# Patient Record
Sex: FEMALE | Race: BLACK OR AFRICAN AMERICAN | Employment: OTHER | URBAN - METROPOLITAN AREA
[De-identification: names, ages, dates, MRNs, and addresses within clinical notes are randomized per-mention and may not be internally consistent; named-entity substitution may affect disease eponyms.]

---

## 2017-01-03 ENCOUNTER — TELEPHONE (OUTPATIENT)
Dept: CARDIOLOGY CLINIC | Age: 62
End: 2017-01-03

## 2017-01-06 ENCOUNTER — PROCEDURE VISIT (OUTPATIENT)
Dept: CARDIOLOGY CLINIC | Age: 62
End: 2017-01-06

## 2017-01-06 DIAGNOSIS — Z95.810 CARDIAC DEFIBRILLATOR IN PLACE: ICD-10-CM

## 2017-01-19 ENCOUNTER — OFFICE VISIT (OUTPATIENT)
Dept: CARDIOLOGY CLINIC | Age: 62
End: 2017-01-19

## 2017-01-19 VITALS
BODY MASS INDEX: 42.69 KG/M2 | OXYGEN SATURATION: 97 % | SYSTOLIC BLOOD PRESSURE: 120 MMHG | HEIGHT: 67 IN | HEART RATE: 109 BPM | DIASTOLIC BLOOD PRESSURE: 62 MMHG | WEIGHT: 272 LBS

## 2017-01-19 DIAGNOSIS — I48.0 PAROXYSMAL ATRIAL FIBRILLATION (HCC): ICD-10-CM

## 2017-01-19 DIAGNOSIS — R06.02 SHORTNESS OF BREATH: Primary | ICD-10-CM

## 2017-01-19 PROCEDURE — 99213 OFFICE O/P EST LOW 20 MIN: CPT | Performed by: NURSE PRACTITIONER

## 2017-01-19 PROCEDURE — 93000 ELECTROCARDIOGRAM COMPLETE: CPT | Performed by: NURSE PRACTITIONER

## 2017-02-06 ENCOUNTER — TELEPHONE (OUTPATIENT)
Dept: CARDIOLOGY CLINIC | Age: 62
End: 2017-02-06

## 2017-04-20 ENCOUNTER — OFFICE VISIT (OUTPATIENT)
Dept: CARDIOLOGY CLINIC | Age: 62
End: 2017-04-20

## 2017-04-20 VITALS
OXYGEN SATURATION: 97 % | SYSTOLIC BLOOD PRESSURE: 140 MMHG | DIASTOLIC BLOOD PRESSURE: 80 MMHG | HEART RATE: 90 BPM | WEIGHT: 274 LBS | BODY MASS INDEX: 43.56 KG/M2

## 2017-04-20 DIAGNOSIS — Z95.810 AUTOMATIC IMPLANTABLE CARDIOVERTER-DEFIBRILLATOR IN SITU: ICD-10-CM

## 2017-04-20 DIAGNOSIS — I48.0 PAROXYSMAL ATRIAL FIBRILLATION (HCC): ICD-10-CM

## 2017-04-20 DIAGNOSIS — R06.02 SHORTNESS OF BREATH: Primary | ICD-10-CM

## 2017-04-20 DIAGNOSIS — I50.22 CHRONIC SYSTOLIC (CONGESTIVE) HEART FAILURE (HCC): ICD-10-CM

## 2017-04-20 DIAGNOSIS — I10 ESSENTIAL HYPERTENSION: ICD-10-CM

## 2017-04-20 PROCEDURE — 93290 INTERROG DEV EVAL ICPMS IP: CPT | Performed by: NURSE PRACTITIONER

## 2017-04-20 PROCEDURE — 99214 OFFICE O/P EST MOD 30 MIN: CPT | Performed by: NURSE PRACTITIONER

## 2017-04-20 RX ORDER — METOPROLOL TARTRATE 100 MG/1
150 TABLET ORAL 2 TIMES DAILY
Qty: 270 TABLET | Refills: 3 | Status: SHIPPED | OUTPATIENT
Start: 2017-04-20 | End: 2018-05-30 | Stop reason: SDUPTHER

## 2017-04-20 RX ORDER — POTASSIUM CHLORIDE 750 MG/1
10 TABLET, EXTENDED RELEASE ORAL DAILY
Qty: 90 TABLET | Refills: 3 | Status: SHIPPED | OUTPATIENT
Start: 2017-04-20 | End: 2018-06-28

## 2017-04-20 RX ORDER — ATORVASTATIN CALCIUM 40 MG/1
40 TABLET, FILM COATED ORAL DAILY
Qty: 90 TABLET | Refills: 3 | Status: SHIPPED | OUTPATIENT
Start: 2017-04-20 | End: 2018-06-28

## 2017-04-20 RX ORDER — LISINOPRIL 5 MG/1
TABLET ORAL
Qty: 90 TABLET | Refills: 3 | Status: SHIPPED | OUTPATIENT
Start: 2017-04-20 | End: 2018-06-28

## 2017-04-20 RX ORDER — FUROSEMIDE 20 MG/1
20 TABLET ORAL DAILY
Qty: 90 TABLET | Refills: 3 | Status: SHIPPED | OUTPATIENT
Start: 2017-04-20 | End: 2018-05-30 | Stop reason: SDUPTHER

## 2017-06-02 ENCOUNTER — TELEPHONE (OUTPATIENT)
Dept: SLEEP MEDICINE | Age: 62
End: 2017-06-02

## 2018-05-30 ENCOUNTER — PROCEDURE VISIT (OUTPATIENT)
Dept: CARDIOLOGY CLINIC | Age: 63
End: 2018-05-30

## 2018-05-30 ENCOUNTER — HOSPITAL ENCOUNTER (OUTPATIENT)
Dept: OTHER | Age: 63
Discharge: HOME | End: 2018-05-30
Attending: NURSE PRACTITIONER | Admitting: NURSE PRACTITIONER

## 2018-05-30 ENCOUNTER — OFFICE VISIT (OUTPATIENT)
Dept: CARDIOLOGY CLINIC | Age: 63
End: 2018-05-30

## 2018-05-30 VITALS
WEIGHT: 275 LBS | BODY MASS INDEX: 43.16 KG/M2 | DIASTOLIC BLOOD PRESSURE: 64 MMHG | OXYGEN SATURATION: 94 % | HEART RATE: 101 BPM | SYSTOLIC BLOOD PRESSURE: 122 MMHG | HEIGHT: 67 IN

## 2018-05-30 DIAGNOSIS — I48.0 PAROXYSMAL ATRIAL FIBRILLATION (HCC): ICD-10-CM

## 2018-05-30 DIAGNOSIS — I47.29 VENTRICULAR TACHYCARDIA, NONSUSTAINED: ICD-10-CM

## 2018-05-30 DIAGNOSIS — R06.02 SHORTNESS OF BREATH: Primary | ICD-10-CM

## 2018-05-30 DIAGNOSIS — Z95.810 CARDIAC DEFIBRILLATOR IN PLACE: ICD-10-CM

## 2018-05-30 DIAGNOSIS — I10 ESSENTIAL HYPERTENSION: ICD-10-CM

## 2018-05-30 LAB
A/G RATIO: 1.1 (ref 1.1–2.2)
ALBUMIN SERPL-MCNC: 4 G/DL (ref 3.4–5)
ALP BLD-CCNC: 92 U/L (ref 40–129)
ALT SERPL-CCNC: 51 U/L (ref 10–40)
ANION GAP SERPL CALCULATED.3IONS-SCNC: 15 MMOL/L (ref 3–16)
AST SERPL-CCNC: 29 U/L (ref 15–37)
BILIRUB SERPL-MCNC: 0.4 MG/DL (ref 0–1)
BUN BLDV-MCNC: 8 MG/DL (ref 7–20)
CALCIUM SERPL-MCNC: 9.3 MG/DL (ref 8.3–10.6)
CHLORIDE BLD-SCNC: 100 MMOL/L (ref 99–110)
CO2: 28 MMOL/L (ref 21–32)
CREAT SERPL-MCNC: 0.5 MG/DL (ref 0.6–1.2)
GFR AFRICAN AMERICAN: >60
GFR NON-AFRICAN AMERICAN: >60
GLOBULIN: 3.7 G/DL
GLUCOSE BLD-MCNC: 92 MG/DL (ref 70–99)
MAGNESIUM: 2 MG/DL (ref 1.8–2.4)
POTASSIUM SERPL-SCNC: 3.5 MMOL/L (ref 3.5–5.1)
SODIUM BLD-SCNC: 143 MMOL/L (ref 136–145)
TOTAL PROTEIN: 7.7 G/DL (ref 6.4–8.2)
TSH SERPL DL<=0.05 MIU/L-ACNC: 5.64 UIU/ML (ref 0.27–4.2)

## 2018-05-30 PROCEDURE — 99214 OFFICE O/P EST MOD 30 MIN: CPT | Performed by: NURSE PRACTITIONER

## 2018-05-30 PROCEDURE — 93283 PRGRMG EVAL IMPLANTABLE DFB: CPT | Performed by: INTERNAL MEDICINE

## 2018-05-30 RX ORDER — ATORVASTATIN CALCIUM 40 MG/1
40 TABLET, FILM COATED ORAL DAILY
Qty: 90 TABLET | Refills: 3 | Status: CANCELLED | OUTPATIENT
Start: 2018-05-30

## 2018-05-30 RX ORDER — METOPROLOL TARTRATE 100 MG/1
150 TABLET ORAL 2 TIMES DAILY
Qty: 270 TABLET | Refills: 3 | Status: CANCELLED | OUTPATIENT
Start: 2018-05-30

## 2018-05-30 RX ORDER — FUROSEMIDE 20 MG/1
20 TABLET ORAL DAILY
Qty: 30 TABLET | Refills: 3 | Status: SHIPPED | OUTPATIENT
Start: 2018-05-30 | End: 2018-06-28 | Stop reason: SDUPTHER

## 2018-05-30 RX ORDER — METOPROLOL TARTRATE 50 MG/1
50 TABLET, FILM COATED ORAL 2 TIMES DAILY
Qty: 60 TABLET | Refills: 3 | Status: SHIPPED | OUTPATIENT
Start: 2018-05-30 | End: 2018-06-28 | Stop reason: SDUPTHER

## 2018-05-30 RX ORDER — FUROSEMIDE 20 MG/1
20 TABLET ORAL DAILY
Qty: 90 TABLET | Refills: 3 | Status: CANCELLED | OUTPATIENT
Start: 2018-05-30

## 2018-05-30 RX ORDER — POTASSIUM CHLORIDE 750 MG/1
10 TABLET, EXTENDED RELEASE ORAL DAILY
Qty: 90 TABLET | Refills: 3 | Status: CANCELLED | OUTPATIENT
Start: 2018-05-30

## 2018-05-30 RX ORDER — LISINOPRIL 5 MG/1
TABLET ORAL
Qty: 90 TABLET | Refills: 3 | Status: CANCELLED | OUTPATIENT
Start: 2018-05-30

## 2018-06-01 ENCOUNTER — TELEPHONE (OUTPATIENT)
Dept: CARDIOLOGY CLINIC | Age: 63
End: 2018-06-01

## 2018-06-01 DIAGNOSIS — E78.5 HYPERLIPIDEMIA, UNSPECIFIED HYPERLIPIDEMIA TYPE: Primary | ICD-10-CM

## 2018-06-07 ENCOUNTER — TELEPHONE (OUTPATIENT)
Dept: CARDIOLOGY CLINIC | Age: 63
End: 2018-06-07

## 2018-06-28 ENCOUNTER — OFFICE VISIT (OUTPATIENT)
Dept: CARDIOLOGY CLINIC | Age: 63
End: 2018-06-28

## 2018-06-28 VITALS
OXYGEN SATURATION: 97 % | HEIGHT: 66 IN | BODY MASS INDEX: 44.37 KG/M2 | SYSTOLIC BLOOD PRESSURE: 122 MMHG | WEIGHT: 276.1 LBS | DIASTOLIC BLOOD PRESSURE: 72 MMHG | HEART RATE: 98 BPM

## 2018-06-28 DIAGNOSIS — I48.0 PAROXYSMAL ATRIAL FIBRILLATION (HCC): ICD-10-CM

## 2018-06-28 DIAGNOSIS — I10 ESSENTIAL HYPERTENSION: ICD-10-CM

## 2018-06-28 DIAGNOSIS — R06.02 SHORTNESS OF BREATH: Primary | ICD-10-CM

## 2018-06-28 DIAGNOSIS — R79.89 ELEVATED TSH: ICD-10-CM

## 2018-06-28 PROCEDURE — 99214 OFFICE O/P EST MOD 30 MIN: CPT | Performed by: NURSE PRACTITIONER

## 2018-06-28 RX ORDER — FUROSEMIDE 20 MG/1
20 TABLET ORAL DAILY
Qty: 90 TABLET | Refills: 3 | Status: SHIPPED | OUTPATIENT
Start: 2018-06-28 | End: 2019-04-02 | Stop reason: DRUGHIGH

## 2018-06-28 RX ORDER — LISINOPRIL 2.5 MG/1
2.5 TABLET ORAL DAILY
Qty: 90 TABLET | Refills: 1 | Status: SHIPPED | OUTPATIENT
Start: 2018-06-28 | End: 2019-04-02 | Stop reason: SDUPTHER

## 2018-06-28 RX ORDER — IBUPROFEN 200 MG
800 TABLET ORAL EVERY 8 HOURS PRN
Status: ON HOLD | COMMUNITY
End: 2020-02-29 | Stop reason: HOSPADM

## 2018-06-28 RX ORDER — ATORVASTATIN CALCIUM 40 MG/1
40 TABLET, FILM COATED ORAL DAILY
Qty: 90 TABLET | Refills: 3 | Status: SHIPPED | OUTPATIENT
Start: 2018-06-28 | End: 2019-04-02 | Stop reason: SDUPTHER

## 2018-06-28 RX ORDER — METOPROLOL TARTRATE 50 MG/1
50 TABLET, FILM COATED ORAL 2 TIMES DAILY
Qty: 180 TABLET | Refills: 3 | Status: SHIPPED | OUTPATIENT
Start: 2018-06-28 | End: 2019-04-02 | Stop reason: SDUPTHER

## 2018-09-21 ENCOUNTER — TELEPHONE (OUTPATIENT)
Dept: CARDIOLOGY CLINIC | Age: 63
End: 2018-09-21

## 2018-09-21 NOTE — TELEPHONE ENCOUNTER
Medication Refill    When was your last appointment with cardiology?  (if 1year or longer, please schedule an appointment)    Medication needing refilled:eliquis     Doseage of the medication:    How are you taking this medication (QD, BID, TID, QID, PRN):    Patient want a 30 or 90 day supply called in:    Which Pharmacy are we sending the medication to:[facility] outpatient pharm     Pharmacy Phone number:    Pharmacy Fax number:  Efarin is paying for her rx today because the patient cant afford it . Efrain wants to know how to get this patient on the patient assistance program so she can get the eliquis cheaper .  pls call Reuben Zavala

## 2018-10-03 ENCOUNTER — HOSPITAL ENCOUNTER (OUTPATIENT)
Age: 63
Discharge: HOME | End: 2018-10-03

## 2018-10-03 ENCOUNTER — OFFICE VISIT (OUTPATIENT)
Dept: CARDIOLOGY CLINIC | Age: 63
End: 2018-10-03
Payer: MEDICARE

## 2018-10-03 VITALS
HEIGHT: 63 IN | DIASTOLIC BLOOD PRESSURE: 78 MMHG | SYSTOLIC BLOOD PRESSURE: 136 MMHG | BODY MASS INDEX: 48.64 KG/M2 | HEART RATE: 80 BPM | WEIGHT: 274.5 LBS

## 2018-10-03 DIAGNOSIS — I10 ESSENTIAL HYPERTENSION: ICD-10-CM

## 2018-10-03 DIAGNOSIS — I47.29 VENTRICULAR TACHYCARDIA, NONSUSTAINED: ICD-10-CM

## 2018-10-03 DIAGNOSIS — Z00.00 WELLNESS EXAMINATION: ICD-10-CM

## 2018-10-03 DIAGNOSIS — R79.89 ELEVATED TSH: ICD-10-CM

## 2018-10-03 DIAGNOSIS — I48.0 PAROXYSMAL ATRIAL FIBRILLATION (HCC): Primary | ICD-10-CM

## 2018-10-03 LAB
A/G RATIO: 1.1 (ref 1.1–2.2)
ALBUMIN SERPL-MCNC: 4 G/DL (ref 3.4–5)
ALP BLD-CCNC: 99 U/L (ref 40–129)
ALT SERPL-CCNC: 14 U/L (ref 10–40)
ANION GAP SERPL CALCULATED.3IONS-SCNC: 14 MMOL/L (ref 3–16)
AST SERPL-CCNC: 18 U/L (ref 15–37)
BILIRUB SERPL-MCNC: 0.4 MG/DL (ref 0–1)
BUN BLDV-MCNC: 9 MG/DL (ref 7–20)
CALCIUM SERPL-MCNC: 9.4 MG/DL (ref 8.3–10.6)
CHLORIDE BLD-SCNC: 102 MMOL/L (ref 99–110)
CO2: 27 MMOL/L (ref 21–32)
CREAT SERPL-MCNC: 0.6 MG/DL (ref 0.6–1.2)
GFR AFRICAN AMERICAN: >60
GFR NON-AFRICAN AMERICAN: >60
GLOBULIN: 3.8 G/DL
GLUCOSE BLD-MCNC: 91 MG/DL (ref 70–99)
POTASSIUM SERPL-SCNC: 3.5 MMOL/L (ref 3.5–5.1)
SODIUM BLD-SCNC: 143 MMOL/L (ref 136–145)
T4 FREE: 1.2 NG/DL (ref 0.9–1.8)
TOTAL PROTEIN: 7.8 G/DL (ref 6.4–8.2)
TSH SERPL DL<=0.05 MIU/L-ACNC: 4.14 UIU/ML (ref 0.27–4.2)

## 2018-10-03 PROCEDURE — 36415 COLL VENOUS BLD VENIPUNCTURE: CPT

## 2018-10-03 PROCEDURE — 80053 COMPREHEN METABOLIC PANEL: CPT

## 2018-10-03 PROCEDURE — 84439 ASSAY OF FREE THYROXINE: CPT

## 2018-10-03 PROCEDURE — 99214 OFFICE O/P EST MOD 30 MIN: CPT | Performed by: NURSE PRACTITIONER

## 2018-10-03 PROCEDURE — 84443 ASSAY THYROID STIM HORMONE: CPT

## 2018-10-03 NOTE — PROGRESS NOTES
edema, no calf tenderness. Pulses are present bilaterally. DATA:      [id number] Score for Atrial Fibrillation Stroke Risk   Risk   Factors  Component Value   C CHF Yes 1   H HTN Yes 1   A2 Age >= 76 No,  (61 y.o.) 0   D DM No 0   S2 Prior Stroke/TIA No 0   V Vascular Disease No 0   A Age 74-69 No,  (61 y.o.) 0   Sc Sex female 1    [id number]  Score  3   Score last updated 10/3/18 8:13 AM      Lab Results   Component Value Date     05/30/2018    K 3.5 05/30/2018     05/30/2018    CO2 28 05/30/2018    BUN 8 05/30/2018    CREATININE 0.5 05/30/2018    GLUCOSE 92 05/30/2018    CALCIUM 9.3 05/30/2018      Lab Results   Component Value Date    TSH 5.64 (H) 05/30/2018     Lab Results   Component Value Date    MG 2.00 05/30/2018       Radiology Review:  Pertinent images / reports were reviewed as a part of this visit and reveals the following:      Last Echo: 2/29/16:  Summary   -LV function analysis is limited due to difficult endocardial visualization.   -Mild concentric left ventricular hypertrophy is present.   -Global ejection fraction is borderline and estimated around 50 %.  -There is reversal of E/A inflow velocities across the mitral valve   suggesting impaired left ventricular relaxation.   -E/e'= 12.2 .   -There is mild mitral and tricuspid regurgitation with RVSP estimated at 35   mmHg.   -The left atrium is at the upper limits of normal in size.   -Pacer / ICD wire is visualized in the right heart. Last Stress Test: 12/12/16:  Summary    -There is a small anterior apical defect that is partially reversible.    -This is likely due to artifact due to shadowing from bowel and liver.    -However, a very small area of ischemia cannot be excluded. -There is mild LV dysfunction with EF= 52%.    -This is a low risk scan. Stress Protocols      Resting ECG  Normal sinus rhythm.       Resting HR:82 bpm    Resting BP:113/71 mmHg     Stress Protocol:Pharmacologic - Lexiscan's      Peak HR:113 bleeding was discussed. Daily weight, low sodium diet were discussed. Patient instructed to call the office with a weight gain: > 3 # over night or 5# in one week; swelling, SOB/orthopnea/PND    The patient verbalizes understanding not to stop medications without discussing with us. Discussed exercise: 30-60 minutes 7 days/week  Discussed Low saturated fat/WILBER diet. Drinks > 2 L / day. Drinks a lot of water or sweet tea    Thank you for allowing to us to participate in the care of [employer].     Audi Tipton CNP    Documentation of today's visit sent to PCP >> no PCP

## 2019-03-18 ENCOUNTER — TELEPHONE (OUTPATIENT)
Dept: CARDIOLOGY CLINIC | Age: 64
End: 2019-03-18

## 2019-04-02 ENCOUNTER — OFFICE VISIT (OUTPATIENT)
Dept: CARDIOLOGY CLINIC | Age: 64
End: 2019-04-02
Payer: MEDICARE

## 2019-04-02 ENCOUNTER — HOSPITAL ENCOUNTER (OUTPATIENT)
Age: 64
Discharge: HOME | End: 2019-04-02
Payer: MEDICARE

## 2019-04-02 ENCOUNTER — PROCEDURE VISIT (OUTPATIENT)
Dept: CARDIOLOGY CLINIC | Age: 64
End: 2019-04-02
Payer: MEDICARE

## 2019-04-02 ENCOUNTER — HOSPITAL ENCOUNTER (OUTPATIENT)
Dept: NON INVASIVE DIAGNOSTICS | Age: 64
Discharge: HOME | End: 2019-04-02
Payer: MEDICARE

## 2019-04-02 VITALS
DIASTOLIC BLOOD PRESSURE: 62 MMHG | OXYGEN SATURATION: 93 % | WEIGHT: 280 LBS | BODY MASS INDEX: 46.65 KG/M2 | HEIGHT: 65 IN | HEART RATE: 102 BPM | SYSTOLIC BLOOD PRESSURE: 118 MMHG

## 2019-04-02 DIAGNOSIS — Z95.810 CARDIAC DEFIBRILLATOR IN PLACE: ICD-10-CM

## 2019-04-02 DIAGNOSIS — I47.29 VENTRICULAR TACHYCARDIA, NONSUSTAINED: ICD-10-CM

## 2019-04-02 DIAGNOSIS — I10 ESSENTIAL HYPERTENSION: ICD-10-CM

## 2019-04-02 DIAGNOSIS — E87.79 OTHER HYPERVOLEMIA: ICD-10-CM

## 2019-04-02 DIAGNOSIS — Z79.899 LONG-TERM USE OF HIGH-RISK MEDICATION: ICD-10-CM

## 2019-04-02 DIAGNOSIS — I48.0 PAROXYSMAL ATRIAL FIBRILLATION (HCC): Primary | ICD-10-CM

## 2019-04-02 LAB
A/G RATIO: 1 (ref 1.1–2.2)
ALBUMIN SERPL-MCNC: 4 G/DL (ref 3.4–5)
ALP BLD-CCNC: 92 U/L (ref 40–129)
ALT SERPL-CCNC: 11 U/L (ref 10–40)
ANION GAP SERPL CALCULATED.3IONS-SCNC: 12 MMOL/L (ref 3–16)
AST SERPL-CCNC: 12 U/L (ref 15–37)
BILIRUB SERPL-MCNC: <0.2 MG/DL (ref 0–1)
BUN BLDV-MCNC: 9 MG/DL (ref 7–20)
CALCIUM SERPL-MCNC: 9.4 MG/DL (ref 8.3–10.6)
CHLORIDE BLD-SCNC: 104 MMOL/L (ref 99–110)
CHOLESTEROL, TOTAL: 165 MG/DL (ref 0–199)
CO2: 27 MMOL/L (ref 21–32)
CREAT SERPL-MCNC: 0.6 MG/DL (ref 0.6–1.2)
GFR AFRICAN AMERICAN: >60
GFR NON-AFRICAN AMERICAN: >60
GLOBULIN: 4.2 G/DL
GLUCOSE BLD-MCNC: 101 MG/DL (ref 70–99)
HCT VFR BLD CALC: 41.7 % (ref 36–48)
HDLC SERPL-MCNC: 52 MG/DL (ref 40–60)
HEMOGLOBIN: 13 G/DL (ref 12–16)
LDL CHOLESTEROL CALCULATED: 97 MG/DL
LV EF: 33 %
LVEF MODALITY: NORMAL
MAGNESIUM: 2.2 MG/DL (ref 1.8–2.4)
MCH RBC QN AUTO: 26.8 PG (ref 26–34)
MCHC RBC AUTO-ENTMCNC: 31.2 G/DL (ref 31–36)
MCV RBC AUTO: 85.7 FL (ref 80–100)
PDW BLD-RTO: 14.5 % (ref 12.4–15.4)
PLATELET # BLD: 332 K/UL (ref 135–450)
PMV BLD AUTO: 9.1 FL (ref 5–10.5)
POTASSIUM SERPL-SCNC: 3.7 MMOL/L (ref 3.5–5.1)
RBC # BLD: 4.86 M/UL (ref 4–5.2)
SODIUM BLD-SCNC: 143 MMOL/L (ref 136–145)
TOTAL PROTEIN: 8.2 G/DL (ref 6.4–8.2)
TRIGL SERPL-MCNC: 80 MG/DL (ref 0–150)
VLDLC SERPL CALC-MCNC: 16 MG/DL
WBC # BLD: 17.7 K/UL (ref 4–11)

## 2019-04-02 PROCEDURE — C8929 TTE W OR WO FOL WCON,DOPPLER: HCPCS

## 2019-04-02 PROCEDURE — 99214 OFFICE O/P EST MOD 30 MIN: CPT | Performed by: NURSE PRACTITIONER

## 2019-04-02 PROCEDURE — G8400 PT W/DXA NO RESULTS DOC: HCPCS | Performed by: NURSE PRACTITIONER

## 2019-04-02 PROCEDURE — 36415 COLL VENOUS BLD VENIPUNCTURE: CPT

## 2019-04-02 PROCEDURE — 1090F PRES/ABSN URINE INCON ASSESS: CPT | Performed by: NURSE PRACTITIONER

## 2019-04-02 PROCEDURE — 85027 COMPLETE CBC AUTOMATED: CPT

## 2019-04-02 PROCEDURE — G8598 ASA/ANTIPLAT THER USED: HCPCS | Performed by: NURSE PRACTITIONER

## 2019-04-02 PROCEDURE — 4040F PNEUMOC VAC/ADMIN/RCVD: CPT | Performed by: NURSE PRACTITIONER

## 2019-04-02 PROCEDURE — 6360000004 HC RX CONTRAST MEDICATION: Performed by: INTERNAL MEDICINE

## 2019-04-02 PROCEDURE — 3017F COLORECTAL CA SCREEN DOC REV: CPT | Performed by: NURSE PRACTITIONER

## 2019-04-02 PROCEDURE — 83735 ASSAY OF MAGNESIUM: CPT

## 2019-04-02 PROCEDURE — 1036F TOBACCO NON-USER: CPT | Performed by: NURSE PRACTITIONER

## 2019-04-02 PROCEDURE — 93283 PRGRMG EVAL IMPLANTABLE DFB: CPT | Performed by: INTERNAL MEDICINE

## 2019-04-02 PROCEDURE — 80053 COMPREHEN METABOLIC PANEL: CPT

## 2019-04-02 PROCEDURE — 80061 LIPID PANEL: CPT

## 2019-04-02 PROCEDURE — G8427 DOCREV CUR MEDS BY ELIG CLIN: HCPCS | Performed by: NURSE PRACTITIONER

## 2019-04-02 PROCEDURE — G8417 CALC BMI ABV UP PARAM F/U: HCPCS | Performed by: NURSE PRACTITIONER

## 2019-04-02 PROCEDURE — 1123F ACP DISCUSS/DSCN MKR DOCD: CPT | Performed by: NURSE PRACTITIONER

## 2019-04-02 RX ORDER — FUROSEMIDE 20 MG/1
20 TABLET ORAL DAILY
Qty: 90 TABLET | Refills: 3 | Status: CANCELLED | OUTPATIENT
Start: 2019-04-02

## 2019-04-02 RX ORDER — METOPROLOL TARTRATE 50 MG/1
75 TABLET, FILM COATED ORAL 2 TIMES DAILY
Qty: 270 TABLET | Refills: 3 | Status: ON HOLD
Start: 2019-04-02 | End: 2020-08-13 | Stop reason: HOSPADM

## 2019-04-02 RX ORDER — ATORVASTATIN CALCIUM 40 MG/1
40 TABLET, FILM COATED ORAL DAILY
Qty: 90 TABLET | Refills: 3 | Status: SHIPPED | OUTPATIENT
Start: 2019-04-02 | End: 2020-08-24

## 2019-04-02 RX ORDER — FUROSEMIDE 20 MG/1
20 TABLET ORAL EVERY OTHER DAY
Qty: 45 TABLET | Refills: 3 | Status: ON HOLD
Start: 2019-04-02 | End: 2020-08-18 | Stop reason: HOSPADM

## 2019-04-02 RX ORDER — LISINOPRIL 2.5 MG/1
2.5 TABLET ORAL DAILY
Qty: 90 TABLET | Refills: 1 | Status: SHIPPED | OUTPATIENT
Start: 2019-04-02 | End: 2019-11-06 | Stop reason: SDUPTHER

## 2019-04-02 RX ADMIN — PERFLUTREN 1.65 MG: 6.52 INJECTION, SUSPENSION INTRAVENOUS at 12:09

## 2019-04-02 NOTE — PROGRESS NOTES
TABS tablet Take 1 tablet by mouth 2 times daily 60 tablet 5    ibuprofen (ADVIL;MOTRIN) 200 MG tablet Take 400 mg by mouth daily       lisinopril (PRINIVIL;ZESTRIL) 2.5 MG tablet Take 1 tablet by mouth daily 90 tablet 1    atorvastatin (LIPITOR) 40 MG tablet Take 1 tablet by mouth daily 90 tablet 3    metoprolol tartrate (LOPRESSOR) 50 MG tablet Take 1 tablet by mouth 2 times daily 180 tablet 3    furosemide (LASIX) 20 MG tablet Take 1 tablet by mouth daily 90 tablet 3     No current facility-administered medications for this visit. REVIEW OF SYSTEMS:    CONSTITUTIONAL: No major weight gain or loss, fatigue, weakness, night sweats or fever. HEENT: No new vision difficulties or ringing in the ears. RESPIRATORY: No new SOB, PND, orthopnea or cough. CARDIOVASCULAR: See HPI  GI: No nausea, vomiting, diarrhea, constipation, abdominal pain or changes in bowel habits. : No urinary frequency, urgency, incontinence hematuria or dysuria. SKIN: No cyanosis or skin lesions. MUSCULOSKELETAL: Lt shoulder pain . NEUROLOGICAL: No syncope or TIA-like symptoms. PSYCHIATRIC: No anxiety, pain, insomnia or depression    Objective:   PHYSICAL EXAM:        Vitals:    04/02/19 1027 04/02/19 1047   BP: 130/70 118/62   Pulse: 102    SpO2: 93%    Weight: 280 lb (127 kg)    Height: 5' 5\" (1.651 m)       BMI 43.72 kg/m2     CONSTITUTIONAL: Cooperative, no apparent distress, and appears well nourished / obese  NEUROLOGIC:  Awake and orientated to person, place and time. PSYCH: Calm affect  SKIN: Warm and dry. HEENT: Sclera non-icteric, normocephalic, neck supple, no elevation of JVP, normal carotid pulses with no bruits and thyroid normal size. LUNGS:  No increased work of breathing and clear to auscultation, no crackles or wheezing  CARDIOVASCULAR:  Regular rate 104 and rhythm with no murmurs, gallops, rubs, or abnormal heart sounds, normal PMI. The apical impulses not displaced  JVP less than 8 cm H2O  Heart tones are crisp and normal  Cervical veins are not engorged  The carotid upstroke is normal in amplitude and contour without delay or bruit  JVP is not elevated  ABDOMEN:  Normal bowel sounds, non-distended and non-tender to palpation  EXT: no  edema, no calf tenderness. Pulses are present bilaterally. DATA:      [id number] Score for Atrial Fibrillation Stroke Risk   Risk   Factors  Component Value   C CHF Yes 1   H HTN Yes 1   A2 Age >= 76 No,  (65 y.o.) 0   D DM No 0   S2 Prior Stroke/TIA No 0   V Vascular Disease No 0   A Age 74-69 Yes,  (65 y.o.) 1   Sc Sex female 1    [id number]  Score  4   Score last updated 4/4/19 7:51 AM      Lab Results   Component Value Date     10/03/2018    K 3.5 10/03/2018     10/03/2018    CO2 27 10/03/2018    BUN 9 10/03/2018    CREATININE 0.6 10/03/2018    GLUCOSE 91 10/03/2018    CALCIUM 9.4 10/03/2018      Lab Results   Component Value Date    TSH 4.14 10/03/2018     Lab Results   Component Value Date    MG 2.00 05/30/2018       Radiology Review:  Pertinent images / reports were reviewed as a part of this visit and reveals the following:      Last Echo: 2/29/16:  Summary   -LV function analysis is limited due to difficult endocardial visualization.   -Mild concentric left ventricular hypertrophy is present.   -Global ejection fraction is borderline and estimated around 50 %.  -There is reversal of E/A inflow velocities across the mitral valve   suggesting impaired left ventricular relaxation.   -E/e'= 12.2 .   -There is mild mitral and tricuspid regurgitation with RVSP estimated at 35   mmHg.   -The left atrium is at the upper limits of normal in size.   -Pacer / ICD wire is visualized in the right heart. Last Stress Test: 12/12/16:  Summary    -There is a small anterior apical defect that is partially reversible.    -This is likely due to artifact due to shadowing from bowel and liver.    -However, a very small area of ischemia cannot be excluded.     -There is mild LV dysfunction with EF= 52%.    -This is a low risk scan. Stress Protocols      Resting ECG  Normal sinus rhythm. Resting HR:82 bpm    Resting BP:113/71 mmHg     Stress Protocol:Pharmacologic - Lexiscan's      Peak HR:113 bpm                             HR/BP product:[id number]    Peak BP:121/63 mmHg    Predicted HR: 158 bpm  % of predicted HR: 72    Test duration: 4 min    Reason for termination:Completed      ECG Findings  Normal response to lexiscan . Gated   Imaging Results     Stress ejection   Ejection fraction:52 %    Device interrogation : 4/2/19:   ~Arrhythmia in Dec : confirmed as AT and ATP terminated  ~AS- 0.09%  ~AP-VS 0.33%  ~AP- 0.02 %    Assessment:     1. Paroxysmal atrial fibrillation (HCC)   ~regular to auscultation   ~no AF noted on device interrogation today. Last episode of AT was in Dec '18 with ATP termination  ~ASA 81 mg daily & Eliquis   ~CHADs 4    2. Essential HTN   ~controlled in office today ; suboptimal when presenting to ER 3/31 at 158/75 (presented with c/o hip/back pain > tx with prednisone, percocet and flexeril)   ~lisinopril / metoprolol   3. VT   ~non noted on device interrogation today  ~denies device firing   ~ Optival interrogation today: decompensation late Aug through mid-September. Does not recall having any associated swelling and \"always\" SOB. Unable to tolerate higher dose of lasix d/t excessive urination / inct on low dose  ~EF 52% by LexiScan '16  ~offers no c/o chest discomfort    4. Volume overload    ~episode of decompensation end-Dec first of Jan '19 noted on Optival impedence   ~episode of AT with ATP around the time of being volume up   ~pt has stopped taking lasix d/t freq urination ; also takes ibuprofen often      I had the opportunity to review the clinical symptoms and presentation of Jose Alberto Underwood. Plan:     1. CBC/CMP/Mg+/lipid profile  2. Agreeable to resume lasix at qod 20 mg  3. Increase metoprolol to 75 mg bid: HR  4.  F/u in 3 months with device check   ~echo as planned for today    Overall the patient is stable from CV standpoint    I have addresed the patient's cardiac risk factors and adjusted pharmacologic treatment as needed. In addition, I have reinforced the need for patient directed risk factor modification. Further evaluation will be based upon the patient's clinical course and testing results. All questions and concerns were addressed to the patient/niece, RN . Alternatives to my treatment were discussed. The patient is not currently smoking. The risks related to smoking were reviewed with the patient. Recommend maintaining a smoke-free lifestyle. .    Patient is on a beta-blocker   Patient is on an ace-I     Patient is on a statin     Antiplatelet therapy / anti-coagulation has been recommended / prescribed for this patient. Education conducted on adverse reactions including bleeding was discussed. Daily weight, low sodium diet were discussed. Patient instructed to call the office with a weight gain: > 3 # over night or 5# in one week; swelling, SOB/orthopnea/PND    The patient verbalizes understanding not to stop medications without discussing with us. Discussed exercise: 30-60 minutes 7 days/week  Discussed Low saturated fat/WILBER diet. Drinks > 2 L / day. Drinks a lot of water or sweet tea    Thank you for allowing to us to participate in the care of [employer].     Cisco Santana, APRN-CNP    Documentation of today's visit sent to PCP >> no PCP

## 2019-04-02 NOTE — PATIENT INSTRUCTIONS
Labs today after you have your echo    Increase metoprolol to 75 mg or 1 1/2 tablets twice a day    Resume lasix at 20 mg every other day    appt in three months with Yojana Mathis and myself

## 2019-04-02 NOTE — PROGRESS NOTES
Patient comes in for programming evaluation for her defibrillator. All sensing and pacing parameters are within normal range. Pt had recordings of AT back in December that was ATP terminated. She remains on Eliquis. No changes need to be made at this time. Please see interrogation for more detail. Patient will follow up in 3 months in office or remotely. Optivol is within normal range.

## 2019-04-05 ENCOUNTER — HOSPITAL ENCOUNTER (EMERGENCY)
Age: 64
Discharge: HOME | End: 2019-04-05
Attending: EMERGENCY MEDICINE
Payer: MEDICARE

## 2019-04-05 ENCOUNTER — APPOINTMENT (OUTPATIENT)
Dept: GENERAL RADIOLOGY | Age: 64
End: 2019-04-05
Payer: MEDICARE

## 2019-04-05 ENCOUNTER — TELEPHONE (OUTPATIENT)
Dept: CARDIOLOGY CLINIC | Age: 64
End: 2019-04-05

## 2019-04-05 VITALS
HEART RATE: 86 BPM | TEMPERATURE: 97.7 F | SYSTOLIC BLOOD PRESSURE: 114 MMHG | BODY MASS INDEX: 46.65 KG/M2 | RESPIRATION RATE: 15 BRPM | OXYGEN SATURATION: 97 % | WEIGHT: 280 LBS | DIASTOLIC BLOOD PRESSURE: 50 MMHG | HEIGHT: 65 IN

## 2019-04-05 DIAGNOSIS — I48.0 PAROXYSMAL ATRIAL FIBRILLATION (HCC): ICD-10-CM

## 2019-04-05 DIAGNOSIS — M25.561 ACUTE PAIN OF RIGHT KNEE: ICD-10-CM

## 2019-04-05 DIAGNOSIS — I50.42 CHRONIC COMBINED SYSTOLIC AND DIASTOLIC HEART FAILURE (HCC): ICD-10-CM

## 2019-04-05 DIAGNOSIS — R42 DIZZINESS: ICD-10-CM

## 2019-04-05 DIAGNOSIS — I42.8 OTHER CARDIOMYOPATHY (HCC): Primary | ICD-10-CM

## 2019-04-05 DIAGNOSIS — R11.0 NAUSEA: ICD-10-CM

## 2019-04-05 DIAGNOSIS — N89.8 VAGINAL ITCHING: ICD-10-CM

## 2019-04-05 DIAGNOSIS — R51.9 ACUTE NONINTRACTABLE HEADACHE, UNSPECIFIED HEADACHE TYPE: Primary | ICD-10-CM

## 2019-04-05 DIAGNOSIS — R68.83 CHILLS: ICD-10-CM

## 2019-04-05 DIAGNOSIS — R53.83 FATIGUE, UNSPECIFIED TYPE: ICD-10-CM

## 2019-04-05 DIAGNOSIS — D72.829 LEUKOCYTOSIS, UNSPECIFIED TYPE: ICD-10-CM

## 2019-04-05 LAB
A/G RATIO: 1 (ref 1.1–2.2)
ALBUMIN SERPL-MCNC: 3.8 G/DL (ref 3.4–5)
ALP BLD-CCNC: 92 U/L (ref 40–129)
ALT SERPL-CCNC: 15 U/L (ref 10–40)
ANION GAP SERPL CALCULATED.3IONS-SCNC: 10 MMOL/L (ref 3–16)
APTT: 27.4 SEC (ref 26–36)
AST SERPL-CCNC: 15 U/L (ref 15–37)
BACTERIA WET PREP: NORMAL
BASOPHILS ABSOLUTE: 0.2 K/UL (ref 0–0.2)
BASOPHILS RELATIVE PERCENT: 0.8 %
BILIRUB SERPL-MCNC: 0.3 MG/DL (ref 0–1)
BILIRUBIN URINE: NEGATIVE
BLOOD, URINE: NEGATIVE
BUN BLDV-MCNC: 11 MG/DL (ref 7–20)
CALCIUM SERPL-MCNC: 9.3 MG/DL (ref 8.3–10.6)
CHLORIDE BLD-SCNC: 102 MMOL/L (ref 99–110)
CLARITY: CLEAR
CLUE CELLS: NORMAL
CO2: 29 MMOL/L (ref 21–32)
COLOR: YELLOW
CREAT SERPL-MCNC: 0.7 MG/DL (ref 0.6–1.2)
EKG ATRIAL RATE: 92 BPM
EKG DIAGNOSIS: NORMAL
EKG P AXIS: 77 DEGREES
EKG P-R INTERVAL: 160 MS
EKG Q-T INTERVAL: 368 MS
EKG QRS DURATION: 96 MS
EKG QTC CALCULATION (BAZETT): 455 MS
EKG R AXIS: -7 DEGREES
EKG T AXIS: 59 DEGREES
EKG VENTRICULAR RATE: 92 BPM
EOSINOPHILS ABSOLUTE: 0.1 K/UL (ref 0–0.6)
EOSINOPHILS RELATIVE PERCENT: 0.6 %
EPITHELIAL CELLS WET PREP: NORMAL
GFR AFRICAN AMERICAN: >60
GFR NON-AFRICAN AMERICAN: >60
GLOBULIN: 4 G/DL
GLUCOSE BLD-MCNC: 92 MG/DL (ref 70–99)
GLUCOSE URINE: NEGATIVE MG/DL
HCT VFR BLD CALC: 44.3 % (ref 36–48)
HEMOGLOBIN: 13.9 G/DL (ref 12–16)
INR BLD: 1.05 (ref 0.86–1.14)
KETONES, URINE: NEGATIVE MG/DL
LACTIC ACID: 1 MMOL/L (ref 0.4–2)
LACTIC ACID: 2.2 MMOL/L (ref 0.4–2)
LEUKOCYTE ESTERASE, URINE: NEGATIVE
LYMPHOCYTES ABSOLUTE: 4.3 K/UL (ref 1–5.1)
LYMPHOCYTES RELATIVE PERCENT: 18.5 %
MCH RBC QN AUTO: 27 PG (ref 26–34)
MCHC RBC AUTO-ENTMCNC: 31.5 G/DL (ref 31–36)
MCV RBC AUTO: 85.5 FL (ref 80–100)
MICROSCOPIC EXAMINATION: NORMAL
MONOCYTES ABSOLUTE: 1.5 K/UL (ref 0–1.3)
MONOCYTES RELATIVE PERCENT: 6.3 %
NEUTROPHILS ABSOLUTE: 17 K/UL (ref 1.7–7.7)
NEUTROPHILS RELATIVE PERCENT: 73.8 %
NITRITE, URINE: NEGATIVE
PDW BLD-RTO: 14.5 % (ref 12.4–15.4)
PH UA: 7 (ref 5–8)
PLATELET # BLD: 337 K/UL (ref 135–450)
PMV BLD AUTO: 8.6 FL (ref 5–10.5)
POTASSIUM SERPL-SCNC: 4 MMOL/L (ref 3.5–5.1)
PRO-BNP: 80 PG/ML (ref 0–124)
PROTEIN UA: NEGATIVE MG/DL
PROTHROMBIN TIME: 12 SEC (ref 9.8–13)
RAPID INFLUENZA  B AGN: NEGATIVE
RAPID INFLUENZA A AGN: NEGATIVE
RBC # BLD: 5.17 M/UL (ref 4–5.2)
RBC WET PREP: NORMAL
SODIUM BLD-SCNC: 141 MMOL/L (ref 136–145)
SOURCE WET PREP: NORMAL
SPECIFIC GRAVITY UA: 1.01 (ref 1–1.03)
TOTAL PROTEIN: 7.8 G/DL (ref 6.4–8.2)
TRICHOMONAS PREP: NORMAL
TROPONIN: <0.01 NG/ML
URINE REFLEX TO CULTURE: NORMAL
URINE TYPE: NORMAL
UROBILINOGEN, URINE: 0.2 E.U./DL
WBC # BLD: 23.1 K/UL (ref 4–11)
WBC WET PREP: NORMAL
YEAST WET PREP: NORMAL

## 2019-04-05 PROCEDURE — 81003 URINALYSIS AUTO W/O SCOPE: CPT

## 2019-04-05 PROCEDURE — 87804 INFLUENZA ASSAY W/OPTIC: CPT

## 2019-04-05 PROCEDURE — 87210 SMEAR WET MOUNT SALINE/INK: CPT

## 2019-04-05 PROCEDURE — 96375 TX/PRO/DX INJ NEW DRUG ADDON: CPT

## 2019-04-05 PROCEDURE — 93971 EXTREMITY STUDY: CPT

## 2019-04-05 PROCEDURE — 2580000003 HC RX 258: Performed by: EMERGENCY MEDICINE

## 2019-04-05 PROCEDURE — 96374 THER/PROPH/DIAG INJ IV PUSH: CPT

## 2019-04-05 PROCEDURE — 80053 COMPREHEN METABOLIC PANEL: CPT

## 2019-04-05 PROCEDURE — 83605 ASSAY OF LACTIC ACID: CPT

## 2019-04-05 PROCEDURE — 85730 THROMBOPLASTIN TIME PARTIAL: CPT

## 2019-04-05 PROCEDURE — 96361 HYDRATE IV INFUSION ADD-ON: CPT

## 2019-04-05 PROCEDURE — 84484 ASSAY OF TROPONIN QUANT: CPT

## 2019-04-05 PROCEDURE — 93010 ELECTROCARDIOGRAM REPORT: CPT | Performed by: INTERNAL MEDICINE

## 2019-04-05 PROCEDURE — 85025 COMPLETE CBC W/AUTO DIFF WBC: CPT

## 2019-04-05 PROCEDURE — 6360000002 HC RX W HCPCS: Performed by: PHYSICIAN ASSISTANT

## 2019-04-05 PROCEDURE — 73560 X-RAY EXAM OF KNEE 1 OR 2: CPT

## 2019-04-05 PROCEDURE — 83880 ASSAY OF NATRIURETIC PEPTIDE: CPT

## 2019-04-05 PROCEDURE — 87040 BLOOD CULTURE FOR BACTERIA: CPT

## 2019-04-05 PROCEDURE — 85610 PROTHROMBIN TIME: CPT

## 2019-04-05 PROCEDURE — 99285 EMERGENCY DEPT VISIT HI MDM: CPT

## 2019-04-05 PROCEDURE — 93005 ELECTROCARDIOGRAM TRACING: CPT | Performed by: PHYSICIAN ASSISTANT

## 2019-04-05 PROCEDURE — 71046 X-RAY EXAM CHEST 2 VIEWS: CPT

## 2019-04-05 RX ORDER — ONDANSETRON 2 MG/ML
4 INJECTION INTRAMUSCULAR; INTRAVENOUS ONCE
Status: COMPLETED | OUTPATIENT
Start: 2019-04-05 | End: 2019-04-05

## 2019-04-05 RX ORDER — ONDANSETRON 4 MG/1
4 TABLET, FILM COATED ORAL EVERY 8 HOURS PRN
Qty: 20 TABLET | Refills: 0 | Status: SHIPPED | OUTPATIENT
Start: 2019-04-05 | End: 2019-08-12

## 2019-04-05 RX ORDER — 0.9 % SODIUM CHLORIDE 0.9 %
1000 INTRAVENOUS SOLUTION INTRAVENOUS ONCE
Status: COMPLETED | OUTPATIENT
Start: 2019-04-05 | End: 2019-04-05

## 2019-04-05 RX ORDER — MORPHINE SULFATE 4 MG/ML
4 INJECTION, SOLUTION INTRAMUSCULAR; INTRAVENOUS ONCE
Status: COMPLETED | OUTPATIENT
Start: 2019-04-05 | End: 2019-04-05

## 2019-04-05 RX ORDER — OXYCODONE HYDROCHLORIDE AND ACETAMINOPHEN 5; 325 MG/1; MG/1
1-2 TABLET ORAL EVERY 6 HOURS PRN
Qty: 6 TABLET | Refills: 0 | Status: SHIPPED | OUTPATIENT
Start: 2019-04-05 | End: 2019-04-08

## 2019-04-05 RX ADMIN — ONDANSETRON 4 MG: 2 INJECTION INTRAMUSCULAR; INTRAVENOUS at 15:23

## 2019-04-05 RX ADMIN — MORPHINE SULFATE 4 MG: 4 INJECTION INTRAVENOUS at 15:23

## 2019-04-05 RX ADMIN — SODIUM CHLORIDE 1000 ML: 9 INJECTION, SOLUTION INTRAVENOUS at 17:24

## 2019-04-05 ASSESSMENT — ENCOUNTER SYMPTOMS
SHORTNESS OF BREATH: 1
ANAL BLEEDING: 0
RECTAL PAIN: 0
COLOR CHANGE: 0
DIARRHEA: 0
BLOOD IN STOOL: 0
CONSTIPATION: 0
COUGH: 0
EYES NEGATIVE: 1
BACK PAIN: 0
WHEEZING: 0
ABDOMINAL DISTENTION: 0
ALLERGIC/IMMUNOLOGIC NEGATIVE: 1
STRIDOR: 0
ABDOMINAL PAIN: 0
NAUSEA: 1
VOMITING: 0

## 2019-04-05 ASSESSMENT — PAIN SCALES - GENERAL: PAINLEVEL_OUTOF10: 9

## 2019-04-05 NOTE — ED PROVIDER NOTES
[address]  eMERGENCY dEPARTMENT eNCOUnter        Pt Name: Yumiko Byers  MRN: [id number]  Cirilogfluis daniel 1955  Date of evaluation: 4/5/2019  Provider: Nayan Mandel PA-C  PCP: No primary care provider on file. This patient was seen and evaluated by the attending physician Dr. Stephani Favre   Patient presents with    Illness     pt states started yesterday with nausea, dizziness, sob, headache, low grade fever       HISTORY OF PRESENT ILLNESS   (Location/Symptom, Timing/Onset, Context/Setting, Quality, Duration, Modifying Factors, Severity)  Note limiting factors. Yumiko Byers is a 71 y.o. female who presents to the emergency department with a multitude of symptoms. She states that she has had issues with her right knee in the past, and states that it does occasionally cause discomfort however starting 2 days ago, her pain has gotten worse and she reports radiation to her right lower leg. She is unable to bear weight and straighten the right knee because of the pain. Also since yesterday, patient reports nausea, shortness of breath, headache, dizziness, low-grade fevers. She does not report any significant cough, palpitations or hemoptysis but states that last night she did have a cramping in her chest that is resolved now. She denies any redness, red streaking, numbness, tingling or weakness of her extremities. She also reports some vaginal irritation and itching and thinks she might have a yeast infection. She would like for us to check this. Nursing Notes were all reviewed and agreed with or any disagreements were addressed  in the HPI. REVIEW OF SYSTEMS    (2-9 systems for level 4, 10 or more for level 5)     Review of Systems   Constitutional: Positive for chills, fatigue and fever. HENT: Negative. Eyes: Negative. Respiratory: Positive for shortness of breath.  Negative for cough, wheezing and stridor. Cardiovascular: Positive for chest pain. Negative for palpitations and leg swelling. Gastrointestinal: Positive for nausea. Negative for abdominal distention, abdominal pain, anal bleeding, blood in stool, constipation, diarrhea, rectal pain and vomiting. Endocrine: Negative. Genitourinary: Positive for vaginal discharge and vaginal pain. Negative for decreased urine volume, difficulty urinating, dysuria, flank pain, frequency, hematuria, menstrual problem, pelvic pain, urgency and vaginal bleeding. Musculoskeletal: Positive for arthralgias and myalgias. Negative for back pain, gait problem, joint swelling, neck pain and neck stiffness. Skin: Negative for color change, pallor, rash and wound. Allergic/Immunologic: Negative. Neurological: Positive for dizziness and headaches. Negative for tremors, seizures, syncope, facial asymmetry, speech difficulty, weakness and numbness. Psychiatric/Behavioral: Negative for confusion. All other systems reviewed and are negative. Positives and Pertinent negatives as per HPI. Except as noted abovein the ROS, all other systems were reviewed and negative.        PAST MEDICAL HISTORY     Past Medical History:   Diagnosis Date    AICD (automatic cardioverter/defibrillator) present     Arthritis     CAD (coronary artery disease)     [facility]     Gout     Hyperlipidemia     Hypertension     MI (myocardial infarction) ([facility] [address]          SURGICAL HISTORY     Past Surgical History:   Procedure Laterality Date    CARDIAC DEFIBRILLATOR PLACEMENT      DILATION AND CURETTAGE OF UTERUS           CURRENTMEDICATIONS       Discharge Medication List as of 4/5/2019  7:04 PM      CONTINUE these medications which have NOT CHANGED    Details   metoprolol tartrate (LOPRESSOR) 50 MG tablet Take 1.5 tablets by mouth 2 times daily, Disp-270 tablet, R-3Normal      atorvastatin (LIPITOR) 40 MG tablet Take 1 tablet by mouth daily, Disp-90 tablet, R-3Normal      lisinopril (PRINIVIL;ZESTRIL) 2.5 MG tablet Take 1 tablet by mouth daily, Disp-90 tablet, R-1Normal      furosemide (LASIX) 20 MG tablet Take 1 tablet by mouth every other day, Disp-45 tablet, R-3Normal      ibuprofen (ADVIL;MOTRIN) 200 MG tablet Take 400 mg by mouth daily Historical Med               ALLERGIES     Patient has no known allergies.     FAMILYHISTORY       Family History   Problem Relation Age of Onset    Heart Disease Mother     High Blood Pressure Mother     High Cholesterol Mother     Diabetes Mother     Cancer Father           SOCIAL HISTORY       Social History     Socioeconomic History    Marital status:      Spouse name: None    Number of children: None    Years of education: None    Highest education level: None   Occupational History    None   Social Needs    Financial resource strain: None    Food insecurity:     Worry: None     Inability: None    Transportation needs:     Medical: None     Non-medical: None   Tobacco Use    Smoking status: Never Smoker    Smokeless tobacco: Never Used    Tobacco comment: QUIT AS A TEEN   Substance and Sexual Activity    Alcohol use: No     Comment: RARE    Drug use: No    Sexual activity: None   Lifestyle    Physical activity:     Days per week: None     Minutes per session: None    Stress: None   Relationships    Social connections:     Talks on phone: None     Gets together: None     Attends Zoroastrian service: None     Active member of club or organization: None     Attends meetings of clubs or organizations: None     Relationship status: None    Intimate partner violence:     Fear of current or ex partner: None     Emotionally abused: None     Physically abused: None     Forced sexual activity: None   Other Topics Concern    None   Social History Narrative    None       SCREENINGS             PHYSICAL EXAM    (up to 7 for level 4, 8 or more for level 5)     ED Triage Vitals [04/05/19 1356]   BP Temp 95% 96% 99% 97%   Weight: 280 lb (127 kg)      Height: 5' 5\" (1.651 m)          Patient was given thefollowing medications:  Medications   morphine injection 4 mg (4 mg Intravenous Given 4/5/19 1523)   ondansetron (ZOFRAN) injection 4 mg (4 mg Intravenous Given 4/5/19 1523)   0.9 % sodium chloride bolus (0 mLs Intravenous Stopped 4/5/19 1859)     This patient presents with a multitude of symptoms. She reports some vaginal irritation and itching with discharge. Wet prep was unremarkable. She has not been sexually active for many years and therefore decreased suspicion for STD. Given her postmenopausal state, this could represent mild atrophic vaginitis. Therefore, advised to follow up with gynecologist.  Although she reports some nausea, abdomen is soft and nontender in all 4 quadrants without pulsatile mass or CVA tenderness. She is doing much better after receiving IV fluids, pain medicine and antibiotic. Lactate is minimally elevated, when repeated it is 1.0. Patient does have chronic leukocytosis, and was on a steroid prescription recently for the right knee pain, therefore this could also cause some worsening acute leukocytosis. She denies any chest pain throughout stay here. EKG is stable. Chest x-ray shows no acute intrathoracic abnormality. My suspicion is low for acute surgical abdomen, obstruction, perforation, abscess, mesenteric ischemia, AAA, dissection, cholecystitis, cholangitis, pancreatitis, appendicitis, C. diff colitis, diverticulitis, volvulus, incarcerated hernia, necrotizing fasciitis, TOA, ovarian torsion, PID, ectopic pregnancy, christos dahiana Tristian syndrome,pyelonephritis, perinephric abscess, kidney stone, urosepsis, fistula, std, syphilis,  carotid dissection, sinus abscess, acute fracture, acute CVA, ICH, SAH, TIA, meningitis, encephalitis, pseudotumor cerebri, temporal arteritis, sentinel bleed from ruptured aneurysm, hypertensive urgency or emergency, subdural hematoma, epidural hematoma, cerebellar compromise, posterior stroke,ACS, PE, myocarditis, pericarditis, endocarditis, acute pulmonary edema, pleural effusion, pericardial effusion, cardiac tamponade, cardiomyopathy, CHF exacerbation, thoracic aortic dissection, esophageal rupture, other life-threatening arrhythmia, hypertensive urgency or emergency, hemothorax, pulmonary contusion, subcutaneous emphysema, flail chest, pneumo mediastinum, rib fracture, pneumonia, otitis infection,  Sepsis, dka, hypoglycemia, or other concerning pathology. Xray of right knee unremarkable. Venous doppler negative for acute DVT. My suspicion is low for foreign body, tendon rupture, compartment syndrome, acute fracture, dislocation, DVT, arterial compromise or occlusion, limb ischemia, gout, septic joint, abscess, cellulitis, osteomyelitis, or other concerning pathology. Patient will be sent home with gynecology and orthopedic referrals. Advised to follow-up with Dr. Evert Alexis, her PCP, for recheck and may return to ED per discharge instructions. Will be sent home with Zofran and short course of Percocet as needed for breakthrough pain. We have addressed concerns and expectations. FINAL IMPRESSION      1. Acute nonintractable headache, unspecified headache type    2. Dizziness    3. Nausea    4. Acute pain of right knee    5. Fatigue, unspecified type    6. Chills    7. Leukocytosis, unspecified type-chronic    8.  Vaginal itching          DISPOSITION/PLAN   DISPOSITION Decision To Discharge 04/05/2019 03:30:31 PM      PATIENT REFERREDTO:  Arleen Cuevas MD  [address]  [phone]      for follow up in 1-3 days    [facility]  [address] [place] [address]  [address] [place] [id number] [phone]    for gynecology follow up    Dominik Hopkins MD  [address] [place] [address]  [address] [place] [address]  [phone]      for orthopedic follow pu      DISCHARGE MEDICATIONS:  Discharge Medication List as of 4/5/2019 7:04 PM      START taking these medications    Details   oxyCODONE-acetaminophen (PERCOCET) 5-325 MG per tablet Take 1-2 tablets by mouth every 6 hours as needed for Pain for up to 3 days. WARNING:  May cause drowsiness. May impair ability to operate vehicles or machinery.   Do not use in combination with alcohol., Disp-6 tablet, R-0Print      ondansetron (ZOFRAN) 4 MG tablet Take 1 tablet by mouth every 8 hours as needed for Nausea, Disp-20 tablet, R-0Print             DISCONTINUED MEDICATIONS:  Discharge Medication List as of 4/5/2019  7:04 PM                 (Please note that portions ofthis note were completed with a voice recognition program.  Efforts were made to edit the dictations but occasionally words are mis-transcribed.)    Loyd Medeiros PA-C (electronically signed)           Loyd Medeiros PA-C  04/06/19 1053

## 2019-04-05 NOTE — TELEPHONE ENCOUNTER
----- Message from YEIMY Mohamud CNP sent at 4/4/2019  5:17 PM EDT -----  Her EF is lower than previous study; she'll need a LexiScan to reassess

## 2019-04-05 NOTE — TELEPHONE ENCOUNTER
----- Message from YEIMY Soto CNP sent at 4/4/2019  5:18 PM EDT -----  Her WBC is up.  She needs to f/u with her PCP

## 2019-04-05 NOTE — TELEPHONE ENCOUNTER
Called and spoke with patient this am. She states that she does not have a PCP at this time. I encouraged her to call her insurance company and look for a PCP.

## 2019-04-06 NOTE — ED PROVIDER NOTES
[address] Emergency Department      Pt Name: Juliet Murray  MRN: [id number]  Cirilogfluis daniel 1955  Date of evaluation: 4/5/2019  Provider: Kelsie Zhang MD  I independently performed a history and physical on Juliet Murray. All diagnostic, treatment, and disposition decisions were made by myself in conjunction with the advanced practice provider. HPI: Juliet Murray presented with   Chief Complaint   Patient presents with    Illness     pt states started yesterday with nausea, dizziness, sob, headache, low grade fever     Juliet Murray has a past medical history of AICD (automatic cardioverter/defibrillator) present, Arthritis, CAD (coronary artery disease), CHF (congestive heart failure) ([facility] [address] Gout, Hyperlipidemia, Hypertension, and MI (myocardial infarction) ([facility] [address] She has a past surgical history that includes Dilation and curettage of uterus and Cardiac defibrillator placement. No current facility-administered medications on file prior to encounter.       Current Outpatient Medications on File Prior to Encounter   Medication Sig Dispense Refill    metoprolol tartrate (LOPRESSOR) 50 MG tablet Take 1.5 tablets by mouth 2 times daily 270 tablet 3    atorvastatin (LIPITOR) 40 MG tablet Take 1 tablet by mouth daily 90 tablet 3    lisinopril (PRINIVIL;ZESTRIL) 2.5 MG tablet Take 1 tablet by mouth daily 90 tablet 1    furosemide (LASIX) 20 MG tablet Take 1 tablet by mouth every other day 45 tablet 3    ibuprofen (ADVIL;MOTRIN) 200 MG tablet Take 400 mg by mouth daily        PHYSICAL EXAM  Vitals: BP (!) 114/50   Pulse 86   Temp 97.7 °F (36.5 °C) (Infrared)   Resp 15   Ht 5' 5\" (1.651 m)   Wt 280 lb (127 kg)   SpO2 97%   BMI 46.59 kg/m²   Constitutional:  71 y.o. female alert  HENT:  Atraumatic, oral mucosa moist  Neck:  No visible JVD, supple  Chest/Lungs:  Respiratory effort normal, clear, regular  Abdomen:  Non-distended, soft, NT  Back:  No gross deformity  Extremities:  Normal tone and perfusion, no edema, no redness, no visible joint effusion, pain with flexion beyond 45 degrees at the knee, leg lifts symmetric, light touch sensation intact    Medical Decision Making and Plan: Briefly, this is an 71 y. o.female who presented with leg pain, nausea, feeling feverish, light headed, multiple complaints. Seen at [facility] on Sunday and diagnosed with sciatica, started on prednisone which she took a couple doses of at home. Diagnostics show leukocytosis though she does have what appears to be a chronic leukocytosis, level today slightly higher than prior levels. We suspect this is a response to her recent steroid use. There is no obvious source of infection on clinical exam or on diagnostic testing. We do not believe the patient is experiencing symptoms from sepsis, septic joint, DVT, meningitis, endocarditis, myocarditis, soft tissue abscess or necrotizing infection, cholangitis, acute abdomen, epidural abscess or discitis, or other emergent condition requiring hospital admission. Her lactate level improved with hydration. She does report needing to establish herself with primary care so that she does not need to depend on coming to the ED for medical treatment. Tiffanie Kiran was given appropriate discharge instructions. Referral to follow up provider. For further details of [address] Emergency Department encounter, please see documentation by advanced practice provider REHAN Harrington.     Labs Reviewed   CBC WITH AUTO DIFFERENTIAL - Abnormal; Notable for the following components:       Result Value    WBC 23.1 (*)     Neutrophils # 17.0 (*)     Monocytes # 1.5 (*)     All other components within normal limits    Narrative:     Performed at:  [facility] [address], [place], [address]   Phone [phone]   COMPREHENSIVE METABOLIC PANEL - Abnormal; Notable for the following components:    Albumin/Globulin Ratio 1.0 (*)     All other components within normal limits    Narrative:     Performed at:  [facility] [address], [address]   Phone [phone]   LACTIC ACID, PLASMA - Abnormal; Notable for the following components:    Lactic Acid 2.2 (*)     All other components within normal limits    Narrative:     Performed at:  [facility] [address],  [place], [address]   Phone [phone]   RAPID INFLUENZA A/B ANTIGENS    Narrative:     Performed at:  [facility] [address], [address]   Phone [phone]   WET PREP, GENITAL    Narrative:     Performed at:  [facility] [address], [address]   Phone [phone]   CULTURE BLOOD #1   CULTURE BLOOD #2   TROPONIN    Narrative:     Performed at:  [facility] [email or web address], [address]   Phone [phone]   BRAIN NATRIURETIC PEPTIDE    Narrative:     Performed at:  [facility] [email or web address], [address]   Phone [phone]   PROTIME-INR    Narrative:     Performed at:  [facility] [address], [address]   Phone [phone]   APTT    Narrative:     Performed at:  [facility] [email or web address], [address]   Phone [phone]   LACTIC ACID, PLASMA    Narrative:     Performed at:  [facility] [email or web address], [address]   Phone [phone]   URINE RT REFLEX TO CULTURE    Narrative:     Performed at:  [facility] [address], [address]   Phone [phone]     Previous WBC:    WBC   Date/Time Value Ref Range Status   04/05/2019 02:05 PM 23.1 (H) 4.0 - 11.0 K/uL Final   04/02/2019 12:37 PM 17.7 (H) 4.0 - 11.0 K/uL Final   04/07/2015 02:24 PM 15.5 (H) 4.0 - 11.0 K/uL Final     RADIOLOGY:   Plain x-rays were viewed by me: Xr Chest Standard (2 Vw)    Result Date: 4/5/2019  EXAMINATION: TWO VIEWS OF THE CHEST 4/5/2019 2:44 pm COMPARISON: 04/07/2015 HISTORY: ORDERING SYSTEM PROVIDED HISTORY: sob TECHNOLOGIST PROVIDED HISTORY: Reason for exam:->sob Ordering Physician Provided Reason for Exam: Illness (pt states started yesterday with nausea, dizziness, sob, headache, low grade fever) Acuity: Unknown Type of Exam: Unknown FINDINGS: Limited by patient body habitus. Cardiac defibrillator in the left chest with lead projected over the right ventricle. Mild cardiomegaly. Normal pulmonary vasculature. No focal consolidations, pleural effusions, or pneumothorax. No evidence of acute process in the chest.     Xr Knee Right (1-2 Views)    Result Date: 4/5/2019  EXAMINATION: 2 XRAY VIEWS OF THE RIGHT KNEE 4/5/2019 2:44 pm COMPARISON: None. HISTORY: ORDERING SYSTEM PROVIDED HISTORY: pain TECHNOLOGIST PROVIDED HISTORY: Reason for exam:->pain Ordering Physician Provided Reason for Exam: Illness (pt states started yesterday with nausea, dizziness, sob, headache, low grade fever) Acuity: Unknown Type of Exam: Unknown FINDINGS: No acute fracture or dislocation. Moderate to severe degenerative changes in the medial compartment of the knee as well as the patellofemoral joint. No joint effusion. No acute radiographic abnormality. Moderate to severe degenerative changes in the medial compartment and patellofemoral joint.      Vl Extremity Venous Right    Result Date: 4/5/2019  Lower Extremities DVT Study  Demographics   Patient Name       Thiago Cervantes   Date of Study      04/05/2019         Gender              Female   Patient Number     [id number]         Date of Birth       1955   Visit Number       [id number]          Age                 71 year(s)   Accession Number   [id number]          Room Number         8193   Corporate ID       [id number]            [id number] Christine Roberts,                                                            [address] [place] [address]   Ordering Physician Saqib Mccullough    Interpreting        [facility]                     Reyes Guillen PA-C       Physician           Lucie Calabrese MD,                                                            [facility]  Procedure Type of Study:   Veins:Lower Extremities DVT Study, VL EXTREMITY VENOUS DUPLEX RIGHT. Vascular Sonographer Report  Additional Indications:Pain and swelling Impressions Right Impression No evidence of deep vein or superficial vein thrombosis involving the right lower extremity and the left common femoral vein. The right peroneal veins were not visualized. Conclusions   Summary   -No evidence of deep vein or superficial vein thrombosis involving the right  lower extremity and the left common femoral vein. -The right peroneal veins were not visualized. Signature   ------------------------------------------------------------------  Electronically signed by Lucie Calabrese MD, [facility] (Interpreting  physician) on 04/05/2019 at 05:58 PM  ------------------------------------------------------------------  Patient Status:ER. [address] [place] [address] - Vascular Lab. Technical Quality:Limited visualization due to swelling. Velocities are measured in cm/s ; Diameters are measured in mm Right Lower Extremities DVT Study Measurements Right 2D Measurements +------------------------+----------+---------------+----------+ ! Location                ! Visualized! Compressibility! Thrombosis! +------------------------+----------+---------------+----------+ ! Sapheno Femoral Junction! Yes       ! Yes            ! None      ! +------------------------+----------+---------------+----------+ ! GSV Thigh               ! Yes       ! Yes            ! None      ! +------------------------+----------+---------------+----------+ ! Common Femoral          !Yes       ! Yes            ! None      ! +------------------------+------+------+------------+ ! Popliteal               !Phasic!      !            ! +------------------------+------+------+------------+ Left Lower Extremities DVT Study Measurements Left 2D Measurements +------------------------+----------+---------------+----------+ ! Location                ! Visualized! Compressibility! Thrombosis! +------------------------+----------+---------------+----------+ ! Sapheno Femoral Junction! Yes       ! Yes            ! None      ! +------------------------+----------+---------------+----------+ ! Common Femoral          !Yes       ! Yes            ! None      ! +------------------------+----------+---------------+----------+ Left Doppler Measurements +--------------+------+------+------------+ ! Location      ! Signal!Reflux! Reflux (sec)! +--------------+------+------+------------+ ! Common Femoral!Phasic!      !            ! +--------------+------+------+------------+    EKG:  Read by me in the absence of a cardiologist shows:  Sinus rhythm, normal rate, normal conduction intervals, normal axis, no acute injury pattern, no prior EKG available for comparison     Medications administered:  Medications   morphine injection 4 mg (4 mg Intravenous Given 4/5/19 1523)   ondansetron (ZOFRAN) injection 4 mg (4 mg Intravenous Given 4/5/19 1523)   0.9 % sodium chloride bolus (0 mLs Intravenous Stopped 4/5/19 0661)     Discharge Medication List as of 4/5/2019  7:04 PM      START taking these medications    Details   oxyCODONE-acetaminophen (PERCOCET) 5-325 MG per tablet Take 1-2 tablets by mouth every 6 hours as needed for Pain for up to 3 days. WARNING:  May cause drowsiness. May impair ability to operate vehicles or machinery.   Do not use in combination with alcohol., Disp-6 tablet, R-0Print      ondansetron (ZOFRAN) 4 MG tablet Take 1 tablet by mouth every 8 hours as needed for Nausea, Disp-20 tablet, R-0Print           FOLLOW UP:    Zaina Coronel MD  [address] [facility] [address]  [phone]      for follow up in 1-3 days    [facility] AND  [facility]  [address] [facility]  [address]  [phone]    for gynecology follow up    Sharonda Jimenez MD  [address], [address] [place] [address]  [address] [facility]  [phone]      for orthopedic follow pu    FINAL IMPRESSION:    1. Acute nonintractable headache, unspecified headache type    2. Dizziness    3. Nausea    4. Acute pain of right knee    5. Fatigue, unspecified type    6. Chills    7. Leukocytosis, unspecified type-chronic    8.  Vaginal itching          Missy Aguilera MD  04/05/19 8936

## 2019-04-08 NOTE — TELEPHONE ENCOUNTER
Faxed the referral to Dr. Sunita Cottrell. Asked their office to call us back, if not accepting new patients.

## 2019-04-10 LAB
BLOOD CULTURE, ROUTINE: NORMAL
CULTURE, BLOOD 2: NORMAL

## 2019-04-16 ENCOUNTER — OFFICE VISIT (OUTPATIENT)
Dept: ORTHOPEDIC SURGERY | Age: 64
End: 2019-04-16
Payer: MEDICARE

## 2019-04-16 ENCOUNTER — TELEPHONE (OUTPATIENT)
Dept: ORTHOPEDIC SURGERY | Age: 64
End: 2019-04-16

## 2019-04-16 VITALS — RESPIRATION RATE: 16 BRPM | WEIGHT: 274 LBS | HEIGHT: 65 IN | BODY MASS INDEX: 45.65 KG/M2

## 2019-04-16 DIAGNOSIS — E66.01 MORBID OBESITY WITH BMI OF 45.0-49.9, ADULT (HCC): ICD-10-CM

## 2019-04-16 DIAGNOSIS — M85.80 OSTEOPENIA DETERMINED BY X-RAY: ICD-10-CM

## 2019-04-16 DIAGNOSIS — M17.11 PRIMARY OSTEOARTHRITIS OF RIGHT KNEE: Primary | ICD-10-CM

## 2019-04-16 PROCEDURE — G8598 ASA/ANTIPLAT THER USED: HCPCS | Performed by: ORTHOPAEDIC SURGERY

## 2019-04-16 PROCEDURE — G8400 PT W/DXA NO RESULTS DOC: HCPCS | Performed by: ORTHOPAEDIC SURGERY

## 2019-04-16 PROCEDURE — 99204 OFFICE O/P NEW MOD 45 MIN: CPT | Performed by: ORTHOPAEDIC SURGERY

## 2019-04-16 PROCEDURE — G8428 CUR MEDS NOT DOCUMENT: HCPCS | Performed by: ORTHOPAEDIC SURGERY

## 2019-04-16 PROCEDURE — 20610 DRAIN/INJ JOINT/BURSA W/O US: CPT | Performed by: ORTHOPAEDIC SURGERY

## 2019-04-16 PROCEDURE — 4040F PNEUMOC VAC/ADMIN/RCVD: CPT | Performed by: ORTHOPAEDIC SURGERY

## 2019-04-16 PROCEDURE — 1090F PRES/ABSN URINE INCON ASSESS: CPT | Performed by: ORTHOPAEDIC SURGERY

## 2019-04-16 PROCEDURE — 1123F ACP DISCUSS/DSCN MKR DOCD: CPT | Performed by: ORTHOPAEDIC SURGERY

## 2019-04-16 PROCEDURE — 3017F COLORECTAL CA SCREEN DOC REV: CPT | Performed by: ORTHOPAEDIC SURGERY

## 2019-04-16 PROCEDURE — 1036F TOBACCO NON-USER: CPT | Performed by: ORTHOPAEDIC SURGERY

## 2019-04-16 PROCEDURE — G8417 CALC BMI ABV UP PARAM F/U: HCPCS | Performed by: ORTHOPAEDIC SURGERY

## 2019-04-16 NOTE — PROGRESS NOTES
NEW PATIENT ORTHOPAEDIC NOTE    Chief Complaint   Patient presents with    Knee Pain     rt knee pain        HPI  71 y.o. female seen for evaluation of right knee pain    Onset acute on chronic, worse past few weeks  History of symptoms yes as above  Injury/trauma none  Pain is located diffuse but worse medially  Worse with WB, standing  Better with nothing, ibuprofen doesn't help  Pain is 8/10  Associated with grinding      I have reviewed and discussed thebelow pain assessment findings with the patient. Pain Assessment  Location of Pain: Knee  Location Modifiers: Right  Severity of Pain: 8  Quality of Pain: Sharp  Duration of Pain: Persistent  Frequency of Pain: Constant  Date Pain First Started: (on going )  Aggravating Factors: Stairs, Walking, Standing  Limiting Behavior: Yes  Relieving Factors:  Other (Comment)(nothing )  Result of Injury: No  Work-Related Injury: No  Are there other pain locations you wish to document?: No    Review of Systems  Constitutional - denies fevers, weight loss  Cardiovascular - denies chest pain, palpitations, peripheral edema, blood clots; hx of CAG, + HTN  Respiratory - denies SOB, cough  Gastrointestinal - denies abdominal pain, nausea, vomiting  Genitourinary - denies dysuria, discharge  Musculoskeletal - per HPI  Integumentary - denies rash, sores  Neurologic - denies numbness,tingling, paresthesias  Hematologic - denies abnormal bleeding, blood clots  Allergic/Immunologic - denies metal allergies, recurrent infections    No Known Allergies     Current Outpatient Medications   Medication Sig Dispense Refill    diclofenac (VOLTAREN) 50 MG EC tablet Take 1 tablet by mouth 2 times daily (with meals) 60 tablet 1    apixaban (ELIQUIS) 5 MG TABS tablet Take 1 tablet by mouth 2 times daily 60 tablet 0    ondansetron (ZOFRAN) 4 MG tablet Take 1 tablet by mouth every 8 hours as needed for Nausea 20 tablet 0    metoprolol tartrate (LOPRESSOR) 50 MG tablet Take 1.5 tablets by mouth 2 times daily 270 tablet 3    atorvastatin (LIPITOR) 40 MG tablet Take 1 tablet by mouth daily 90 tablet 3    lisinopril (PRINIVIL;ZESTRIL) 2.5 MG tablet Take 1 tablet by mouth daily 90 tablet 1    furosemide (LASIX) 20 MG tablet Take 1 tablet by mouth every other day 45 tablet 3    ibuprofen (ADVIL;MOTRIN) 200 MG tablet Take 400 mg by mouth daily        No current facility-administered medications for this visit.         Past Medical History:   Diagnosis Date    AICD (automatic cardioverter/defibrillator) present     Arthritis     CAD (coronary artery disease)     [facility]est[facility]     Gout     Hyperlipidemia     Hypertension     MI (myocardial infarction) ([facility] [address]         Past Surgical History:   Procedure Laterality Date    CARDIAC DEFIBRILLATOR PLACEMENT      DILATION AND CURETTAGE OF UTERUS         Family History   Problem Relation Age of Onset    Heart Disease Mother     High Blood Pressure Mother     High Cholesterol Mother     Diabetes Mother     Cancer Father        Social History     Socioeconomic History    Marital status:      Spouse name: Not on file    Number of children: Not on file    Years of education: Not on file    Highest education level: Not on file   Occupational History    Not on file   Social Needs    Financial resource strain: Not on file    Food insecurity:     Worry: Not on file     Inability: Not on file    Transportation needs:     Medical: Not on file     Non-medical: Not on file   Tobacco Use    Smoking status: Never Smoker    Smokeless tobacco: Never Used    Tobacco comment: QUIT AS A TEEN   Substance and Sexual Activity    Alcohol use: No     Comment: RARE    Drug use: No    Sexual activity: Not on file   Lifestyle    Physical activity:     Days per week: Not on file     Minutes per session: Not on file    Stress: Not on file   Relationships    Social connections:     Talks on phone: Not on file     Gets together: Not on

## 2019-07-02 ENCOUNTER — OFFICE VISIT (OUTPATIENT)
Dept: CARDIOLOGY CLINIC | Age: 64
End: 2019-07-02
Payer: MEDICARE

## 2019-07-02 ENCOUNTER — PROCEDURE VISIT (OUTPATIENT)
Dept: CARDIOLOGY CLINIC | Age: 64
End: 2019-07-02
Payer: MEDICARE

## 2019-07-02 VITALS
HEART RATE: 108 BPM | SYSTOLIC BLOOD PRESSURE: 100 MMHG | BODY MASS INDEX: 41.48 KG/M2 | WEIGHT: 249 LBS | DIASTOLIC BLOOD PRESSURE: 62 MMHG | HEIGHT: 65 IN

## 2019-07-02 DIAGNOSIS — I42.8 OTHER CARDIOMYOPATHY (HCC): Primary | ICD-10-CM

## 2019-07-02 DIAGNOSIS — I47.29 VENTRICULAR TACHYCARDIA, NONSUSTAINED: ICD-10-CM

## 2019-07-02 DIAGNOSIS — Z95.810 CARDIAC DEFIBRILLATOR IN PLACE: ICD-10-CM

## 2019-07-02 DIAGNOSIS — I10 ESSENTIAL HYPERTENSION: ICD-10-CM

## 2019-07-02 DIAGNOSIS — I48.0 PAROXYSMAL ATRIAL FIBRILLATION (HCC): ICD-10-CM

## 2019-07-02 PROCEDURE — G8400 PT W/DXA NO RESULTS DOC: HCPCS | Performed by: NURSE PRACTITIONER

## 2019-07-02 PROCEDURE — G8427 DOCREV CUR MEDS BY ELIG CLIN: HCPCS | Performed by: NURSE PRACTITIONER

## 2019-07-02 PROCEDURE — 3017F COLORECTAL CA SCREEN DOC REV: CPT | Performed by: NURSE PRACTITIONER

## 2019-07-02 PROCEDURE — 1123F ACP DISCUSS/DSCN MKR DOCD: CPT | Performed by: NURSE PRACTITIONER

## 2019-07-02 PROCEDURE — 99214 OFFICE O/P EST MOD 30 MIN: CPT | Performed by: NURSE PRACTITIONER

## 2019-07-02 PROCEDURE — 1036F TOBACCO NON-USER: CPT | Performed by: NURSE PRACTITIONER

## 2019-07-02 PROCEDURE — 1090F PRES/ABSN URINE INCON ASSESS: CPT | Performed by: NURSE PRACTITIONER

## 2019-07-02 PROCEDURE — G8417 CALC BMI ABV UP PARAM F/U: HCPCS | Performed by: NURSE PRACTITIONER

## 2019-07-02 PROCEDURE — 93283 PRGRMG EVAL IMPLANTABLE DFB: CPT | Performed by: INTERNAL MEDICINE

## 2019-07-02 PROCEDURE — 4040F PNEUMOC VAC/ADMIN/RCVD: CPT | Performed by: NURSE PRACTITIONER

## 2019-07-02 PROCEDURE — G8598 ASA/ANTIPLAT THER USED: HCPCS | Performed by: NURSE PRACTITIONER

## 2019-07-02 NOTE — PROGRESS NOTES
not engorged  The carotid upstroke is normal in amplitude and contour without delay or bruit  JVP is not elevated  ABDOMEN:  Normal bowel sounds, non-distended and non-tender to palpation  EXT: thanh ankle edema, no calf tenderness. Pulses are present bilaterally.     DATA:        [id number] Score for Atrial Fibrillation Stroke Risk   Risk   Factors  Component Value   C CHF No 1   H HTN Yes 1   A2 Age >= 75 No,  (65 y.o.) 0   D DM No 0   S2 Prior Stroke/TIA No 0   V Vascular Disease No 0   A Age 74-69 Yes,  (65 y.o.) 1   Sc Sex female 1    [id number]  Score  4   Score last updated 7/2/19 7:05 PM        Lab Results   Component Value Date     04/05/2019    K 4.0 04/05/2019     04/05/2019    CO2 29 04/05/2019    BUN 11 04/05/2019    CREATININE 0.7 04/05/2019    GLUCOSE 92 04/05/2019    CALCIUM 9.3 04/05/2019      Lab Results   Component Value Date    TSH 4.14 10/03/2018     Lab Results   Component Value Date    MG 2.20 04/02/2019     Lab Results   Component Value Date    WBC 23.1 (H) 04/05/2019    HGB 13.9 04/05/2019    HCT 44.3 04/05/2019    MCV 85.5 04/05/2019     04/05/2019     LABS: 5/9/19:    W 18.4 H/H 12.6 / 38.4 plts 336   Na+ 143 L+ 3.7 chl 103 CO2 23 BUN 8 creatinine 0.74 glu 73    trig 94 HDL 55    CRP 59.8   TSH 5.66   A1c 6.7%    Radiology Review:  Pertinent images / reports were reviewed as a part of this visit and reveals the following:    CXR: 4/5/19:  FINDINGS:   Limited by patient body habitus.       Cardiac defibrillator in the left chest with lead projected over the right   ventricle.  Mild cardiomegaly.  Normal pulmonary vasculature.  No focal   consolidations, pleural effusions, or pneumothorax.           Impression   No evidence of acute process in the chest.       Last Echo: 2/29/16:  Summary   -LV function analysis is limited due to difficult endocardial visualization.   -Mild concentric left ventricular hypertrophy is present.   -Global ejection fraction is including bleeding was discussed. Daily weight, low sodium diet were discussed. Patient instructed to call the office with a weight gain: > 3 # over night or 5# in one week; swelling, SOB/orthopnea/PND    The patient verbalizes understanding not to stop medications without discussing with us. Discussed exercise: 30-60 minutes 7 days/week . Limited d/t knee discomfort   Discussed Low saturated fat/WILBER diet. Drinks > 2 L / day    Thank you for allowing to us to participate in the care of Hojo.pl.     YEIMY Ledesma-HANS    Documentation of today's visit sent to PCP

## 2019-07-02 NOTE — LETTER
[address] [place] [address] Cardiology [place] [address] [address] [address]  Phone: [phone]  Fax: [phone]      July 3, 2019     Louis Patel MD  Justo Gasca    Patient: Al Keller  MR Number: [id number]  YOB: 1955  Date of Visit: 7/2/2019    Dear Dr. Louis Patel:    Fabby Avalos is 71 y.o. female who presents today with a history of VF s/p ICD '07, device upgrade '15, AT/PAF and non-compliance . CHIEF COMPLAINT / HPI:  Follow Up secondary cardiomyopathy encouraged to take lasix qod. Subjective:   She cannot tolerate taking lasix d/t inct, she manages to take it every 3 days. She denies chest discomfort. Every now and then she'll feel a funny heart beat. She comes today without needing a w/c. She's lost a lot of weight through dietary changes (31# / 3 months by office scales). Eating more fruits/vegetables and snacks on popcorn. She eats nothing after 6 pm. Her breathing is much better. She sleeps on her left side with two pillows. She denies PND. She snores and has a sleep study planned. She has occ swelling in her ankles  She now has a PCP and being under w/u for an elevated WBC  These symptoms have improved since the last OV. With regard to medication therapy the patient has been compliant with prescribed regimen since last seen. They have tolerated therapy to date.        Current Outpatient Medications   Medication Sig Dispense Refill    Naproxen Sodium (ALEVE PO) Take by mouth as needed      diclofenac (VOLTAREN) 50 MG EC tablet Take 1 tablet by mouth 2 times daily (with meals) 60 tablet 1    apixaban (ELIQUIS) 5 MG TABS tablet Take 1 tablet by mouth 2 times daily 60 tablet 0    ondansetron (ZOFRAN) 4 MG tablet Take 1 tablet by mouth every 8 hours as needed for Nausea 20 tablet 0    metoprolol tartrate (LOPRESSOR) 50 MG tablet Take 1.5 tablets by mouth 2

## 2019-07-03 NOTE — COMMUNICATION BODY
function. No therapy delieved  Optival : thoracic impendence : no evidence of volume overload. Last episode of decompensation Dec '18 - Jan '19  Time in AF/AT <0.1 hr / day  ~AS-VS 99.&%  ~AS- < 0.1%  ~AP-VS 0.2%  ~AP- < 0.1%      Assessment:   1. Cardiomyopathy   ~new : decreased LVEF by echo to 30-35%   ~myoview from '16 : small anterior apical defect that is partially reversible.     ~offers no c/o CP    2. Paroxysmal atrial fibrillation (HCC)   ~regular to auscultation   ~< 0.1 hr / day of AF noted on device interrogation today. Last episode of AT was in Dec '18 with ATP termination  ~ASA 81 mg daily & Eliquis   ~CHADs 4 ; if diagnosed with DM will increase score to 5    2. Essential HTN   ~controlled    ~lisinopril / metoprolol   3. VT   ~non noted on device interrogation today  ~denies device firing   ~no therapies delivered per device interrogation   ~EF 52% by Thelma Angeles '16. EF 30-35% with latest echo  ~offers no c/o chest discomfort    4. Volume overload    ~euvolemic : has had a wt loss and doing better with diet. Now following with a PCP    ~does not tolerate lasix more than every 3rd day d/t freq urination      I had the opportunity to review the clinical symptoms and presentation of Raj Chase. Plan:     1. LexiScan as planned with decreased LVEF by echo  2. F/u in 3 months for device check  3. F/U in five months / test dependend     Overall the patient is stable from CV standpoint    Further evaluation will be based upon the patient's clinical course and testing results. Thank you for allowing to us to participate in the care of Raj Chase.     Cisco Mckeon, APRN-CNP

## 2019-08-12 ENCOUNTER — NURSE ONLY (OUTPATIENT)
Dept: CARDIOLOGY CLINIC | Age: 64
End: 2019-08-12
Payer: MEDICARE

## 2019-08-12 ENCOUNTER — OFFICE VISIT (OUTPATIENT)
Dept: CARDIOLOGY CLINIC | Age: 64
End: 2019-08-12
Payer: MEDICARE

## 2019-08-12 VITALS
HEIGHT: 64 IN | SYSTOLIC BLOOD PRESSURE: 132 MMHG | WEIGHT: 269 LBS | DIASTOLIC BLOOD PRESSURE: 72 MMHG | HEART RATE: 92 BPM | BODY MASS INDEX: 45.93 KG/M2

## 2019-08-12 DIAGNOSIS — Z79.899 LONG-TERM USE OF HIGH-RISK MEDICATION: ICD-10-CM

## 2019-08-12 DIAGNOSIS — R07.89 OTHER CHEST PAIN: Primary | ICD-10-CM

## 2019-08-12 DIAGNOSIS — I47.29 VENTRICULAR TACHYCARDIA, NONSUSTAINED: ICD-10-CM

## 2019-08-12 DIAGNOSIS — I48.0 PAROXYSMAL ATRIAL FIBRILLATION (HCC): ICD-10-CM

## 2019-08-12 DIAGNOSIS — I42.8 NONISCHEMIC CARDIOMYOPATHY (HCC): ICD-10-CM

## 2019-08-12 DIAGNOSIS — I50.42 CHRONIC COMBINED SYSTOLIC AND DIASTOLIC HEART FAILURE (HCC): ICD-10-CM

## 2019-08-12 DIAGNOSIS — Z95.810 CARDIAC DEFIBRILLATOR IN PLACE: ICD-10-CM

## 2019-08-12 DIAGNOSIS — I10 ESSENTIAL HYPERTENSION: ICD-10-CM

## 2019-08-12 PROCEDURE — 3017F COLORECTAL CA SCREEN DOC REV: CPT | Performed by: NURSE PRACTITIONER

## 2019-08-12 PROCEDURE — 4040F PNEUMOC VAC/ADMIN/RCVD: CPT | Performed by: NURSE PRACTITIONER

## 2019-08-12 PROCEDURE — 93283 PRGRMG EVAL IMPLANTABLE DFB: CPT | Performed by: INTERNAL MEDICINE

## 2019-08-12 PROCEDURE — G8400 PT W/DXA NO RESULTS DOC: HCPCS | Performed by: NURSE PRACTITIONER

## 2019-08-12 PROCEDURE — G8417 CALC BMI ABV UP PARAM F/U: HCPCS | Performed by: NURSE PRACTITIONER

## 2019-08-12 PROCEDURE — 1036F TOBACCO NON-USER: CPT | Performed by: NURSE PRACTITIONER

## 2019-08-12 PROCEDURE — 99214 OFFICE O/P EST MOD 30 MIN: CPT | Performed by: NURSE PRACTITIONER

## 2019-08-12 PROCEDURE — G8598 ASA/ANTIPLAT THER USED: HCPCS | Performed by: NURSE PRACTITIONER

## 2019-08-12 PROCEDURE — 1123F ACP DISCUSS/DSCN MKR DOCD: CPT | Performed by: NURSE PRACTITIONER

## 2019-08-12 PROCEDURE — G8427 DOCREV CUR MEDS BY ELIG CLIN: HCPCS | Performed by: NURSE PRACTITIONER

## 2019-08-12 PROCEDURE — 1090F PRES/ABSN URINE INCON ASSESS: CPT | Performed by: NURSE PRACTITIONER

## 2019-08-12 RX ORDER — SPIRONOLACTONE 25 MG/1
25 TABLET ORAL DAILY
Qty: 90 TABLET | Refills: 1 | Status: SHIPPED | OUTPATIENT
Start: 2019-08-12 | End: 2019-11-06 | Stop reason: SDUPTHER

## 2019-08-12 NOTE — COMMUNICATION BODY
[address]     Outpatient Follow Up Note    Samuel Reece is 71 y.o. female who presents today with a history of hypokalemia (K+ 2.5) VF s/p ICD '07, device upgrade '15, AT/PAF, non-ischemic CM and HFrEF.      CHIEF COMPLAINT / HPI:  Follow Up secondary being referred from Dr. Geeta Valdivia office with reported chest pain (seeing hem/onc for newly diagnosis of chronic leukemia). She's next to see ID  (per PCP's note: for elevated WBC and CRP). She had not scheduled her Myoview yet as she recently had gotten her transportation squared away. Subjective:     Her chest started hurting around her breast bone and face. Her heart was skipping beats. It started this morning after getting to Dr. Geeta Valdivia office. While waiting to be seen, it started to get worse : heaviness. Her jaws were of concern. She is SOB after walking short-moderate distances. She is now sleep with 3 pillows from 2, two weeks ago. She denies PND but doesn't sleep well (c/o insomnia). Her feet and ankles swelled over the weekend. She went to [place] and had not taken her diuretic. She continues to follow her diet but doesn't have an appetite. She feels nauseated at times just at the smell of foods. Her wt is up ~ 20# / 5 weeks by office scales. She cannot tolerate taking lasix d/t inct, she manages to take it 3 days / week. She is no longer taking ibuprofen routinely. These symptoms are new since the last OV. With regard to medication therapy the patient has been compliant with prescribed regimen since last seen. They have tolerated therapy to date.      Current Outpatient Medications   Medication Sig Dispense Refill    apixaban (ELIQUIS) 5 MG TABS tablet Take 1 tablet by mouth 2 times daily 60 tablet 0    metoprolol tartrate (LOPRESSOR) 50 MG tablet Take 1.5 tablets by mouth 2 times daily 270 tablet 3    atorvastatin (LIPITOR) 40 MG tablet Take 1 tablet by mouth daily 90 tablet 3    lisinopril (PRINIVIL;ZESTRIL) 2.5 MG tablet Take 1

## 2019-08-12 NOTE — PROGRESS NOTES
[address]     Outpatient Follow Up Note    Eva Postal is 71 y.o. female who presents today with a history of hypokalemia (K+ 2.5) VF s/p ICD '07, device upgrade '15, AT/PAF, non-ischemic CM and HFrEF.      CHIEF COMPLAINT / HPI:  Follow Up secondary being referred from Dr. Trice Pedro office with reported chest pain (seeing hem/onc for newly diagnosis of chronic leukemia). She's next to see ID  (per PCP's note: for elevated WBC and CRP). She had not scheduled her Myoview yet as she recently had gotten her transportation squared away. Subjective:     Her chest started hurting around her breast bone and face. Her heart was skipping beats. It started this morning after getting to Dr. Trice Pedro office. While waiting to be seen, it started to get worse : heaviness. Her jaws were of concern. She is SOB after walking short-moderate distances. She is now sleep with 3 pillows from 2, two weeks ago. She denies PND but doesn't sleep well (c/o insomnia). Her feet and ankles swelled over the weekend. She went to [place] and had not taken her diuretic. She continues to follow her diet but doesn't have an appetite. She feels nauseated at times just at the smell of foods. Her wt is up ~ 20# / 5 weeks by office scales. She cannot tolerate taking lasix d/t inct, she manages to take it 3 days / week. She is no longer taking ibuprofen routinely. These symptoms are new since the last OV. With regard to medication therapy the patient has been compliant with prescribed regimen since last seen. They have tolerated therapy to date.      Past Medical History:   Diagnosis Date    AICD (automatic cardioverter/defibrillator) present     Arthritis     CAD (coronary artery disease)     CHF (congestive heart failure) ([facility])     Gout     Hyperlipidemia     Hypertension     MI (myocardial infarction) ([facility] [address]      Social History:    Social History     Tobacco Use   Smoking Status Never Smoker   Smokeless Tobacco Never Used increase score to 5 (PCP planning on w/u once oncology & ID w/u concluded)     4. Essential HTN   ~suboptimal on arrival ; improved by recheck   ~lisinopril / metoprolol   5. VT   ~none noted on device interrogation today  ~denies device firing   ~no therapies delivered per device interrogation   ~EF 52% by Thelma Angeles '16. EF 30-35% with latest echo  ~K+ 3.7   6. Chronic combined systolic and diastolic heart failure   ~thoracic impedence showing signs of decompensation for approx 3 weeks    ~states to take lasix M-W-F only    ~wt up 20# / 5 weeks by office scale    I had the opportunity to review the clinical symptoms and presentation of Raj Chase. Plan:     1. EKG: sinus rhythm 100   2. Tonja Sol as planned  3. Begin aldactone 25 mg daily    ~BMP in one week  4. F/U in 3-4 weeks / test dependent      Overall the patient is stable from CV standpoint    I have addresed the patient's cardiac risk factors and adjusted pharmacologic treatment as needed. In addition, I have reinforced the need for patient directed risk factor modification. Further evaluation will be based upon the patient's clinical course and testing results. All questions and concerns were addressed to the patient . Alternatives to my treatment were discussed. The patient is not currently smoking. The risks related to smoking were reviewed with the patient. Recommend maintaining a smoke-free lifestyle. .    Patient is on a beta-blocker   Patient is on an ace-I     Patient is on a statin     Antiplatelet therapy / anti-coagulation has been recommended / prescribed for this patient. Education conducted on adverse reactions including bleeding was discussed. Daily weight, low sodium diet were discussed.  Patient instructed to call the office with a weight gain: > 3 # over night or 5# in one week; swelling, SOB/orthopnea/PND    The patient verbalizes understanding not to stop medications without discussing with

## 2019-08-14 ENCOUNTER — HOSPITAL ENCOUNTER (OUTPATIENT)
Dept: NON INVASIVE DIAGNOSTICS | Age: 64
Discharge: HOME | End: 2019-08-14
Payer: MEDICARE

## 2019-08-14 LAB
LV EF: 62 %
LVEF MODALITY: NORMAL

## 2019-08-14 PROCEDURE — 6360000002 HC RX W HCPCS: Performed by: INTERNAL MEDICINE

## 2019-08-14 PROCEDURE — 6360000002 HC RX W HCPCS: Performed by: NURSE PRACTITIONER

## 2019-08-14 PROCEDURE — 93017 CV STRESS TEST TRACING ONLY: CPT | Performed by: INTERNAL MEDICINE

## 2019-08-14 PROCEDURE — 3430000000 HC RX DIAGNOSTIC RADIOPHARMACEUTICAL: Performed by: NURSE PRACTITIONER

## 2019-08-14 PROCEDURE — 78452 HT MUSCLE IMAGE SPECT MULT: CPT

## 2019-08-14 PROCEDURE — A9502 TC99M TETROFOSMIN: HCPCS | Performed by: NURSE PRACTITIONER

## 2019-08-14 RX ORDER — AMINOPHYLLINE DIHYDRATE 25 MG/ML
100 INJECTION, SOLUTION INTRAVENOUS ONCE
Status: COMPLETED | OUTPATIENT
Start: 2019-08-14 | End: 2019-08-14

## 2019-08-14 RX ADMIN — TETROFOSMIN 30 MILLICURIE: 1.38 INJECTION, POWDER, LYOPHILIZED, FOR SOLUTION INTRAVENOUS at 09:20

## 2019-08-14 RX ADMIN — TETROFOSMIN 10 MILLICURIE: 1.38 INJECTION, POWDER, LYOPHILIZED, FOR SOLUTION INTRAVENOUS at 07:59

## 2019-08-14 RX ADMIN — REGADENOSON 0.4 MG: 0.08 INJECTION, SOLUTION INTRAVENOUS at 09:17

## 2019-08-14 RX ADMIN — AMINOPHYLLINE 100 MG: 25 INJECTION, SOLUTION INTRAVENOUS at 09:25

## 2019-08-19 ENCOUNTER — TELEPHONE (OUTPATIENT)
Dept: CARDIOLOGY CLINIC | Age: 64
End: 2019-08-19

## 2019-11-06 ENCOUNTER — OFFICE VISIT (OUTPATIENT)
Dept: CARDIOLOGY CLINIC | Age: 64
End: 2019-11-06
Payer: MEDICARE

## 2019-11-06 VITALS
DIASTOLIC BLOOD PRESSURE: 80 MMHG | HEIGHT: 64 IN | BODY MASS INDEX: 44.34 KG/M2 | SYSTOLIC BLOOD PRESSURE: 138 MMHG | HEART RATE: 74 BPM | WEIGHT: 259.7 LBS | OXYGEN SATURATION: 98 %

## 2019-11-06 DIAGNOSIS — I48.0 PAROXYSMAL ATRIAL FIBRILLATION (HCC): ICD-10-CM

## 2019-11-06 DIAGNOSIS — I50.42 CHRONIC COMBINED SYSTOLIC AND DIASTOLIC HEART FAILURE (HCC): ICD-10-CM

## 2019-11-06 DIAGNOSIS — I10 ESSENTIAL HYPERTENSION: ICD-10-CM

## 2019-11-06 DIAGNOSIS — I42.8 NONISCHEMIC CARDIOMYOPATHY (HCC): Primary | ICD-10-CM

## 2019-11-06 PROCEDURE — 1123F ACP DISCUSS/DSCN MKR DOCD: CPT | Performed by: NURSE PRACTITIONER

## 2019-11-06 PROCEDURE — G8427 DOCREV CUR MEDS BY ELIG CLIN: HCPCS | Performed by: NURSE PRACTITIONER

## 2019-11-06 PROCEDURE — 1090F PRES/ABSN URINE INCON ASSESS: CPT | Performed by: NURSE PRACTITIONER

## 2019-11-06 PROCEDURE — G8417 CALC BMI ABV UP PARAM F/U: HCPCS | Performed by: NURSE PRACTITIONER

## 2019-11-06 PROCEDURE — 99214 OFFICE O/P EST MOD 30 MIN: CPT | Performed by: NURSE PRACTITIONER

## 2019-11-06 PROCEDURE — G8484 FLU IMMUNIZE NO ADMIN: HCPCS | Performed by: NURSE PRACTITIONER

## 2019-11-06 PROCEDURE — 3017F COLORECTAL CA SCREEN DOC REV: CPT | Performed by: NURSE PRACTITIONER

## 2019-11-06 PROCEDURE — G8400 PT W/DXA NO RESULTS DOC: HCPCS | Performed by: NURSE PRACTITIONER

## 2019-11-06 PROCEDURE — 4040F PNEUMOC VAC/ADMIN/RCVD: CPT | Performed by: NURSE PRACTITIONER

## 2019-11-06 PROCEDURE — 1036F TOBACCO NON-USER: CPT | Performed by: NURSE PRACTITIONER

## 2019-11-06 RX ORDER — SPIRONOLACTONE 25 MG/1
25 TABLET ORAL DAILY
Qty: 90 TABLET | Refills: 1 | Status: ON HOLD
Start: 2019-11-06 | End: 2020-08-18 | Stop reason: HOSPADM

## 2019-11-06 RX ORDER — LISINOPRIL 2.5 MG/1
2.5 TABLET ORAL DAILY
Qty: 90 TABLET | Refills: 1 | Status: ON HOLD
Start: 2019-11-06 | End: 2020-08-18 | Stop reason: HOSPADM

## 2019-11-13 ENCOUNTER — TELEPHONE (OUTPATIENT)
Dept: CARDIOLOGY CLINIC | Age: 64
End: 2019-11-13

## 2020-02-28 ENCOUNTER — PROCEDURE VISIT (OUTPATIENT)
Dept: CARDIOLOGY CLINIC | Age: 65
End: 2020-02-28

## 2020-02-28 ENCOUNTER — APPOINTMENT (OUTPATIENT)
Dept: GENERAL RADIOLOGY | Age: 65
End: 2020-02-28
Payer: MEDICARE

## 2020-02-28 ENCOUNTER — HOSPITAL ENCOUNTER (OUTPATIENT)
Age: 65
Setting detail: OBSERVATION
Discharge: HOME | End: 2020-02-29
Attending: EMERGENCY MEDICINE | Admitting: INTERNAL MEDICINE
Payer: MEDICARE

## 2020-02-28 PROBLEM — R07.9 CHEST PAIN: Status: ACTIVE | Noted: 2020-02-28

## 2020-02-28 LAB
ANION GAP SERPL CALCULATED.3IONS-SCNC: 14 MMOL/L (ref 3–16)
BASOPHILS ABSOLUTE: 0.1 K/UL (ref 0–0.2)
BASOPHILS RELATIVE PERCENT: 0.8 %
BUN BLDV-MCNC: 11 MG/DL (ref 7–20)
CALCIUM SERPL-MCNC: 9.6 MG/DL (ref 8.3–10.6)
CHLORIDE BLD-SCNC: 102 MMOL/L (ref 99–110)
CO2: 25 MMOL/L (ref 21–32)
CREAT SERPL-MCNC: 0.6 MG/DL (ref 0.6–1.2)
EKG ATRIAL RATE: 84 BPM
EKG DIAGNOSIS: NORMAL
EKG P AXIS: 71 DEGREES
EKG P-R INTERVAL: 180 MS
EKG Q-T INTERVAL: 392 MS
EKG QRS DURATION: 90 MS
EKG QTC CALCULATION (BAZETT): 463 MS
EKG R AXIS: -13 DEGREES
EKG T AXIS: 65 DEGREES
EKG VENTRICULAR RATE: 84 BPM
EOSINOPHILS ABSOLUTE: 0.2 K/UL (ref 0–0.6)
EOSINOPHILS RELATIVE PERCENT: 1.4 %
GFR AFRICAN AMERICAN: >60
GFR NON-AFRICAN AMERICAN: >60
GLUCOSE BLD-MCNC: 101 MG/DL (ref 70–99)
HCT VFR BLD CALC: 43.9 % (ref 36–48)
HEMOGLOBIN: 13.9 G/DL (ref 12–16)
LYMPHOCYTES ABSOLUTE: 2.6 K/UL (ref 1–5.1)
LYMPHOCYTES RELATIVE PERCENT: 17.2 %
MCH RBC QN AUTO: 27.1 PG (ref 26–34)
MCHC RBC AUTO-ENTMCNC: 31.6 G/DL (ref 31–36)
MCV RBC AUTO: 86 FL (ref 80–100)
MONOCYTES ABSOLUTE: 0.7 K/UL (ref 0–1.3)
MONOCYTES RELATIVE PERCENT: 4.9 %
NEUTROPHILS ABSOLUTE: 11.3 K/UL (ref 1.7–7.7)
NEUTROPHILS RELATIVE PERCENT: 75.7 %
PDW BLD-RTO: 16.1 % (ref 12.4–15.4)
PLATELET # BLD: 340 K/UL (ref 135–450)
PMV BLD AUTO: 8.2 FL (ref 5–10.5)
POTASSIUM SERPL-SCNC: 3.8 MMOL/L (ref 3.5–5.1)
PRO-BNP: 59 PG/ML (ref 0–124)
RBC # BLD: 5.11 M/UL (ref 4–5.2)
SODIUM BLD-SCNC: 141 MMOL/L (ref 136–145)
TROPONIN: <0.01 NG/ML
WBC # BLD: 15 K/UL (ref 4–11)

## 2020-02-28 PROCEDURE — 6370000000 HC RX 637 (ALT 250 FOR IP): Performed by: INTERNAL MEDICINE

## 2020-02-28 PROCEDURE — 84484 ASSAY OF TROPONIN QUANT: CPT

## 2020-02-28 PROCEDURE — 36415 COLL VENOUS BLD VENIPUNCTURE: CPT

## 2020-02-28 PROCEDURE — G0378 HOSPITAL OBSERVATION PER HR: HCPCS

## 2020-02-28 PROCEDURE — 93010 ELECTROCARDIOGRAM REPORT: CPT | Performed by: INTERNAL MEDICINE

## 2020-02-28 PROCEDURE — 99285 EMERGENCY DEPT VISIT HI MDM: CPT

## 2020-02-28 PROCEDURE — 96374 THER/PROPH/DIAG INJ IV PUSH: CPT

## 2020-02-28 PROCEDURE — 71046 X-RAY EXAM CHEST 2 VIEWS: CPT

## 2020-02-28 PROCEDURE — 85025 COMPLETE CBC W/AUTO DIFF WBC: CPT

## 2020-02-28 PROCEDURE — 83880 ASSAY OF NATRIURETIC PEPTIDE: CPT

## 2020-02-28 PROCEDURE — 6360000002 HC RX W HCPCS: Performed by: PHYSICIAN ASSISTANT

## 2020-02-28 PROCEDURE — 93005 ELECTROCARDIOGRAM TRACING: CPT | Performed by: EMERGENCY MEDICINE

## 2020-02-28 PROCEDURE — 6370000000 HC RX 637 (ALT 250 FOR IP): Performed by: PHYSICIAN ASSISTANT

## 2020-02-28 PROCEDURE — 80048 BASIC METABOLIC PNL TOTAL CA: CPT

## 2020-02-28 RX ORDER — ASPIRIN 81 MG/1
81 TABLET ORAL DAILY
COMMUNITY

## 2020-02-28 RX ORDER — FUROSEMIDE 20 MG/1
20 TABLET ORAL EVERY OTHER DAY
Status: DISCONTINUED | OUTPATIENT
Start: 2020-02-29 | End: 2020-02-29 | Stop reason: HOSPADM

## 2020-02-28 RX ORDER — PROMETHAZINE HYDROCHLORIDE 25 MG/1
12.5 TABLET ORAL EVERY 6 HOURS PRN
Status: DISCONTINUED | OUTPATIENT
Start: 2020-02-28 | End: 2020-02-29 | Stop reason: HOSPADM

## 2020-02-28 RX ORDER — DOCUSATE SODIUM 100 MG/1
100 CAPSULE, LIQUID FILLED ORAL 2 TIMES DAILY
Status: DISCONTINUED | OUTPATIENT
Start: 2020-02-28 | End: 2020-02-29 | Stop reason: HOSPADM

## 2020-02-28 RX ORDER — POLYETHYLENE GLYCOL 3350 17 G/17G
17 POWDER, FOR SOLUTION ORAL DAILY PRN
Status: DISCONTINUED | OUTPATIENT
Start: 2020-02-28 | End: 2020-02-29 | Stop reason: HOSPADM

## 2020-02-28 RX ORDER — ASPIRIN 81 MG/1
81 TABLET, CHEWABLE ORAL DAILY
Status: DISCONTINUED | OUTPATIENT
Start: 2020-02-29 | End: 2020-02-29 | Stop reason: HOSPADM

## 2020-02-28 RX ORDER — HYDROCODONE BITARTRATE AND ACETAMINOPHEN 5; 325 MG/1; MG/1
1 TABLET ORAL EVERY 6 HOURS PRN
Status: DISCONTINUED | OUTPATIENT
Start: 2020-02-28 | End: 2020-02-29 | Stop reason: HOSPADM

## 2020-02-28 RX ORDER — SODIUM CHLORIDE 0.9 % (FLUSH) 0.9 %
10 SYRINGE (ML) INJECTION PRN
Status: DISCONTINUED | OUTPATIENT
Start: 2020-02-28 | End: 2020-02-29 | Stop reason: HOSPADM

## 2020-02-28 RX ORDER — ONDANSETRON 2 MG/ML
4 INJECTION INTRAMUSCULAR; INTRAVENOUS EVERY 6 HOURS PRN
Status: DISCONTINUED | OUTPATIENT
Start: 2020-02-28 | End: 2020-02-29 | Stop reason: HOSPADM

## 2020-02-28 RX ORDER — ATORVASTATIN CALCIUM 40 MG/1
40 TABLET, FILM COATED ORAL DAILY
Status: DISCONTINUED | OUTPATIENT
Start: 2020-02-28 | End: 2020-02-29 | Stop reason: HOSPADM

## 2020-02-28 RX ORDER — ASPIRIN 81 MG/1
324 TABLET, CHEWABLE ORAL ONCE
Status: COMPLETED | OUTPATIENT
Start: 2020-02-28 | End: 2020-02-28

## 2020-02-28 RX ORDER — SODIUM CHLORIDE 0.9 % (FLUSH) 0.9 %
10 SYRINGE (ML) INJECTION EVERY 12 HOURS SCHEDULED
Status: DISCONTINUED | OUTPATIENT
Start: 2020-02-28 | End: 2020-02-29 | Stop reason: HOSPADM

## 2020-02-28 RX ORDER — LISINOPRIL 20 MG/1
20 TABLET ORAL DAILY
Status: DISCONTINUED | OUTPATIENT
Start: 2020-02-29 | End: 2020-02-29 | Stop reason: HOSPADM

## 2020-02-28 RX ORDER — ACETAMINOPHEN 325 MG/1
650 TABLET ORAL EVERY 6 HOURS PRN
Status: DISCONTINUED | OUTPATIENT
Start: 2020-02-28 | End: 2020-02-29 | Stop reason: HOSPADM

## 2020-02-28 RX ORDER — MORPHINE SULFATE 2 MG/ML
2 INJECTION, SOLUTION INTRAMUSCULAR; INTRAVENOUS ONCE
Status: COMPLETED | OUTPATIENT
Start: 2020-02-28 | End: 2020-02-28

## 2020-02-28 RX ORDER — ACETAMINOPHEN 650 MG/1
650 SUPPOSITORY RECTAL EVERY 6 HOURS PRN
Status: DISCONTINUED | OUTPATIENT
Start: 2020-02-28 | End: 2020-02-29 | Stop reason: HOSPADM

## 2020-02-28 RX ORDER — ISOSORBIDE MONONITRATE 30 MG/1
30 TABLET, EXTENDED RELEASE ORAL DAILY
Status: DISCONTINUED | OUTPATIENT
Start: 2020-02-28 | End: 2020-02-29

## 2020-02-28 RX ADMIN — METOPROLOL TARTRATE 75 MG: 50 TABLET, FILM COATED ORAL at 20:08

## 2020-02-28 RX ADMIN — ASPIRIN 81 MG 324 MG: 81 TABLET ORAL at 14:41

## 2020-02-28 RX ADMIN — DESMOPRESSIN ACETATE 40 MG: 0.2 TABLET ORAL at 20:08

## 2020-02-28 RX ADMIN — APIXABAN 5 MG: 5 TABLET, FILM COATED ORAL at 20:08

## 2020-02-28 RX ADMIN — MORPHINE SULFATE 2 MG: 2 INJECTION, SOLUTION INTRAMUSCULAR; INTRAVENOUS at 14:41

## 2020-02-28 RX ADMIN — HYDROCODONE BITARTRATE AND ACETAMINOPHEN 1 TABLET: 5; 325 TABLET ORAL at 19:18

## 2020-02-28 RX ADMIN — ISOSORBIDE MONONITRATE 30 MG: 30 TABLET, EXTENDED RELEASE ORAL at 20:08

## 2020-02-28 RX ADMIN — NITROGLYCERIN 1 INCH: 20 OINTMENT TOPICAL at 14:41

## 2020-02-28 ASSESSMENT — ENCOUNTER SYMPTOMS
ABDOMINAL PAIN: 0
DIARRHEA: 0
BACK PAIN: 0
VOMITING: 0
CHEST TIGHTNESS: 0
SHORTNESS OF BREATH: 0
NAUSEA: 1

## 2020-02-28 ASSESSMENT — PAIN SCALES - GENERAL
PAINLEVEL_OUTOF10: 0
PAINLEVEL_OUTOF10: 6

## 2020-02-28 ASSESSMENT — PAIN DESCRIPTION - PAIN TYPE
TYPE: ACUTE PAIN
TYPE: ACUTE PAIN

## 2020-02-28 ASSESSMENT — PAIN DESCRIPTION - DESCRIPTORS
DESCRIPTORS: HEAVINESS;ACHING
DESCRIPTORS: ACHING

## 2020-02-28 ASSESSMENT — PAIN DESCRIPTION - FREQUENCY
FREQUENCY: CONTINUOUS
FREQUENCY: CONTINUOUS

## 2020-02-28 ASSESSMENT — HEART SCORE: ECG: 1

## 2020-02-28 ASSESSMENT — PAIN DESCRIPTION - LOCATION
LOCATION: CHEST
LOCATION: CHEST

## 2020-02-28 NOTE — ED NOTES
Report called to Enid Denson, charge RN on 3A. States she does not have anybody available to leave the floor, so ok to send patient up on tele box with a tech. Denies further questions at this time. Patient to floor via wheelchair.       Arun Price RN  02/28/20 1703

## 2020-02-28 NOTE — ED PROVIDER NOTES
mouth every 8 hours as needed     LISINOPRIL (PRINIVIL;ZESTRIL) 2.5 MG TABLET    Take 1 tablet by mouth daily    METOPROLOL TARTRATE (LOPRESSOR) 50 MG TABLET    Take 1.5 tablets by mouth 2 times daily    NAPROXEN SODIUM (ALEVE PO)    Take by mouth as needed    SPIRONOLACTONE (ALDACTONE) 25 MG TABLET    Take 1 tablet by mouth daily         ALLERGIES     Patient has no known allergies. FAMILYHISTORY       Family History   Problem Relation Age of Onset    Heart Disease Mother     High Blood Pressure Mother     High Cholesterol Mother     Diabetes Mother     Cancer Father           SOCIAL HISTORY       Social History     Tobacco Use    Smoking status: Never Smoker    Smokeless tobacco: Never Used    Tobacco comment: QUIT AS A TEEN   Substance Use Topics    Alcohol use: No     Comment: RARE    Drug use: No       SCREENINGS      Heart Score for chest pain patients  History: Highly Suspicious  ECG: Non-Specifc repolarization disturbance/LBTB/PM  Patient Age: > 65 years  *Risk factors for Atherosclerotic disease: Hypercholesterolemia, Hypertension, Obesity, Coronary Artery Disease  Risk Factors: > 3 Risk factors or history of atherosclerotic disease*  Troponin: < 1X normal limit  Heart Score Total: 7      PHYSICAL EXAM    (up to 7 for level 4, 8 or more for level 5)     ED Triage Vitals [02/28/20 0932]   BP Temp Temp Source Pulse Resp SpO2 Height Weight   (!) 203/94 98 °F (36.7 °C) Oral 82 22 97 % 5' 5\" (1.651 m) 264 lb (119.7 kg)       Physical Exam  Vitals signs and nursing note reviewed. Constitutional:       General: She is awake. She is not in acute distress. Appearance: She is well-developed. She is morbidly obese. She is not diaphoretic. HENT:      Head: Normocephalic and atraumatic. Right Ear: Hearing and external ear normal.      Left Ear: Hearing and external ear normal.   Eyes:      General: No scleral icterus. Right eye: No discharge. Left eye: No discharge. Conjunctiva/sclera: Conjunctivae normal.   Neck:      Musculoskeletal: Normal range of motion. Vascular: No JVD. Cardiovascular:      Rate and Rhythm: Normal rate and regular rhythm. Heart sounds: No murmur. No friction rub. No gallop. Pulmonary:      Effort: Pulmonary effort is normal. No accessory muscle usage or respiratory distress. Breath sounds: Normal breath sounds. No wheezing, rhonchi or rales. Skin:     General: Skin is warm and dry. Neurological:      Mental Status: She is alert and oriented to person, place, and time. GCS: GCS eye subscore is 4. GCS verbal subscore is 5. GCS motor subscore is 6. Cranial Nerves: No cranial nerve deficit. Sensory: No sensory deficit. Coordination: Coordination normal.   Psychiatric:         Behavior: Behavior normal. Behavior is cooperative. DIAGNOSTIC RESULTS   LABS:    Labs Reviewed   BASIC METABOLIC PANEL - Abnormal; Notable for the following components:       Result Value    Glucose 101 (*)     All other components within normal limits    Narrative:     Performed at:  [facility] [address], [facility], [address]   Phone [phone]   CBC WITH AUTO DIFFERENTIAL - Abnormal; Notable for the following components:    WBC 15.0 (*)     RDW 16.1 (*)     Neutrophils Absolute 11.3 (*)     All other components within normal limits    Narrative:     Performed at:  [facility] [address], [facility], [address]   Phone [phone]   TROPONIN    Narrative:     Performed at:  [facility] [address], [facility], [address]   Phone [phone]    Narrative:     Performed at:  [facility] [address], [facility], [address]   Phone [phone]       All other labs were within normal range or not returned as of this dictation. EKG:  All EKG's are physician will feel the phone call for the patient's ongoing care management. FINAL IMPRESSION      1. Chest pain, unspecified type    2. History of coronary artery disease    3. Leukocytosis chronic, unspecified type    4. History of cardiomyopathy    5. History of paroxysmal atrial fibrillation ([facility] [address]    6. History of implantable cardiac defibrillator (ICD)    7. Anticoagulation adequate          DISPOSITION/PLAN   DISPOSITION: Admit medical stable condition.          (Please note that portions of this note were completed with a voice recognition program.  Efforts were made to edit the dictations but occasionally words are mis-transcribed.)    Kirstie Patrick PA-C (electronically signed)            Bernard Reed PA-C  02/28/20 6079

## 2020-02-29 VITALS
SYSTOLIC BLOOD PRESSURE: 101 MMHG | WEIGHT: 264 LBS | BODY MASS INDEX: 43.99 KG/M2 | TEMPERATURE: 97.8 F | RESPIRATION RATE: 16 BRPM | DIASTOLIC BLOOD PRESSURE: 55 MMHG | OXYGEN SATURATION: 96 % | HEART RATE: 74 BPM | HEIGHT: 65 IN

## 2020-02-29 LAB
EKG ATRIAL RATE: 72 BPM
EKG DIAGNOSIS: NORMAL
EKG P AXIS: 70 DEGREES
EKG P-R INTERVAL: 174 MS
EKG Q-T INTERVAL: 446 MS
EKG QRS DURATION: 92 MS
EKG QTC CALCULATION (BAZETT): 488 MS
EKG R AXIS: 0 DEGREES
EKG T AXIS: 58 DEGREES
EKG VENTRICULAR RATE: 72 BPM
HCT VFR BLD CALC: 40.3 % (ref 36–48)
HEMOGLOBIN: 12.9 G/DL (ref 12–16)
LV EF: 55 %
LVEF MODALITY: NORMAL
MCH RBC QN AUTO: 27.7 PG (ref 26–34)
MCHC RBC AUTO-ENTMCNC: 31.9 G/DL (ref 31–36)
MCV RBC AUTO: 86.8 FL (ref 80–100)
PDW BLD-RTO: 16.2 % (ref 12.4–15.4)
PLATELET # BLD: 333 K/UL (ref 135–450)
PMV BLD AUTO: 8.4 FL (ref 5–10.5)
RBC # BLD: 4.65 M/UL (ref 4–5.2)
WBC # BLD: 14.4 K/UL (ref 4–11)

## 2020-02-29 PROCEDURE — 93017 CV STRESS TEST TRACING ONLY: CPT | Performed by: INTERNAL MEDICINE

## 2020-02-29 PROCEDURE — 99205 OFFICE O/P NEW HI 60 MIN: CPT | Performed by: INTERNAL MEDICINE

## 2020-02-29 PROCEDURE — 93010 ELECTROCARDIOGRAM REPORT: CPT | Performed by: INTERNAL MEDICINE

## 2020-02-29 PROCEDURE — 6370000000 HC RX 637 (ALT 250 FOR IP): Performed by: INTERNAL MEDICINE

## 2020-02-29 PROCEDURE — G0378 HOSPITAL OBSERVATION PER HR: HCPCS

## 2020-02-29 PROCEDURE — 78452 HT MUSCLE IMAGE SPECT MULT: CPT | Performed by: INTERNAL MEDICINE

## 2020-02-29 PROCEDURE — 3430000000 HC RX DIAGNOSTIC RADIOPHARMACEUTICAL: Performed by: INTERNAL MEDICINE

## 2020-02-29 PROCEDURE — A9502 TC99M TETROFOSMIN: HCPCS | Performed by: INTERNAL MEDICINE

## 2020-02-29 PROCEDURE — 85027 COMPLETE CBC AUTOMATED: CPT

## 2020-02-29 PROCEDURE — 93005 ELECTROCARDIOGRAM TRACING: CPT | Performed by: INTERNAL MEDICINE

## 2020-02-29 PROCEDURE — 6360000002 HC RX W HCPCS: Performed by: INTERNAL MEDICINE

## 2020-02-29 RX ORDER — AMINOPHYLLINE DIHYDRATE 25 MG/ML
100 INJECTION, SOLUTION INTRAVENOUS ONCE
Status: COMPLETED | OUTPATIENT
Start: 2020-02-29 | End: 2020-02-29

## 2020-02-29 RX ORDER — METOPROLOL TARTRATE 50 MG/1
100 TABLET, FILM COATED ORAL 2 TIMES DAILY
Status: DISCONTINUED | OUTPATIENT
Start: 2020-02-29 | End: 2020-02-29 | Stop reason: HOSPADM

## 2020-02-29 RX ORDER — SPIRONOLACTONE 25 MG/1
25 TABLET ORAL DAILY
Status: DISCONTINUED | OUTPATIENT
Start: 2020-02-29 | End: 2020-02-29 | Stop reason: HOSPADM

## 2020-02-29 RX ADMIN — FUROSEMIDE 20 MG: 20 TABLET ORAL at 08:33

## 2020-02-29 RX ADMIN — ASPIRIN 81 MG 81 MG: 81 TABLET ORAL at 08:34

## 2020-02-29 RX ADMIN — DOCUSATE SODIUM 100 MG: 100 CAPSULE ORAL at 08:34

## 2020-02-29 RX ADMIN — ACETAMINOPHEN 650 MG: 325 TABLET, FILM COATED ORAL at 05:12

## 2020-02-29 RX ADMIN — TETROFOSMIN 30 MILLICURIE: 1.38 INJECTION, POWDER, LYOPHILIZED, FOR SOLUTION INTRAVENOUS at 12:10

## 2020-02-29 RX ADMIN — ISOSORBIDE MONONITRATE 30 MG: 30 TABLET, EXTENDED RELEASE ORAL at 08:33

## 2020-02-29 RX ADMIN — APIXABAN 5 MG: 5 TABLET, FILM COATED ORAL at 08:33

## 2020-02-29 RX ADMIN — SPIRONOLACTONE 25 MG: 25 TABLET ORAL at 14:11

## 2020-02-29 RX ADMIN — REGADENOSON 0.4 MG: 0.08 INJECTION, SOLUTION INTRAVENOUS at 12:10

## 2020-02-29 RX ADMIN — TETROFOSMIN 10 MILLICURIE: 1.38 INJECTION, POWDER, LYOPHILIZED, FOR SOLUTION INTRAVENOUS at 10:35

## 2020-02-29 RX ADMIN — AMINOPHYLLINE DIHYDRATE 100 MG: 25 INJECTION, SOLUTION INTRAVENOUS at 12:21

## 2020-02-29 RX ADMIN — ACETAMINOPHEN 650 MG: 325 TABLET, FILM COATED ORAL at 14:11

## 2020-02-29 RX ADMIN — LISINOPRIL 20 MG: 20 TABLET ORAL at 08:33

## 2020-02-29 ASSESSMENT — PAIN SCALES - GENERAL
PAINLEVEL_OUTOF10: 2
PAINLEVEL_OUTOF10: 7
PAINLEVEL_OUTOF10: 6
PAINLEVEL_OUTOF10: 4

## 2020-02-29 ASSESSMENT — PAIN DESCRIPTION - DESCRIPTORS
DESCRIPTORS: HEADACHE
DESCRIPTORS: ACHING
DESCRIPTORS: STABBING

## 2020-02-29 ASSESSMENT — PAIN DESCRIPTION - LOCATION
LOCATION: HEAD
LOCATION: CHEST

## 2020-02-29 ASSESSMENT — PAIN DESCRIPTION - PAIN TYPE
TYPE: ACUTE PAIN
TYPE: ACUTE PAIN

## 2020-02-29 ASSESSMENT — PAIN DESCRIPTION - ORIENTATION: ORIENTATION: MID;LEFT

## 2020-02-29 NOTE — PROGRESS NOTES
PM assessment completed. Pt NSR on the monitor, initial troponin WNL. Pt had c/o her chest aching 6/10 see MAR for administration of scheduled medication, and PRN. Pt updated on POC, notified to not eat or drink after midnight, and to avoid caffeine and decaffeinated products. Pt states she understands. Pt educated to notify RN if and when she has chest pain. Pt educated on call light. Call light within reach, will continue to monitor.

## 2020-02-29 NOTE — ED PROVIDER NOTES
I independently performed a history and physical on Miroi. All diagnostic, treatment, and disposition decisions were made by myself in conjunction with the advanced practice provider. Briefly, this is a 76 y.o. female here for central heavy chest pain which began at 0300 this morning. Patient states pain woke her from sleep. .    On exam, Patient afebrile and nontoxic. No distress. Heart RRR. Lungs CTAB. Abdomen soft, nondistended, nontender to palpation in all quadrants. EKG  EKG was reviewed by emergency department physician in the absence of a cardiologist    Narrow complex sinus rhythm, rate 84, normal axis, normal MO and QRS intervals, normal Qtc, no ST elevations or depressions, normal t-wave morphology, impression NSR with poor R wave progression, no STEMI      Screenings   [place] Coma Scale  Eye Opening: Spontaneous  Best Verbal Response: Oriented  Best Motor Response: Obeys commands  Andriy Coma Scale Score: 15 Heart Score for chest pain patients  History: Highly Suspicious  ECG: Non-Specifc repolarization disturbance/LBTB/PM  Patient Age: > 65 years  *Risk factors for Atherosclerotic disease: Hypercholesterolemia, Hypertension, Obesity, Coronary Artery Disease  Risk Factors: > 3 Risk factors or history of atherosclerotic disease*  Troponin: < 1X normal limit  Heart Score Total: 7      MDM    Patient with active chest pain and history of CAD. EKG is no STEMI, no troponin normal, ACS or malignant dysrhythmias not evident. Work-up without evidence of infection. Nothing clinically to suggest dissection. No risk factors for pulmonary embolism this is felt much less likely diagnosis, patient is already on appropriate anticoagulation. Pacemaker was interrogated, I discussed results with rep and there have been no dysrhythmias immediately preceding or during patient's symptoms today.   Patient is high risk by HEART score with active chest pain, will admit to internal medicine service for

## 2020-02-29 NOTE — PROGRESS NOTES
Instructed on Lexiscan Stress Test Procedure including possible side effects/ adverse reactions. Patient verbalizes  understanding and denies having any questions . See [address] [place] [address] Rd Cardiology. Aminophylline given per lexiscan protocol in stress lab for c/o persistant nausea.

## 2020-02-29 NOTE — PLAN OF CARE
Problem: Pain:  Goal: Control of acute pain  Description  Control of acute pain  2/29/2020 1312 by Darrell Mott, RN  Outcome: Ongoing  Note:   C/o chest aching this am, much better then yesterday, having stress test now

## 2020-02-29 NOTE — CONSULTS
[address]  Advanced CHF/Pulmonary Hypertension   Cardiac Evaluation      Lesli Cotton  YOB: 1955    Requesting PHysician:  Dr. Ole Titus      Chief Complaint   Patient presents with    Chest Pain     c/o chest pain started 3am acute dizziness onset 0730, denies dizziness currently. cp, sob, denies being sweaty or nausea         History of Present Illness:  Lesli Cotton is a 77 yo female who presents to the ED with chest pain that woke her up from sleep yesterday am.  The pain continued after that. The pain was a pressure type pain in the middle of her chest without radiation. It was a dull achy pressure type pain. There was some associated symptoms of dizziness. The pain was associated with mild nausea but no diaphoresis. She does not take aspirin. She has a history of chronic systolic HF and VT, has an ICD. She reports that she has had MIs in the past, but I see no indication of that in her chart. She follows in our office. The pain has no alleviating or worsening factors. She is on Eliquis as a blood thinner. Denies vomiting or diarrhea. No increasing swelling in her lower extremities. She is currently not having any chest pain. troponins are negative. EKG is unremarkable.         No Known Allergies  Current Facility-Administered Medications   Medication Dose Route Frequency Provider Last Rate Last Dose    apixaban (ELIQUIS) tablet 5 mg  5 mg Oral BID Teri Morin MD   5 mg at 02/29/20 0833    atorvastatin (LIPITOR) tablet 40 mg  40 mg Oral Daily Lopez Titus MD   40 mg at 02/28/20 2008    furosemide (LASIX) tablet 20 mg  20 mg Oral Every Other Day Teri Morin MD   20 mg at 02/29/20 0833    lisinopril (PRINIVIL;ZESTRIL) tablet 20 mg  20 mg Oral Daily Lopez Titus MD   20 mg at 02/29/20 0833    metoprolol tartrate (LOPRESSOR) tablet 75 mg  75 mg Oral BID Teri Morin MD   Stopped at 02/29/20 0833    sodium chloride flush 0.9 % injection 10 mL 10 mL Intravenous 2 times per day Ar Carnes MD        sodium chloride flush 0.9 % injection 10 mL  10 mL Intravenous PRN Ar Carnes MD        acetaminophen (TYLENOL) tablet 650 mg  650 mg Oral Q6H PRN Ar Carnes MD   650 mg at 02/29/20 2238    Or    acetaminophen (TYLENOL) suppository 650 mg  650 mg Rectal Q6H PRN Lopez Valadez MD        polyethylene glycol (GLYCOLAX) packet 17 g  17 g Oral Daily PRN Lopez Velasco MD        promethazine (PHENERGAN) tablet 12.5 mg  12.5 mg Oral Q6H PRN Lopez Valadez MD        Or    ondansetron (ZOFRAN) injection 4 mg  4 mg Intravenous Q6H PRN Lopez Velasco MD        aspirin chewable tablet 81 mg  81 mg Oral Daily Lopez Queen MD   81 mg at 02/29/20 0834    isosorbide mononitrate (IMDUR) extended release tablet 30 mg  30 mg Oral Daily Lopez Queen MD   30 mg at 02/29/20 0833    HYDROcodone-acetaminophen (NORCO) 5-325 MG per tablet 1 tablet  1 tablet Oral Q6H PRN Ar Carnes MD   1 tablet at 02/28/20 1918    docusate sodium (COLACE) capsule 100 mg  100 mg Oral BID Ar Carnes MD   100 mg at 02/29/20 8121       Past Medical History:   Diagnosis Date    AICD (automatic cardioverter/defibrillator) present     Arthritis     [facility] [address]     Gout     Hyperlipidemia     Hypertension     MI (myocardial infarction) ([facility] [address]      Past Surgical History:   Procedure Laterality Date    CARDIAC DEFIBRILLATOR PLACEMENT      DILATION AND CURETTAGE OF UTERUS       Family History   Problem Relation Age of Onset    Heart Disease Mother     High Blood Pressure Mother     High Cholesterol Mother     Diabetes Mother     Cancer Father      Social History     Socioeconomic History    Marital status:      Spouse name: Not on file    Number of children: Not on file    Years of education: Not on file    Highest education level: Not on file   Occupational History    Not on file   Social Needs    Financial resource strain: Not on file    Food insecurity:     Worry: Not on file     Inability: Not on file    Transportation needs:     Medical: Not on file     Non-medical: Not on file   Tobacco Use    Smoking status: Never Smoker    Smokeless tobacco: Never Used    Tobacco comment: QUIT AS A TEEN   Substance and Sexual Activity    Alcohol use: No     Comment: RARE    Drug use: No    Sexual activity: Not on file   Lifestyle    Physical activity:     Days per week: Not on file     Minutes per session: Not on file    Stress: Not on file   Relationships    Social connections:     Talks on phone: Not on file     Gets together: Not on file     Attends [employer] service: Not on file     Active member of club or organization: Not on file     Attends meetings of clubs or organizations: Not on file     Relationship status: Not on file    Intimate partner violence:     Fear of current or ex partner: Not on file     Emotionally abused: Not on file     Physically abused: Not on file     Forced sexual activity: Not on file   Other Topics Concern    Not on file   Social History Narrative    Not on file       Review of Systems:   · Constitutional: there has been no unanticipated weight loss. There's been no change in energy level, sleep pattern, or activity level. · Eyes: No visual changes or diplopia. No scleral icterus. · ENT: No Headaches, hearing loss or vertigo. No mouth sores or sore throat. · Cardiovascular: Reviewed in HPI  · Respiratory: No cough or wheezing, no sputum production. No hematemesis. · Gastrointestinal: No abdominal pain, appetite loss, blood in stools. No change in bowel or bladder habits. · Genitourinary: No dysuria, trouble voiding, or hematuria. · Musculoskeletal:  No gait disturbance, weakness or joint complaints. · Integumentary: No rash or pruritis. · Neurological: No headache, diplopia, change in muscle strength, numbness or tingling.  No change in gait, balance, mentation, or behavior are noted    Labs were reviewed including labs from other hospital systems through [facility]. Cardiac testing was reviewed including echos, nuclear scans, cardiac catheterization, including from other hospital systems through [facility]. Assessment:    1. Chest pain, unspecified type    2. History of coronary artery disease    3. Leukocytosis chronic, unspecified type    4. History of cardiomyopathy    5. History of paroxysmal atrial fibrillation ([address]    6. History of implantable cardiac defibrillator (ICD)    7. Anticoagulation adequate         Plan:  1. Her troponins are negative and chest pain is atypical  2. Would like to proceed with Paddy Even to rule out ischemia. 3.  Continue lisinopril, lasix  4. Continue aspirin, metoprolol,spironolactone  5. If stress test is negative, okay for discharge later today  6. If positive, she will need to stay for cardiac cath on Monday. ADdendum:  Stress test negative for ischemia  Interrogation of her device shows some VT and atrial tachycardia. Discussed with Dr. Aide Gomes. Recommend increasing metoprolol to 100 mg po bid  Okay for discharge home today from our perspective. I appreciate the opportunity of cooperating in the care of this patient.     Flori Grant M.D., [address] [facility]

## 2020-02-29 NOTE — PROGRESS NOTES
Discharge instructions given including to f/u with pcp in 1-2 weeks, peripheral iv removed, no homecare needs, patient discharged at 1730

## 2020-02-29 NOTE — DISCHARGE SUMMARY
CARDIOLOGY    Physical examination on discharge day. BP (!) 101/55   Pulse 74   Temp 97.8 °F (36.6 °C) (Oral)   Resp 16   Ht 5' 5\" (1.651 m)   Wt 264 lb (119.7 kg)   SpO2 96%   BMI 43.93 kg/m²   General appearance. Alert. Looks comfortable. HEENT. Sclera clear. Moist mucus membranes. Cardiovascular. Regular rate and rhythm, normal S1, S2. No murmur. Respiratory. Not using accessory muscles. Clear to auscultation bilaterally, no wheeze. Gastrointestinal. Abdomen soft, non-tender, not distended, normal bowel sounds  Neurology. Facial symmetry. No speech deficits. Moving all extremities equally. Extremities. No edema in lower extremities. Skin. Warm, dry, normal turgor        Condition at time of discharge stable    Medication instructions provided to patient at discharge. Medication List      CONTINUE taking these medications    apixaban 5 MG Tabs tablet  Commonly known as:  ELIQUIS  Take 1 tablet by mouth 2 times daily  Notes to patient:  Blood thinner used to treat and prevent blood clots  Side effects:  Bruising/bleeding     aspirin 81 MG EC tablet  Notes to patient:  Use: prevents heart attacks and strokes. Side effects: bleeding or bruising more easily, stomach upset. atorvastatin 40 MG tablet  Commonly known as:  LIPITOR  Take 1 tablet by mouth daily  Notes to patient:  Atorvastatin (Lipitor)  Use: lower cholesterol; prevent heart attack/stroke  Side effects: headache, muscle pain/weakness     furosemide 20 MG tablet  Commonly known as:  LASIX  Take 1 tablet by mouth every other day  Notes to patient:  Use: treat heart failure, fluid retention, lower blood pressure.        Side effects: frequent urination, weakness, muscle cramps, increased sensitivity to light, nausea, and dizziness     lisinopril 2.5 MG tablet  Commonly known as:  PRINIVIL;ZESTRIL  Take 1 tablet by mouth daily  Notes to patient:  Use:  Lowers blood pressure and takes workload off heart  Side Effects: Dry cough, dizziness, drowsiness, sensitivity to the sun     metoprolol tartrate 50 MG tablet  Commonly known as:  LOPRESSOR  Take 1.5 tablets by mouth 2 times daily  Notes to patient:  Use: treat heart failure, prevent future heart attacks, lower blood pressure and heart rate, treat chest pain  Side effects: dizziness, fatigue, and diarrhea     spironolactone 25 MG tablet  Commonly known as:  ALDACTONE  Take 1 tablet by mouth daily  Notes to patient:  Use: treat heart failure, fluid retention, lower blood pressure. Side effects: frequent urination, weakness, muscle cramps, increased sensitivity to light, nausea, and dizziness        STOP taking these medications    ALEVE PO     ibuprofen 200 MG tablet  Commonly known as:  ADVIL;MOTRIN            Discharge recommendations given to patient. Follow Up. Pcp, cardiology in 1 week   Disposition. home  Activity. activity as tolerated  Diet: DIET CARDIAC;      Spent 25  minutes in discharge process.     Patience Pablo MD     2/29/2020 3:54 PM

## 2020-08-11 ENCOUNTER — HOSPITAL ENCOUNTER (INPATIENT)
Age: 65
LOS: 7 days | Discharge: HOME | DRG: 286 | End: 2020-08-18
Attending: INTERNAL MEDICINE | Admitting: INTERNAL MEDICINE
Payer: MEDICARE

## 2020-08-11 PROBLEM — I49.9 CARDIAC ARRHYTHMIA: Status: ACTIVE | Noted: 2020-08-11

## 2020-08-11 LAB — TROPONIN: 0.2 NG/ML

## 2020-08-11 PROCEDURE — 94760 N-INVAS EAR/PLS OXIMETRY 1: CPT

## 2020-08-11 PROCEDURE — 2060000000 HC ICU INTERMEDIATE R&B

## 2020-08-11 PROCEDURE — 84484 ASSAY OF TROPONIN QUANT: CPT

## 2020-08-11 PROCEDURE — 6360000002 HC RX W HCPCS: Performed by: INTERNAL MEDICINE

## 2020-08-11 PROCEDURE — 6370000000 HC RX 637 (ALT 250 FOR IP): Performed by: INTERNAL MEDICINE

## 2020-08-11 PROCEDURE — 2580000003 HC RX 258: Performed by: INTERNAL MEDICINE

## 2020-08-11 RX ORDER — LISINOPRIL 5 MG/1
2.5 TABLET ORAL DAILY
Status: DISCONTINUED | OUTPATIENT
Start: 2020-08-12 | End: 2020-08-13

## 2020-08-11 RX ORDER — ATORVASTATIN CALCIUM 40 MG/1
40 TABLET, FILM COATED ORAL NIGHTLY
Status: DISCONTINUED | OUTPATIENT
Start: 2020-08-11 | End: 2020-08-18 | Stop reason: HOSPADM

## 2020-08-11 RX ORDER — POTASSIUM CHLORIDE 20 MEQ/1
40 TABLET, EXTENDED RELEASE ORAL PRN
Status: DISCONTINUED | OUTPATIENT
Start: 2020-08-11 | End: 2020-08-15

## 2020-08-11 RX ORDER — PROMETHAZINE HYDROCHLORIDE 25 MG/1
12.5 TABLET ORAL EVERY 6 HOURS PRN
Status: DISCONTINUED | OUTPATIENT
Start: 2020-08-11 | End: 2020-08-18 | Stop reason: HOSPADM

## 2020-08-11 RX ORDER — SODIUM CHLORIDE 0.9 % (FLUSH) 0.9 %
10 SYRINGE (ML) INJECTION EVERY 12 HOURS SCHEDULED
Status: DISCONTINUED | OUTPATIENT
Start: 2020-08-11 | End: 2020-08-18 | Stop reason: HOSPADM

## 2020-08-11 RX ORDER — SODIUM CHLORIDE 0.9 % (FLUSH) 0.9 %
10 SYRINGE (ML) INJECTION PRN
Status: DISCONTINUED | OUTPATIENT
Start: 2020-08-11 | End: 2020-08-18 | Stop reason: HOSPADM

## 2020-08-11 RX ORDER — ACETAMINOPHEN 325 MG/1
650 TABLET ORAL EVERY 6 HOURS PRN
Status: DISCONTINUED | OUTPATIENT
Start: 2020-08-11 | End: 2020-08-13 | Stop reason: SDUPTHER

## 2020-08-11 RX ORDER — ZOLPIDEM TARTRATE 5 MG/1
5 TABLET ORAL NIGHTLY PRN
Status: DISCONTINUED | OUTPATIENT
Start: 2020-08-11 | End: 2020-08-18 | Stop reason: HOSPADM

## 2020-08-11 RX ORDER — MORPHINE SULFATE 2 MG/ML
2 INJECTION, SOLUTION INTRAMUSCULAR; INTRAVENOUS EVERY 4 HOURS PRN
Status: DISCONTINUED | OUTPATIENT
Start: 2020-08-11 | End: 2020-08-18 | Stop reason: HOSPADM

## 2020-08-11 RX ORDER — POLYETHYLENE GLYCOL 3350 17 G/17G
17 POWDER, FOR SOLUTION ORAL DAILY PRN
Status: DISCONTINUED | OUTPATIENT
Start: 2020-08-11 | End: 2020-08-18 | Stop reason: HOSPADM

## 2020-08-11 RX ORDER — SPIRONOLACTONE 25 MG/1
25 TABLET ORAL DAILY
Status: DISCONTINUED | OUTPATIENT
Start: 2020-08-12 | End: 2020-08-18

## 2020-08-11 RX ORDER — LORAZEPAM 2 MG/ML
1 INJECTION INTRAMUSCULAR EVERY 6 HOURS PRN
Status: DISCONTINUED | OUTPATIENT
Start: 2020-08-11 | End: 2020-08-18 | Stop reason: HOSPADM

## 2020-08-11 RX ORDER — ONDANSETRON 2 MG/ML
4 INJECTION INTRAMUSCULAR; INTRAVENOUS EVERY 6 HOURS PRN
Status: DISCONTINUED | OUTPATIENT
Start: 2020-08-11 | End: 2020-08-13 | Stop reason: SDUPTHER

## 2020-08-11 RX ORDER — ASPIRIN 81 MG/1
81 TABLET ORAL DAILY
Status: DISCONTINUED | OUTPATIENT
Start: 2020-08-12 | End: 2020-08-18 | Stop reason: HOSPADM

## 2020-08-11 RX ORDER — MAGNESIUM SULFATE IN WATER 40 MG/ML
2 INJECTION, SOLUTION INTRAVENOUS PRN
Status: DISCONTINUED | OUTPATIENT
Start: 2020-08-11 | End: 2020-08-18 | Stop reason: HOSPADM

## 2020-08-11 RX ORDER — ACETAMINOPHEN 650 MG/1
650 SUPPOSITORY RECTAL EVERY 6 HOURS PRN
Status: DISCONTINUED | OUTPATIENT
Start: 2020-08-11 | End: 2020-08-18 | Stop reason: HOSPADM

## 2020-08-11 RX ORDER — POTASSIUM CHLORIDE 7.45 MG/ML
10 INJECTION INTRAVENOUS PRN
Status: DISCONTINUED | OUTPATIENT
Start: 2020-08-11 | End: 2020-08-15

## 2020-08-11 RX ORDER — FUROSEMIDE 20 MG/1
20 TABLET ORAL EVERY OTHER DAY
Status: DISCONTINUED | OUTPATIENT
Start: 2020-08-12 | End: 2020-08-12

## 2020-08-11 RX ADMIN — METOPROLOL TARTRATE 75 MG: 50 TABLET, FILM COATED ORAL at 23:09

## 2020-08-11 RX ADMIN — MORPHINE SULFATE 2 MG: 2 INJECTION, SOLUTION INTRAMUSCULAR; INTRAVENOUS at 23:09

## 2020-08-11 RX ADMIN — Medication 10 ML: at 23:13

## 2020-08-11 RX ADMIN — DESMOPRESSIN ACETATE 40 MG: 0.2 TABLET ORAL at 23:09

## 2020-08-11 RX ADMIN — ZOLPIDEM TARTRATE 5 MG: 5 TABLET ORAL at 23:09

## 2020-08-11 RX ADMIN — APIXABAN 5 MG: 5 TABLET, FILM COATED ORAL at 23:12

## 2020-08-11 ASSESSMENT — PAIN DESCRIPTION - ORIENTATION: ORIENTATION: MID

## 2020-08-11 ASSESSMENT — PAIN DESCRIPTION - PAIN TYPE: TYPE: ACUTE PAIN

## 2020-08-11 ASSESSMENT — PAIN DESCRIPTION - LOCATION: LOCATION: CHEST

## 2020-08-11 ASSESSMENT — PAIN SCALES - GENERAL: PAINLEVEL_OUTOF10: 7

## 2020-08-12 ENCOUNTER — APPOINTMENT (OUTPATIENT)
Dept: GENERAL RADIOLOGY | Age: 65
DRG: 286 | End: 2020-08-12
Attending: INTERNAL MEDICINE
Payer: MEDICARE

## 2020-08-12 ENCOUNTER — NURSE ONLY (OUTPATIENT)
Dept: CARDIOLOGY CLINIC | Age: 65
End: 2020-08-12
Payer: MEDICARE

## 2020-08-12 PROBLEM — Z86.79 HISTORY OF VENTRICULAR TACHYCARDIA: Status: ACTIVE | Noted: 2020-08-12

## 2020-08-12 LAB
ANION GAP SERPL CALCULATED.3IONS-SCNC: 7 MMOL/L (ref 3–16)
ANION GAP SERPL CALCULATED.3IONS-SCNC: 8 MMOL/L (ref 3–16)
BUN BLDV-MCNC: 10 MG/DL (ref 7–20)
BUN BLDV-MCNC: 8 MG/DL (ref 7–20)
CALCIUM SERPL-MCNC: 8.6 MG/DL (ref 8.3–10.6)
CALCIUM SERPL-MCNC: 8.8 MG/DL (ref 8.3–10.6)
CHLORIDE BLD-SCNC: 104 MMOL/L (ref 99–110)
CHLORIDE BLD-SCNC: 105 MMOL/L (ref 99–110)
CO2: 27 MMOL/L (ref 21–32)
CO2: 27 MMOL/L (ref 21–32)
CREAT SERPL-MCNC: 0.6 MG/DL (ref 0.6–1.2)
CREAT SERPL-MCNC: 0.6 MG/DL (ref 0.6–1.2)
GFR AFRICAN AMERICAN: >60
GFR AFRICAN AMERICAN: >60
GFR NON-AFRICAN AMERICAN: >60
GFR NON-AFRICAN AMERICAN: >60
GLUCOSE BLD-MCNC: 142 MG/DL (ref 70–99)
GLUCOSE BLD-MCNC: 150 MG/DL (ref 70–99)
HCT VFR BLD CALC: 37.1 % (ref 36–48)
HEMOGLOBIN: 11.9 G/DL (ref 12–16)
LV EF: 40 %
LVEF MODALITY: NORMAL
MAGNESIUM: 1.9 MG/DL (ref 1.8–2.4)
MAGNESIUM: 2.1 MG/DL (ref 1.8–2.4)
MCH RBC QN AUTO: 29.4 PG (ref 26–34)
MCHC RBC AUTO-ENTMCNC: 32 G/DL (ref 31–36)
MCV RBC AUTO: 91.7 FL (ref 80–100)
PDW BLD-RTO: 20.2 % (ref 12.4–15.4)
PLATELET # BLD: 327 K/UL (ref 135–450)
PMV BLD AUTO: 7.4 FL (ref 5–10.5)
POTASSIUM REFLEX MAGNESIUM: 3.4 MMOL/L (ref 3.5–5.1)
POTASSIUM SERPL-SCNC: 3.9 MMOL/L (ref 3.5–5.1)
PRO-BNP: 95 PG/ML (ref 0–124)
RBC # BLD: 4.04 M/UL (ref 4–5.2)
SODIUM BLD-SCNC: 138 MMOL/L (ref 136–145)
SODIUM BLD-SCNC: 140 MMOL/L (ref 136–145)
TROPONIN: 0.16 NG/ML
WBC # BLD: 10.6 K/UL (ref 4–11)

## 2020-08-12 PROCEDURE — 83735 ASSAY OF MAGNESIUM: CPT

## 2020-08-12 PROCEDURE — 2580000003 HC RX 258: Performed by: INTERNAL MEDICINE

## 2020-08-12 PROCEDURE — 6370000000 HC RX 637 (ALT 250 FOR IP): Performed by: INTERNAL MEDICINE

## 2020-08-12 PROCEDURE — 84484 ASSAY OF TROPONIN QUANT: CPT

## 2020-08-12 PROCEDURE — 36415 COLL VENOUS BLD VENIPUNCTURE: CPT

## 2020-08-12 PROCEDURE — 71045 X-RAY EXAM CHEST 1 VIEW: CPT

## 2020-08-12 PROCEDURE — 93306 TTE W/DOPPLER COMPLETE: CPT

## 2020-08-12 PROCEDURE — 93289 INTERROG DEVICE EVAL HEART: CPT | Performed by: INTERNAL MEDICINE

## 2020-08-12 PROCEDURE — 85027 COMPLETE CBC AUTOMATED: CPT

## 2020-08-12 PROCEDURE — 80048 BASIC METABOLIC PNL TOTAL CA: CPT

## 2020-08-12 PROCEDURE — 83880 ASSAY OF NATRIURETIC PEPTIDE: CPT

## 2020-08-12 PROCEDURE — 99223 1ST HOSP IP/OBS HIGH 75: CPT | Performed by: INTERNAL MEDICINE

## 2020-08-12 PROCEDURE — 2060000000 HC ICU INTERMEDIATE R&B

## 2020-08-12 PROCEDURE — 6360000002 HC RX W HCPCS: Performed by: INTERNAL MEDICINE

## 2020-08-12 PROCEDURE — 6360000002 HC RX W HCPCS: Performed by: NURSE PRACTITIONER

## 2020-08-12 RX ORDER — AMIODARONE HYDROCHLORIDE 200 MG/1
400 TABLET ORAL 2 TIMES DAILY
Status: DISCONTINUED | OUTPATIENT
Start: 2020-08-12 | End: 2020-08-18 | Stop reason: HOSPADM

## 2020-08-12 RX ORDER — METOPROLOL SUCCINATE 50 MG/1
100 TABLET, EXTENDED RELEASE ORAL DAILY
Status: DISCONTINUED | OUTPATIENT
Start: 2020-08-12 | End: 2020-08-18 | Stop reason: HOSPADM

## 2020-08-12 RX ORDER — FUROSEMIDE 20 MG/1
20 TABLET ORAL DAILY
Status: DISCONTINUED | OUTPATIENT
Start: 2020-08-13 | End: 2020-08-13

## 2020-08-12 RX ADMIN — LORAZEPAM 1 MG: 2 INJECTION INTRAMUSCULAR; INTRAVENOUS at 06:20

## 2020-08-12 RX ADMIN — SPIRONOLACTONE 25 MG: 25 TABLET ORAL at 08:56

## 2020-08-12 RX ADMIN — MORPHINE SULFATE 2 MG: 2 INJECTION, SOLUTION INTRAMUSCULAR; INTRAVENOUS at 08:54

## 2020-08-12 RX ADMIN — AMIODARONE HYDROCHLORIDE 400 MG: 200 TABLET ORAL at 21:26

## 2020-08-12 RX ADMIN — Medication 10 ML: at 21:31

## 2020-08-12 RX ADMIN — LISINOPRIL 2.5 MG: 5 TABLET ORAL at 08:56

## 2020-08-12 RX ADMIN — DESMOPRESSIN ACETATE 40 MG: 0.2 TABLET ORAL at 21:26

## 2020-08-12 RX ADMIN — METOPROLOL TARTRATE 75 MG: 50 TABLET, FILM COATED ORAL at 08:56

## 2020-08-12 RX ADMIN — MORPHINE SULFATE 2 MG: 2 INJECTION, SOLUTION INTRAMUSCULAR; INTRAVENOUS at 14:50

## 2020-08-12 RX ADMIN — ZOLPIDEM TARTRATE 5 MG: 5 TABLET ORAL at 21:26

## 2020-08-12 RX ADMIN — APIXABAN 5 MG: 5 TABLET, FILM COATED ORAL at 08:56

## 2020-08-12 RX ADMIN — ENOXAPARIN SODIUM 40 MG: 40 INJECTION SUBCUTANEOUS at 21:26

## 2020-08-12 RX ADMIN — LORAZEPAM 1 MG: 2 INJECTION INTRAMUSCULAR; INTRAVENOUS at 21:26

## 2020-08-12 RX ADMIN — METOPROLOL SUCCINATE 100 MG: 50 TABLET, EXTENDED RELEASE ORAL at 21:32

## 2020-08-12 RX ADMIN — FUROSEMIDE 20 MG: 20 TABLET ORAL at 08:55

## 2020-08-12 RX ADMIN — AMIODARONE HYDROCHLORIDE 400 MG: 200 TABLET ORAL at 14:49

## 2020-08-12 RX ADMIN — ASPIRIN 81 MG: 81 TABLET, COATED ORAL at 08:54

## 2020-08-12 RX ADMIN — Medication 10 ML: at 08:57

## 2020-08-12 RX ADMIN — Medication 10 ML: at 14:50

## 2020-08-12 ASSESSMENT — PAIN SCALES - GENERAL
PAINLEVEL_OUTOF10: 6
PAINLEVEL_OUTOF10: 6
PAINLEVEL_OUTOF10: 7
PAINLEVEL_OUTOF10: 8
PAINLEVEL_OUTOF10: 8
PAINLEVEL_OUTOF10: 5
PAINLEVEL_OUTOF10: 8

## 2020-08-12 ASSESSMENT — PAIN DESCRIPTION - DESCRIPTORS: DESCRIPTORS: SORE

## 2020-08-12 ASSESSMENT — PAIN DESCRIPTION - LOCATION
LOCATION: CHEST
LOCATION: CHEST

## 2020-08-12 ASSESSMENT — PAIN DESCRIPTION - PAIN TYPE: TYPE: ACUTE PAIN

## 2020-08-12 ASSESSMENT — PAIN DESCRIPTION - FREQUENCY: FREQUENCY: CONTINUOUS

## 2020-08-12 ASSESSMENT — PAIN DESCRIPTION - ORIENTATION
ORIENTATION: MID
ORIENTATION: MID

## 2020-08-12 NOTE — PROGRESS NOTES
[facility] check @ [facility] 8/11/20. Pt transferred to [facility] and currently admitted. DC-ICD. Episodes Since: 28-Feb-2020  7 Treated VT/VF w/ ATP attempts x1-2 and 7 shocks delivered. 1 Monitored VT  2 Non-sustained VT  6 SVT  3 Monitored AT/AF  Dual tachycardia noted w/ 1:1 AVC @ 230-250 bpm.   Possible OptiVol fluid accumulation: 25-Jul-2020 -- ongoing. See PACEART report under Cardiology tab.

## 2020-08-12 NOTE — PROGRESS NOTES
[facility] PROGRESS NOTE    8/12/2020 2:18 PM        Name: Piter Valadez . Admitted: 8/11/2020  Primary Care Provider: Zahra Wilson MD (Tel: [phone])      Subjective:  Patient is a 75 yo female with hx non-obstructed CAD,  VT arrest s/p AICD, PAF, HTN, HLD. She presented to outside hospital with c/o multiple AICD discharges. Device interrogation showed AICD discharge x 7. Noted to have low potassium 2.5. Presently resting in bed. No further significant arrhythmia on monitor since admission. States chest feels sore, notes stable exertional shortness of breath. Has been seen by EP and started on amiodarone. She offers c/o vaginal discomfort, discharge, itching and foul odor. Says this has been ongoing intermittently for months. Has been washing with apple cider vinegar without improvement.  Says it has been years since she has seen gynecologist.    Reviewed interval ancillary notes    Current Medications  aspirin EC tablet 81 mg, Daily  apixaban (ELIQUIS) tablet 5 mg, BID  atorvastatin (LIPITOR) tablet 40 mg, Nightly  lisinopril (PRINIVIL;ZESTRIL) tablet 2.5 mg, Daily  metoprolol tartrate (LOPRESSOR) tablet 75 mg, BID  furosemide (LASIX) tablet 20 mg, Every Other Day  spironolactone (ALDACTONE) tablet 25 mg, Daily  sodium chloride flush 0.9 % injection 10 mL, 2 times per day  sodium chloride flush 0.9 % injection 10 mL, PRN  acetaminophen (TYLENOL) tablet 650 mg, Q6H PRN    Or  acetaminophen (TYLENOL) suppository 650 mg, Q6H PRN  polyethylene glycol (GLYCOLAX) packet 17 g, Daily PRN  promethazine (PHENERGAN) tablet 12.5 mg, Q6H PRN    Or  ondansetron (ZOFRAN) injection 4 mg, Q6H PRN  potassium chloride (KLOR-CON M) extended release tablet 40 mEq, PRN    Or  potassium bicarb-citric acid (EFFER-K) effervescent tablet 40 mEq, PRN    Or  potassium chloride 10 mEq/100 mL IVPB (Peripheral Line), PRN  magnesium sulfate 2 g in 50 mL IVPB premix, PRN  LORazepam (ATIVAN) injection 1 mg, Q6H PRN  morphine (PF) injection 2 mg, Q4H PRN  zolpidem (AMBIEN) tablet 5 mg, Nightly PRN        Objective:  /76   Pulse 82   Temp 98.3 °F (36.8 °C) (Oral)   Resp 16   Ht 5' 6\" (1.676 m)   Wt 294 lb (133.4 kg)   SpO2 95%   BMI 47.45 kg/m²   No intake or output data in the 24 hours ending 08/12/20 0716   Wt Readings from Last 3 Encounters:   08/11/20 294 lb (133.4 kg)   02/29/20 264 lb (119.7 kg)   11/06/19 259 lb 11.2 oz (117.8 kg)       General appearance:  Appears comfortable  Eyes: Sclera clear. Pupils equal.  ENT: Moist oral mucosa. Trachea midline, no adenopathy. Cardiovascular: Regular rhythm, normal S1, S2. No murmur. No edema in lower extremities  Respiratory: Not using accessory muscles. Good inspiratory effort. Clear to auscultation bilaterally, no wheeze or crackles. GI: Abdomen soft, no tenderness, not distended, normal bowel sounds  Musculoskeletal: No cyanosis in digits, neck supple  Neurology: CN 2-12 grossly intact. No speech or motor deficits  Psych: Normal affect. Alert and oriented in time, place and person  Skin: Warm, dry, normal turgor    Labs and Tests:  CBC:   Recent Labs     08/12/20  0433   WBC 10.6   HGB 11.9*        BMP:    Recent Labs     08/12/20  0433      K 3.9      CO2 27   BUN 8   CREATININE 0.6   GLUCOSE 142*     Hepatic: No results for input(s): AST, ALT, ALB, BILITOT, ALKPHOS in the last 72 hours. CXR 8/12/2020:  FINDINGS:    Left-sided cardiac pacer/defibrillator is unchanged in configuration.  The    heart is enlarged with mild central pulmonary venous congestion.  There are    patchy bilateral perihilar and lower lobe interstitial/alveolar opacities.     Note is made of a small left effusion.  Mediastinal contours are stable.              Impression    CHF with pulmonary edema.             MPI 2/29/2020:  Summary     There is normal isotope uptake at stress and rest. There is no evidence of     myocardial ischemia or scar.          Normal LV function.          Overall findings represent a low risk scan. Echo 4/2/2019:  Summary   -Limited study.   -Definity was used to optimize left ventricle. - Normal left ventricle size. There is concentric left ventricular   hypertrophy. Ejection fraction is visually estimated to be 30-35 %. Diastolic filling parameters suggests impaired left ventricular relaxation. [facility] 5/2007:   FINDING:         PRESSURES:  LV pressure is 134 with an EDP of 29.  Aortic pressure is 132/81.         LEFT VENTRICULOGRAM:  Left ventricular function is severely decreased. Ejection fraction     is estimated at 25-30% with large anteroapical severe hypokinesis. This possibly could be     in the realm of apical ballooning syndrome.          LEFT MAIN CORONARY ARTERY:  The LMCA appears to be angiographically normal.          LEFT ANTERIOR DESCENDING:  The LAD is tortuous but has very mild luminal disease.         LEFT CIRCUMFLEX:  The left circumflex is non-dominant and has very mild luminal disease.           RIGHT CORONARY ARTERY:  The RCA is dominant and is angiographically normal. It gives rise to a moderate sized PDA and a smaller PLV branch.           IMPRESSION:        Severe LV dysfunction with an ejection fraction of around 25-30%.       Angiographically normal coronaries.              PLAN:   EP consult for sudden death and QT prolongation. Problem List  Principal Problem:    Cardiac arrhythmia  Active Problems:    Hyperlipidemia    Morbid obesity (HCC)    Automatic implantable cardioverter-defibrillator in situ    Paroxysmal atrial fibrillation (HCC)    Chest pain    History of ventricular tachycardia  Resolved Problems:    * No resolved hospital problems. *       Assessment & Plan:   1. Cardiac arrhythmia / Multiple AICD shocks. Device check with episode VT/VF, atrial fib RVR. Has been started on amiodarone per EP. 2. PAF.  Presently in sinus

## 2020-08-12 NOTE — CARE COORDINATION
Discharge Planning Assessment  Discharge Planning Assessment  RN/SW discharge planner met with patient/ (and family member) to discuss reason for admission, current living situation, and potential needs at the time of discharge    Demographics/Insurance verified Yes/yes    Current type of dwelling: private home    Patient from ECF/SW confirmed with: n/a    Living arrangements: basement apartment 9 stairs to enter. Lives alone    Level of function/Support: independent     PCP:Kristi Jackson MD    Last Visit to PCP: 2019    DME: None    Active with any community resources/agencies/skilled home care: None    Medication compliance issues: No    Financial issues that could impact healthcare: No      Tentative discharge plan:  Discussed and provided facilities of choice if transition to a skilled nursing facility is required at the time of discharge      Discussed with patient and/or family that on the day of discharge home tentative time of discharge will be between 10 AM and noon. Transportation at the time of discharge: yes    Pt denies discharge needs.     Marko Reyes MSW, [address]

## 2020-08-12 NOTE — PLAN OF CARE
Problem: Pain:  Goal: Control of acute pain  Description: Control of acute pain  8/12/2020 1140 by Jazz Chilel RN  Outcome: Ongoing  Note: Pt has chest soreness of 8/0-10 r/t seven paced defibrillations within the past 24 hours; gave 2 mg of morphine IV; reassessed pt 45 minutes later and pt was satisfied; will continue to assess pt throughout shift  5/60/6512 [id number] by Paola Estrada RN  Outcome: Ongoing  Note: Pt states chest pain is now just \" sore\" but a 6/10 . Problem: Falls - Risk of:  Goal: Will remain free from falls  Description: Will remain free from falls  8/12/2020 1140 by Jazz Chilel RN  Outcome: Ongoing  Note: Pt is aware of her limitations and uses call light appropriately; will continue to monitor pt throughout shift  7/59/9217 2041 by Paola Estrada RN  Outcome: Ongoing  Note: Pt has remained free from falls this shift. Socks, id bracelet, and sign posted in room.

## 2020-08-12 NOTE — PROGRESS NOTES
Referral received because patient is grieving. Patient has lost 4 family members recently - all from different causes. Most recent was her brother who was found dead in his home (dead for several days). She presents as calm, subdued, sad and tearful. Spiritual support provided through listening, prayer and provided a prayer shawl. Patient seemed appreciative of visit. Update to nurse.

## 2020-08-12 NOTE — H&P
(ALDACTONE) 25 MG tablet Take 1 tablet by mouth daily 11/6/19  Yes YEIMY Moraes CNP   metoprolol tartrate (LOPRESSOR) 50 MG tablet Take 1.5 tablets by mouth 2 times daily 4/2/19  Yes YEIMY Moraes CNP   atorvastatin (LIPITOR) 40 MG tablet Take 1 tablet by mouth daily 4/2/19  Yes YEIMY Moraes CNP   furosemide (LASIX) 20 MG tablet Take 1 tablet by mouth every other day 4/2/19  Yes YEIMY Moraes CNP       Allergies:  Patient has no known allergies. Social History:      The patient currently lives at home    TOBACCO:   reports that she has never smoked. She has never used smokeless tobacco.  ETOH:   reports no history of alcohol use. E-Cigarettes Vaping or Juuling     Questions Responses    Vaping Use Never User    Start Date     Does device contain nicotine? Never    Quit Date     Vaping Type             Family History:      Reviewed in detail and negative for DM, CAD, Cancer, CVA. Positive as follows:        Problem Relation Age of Onset    Heart Disease Mother     High Blood Pressure Mother     High Cholesterol Mother     Diabetes Mother     Cancer Father        REVIEW OF SYSTEMS:   Pertinent positives as noted in the HPI. All other systems reviewed and negative. PHYSICAL EXAM PERFORMED:    BP (!) 173/93   Pulse 96   Temp 98.4 °F (36.9 °C) (Oral)   Resp 16   Ht 5' 6\" (1.676 m)   Wt 294 lb (133.4 kg)   SpO2 96%   BMI 47.45 kg/m²     General appearance:  No apparent distress, appears stated age and cooperative. HEENT:  Normal cephalic, atraumatic without obvious deformity. Pupils equal, round, and reactive to light. Extra ocular muscles intact. Conjunctivae/corneas clear. Neck: Supple, with full range of motion. No jugular venous distention. Trachea midline. Respiratory:  Normal respiratory effort. Clear to auscultation, bilaterally without Rales/Wheezes/Rhonchi.   Cardiovascular:  Regular rate and rhythm with normal S1/S2 without murmurs, rubs or opportunity to be involved in this patient's care. If you have any questions or concerns please feel free to contact me at [phone].

## 2020-08-12 NOTE — CONSULTS
[facility]   Electrophysiology Consultation   Date: 8/12/2020  Reason for Consultation: AICD shock   Consult Requesting Physician: Naomi Bowling MD     CC: Chest pain   HPI: Fredo Becerra is a 76 y.o. female who has presented to the hospital with chest pain. She states that she lost her brother and ws distressed. She was not feeling well. Suddenly felt severe chest pain and had several shocks. No syncope. She states that she was not taking her BB therapy due to Covid issues. She has been transferred to [address] [place] [address] for further evaluation. At [facility] she noted to have very high troponin of 3.5 and also low potassium of 2.5. She has multiple comorbidities including history of non-obstructive CAD, history of ?MI and s/p AICD implantation in 2007 at [facility]. Hasn't had any cardiac cath since then. She reportedly had cardiac arrest.   She is morbidly obese, and has not tested for TYLER. She also has history of PAF and is on anticoagulation. Past Medical History:   Diagnosis Date    AICD (automatic cardioverter/defibrillator) present     Arthritis     [facility]congestive [facility] failure) [facility])     Gout     Hyperlipidemia     Hypertension     MI (myocardial infarction) ([facility] [address]         Past Surgical History:   Procedure Laterality Date    CARDIAC DEFIBRILLATOR PLACEMENT      DILATION AND CURETTAGE OF UTERUS         No Known Allergies    Social History:  Reviewed. reports that she has never smoked. She has never used smokeless tobacco. She reports that she does not drink alcohol or use drugs. Family History:  Reviewed. family history includes Cancer in her father; Diabetes in her mother; Heart Disease in her mother; High Blood Pressure in her mother; High Cholesterol in her mother. Review of System:  All other systems reviewed except for that noted above.  Pertinent negatives and positives are:       · General: negative for fever, chills   · Ophthalmic ROS: negative for - eye pain or loss of vision  · ENT ROS: negative for - headaches, sore throat   · Respiratory: negative for - cough, sputum  · Cardiovascular: Reviewed in HPI  · Gastrointestinal: negative for - abdominal pain, diarrhea, N/V  · Hematology: negative for - bleeding, blood clots, bruising or jaundice  · Genito-Urinary:  negative for - Dysuria or incontinence  · Musculoskeletal: negative for - Joint swelling, muscle pain  · Neurological: negative for - confusion, dizziness, headaches   · Psychiatric: No anxiety, no depression. · Dermatological: negative for - rash    Physical Examination:  Vitals:    20 0745   BP: 128/80   Pulse: 83   Resp: 18   Temp: 97.6 °F (36.4 °C)   SpO2: 95%      No intake/output data recorded. Wt Readings from Last 3 Encounters:   20 294 lb (133.4 kg)   20 264 lb (119.7 kg)   19 259 lb 11.2 oz (117.8 kg)     Temp  Av.9 °F (36.6 °C)  Min: 97.6 °F (36.4 °C)  Max: 98.4 °F (36.9 °C)  Pulse  Av  Min: 83  Max: 96  BP  Min: 128/80  Max: 173/93  SpO2  Av.5 %  Min: 95 %  Max: 96 %  No intake or output data in the 24 hours ending 20 1224    · Telemetry: Sinus rhythm   · Constitutional: Oriented. No distress. · Head: Normocephalic and atraumatic. · Mouth/Throat: Oropharynx is clear and moist.   · Eyes: Conjunctivae normal. EOM are normal.   · Neck: Neck supple. No rigidity. No JVD present. · Cardiovascular: Normal rate, regular rhythm, S1&S2. · Pulmonary/Chest: Bilateral respiratory sounds. No wheezes, No rhonchi. · Abdominal: Soft. Bowel sounds present. No distension, No tenderness. + obese   · Musculoskeletal: No tenderness. No edema    · Lymphadenopathy: Has no cervical adenopathy. · Neurological: Alert and oriented. Cranial nerve appears intact, No Gross deficit   · Skin: Skin is warm and dry. No rash noted. · Psychiatric: Has a normal behavior     Labs, diagnostic and imaging results reviewed. Reviewed.    Recent Labs     20  0433      K 3.9    CO2 27   BUN 8   CREATININE 0.6     Recent Labs     08/12/20  0433   WBC 10.6   HGB 11.9*   HCT 37.1   MCV 91.7        Lab Results   Component Value Date    TROPONINI 0.16 08/12/2020     No results found for: BNP  Lab Results   Component Value Date    PROTIME 12.0 04/05/2019    PROTIME 11.9 06/22/2013    INR 1.05 04/05/2019    INR 1.05 06/22/2013     Lab Results   Component Value Date    CHOL 165 04/02/2019    HDL 52 04/02/2019    TRIG 80 04/02/2019       ECG: Sinus rhythm   Echo: -Definity was used to optimize left ventricle. - Normal left ventricle size. There is concentric left ventricular   hypertrophy. Ejection fraction is visually estimated to be 30-35 %. Diastolic filling parameters suggests impaired left ventricular relaxation.       Scheduled Meds:   aspirin  81 mg Oral Daily    apixaban  5 mg Oral BID    atorvastatin  40 mg Oral Nightly    lisinopril  2.5 mg Oral Daily    metoprolol tartrate  75 mg Oral BID    furosemide  20 mg Oral Every Other Day    spironolactone  25 mg Oral Daily    sodium chloride flush  10 mL Intravenous 2 times per day     Continuous Infusions:  PRN Meds:.sodium chloride flush, acetaminophen **OR** acetaminophen, polyethylene glycol, promethazine **OR** ondansetron, potassium chloride **OR** potassium alternative oral replacement **OR** potassium chloride, magnesium sulfate, LORazepam, morphine, zolpidem     Patient Active Problem List    Diagnosis Date Noted    History of ventricular tachycardia 08/12/2020    Cardiac arrhythmia 08/11/2020    Chest pain 02/28/2020    Shortness of breath 11/29/2016    Paroxysmal atrial fibrillation ([facility] [address] 11/29/2016    Automatic implantable cardioverter-defibrillator in situ 01/13/2015    Hyperlipidemia 12/18/2014    Morbid obesity ([facility] [address] 12/18/2014      Active Hospital Problems    Diagnosis Date Noted    History of ventricular tachycardia [Z86.79] 08/12/2020    Cardiac arrhythmia [I49.9] 08/11/2020    Chest pain [R07.9] 02/28/2020    Paroxysmal atrial fibrillation ([facility] [address] [I48.0] 11/29/2016    Automatic implantable cardioverter-defibrillator in situ [Z95.810] 01/13/2015    Hyperlipidemia [E78.5] 12/18/2014    Morbid obesity ([facility] [address] [E66.01] 12/18/2014       Assessment:     - AICD shock:    Device interrogation reviewed and it showed 7 AICD shock. Device interrogation revealed 7 AICD shock. She has had Afib/flutter with RVR. Start Amiodarone PO loading. Resume BB therapy. Treatment options including ablation were discussed with patient. Risks, benefits and alternative of each treatment options were explained. All questions answered. Will use amiodarone for now. - History of cardiac arrest in the past, ? MI and CAD   Last cath in 2007. High troponin. IC consult for further evaluation and possible cath. ASA   Statin   Lisinopril   Change metoprolol to Toprol XL    - Morbid obesity: Body mass index is 47.45 kg/m². - ? TYLER     - Chronic systolic heart failure: On medical therapy. Follow up with heart failure team.      Thank you for allowing me to participate in the care of [facility]     All questions and concerns were addressed to the patient/family. Alternatives to my treatment were discussed. I have discussed the above stated plan and the patient verbalized understanding and agreed with the plan. NOTE: This report was transcribed using voice recognition software. Every effort was made to ensure accuracy, however, inadvertent computerized transcription errors may be present.      Ratna Vicente MD, MPH  [facility]   Office: [phone]

## 2020-08-12 NOTE — PLAN OF CARE
Problem: Pain:  Goal: Pain level will decrease  Description: Pain level will decrease  Outcome: Ongoing  Goal: Control of acute pain  Description: Control of acute pain  Outcome: Ongoing  Note: Pt states chest pain is now just \" sore\" but a 6/10 . Goal: Control of chronic pain  Description: Control of chronic pain  Outcome: Ongoing     Problem: Falls - Risk of:  Goal: Will remain free from falls  Description: Will remain free from falls  Outcome: Ongoing  Note: Pt has remained free from falls this shift. Socks, id bracelet, and sign posted in room.    Goal: Absence of physical injury  Description: Absence of physical injury  Outcome: Ongoing

## 2020-08-13 LAB
ANION GAP SERPL CALCULATED.3IONS-SCNC: 10 MMOL/L (ref 3–16)
BUN BLDV-MCNC: 8 MG/DL (ref 7–20)
CALCIUM SERPL-MCNC: 8.6 MG/DL (ref 8.3–10.6)
CHLORIDE BLD-SCNC: 105 MMOL/L (ref 99–110)
CO2: 27 MMOL/L (ref 21–32)
CREAT SERPL-MCNC: 0.6 MG/DL (ref 0.6–1.2)
GFR AFRICAN AMERICAN: >60
GFR NON-AFRICAN AMERICAN: >60
GLUCOSE BLD-MCNC: 128 MG/DL (ref 70–99)
HCT VFR BLD CALC: 37.4 % (ref 36–48)
HEMOGLOBIN: 12 G/DL (ref 12–16)
LEFT VENTRICULAR EJECTION FRACTION HIGH VALUE: 35 %
LEFT VENTRICULAR EJECTION FRACTION MODE: NORMAL
MCH RBC QN AUTO: 29.3 PG (ref 26–34)
MCHC RBC AUTO-ENTMCNC: 32 G/DL (ref 31–36)
MCV RBC AUTO: 91.6 FL (ref 80–100)
PDW BLD-RTO: 19.9 % (ref 12.4–15.4)
PLATELET # BLD: 311 K/UL (ref 135–450)
PMV BLD AUTO: 7.8 FL (ref 5–10.5)
POTASSIUM SERPL-SCNC: 3.8 MMOL/L (ref 3.5–5.1)
RBC # BLD: 4.08 M/UL (ref 4–5.2)
SODIUM BLD-SCNC: 142 MMOL/L (ref 136–145)
T4 FREE: 1 NG/DL (ref 0.9–1.8)
TSH REFLEX: 4.24 UIU/ML (ref 0.27–4.2)
WBC # BLD: 10.9 K/UL (ref 4–11)

## 2020-08-13 PROCEDURE — 99152 MOD SED SAME PHYS/QHP 5/>YRS: CPT

## 2020-08-13 PROCEDURE — 93458 L HRT ARTERY/VENTRICLE ANGIO: CPT

## 2020-08-13 PROCEDURE — 6370000000 HC RX 637 (ALT 250 FOR IP): Performed by: NURSE PRACTITIONER

## 2020-08-13 PROCEDURE — 6360000002 HC RX W HCPCS: Performed by: INTERNAL MEDICINE

## 2020-08-13 PROCEDURE — 6370000000 HC RX 637 (ALT 250 FOR IP): Performed by: INTERNAL MEDICINE

## 2020-08-13 PROCEDURE — 2709999900 HC NON-CHARGEABLE SUPPLY

## 2020-08-13 PROCEDURE — 2580000003 HC RX 258

## 2020-08-13 PROCEDURE — 99233 SBSQ HOSP IP/OBS HIGH 50: CPT | Performed by: INTERNAL MEDICINE

## 2020-08-13 PROCEDURE — 2580000003 HC RX 258: Performed by: INTERNAL MEDICINE

## 2020-08-13 PROCEDURE — B2111ZZ FLUOROSCOPY OF MULTIPLE CORONARY ARTERIES USING LOW OSMOLAR CONTRAST: ICD-10-PCS | Performed by: INTERNAL MEDICINE

## 2020-08-13 PROCEDURE — 2500000003 HC RX 250 WO HCPCS

## 2020-08-13 PROCEDURE — C1769 GUIDE WIRE: HCPCS

## 2020-08-13 PROCEDURE — 6360000002 HC RX W HCPCS

## 2020-08-13 PROCEDURE — 1200000000 HC SEMI PRIVATE

## 2020-08-13 PROCEDURE — 99233 SBSQ HOSP IP/OBS HIGH 50: CPT | Performed by: NURSE PRACTITIONER

## 2020-08-13 PROCEDURE — 85027 COMPLETE CBC AUTOMATED: CPT

## 2020-08-13 PROCEDURE — 84443 ASSAY THYROID STIM HORMONE: CPT

## 2020-08-13 PROCEDURE — 80048 BASIC METABOLIC PNL TOTAL CA: CPT

## 2020-08-13 PROCEDURE — B2151ZZ FLUOROSCOPY OF LEFT HEART USING LOW OSMOLAR CONTRAST: ICD-10-PCS | Performed by: INTERNAL MEDICINE

## 2020-08-13 PROCEDURE — 36415 COLL VENOUS BLD VENIPUNCTURE: CPT

## 2020-08-13 PROCEDURE — 84439 ASSAY OF FREE THYROXINE: CPT

## 2020-08-13 PROCEDURE — 6360000004 HC RX CONTRAST MEDICATION: Performed by: INTERNAL MEDICINE

## 2020-08-13 PROCEDURE — 4A023N7 MEASUREMENT OF CARDIAC SAMPLING AND PRESSURE, LEFT HEART, PERCUTANEOUS APPROACH: ICD-10-PCS | Performed by: INTERNAL MEDICINE

## 2020-08-13 PROCEDURE — C1894 INTRO/SHEATH, NON-LASER: HCPCS

## 2020-08-13 PROCEDURE — 99153 MOD SED SAME PHYS/QHP EA: CPT

## 2020-08-13 RX ORDER — SODIUM CHLORIDE 0.9 % (FLUSH) 0.9 %
10 SYRINGE (ML) INJECTION EVERY 12 HOURS SCHEDULED
Status: DISCONTINUED | OUTPATIENT
Start: 2020-08-13 | End: 2020-08-18 | Stop reason: HOSPADM

## 2020-08-13 RX ORDER — ACETAMINOPHEN 325 MG/1
650 TABLET ORAL EVERY 4 HOURS PRN
Status: DISCONTINUED | OUTPATIENT
Start: 2020-08-13 | End: 2020-08-18 | Stop reason: HOSPADM

## 2020-08-13 RX ORDER — OXYCODONE HYDROCHLORIDE AND ACETAMINOPHEN 5; 325 MG/1; MG/1
1 TABLET ORAL EVERY 4 HOURS PRN
Status: DISCONTINUED | OUTPATIENT
Start: 2020-08-13 | End: 2020-08-18 | Stop reason: HOSPADM

## 2020-08-13 RX ORDER — METOPROLOL SUCCINATE 100 MG/1
100 TABLET, EXTENDED RELEASE ORAL DAILY
Qty: 90 TABLET | Refills: 3 | Status: SHIPPED | OUTPATIENT
Start: 2020-08-14 | End: 2021-08-30 | Stop reason: SDUPTHER

## 2020-08-13 RX ORDER — CARVEDILOL 6.25 MG/1
12.5 TABLET ORAL 2 TIMES DAILY WITH MEALS
Status: DISCONTINUED | OUTPATIENT
Start: 2020-08-13 | End: 2020-08-13

## 2020-08-13 RX ORDER — FUROSEMIDE 10 MG/ML
40 INJECTION INTRAMUSCULAR; INTRAVENOUS 2 TIMES DAILY
Status: DISCONTINUED | OUTPATIENT
Start: 2020-08-13 | End: 2020-08-18

## 2020-08-13 RX ORDER — ONDANSETRON 2 MG/ML
4 INJECTION INTRAMUSCULAR; INTRAVENOUS EVERY 6 HOURS PRN
Status: DISCONTINUED | OUTPATIENT
Start: 2020-08-13 | End: 2020-08-18 | Stop reason: HOSPADM

## 2020-08-13 RX ORDER — SODIUM CHLORIDE 0.9 % (FLUSH) 0.9 %
10 SYRINGE (ML) INJECTION PRN
Status: DISCONTINUED | OUTPATIENT
Start: 2020-08-13 | End: 2020-08-18 | Stop reason: HOSPADM

## 2020-08-13 RX ORDER — AMIODARONE HYDROCHLORIDE 400 MG/1
TABLET ORAL
Qty: 59 TABLET | Refills: 0 | Status: ON HOLD | OUTPATIENT
Start: 2020-08-13 | End: 2020-10-09 | Stop reason: SDUPTHER

## 2020-08-13 RX ORDER — OXYCODONE HYDROCHLORIDE AND ACETAMINOPHEN 5; 325 MG/1; MG/1
2 TABLET ORAL EVERY 4 HOURS PRN
Status: DISCONTINUED | OUTPATIENT
Start: 2020-08-13 | End: 2020-08-18 | Stop reason: HOSPADM

## 2020-08-13 RX ORDER — VALSARTAN 160 MG/1
160 TABLET ORAL DAILY
Status: DISCONTINUED | OUTPATIENT
Start: 2020-08-13 | End: 2020-08-18 | Stop reason: HOSPADM

## 2020-08-13 RX ORDER — VALSARTAN 160 MG/1
160 TABLET ORAL DAILY
Qty: 90 TABLET | Refills: 3 | Status: SHIPPED | OUTPATIENT
Start: 2020-08-14 | End: 2020-09-21

## 2020-08-13 RX ADMIN — MORPHINE SULFATE 2 MG: 2 INJECTION, SOLUTION INTRAMUSCULAR; INTRAVENOUS at 09:36

## 2020-08-13 RX ADMIN — AMIODARONE HYDROCHLORIDE 400 MG: 200 TABLET ORAL at 08:15

## 2020-08-13 RX ADMIN — ZOLPIDEM TARTRATE 5 MG: 5 TABLET ORAL at 21:15

## 2020-08-13 RX ADMIN — LISINOPRIL 2.5 MG: 5 TABLET ORAL at 08:18

## 2020-08-13 RX ADMIN — APIXABAN 5 MG: 5 TABLET, FILM COATED ORAL at 21:15

## 2020-08-13 RX ADMIN — OXYCODONE HYDROCHLORIDE AND ACETAMINOPHEN 2 TABLET: 5; 325 TABLET ORAL at 12:41

## 2020-08-13 RX ADMIN — LORAZEPAM 1 MG: 2 INJECTION INTRAMUSCULAR; INTRAVENOUS at 21:23

## 2020-08-13 RX ADMIN — VALSARTAN 160 MG: 160 TABLET, FILM COATED ORAL at 12:41

## 2020-08-13 RX ADMIN — ASPIRIN 81 MG: 81 TABLET, COATED ORAL at 08:15

## 2020-08-13 RX ADMIN — Medication 10 ML: at 21:15

## 2020-08-13 RX ADMIN — AMIODARONE HYDROCHLORIDE 400 MG: 200 TABLET ORAL at 21:15

## 2020-08-13 RX ADMIN — FUROSEMIDE 20 MG: 20 TABLET ORAL at 08:15

## 2020-08-13 RX ADMIN — SPIRONOLACTONE 25 MG: 25 TABLET ORAL at 08:20

## 2020-08-13 RX ADMIN — IOPAMIDOL 84 ML: 755 INJECTION, SOLUTION INTRAVENOUS at 11:19

## 2020-08-13 RX ADMIN — METOPROLOL SUCCINATE 100 MG: 50 TABLET, EXTENDED RELEASE ORAL at 08:14

## 2020-08-13 RX ADMIN — FUROSEMIDE 40 MG: 10 INJECTION, SOLUTION INTRAMUSCULAR; INTRAVENOUS at 17:03

## 2020-08-13 RX ADMIN — MORPHINE SULFATE 2 MG: 2 INJECTION, SOLUTION INTRAMUSCULAR; INTRAVENOUS at 15:05

## 2020-08-13 RX ADMIN — DESMOPRESSIN ACETATE 40 MG: 0.2 TABLET ORAL at 21:15

## 2020-08-13 RX ADMIN — Medication 10 ML: at 08:20

## 2020-08-13 ASSESSMENT — PAIN SCALES - GENERAL
PAINLEVEL_OUTOF10: 0
PAINLEVEL_OUTOF10: 5
PAINLEVEL_OUTOF10: 7
PAINLEVEL_OUTOF10: 0
PAINLEVEL_OUTOF10: 7
PAINLEVEL_OUTOF10: 0

## 2020-08-13 ASSESSMENT — PAIN DESCRIPTION - PAIN TYPE
TYPE: ACUTE PAIN

## 2020-08-13 ASSESSMENT — PAIN DESCRIPTION - ORIENTATION
ORIENTATION: MID

## 2020-08-13 ASSESSMENT — PAIN SCALES - WONG BAKER
WONGBAKER_NUMERICALRESPONSE: 0

## 2020-08-13 ASSESSMENT — PAIN DESCRIPTION - LOCATION
LOCATION: CHEST

## 2020-08-13 ASSESSMENT — PAIN DESCRIPTION - DESCRIPTORS
DESCRIPTORS: ACHING
DESCRIPTORS: PRESSURE

## 2020-08-13 ASSESSMENT — PAIN DESCRIPTION - FREQUENCY
FREQUENCY: CONTINUOUS
FREQUENCY: CONTINUOUS

## 2020-08-13 NOTE — CONSULTS
Department of Gynecology  Consult Note      Reason for Consult:  Vaginal discharge  Requesting Physician:  Dwight    CHIEF COMPLAINT:   Vaginal odor, discomfort and discharge for a month    History obtained from patient    HISTORY OF PRESENT ILLNESS:     The patient is a 76 y.o. female who presents with chest pain and had an incidental complaint of vaginal discharge    Past Medical History:        Diagnosis Date    AICD (automatic cardioverter/defibrillator) present     Arthritis     [facility] [address]     Gout     Hyperlipidemia     Hypertension     MI (myocardial infarction) ([facility] [address]      Past Surgical History:        Procedure Laterality Date    CARDIAC DEFIBRILLATOR PLACEMENT      DILATION AND CURETTAGE OF UTERUS     removal ectopic pregnancy    Past Gynecological History:    1. Last menstrual period:  Late 40's  2. Menses: one episode PMB with w/u at [facility] about 4 years ago  3. Contraception: Abstinence  4. Sexually transmitted disease history: none        A.  Number of sexual partners in the last 6 months: 0    meds:  Current Facility-Administered Medications:     sodium chloride flush 0.9 % injection 10 mL, 10 mL, Intravenous, 2 times per day, Robert Yen MD    sodium chloride flush 0.9 % injection 10 mL, 10 mL, Intravenous, PRN, Robert Yen MD    acetaminophen (TYLENOL) tablet 650 mg, 650 mg, Oral, Q4H PRN, Robert Yen MD    oxyCODONE-acetaminophen (PERCOCET) 5-325 MG per tablet 1 tablet, 1 tablet, Oral, Q4H PRN **OR** oxyCODONE-acetaminophen (PERCOCET) 5-325 MG per tablet 2 tablet, 2 tablet, Oral, Q4H PRN, Robert Yen MD, 2 tablet at 08/13/20 1241    ondansetron (ZOFRAN) injection 4 mg, 4 mg, Intravenous, Q6H PRN, Robert Yen MD    valsartan (DIOVAN) tablet 160 mg, 160 mg, Oral, Daily, Robert Yen MD, 160 mg at 08/13/20 1241    furosemide (LASIX) injection 40 mg, 40 mg, Intravenous, BID, Robert Yen MD    [facility]) tablet 5 mg, 5 mg, Oral, BID, Patient has no known allergies. Social History:  TOBACCO:   reports that she has never smoked.  She has never used smokeless tobacco.    Family History:       Problem Relation Age of Onset    Heart Disease Mother     High Blood Pressure Mother     High Cholesterol Mother     Diabetes Mother     Cancer Father         PHYSICAL EXAM:    Vitals:  BP (!) 158/96   Pulse 66   Temp 98.7 °F (37.1 °C) (Temporal)   Resp 18   Ht 5' 6\" (1.676 m)   Wt 289 lb 12.8 oz (131.5 kg)   SpO2 94%   BMI 46.77 kg/m²     CONSTITUTIONAL:  awake, alert, cooperative, no apparent distress, and appears stated age  External Genitalia: General appearance; normal, Hair distribution; normal, Lesions absent  Blind swab of vagina with thin yellow tinged malodorous D/C    IMPRESSION/RECOMMENDATIONS:      Incidental vaginal discharge x 1 mo, no better with OTC Vagasil products  Affirm collected  Needs f/u pap as outpatient, h/o abnormal pap about 4 years ago and no f/u

## 2020-08-13 NOTE — OP NOTE
Patient:  Gerardo Means   :   1955    Procedural Summary  ~Consent:   Obtained written and verbal consent      Risks/benefits explained in detail  ~Procedure:    Left Heart Catheterization  ~Medications:    Procedural sedation with minimal conscious sedation  ~Complications:   None  ~Blood Loss:    <10cc  ~Specimens:    None obtained  ~Pre-sedation re-evaluation: Performed immediately prior to procedure. Medication and Procedural Reconciliation:  An independent trained observer pushed medications at my direction. We monitored the patient's level of consciousness and vital signs/physiologic status throughout the procedure duration (see start and stop times below). Sedation: 1 mg Versed, 100 mcg Fentanyl  Sedation start: 1039  Sedation stop: 1105    Cardiac Cath LVG:  Anatomy:   LM-Normal   LAD-mid 50%  Cx-normal  OM- normal  RCA-dominant normal  RPDA- normal  LVEF- 35  LVG- Global  LVEDP- 25    Contrast: 84  Flouro Time: 4.8  Access: R radial    Impression  ~Coronary Angiography w/ nonobstructive CAD  ~LVG with LVEF of 35 and global regional wall motion abnormalities  ~high LVEDP        Recommendation  ~Aggressive medical treatment and risk factor modification  ~1. Medications reviewed, Needs lasix, ace/arb/bb  2. Stop heparin gtt. Post cath IVF. Bedrest.  3. No indication for statin or anti platelet therapy  4. Patient has been advised on the importance of regular exercise of at least 20-30 minutes daily alternating with aerobic and isometric activities. 5. Patient counseled about and offered assistance for smoking cessation   6. No indication for cardiac rehab  7.  Monitor for 1-2 days with diuresis            Franklyn Erickson MD 2020 11:11 AM

## 2020-08-13 NOTE — PROGRESS NOTES
Called cath lab in regards to procedure this morning. Plan is around 930 am. Give scheduled meds, no blood thinners.      Sleeping and resting comfortably in bed at the start of shift

## 2020-08-13 NOTE — PROGRESS NOTES
Luis taken out , offered bedpan able to void. Left floor for cath lab at 1010am with X2 RNs via hospital bed, she does have chest pressure mid chest 5/10.    Belongings left in room, as soon as they know what room she is going into , they will call so transport can bring them to room

## 2020-08-13 NOTE — PROGRESS NOTES
[facility] PROGRESS NOTE    8/13/2020 3:50 PM        Name: Iza Flores . Admitted: 8/11/2020  Primary Care Provider: Eve Oppenheim, MD (Tel: [phone])      Subjective:  Patient is a 75 yo female with hx non-obstructed CAD,  VT arrest s/p AICD, PAF, HTN, HLD. She presented to outside hospital with c/o multiple AICD discharges. Device interrogation showed AICD discharge x 7. Noted to have low potassium 2.5. Presently resting in bed, sister visiting. C today showed non-obstructive CAD, LVEF 35%, LVEDP 25 consistent with volume overload. Now on IV Lasix. No recurrent arrhythmias. Reports shortness of breath which \"I have all the time. \" Denies chest pain.      Reviewed interval ancillary notes    Current Medications  sodium chloride flush 0.9 % injection 10 mL, 2 times per day  sodium chloride flush 0.9 % injection 10 mL, PRN  acetaminophen (TYLENOL) tablet 650 mg, Q4H PRN  oxyCODONE-acetaminophen (PERCOCET) 5-325 MG per tablet 1 tablet, Q4H PRN    Or  oxyCODONE-acetaminophen (PERCOCET) 5-325 MG per tablet 2 tablet, Q4H PRN  ondansetron (ZOFRAN) injection 4 mg, Q6H PRN  valsartan (DIOVAN) tablet 160 mg, Daily  furosemide (LASIX) injection 40 mg, BID  amiodarone (CORDARONE) tablet 400 mg, BID  metoprolol succinate (TOPROL XL) extended release tablet 100 mg, Daily  aspirin EC tablet 81 mg, Daily  atorvastatin (LIPITOR) tablet 40 mg, Nightly  spironolactone (ALDACTONE) tablet 25 mg, Daily  sodium chloride flush 0.9 % injection 10 mL, 2 times per day  sodium chloride flush 0.9 % injection 10 mL, PRN  acetaminophen (TYLENOL) suppository 650 mg, Q6H PRN  polyethylene glycol (GLYCOLAX) packet 17 g, Daily PRN  promethazine (PHENERGAN) tablet 12.5 mg, Q6H PRN  potassium chloride (KLOR-CON M) extended release tablet 40 mEq, PRN    Or  potassium bicarb-citric acid (EFFER-K) effervescent tablet 40 mEq, PRN Or  potassium chloride 10 mEq/100 mL IVPB (Peripheral Line), PRN  magnesium sulfate 2 g in 50 mL IVPB premix, PRN  LORazepam (ATIVAN) injection 1 mg, Q6H PRN  morphine (PF) injection 2 mg, Q4H PRN  zolpidem (AMBIEN) tablet 5 mg, Nightly PRN        Objective:  /84   Pulse 62   Temp 98.7 °F (37.1 °C) (Temporal)   Resp 18   Ht 5' 6\" (1.676 m)   Wt 289 lb 12.8 oz (131.5 kg)   SpO2 97%   BMI 46.77 kg/m²   No intake or output data in the 24 hours ending 08/13/20 1352   Wt Readings from Last 3 Encounters:   08/13/20 289 lb 12.8 oz (131.5 kg)   02/29/20 264 lb (119.7 kg)   11/06/19 259 lb 11.2 oz (117.8 kg)     General:  Awake, alert, oriented in NAD  Skin:  Warm and dry. No unusual bruising or rash  Neck:  Supple. No JVD appreciated but thick neck  Chest:  Normal effort. Clear to auscultation, no wheezes/rhonchi/rales  Cardiovascular:  RRR, normal S1/S2, no murmur/gallop/rub  Abdomen:  Soft, nontender, +bowel sounds  Extremities:  No edema  Neurological: No focal deficits  Psychological: Normal mood and affect      Labs and Tests:  CBC:   Recent Labs     08/12/20  0433 08/13/20  0725   WBC 10.6 10.9   HGB 11.9* 12.0    311     BMP:    Recent Labs     08/12/20  0433 08/12/20 2027 08/13/20  0725    138 142   K 3.9 3.4* 3.8    104 105   CO2 27 27 27   BUN 8 10 8   CREATININE 0.6 0.6 0.6   GLUCOSE 142* 150* 128*     Hepatic: No results for input(s): AST, ALT, ALB, BILITOT, ALKPHOS in the last 72 hours. CXR 8/12/2020:  FINDINGS:    Left-sided cardiac pacer/defibrillator is unchanged in configuration.  The    heart is enlarged with mild central pulmonary venous congestion.  There are    patchy bilateral perihilar and lower lobe interstitial/alveolar opacities.     Note is made of a small left effusion.  Mediastinal contours are stable.              Impression    CHF with pulmonary edema.             MPI 2/29/2020:  Summary     There is normal isotope uptake at stress and rest. There is no evidence of     myocardial ischemia or scar.          Normal LV function.          Overall findings represent a low risk scan. Echo 8/12/2020:  Summary   Left ventricular systolic function is mild to moderately reduced with   ejection fraction estimated at 40%. There is mild concentric left ventricular hypertrophy. Grade I diastolic dysfunction. Normal right ventricular size and function. Pacer / ICD wire is visualized in the right ventricle. Mild mitral regurgitation. Aortic valve appears sclerotic but opens adequately. Mild tricuspid regurgitation. Systolic pulmonary artery pressure (SPAP) is   normal and estimated at 25-30 mmHg. [facility] 8/13/2020: Anatomy:   LM-Normal   LAD-mid 50%  Cx-normal  OM- normal  RCA-dominant normal  RPDA- normal  LVEF- 35  LVG- Global  LVEDP- 25     Impression  ~Coronary Angiography w/ nonobstructive CAD  ~LVG with LVEF of 35 and global regional wall motion abnormalities  ~high LVEDP         Problem List  Principal Problem:    Cardiac arrhythmia  Active Problems:    Hyperlipidemia    Morbid obesity (HCC)    Automatic implantable cardioverter-defibrillator in situ    Paroxysmal atrial fibrillation (HCC)    Chest pain    History of ventricular tachycardia  Resolved Problems:    * No resolved hospital problems. *       Assessment & Plan:   1. Cardiac arrhythmia / Multiple AICD shocks. Potassium 2.85 on presentation. Device check with episodes VT/VF, atrial fib RVR. Has been started on amiodarone per EP. No recurrent arrhythmia. Considering ablation as out-patient. 2. PAF. Remains in sinus rhythm. Started on amiodarone. Eliquis has been resumed post cath. Continue beta blocker for rate control. 3. Hypokalemia. Resolved. Potassium 3.8 today. Continue aldactone. BMP daily. 4. CAD / elevated troponin. [facility] today with non-obstructive CAD. Per cardiology op note, no indication for asa or statin. 5. Cardiomyopathy. EF 40% by echo.  LVEDP on [facility] today 25 mmHg consistent with volume overload. Agree with IV Lasix. Continue ACE, AA, and evidence based beta blocker. O2 sats mid 90s on room air. Will ask HF coordinator to see. 6. HTN. Mildly elevated today, should improve with diuresis. Continue lisinopril. Follow BMP closely. 7. Vaginal discharge/pain. GYN consult pending.        Diet: DIET CARDIAC;  Code:Full Code  DVT PPX: On YEIMY Dan - CNP   8/13/2020 3:50 PM

## 2020-08-13 NOTE — PROGRESS NOTES
noted bleeding from cath site after 2ml if air is released, pushed 2ml back again. No bleeding noted now, will continue to monitor.

## 2020-08-13 NOTE — PROGRESS NOTES
[facility]   Electrophysiology Progress Note     Date: 8/13/2020  Admit Date: 8/11/2020     Reason for consultation: AICD    Chief Complaint: No chief complaint on file. History of Present Illness: History obtained from patient and medical record. Larry Carpenter is a 76 y.o. female with a past medical history of HTN, HLD, CHF, obesity, and AICD (2007 at [facility]). Remote hx of MI and cardiac arrest ?    Pt presented to outside hospital with chest pain. She recently lost her brother and has not been feeling well. Pt admitted to non-compliance with medications secondary to COVID. Her troponin was noted to be 3.5 and potassium 2.5 in the ER at [facility]. She was transferred to [facility] as her primary cardiologists are here. Interval Hx: Today, she is being seen for follow up. She had a cardiac angiogram today, which showed normal coronaries and EF of 35%. She remains in sinus rhythm on telemetry, no recurrent arrhythmias. Patient seen and examined. Clinical notes reviewed. Telemetry reviewed. No new complaints today. No major events overnight. Denies having chest pain, palpitations, shortness of breath, orthopnea/PND, cough, or dizziness at the time of this visit. Allergies:  No Known Allergies    Home Meds:  Prior to Visit Medications    Medication Sig Taking?  Authorizing Provider   aspirin 81 MG EC tablet Take 81 mg by mouth daily Yes Historical Provider, MD   lisinopril (PRINIVIL;ZESTRIL) 2.5 MG tablet Take 1 tablet by mouth daily Yes YEIMY Avendano CNP   apixaban (ELIQUIS) 5 MG TABS tablet Take 1 tablet by mouth 2 times daily Yes YEIMY Avendano CNP   spironolactone (ALDACTONE) 25 MG tablet Take 1 tablet by mouth daily Yes YEIMY Avendano CNP   metoprolol tartrate (LOPRESSOR) 50 MG tablet Take 1.5 tablets by mouth 2 times daily Yes YEIMY Avendano CNP   atorvastatin (LIPITOR) 40 MG tablet Take 1 tablet by mouth daily Yes YEIMY Avendano CNP furosemide (LASIX) 20 MG tablet Take 1 tablet by mouth every other day Yes Daniel Gamboa, APRN - CNP      Scheduled Meds:   sodium chloride flush  10 mL Intravenous 2 times per day    valsartan  160 mg Oral Daily    furosemide  40 mg Intravenous BID    amiodarone  400 mg Oral BID    metoprolol succinate  100 mg Oral Daily    aspirin  81 mg Oral Daily    atorvastatin  40 mg Oral Nightly    spironolactone  25 mg Oral Daily    sodium chloride flush  10 mL Intravenous 2 times per day     Continuous Infusions:  PRN Meds:sodium chloride flush, acetaminophen, oxyCODONE-acetaminophen **OR** oxyCODONE-acetaminophen, ondansetron, sodium chloride flush, [DISCONTINUED] acetaminophen **OR** acetaminophen, polyethylene glycol, promethazine **OR** [DISCONTINUED] ondansetron, potassium chloride **OR** potassium alternative oral replacement **OR** potassium chloride, magnesium sulfate, LORazepam, morphine, zolpidem     Past Medical History:  Past Medical History:   Diagnosis Date    AICD (automatic cardioverter/defibrillator) present     Arthritis     [facility]     Gout     Hyperlipidemia     Hypertension     MI (myocardial infarction) ([facility] [address]       Past Surgical History:    has a past surgical history that includes Dilation and curettage of uterus and Cardiac defibrillator placement. Social History:  Reviewed. reports that she has never smoked. She has never used smokeless tobacco. She reports that she does not drink alcohol or use drugs. Family History:  Reviewed. family history includes Cancer in her father; Diabetes in her mother; Heart Disease in her mother; High Blood Pressure in her mother; High Cholesterol in her mother.     Review of Systems:  · Constitutional: Negative for fever, night sweats, chills, weight changes, or weakness  · Skin: Negative for rash, dry skin, pruritus, bruising, bleeding, blood clots, or changes in skin pigment  · HEENT: Negative for vision changes, ringing in the ears, sore throat, dysphagia, or swollen lymph nodes  · Respiratory: Reviewed in HPI  · Cardiovascular: Reviewed in HPI  · Gastrointestinal: Negative for abdominal pain, N/V/D, constipation, or black/tarry stools  · Genito-Urinary: Negative for dysuria, incontinence, urgency, or hematuria  · Musculoskeletal: Negative for joint swelling, muscle pain, or injuries  · Neurological/Psych: Negative for confusion, seizures, headaches, balance issues or TIA-like symptoms. No anxiety, depression, or insomnia    Physical Examination:  Vitals:    08/13/20 1416   BP: (!) 158/96   Pulse: 66   Resp: 18   Temp:    SpO2: 94%      In: -   Out: 800    Wt Readings from Last 3 Encounters:   08/13/20 289 lb 12.8 oz (131.5 kg)   02/29/20 264 lb (119.7 kg)   11/06/19 259 lb 11.2 oz (117.8 kg)       Intake/Output Summary (Last 24 hours) at 8/13/2020 1512  Last data filed at 8/13/2020 1502  Gross per 24 hour   Intake --   Output 800 ml   Net -800 ml     Telemetry: Personally Reviewed  - Sinus rhythm   · Constitutional: Cooperative and in no apparent distress, and appears well nourished  · Skin: Warm and pink; no pallor, cyanosis, bruising, or clubbing  · HEENT: Symmetric and normocephalic. PERRL, EOM intact. Conjunctiva pink with clear sclera. Mucus membranes pink and moist. Teeth intact. Thyroid smooth without nodules or goiter. · Cardiovascular: Regular rate and rhythm. S1/S2 present without murmurs, rubs, or gallops. Peripheral pulses 2+, capillary refill < 3 seconds. No elevation of JVP. No peripheral edema  · Respiratory: Respirations symmetric and unlabored. Lungs clear to auscultation bilaterally, no wheezing, crackles, or rhonchi  · Gastrointestinal: Abdomen soft and round. Bowel sounds normoactive in all quadrants without tenderness or masses. · Musculoskeletal: Bilateral upper and lower extremity strength 5/5 with full ROM  · Neurologic/Psych: Awake and orientated to person, place and time.  Calm affect, appropriate mood    Pertinent labs, diagnostic, device, and imaging results reviewed as a part of this visit    Labs:    BMP:   Recent Labs     20  0433 08/12/20  2027 08/13/20  0725    138 142   K 3.9 3.4* 3.8    104 105   CO2 27 27 27   BUN 8 10 8   CREATININE 0.6 0.6 0.6   MG 2.10 1.90  --      Estimated Creatinine Clearance: 128 mL/min (based on SCr of 0.6 mg/dL). CBC:   Recent Labs     20  0725   WBC 10.6 10.9   HGB 11.9* 12.0   HCT 37.1 37.4   MCV 91.7 91.6    311     Thyroid:   Lab Results   Component Value Date    TSH 4.14 10/03/2018     Lipids:   Lab Results   Component Value Date    CHOL 165 2019    HDL 52 2019    TRIG 80 2019     LFTS:   Lab Results   Component Value Date    ALT 15 2019    AST 15 2019    ALKPHOS 92 2019    PROT 7.8 2019    AGRATIO 1.0 2019    BILITOT 0.3 2019     Cardiac Enzymes:   Lab Results   Component Value Date    TROPONINI 0.16 2020    TROPONINI 0.20 2020    TROPONINI <0.01 2020     Coags:   Lab Results   Component Value Date    PROTIME 12.0 2019    INR 1.05 2019       EC20  NSR, rate 72, QTc 488    ECHO: 20   Left ventricular systolic function is mild to moderately reduced with   ejection fraction estimated at 40%. There is mild concentric left ventricular hypertrophy. Grade I diastolic dysfunction. Normal right ventricular size and function. Pacer / ICD wire is visualized in the right ventricle. Mild mitral regurgitation. Aortic valve appears sclerotic but opens adequately. Mild tricuspid regurgitation. Systolic pulmonary artery pressure (SPAP) is normal and estimated at 25-30 mmHg.      Cath: 20  Anatomy:   LM-Normal   LAD-mid 50%  Cx-normal  OM- normal  RCA-dominant normal  RPDA- normal  LVEF- 35  LVG- Global  LVEDP- 25     Contrast: 84  Flouro Time: 4.8  Access: R radial     Impression  ~Coronary Angiography w/ failure (NYHA Class III)  - Appears decompensated   ~ EF 40% per echo; 35% per LHC (8/20)   ~ Elevated LVEDP on cath today  - Continue with Toprol  mg QD, valsartan 160 mg QD, spironolactone 25 mg QD  - Continue IV diuresis with lasix  - Monitor I&Os, daily weights    5. At risk for TYLER   - Discussed long term risks of untreated TYLER   - Consider referral to sleep medicine at discharge    Multiple medical conditions with risk of decompensation. All pertinent information and plan of care discussed with the EP physician. All questions and concerns were addressed to the patient. Alternatives to my treatment were discussed. I have discussed the above stated plan with patient and the nurse. The patient verbalized understanding and agreed with the plan. Thank you for allowing to us to participate in the care of Eugene Dunbar.     YEIMY Mejia-HANS  [address]   Office: [phone]

## 2020-08-13 NOTE — PROGRESS NOTES
Pt upto 3A from cathlab, transferred form 3T. Left heart cath done,through rt radial, no complications noted at site. Call light with in reach, pt alert and awake. Pt on RA. Will continue to monitor.

## 2020-08-13 NOTE — PRE SEDATION
Brief Pre-Op Note/Sedation Assessment      Gerardo Means  1955  [id number]      [id number]  7:55 AM    Planned Procedure: Cardiac Catheterization Procedure    Post Procedure Plan: Return to same level of care    Consent: I have discussed with the patient and/or the patient representative the indication, alternatives, and the possible risks and/or complications of the planned procedure and the anesthesia methods. The patient and/or patient representative appear to understand and agree to proceed. Chief Complaint: Dyspnea on Exertion  Dyspnea      Indications for Cath Procedure:  Suspected CAD and Cardiac Arrythmia  Anginal Classification within 2 weeks:  No symptoms  NYHA Heart Failure Class within 2 weeks: Class III - Symptoms of HF on less-than-ordinary exertion, Newly Diagnosed?  Yes, Heart Failure Type: Diastolic  Is Cath Lab Visit Valve-related?: No  Surgical Risk: Intermediate  Functional Type: >= 4 METS with symptoms    Anti- Anginal Meds within 2 weeks:   Yes: Aspirin    Stress or Imaging Studies Performed:  None     Vital Signs:  BP (!) 159/84   Pulse 76   Temp 98.1 °F (36.7 °C) (Oral)   Resp 16   Ht 5' 6\" (1.676 m)   Wt 289 lb 12.8 oz (131.5 kg)   SpO2 98%   BMI 46.77 kg/m²     Allergies:  No Known Allergies    Past Medical History:  Past Medical History:   Diagnosis Date    AICD (automatic cardioverter/defibrillator) present     Arthritis     [facility]     Gout     Hyperlipidemia     Hypertension     [facility] ([facility] infarction) [facility] [address]          Surgical History:  Past Surgical History:   Procedure Laterality Date    CARDIAC DEFIBRILLATOR PLACEMENT      DILATION AND CURETTAGE OF UTERUS           Medications:  Current Facility-Administered Medications   Medication Dose Route Frequency Provider Last Rate Last Dose    amiodarone (CORDARONE) tablet 400 mg  400 mg Oral BID Mari Carrillo MD   400 mg at 08/12/20 2126    metoprolol succinate (TOPROL XL) extended release tablet 100 mg  100 mg Oral Daily Rickey Sharma MD   100 mg at 08/12/20 2132    furosemide (LASIX) tablet 20 mg  20 mg Oral Daily YEIMY Whittington - CNP        aspirin EC tablet 81 mg  81 mg Oral Daily Bishop Whitman MD   81 mg at 08/12/20 0854    atorvastatin (LIPITOR) tablet 40 mg  40 mg Oral Nightly Bishop Whitman MD   40 mg at 08/12/20 2126    lisinopril (PRINIVIL;ZESTRIL) tablet 2.5 mg  2.5 mg Oral Daily Bishop Whitman MD   2.5 mg at 08/12/20 0856    spironolactone (ALDACTONE) tablet 25 mg  25 mg Oral Daily Bishop Whitman MD   25 mg at 08/12/20 0856    sodium chloride flush 0.9 % injection 10 mL  10 mL Intravenous 2 times per day Bishop Whitman MD   10 mL at 08/12/20 2131    sodium chloride flush 0.9 % injection 10 mL  10 mL Intravenous PRN Bishop Whitman MD   10 mL at 08/12/20 1450    acetaminophen (TYLENOL) tablet 650 mg  650 mg Oral Q6H PRN Bishop Whitman MD        Or   Sandrine Piles acetaminophen (TYLENOL) suppository 650 mg  650 mg Rectal Q6H PRN Bishop Whitman MD        polyethylene glycol (GLYCOLAX) packet 17 g  17 g Oral Daily PRN Bishop Whitman MD        promethazine (PHENERGAN) tablet 12.5 mg  12.5 mg Oral Q6H PRN Bishop Whitman MD        Or    ondansetron (ZOFRAN) injection 4 mg  4 mg Intravenous Q6H PRN Bishop Whitman MD        potassium chloride (KLOR-CON M) extended release tablet 40 mEq  40 mEq Oral PRN Bishop Whitman MD        Or    potassium bicarb-citric acid (EFFER-K) effervescent tablet 40 mEq  40 mEq Oral PRN Bishop Whitman MD        Or    potassium chloride 10 mEq/100 mL IVPB (Peripheral Line)  10 mEq Intravenous PRN Bishop Whitman MD        magnesium sulfate 2 g in 50 mL IVPB premix  2 g Intravenous PRN Bishop Whitman MD        LORazepam (ATIVAN) injection 1 mg  1 mg Intravenous Q6H PRN Bishop Whitman MD   1 mg at 08/12/20 2126    morphine (PF) injection 2 mg  2 mg Intravenous Q4H PRN Bishop Whitman MD   2 mg at 08/12/20 1450    zolpidem (AMBIEN) tablet 5 mg  5 mg Oral Nightly PRN Gordon Crocker MD   5 mg at 08/12/20 2120           Pre-Sedation:    Pre-Sedation Documentation and Exam:  I have assessed the patient and agree with the H&P present on the chart. Prior History of Anesthesia Complications:   none    Modified Mallampati:  II (soft palate, uvula, fauces visible)    ASA Classification:  Class 2 - A normal healthy patient with mild systemic disease      Starla Scale: Activity:  2 - Able to move 4 extremities voluntarily on command  Respiration:  2 - Able to breathe deeply and cough freely  Circulation:  2 - BP+/- 20mmHg of normal  Consciousness:  2 - Fully awake  Oxygen Saturation (color):  2 - Able to maintain oxygen saturation >92% on room air    Sedation/Anesthesia Plan:  Guard the patient's safety and welfare. Minimize physical discomfort and pain. Minimize negative psychological responses to treatment by providing sedation and analgesia and maximize the potential amnesia. Patient to meet pre-procedure discharge plan.     Medication Planned:  midazolam intravenously and fentanyl intravenously    Patient is an appropriate candidate for plan of sedation: yes      Electronically signed by Mirta Pennington MD on 8/13/2020 at 7:55 AM

## 2020-08-13 NOTE — CONSULTS
Not appropriate for cardiac rehab since no stents placed and EF 35%. Dr. Kp Patino note from 8/13/2020 states no indication for cardiac rehab.

## 2020-08-13 NOTE — PROGRESS NOTES
Report given to 3A nurse,belongings walked to [address] and given to pt. She states all her belongings are in there.

## 2020-08-14 LAB
ANION GAP SERPL CALCULATED.3IONS-SCNC: 11 MMOL/L (ref 3–16)
BUN BLDV-MCNC: 10 MG/DL (ref 7–20)
CALCIUM SERPL-MCNC: 9 MG/DL (ref 8.3–10.6)
CHLORIDE BLD-SCNC: 100 MMOL/L (ref 99–110)
CO2: 29 MMOL/L (ref 21–32)
CREAT SERPL-MCNC: 0.8 MG/DL (ref 0.6–1.2)
GFR AFRICAN AMERICAN: >60
GFR NON-AFRICAN AMERICAN: >60
GLUCOSE BLD-MCNC: 115 MG/DL (ref 70–99)
POTASSIUM SERPL-SCNC: 3.8 MMOL/L (ref 3.5–5.1)
SODIUM BLD-SCNC: 140 MMOL/L (ref 136–145)

## 2020-08-14 PROCEDURE — 36415 COLL VENOUS BLD VENIPUNCTURE: CPT

## 2020-08-14 PROCEDURE — 6370000000 HC RX 637 (ALT 250 FOR IP): Performed by: INTERNAL MEDICINE

## 2020-08-14 PROCEDURE — 6370000000 HC RX 637 (ALT 250 FOR IP): Performed by: NURSE PRACTITIONER

## 2020-08-14 PROCEDURE — 2700000000 HC OXYGEN THERAPY PER DAY

## 2020-08-14 PROCEDURE — 2580000003 HC RX 258: Performed by: INTERNAL MEDICINE

## 2020-08-14 PROCEDURE — 94640 AIRWAY INHALATION TREATMENT: CPT

## 2020-08-14 PROCEDURE — 99232 SBSQ HOSP IP/OBS MODERATE 35: CPT | Performed by: CLINICAL NURSE SPECIALIST

## 2020-08-14 PROCEDURE — 1200000000 HC SEMI PRIVATE

## 2020-08-14 PROCEDURE — 99233 SBSQ HOSP IP/OBS HIGH 50: CPT | Performed by: INTERNAL MEDICINE

## 2020-08-14 PROCEDURE — 6360000002 HC RX W HCPCS: Performed by: INTERNAL MEDICINE

## 2020-08-14 PROCEDURE — 94761 N-INVAS EAR/PLS OXIMETRY MLT: CPT

## 2020-08-14 PROCEDURE — 80048 BASIC METABOLIC PNL TOTAL CA: CPT

## 2020-08-14 RX ADMIN — Medication 10 ML: at 20:40

## 2020-08-14 RX ADMIN — Medication 10 ML: at 10:03

## 2020-08-14 RX ADMIN — AMIODARONE HYDROCHLORIDE 400 MG: 200 TABLET ORAL at 20:40

## 2020-08-14 RX ADMIN — SPIRONOLACTONE 25 MG: 25 TABLET ORAL at 10:02

## 2020-08-14 RX ADMIN — DESMOPRESSIN ACETATE 40 MG: 0.2 TABLET ORAL at 20:40

## 2020-08-14 RX ADMIN — ASPIRIN 81 MG: 81 TABLET, COATED ORAL at 10:03

## 2020-08-14 RX ADMIN — FUROSEMIDE 40 MG: 10 INJECTION, SOLUTION INTRAMUSCULAR; INTRAVENOUS at 17:10

## 2020-08-14 RX ADMIN — AMIODARONE HYDROCHLORIDE 400 MG: 200 TABLET ORAL at 10:03

## 2020-08-14 RX ADMIN — OXYCODONE HYDROCHLORIDE AND ACETAMINOPHEN 1 TABLET: 5; 325 TABLET ORAL at 06:01

## 2020-08-14 RX ADMIN — ZOLPIDEM TARTRATE 5 MG: 5 TABLET ORAL at 20:45

## 2020-08-14 RX ADMIN — METOPROLOL SUCCINATE 100 MG: 50 TABLET, EXTENDED RELEASE ORAL at 10:03

## 2020-08-14 RX ADMIN — OXYCODONE HYDROCHLORIDE AND ACETAMINOPHEN 1 TABLET: 5; 325 TABLET ORAL at 15:50

## 2020-08-14 RX ADMIN — LORAZEPAM 1 MG: 2 INJECTION INTRAMUSCULAR; INTRAVENOUS at 20:45

## 2020-08-14 RX ADMIN — APIXABAN 5 MG: 5 TABLET, FILM COATED ORAL at 10:03

## 2020-08-14 RX ADMIN — FUROSEMIDE 40 MG: 10 INJECTION, SOLUTION INTRAMUSCULAR; INTRAVENOUS at 10:02

## 2020-08-14 RX ADMIN — OXYCODONE HYDROCHLORIDE AND ACETAMINOPHEN 2 TABLET: 5; 325 TABLET ORAL at 10:06

## 2020-08-14 RX ADMIN — VALSARTAN 160 MG: 160 TABLET, FILM COATED ORAL at 10:03

## 2020-08-14 RX ADMIN — APIXABAN 5 MG: 5 TABLET, FILM COATED ORAL at 20:40

## 2020-08-14 ASSESSMENT — PAIN SCALES - GENERAL
PAINLEVEL_OUTOF10: 4
PAINLEVEL_OUTOF10: 6
PAINLEVEL_OUTOF10: 7
PAINLEVEL_OUTOF10: 7
PAINLEVEL_OUTOF10: 0

## 2020-08-14 ASSESSMENT — PAIN DESCRIPTION - PAIN TYPE
TYPE: CHRONIC PAIN

## 2020-08-14 ASSESSMENT — PAIN DESCRIPTION - DESCRIPTORS
DESCRIPTORS: POUNDING
DESCRIPTORS: ACHING

## 2020-08-14 ASSESSMENT — PAIN DESCRIPTION - ORIENTATION
ORIENTATION: MID
ORIENTATION: MID

## 2020-08-14 ASSESSMENT — PAIN DESCRIPTION - LOCATION
LOCATION: CHEST

## 2020-08-14 NOTE — PROGRESS NOTES
[facility]   Daily Progress Note      Admit Date:  8/11/2020    HPI:    Ms. Haley Reese is a 76year old female with history of non obstructed CAD, VT arrest s/p AICD, PAF, HTN, HLD    She is admitted with multiple AICD discharges (x7). LHC done with non obstructive CAD, LVEF 35%, LVEDP 25 consistent with volume overload. She is being diuresed with IV lasix. She was not taking her BB. Troponin 3.5 and potassium 2.5    Subjective:  Patient is being seen for chronic sHF. There were no acute overnight cardiac events. Creat 0.8 potassium 3.8 weight 294->281 and -1.1 L out. She is lying in the bed on 2 L of oxygen (not on at home) with family member at bedside. She has a bag of fritos at bedside. She has never had sleep evaluation. She denies any chest pain, palpitations or lightheadedness. She feels best weight is 260 although per chart always 270-280s    Objective:   /73   Pulse 71   Temp 98.2 °F (36.8 °C) (Oral)   Resp 18   Ht 5' 6\" (1.676 m)   Wt 281 lb 1.4 oz (127.5 kg)   SpO2 97%   BMI 45.37 kg/m²       Intake/Output Summary (Last 24 hours) at 8/14/2020 1400  Last data filed at 8/14/2020 0723  Gross per 24 hour   Intake 300 ml   Output 1425 ml   Net -1125 ml          Physical Exam:  General:  Awake, alert, oriented in NAD  Skin:  Warm and dry. No unusual bruising or rash  Neck:  Supple. No JVD or carotid bruit appreciated  Chest:  Normal effort.   Clear to auscultation, no wheezes/rhonchi/rales  Cardiovascular:  RRR, S1/S2, no murmur/gallop/rub  Abdomen:  Soft, nontender, +bowel sounds, morbidly obese  Extremities:  No edema  Neurological: No focal deficits  Psychological: Normal mood and affect      Medications:    sodium chloride flush  10 mL Intravenous 2 times per day    valsartan  160 mg Oral Daily    furosemide  40 mg Intravenous BID    apixaban  5 mg Oral BID    amiodarone  400 mg Oral BID    metoprolol succinate  100 mg Oral Daily    aspirin  81 mg Oral Daily    atorvastatin  40 mg Oral Nightly    spironolactone  25 mg Oral Daily    sodium chloride flush  10 mL Intravenous 2 times per day         Lab Data:  CBC:   Recent Labs     08/12/20  0433 08/13/20  0725   WBC 10.6 10.9   HGB 11.9* 12.0    311     BMP:    Recent Labs     08/12/20 2027 08/13/20  0725 08/14/20  0614    142 140   K 3.4* 3.8 3.8   CO2 27 27 29   BUN 10 8 10   CREATININE 0.6 0.6 0.8     INR:  No results for input(s): INR in the last 72 hours. BNP:    Recent Labs     08/12/20  0433   PROBNP 95         Diagnostics:  CXR 8/12/2020:  FINDINGS:    Left-sided cardiac pacer/defibrillator is unchanged in configuration.  The    heart is enlarged with mild central pulmonary venous congestion.  There are    patchy bilateral perihilar and lower lobe interstitial/alveolar opacities. Note is made of a small left effusion.  Mediastinal contours are stable.              Impression    CHF with pulmonary edema.               MPI 2/29/2020:  Summary     There is normal isotope uptake at stress and rest. There is no evidence of     myocardial ischemia or scar.          Normal LV function.          Overall findings represent a low risk scan.       Echo 8/12/2020:  Summary   Left ventricular systolic function is mild to moderately reduced with   ejection fraction estimated at 40%.   There is mild concentric left ventricular hypertrophy.   Grade I diastolic dysfunction.   Normal right ventricular size and function.   Pacer / ICD wire is visualized in the right ventricle.   Mild mitral regurgitation.   Aortic valve appears sclerotic but opens adequately.   Mild tricuspid regurgitation. Systolic pulmonary artery pressure (SPAP) is   normal and estimated at 25-30 mmHg.     [facility] 8/13/2020:   Anatomy:   LM-Normal   LAD-mid 50%  Cx-normal  OM- normal  RCA-dominant normal  RPDA- normal  LVEF- 35  LVG- Global  LVEDP- 25     Impression  ~Coronary Angiography w/ nonobstructive CAD  ~LVG with LVEF of 35 and global regional wall motion abnormalities  ~high LVEDP     Assessment:    1. VT with multiple AICD shocks  2. Chronic PAF on eliquis and amiodarone  3. CAD, non obstructive  4. Systolic heart failure, on ace, bb   5. Hypokalemia  6. Obese  7. Possible TYLER    Plan:    1. Continue lasix 40 mg bid IV  2. Continue toprol 100 mg daily and valsartan 160 mg daily  3. Continue aldactone 25 mg daily   4.  EP following  5. Recommend sleep evaluation as an outpatient    Hopefully home in the next 1-2 days    Discussed with patient who is agreeable with plan of care. Thank you for allowing me to participate in the care of your patient.     Kishore Mcconnell, CNS

## 2020-08-14 NOTE — PROGRESS NOTES
Pt refuses bed alarm. Pt educated call in needs. Pt verbalized understanding. Will continue to monitor.

## 2020-08-14 NOTE — PROGRESS NOTES
[address] 1955    History:  Past Medical History:   Diagnosis Date    AICD (automatic cardioverter/defibrillator) present     Arthritis     [facility])     Gout     Hyperlipidemia     Hypertension     MI (myocardial infarction) ([facility] [address]        ECHO:  Summary   Left ventricular systolic function is mild to moderately reduced with   ejection fraction estimated at 40%. There is mild concentric left ventricular hypertrophy. Grade I diastolic dysfunction. Normal right ventricular size and function. Pacer / ICD wire is visualized in the right ventricle. Mild mitral regurgitation. Aortic valve appears sclerotic but opens adequately. Mild tricuspid regurgitation. Systolic pulmonary artery pressure (SPAP) is   normal and estimated at 25-30 mmHg. ACE/ARB: .valsartan 160 mg daily  BB: toprolol xl 100 mg daily  Aldosterone Antagonist: aldactone 25 mg daily    History of sleep apnea: yes Type: ros   Equipment used at home: has seen sleep in past but needs PSG still-- did not go d/t covid      Last Hospital Admission:2/28/20 with chest pain  Code Status: full code  Discharge plans: lives at home    Family Present: farideh Carr was admitted to the hospital with her AICD discharge. Patient states HF is new but appears she has history in EMR. Patient states she does not have a scale and was not weighing herself daily. Patient weight today 281 lb, was 294 lb on admission. Patient states she does not add salt to her foods but was not following a sodium or fluid restriction. A bag of Fritos are at bedside. Discussed reading labels and adhering to 3 G sodium restriction. Patient states she had not been getting her medications during COVID and she had cancelled her follow ups with cardiology. She states she is otherwise compliant. She has been getting short of breath at home with any activity so she had not been exercising. Patient provided with both written and verbal education on CHF signs/ symptoms, causes, discharge medications,  daily weights, low sodium diet, activity, and follow-up. Pt to call if gains 3 pounds in one day or 5 pounds in one week. Mutually agreed upon goals were discussed such as calling the MD as soon as they recognize symptoms and weight gain, maintaining his proper diet, taking medications as prescribed, joining rehab when able. Patient provided with CHF Zone Management tool and CHF symptoms magnet. Discussed importance of lifestyle changes: agrees to daily wieghts    PATIENT/CAREGIVER TEACHING:    Level of patient/caregiver understanding able to:   [x ] Verbalize understanding [ ] Demonstrate understanding [ ] Teach back   [ ] Needs reinforcement [ ] Other:       Time spent teachin mins    1. WEIGHT: Admit Weight: 294 lb (133.4 kg)      Today  Weight: 281 lb 1.4 oz (127.5 kg)   2. I/O     Intake/Output Summary (Last 24 hours) at 2020 1406  Last data filed at 2020 0723  Gross per 24 hour   Intake 300 ml   Output 1425 ml   Net -1125 ml       Recommendations:   1. She needs follow up in a week with cardiology. 2. Educate further on fluid restriction 48 oz- 64 oz during inpatient stay so she can understand how to measure intake at home. 3. Continue to educate on S/S.   4. Emphasize daily weights, diet, and knowing when and who to call  5. Provided patient with CHF Resource Line for questions and concerns.        NOEL SHAFER 2020 2:06 PM

## 2020-08-14 NOTE — PROGRESS NOTES
[facility] PROGRESS NOTE    8/14/2020 11:19 AM        Name: Dee Leyva . Admitted: 8/11/2020  Primary Care Provider: Keenan Griffith MD (Tel: [phone])      Subjective:  Patient is a 77 yo female with hx non-obstructed CAD,  VT arrest s/p AICD, PAF, HTN, HLD. She presented to outside hospital with c/o multiple AICD discharges. Device interrogation showed AICD discharge x 7. Noted to have low potassium 2.5. Presently resting in bed. Says she feels tired today. Reports slight improvement in breathing. O2 sats high 90s on 2 liters, she does not use this at home. On IV Lasix, net output ~ 1 liter.      Reviewed interval ancillary notes    Current Medications  sodium chloride flush 0.9 % injection 10 mL, 2 times per day  sodium chloride flush 0.9 % injection 10 mL, PRN  acetaminophen (TYLENOL) tablet 650 mg, Q4H PRN  oxyCODONE-acetaminophen (PERCOCET) 5-325 MG per tablet 1 tablet, Q4H PRN    Or  oxyCODONE-acetaminophen (PERCOCET) 5-325 MG per tablet 2 tablet, Q4H PRN  ondansetron (ZOFRAN) injection 4 mg, Q6H PRN  valsartan (DIOVAN) tablet 160 mg, Daily  furosemide (LASIX) injection 40 mg, BID  apixaban (ELIQUIS) tablet 5 mg, BID  amiodarone (CORDARONE) tablet 400 mg, BID  metoprolol succinate (TOPROL XL) extended release tablet 100 mg, Daily  aspirin EC tablet 81 mg, Daily  atorvastatin (LIPITOR) tablet 40 mg, Nightly  spironolactone (ALDACTONE) tablet 25 mg, Daily  sodium chloride flush 0.9 % injection 10 mL, 2 times per day  sodium chloride flush 0.9 % injection 10 mL, PRN  acetaminophen (TYLENOL) suppository 650 mg, Q6H PRN  polyethylene glycol (GLYCOLAX) packet 17 g, Daily PRN  promethazine (PHENERGAN) tablet 12.5 mg, Q6H PRN  potassium chloride (KLOR-CON M) extended release tablet 40 mEq, PRN    Or  potassium bicarb-citric acid (EFFER-K) effervescent tablet 40 mEq, PRN    Or  potassium chloride 10 mEq/100 mL IVPB (Peripheral Line), PRN  magnesium sulfate 2 g in 50 mL IVPB premix, PRN  LORazepam (ATIVAN) injection 1 mg, Q6H PRN  morphine (PF) injection 2 mg, Q4H PRN  zolpidem (AMBIEN) tablet 5 mg, Nightly PRN        Objective:  /76   Pulse 84   Temp 98 °F (36.7 °C) (Oral)   Resp 16   Ht 5' 6\" (1.676 m)   Wt 281 lb 1.4 oz (127.5 kg)   SpO2 97%   BMI 45.37 kg/m²     Intake/Output Summary (Last 24 hours) at 8/14/2020 1119  Last data filed at 8/14/2020 0723  Gross per 24 hour   Intake 300 ml   Output 1425 ml   Net -1125 ml      Wt Readings from Last 3 Encounters:   08/14/20 281 lb 1.4 oz (127.5 kg)   02/29/20 264 lb (119.7 kg)   11/06/19 259 lb 11.2 oz (117.8 kg)     General:  Awake, alert, oriented in NAD  Skin:  Warm and dry. No unusual bruising or rash  Neck:  Supple. No JVD appreciated but thick neck  Chest:  Normal effort. Clear to auscultation, no wheezes/rhonchi/rales  Cardiovascular:  RRR, normal S1/S2, no murmur/gallop/rub  Abdomen:  Soft, nontender, +bowel sounds  Extremities:  No edema  Neurological: No focal deficits  Psychological: Normal mood and affect    Labs and Tests:  CBC:   Recent Labs     08/12/20  0433 08/13/20  0725   WBC 10.6 10.9   HGB 11.9* 12.0    311     BMP:    Recent Labs     08/12/20 2027 08/13/20  0725 08/14/20  0614    142 140   K 3.4* 3.8 3.8    105 100   CO2 27 27 29   BUN 10 8 10   CREATININE 0.6 0.6 0.8   GLUCOSE 150* 128* 115*     Hepatic: No results for input(s): AST, ALT, ALB, BILITOT, ALKPHOS in the last 72 hours. CXR 8/12/2020:  FINDINGS:    Left-sided cardiac pacer/defibrillator is unchanged in configuration.  The    heart is enlarged with mild central pulmonary venous congestion.  There are    patchy bilateral perihilar and lower lobe interstitial/alveolar opacities.     Note is made of a small left effusion.  Mediastinal contours are stable.              Impression    CHF with pulmonary edema.             MPI 2/29/2020:  Summary    Gigi Cabrera is normal isotope uptake at stress and rest. There is no evidence of     myocardial ischemia or scar.          Normal LV function.          Overall findings represent a low risk scan. Echo 8/12/2020:  Summary   Left ventricular systolic function is mild to moderately reduced with   ejection fraction estimated at 40%. There is mild concentric left ventricular hypertrophy. Grade I diastolic dysfunction. Normal right ventricular size and function. Pacer / ICD wire is visualized in the right ventricle. Mild mitral regurgitation. Aortic valve appears sclerotic but opens adequately. Mild tricuspid regurgitation. Systolic pulmonary artery pressure (SPAP) is   normal and estimated at 25-30 mmHg. [facility] 8/13/2020: Anatomy:   LM-Normal   LAD-mid 50%  Cx-normal  OM- normal  RCA-dominant normal  RPDA- normal  LVEF- 35  LVG- Global  LVEDP- 25     Impression  ~Coronary Angiography w/ nonobstructive CAD  ~LVG with LVEF of 35 and global regional wall motion abnormalities  ~high LVEDP         Problem List  Principal Problem:    Cardiac arrhythmia  Active Problems:    Hyperlipidemia    Morbid obesity (HCC)    Automatic implantable cardioverter-defibrillator in situ    Paroxysmal atrial fibrillation (HCC)    Chest pain    History of ventricular tachycardia    Acute on chronic systolic congestive heart failure (HCC)    Non-ischemic cardiomyopathy ([facility] [address]  Resolved Problems:    * No resolved hospital problems. *       Assessment & Plan:   1. Cardiac arrhythmia / Multiple AICD shocks. Potassium 2.85 on presentation. Device check with episodes VT/VF, atrial fib RVR. Has been started on short term amiodarone per EP. No recurrent arrhythmia. Will have ablation as out-patient. 2. PAF. Remains in sinus rhythm. Started on amiodarone. Continue Eliquis and beta blocker for rate control. TSH 4.24, free T4 1.0  3. Hypokalemia. Resolved. Potassium 3.8 today. Continue aldactone. BMP daily. 4. CAD / elevated troponin.  [facility] with non-obstructive CAD. Per cardiology op note, no indication for asa or statin. 5. Cardiomyopathy. EF 40% by echo. LVEDP on LHC 25 mmHg consistent with volume overload. Receiving IV Lasix BID. Continue ACE, AA, and evidence based beta blocker. Limit fluids to 2 liters, no added salt diet, daily weights, I&O. HF coordinator to see. 6. HTN. Controlled. Continue lisinopril. 7. Vaginal discharge/pain. GYN consulted, swab results pending. DC planning. Needs continued diuresis.      Diet: DIET CARDIAC;  Code:Full Code  DVT PPX: On YEIMY Hatch - CNP   8/14/2020 11:19 AM

## 2020-08-14 NOTE — PROGRESS NOTES
CLINICAL PHARMACY NOTE: MEDS TO [address] Select Patient?: No  Total # of Prescriptions Filled: 3   The following medications were delivered to the patient:  · Amiodarone 200mg  · Valsartan 80mg  · Metoprolol ER 100mg  Total # of Interventions Completed: 0  Time Spent (min): 30    Additional Documentation:    Delivered to Nurse [id number] [place] [facility]

## 2020-08-14 NOTE — PROGRESS NOTES
[address]   Electrophysiology Progress Note     Admit Date: 2020     Reason for follow up: AICD shock     HPI and Interval History: 76 y.o. female presented with AICD shock. Multiple comorbidities including hypertension, hyperlipidemia, morbid obesity,HFrEF, status post AICD in  at [facility] and remote history of MI and cardiac arrest    Device interrogation with A. fib/flutter with rapid ventricular rate and multiple shocks. Cardiac catheterization with no significant obstructive coronary artery disease    Patient seen and examined. Clinical notes reviewed. Telemetry reviewed. No new complaint today. No major events overnight. Denies having chest pain, shortness of breath, dyspnea on exertion, Orthopnea, PND at the time of this visit. Review of System:  All other systems reviewed except for that noted above. Pertinent negatives and positives are:     · General: negative for fever, chills   · Ophthalmic ROS: negative for - eye pain or loss of vision  · ENT ROS: negative for - headaches, sore throat   · Respiratory: negative for - cough, sputum  · Cardiovascular: Reviewed in HPI  · Gastrointestinal: negative for - abdominal pain, diarrhea, N/V  · Hematology: negative for - bleeding, blood clots, bruising or jaundice  · Genito-Urinary:  negative for - Dysuria or incontinence  · Musculoskeletal: negative for - Joint swelling, muscle pain  · Neurological: negative for - confusion, dizziness, headaches   · Psychiatric: No anxiety, no depression.   · Dermatological: negative for - rash      Physical Examination:  Vitals:    20 0407   BP: (!) 108/53   Pulse: 70   Resp: 16   Temp: 98 °F (36.7 °C)   SpO2: 97%      In: 300 [P.O.:300]  Out: 1425    Wt Readings from Last 3 Encounters:   20 281 lb 1.4 oz (127.5 kg)   20 264 lb (119.7 kg)   19 259 lb 11.2 oz (117.8 kg)     Temp  Av °F (36.7 °C)  Min: 96.8 °F (36 °C)  Max: 98.7 °F (37.1 °C)  Pulse  Av.2  Min: 62  Max: 85  BP  Min: 108/53  Max: 158/96  SpO2  Av.1 %  Min: 92 %  Max: 97 %    Intake/Output Summary (Last 24 hours) at 2020 0811  Last data filed at 2020 0723  Gross per 24 hour   Intake 300 ml   Output 1425 ml   Net -1125 ml       · Telemetry: Sinus rhythm   · Constitutional: Oriented. No distress. · Head: Normocephalic and atraumatic. · Mouth/Throat: Oropharynx is clear and moist.   · Eyes: Conjunctivae normal. EOM are normal.   · Neck: Neck supple. No rigidity. No JVD present. · Cardiovascular: Normal rate, regular rhythm, S1&S2. · Pulmonary/Chest: Bilateral respiratory sounds. No wheezes, No rhonchi. · Abdominal: Soft. Bowel sounds present. No distension, No tenderness.  + Morbid obesity  · Musculoskeletal: No tenderness. No edema    · Lymphadenopathy: Has no cervical adenopathy. · Neurological: Alert and oriented. Cranial nerve appears intact, No Gross deficit   · Skin: Skin is warm and dry. No rash noted. · Psychiatric: Has a normal behavior     Labs, diagnostic and imaging results reviewed. Reviewed. Recent Labs     20  0725 20  0614    142 140   K 3.4* 3.8 3.8    105 100   CO2 27 27 29   BUN 10 8 10   CREATININE 0.6 0.6 0.8     Recent Labs     20  0433 20  0725   WBC 10.6 10.9   HGB 11.9* 12.0   HCT 37.1 37.4   MCV 91.7 91.6    311     Lab Results   Component Value Date    TROPONINI 0.16 2020     Estimated Creatinine Clearance: 95 mL/min (based on SCr of 0.8 mg/dL).    No results found for: BNP  Lab Results   Component Value Date    PROTIME 12.0 2019    PROTIME 11.9 2013    INR 1.05 2019    INR 1.05 2013     Lab Results   Component Value Date    CHOL 165 2019    HDL 52 2019    TRIG 80 2019       Scheduled Meds:   sodium chloride flush  10 mL Intravenous 2 times per day    valsartan  160 mg Oral Daily    furosemide  40 mg Intravenous BID    apixaban  5 mg Oral BID    amiodarone  400 mg Oral BID    metoprolol succinate  100 mg Oral Daily    aspirin  81 mg Oral Daily    atorvastatin  40 mg Oral Nightly    spironolactone  25 mg Oral Daily    sodium chloride flush  10 mL Intravenous 2 times per day     Continuous Infusions:  PRN Meds:sodium chloride flush, acetaminophen, oxyCODONE-acetaminophen **OR** oxyCODONE-acetaminophen, ondansetron, sodium chloride flush, [DISCONTINUED] acetaminophen **OR** acetaminophen, polyethylene glycol, promethazine **OR** [DISCONTINUED] ondansetron, potassium chloride **OR** potassium alternative oral replacement **OR** potassium chloride, magnesium sulfate, LORazepam, morphine, zolpidem     Patient Active Problem List    Diagnosis Date Noted    [facility]     Non-ischemic cardiomyopathy ([facility]     History of ventricular tachycardia 2020    Cardiac arrhythmia 2020    Chest pain 2020    Shortness of breath 2016    Paroxysmal atrial fibrillation ([facility] 2016    Automatic implantable cardioverter-defibrillator in situ 2015    Hyperlipidemia 2014    Morbid obesity ([facility] 2014      Active Hospital Problems    Diagnosis Date Noted    Acute on chronic systolic congestive heart failure (HCC) [I50.23]     Non-ischemic cardiomyopathy ([facility] [I42.8]     History of ventricular tachycardia [Z86.79] 2020    Cardiac arrhythmia [I49.9] 2020    Chest pain [R07.9] 2020    Paroxysmal atrial fibrillation ([facility] [I48.0] 2016    Automatic implantable cardioverter-defibrillator in situ [Z95.810] 2015    Hyperlipidemia [E78.5] 2014    Morbid obesity ([facility] [E66.01] 2014     EC20  NSR, rate 72, QTc 488     ECHO: 20   Left ventricular systolic function is mild to moderately reduced with   ejection fraction estimated at 40%.   There is mild concentric left ventricular hypertrophy.   Grade I diastolic dysfunction.   Normal right ventricular size and function.   Pacer / ICD wire is visualized in the right ventricle.   Mild mitral regurgitation.   Aortic valve appears sclerotic but opens adequately.   Mild tricuspid regurgitation. Systolic pulmonary artery pressure (SPAP) is normal and estimated at 25-30 mmHg.     Cath: 20  Anatomy:   LM-Normal   LAD-mid 50%  Cx-normal  OM- normal  RCA-dominant normal  RPDA- normal  LVEF- 35  LVG- Global  LVEDP- 25     Contrast: 84  Flouro Time: 4.8  Access: R radial     Impression  ~Coronary Angiography w/ nonobstructive CAD  ~LVG with LVEF of 35 and global regional wall motion abnormalities  ~high LVEDP       Assessment:     -Multiple AICD shock   Patient also had hyperkalemia and fluid overload   She is not taking her beta-blocker therapy     Noted to have repeated episodes of atrial fibrillation with rapid ventricular rate. Started on amiodarone therapy plan for short-term amiodarone    Treatment options including ablation were discussed with patient. Risks, benefits and alternative of each treatment options were explained. All questions answered. The risks, benefits and alternatives of the ablation procedure were discussed with the patient. The risks including, but not limited to, the risks of bleeding, infection, radiation exposure, injury to vascular, cardiac and surrounding structures (including pneumothorax), stroke, cardiac perforation, tamponade, need for emergent open heart surgery, need for pacemaker implantation, Injury to the phrenic nerve, injury to the esophagus, myocardial infarction and death were discussed in detail. The patient opted to proceed with the ablation. Will schedule for outpatient ablation. Resume anticoagulation with Eliquis    - Oral amiodarone loadin mg BID x1 week, then 200 mg BID x1 week, then 200 mg daily    -Nonischemic cardiomyopathy     On optimal medical therapy.    Needs follow-up with heart failure clinic    - Acute of chronic systolic heart failure:    Continue with medical therapy with Toprol- mg daily   Valsartan 160 daily   Spironolactone 25 mg daily   Continue with IV diuresis with Lasix   Strict intake output    Morbid obesity: Body mass index is 45.37 kg/m². At risk for TYLER   Needs outpatient sleep study       Multiple medical conditions with risk of decompensation. All questions and concerns were addressed to the patient/family. Alternatives to my treatment were discussed. I have discussed the above stated plan and the patient verbalized understanding and agreed with the plan. NOTE: This report was transcribed using voice recognition software. Every effort was made to ensure accuracy, however, inadvertent computerized transcription errors may be present.      Guera Cuevas MD, MPH  [place] [address]   Office: [phone]

## 2020-08-14 NOTE — PROGRESS NOTES
Assessment and med pass complete. Requested med for sleep. Meds taken whole with water. Appears to be out of breath, gave oxygen to use while sleeping. Refusing bed alarm, commode next to bed, informed to call if needing assist to prevent a fall. Got cup of ice per request. Denies other needs, call light in reach.

## 2020-08-15 LAB
ANION GAP SERPL CALCULATED.3IONS-SCNC: 9 MMOL/L (ref 3–16)
BUN BLDV-MCNC: 11 MG/DL (ref 7–20)
CALCIUM SERPL-MCNC: 9.3 MG/DL (ref 8.3–10.6)
CHLORIDE BLD-SCNC: 99 MMOL/L (ref 99–110)
CO2: 30 MMOL/L (ref 21–32)
CREAT SERPL-MCNC: 0.7 MG/DL (ref 0.6–1.2)
GFR AFRICAN AMERICAN: >60
GFR NON-AFRICAN AMERICAN: >60
GLUCOSE BLD-MCNC: 139 MG/DL (ref 70–99)
POTASSIUM SERPL-SCNC: 3.2 MMOL/L (ref 3.5–5.1)
SODIUM BLD-SCNC: 138 MMOL/L (ref 136–145)

## 2020-08-15 PROCEDURE — 36415 COLL VENOUS BLD VENIPUNCTURE: CPT

## 2020-08-15 PROCEDURE — 6370000000 HC RX 637 (ALT 250 FOR IP): Performed by: NURSE PRACTITIONER

## 2020-08-15 PROCEDURE — 80048 BASIC METABOLIC PNL TOTAL CA: CPT

## 2020-08-15 PROCEDURE — 6360000002 HC RX W HCPCS: Performed by: INTERNAL MEDICINE

## 2020-08-15 PROCEDURE — 6370000000 HC RX 637 (ALT 250 FOR IP): Performed by: INTERNAL MEDICINE

## 2020-08-15 PROCEDURE — 2580000003 HC RX 258: Performed by: INTERNAL MEDICINE

## 2020-08-15 PROCEDURE — 1200000000 HC SEMI PRIVATE

## 2020-08-15 PROCEDURE — 94760 N-INVAS EAR/PLS OXIMETRY 1: CPT

## 2020-08-15 PROCEDURE — 99233 SBSQ HOSP IP/OBS HIGH 50: CPT | Performed by: NURSE PRACTITIONER

## 2020-08-15 RX ORDER — POTASSIUM CHLORIDE 20 MEQ/1
40 TABLET, EXTENDED RELEASE ORAL ONCE
Status: COMPLETED | OUTPATIENT
Start: 2020-08-15 | End: 2020-08-15

## 2020-08-15 RX ADMIN — AMIODARONE HYDROCHLORIDE 400 MG: 200 TABLET ORAL at 20:10

## 2020-08-15 RX ADMIN — Medication 10 ML: at 08:44

## 2020-08-15 RX ADMIN — APIXABAN 5 MG: 5 TABLET, FILM COATED ORAL at 08:44

## 2020-08-15 RX ADMIN — Medication 10 ML: at 12:00

## 2020-08-15 RX ADMIN — OXYCODONE HYDROCHLORIDE AND ACETAMINOPHEN 1 TABLET: 5; 325 TABLET ORAL at 07:01

## 2020-08-15 RX ADMIN — LORAZEPAM 1 MG: 2 INJECTION INTRAMUSCULAR; INTRAVENOUS at 20:10

## 2020-08-15 RX ADMIN — AMIODARONE HYDROCHLORIDE 400 MG: 200 TABLET ORAL at 08:45

## 2020-08-15 RX ADMIN — APIXABAN 5 MG: 5 TABLET, FILM COATED ORAL at 20:09

## 2020-08-15 RX ADMIN — POTASSIUM CHLORIDE 40 MEQ: 1500 TABLET, EXTENDED RELEASE ORAL at 17:12

## 2020-08-15 RX ADMIN — ZOLPIDEM TARTRATE 5 MG: 5 TABLET ORAL at 20:09

## 2020-08-15 RX ADMIN — FUROSEMIDE 40 MG: 10 INJECTION, SOLUTION INTRAMUSCULAR; INTRAVENOUS at 08:44

## 2020-08-15 RX ADMIN — FUROSEMIDE 40 MG: 10 INJECTION, SOLUTION INTRAMUSCULAR; INTRAVENOUS at 17:12

## 2020-08-15 RX ADMIN — DESMOPRESSIN ACETATE 40 MG: 0.2 TABLET ORAL at 20:10

## 2020-08-15 RX ADMIN — OXYCODONE HYDROCHLORIDE AND ACETAMINOPHEN 1 TABLET: 5; 325 TABLET ORAL at 12:07

## 2020-08-15 RX ADMIN — SPIRONOLACTONE 25 MG: 25 TABLET ORAL at 08:45

## 2020-08-15 RX ADMIN — Medication 10 ML: at 20:10

## 2020-08-15 RX ADMIN — VALSARTAN 160 MG: 160 TABLET, FILM COATED ORAL at 08:45

## 2020-08-15 RX ADMIN — METOPROLOL SUCCINATE 100 MG: 50 TABLET, EXTENDED RELEASE ORAL at 08:45

## 2020-08-15 RX ADMIN — ASPIRIN 81 MG: 81 TABLET, COATED ORAL at 08:45

## 2020-08-15 ASSESSMENT — PAIN SCALES - WONG BAKER: WONGBAKER_NUMERICALRESPONSE: 0

## 2020-08-15 ASSESSMENT — PAIN DESCRIPTION - PAIN TYPE
TYPE: CHRONIC PAIN
TYPE: ACUTE PAIN;CHRONIC PAIN

## 2020-08-15 ASSESSMENT — PAIN DESCRIPTION - DESCRIPTORS
DESCRIPTORS: ACHING
DESCRIPTORS: SORE

## 2020-08-15 ASSESSMENT — PAIN SCALES - GENERAL
PAINLEVEL_OUTOF10: 8
PAINLEVEL_OUTOF10: 7

## 2020-08-15 ASSESSMENT — PAIN DESCRIPTION - LOCATION
LOCATION: CHEST
LOCATION: CHEST

## 2020-08-15 ASSESSMENT — PAIN DESCRIPTION - ORIENTATION
ORIENTATION: MID
ORIENTATION: MID

## 2020-08-15 NOTE — PROGRESS NOTES
Assessment and med pass complete. Meds taken whole with water. Resting in bed, refusing alarm, commode next to bed, informed to call if needing assist to prevent a fall. Requested snack. Denies other needs, call light in reach.

## 2020-08-15 NOTE — PLAN OF CARE
Problem: Pain:  Goal: Control of acute pain  Description: Control of acute pain  Outcome: Ongoing  Note: C/o acute chest pain mid sternal, ice applied, percocet given, pain reduced a little but still present after ICD fired before admission, emotional support given     Problem: Cardiac Output - Decreased:  Goal: Hemodynamic stability will improve  Description: Hemodynamic stability will improve  Outcome: Ongoing  Note: Pt sob after ambulation, on 2 L NC overnight and prn, iv lasix given, continued I&O's, fluid restriction 2 L /daily

## 2020-08-15 NOTE — PROGRESS NOTES
[address]   Cardiology Progress Note   Date: 8/15/2020  Admit Date: 8/11/2020     Reason for follow up: ICD shock/CHF    Chief Complaint: ICD fired    History of Present Illness: History obtained from patient and medical record. Kylie Gregory is a 76 y.o. female with a past medical history of HTN, HLD, CMP s/p AICD (2007, [facility] [address]), VT, and CHF who presented with CP and several ICD shocks. She was noted to have elevated troponin 3.5 and s/p C 813/2020 that showed nonobstructive CAD. Potassium was 2.5 on admission. Interval Hx: Today, she is being seen for follow up. Continues to not feel well, but cannot well define her symptoms. Continues with SOB and on O2. Admits to mild orthopnea. No arrhythmia on monitor. No new complaints today. No major events overnight. Denies having chest pain, palpitations,or dizziness. Patient seen and examined. Clinical notes reviewed. Telemetry reviewed. Allergies:  No Known Allergies    Home Meds:  Prior to Visit Medications    Medication Sig Taking? Authorizing Provider   amiodarone (PACERONE) 400 MG tablet Take 1 tablet by mouth 2 times daily for 7 days, THEN 0.5 tablets 2 times daily for 7 days, THEN 0.5 tablets daily.  Yes YEIMY Poole CNP   metoprolol succinate (TOPROL XL) 100 MG extended release tablet Take 1 tablet by mouth daily Yes YEIMY Poole CNP   valsartan (DIOVAN) 160 MG tablet Take 1 tablet by mouth daily Yes YEIMY Poole CNP   aspirin 81 MG EC tablet Take 81 mg by mouth daily Yes Historical Provider, MD   lisinopril (PRINIVIL;ZESTRIL) 2.5 MG tablet Take 1 tablet by mouth daily Yes YEIMY Napier CNP   spironolactone (ALDACTONE) 25 MG tablet Take 1 tablet by mouth daily Yes YEIMY Napier CNP   atorvastatin (LIPITOR) 40 MG tablet Take 1 tablet by mouth daily Yes YEIMY Napier CNP   furosemide (LASIX) 20 MG tablet Take 1 tablet by mouth every other day Yes YEIMY Napier CNP   apixaban (ELIQUIS) 5 MG TABS tablet Take 1 tablet by mouth 2 times daily  Johs Jazz, APRN - CNP      Scheduled Meds:   sodium chloride flush  10 mL Intravenous 2 times per day    valsartan  160 mg Oral Daily    furosemide  40 mg Intravenous BID    apixaban  5 mg Oral BID    amiodarone  400 mg Oral BID    metoprolol succinate  100 mg Oral Daily    aspirin  81 mg Oral Daily    atorvastatin  40 mg Oral Nightly    spironolactone  25 mg Oral Daily    sodium chloride flush  10 mL Intravenous 2 times per day     Continuous Infusions:  PRN Meds:sodium chloride flush, acetaminophen, oxyCODONE-acetaminophen **OR** oxyCODONE-acetaminophen, ondansetron, sodium chloride flush, [DISCONTINUED] acetaminophen **OR** acetaminophen, polyethylene glycol, promethazine **OR** [DISCONTINUED] ondansetron, potassium chloride **OR** potassium alternative oral replacement **OR** potassium chloride, magnesium sulfate, LORazepam, morphine, zolpidem     Past Medical History:  Past Medical History:   Diagnosis Date    AICD (automatic cardioverter/defibrillator) present     Arthritis     [facility]congestive [facility] failure) [facility]     Gout     Hyperlipidemia     Hypertension     MI [facility]myocardial infarction) [facility]       Past Surgical History:    has a past surgical history that includes Dilation and curettage of uterus and Cardiac defibrillator placement. Social History:  Reviewed. reports that she has never smoked. She has never used smokeless tobacco. She reports that she does not drink alcohol or use drugs. Family History:  Reviewed. family history includes Cancer in her father; Diabetes in her mother; Heart Disease in her mother; High Blood Pressure in her mother; High Cholesterol in her mother.    Denies family history of sudden cardiac death, arrhythmia, premature CAD    Review of Systems:  · Constitutional: Negative for fever, night sweats, chills, weight changes, or weakness  · Skin: Negative for rash, dry skin, pruritus, bruising, bleeding, blood clots, or changes in skin pigment  · HEENT: Negative for vision changes, ringing in the ears, sore throat, dysphagia, or swollen lymph nodes  · Respiratory: Reviewed in HPI  · Cardiovascular: Reviewed in HPI  · Gastrointestinal: Negative for abdominal pain, N/V/D, constipation, or black/tarry stools  · Genito-Urinary: Negative for dysuria, incontinence, urgency, or hematuria  · Musculoskeletal: Negative for joint swelling, muscle pain, or injuries  · Neurological/Psych: Negative for confusion, seizures, headaches, balance issues or TIA-like symptoms. No anxiety, depression, or insomnia    Physical Examination:  Vitals:    08/15/20 0830   BP: (!) 178/85   Pulse: 62   Resp: 16   Temp: 98.7 °F (37.1 °C)   SpO2: 95%      In: -   Out: 1450    Wt Readings from Last 3 Encounters:   08/15/20 277 lb 1.9 oz (125.7 kg)   02/29/20 264 lb (119.7 kg)   11/06/19 259 lb 11.2 oz (117.8 kg)       Intake/Output Summary (Last 24 hours) at 8/15/2020 1057  Last data filed at 8/15/2020 0701  Gross per 24 hour   Intake --   Output 1450 ml   Net -1450 ml     Telemetry: Personally Reviewed Normal sinus rhythm  · Constitutional: Cooperative and in no apparent distress, and appears well nourished  · Skin: Warm and pink; no pallor, cyanosis, bruising, or clubbing  · HEENT: Symmetric and normocephalic. PERRL, EOM intact. Conjunctiva pink with clear sclera. Mucus membranes pink and moist.   · Cardiovascular: regular and rhythm. S1 & S2, no murmurs, rubs, or gallops. Peripheral pulses 2+, capillary refill < 3 seconds. Negative elevation of JVP. 2+ Peripheral edema  · Respiratory: Respirations symmetric and unlabored. Lungs clear to auscultation bilaterally, no wheezing, crackles, or rhonchi  · Gastrointestinal: Abdomen soft and round. Bowel sounds normoactive in all quadrants without tenderness or masses.   · Musculoskeletal: Bilateral upper and lower extremity strength 5/5 with full ROM  · Neurologic/Psych: Awake and orientated to person, place and time. Calm affect, appropriate mood    Pertinent labs, diagnostic, device, and imaging results reviewed as a part of this visit    Labs:    BMP:   Recent Labs     08/12/20 2027 08/13/20 0725 08/14/20 0614    142 140   K 3.4* 3.8 3.8    105 100   CO2 27 27 29   BUN 10 8 10   CREATININE 0.6 0.6 0.8   MG 1.90  --   --      Estimated Creatinine Clearance: 94 mL/min (based on SCr of 0.8 mg/dL). CBC:   Recent Labs     08/13/20 0725   WBC 10.9   HGB 12.0   HCT 37.4   MCV 91.6        Thyroid:   Lab Results   Component Value Date    TSH 4.14 10/03/2018     Lipids:   Lab Results   Component Value Date    CHOL 165 04/02/2019    HDL 52 04/02/2019    TRIG 80 04/02/2019     LFTS:   Lab Results   Component Value Date    ALT 15 04/05/2019    AST 15 04/05/2019    ALKPHOS 92 04/05/2019    PROT 7.8 04/05/2019    AGRATIO 1.0 04/05/2019    BILITOT 0.3 04/05/2019     Cardiac Enzymes:   Lab Results   Component Value Date    TROPONINI 0.16 08/12/2020    TROPONINI 0.20 08/11/2020    TROPONINI <0.01 02/28/2020     Coags:   Lab Results   Component Value Date    PROTIME 12.0 04/05/2019    INR 1.05 04/05/2019     ECG: none recent    ECHO:  8/12/2020  Summary   Left ventricular systolic function is mild to moderately reduced with   ejection fraction estimated at 40%. There is mild concentric left ventricular hypertrophy. Grade I diastolic dysfunction. Normal right ventricular size and function. Pacer / ICD wire is visualized in the right ventricle. Mild mitral regurgitation. Aortic valve appears sclerotic but opens adequately. Mild tricuspid regurgitation. Systolic pulmonary artery pressure (SPAP) is   normal and estimated at 25-30 mmHg.     Cath: 8/13/2020  Cardiac Cath LVG:  Anatomy:   LM-Normal   LAD-mid 50%  Cx-normal  OM- normal  RCA-dominant normal  RPDA- normal  LVEF- 35  LVG- Global  LVEDP- 25     Contrast: 84  Flouro Time: 4.8  Access: R radial     Impression  ~Coronary Angiography w/ nonobstructive CAD  ~LVG with LVEF of 35 and global regional wall motion abnormalities  ~high LVEDP    Problem List:   Patient Active Problem List    Diagnosis Date Noted    Coronary artery disease due to lipid rich plaque     Hypokalemia     Class 3 severe obesity with serious comorbidity and body mass index (BMI) of 45.0 to 49.9 in adult ([facility]     TYLER (obstructive sleep apnea)     Systolic heart failure, chronic (HCC)     Non-ischemic cardiomyopathy ([facility]     History of ventricular tachycardia 08/12/2020    Cardiac arrhythmia 08/11/2020    Chest pain 02/28/2020    Shortness of breath 11/29/2016    Paroxysmal atrial fibrillation ([facility] 11/29/2016    Automatic implantable cardioverter-defibrillator in situ 01/13/2015    Hyperlipidemia 12/18/2014    Morbid obesity ([facility] 12/18/2014      Assessment and Plan:  AICD Shock   - S/p AICD shock x 7    - Occurred in the setting of severe hypokalemia   - Device interrogation showed AF/FL   - On oral amiodarone  Paroxysmal Atrial Fibrillation/RVR  - Non on monitor today  - Continue Toprol 100 mg daily and amiodarone 400 mg bid x 1 week, 200 mg bid x 1 week and then 200 mg dialy   - [id number] score: 4 (age, gender, CHF, HTN) ; [id number] Vasc score and anticoagulation discussed. High risk for stroke and thromboembolism. Anticoagulation is recommended.   ~ On Eliquis 5 mg bid, no s/s of bleeding  - Afib risk factors including age, HTN, obesity, inactivity and TYLER were discussed with patient. Risk factor modification recommended   ~ TSH 4.24 (8/13/2020)     - Treatment options including cardioversion, rate control strategy, antiarrhythmics, anticoagulation and possible ablation were discussed with patient. Risks, benefits and alternative of each treatment options were explained.  All questions answered    ~ Continue amiodarone loading  NICMP/Implantable device   - S/p dual chamber AICD (2007, [facility])  Acute on Chronic

## 2020-08-15 NOTE — PROGRESS NOTES
[facility] PROGRESS NOTE    8/15/2020 1:26 PM        Name: Ani Anguiano . Admitted: 8/11/2020  Primary Care Provider: Kathryn Valenzuela MD (Tel: [phone])      Subjective:  Patient is a 77 yo female with hx non-obstructed CAD,  VT arrest s/p AICD, PAF, HTN, HLD. She presented to outside hospital with c/o multiple AICD discharges. Device interrogation showed AICD discharge x 7. Noted to have low potassium 2.5. Presently resting in bed, sister visiting. Reports she is feeling better compared to admission, breathing has improved. Still with shortness of breath getting up to [facility], still using O2. Weight trending down, I&O not complete. On IV Lasix, labs not obtained today. Denies chest pain, palpitations.      Reviewed interval ancillary notes    Current Medications  sodium chloride flush 0.9 % injection 10 mL, 2 times per day  sodium chloride flush 0.9 % injection 10 mL, PRN  acetaminophen (TYLENOL) tablet 650 mg, Q4H PRN  oxyCODONE-acetaminophen (PERCOCET) 5-325 MG per tablet 1 tablet, Q4H PRN    Or  oxyCODONE-acetaminophen (PERCOCET) 5-325 MG per tablet 2 tablet, Q4H PRN  ondansetron (ZOFRAN) injection 4 mg, Q6H PRN  valsartan (DIOVAN) tablet 160 mg, Daily  furosemide (LASIX) injection 40 mg, BID  apixaban (ELIQUIS) tablet 5 mg, BID  amiodarone (CORDARONE) tablet 400 mg, BID  metoprolol succinate (TOPROL XL) extended release tablet 100 mg, Daily  aspirin EC tablet 81 mg, Daily  atorvastatin (LIPITOR) tablet 40 mg, Nightly  spironolactone (ALDACTONE) tablet 25 mg, Daily  sodium chloride flush 0.9 % injection 10 mL, 2 times per day  sodium chloride flush 0.9 % injection 10 mL, PRN  acetaminophen (TYLENOL) suppository 650 mg, Q6H PRN  polyethylene glycol (GLYCOLAX) packet 17 g, Daily PRN  promethazine (PHENERGAN) tablet 12.5 mg, Q6H PRN  potassium chloride (KLOR-CON M) extended release tablet 40 mEq, PRN      Impression    CHF with pulmonary edema.             MPI 2/29/2020:  Summary     There is normal isotope uptake at stress and rest. There is no evidence of     myocardial ischemia or scar.          Normal LV function.          Overall findings represent a low risk scan. Echo 8/12/2020:  Summary   Left ventricular systolic function is mild to moderately reduced with   ejection fraction estimated at 40%. There is mild concentric left ventricular hypertrophy. Grade I diastolic dysfunction. Normal right ventricular size and function. Pacer / ICD wire is visualized in the right ventricle. Mild mitral regurgitation. Aortic valve appears sclerotic but opens adequately. Mild tricuspid regurgitation. Systolic pulmonary artery pressure (SPAP) is   normal and estimated at 25-30 mmHg. [facility] 8/13/2020: Anatomy:   LM-Normal   LAD-mid 50%  Cx-normal  OM- normal  RCA-dominant normal  RPDA- normal  LVEF- 35  LVG- Global  LVEDP- 25     Impression  ~Coronary Angiography w/ nonobstructive CAD  ~LVG with LVEF of 35 and global regional wall motion abnormalities  ~high LVEDP         Problem List  Principal Problem:    Cardiac arrhythmia  Active Problems:    Hyperlipidemia    Morbid obesity (HCC)    Automatic implantable cardioverter-defibrillator in situ    Paroxysmal atrial fibrillation (HCC)    Chest pain    History of ventricular tachycardia    Systolic heart failure, chronic (HCC)    Non-ischemic cardiomyopathy (HCC)    Coronary artery disease due to lipid rich plaque    Hypokalemia    Class 3 severe obesity with serious comorbidity and body mass index (BMI) of 45.0 to 49.9 in adult ([facility])    TYLER (obstructive sleep apnea)  Resolved Problems:    * No resolved hospital problems. *       Assessment & Plan:   1. Cardiac arrhythmia / Multiple AICD shocks. Potassium 2.8 on presentation. Device check with episodes VT/VF, atrial fib RVR. Has been started on short term amiodarone per EP. No recurrent arrhythmia. Plan is for ablation as out-patient. 2. PAF. Remains in sinus rhythm, on amiodarone. Continue Eliquis and beta blocker for rate control. TSH 4.24, free T4 1.0  3. Hypokalemia. Check BMP. Continue aldactone. 4. CAD / elevated troponin. [facility] with non-obstructive CAD. Per cardiology op note, no indication for asa or statin. 5. Cardiomyopathy. EF 40% by echo. LVEDP on [facility] 25 mmHg consistent with volume overload. Receiving IV Lasix BID, weight trending down. Check BMP. Continue ACE, AA, and evidence based beta blocker. Limit fluids to 2 liters, no added salt diet, daily weights, I&O.   6. HTN. Controlled. Continue lisinopril. 7. Vaginal discharge/pain. GYN consulted, swab results pending. DC planning. Needs continued diuresis. Still on O2. Diet: DIET CARDIAC; No Added Salt (3-4 GM);  Daily Fluid Restriction: 2000 ml  Code:Full Code  DVT PPX: On YEIMY Cooney - CNP   8/15/2020 1:26 PM

## 2020-08-16 LAB
ANION GAP SERPL CALCULATED.3IONS-SCNC: 10 MMOL/L (ref 3–16)
BUN BLDV-MCNC: 11 MG/DL (ref 7–20)
CALCIUM SERPL-MCNC: 9.1 MG/DL (ref 8.3–10.6)
CHLORIDE BLD-SCNC: 99 MMOL/L (ref 99–110)
CO2: 31 MMOL/L (ref 21–32)
CREAT SERPL-MCNC: 0.6 MG/DL (ref 0.6–1.2)
GFR AFRICAN AMERICAN: >60
GFR NON-AFRICAN AMERICAN: >60
GLUCOSE BLD-MCNC: 111 MG/DL (ref 70–99)
HCT VFR BLD CALC: 36.9 % (ref 36–48)
HEMOGLOBIN: 11.7 G/DL (ref 12–16)
MAGNESIUM: 1.8 MG/DL (ref 1.8–2.4)
MCH RBC QN AUTO: 29.4 PG (ref 26–34)
MCHC RBC AUTO-ENTMCNC: 31.8 G/DL (ref 31–36)
MCV RBC AUTO: 92.5 FL (ref 80–100)
PDW BLD-RTO: 20 % (ref 12.4–15.4)
PLATELET # BLD: 316 K/UL (ref 135–450)
PMV BLD AUTO: 7.9 FL (ref 5–10.5)
POTASSIUM SERPL-SCNC: 3.7 MMOL/L (ref 3.5–5.1)
RBC # BLD: 3.99 M/UL (ref 4–5.2)
SODIUM BLD-SCNC: 140 MMOL/L (ref 136–145)
WBC # BLD: 12.1 K/UL (ref 4–11)

## 2020-08-16 PROCEDURE — 2580000003 HC RX 258: Performed by: INTERNAL MEDICINE

## 2020-08-16 PROCEDURE — 6370000000 HC RX 637 (ALT 250 FOR IP): Performed by: INTERNAL MEDICINE

## 2020-08-16 PROCEDURE — 80048 BASIC METABOLIC PNL TOTAL CA: CPT

## 2020-08-16 PROCEDURE — 6370000000 HC RX 637 (ALT 250 FOR IP): Performed by: NURSE PRACTITIONER

## 2020-08-16 PROCEDURE — 83735 ASSAY OF MAGNESIUM: CPT

## 2020-08-16 PROCEDURE — 1200000000 HC SEMI PRIVATE

## 2020-08-16 PROCEDURE — 36415 COLL VENOUS BLD VENIPUNCTURE: CPT

## 2020-08-16 PROCEDURE — 6360000002 HC RX W HCPCS: Performed by: INTERNAL MEDICINE

## 2020-08-16 PROCEDURE — 94760 N-INVAS EAR/PLS OXIMETRY 1: CPT

## 2020-08-16 PROCEDURE — 99233 SBSQ HOSP IP/OBS HIGH 50: CPT | Performed by: NURSE PRACTITIONER

## 2020-08-16 PROCEDURE — 85027 COMPLETE CBC AUTOMATED: CPT

## 2020-08-16 RX ORDER — POTASSIUM CHLORIDE 20 MEQ/1
40 TABLET, EXTENDED RELEASE ORAL PRN
Status: DISCONTINUED | OUTPATIENT
Start: 2020-08-16 | End: 2020-08-18 | Stop reason: HOSPADM

## 2020-08-16 RX ORDER — POTASSIUM CHLORIDE 7.45 MG/ML
10 INJECTION INTRAVENOUS PRN
Status: DISCONTINUED | OUTPATIENT
Start: 2020-08-16 | End: 2020-08-18 | Stop reason: HOSPADM

## 2020-08-16 RX ORDER — METOLAZONE 2.5 MG/1
2.5 TABLET ORAL ONCE
Status: COMPLETED | OUTPATIENT
Start: 2020-08-16 | End: 2020-08-16

## 2020-08-16 RX ORDER — POTASSIUM CHLORIDE 20 MEQ/1
20 TABLET, EXTENDED RELEASE ORAL ONCE
Status: COMPLETED | OUTPATIENT
Start: 2020-08-16 | End: 2020-08-16

## 2020-08-16 RX ADMIN — OXYCODONE HYDROCHLORIDE AND ACETAMINOPHEN 1 TABLET: 5; 325 TABLET ORAL at 12:01

## 2020-08-16 RX ADMIN — DESMOPRESSIN ACETATE 40 MG: 0.2 TABLET ORAL at 20:52

## 2020-08-16 RX ADMIN — OXYCODONE HYDROCHLORIDE AND ACETAMINOPHEN 1 TABLET: 5; 325 TABLET ORAL at 16:28

## 2020-08-16 RX ADMIN — AMIODARONE HYDROCHLORIDE 400 MG: 200 TABLET ORAL at 20:53

## 2020-08-16 RX ADMIN — APIXABAN 5 MG: 5 TABLET, FILM COATED ORAL at 20:52

## 2020-08-16 RX ADMIN — OXYCODONE HYDROCHLORIDE AND ACETAMINOPHEN 1 TABLET: 5; 325 TABLET ORAL at 20:53

## 2020-08-16 RX ADMIN — POTASSIUM CHLORIDE 20 MEQ: 1500 TABLET, EXTENDED RELEASE ORAL at 16:13

## 2020-08-16 RX ADMIN — Medication 10 ML: at 08:09

## 2020-08-16 RX ADMIN — Medication 10 ML: at 20:53

## 2020-08-16 RX ADMIN — ZOLPIDEM TARTRATE 5 MG: 5 TABLET ORAL at 20:53

## 2020-08-16 RX ADMIN — METOPROLOL SUCCINATE 100 MG: 50 TABLET, EXTENDED RELEASE ORAL at 08:08

## 2020-08-16 RX ADMIN — SPIRONOLACTONE 25 MG: 25 TABLET ORAL at 08:08

## 2020-08-16 RX ADMIN — FUROSEMIDE 40 MG: 10 INJECTION, SOLUTION INTRAMUSCULAR; INTRAVENOUS at 08:08

## 2020-08-16 RX ADMIN — VALSARTAN 160 MG: 160 TABLET, FILM COATED ORAL at 08:08

## 2020-08-16 RX ADMIN — ASPIRIN 81 MG: 81 TABLET, COATED ORAL at 08:08

## 2020-08-16 RX ADMIN — APIXABAN 5 MG: 5 TABLET, FILM COATED ORAL at 08:08

## 2020-08-16 RX ADMIN — OXYCODONE HYDROCHLORIDE AND ACETAMINOPHEN 1 TABLET: 5; 325 TABLET ORAL at 07:10

## 2020-08-16 RX ADMIN — FUROSEMIDE 40 MG: 10 INJECTION, SOLUTION INTRAMUSCULAR; INTRAVENOUS at 17:58

## 2020-08-16 RX ADMIN — METOLAZONE 2.5 MG: 2.5 TABLET ORAL at 16:13

## 2020-08-16 RX ADMIN — AMIODARONE HYDROCHLORIDE 400 MG: 200 TABLET ORAL at 08:08

## 2020-08-16 ASSESSMENT — PAIN DESCRIPTION - PAIN TYPE
TYPE: CHRONIC PAIN
TYPE: ACUTE PAIN

## 2020-08-16 ASSESSMENT — PAIN SCALES - GENERAL
PAINLEVEL_OUTOF10: 0
PAINLEVEL_OUTOF10: 7
PAINLEVEL_OUTOF10: 8
PAINLEVEL_OUTOF10: 8
PAINLEVEL_OUTOF10: 6

## 2020-08-16 ASSESSMENT — PAIN DESCRIPTION - LOCATION
LOCATION: CHEST;BACK
LOCATION: CHEST

## 2020-08-16 ASSESSMENT — PAIN DESCRIPTION - ORIENTATION: ORIENTATION: MID

## 2020-08-16 NOTE — PLAN OF CARE
Problem: Pain:  Goal: Pain level will decrease  Description: Pain level will decrease  Outcome: Ongoing  Note: C/o continued chest pain, thinks its from AICD firing, soreness, ice didn't help, percocet prn     Problem: Cardiac Output - Decreased:  Goal: Hemodynamic stability will improve  Description: Hemodynamic stability will improve  8/16/2020 0822 by Scot Lizama RN  Outcome: Ongoing  Note: Pt A&OX4, /72   Pulse 76   Temp 98.2 °F (36.8 °C) (Oral)   Resp 16   Ht 5' 6\" (1.676 m)   Wt 285 lb 9.6 oz (129.5 kg)   SpO2 93%   BMI 46.10 kg/m²   SR on tele, lungs diminished, c/o sob still with ambulation, on RA, oxygen with sleep and prn, iv lasix given, continued I&O's, 2 L fluid restriction

## 2020-08-16 NOTE — PROGRESS NOTES
[facility] PROGRESS NOTE    8/16/2020 1:35 PM        Name: Jignesh Galeano . Admitted: 8/11/2020  Primary Care Provider: Nikki Main MD (Tel: [phone])      Subjective:  Patient is a 75 yo female with hx non-obstructed CAD,  VT arrest s/p AICD, PAF, HTN, HLD. She presented to outside hospital with c/o multiple AICD discharges. Device interrogation showed AICD discharge x 7. Noted to have low potassium 2.5. Presently resting in bed, sister visiting. Reports breathing improved compared to admission but still short of breath getting up to commode chair, + orthopnea. I&O not complete, weight today 285 lbs which is trending up but different scale. Renal numbers stable. Denies chest pain.      Reviewed interval ancillary notes    Current Medications  potassium chloride (KLOR-CON M) extended release tablet 40 mEq, PRN    Or  potassium bicarb-citric acid (EFFER-K) effervescent tablet 40 mEq, PRN    Or  potassium chloride 10 mEq/100 mL IVPB (Peripheral Line), PRN  sodium chloride flush 0.9 % injection 10 mL, 2 times per day  sodium chloride flush 0.9 % injection 10 mL, PRN  acetaminophen (TYLENOL) tablet 650 mg, Q4H PRN  oxyCODONE-acetaminophen (PERCOCET) 5-325 MG per tablet 1 tablet, Q4H PRN    Or  oxyCODONE-acetaminophen (PERCOCET) 5-325 MG per tablet 2 tablet, Q4H PRN  ondansetron (ZOFRAN) injection 4 mg, Q6H PRN  valsartan (DIOVAN) tablet 160 mg, Daily  furosemide (LASIX) injection 40 mg, BID  apixaban (ELIQUIS) tablet 5 mg, BID  amiodarone (CORDARONE) tablet 400 mg, BID  metoprolol succinate (TOPROL XL) extended release tablet 100 mg, Daily  aspirin EC tablet 81 mg, Daily  atorvastatin (LIPITOR) tablet 40 mg, Nightly  spironolactone (ALDACTONE) tablet 25 mg, Daily  sodium chloride flush 0.9 % injection 10 mL, 2 times per day  sodium chloride flush 0.9 % injection 10 mL, PRN  acetaminophen (TYLENOL) suppository 650 mg, Q6H PRN  polyethylene glycol (GLYCOLAX) packet 17 g, Daily PRN  promethazine (PHENERGAN) tablet 12.5 mg, Q6H PRN  magnesium sulfate 2 g in 50 mL IVPB premix, PRN  LORazepam (ATIVAN) injection 1 mg, Q6H PRN  morphine (PF) injection 2 mg, Q4H PRN  zolpidem (AMBIEN) tablet 5 mg, Nightly PRN        Objective:  BP (!) 147/78   Pulse 70   Temp 97.9 °F (36.6 °C)   Resp 16   Ht 5' 6\" (1.676 m)   Wt 285 lb 9.6 oz (129.5 kg)   SpO2 92%   BMI 46.10 kg/m²     Intake/Output Summary (Last 24 hours) at 8/16/2020 1335  Last data filed at 8/16/2020 1330  Gross per 24 hour   Intake 720 ml   Output 1300 ml   Net -580 ml      Wt Readings from Last 3 Encounters:   08/16/20 285 lb 9.6 oz (129.5 kg)   02/29/20 264 lb (119.7 kg)   11/06/19 259 lb 11.2 oz (117.8 kg)     General:  Awake, alert, oriented in NAD  Skin:  Warm and dry. No unusual bruising or rash  Neck:  Supple. No JVD but difficult to assess  Chest:  Normal effort. Diminished, no wheezes/rhonchi/rales  Cardiovascular:  RRR, normal S1/S2, no murmur/gallop/rub  Abdomen:  Soft, nontender, +bowel sounds  Extremities:  Trace BLE edema  Neurological: No focal deficits  Psychological: Normal mood and affect      Labs and Tests:  CBC:   Recent Labs     08/16/20  0950   WBC 12.1*   HGB 11.7*        BMP:    Recent Labs     08/14/20  0614 08/15/20  1342 08/16/20  0950    138 140   K 3.8 3.2* 3.7    99 99   CO2 29 30 31   BUN 10 11 11   CREATININE 0.8 0.7 0.6   GLUCOSE 115* 139* 111*     Hepatic: No results for input(s): AST, ALT, ALB, BILITOT, ALKPHOS in the last 72 hours. CXR 8/12/2020:  FINDINGS:    Left-sided cardiac pacer/defibrillator is unchanged in configuration.  The    heart is enlarged with mild central pulmonary venous congestion.  There are    patchy bilateral perihilar and lower lobe interstitial/alveolar opacities.     Note is made of a small left effusion.  Mediastinal contours are stable.              Impression    CHF with pulmonary edema.             MPI 2/29/2020:  Summary     There is normal isotope uptake at stress and rest. There is no evidence of     myocardial ischemia or scar.          Normal LV function.          Overall findings represent a low risk scan. Echo 8/12/2020:  Summary   Left ventricular systolic function is mild to moderately reduced with   ejection fraction estimated at 40%. There is mild concentric left ventricular hypertrophy. Grade I diastolic dysfunction. Normal right ventricular size and function. Pacer / ICD wire is visualized in the right ventricle. Mild mitral regurgitation. Aortic valve appears sclerotic but opens adequately. Mild tricuspid regurgitation. Systolic pulmonary artery pressure (SPAP) is   normal and estimated at 25-30 mmHg. [facility] 8/13/2020: Anatomy:   LM-Normal   LAD-mid 50%  Cx-normal  OM- normal  RCA-dominant normal  RPDA- normal  LVEF- 35  LVG- Global  LVEDP- 25     Impression  ~Coronary Angiography w/ nonobstructive CAD  ~LVG with LVEF of 35 and global regional wall motion abnormalities  ~high LVEDP         Problem List  Principal Problem:    Cardiac arrhythmia  Active Problems:    Hyperlipidemia    Morbid obesity (HCC)    Automatic implantable cardioverter-defibrillator in situ    Paroxysmal atrial fibrillation (HCC)    Chest pain    History of ventricular tachycardia    Systolic heart failure, chronic (HCC)    Non-ischemic cardiomyopathy (HCC)    Coronary artery disease due to lipid rich plaque    Hypokalemia    Class 3 severe obesity with serious comorbidity and body mass index (BMI) of 45.0 to 49.9 in adult (HCC)    TYLER (obstructive sleep apnea)  Resolved Problems:    * No resolved hospital problems. *       Assessment & Plan:   1. Cardiac arrhythmia / Multiple AICD shocks. Potassium 2.8 on presentation. Device check with episodes VT/VF, atrial fib RVR. Has been started on short term amiodarone per EP. No recurrent arrhythmia.  Plan is for ablation as out-patient. 2. PAF. Remains in sinus rhythm, on amiodarone. Continue Eliquis and beta blocker for rate control. TSH 4.24, free T4 1.0  3. Hypokalemia. Replaced. Potassium 3.7 today. Will give additional 20 mEq. Continue aldactone. BMP in am.  4. CAD / elevated troponin. [facility] with non-obstructive CAD. Per cardiology op note, no indication for asa or statin. 5. Cardiomyopathy. EF 40% by echo. LVEDP on [facility] 25 mmHg consistent with volume overload. Receiving IV Lasix BID, weight up today. Add one dose metolazone prior to next Lasix dose. Continue ACE, AA, and evidence based beta blocker. Continue fluid and sodium restrictions, daily weights, I&O.   6. HTN. Controlled. Continue lisinopril. 7. Vaginal discharge/pain. GYN consulted, swab results pending. Patient will need f/u pap as outpatient as she had abnormal pap 4 years ago and no follow up. DC planning. Needs continued diuresis. Diet: DIET CARDIAC; No Added Salt (3-4 GM);  Daily Fluid Restriction: 2000 ml  Code:Full Code  DVT PPX: On Lakshmibrigettecharlene      Abhijeet Odell, YEIMY - CNP   8/16/2020 1:35 PM

## 2020-08-16 NOTE — PROGRESS NOTES
[address]   Cardiology Progress Note   Date: 8/16/2020  Admit Date: 8/11/2020     Reason for follow up: ICD shock/CHF    Chief Complaint: ICD fired    History of Present Illness: History obtained from patient and medical record. Kylie Gregory is a 76 y.o. female with a past medical history of HTN, HLD, CMP s/p AICD (2007, [facility] [place], and CHF who presented with CP and several ICD shocks. She was noted to have elevated troponin 3.5 and s/p [facility] 813/2020 that showed nonobstructive CAD. Potassium was 2.5 on admission. Interval Hx: Today, she is being seen for follow up. Hypokalemic yesterday, renal fx stable. No BMP this am.  She continues to feel SOB, mild BLE edema remains. O2 overnight, now RA. No new complaints today. No major events overnight. Denies having chest pain, palpitations or dizziness. Patient seen and examined. Clinical notes reviewed. Telemetry reviewed. Allergies:  No Known Allergies    Home Meds:  Prior to Visit Medications    Medication Sig Taking? Authorizing Provider   amiodarone (PACERONE) 400 MG tablet Take 1 tablet by mouth 2 times daily for 7 days, THEN 0.5 tablets 2 times daily for 7 days, THEN 0.5 tablets daily.  Yes YEIMY Poole CNP   metoprolol succinate (TOPROL XL) 100 MG extended release tablet Take 1 tablet by mouth daily Yes YEIMY Poole CNP   valsartan (DIOVAN) 160 MG tablet Take 1 tablet by mouth daily Yes YEIMY Poole CNP   aspirin 81 MG EC tablet Take 81 mg by mouth daily Yes Historical Provider, MD   lisinopril (PRINIVIL;ZESTRIL) 2.5 MG tablet Take 1 tablet by mouth daily Yes YEIMY Napier CNP   spironolactone (ALDACTONE) 25 MG tablet Take 1 tablet by mouth daily Yes YEIMY Napier CNP   atorvastatin (LIPITOR) 40 MG tablet Take 1 tablet by mouth daily Yes YEIMY Napier CNP   furosemide (LASIX) 20 MG tablet Take 1 tablet by mouth every other day Yes YEIMY Napier CNP   apixaban ringing in the ears, sore throat, dysphagia, or swollen lymph nodes  · Respiratory: Reviewed in HPI  · Cardiovascular: Reviewed in HPI  · Gastrointestinal: Negative for abdominal pain, N/V/D, constipation, or black/tarry stools  · Genito-Urinary: Negative for dysuria, incontinence, urgency, or hematuria  · Musculoskeletal: Negative for joint swelling, muscle pain, or injuries  · Neurological/Psych: Negative for confusion, seizures, headaches, balance issues or TIA-like symptoms. No anxiety, depression, or insomnia    Physical Examination:  Vitals:    08/16/20 0815   BP:    Pulse:    Resp:    Temp:    SpO2: 93%      In: -   Out: 500    Wt Readings from Last 3 Encounters:   08/16/20 285 lb 9.6 oz (129.5 kg)   02/29/20 264 lb (119.7 kg)   11/06/19 259 lb 11.2 oz (117.8 kg)       Intake/Output Summary (Last 24 hours) at 8/16/2020 0850  Last data filed at 8/16/2020 3690  Gross per 24 hour   Intake --   Output 1200 ml   Net -1200 ml     Telemetry: Personally Reviewed Normal sinus rhythm  · Constitutional: Cooperative and in no apparent distress, and appears well nourished +obesity   · Skin: Warm and pink; no pallor, cyanosis, bruising, or clubbing  · HEENT: Symmetric and normocephalic. PERRL, EOM intact. Conjunctiva pink with clear sclera. Mucus membranes pink and moist.   · Cardiovascular: regular and rhythm. S1 & S2, no murmurs, rubs, or gallops. Peripheral pulses 2+, capillary refill < 3 seconds. Negative elevation of JVP. Trace Peripheral edema  · Respiratory: Respirations symmetric and unlabored. Lungs clear to auscultation bilaterally, no wheezing, crackles, or rhonchi  · Gastrointestinal: Abdomen soft and round. Bowel sounds normoactive in all quadrants without tenderness or masses. · Musculoskeletal: Bilateral upper and lower extremity strength 5/5 with full ROM  · Neurologic/Psych: Awake and orientated to person, place and time.  Calm affect, appropriate mood    Pertinent labs, diagnostic, device, and imaging results reviewed as a part of this visit    Labs:    BMP:   Recent Labs     08/14/20  0614 08/15/20  1342    138   K 3.8 3.2*    99   CO2 29 30   BUN 10 11   CREATININE 0.8 0.7     Estimated Creatinine Clearance: 109 mL/min (based on SCr of 0.7 mg/dL). CBC:   No results for input(s): WBC, HGB, HCT, MCV, PLT in the last 72 hours. Thyroid:   Lab Results   Component Value Date    TSH 4.14 10/03/2018     Lipids:   Lab Results   Component Value Date    CHOL 165 04/02/2019    HDL 52 04/02/2019    TRIG 80 04/02/2019     LFTS:   Lab Results   Component Value Date    ALT 15 04/05/2019    AST 15 04/05/2019    ALKPHOS 92 04/05/2019    PROT 7.8 04/05/2019    AGRATIO 1.0 04/05/2019    BILITOT 0.3 04/05/2019     Cardiac Enzymes:   Lab Results   Component Value Date    TROPONINI 0.16 08/12/2020    TROPONINI 0.20 08/11/2020    TROPONINI <0.01 02/28/2020     Coags:   Lab Results   Component Value Date    PROTIME 12.0 04/05/2019    INR 1.05 04/05/2019     ECG: none recent    ECHO:  8/12/2020  Summary   Left ventricular systolic function is mild to moderately reduced with   ejection fraction estimated at 40%. There is mild concentric left ventricular hypertrophy. Grade I diastolic dysfunction. Normal right ventricular size and function. Pacer / ICD wire is visualized in the right ventricle. Mild mitral regurgitation. Aortic valve appears sclerotic but opens adequately. Mild tricuspid regurgitation. Systolic pulmonary artery pressure (SPAP) is   normal and estimated at 25-30 mmHg.     Cath: 8/13/2020  Cardiac Cath LVG:  Anatomy:   LM-Normal   LAD-mid 50%  Cx-normal  OM- normal  RCA-dominant normal  RPDA- normal  LVEF- 35  LVG- Global  LVEDP- 25     Contrast: 84  Flouro Time: 4.8  Access: R radial     Impression  ~Coronary Angiography w/ nonobstructive CAD  ~LVG with LVEF of 35 and global regional wall motion abnormalities  ~high LVEDP    Problem List:   Patient Active Problem List    Diagnosis Date Noted    Coronary artery disease due to lipid rich plaque     Hypokalemia     Class 3 severe obesity with serious comorbidity and body mass index (BMI) of 45.0 to 49.9 in adult ([facility]     TYLER (obstructive sleep apnea)     Systolic heart failure, chronic (HCC)     [facility]     History of ventricular tachycardia 08/12/2020    Cardiac arrhythmia 08/11/2020    Chest pain 02/28/2020    Shortness of breath 11/29/2016    Paroxysmal atrial fibrillation (HCC) 11/29/2016    Automatic implantable cardioverter-defibrillator in situ 01/13/2015    Hyperlipidemia 12/18/2014    Morbid obesity ([facility] 12/18/2014      Assessment and Plan:  AICD Shock   - S/p AICD shock x 7    - Occurred in the setting of severe hypokalemia   - Device interrogation showed AF/FL   - On oral amiodarone  Paroxysmal Atrial Fibrillation/RVR  - None on monitor today, remains in SR 80's  - Continue Toprol 100 mg daily and amiodarone 400 mg bid x 1 week, 200 mg bid x 1 week and then 200 mg dialy   - [id number] score: 4 (age, gender, CHF, HTN) ; [id number] Vasc score and anticoagulation discussed. High risk for stroke and thromboembolism. Anticoagulation is recommended.   ~ On Eliquis 5 mg bid, no s/s of bleeding  - Afib risk factors including age, HTN, obesity, inactivity and TYLER were discussed with patient. Risk factor modification recommended   ~ TSH 4.24 (8/13/2020)     - Treatment options including cardioversion, rate control strategy, antiarrhythmics, anticoagulation and possible ablation were discussed with patient. Risks, benefits and alternative of each treatment options were explained.  All questions answered    ~ Continue amiodarone loading  NICMP/Implantable device   - S/p dual chamber AICD (2007, [facility])  Acute on Chronic systolic and diastolic heart failure (NYHA Class III)  - Continues with SOB, trace BLE edema, O2 overnight, now on RA               ~ EF 40% per echo   - Continue BB, ARB, and aldactone   - Continue IV lasix for another day, trend renal fx/BUN    - Continue daily wt, strict I&O and fluid restriction  HTN-goal <130/80   - Controlled  - Discussed lifestyle modifications, weight loss, low sodium diet  Hypokalemia   - Monitor and replace   - S/s potassium replacement, recheck 1700  TYLER?   - Consider OP sleep evaluation  Obesity: Body mass index is 46.1 kg/m². - Excessive weight is complicating assessment treatment. It is placing patient at risk for multiple co-morbidities as well as early death contributing to the patient's presentations. - Discussed weight loss and lifestyle modifications.       - BMP and mg today, replace K+ as needed  - Continue IV lasix one more day    All pertinent information and plan of care discussed with the rounding/attending cardiologist.    Multiple medical conditions with risk of decompensation. All questions and concerns were addressed to the patient. Alternatives to my treatment were discussed. I have discussed the above stated plan with patient and the nurse. The patient verbalized understanding and agreed with the plan. Thank you for allowing to us to participate in the care of [place]. Annetta Stewart, YEIMY-CNP  [address]   Office: [phone]    I have spent 35 minutes of face to face time with the patient with more than 50% spent counseling and coordinating care for [place].

## 2020-08-16 NOTE — PROGRESS NOTES
Assessment and med pass complete. Gave sleep aid per request. Meds taken whole with fresh ice water. Refusing alarm, commode next to bed. Denies other needs, call light in reach.

## 2020-08-17 PROCEDURE — 94760 N-INVAS EAR/PLS OXIMETRY 1: CPT

## 2020-08-17 PROCEDURE — 6370000000 HC RX 637 (ALT 250 FOR IP): Performed by: INTERNAL MEDICINE

## 2020-08-17 PROCEDURE — 2580000003 HC RX 258: Performed by: INTERNAL MEDICINE

## 2020-08-17 PROCEDURE — 80048 BASIC METABOLIC PNL TOTAL CA: CPT

## 2020-08-17 PROCEDURE — 6360000002 HC RX W HCPCS: Performed by: INTERNAL MEDICINE

## 2020-08-17 PROCEDURE — 6370000000 HC RX 637 (ALT 250 FOR IP): Performed by: NURSE PRACTITIONER

## 2020-08-17 PROCEDURE — 1200000000 HC SEMI PRIVATE

## 2020-08-17 PROCEDURE — 99233 SBSQ HOSP IP/OBS HIGH 50: CPT | Performed by: NURSE PRACTITIONER

## 2020-08-17 RX ADMIN — FUROSEMIDE 40 MG: 10 INJECTION, SOLUTION INTRAMUSCULAR; INTRAVENOUS at 18:00

## 2020-08-17 RX ADMIN — Medication 10 ML: at 08:18

## 2020-08-17 RX ADMIN — FUROSEMIDE 40 MG: 10 INJECTION, SOLUTION INTRAMUSCULAR; INTRAVENOUS at 08:17

## 2020-08-17 RX ADMIN — AMIODARONE HYDROCHLORIDE 400 MG: 200 TABLET ORAL at 08:17

## 2020-08-17 RX ADMIN — OXYCODONE HYDROCHLORIDE AND ACETAMINOPHEN 2 TABLET: 5; 325 TABLET ORAL at 08:21

## 2020-08-17 RX ADMIN — VALSARTAN 160 MG: 160 TABLET, FILM COATED ORAL at 08:17

## 2020-08-17 RX ADMIN — APIXABAN 5 MG: 5 TABLET, FILM COATED ORAL at 21:02

## 2020-08-17 RX ADMIN — SPIRONOLACTONE 25 MG: 25 TABLET ORAL at 08:17

## 2020-08-17 RX ADMIN — DESMOPRESSIN ACETATE 40 MG: 0.2 TABLET ORAL at 21:01

## 2020-08-17 RX ADMIN — ASPIRIN 81 MG: 81 TABLET, COATED ORAL at 08:17

## 2020-08-17 RX ADMIN — OXYCODONE HYDROCHLORIDE AND ACETAMINOPHEN 1 TABLET: 5; 325 TABLET ORAL at 16:00

## 2020-08-17 RX ADMIN — OXYCODONE HYDROCHLORIDE AND ACETAMINOPHEN 1 TABLET: 5; 325 TABLET ORAL at 21:02

## 2020-08-17 RX ADMIN — AMIODARONE HYDROCHLORIDE 400 MG: 200 TABLET ORAL at 21:01

## 2020-08-17 RX ADMIN — Medication 10 ML: at 21:02

## 2020-08-17 RX ADMIN — METOPROLOL SUCCINATE 100 MG: 50 TABLET, EXTENDED RELEASE ORAL at 08:17

## 2020-08-17 RX ADMIN — APIXABAN 5 MG: 5 TABLET, FILM COATED ORAL at 08:17

## 2020-08-17 RX ADMIN — ZOLPIDEM TARTRATE 5 MG: 5 TABLET ORAL at 21:02

## 2020-08-17 RX ADMIN — OXYCODONE HYDROCHLORIDE AND ACETAMINOPHEN 2 TABLET: 5; 325 TABLET ORAL at 04:02

## 2020-08-17 ASSESSMENT — PAIN SCALES - GENERAL
PAINLEVEL_OUTOF10: 6
PAINLEVEL_OUTOF10: 0
PAINLEVEL_OUTOF10: 6
PAINLEVEL_OUTOF10: 0
PAINLEVEL_OUTOF10: 4
PAINLEVEL_OUTOF10: 0
PAINLEVEL_OUTOF10: 5
PAINLEVEL_OUTOF10: 6
PAINLEVEL_OUTOF10: 7
PAINLEVEL_OUTOF10: 5

## 2020-08-17 ASSESSMENT — PAIN DESCRIPTION - PAIN TYPE
TYPE: CHRONIC PAIN
TYPE: ACUTE PAIN
TYPE: CHRONIC PAIN

## 2020-08-17 ASSESSMENT — PAIN DESCRIPTION - LOCATION
LOCATION: CHEST

## 2020-08-17 ASSESSMENT — PAIN DESCRIPTION - ORIENTATION
ORIENTATION: MID
ORIENTATION: MID

## 2020-08-17 NOTE — PROGRESS NOTES
Hospitalist Progress Note      PCP: Robert Bennett MD    Date of Admission: 8/11/2020    Chief Complaint: multiple AICD discharge    Hospital Course: Patient is a 75 yo female with hx non-obstructed CAD,  VT arrest s/p AICD, PAF, HTN, HLD. She presented to outside hospital with c/o multiple AICD discharges. Device interrogation showed AICD discharge x 7. Noted to have low potassium 2.5.        Subjective: no acute events overnight. Remains with exertional dyspnea       Medications:  Reviewed    Infusion Medications   Scheduled Medications    sodium chloride flush  10 mL Intravenous 2 times per day    valsartan  160 mg Oral Daily    furosemide  40 mg Intravenous BID    apixaban  5 mg Oral BID    amiodarone  400 mg Oral BID    metoprolol succinate  100 mg Oral Daily    aspirin  81 mg Oral Daily    atorvastatin  40 mg Oral Nightly    spironolactone  25 mg Oral Daily    sodium chloride flush  10 mL Intravenous 2 times per day     PRN Meds: potassium chloride **OR** potassium alternative oral replacement **OR** potassium chloride, sodium chloride flush, acetaminophen, oxyCODONE-acetaminophen **OR** oxyCODONE-acetaminophen, ondansetron, sodium chloride flush, [DISCONTINUED] acetaminophen **OR** acetaminophen, polyethylene glycol, promethazine **OR** [DISCONTINUED] ondansetron, magnesium sulfate, LORazepam, morphine, zolpidem      Intake/Output Summary (Last 24 hours) at 8/17/2020 1109  Last data filed at 8/17/2020 0400  Gross per 24 hour   Intake 960 ml   Output 2850 ml   Net -1890 ml       Physical Exam Performed:    /76   Pulse 66   Temp 98.3 °F (36.8 °C) (Oral)   Resp 16   Ht 5' 6\" (1.676 m)   Wt 285 lb 7.9 oz (129.5 kg)   SpO2 94%   BMI 46.08 kg/m²     General appearance: No apparent distress, appears stated age and cooperative. HEENT: Pupils equal, round, and reactive to light. Conjunctivae/corneas clear. Neck: Supple, with full range of motion. No jugular venous distention. [I42.8]    History of ventricular tachycardia [Z86.79]    Cardiac arrhythmia [I49.9]    Chest pain [R07.9]    Paroxysmal atrial fibrillation (HCC) [I48.0]    Automatic implantable cardioverter-defibrillator in situ [Z95.810]    Hyperlipidemia [E78.5]    Morbid obesity ([facility] [address] [E66.01]     1. Cardiac arrhythmia / Multiple AICD shocks. Potassium 2.8 on presentation. Device check with episodes VT/VF, atrial fib RVR. Has been started on short term amiodarone per EP. No recurrent arrhythmia. Plan is for ablation as out-patient. 2. PAF. Remains in sinus rhythm, on amiodarone. Continue Eliquis and beta blocker for rate control. TSH 4.24, free T4 1.0  3. Hypokalemia. Replace as needed    4. CAD / elevated troponin. [facility] with non-obstructive CAD. Per cardiology op note, no indication for asa or statin. 5. Cardiomyopathy. EF 40% by echo. LVEDP on [facility] 25 mmHg consistent with volume overload. diuretics Continue ACE, AA, and evidence based beta blocker. Continue fluid and sodium restrictions, daily weights, I&O.   6. HTN. Controlled. Continue lisinopril. 7. Vaginal discharge/pain. GYN consulted, swab results pending. Patient will need f/u pap as outpatient as she had abnormal pap 4 years ago and no follow up. DVT Prophylaxis: NOAC  Diet: DIET CARDIAC; No Added Salt (3-4 GM);  Daily Fluid Restriction: 2000 ml  Code Status: Full Code    PT/OT Eval Status: NA    Dispo - 1-2 days    YEIMY Garcia - HANS denies pain/discomfort

## 2020-08-17 NOTE — PLAN OF CARE
Problem: Pain:  Goal: Pain level will decrease  Description: Pain level will decrease  Outcome: Ongoing  Note: Pain medication given per MAR. Pain reassessed within 1 hour. Pt satisfied. Will continue to monitor. Problem: Falls - Risk of:  Goal: Will remain free from falls  Description: Will remain free from falls  Outcome: Ongoing  Note: Fall precautions in place, bed alarm on, nonskid foot wear applied, bed in lowest position, and call light within reach. Will continue to monitor. Problem: Cardiac Output - Decreased:  Goal: Hemodynamic stability will improve  Description: Hemodynamic stability will improve  Outcome: Ongoing  Note: VSS. Pt on tele monitoring rate and rhythm. IV Lasix BID. Fluid and sodium restriction diet. Intake and output being recorded, daily weight being done. Cardiology following. Will continue to monitor.

## 2020-08-17 NOTE — PLAN OF CARE
Problem: Pain:  Goal: Control of chronic pain  Description: Control of chronic pain  Outcome: Ongoing  Note: Pt assessed for pain. Pt educated on pain rating scale. Pt c/o 6/10 pain. See MAR for pain interventions. Pt c/o 5/10 pain upon reassessment. Pt verbalized satisfaction and denies further pain intervention at this time. Pt educated to call RN if pain gets worse. Pt verbalized understanding. Stimuli reduced. Rest promoted. Call light within reach. Will continue to monitor. Problem: Falls - Risk of:  Goal: Will remain free from falls  Description: Will remain free from falls  8/17/2020 1308 by Tucker Franklin RN  Outcome: Ongoing  Note: Pt assessed for fall. Pt is a high fall risk. Fall precautions in place. Pt refuses bed alarm. Bed locked in lowest position and 2/4 rails up. Nonskid socks on. Call light and personal belongings within reach. Instructed pt to call out for needs. Pt verbalized understanding. Will continue to monitor.

## 2020-08-17 NOTE — PROGRESS NOTES
appropriate mood    Pertinent labs, diagnostic, device, and imaging results reviewed as a part of this visit    Labs:    BMP:   Recent Labs     08/15/20  1342 08/16/20  0950    140   K 3.2* 3.7   CL 99 99   CO2 30 31   BUN 11 11   CREATININE 0.7 0.6   MG  --  1.80     Estimated Creatinine Clearance: 127 mL/min (based on SCr of 0.6 mg/dL). CBC:   Recent Labs     08/16/20  0950   WBC 12.1*   HGB 11.7*   HCT 36.9   MCV 92.5        Thyroid:   Lab Results   Component Value Date    TSH 4.14 10/03/2018     Lipids:   Lab Results   Component Value Date    CHOL 165 04/02/2019    HDL 52 04/02/2019    TRIG 80 04/02/2019     LFTS:   Lab Results   Component Value Date    ALT 15 04/05/2019    AST 15 04/05/2019    ALKPHOS 92 04/05/2019    PROT 7.8 04/05/2019    AGRATIO 1.0 04/05/2019    BILITOT 0.3 04/05/2019     Cardiac Enzymes:   Lab Results   Component Value Date    TROPONINI 0.16 08/12/2020    TROPONINI 0.20 08/11/2020    TROPONINI <0.01 02/28/2020     Coags:   Lab Results   Component Value Date    PROTIME 12.0 04/05/2019    INR 1.05 04/05/2019     ECG: none recent    ECHO:  8/12/2020  Summary   Left ventricular systolic function is mild to moderately reduced with   ejection fraction estimated at 40%. There is mild concentric left ventricular hypertrophy. Grade I diastolic dysfunction. Normal right ventricular size and function. Pacer / ICD wire is visualized in the right ventricle. Mild mitral regurgitation. Aortic valve appears sclerotic but opens adequately. Mild tricuspid regurgitation. Systolic pulmonary artery pressure (SPAP) is   normal and estimated at 25-30 mmHg.     Cath: 8/13/2020  Cardiac Cath LVG:  Anatomy:   LM-Normal   LAD-mid 50%  Cx-normal  OM- normal  RCA-dominant normal  RPDA- normal  LVEF- 35  LVG- Global  LVEDP- 25     Contrast: 84  Flouro Time: 4.8  Access: R radial     Impression  ~Coronary Angiography w/ nonobstructive CAD  ~LVG with LVEF of 35 and global regional wall motion abnormalities  ~high LVEDP    Problem List:   Patient Active Problem List    Diagnosis Date Noted    Coronary artery disease due to lipid rich plaque     Hypokalemia     Class 3 severe obesity with serious comorbidity and body mass index (BMI) of 45.0 to 49.9 in adult ([facility]     TYLER (obstructive sleep apnea)     Systolic heart failure, chronic ([facility]     Non-ischemic cardiomyopathy ([facility]     History of ventricular tachycardia 08/12/2020    Cardiac arrhythmia 08/11/2020    Chest pain 02/28/2020    Shortness of breath 11/29/2016    Paroxysmal atrial fibrillation ([facility] 11/29/2016    Automatic implantable cardioverter-defibrillator in situ 01/13/2015    Hyperlipidemia 12/18/2014    Morbid obesity ([facility] 12/18/2014      Assessment and Plan:  AICD Shock   - S/p AICD shock x 7    - Occurred in the setting of severe hypokalemia   - Device interrogation showed AF/FL   - On oral amiodarone  Paroxysmal Atrial Fibrillation/RVR  - SR on telemetry today   - Continue Toprol 100 mg daily and amiodarone 400 mg bid x 1 week, 200 mg bid x 1 week and then 200 mg dialy   - [id number] score: 4 (age, gender, CHF, HTN) ; [id number] Vasc score and anticoagulation discussed. High risk for stroke and thromboembolism. Anticoagulation is recommended.   ~ On Eliquis 5 mg bid, no s/s of bleeding  - Afib risk factors including age, HTN, obesity, inactivity and TYLER were discussed with patient. Risk factor modification recommended   ~ TSH 4.24 (8/13/2020)     - Treatment options including cardioversion, rate control strategy, antiarrhythmics, anticoagulation and possible ablation were discussed with patient. Risks, benefits and alternative of each treatment options were explained.  All questions answered    ~ Continue amiodarone loading    ~ OP ablation scheduled for 10/8/2020  NICMP/Implantable device   - S/p dual chamber AICD (2007, GSH)  Acute on Chronic systolic and diastolic heart failure (NYHA Class III)               ~ EF 40% per echo   - Continue BB, ARB, and aldactone   - On IV lasix    - Continue daily wt, strict I&O and fluid restriction   - HF following  HTN-goal <130/80   - Controlled  - Discussed lifestyle modifications, weight loss, low sodium diet  Hypokalemia   - Monitor and replace  TYLER?   - Consider OP sleep evaluation  Obesity: Body mass index is 46.08 kg/m². - Excessive weight is complicating assessment treatment. It is placing patient at risk for multiple co-morbidities as well as early death contributing to the patient's presentations. - Discussed weight loss and lifestyle modifications. - Discharge on amiodarone 200 mg bid x 7 days and then 200 mg daily   - OP ablation scheduled 10/8    All pertinent information and plan of care discussed with the rounding/attending cardiologist.    Multiple medical conditions with risk of decompensation. All questions and concerns were addressed to the patient. Alternatives to my treatment were discussed. I have discussed the above stated plan with patient and the nurse. The patient verbalized understanding and agreed with the plan. Thank you for allowing to us to participate in the care of Linda Rocha. YEIMY Sanchez-CNP  [address]   Office: [phone]    I have spent 35 minutes of face to face time with the patient with more than 50% spent counseling and coordinating care for Linda Rocha.

## 2020-08-17 NOTE — PROGRESS NOTES
[address]  HEART FAILURE  Progress Note      Admit Date 8/11/2020     Reason for Consult:      Reason for Consultation/Chief Complaint: CHF    HPI:    Dee Leyva is a 76 y.o. female with PMH CAD, VT arrest, AICD, PAF, HTN, HLD admitted after ICD dischargex7 and hypokalemia 2.5. Amio and Toprol started, pt has diuresed 5L      Subjective:  Patient is being seen for CHF. There were no acute overnight cardiac events. Today Ms. Crowell Dinshanna denies chest pain or palpitations, c/o continued DIXON but cannot say if it is at baseline      Baseline Weight: 264   Wt Readings from Last 3 Encounters:   08/17/20 285 lb 7.9 oz (129.5 kg)   02/29/20 264 lb (119.7 kg)   11/06/19 259 lb 11.2 oz (117.8 kg)          Objective:   /82   Pulse 66   Temp 97.9 °F (36.6 °C) (Oral)   Resp 18   Ht 5' 6\" (1.676 m)   Wt 285 lb 7.9 oz (129.5 kg)   SpO2 91%   BMI 46.08 kg/m²       Intake/Output Summary (Last 24 hours) at 8/17/2020 3066  Last data filed at 8/17/2020 0400  Gross per 24 hour   Intake 960 ml   Output 2850 ml   Net -1890 ml      Wt Readings from Last 3 Encounters:   08/17/20 285 lb 7.9 oz (129.5 kg)   02/29/20 264 lb (119.7 kg)   11/06/19 259 lb 11.2 oz (117.8 kg)      In: 720 [P.O.:720]  Out: 2050       Physical Exam:  General Appearance:  obese/Well Nourished  Respiratory:  · Resp Auscultation: Normal breath sounds without dullness  Cardiovascular:  · Auscultation: Regular rate and rhythm, normal S1S2, no m/g/r/c  · Palpation: Normal    · JVD: obese  · Pedal Pulses: 2+ and equal   Abdomen:  · Soft, NT, ND, + bs  Extremities:  · No Cyanosis or Clubbing  · Extremities: negative  Neurological/Psychiatric:  · Oriented to time, place, and person  · Non-anxious    MEDICATIONS:   Scheduled Meds:   Scheduled Meds:   sodium chloride flush  10 mL Intravenous 2 times per day    valsartan  160 mg Oral Daily    furosemide  40 mg Intravenous BID    apixaban  5 mg Oral BID    amiodarone  400 mg Oral BID    metoprolol Weight: 285 lb 7.9 oz (129.5 kg)   2. I/O     Intake/Output Summary (Last 24 hours) at 8/17/2020 7509  Last data filed at 8/17/2020 0400  Gross per 24 hour   Intake 960 ml   Output 2850 ml   Net -1890 ml       Cardiac Testing:   MPI 2/29/2020:  Summary     There is normal isotope uptake at stress and rest. There is no evidence of     myocardial ischemia or scar.          Normal LV function.          Overall findings represent a low risk scan.          Echo 8/12/2020:  Summary   Left ventricular systolic function is mild to moderately reduced with   ejection fraction estimated at 40%.   There is mild concentric left ventricular hypertrophy.   Grade I diastolic dysfunction.   Normal right ventricular size and function.   Pacer / ICD wire is visualized in the right ventricle.   Mild mitral regurgitation.   Aortic valve appears sclerotic but opens adequately.   Mild tricuspid regurgitation. Systolic pulmonary artery pressure (SPAP) is   normal and estimated at 25-30 mmHg.     [facility] 8/13/2020: Anatomy:   LM-Normal   LAD-mid 50%  Cx-normal  OM- normal  RCA-dominant normal  RPDA- normal  LVEF- 35  LVG- Global  LVEDP- 25     Impression  ~Coronary Angiography w/ nonobstructive CAD  ~LVG with LVEF of 35 and global regional wall motion abnormalities  ~high LVEDP    Device: ICD 2007    Assessment/Plan:     1. AHF- 5L out with IV lasix, no labs today, will change to po tomorrow, recheck labs and plan for d/c soon  2. CAD- [facility] with nonostructive CAD, continue ASA, statin and BB  3. ICM- continue BB, ARB, dejon  4.  HTN- controlled        I appreciate the opportunity of cooperating in the care of this individual.    BERTA Parnell, [address] 8/17/2020, 9:07 AM  Heart Failure  The [address], [address]  Ph: [phone]      Core Measures:   · Discharge instructions:   · LVEF documented:   · ACEI for LV dysfunction:   · Smoking Cessation:

## 2020-08-18 VITALS
BODY MASS INDEX: 45.49 KG/M2 | TEMPERATURE: 97.6 F | OXYGEN SATURATION: 94 % | HEIGHT: 66 IN | RESPIRATION RATE: 18 BRPM | DIASTOLIC BLOOD PRESSURE: 70 MMHG | WEIGHT: 283.07 LBS | HEART RATE: 62 BPM | SYSTOLIC BLOOD PRESSURE: 134 MMHG

## 2020-08-18 LAB
ANION GAP SERPL CALCULATED.3IONS-SCNC: 11 MMOL/L (ref 3–16)
ANION GAP SERPL CALCULATED.3IONS-SCNC: 14 MMOL/L (ref 3–16)
BUN BLDV-MCNC: 20 MG/DL (ref 7–20)
BUN BLDV-MCNC: 22 MG/DL (ref 7–20)
CALCIUM SERPL-MCNC: 9.4 MG/DL (ref 8.3–10.6)
CALCIUM SERPL-MCNC: 9.5 MG/DL (ref 8.3–10.6)
CHLORIDE BLD-SCNC: 93 MMOL/L (ref 99–110)
CHLORIDE BLD-SCNC: 94 MMOL/L (ref 99–110)
CO2: 30 MMOL/L (ref 21–32)
CO2: 32 MMOL/L (ref 21–32)
CREAT SERPL-MCNC: 1 MG/DL (ref 0.6–1.2)
CREAT SERPL-MCNC: 1.2 MG/DL (ref 0.6–1.2)
GFR AFRICAN AMERICAN: 54
GFR AFRICAN AMERICAN: >60
GFR NON-AFRICAN AMERICAN: 45
GFR NON-AFRICAN AMERICAN: 55
GLUCOSE BLD-MCNC: 120 MG/DL (ref 70–99)
GLUCOSE BLD-MCNC: 120 MG/DL (ref 70–99)
POTASSIUM SERPL-SCNC: 3.4 MMOL/L (ref 3.5–5.1)
POTASSIUM SERPL-SCNC: 3.5 MMOL/L (ref 3.5–5.1)
PRO-BNP: 15 PG/ML (ref 0–124)
SODIUM BLD-SCNC: 137 MMOL/L (ref 136–145)
SODIUM BLD-SCNC: 137 MMOL/L (ref 136–145)

## 2020-08-18 PROCEDURE — 6370000000 HC RX 637 (ALT 250 FOR IP): Performed by: INTERNAL MEDICINE

## 2020-08-18 PROCEDURE — 6370000000 HC RX 637 (ALT 250 FOR IP): Performed by: NURSE PRACTITIONER

## 2020-08-18 PROCEDURE — 83880 ASSAY OF NATRIURETIC PEPTIDE: CPT

## 2020-08-18 PROCEDURE — 94680 O2 UPTK RST&XERS DIR SIMPLE: CPT

## 2020-08-18 PROCEDURE — 99232 SBSQ HOSP IP/OBS MODERATE 35: CPT | Performed by: NURSE PRACTITIONER

## 2020-08-18 PROCEDURE — 80048 BASIC METABOLIC PNL TOTAL CA: CPT

## 2020-08-18 PROCEDURE — 94761 N-INVAS EAR/PLS OXIMETRY MLT: CPT

## 2020-08-18 PROCEDURE — 99233 SBSQ HOSP IP/OBS HIGH 50: CPT | Performed by: NURSE PRACTITIONER

## 2020-08-18 PROCEDURE — 2700000000 HC OXYGEN THERAPY PER DAY

## 2020-08-18 PROCEDURE — 6360000002 HC RX W HCPCS: Performed by: INTERNAL MEDICINE

## 2020-08-18 PROCEDURE — 2580000003 HC RX 258: Performed by: INTERNAL MEDICINE

## 2020-08-18 PROCEDURE — 36415 COLL VENOUS BLD VENIPUNCTURE: CPT

## 2020-08-18 PROCEDURE — 94760 N-INVAS EAR/PLS OXIMETRY 1: CPT

## 2020-08-18 RX ORDER — FUROSEMIDE 40 MG/1
40 TABLET ORAL 2 TIMES DAILY
Status: DISCONTINUED | OUTPATIENT
Start: 2020-08-18 | End: 2020-08-18 | Stop reason: HOSPADM

## 2020-08-18 RX ORDER — SPIRONOLACTONE 25 MG/1
25 TABLET ORAL 2 TIMES DAILY
Status: DISCONTINUED | OUTPATIENT
Start: 2020-08-18 | End: 2020-08-18 | Stop reason: HOSPADM

## 2020-08-18 RX ORDER — FUROSEMIDE 40 MG/1
40 TABLET ORAL 2 TIMES DAILY
Qty: 60 TABLET | Refills: 3 | Status: SHIPPED | OUTPATIENT
Start: 2020-08-18 | End: 2021-08-30 | Stop reason: SDUPTHER

## 2020-08-18 RX ORDER — SPIRONOLACTONE 25 MG/1
25 TABLET ORAL 2 TIMES DAILY
Qty: 30 TABLET | Refills: 3 | Status: SHIPPED | OUTPATIENT
Start: 2020-08-18 | End: 2021-08-30 | Stop reason: SDUPTHER

## 2020-08-18 RX ADMIN — OXYCODONE HYDROCHLORIDE AND ACETAMINOPHEN 1 TABLET: 5; 325 TABLET ORAL at 13:21

## 2020-08-18 RX ADMIN — VALSARTAN 160 MG: 160 TABLET, FILM COATED ORAL at 09:26

## 2020-08-18 RX ADMIN — SPIRONOLACTONE 25 MG: 25 TABLET ORAL at 09:26

## 2020-08-18 RX ADMIN — OXYCODONE HYDROCHLORIDE AND ACETAMINOPHEN 1 TABLET: 5; 325 TABLET ORAL at 08:56

## 2020-08-18 RX ADMIN — POTASSIUM CHLORIDE 40 MEQ: 1500 TABLET, EXTENDED RELEASE ORAL at 11:14

## 2020-08-18 RX ADMIN — APIXABAN 5 MG: 5 TABLET, FILM COATED ORAL at 09:15

## 2020-08-18 RX ADMIN — METOPROLOL SUCCINATE 100 MG: 50 TABLET, EXTENDED RELEASE ORAL at 09:15

## 2020-08-18 RX ADMIN — LORAZEPAM 1 MG: 2 INJECTION INTRAMUSCULAR; INTRAVENOUS at 04:40

## 2020-08-18 RX ADMIN — ASPIRIN 81 MG: 81 TABLET, COATED ORAL at 09:15

## 2020-08-18 RX ADMIN — Medication 10 ML: at 09:16

## 2020-08-18 RX ADMIN — AMIODARONE HYDROCHLORIDE 400 MG: 200 TABLET ORAL at 09:16

## 2020-08-18 ASSESSMENT — PAIN DESCRIPTION - PAIN TYPE: TYPE: CHRONIC PAIN

## 2020-08-18 ASSESSMENT — PAIN SCALES - GENERAL
PAINLEVEL_OUTOF10: 6
PAINLEVEL_OUTOF10: 7
PAINLEVEL_OUTOF10: 0
PAINLEVEL_OUTOF10: 0
PAINLEVEL_OUTOF10: 6
PAINLEVEL_OUTOF10: 0
PAINLEVEL_OUTOF10: 5

## 2020-08-18 ASSESSMENT — PAIN DESCRIPTION - LOCATION: LOCATION: CHEST

## 2020-08-18 ASSESSMENT — PAIN DESCRIPTION - ORIENTATION: ORIENTATION: MID

## 2020-08-18 ASSESSMENT — PAIN DESCRIPTION - DESCRIPTORS: DESCRIPTORS: SORE

## 2020-08-18 NOTE — PROGRESS NOTES
[address]   Cardiology Progress Note   Date: 8/18/2020  Admit Date: 8/11/2020     Reason for follow up: ICD shock/CHF    Chief Complaint: ICD fired    History of Present Illness: History obtained from patient and medical record. [place] Cristobal is a 76 y.o. female with a past medical history of HTN, HLD, CMP s/p AICD (2007, [facility] [address]), VT, and CHF who presented with CP and several ICD shocks. She was noted to have elevated troponin 3.5 and s/p C 813/2020 that showed nonobstructive CAD. Potassium was 2.5 on admission. Interval Hx: Today, she is being seen for follow up. Remains in SR. No new complaints today. No major events overnight. Denies having chest pain, palpitations, shortness of breath, orthopnea/PND, cough, or dizziness. Patient seen and examined. Clinical notes reviewed. Telemetry reviewed. Allergies:  No Known Allergies    Home Meds:  Prior to Visit Medications    Medication Sig Taking? Authorizing Provider   furosemide (LASIX) 40 MG tablet Take 1 tablet by mouth 2 times daily Yes Lucinda Devera Gilford, APRN - CNP   spironolactone (ALDACTONE) 25 MG tablet Take 1 tablet by mouth 2 times daily Yes Lucinda Devera Gilford, APRN - CNP   amiodarone (PACERONE) 400 MG tablet Take 1 tablet by mouth 2 times daily for 7 days, THEN 0.5 tablets 2 times daily for 7 days, THEN 0.5 tablets daily.  Yes YEIMY Callejas CNP   metoprolol succinate (TOPROL XL) 100 MG extended release tablet Take 1 tablet by mouth daily Yes YEIMY Callejas CNP   valsartan (DIOVAN) 160 MG tablet Take 1 tablet by mouth daily Yes YEIMY Callejas CNP   aspirin 81 MG EC tablet Take 81 mg by mouth daily Yes Historical Provider, MD   atorvastatin (LIPITOR) 40 MG tablet Take 1 tablet by mouth daily Yes YEIMY Moraes CNP   apixaban (ELIQUIS) 5 MG TABS tablet Take 1 tablet by mouth 2 times daily  YEIMY Callejas CNP      Scheduled Meds:   spironolactone  25 mg Oral BID    furosemide  40 mg Oral BID    sodium chloride flush  10 mL Intravenous 2 times per day    valsartan  160 mg Oral Daily    apixaban  5 mg Oral BID    amiodarone  400 mg Oral BID    metoprolol succinate  100 mg Oral Daily    aspirin  81 mg Oral Daily    atorvastatin  40 mg Oral Nightly    sodium chloride flush  10 mL Intravenous 2 times per day     Continuous Infusions:  PRN Meds:potassium chloride **OR** potassium alternative oral replacement **OR** potassium chloride, sodium chloride flush, acetaminophen, oxyCODONE-acetaminophen **OR** oxyCODONE-acetaminophen, ondansetron, sodium chloride flush, [DISCONTINUED] acetaminophen **OR** acetaminophen, polyethylene glycol, promethazine **OR** [DISCONTINUED] ondansetron, magnesium sulfate, LORazepam, morphine, zolpidem     Past Medical History:  Past Medical History:   Diagnosis Date    AICD (automatic cardioverter/defibrillator) present     Arthritis     CHF [facility]congestive heart failure) ([facility])     Gout     Hyperlipidemia     Hypertension     MI (myocardial infarction) ([facility] [address]       Past Surgical History:    has a past surgical history that includes Dilation and curettage of uterus and Cardiac defibrillator placement. Social History:  Reviewed. reports that she has never smoked. She has never used smokeless tobacco. She reports that she does not drink alcohol or use drugs. Family History:  Reviewed. family history includes Cancer in her father; Diabetes in her mother; Heart Disease in her mother; High Blood Pressure in her mother; High Cholesterol in her mother.    Denies family history of sudden cardiac death, arrhythmia, premature CAD    Review of Systems:  · Constitutional: Negative for fever, night sweats, chills, weight changes, or weakness  · Skin: Negative for rash, dry skin, pruritus, bruising, bleeding, blood clots, or changes in skin pigment  · HEENT: Negative for vision changes, ringing in the ears, sore throat, dysphagia, or swollen lymph nodes  · Respiratory: Reviewed in HPI  · Cardiovascular: Reviewed in HPI  · Gastrointestinal: Negative for abdominal pain, N/V/D, constipation, or black/tarry stools  · Genito-Urinary: Negative for dysuria, incontinence, urgency, or hematuria  · Musculoskeletal: Negative for joint swelling, muscle pain, or injuries  · Neurological/Psych: Negative for confusion, seizures, headaches, balance issues or TIA-like symptoms. No anxiety, depression, or insomnia    Physical Examination:  Vitals:    08/18/20 1140   BP: 134/70   Pulse: 62   Resp: 18   Temp: 97.6 °F (36.4 °C)   SpO2: 95%      In: 540 [P.O.:540]  Out: 850    Wt Readings from Last 3 Encounters:   08/18/20 283 lb 1.1 oz (128.4 kg)   02/29/20 264 lb (119.7 kg)   11/06/19 259 lb 11.2 oz (117.8 kg)       Intake/Output Summary (Last 24 hours) at 8/18/2020 1412  Last data filed at 8/18/2020 1322  Gross per 24 hour   Intake 540 ml   Output 850 ml   Net -310 ml     Telemetry: Personally Reviewed Normal sinus rhythm  · Constitutional: Cooperative and in no apparent distress, and appears well nourished +obesity   · Skin: Warm and pink; no pallor, cyanosis, bruising, or clubbing  · HEENT: Symmetric and normocephalic. PERRL, EOM intact. Conjunctiva pink with clear sclera. Mucus membranes pink and moist.   · Cardiovascular: regular and rhythm. S1 & S2, no murmurs, rubs, or gallops. Peripheral pulses 2+, capillary refill < 3 seconds. Negative elevation of JVP. Trace Peripheral edema  · Respiratory: Respirations symmetric and unlabored. Lungs clear to auscultation bilaterally, no wheezing, crackles, or rhonchi  · Gastrointestinal: Abdomen soft and round. Bowel sounds normoactive in all quadrants without tenderness or masses. · Musculoskeletal: Bilateral upper and lower extremity strength 5/5 with full ROM  · Neurologic/Psych: Awake and orientated to person, place and time.  Calm affect, appropriate mood    Pertinent labs, diagnostic, device, and imaging results reviewed as a part of this visit    Labs:    BMP:   Recent Labs     08/16/20  0950 08/17/20  2353 08/18/20  0605    137 137   K 3.7 3.4* 3.5   CL 99 94* 93*   CO2 31 32 30   BUN 11 20 22*   CREATININE 0.6 1.0 1.2   MG 1.80  --   --      Estimated Creatinine Clearance: 63 mL/min (based on SCr of 1.2 mg/dL). CBC:   Recent Labs     08/16/20  0950   WBC 12.1*   HGB 11.7*   HCT 36.9   MCV 92.5        Thyroid:   Lab Results   Component Value Date    TSH 4.14 10/03/2018     Lipids:   Lab Results   Component Value Date    CHOL 165 04/02/2019    HDL 52 04/02/2019    TRIG 80 04/02/2019     LFTS:   Lab Results   Component Value Date    ALT 15 04/05/2019    AST 15 04/05/2019    ALKPHOS 92 04/05/2019    PROT 7.8 04/05/2019    AGRATIO 1.0 04/05/2019    BILITOT 0.3 04/05/2019     Cardiac Enzymes:   Lab Results   Component Value Date    TROPONINI 0.16 08/12/2020    TROPONINI 0.20 08/11/2020    TROPONINI <0.01 02/28/2020     Coags:   Lab Results   Component Value Date    PROTIME 12.0 04/05/2019    INR 1.05 04/05/2019     ECG: none recent    ECHO:  8/12/2020  Summary   Left ventricular systolic function is mild to moderately reduced with   ejection fraction estimated at 40%. There is mild concentric left ventricular hypertrophy. Grade I diastolic dysfunction. Normal right ventricular size and function. Pacer / ICD wire is visualized in the right ventricle. Mild mitral regurgitation. Aortic valve appears sclerotic but opens adequately. Mild tricuspid regurgitation. Systolic pulmonary artery pressure (SPAP) is   normal and estimated at 25-30 mmHg.     Cath: 8/13/2020  Cardiac Cath LVG:  Anatomy:   LM-Normal   LAD-mid 50%  Cx-normal  OM- normal  RCA-dominant normal  RPDA- normal  LVEF- 35  LVG- Global  LVEDP- 25     Contrast: 84  Flouro Time: 4.8  Access: R radial     Impression  ~Coronary Angiography w/ nonobstructive CAD  ~LVG with LVEF of 35 and global regional wall motion abnormalities  ~high LVEDP    Problem List:   Patient Active Problem List    Diagnosis Date Noted    Coronary artery disease due to lipid rich plaque     Hypokalemia     Class 3 severe obesity with serious comorbidity and body mass index (BMI) of 45.0 to 49.9 in adult ([facility] [address]     TYLER (obstructive sleep apnea)     Systolic heart failure, chronic (HCC)     Non-ischemic cardiomyopathy ([facility]     History of ventricular tachycardia 08/12/2020    Cardiac arrhythmia 08/11/2020    Chest pain 02/28/2020    Shortness of breath 11/29/2016    Paroxysmal atrial fibrillation (HCC) 11/29/2016    Automatic implantable cardioverter-defibrillator in situ 01/13/2015    Hyperlipidemia 12/18/2014    Morbid obesity ([facility] 12/18/2014      Assessment and Plan:  AICD Shock   - S/p AICD shock x 7    - Occurred in the setting of severe hypokalemia   - Device interrogation showed AF/FL   - On oral amiodarone  Paroxysmal Atrial Fibrillation/RVR  - Remains SR today   - Continue Toprol 100 mg daily and amiodarone 400 mg bid x 1 week, 200 mg bid x 1 week and then 200 mg dialy   - [id number] score: 4 (age, gender, CHF, HTN) ; [id number] Vasc score and anticoagulation discussed. High risk for stroke and thromboembolism. Anticoagulation is recommended.   ~ On Eliquis 5 mg bid, no s/s of bleeding  - Afib risk factors including age, HTN, obesity, inactivity and TYLER were discussed with patient. Risk factor modification recommended   ~ TSH 4.24 (8/13/2020)     - Treatment options including cardioversion, rate control strategy, antiarrhythmics, anticoagulation and possible ablation were discussed with patient. Risks, benefits and alternative of each treatment options were explained.  All questions answered    ~ Continue amiodarone loading    ~ OP ablation scheduled for 10/8/2020  NICMP/Implantable device   - S/p dual chamber AICD (2007, [facility])  Acute on Chronic systolic and diastolic heart failure (NYHA Class III)   - Appears compensated, continues with DIXON               ~ EF 40% per echo   - Continue BB, ARB, and aldactone   - On IV lasix    - Continue daily wt, strict I&O and fluid restriction   - HF following  HTN-goal <130/80   - Controlled  - Discussed lifestyle modifications, weight loss, low sodium diet  Hypokalemia   - Monitor and replace  TYLER?   - Needs OP sleep evaluation  Obesity: Body mass index is 45.69 kg/m². - Excessive weight is complicating assessment treatment. It is placing patient at risk for multiple co-morbidities as well as early death contributing to the patient's presentations. - Discussed weight loss and lifestyle modifications. - OP ablation scheduled 10/8  - OK for discharge from EP standpoint  - Discharge on amiodarone 200 mg bid x 7 days and then 200 mg daily     All pertinent information and plan of care discussed with the rounding/attending cardiologist.    Multiple medical conditions with risk of decompensation. All questions and concerns were addressed to the patient. Alternatives to my treatment were discussed. I have discussed the above stated plan with patient and the nurse. The patient verbalized understanding and agreed with the plan. Thank you for allowing to us to participate in the care of Maritza Cook.     YEIMY Guajardo-CNP  [facility]   Office: [phone]

## 2020-08-18 NOTE — PROGRESS NOTES
Data- discharge order received, pt verbalized agreement to discharge, disposition to previous residence, no needs for HHC/DME. Action- discharge instructions prepared and given to patient and sister, pt verbalized understanding. Medication information packet given r/t NEW and/or CHANGED prescriptions emphasizing name/purpose/side effects, pt verbalized understanding. Discharge instruction summary: Diet- general cardiac, Activity- independent, Primary Care Physician as follows: Nikki Main -[phone] f/u appointment,  prescription medications filled here with meds to beds. Inpatient surgical procedure precautions reviewed: CHF Education reviewed. Pt/ Family has had a total of 60 minutes CHF education this admission encounter. Response- Pt belongings gathered, IV removed. Disposition is home (no HHC/DME needs), transported with belongings, taken to lobby via w/c w/ this RN, no complications.

## 2020-08-18 NOTE — PROGRESS NOTES
Hospitalist Progress Note      PCP: Eve Oppenheim, MD    Date of Admission: 8/11/2020    Chief Complaint: multiple AICD discharge    Hospital Course: Patient is a 75 yo female with hx non-obstructed CAD,  VT arrest s/p AICD, PAF, HTN, HLD. She presented to outside hospital with c/o multiple AICD discharges. Device interrogation showed AICD discharge x 7. Noted to have low potassium 2.5.        Subjective: no acute events overnight. No new complaints. Medications:  Reviewed    Infusion Medications   Scheduled Medications    spironolactone  25 mg Oral BID    furosemide  40 mg Oral BID    sodium chloride flush  10 mL Intravenous 2 times per day    valsartan  160 mg Oral Daily    apixaban  5 mg Oral BID    amiodarone  400 mg Oral BID    metoprolol succinate  100 mg Oral Daily    aspirin  81 mg Oral Daily    atorvastatin  40 mg Oral Nightly    sodium chloride flush  10 mL Intravenous 2 times per day     PRN Meds: potassium chloride **OR** potassium alternative oral replacement **OR** potassium chloride, sodium chloride flush, acetaminophen, oxyCODONE-acetaminophen **OR** oxyCODONE-acetaminophen, ondansetron, sodium chloride flush, [DISCONTINUED] acetaminophen **OR** acetaminophen, polyethylene glycol, promethazine **OR** [DISCONTINUED] ondansetron, magnesium sulfate, LORazepam, morphine, zolpidem      Intake/Output Summary (Last 24 hours) at 8/18/2020 0954  Last data filed at 8/18/2020 0921  Gross per 24 hour   Intake 420 ml   Output 850 ml   Net -430 ml       Physical Exam Performed:    /65   Pulse 60   Temp 97.6 °F (36.4 °C) (Oral)   Resp 16   Ht 5' 6\" (1.676 m)   Wt 283 lb 1.1 oz (128.4 kg)   SpO2 92%   BMI 45.69 kg/m²     General appearance: No apparent distress, appears stated age and cooperative. HEENT: Pupils equal, round, and reactive to light. Conjunctivae/corneas clear. Neck: Supple, with full range of motion. No jugular venous distention. Trachea midline.   Respiratory: Normal respiratory effort. Clear to auscultation, bilaterally without Rales/Wheezes/Rhonchi. Cardiovascular: Regular rate and rhythm with normal S1/S2 without murmurs, rubs or gallops. Abdomen: Soft, non-tender, non-distended with normal bowel sounds. Musculoskeletal: No clubbing, cyanosis or edema bilaterally. Full range of motion without deformity. Skin: Skin color, texture, turgor normal.  No rashes or lesions. Neurologic:  Neurovascularly intact without any focal sensory/motor deficits. Cranial nerves: II-XII intact, grossly non-focal.  Psychiatric: Alert and oriented, thought content appropriate, normal insight  Capillary Refill: Brisk,< 3 seconds   Peripheral Pulses: +2 palpable, equal bilaterally       Labs:   Recent Labs     08/16/20  0950   WBC 12.1*   HGB 11.7*   HCT 36.9        Recent Labs     08/16/20  0950 08/17/20  2353 08/18/20  0605    137 137   K 3.7 3.4* 3.5   CL 99 94* 93*   CO2 31 32 30   BUN 11 20 22*   CREATININE 0.6 1.0 1.2   CALCIUM 9.1 9.5 9.4     No results for input(s): AST, ALT, BILIDIR, BILITOT, ALKPHOS in the last 72 hours. No results for input(s): INR in the last 72 hours. No results for input(s): Magdiel Shown in the last 72 hours. Urinalysis:      Lab Results   Component Value Date    NITRU Negative 04/05/2019    WBCUA 4 10/23/2015    BACTERIA 2+ 04/07/2015    RBCUA 2 10/23/2015    BLOODU Negative 04/05/2019    SPECGRAV 1.012 04/05/2019    GLUCOSEU Negative 04/05/2019       Radiology:  XR CHEST PORTABLE   Final Result   CHF with pulmonary edema.                  Assessment/Plan:    Active Hospital Problems    Diagnosis    Coronary artery disease due to lipid rich plaque [I25.10, I25.83]    Hypokalemia [E87.6]    Class 3 severe obesity with serious comorbidity and body mass index (BMI) of 45.0 to 49.9 in adult ([facility]) [E66.01, Z68.42]    TYLER (obstructive sleep apnea) [F34.16]    Systolic heart failure, chronic ([facility]) [I50.22]    Non-ischemic

## 2020-08-18 NOTE — PROGRESS NOTES
Home Oxygen Evaluation   Patients room air at rest saturation SpO2 94%    Patients room air saturation SpO2 92% with exertion

## 2020-08-18 NOTE — PLAN OF CARE
Problem: Pain:  Goal: Pain level will decrease  Description: Pain level will decrease  Outcome: Ongoing  Note: Pain medication given per MAR. Pain reassessed within 1 hour. Pt satisfied. Rest promoted. Will continue to monitor. Problem: Falls - Risk of:  Goal: Will remain free from falls  Description: Will remain free from falls  8/18/2020 0254 by Khris Curtis RN  Outcome: Ongoing  Note: Fall precautions in place, bed alarm on, nonskid foot wear applied, bed in lowest position, and call light within reach. Will continue to monitor. Problem: Cardiac Output - Decreased:  Goal: Hemodynamic stability will improve  Description: Hemodynamic stability will improve  Outcome: Ongoing  Note: VSS. Pt on tele monitoring rate and rhythm. I&O being recorded, daily weight being done. Switching to PO Lasix in morning. Cardiology following. Will continue to monitor.

## 2020-08-18 NOTE — PROGRESS NOTES
Nutrition Assessment     Type and Reason for Visit: Initial, Patient Education(LOS)    Nutrition Recommendations/Plan:   On-going diet educ as appropriate     Nutrition Assessment:  Pt is stable from nutrition standpoint AEB adequate po intake & no significant wt loss. Pt reports appetite is fair, with po intake ~50% or better at meals. Reviewed CHF guidelines, as pt reports has not been on a Na /fluid restriction. No further nutrition concerns expressed @ this time. Malnutrition Assessment:  Malnutrition Status: No malnutrition    Nutrition Related Findings: -5.7 L since admit; trace edema BLE; active BS      Current Nutrition Therapies:    DIET CARDIAC; No Added Salt (3-4 GM);  Daily Fluid Restriction: 2000 ml    Anthropometric Measures:  · Height: 5' 6\" (167.6 cm)  · Current Body Wt: 283 lb (128.4 kg)   · BMI: 45.7    Nutrition Diagnosis:   · Food & Nutrition-related knowledge deficit related to other (comment), cardiac dysfunction(poor food choices) as evidenced by other (comment)(pt verbalized no previous dietary restrictions despite CHF dx.)      Nutrition Interventions:   Food and/or Nutrient Delivery:  Continue Current Diet  Nutrition Education/Counseling:  Education completed(15 min)   Coordination of Nutrition Care:  Continued Inpatient Monitoring    Goals:  po intake at least 50% of meals with Dietary compliance on Cardiac/low Na+       Nutrition Monitoring and Evaluation:   Food/Nutrient Intake Outcomes:  Food and Nutrient Intake    Discharge Planning:    Continue current diet     Electronically signed by Tania Fischer RD, LD on 8/18/20 at 11:54 AM EDT    Contact: [phone]

## 2020-08-18 NOTE — PROGRESS NOTES
[address]  HEART FAILURE  Progress Note      Admit Date 8/11/2020     Reason for Consult:      Reason for Consultation/Chief Complaint: CHF    HPI:    Iza Flores is a 76 y.o. female with PMH CAD, VT arrest, AICD, PAF, HTN, HLD admitted after ICD dischargex7 and hypokalemia 2.5. Amio and Toprol started, pt has diuresed 5L      Subjective:  Patient is being seen for CHF. There were no acute overnight cardiac events. Today Ms. Celso Barry denies chest pain or palpitations, c/o continued DIXON but denies orthopnea.        Baseline Weight: 264   Wt Readings from Last 3 Encounters:   08/18/20 283 lb 1.1 oz (128.4 kg)   02/29/20 264 lb (119.7 kg)   11/06/19 259 lb 11.2 oz (117.8 kg)          Objective:   /65   Pulse 60   Temp 97.6 °F (36.4 °C) (Oral)   Resp 16   Ht 5' 6\" (1.676 m)   Wt 283 lb 1.1 oz (128.4 kg)   SpO2 92%   BMI 45.69 kg/m²       Intake/Output Summary (Last 24 hours) at 8/18/2020 0915  Last data filed at 8/18/2020 6129  Gross per 24 hour   Intake 300 ml   Output 850 ml   Net -550 ml      Wt Readings from Last 3 Encounters:   08/18/20 283 lb 1.1 oz (128.4 kg)   02/29/20 264 lb (119.7 kg)   11/06/19 259 lb 11.2 oz (117.8 kg)      In: 300 [P.O.:300]  Out: 850       Physical Exam:  General Appearance:  obese/Well Nourished  Respiratory:  · Resp Auscultation: bilateral wheezes  Cardiovascular:  · Auscultation: Regular rate and rhythm, normal S1S2, no m/g/r/c  · Palpation: Normal    · JVD: obese  · Pedal Pulses: 2+ and equal   Abdomen:  · Soft, NT, ND, + bs  Extremities:  · No Cyanosis or Clubbing  · Extremities: negative  Neurological/Psychiatric:  · Oriented to time, place, and person  · Non-anxious    MEDICATIONS:   Scheduled Meds:   Scheduled Meds:   spironolactone  25 mg Oral BID    furosemide  40 mg Oral BID    sodium chloride flush  10 mL Intravenous 2 times per day    valsartan  160 mg Oral Daily    apixaban  5 mg Oral BID    amiodarone  400 mg Oral BID    metoprolol succinate Intake/Output Summary (Last 24 hours) at 8/18/2020 0915  Last data filed at 8/18/2020 5036  Gross per 24 hour   Intake 300 ml   Output 850 ml   Net -550 ml       Cardiac Testing:   MPI 2/29/2020:  Summary     There is normal isotope uptake at stress and rest. There is no evidence of     myocardial ischemia or scar.          Normal LV function.          Overall findings represent a low risk scan.          Echo 8/12/2020:  Summary   Left ventricular systolic function is mild to moderately reduced with   ejection fraction estimated at 40%.   There is mild concentric left ventricular hypertrophy.   Grade I diastolic dysfunction.   Normal right ventricular size and function.   Pacer / ICD wire is visualized in the right ventricle.   Mild mitral regurgitation.   Aortic valve appears sclerotic but opens adequately.   Mild tricuspid regurgitation. Systolic pulmonary artery pressure (SPAP) is   normal and estimated at 25-30 mmHg.     [facility] 8/13/2020: Anatomy:   LM-Normal   LAD-mid 50%  Cx-normal  OM- normal  RCA-dominant normal  RPDA- normal  LVEF- 35  LVG- Global  LVEDP- 25     Impression  ~Coronary Angiography w/ nonobstructive CAD  ~LVG with LVEF of 35 and global regional wall motion abnormalities  ~high LVEDP    Device: ICD 2007    Assessment/Plan:     1. AHF- 5L out with IV lasix, stop today and start po lasix, increase dejon to bid   2. CAD- C with nonostructive CAD, continue ASA, statin and BB  3. ICM- continue BB, ARB, dejon  4.  HTN- controlled    OK for discharge home, pt has f/u scheduled next week    I appreciate the opportunity of cooperating in the care of this individual.    Elliott Bruno, ACNP, [address] 8/18/2020, 9:15 AM  Heart Failure  The [address], [address]  Ph: [phone]      Core Measures:   · Discharge instructions:   · LVEF documented:   · ACEI for LV dysfunction:   · Smoking Cessation:

## 2020-08-18 NOTE — PROGRESS NOTES
15 min spent educating pt on fluid intake/restriction and importance of monitoring daily weights at home.

## 2020-08-19 ENCOUNTER — TELEPHONE (OUTPATIENT)
Dept: CARDIOLOGY CLINIC | Age: 65
End: 2020-08-19

## 2020-08-19 NOTE — DISCHARGE SUMMARY
LHC 25 mmHg consistent with volume overload. diuretics Continue ACE, AA, and evidence based beta blocker. Continue fluid and sodium restrictions, daily weights, I&O.   6. HTN. Controlled. Continue lisinopril.     7. Vaginal discharge/pain. GYN consulted, swab results show 1+ bacteria. Patient will need f/u pap as outpatient as she had abnormal pap 4 years ago and no follow up. Labs: For convenience and continuity at follow-up the following most recent labs are provided:      CBC:    Lab Results   Component Value Date    WBC 12.1 08/16/2020    HGB 11.7 08/16/2020    HCT 36.9 08/16/2020     08/16/2020       Renal:    Lab Results   Component Value Date     08/18/2020    K 3.5 08/18/2020    K 3.4 08/12/2020    CL 93 08/18/2020    CO2 30 08/18/2020    BUN 22 08/18/2020    CREATININE 1.2 08/18/2020    CALCIUM 9.4 08/18/2020         Significant Diagnostic Studies    Radiology:   XR CHEST PORTABLE   Final Result   CHF with pulmonary edema. Consults:     IP CONSULT TO CARDIOLOGY  IP CONSULT TO SPIRITUAL SERVICES  IP CONSULT TO OB GYN  IP CONSULT TO CARDIAC REHAB    Disposition:  Home     Condition at Discharge: Stable    Discharge Instructions/Follow-up:  Cardiology     Code Status:  Prior   Activity: activity as tolerated    Diet: cardiac diet      Discharge Medications:     Discharge Medication List as of 8/18/2020  4:21 PM           Details   amiodarone (PACERONE) 400 MG tablet Take 1 tablet by mouth 2 times daily for 7 days, THEN 0.5 tablets 2 times daily for 7 days, THEN 0.5 tablets daily. , Disp-59 tablet,R-0Normal      metoprolol succinate (TOPROL XL) 100 MG extended release tablet Take 1 tablet by mouth daily, Disp-90 tablet,R-3Normal      valsartan (DIOVAN) 160 MG tablet Take 1 tablet by mouth daily, Disp-90 tablet,R-3Normal              Details   furosemide (LASIX) 40 MG tablet Take 1 tablet by mouth 2 times daily, Disp-60 tablet,R-3Normal      spironolactone (ALDACTONE) 25 MG tablet Take 1 tablet by mouth 2 times daily, Disp-30 tablet,R-3Normal      !! apixaban (ELIQUIS) 5 MG TABS tablet Take 1 tablet by mouth 2 times daily, Disp-60 tablet,R-2Normal       !! - Potential duplicate medications found. Please discuss with provider. Details   !! apixaban (ELIQUIS) 5 MG TABS tablet Take 1 tablet by mouth 2 times daily, Disp-180 tablet,R-3Print      aspirin 81 MG EC tablet Take 81 mg by mouth dailyHistorical Med      atorvastatin (LIPITOR) 40 MG tablet Take 1 tablet by mouth daily, Disp-90 tablet, R-3Normal       !! - Potential duplicate medications found. Please discuss with provider. Time Spent on discharge is more than 30 minutes in the examination, evaluation, counseling and review of medications and discharge plan. Signed:    YEIMY Tinoco - CNP   8/19/2020      Thank you Heather Isaacs MD for the opportunity to be involved in this patient's care. If you have any questions or concerns please feel free to contact me at [phone].

## 2020-08-19 NOTE — TELEPHONE ENCOUNTER
Last ov 11/6/19 TS dx: PM,JUAN,EHE,PIS, has had 4cx 2 ns,and cancelled tomorrow appt,and R/s for 8/24 Are you willing to refill eliquis?

## 2020-08-19 NOTE — TELEPHONE ENCOUNTER
Medication Refill    Medication needing refilled:apixaban (ELIQUIS) 5 MG TABS tablet        Doseage of the medication:5mg    How are you taking this medication (QD, BID, TID, QID, PRN):Take 1 tablet by mouth 2 times daily     30 or 90 day supply called in:60    Which Pharmacy are we sending the medication to?:[address] [facility], [address] [place] [address]

## 2020-08-20 ENCOUNTER — TELEPHONE (OUTPATIENT)
Dept: OTHER | Age: 65
End: 2020-08-20

## 2020-08-20 NOTE — TELEPHONE ENCOUNTER
[facility]  TELEPHONE ENCOUNTER FORM    Rell Beaulieu 1955    ASSESSMENT:   1. Baseline weight: 283 lbs  2. Current weight: Has not started weighing self  3. Patient weighing daily: No  4. Symptoms: fatigue  5. Patient knows who to call with symptoms: Yes  6. Diet history:      Patient states sodium limitation is : 3000 mg       Patient states fluid limitation is 64 oz  Patient following diet as instructed: Yes  7. Medication history: taking medications as instructed Yes; medication side effects noted No  8. Patient is involved in exercise program: No  9. Patient knows date and time of follow up appointment: Yes  10. Patient has transportation to appointments: Yes    RECOMMENDATIONS:   1. Medication: taking as prescribed-   2. Diet: no added salt diet  3. MD/ Clinic appointment: 8/24 with Alcon Yu NP  4. Other: Patient doing well. Is tired. Encouraged patient to call CHF Resource line with questions or concerns.

## 2020-08-24 ENCOUNTER — OFFICE VISIT (OUTPATIENT)
Dept: CARDIOLOGY CLINIC | Age: 65
End: 2020-08-24
Payer: MEDICARE

## 2020-08-24 ENCOUNTER — NURSE ONLY (OUTPATIENT)
Dept: CARDIOLOGY CLINIC | Age: 65
End: 2020-08-24

## 2020-08-24 VITALS — OXYGEN SATURATION: 98 % | SYSTOLIC BLOOD PRESSURE: 160 MMHG | DIASTOLIC BLOOD PRESSURE: 82 MMHG | HEART RATE: 98 BPM

## 2020-08-24 PROCEDURE — 1090F PRES/ABSN URINE INCON ASSESS: CPT | Performed by: NURSE PRACTITIONER

## 2020-08-24 PROCEDURE — 4040F PNEUMOC VAC/ADMIN/RCVD: CPT | Performed by: NURSE PRACTITIONER

## 2020-08-24 PROCEDURE — 1036F TOBACCO NON-USER: CPT | Performed by: NURSE PRACTITIONER

## 2020-08-24 PROCEDURE — G8400 PT W/DXA NO RESULTS DOC: HCPCS | Performed by: NURSE PRACTITIONER

## 2020-08-24 PROCEDURE — G8417 CALC BMI ABV UP PARAM F/U: HCPCS | Performed by: NURSE PRACTITIONER

## 2020-08-24 PROCEDURE — 1123F ACP DISCUSS/DSCN MKR DOCD: CPT | Performed by: NURSE PRACTITIONER

## 2020-08-24 PROCEDURE — G8427 DOCREV CUR MEDS BY ELIG CLIN: HCPCS | Performed by: NURSE PRACTITIONER

## 2020-08-24 PROCEDURE — 99214 OFFICE O/P EST MOD 30 MIN: CPT | Performed by: NURSE PRACTITIONER

## 2020-08-24 PROCEDURE — 1111F DSCHRG MED/CURRENT MED MERGE: CPT | Performed by: NURSE PRACTITIONER

## 2020-08-24 PROCEDURE — 3017F COLORECTAL CA SCREEN DOC REV: CPT | Performed by: NURSE PRACTITIONER

## 2020-08-24 RX ORDER — ATORVASTATIN CALCIUM 40 MG/1
40 TABLET, FILM COATED ORAL DAILY
Qty: 30 TABLET | Refills: 5 | Status: SHIPPED | OUTPATIENT
Start: 2020-08-24 | End: 2022-01-27 | Stop reason: SDUPTHER

## 2020-08-24 RX ORDER — IBUPROFEN 200 MG
400 TABLET ORAL 2 TIMES DAILY PRN
COMMUNITY
End: 2022-01-27 | Stop reason: SINTOL

## 2020-08-24 NOTE — PROGRESS NOTES
[address]     Outpatient Follow Up Note    CHIEF COMPLAINT / HPI: Hospital Follow Up secondary to [facility] record has been reviewed  Hospital Course progressed as follows per discharge summary:     77 yo female with hx non-obstructed CAD,  VT arrest s/p AICD, PAF, HTN, HLD. She presented to outside hospital with c/o multiple AICD discharges. Device interrogation showed AICD discharge x 7. Noted to have low potassium 2.5.      1. Cardiac arrhythmia / Multiple AICD shocks. Potassium 2.8 on presentation. Device check with episodes VT/VF, atrial fib RVR. Has been started on amiodarone per EP. No recurrent arrhythmia. Plan is for ablation as out-patient (scheduled for 10/8/20). 2. PAF. Remains in sinus rhythm, on amiodarone. Continue Eliquis and beta blocker for rate control. TSH 4.24, free T4 1.0  3. Hypokalemia. Replace as needed  4. CAD / elevated troponin. LHC with non-obstructive CAD. Per cardiology op note, no indication for asa or statin.    5. Cardiomyopathy. EF 40% by echo. LVEDP on LHC 25 mmHg consistent with volume overload. diuretics Continue ACE, AA, and evidence based beta blocker. Continue fluid and sodium restrictions, daily weights, I&O.   6. HTN. Controlled. Continue lisinopril.        Mariam Duckworth is 76 y.o. female who presents today for a routine follow up after a recent hospitalization related to the above mentioned issues. Subjective:   Since the time of discharge, the patient admits their symptoms have improved. She feels ill / no energy since starting these new medications. She feels mentally confused. Every thing she does she has chest heaviness/discomfort and can't breath. She can't walk to describe the degree of feeling SOB. Washing dishes is tough. Lately, she's been sleeping with 2 pillows. She wakes but cannot stay that she has PND. She has no swelling. She hasn't been able to weigh herself at home yet. The patient is not experiencing palpitations. These symptoms are improving over the last many days. With regard to medication therapy the patient has been compliant with prescribed regimen. They have tolerated therapy to date. Past Medical History:   Diagnosis Date    AICD (automatic cardioverter/defibrillator) present     Arthritis     [facility]congest[facility]     Gout     Hyperlipidemia     Hypertension     MI [facility]ocardial infarction) [facility] [address]      Social History:    Social History     Tobacco Use   Smoking Status Never Smoker   Smokeless Tobacco Never Used   Tobacco Comment    QUIT AS A TEEN     Current Medications:  Current Outpatient Medications   Medication Sig Dispense Refill    furosemide (LASIX) 40 MG tablet Take 1 tablet by mouth 2 times daily 60 tablet 3    spironolactone (ALDACTONE) 25 MG tablet Take 1 tablet by mouth 2 times daily 30 tablet 3    apixaban (ELIQUIS) 5 MG TABS tablet Take 1 tablet by mouth 2 times daily 60 tablet 2    amiodarone (PACERONE) 400 MG tablet Take 1 tablet by mouth 2 times daily for 7 days, THEN 0.5 tablets 2 times daily for 7 days, THEN 0.5 tablets daily. 59 tablet 0    metoprolol succinate (TOPROL XL) 100 MG extended release tablet Take 1 tablet by mouth daily 90 tablet 3    valsartan (DIOVAN) 160 MG tablet Take 1 tablet by mouth daily 90 tablet 3    apixaban (ELIQUIS) 5 MG TABS tablet Take 1 tablet by mouth 2 times daily 180 tablet 3    aspirin 81 MG EC tablet Take 81 mg by mouth daily      atorvastatin (LIPITOR) 40 MG tablet Take 1 tablet by mouth daily 90 tablet 3     No current facility-administered medications for this visit. REVIEW OF SYSTEMS:   CONSTITUTIONAL: No major weight gain or loss, fatigue, weakness, night sweats or fever. There's been no change in energy level, sleep pattern, or activity level. HEENT: No new vision difficulties or ringing in the ears. RESPIRATORY: No new SOB, PND, orthopnea or cough.    CARDIOVASCULAR: See HPI  GI: + nausea, - vomiting, diarrhea, constipation, abdominal pain or changes in bowel habits. : No urinary frequency, urgency, incontinence hematuria or dysuria. SKIN: No cyanosis or skin lesions. MUSCULOSKELETAL: No new muscle or joint pain. NEUROLOGICAL: No syncope or TIA-like symptoms. PSYCHIATRIC: No anxiety, pain, insomnia or depression    Objective:   PHYSICAL EXAM:    Vitals:    08/24/20 1344 08/24/20 1404   BP: 130/70 (!) 160/82   Site: Right Upper Arm Right Upper Arm   Position: Sitting    Cuff Size: Large Adult Large Adult   Pulse: 98    SpO2: 98%          VITALS:  /70 (Site: Right Upper Arm, Position: Sitting, Cuff Size: Large Adult)   Pulse 98   SpO2 98%     CONSTITUTIONAL: Cooperative, no apparent distress, and appears well nourished / obese (refused to be weighed)  NEUROLOGIC:  Awake and orientated to person, place and time. PSYCH: Calm affect; tearful remembering brother and nephew's deaths. SKIN: Warm and dry: Rt radial unremarkable. HEENT: Sclera non-icteric, normocephalic, neck supple, no elevation of JVP, normal carotid pulses with no bruits and thyroid normal size. LUNGS:  No increased work of breathing and clear to auscultation, no crackles or wheezing. CARDIOVASCULAR:  Regular rate 104 and rhythm with no murmurs, gallops, rubs, or abnormal heart sounds, normal PMI. The apical impulses not displaced. Heart tones are crisp and normal                                                                                            Cervical veins are not engorged                 JVP less than 8 cm H2O                                                                              The carotid upstroke is normal in amplitude and contour without delay or bruit    ABDOMEN:  Normal bowel sounds, non-distended and non-tender to palpation   EXT: No edema, no calf tenderness. Pulses are present bilaterally.     DATA:    Lab Results   Component Value Date    ALT 15 04/05/2019    AST 15 is mild to moderately reduced with   ejection fraction estimated at 40%. There is mild concentric left ventricular hypertrophy. Grade I diastolic dysfunction. Normal right ventricular size and function. Pacer / ICD wire is visualized in the right ventricle. Mild mitral regurgitation. Aortic valve appears sclerotic but opens adequately. Mild tricuspid regurgitation. Systolic pulmonary artery pressure (SPAP) is   normal and estimated at 25-30 mmHg. Last Stress Test: Feb '20  Summary     There is normal isotope uptake at stress and rest. There is no evidence of     myocardial ischemia or scar.          Normal LV function.          Overall findings represent a low risk scan. Device interrogation : 8/24/20  AP 0.4%.  <0.1%. No episodes/events noted. No changes need to be made at this time. Please see interrogation for more detail. Optivol was recently elevated but back at baseline today. Patient saw HF NPTS today. Assessment:      Diagnosis Orders   1. Ventricular tachycardia, nonsustained ([address]   ~associated with hypokalemia ; last K+ 3.5  ~spironolactone increased to 25 mg bid at discharge  ~metoprolol resumed at 100 mg daily   ~amiodarone burst and taper: c/o feeling nauseated. VT ablation planned   ~mid-LAD at 50% stenosis by cath    2. Chronic combined systolic and diastolic heart failure (HCC)    ~periods of decompensation by Optival interrogation 8/12/20  ~diuretics and aldosterone antagonist adjusted     3. Paroxysmal atrial fibrillation (HCC)   ~AP above goal (upset crying about DIF) : amiodarone / metoprolol   ~CHADsVASc 4 : DOAC prescribed : needed prescription     4. Nonischemic cardiomyopathy (HCC)  ~EF 40% : BB / ARB   ~non-obst LAD disease at 50%  ~metoprolol / atorvastatin       Patient  is stable since hospital discharge.     Plan:  Begin atorvastatin 40 mg daily and Eliquis 5 mg bid as recommended    Optival interrogation today : thoracic impedence at baseline (neg fluid overload)    ~runs PVCs lessened <0.1 / hr   Decrease amiodarone to 200 mg bid x1 week then 200 mg daily thereafter as scheduled/recommended    F/U in 3-4 weeks    I have addressed the patient's cardiac risk factors and adjusted pharmacologic treatment as needed. In addition, I have reinforced the need for patient directed risk factor modification. Further evaluation will be based upon the patient's clinical course and testing results. All questions and concerns were addressed to the patient/sister, Renata Infante. Alternatives to  treatment were discussed. The patient  currently  is not smoking. The risks related to smoking were reviewed with the patient. Recommend maintaining a smoke-free lifestyle. Angiotension inhibitor/angiotension receptor blocker has been prescribed / recommended for congestive heart failure. Daily weight, low sodium diet were discussed. Patient instructed to call the office with a weight gain: > 3 # over night or 5# in one week; swelling, SOB/orthopnea/PND     anti-coagulation has been recommended / prescribed for this patient. Education conducted on adverse reactions including bleeding was discussed. The patient verbalizes understanding. ~pt assistance of Eliquis / ORAL sent to [facility]     Pt is on a BB  Pt is on an ARB  Pt is on a statin      Saturated fat/WILBER diet discussed : maintaining a 2 L fluid restriction  Exercise program discussed    Thank you for allowing to us to participate in the care of Egan Froylan.       [address]  Documentation of today's visit sent to PCP

## 2020-08-24 NOTE — PATIENT INSTRUCTIONS
Decrease amiodarone to 200 mg twice a day for the next seven days, then 200 mg once daily thereafter    eliquis 5 mg twice a day    Atorvastatin 40 mg every evening    appt in 3-4 weeks    Procedure on Oct 8th

## 2020-08-25 NOTE — PROGRESS NOTES
Patient comes in for programming evaluation for her defibrillator. All sensing and pacing parameters are within normal range. Battery life 3.2 years. AP 0.4%.  <0.1%. No episodes/events noted. No changes need to be made at this time. Please see interrogation for more detail. Optivol was recently elevated but back at baseline today. Patient saw HF NPTS today. Patient will follow up in 3 months in office or remotely. Patient is moving next week. She will call once she is all moved in so that we can mail her out a new Home monitor for remote monitoring.

## 2020-09-14 ENCOUNTER — TELEPHONE (OUTPATIENT)
Dept: CARDIOLOGY CLINIC | Age: 65
End: 2020-09-14

## 2020-09-14 NOTE — TELEPHONE ENCOUNTER
Would like a call regarding this pt. The application is missing the pt consent form and missing conformation if pt is receiving outpatient care.

## 2020-09-21 ENCOUNTER — OFFICE VISIT (OUTPATIENT)
Dept: CARDIOLOGY CLINIC | Age: 65
End: 2020-09-21
Payer: MEDICARE

## 2020-09-21 ENCOUNTER — HOSPITAL ENCOUNTER (OUTPATIENT)
Age: 65
Discharge: HOME | End: 2020-09-21
Payer: MEDICARE

## 2020-09-21 VITALS
HEIGHT: 66 IN | DIASTOLIC BLOOD PRESSURE: 80 MMHG | HEART RATE: 84 BPM | BODY MASS INDEX: 45.69 KG/M2 | OXYGEN SATURATION: 98 % | SYSTOLIC BLOOD PRESSURE: 140 MMHG

## 2020-09-21 LAB
A/G RATIO: 1.1 (ref 1.1–2.2)
ALBUMIN SERPL-MCNC: 3.9 G/DL (ref 3.4–5)
ALP BLD-CCNC: 91 U/L (ref 40–129)
ALT SERPL-CCNC: 12 U/L (ref 10–40)
ANION GAP SERPL CALCULATED.3IONS-SCNC: 12 MMOL/L (ref 3–16)
AST SERPL-CCNC: 15 U/L (ref 15–37)
BILIRUB SERPL-MCNC: 0.5 MG/DL (ref 0–1)
BUN BLDV-MCNC: 7 MG/DL (ref 7–20)
CALCIUM SERPL-MCNC: 9.3 MG/DL (ref 8.3–10.6)
CHLORIDE BLD-SCNC: 105 MMOL/L (ref 99–110)
CO2: 26 MMOL/L (ref 21–32)
CREAT SERPL-MCNC: 0.6 MG/DL (ref 0.6–1.2)
GFR AFRICAN AMERICAN: >60
GFR NON-AFRICAN AMERICAN: >60
GLOBULIN: 3.5 G/DL
GLUCOSE BLD-MCNC: 99 MG/DL (ref 70–99)
MAGNESIUM: 2.1 MG/DL (ref 1.8–2.4)
POTASSIUM SERPL-SCNC: 3.6 MMOL/L (ref 3.5–5.1)
SODIUM BLD-SCNC: 143 MMOL/L (ref 136–145)
TOTAL PROTEIN: 7.4 G/DL (ref 6.4–8.2)

## 2020-09-21 PROCEDURE — G8417 CALC BMI ABV UP PARAM F/U: HCPCS | Performed by: NURSE PRACTITIONER

## 2020-09-21 PROCEDURE — 3017F COLORECTAL CA SCREEN DOC REV: CPT | Performed by: NURSE PRACTITIONER

## 2020-09-21 PROCEDURE — 83735 ASSAY OF MAGNESIUM: CPT

## 2020-09-21 PROCEDURE — 4040F PNEUMOC VAC/ADMIN/RCVD: CPT | Performed by: NURSE PRACTITIONER

## 2020-09-21 PROCEDURE — 36415 COLL VENOUS BLD VENIPUNCTURE: CPT

## 2020-09-21 PROCEDURE — G8427 DOCREV CUR MEDS BY ELIG CLIN: HCPCS | Performed by: NURSE PRACTITIONER

## 2020-09-21 PROCEDURE — 1123F ACP DISCUSS/DSCN MKR DOCD: CPT | Performed by: NURSE PRACTITIONER

## 2020-09-21 PROCEDURE — 99214 OFFICE O/P EST MOD 30 MIN: CPT | Performed by: NURSE PRACTITIONER

## 2020-09-21 PROCEDURE — G8400 PT W/DXA NO RESULTS DOC: HCPCS | Performed by: NURSE PRACTITIONER

## 2020-09-21 PROCEDURE — 80053 COMPREHEN METABOLIC PANEL: CPT

## 2020-09-21 PROCEDURE — 1036F TOBACCO NON-USER: CPT | Performed by: NURSE PRACTITIONER

## 2020-09-21 PROCEDURE — 1090F PRES/ABSN URINE INCON ASSESS: CPT | Performed by: NURSE PRACTITIONER

## 2020-09-21 RX ORDER — VALSARTAN 160 MG/1
160 TABLET ORAL 2 TIMES DAILY
Qty: 180 TABLET | Refills: 3 | Status: SHIPPED | OUTPATIENT
Start: 2020-09-21 | End: 2021-08-30 | Stop reason: SDUPTHER

## 2020-09-21 NOTE — PATIENT INSTRUCTIONS
Labs today : check your potassium level    Referral : sleep evaluation      Increase valsartan to 160 mg twice a day     appt in 6-8 weeks

## 2020-09-21 NOTE — PROGRESS NOTES
[address]     Outpatient Follow Up Note    Josh Washington is 76 y.o. female who presents today with a history of AF, VT/VF, non-obst CAD, CM/EF, HFrEF and HTN . CHIEF COMPLAINT / HPI:  Follow Up secondary to medication adjustments (decreasing amiodarone) and for HFrEF    Subjective:   She still feels the same and doesn't feel good. She gets these movements in her leg muscles leaving it hard to walk. She has no energy and tired all the time. Its not improved after adjusting amiodarone and may be worse. She had started taking vitamin-B    she denies significant chest pain. There is SOB/DIXON. The patient denies orthopnea/PND. She doesn't sleep through the night. She has sleep apnea but hasn't gotten a work up. The patient does not have swelling. The patients weight is stable; should be going down because she barely eats anything . The patient is not experiencing palpitations or dizziness. These symptoms show no change since the last OV. With regard to medication therapy the patient has been compliant with prescribed regimen. They have tolerated therapy to date.      Past Medical History:   Diagnosis Date    AICD (automatic cardioverter/defibrillator) present     Arthritis     CHF (congestive heart failure) (HCC)     Gout     Hyperlipidemia     Hypertension     [facility]ocardial infarction) ([facility] [address]      Social History:    Social History     Tobacco Use   Smoking Status Never Smoker   Smokeless Tobacco Never Used   Tobacco Comment    QUIT AS A TEEN     Current Medications:  Current Outpatient Medications   Medication Sig Dispense Refill    ibuprofen (ADVIL;MOTRIN) 200 MG tablet Take 400 mg by mouth 2 times daily as needed for Pain      apixaban (ELIQUIS) 5 MG TABS tablet Take 1 tablet by mouth 2 times daily 180 tablet 1    atorvastatin (LIPITOR) 40 MG tablet Take 1 tablet by mouth daily 30 tablet 5    furosemide (LASIX) 40 MG tablet Take 1 tablet by mouth 2 times daily 60 tablet 3    spironolactone (ALDACTONE) 25 MG tablet Take 1 tablet by mouth 2 times daily 30 tablet 3    amiodarone (PACERONE) 400 MG tablet Take 1 tablet by mouth 2 times daily for 7 days, THEN 0.5 tablets 2 times daily for 7 days, THEN 0.5 tablets daily. (Patient taking differently: No sig reported) 59 tablet 0    metoprolol succinate (TOPROL XL) 100 MG extended release tablet Take 1 tablet by mouth daily 90 tablet 3    valsartan (DIOVAN) 160 MG tablet Take 1 tablet by mouth daily 90 tablet 3    apixaban (ELIQUIS) 5 MG TABS tablet Take 1 tablet by mouth 2 times daily (Patient not taking: Reported on 8/24/2020) 180 tablet 3    aspirin 81 MG EC tablet Take 81 mg by mouth daily       No current facility-administered medications for this visit. REVIEW OF SYSTEMS:    CONSTITUTIONAL: No major weight gain or loss, fatigue, weakness, night sweats or fever. HEENT: No new vision difficulties or ringing in the ears. RESPIRATORY: No new SOB, PND, orthopnea or cough. CARDIOVASCULAR: See HPI  GI: No nausea, vomiting, diarrhea, constipation, abdominal pain or changes in bowel habits. : No urinary frequency, urgency, incontinence hematuria or dysuria. SKIN: No cyanosis or skin lesions. MUSCULOSKELETAL: No new muscle or joint pain. NEUROLOGICAL: No syncope or TIA-like symptoms. PSYCHIATRIC: No anxiety, pain, insomnia or depression    Objective:   PHYSICAL EXAM:       Vitals:    09/21/20 1052 09/21/20 1121   BP: (!) 150/70 (!) 140/80   Site: Left Upper Arm Left Lower Arm   Position: Sitting    Cuff Size: Large Adult    Pulse: 84    SpO2: 98%    Height: 5' 6\" (1.676 m)         VITALS:  BP (!) 150/70 (Site: Left Upper Arm, Position: Sitting, Cuff Size: Large Adult)   Pulse 84   Ht 5' 6\" (1.676 m)   SpO2 98%   BMI 45.69 kg/m²   CONSTITUTIONAL: Cooperative, no apparent distress, and appears well nourished / obese  NEUROLOGIC:  Awake and orientated to person, place and time. PSYCH: Calm affect. SKIN: Warm and dry.   HEENT: Sclera non-icteric, normocephalic, neck supple, no elevation of JVP, normal carotid pulses with no bruits and thyroid normal size. LUNGS:  No increased work of breathing and clear to auscultation, no crackles or wheezing  CARDIOVASCULAR:  Regular rate and rhythm with no murmurs, gallops, rubs, or abnormal heart sounds, normal PMI. The apical impulses not displaced  JVP less than 8 cm H2O  Heart tones are crisp and normal  Cervical veins are not engorged  The carotid upstroke is normal in amplitude and contour without delay or bruit  JVP is not elevated  ABDOMEN:  Normal bowel sounds, non-distended and non-tender to palpation  EXT: No edema, no calf tenderness. Pulses are present bilaterally.     DATA:    Lab Results   Component Value Date    ALT 15 04/05/2019    AST 15 04/05/2019    ALKPHOS 92 04/05/2019    BILITOT 0.3 04/05/2019     Lab Results   Component Value Date    CREATININE 1.2 08/18/2020    BUN 22 (H) 08/18/2020     08/18/2020    K 3.5 08/18/2020    CL 93 (L) 08/18/2020    CO2 30 08/18/2020       Lab Results   Component Value Date    WBC 12.1 (H) 08/16/2020    HGB 11.7 (L) 08/16/2020    HCT 36.9 08/16/2020    MCV 92.5 08/16/2020     08/16/2020     No components found for: CHLPL  Lab Results   Component Value Date    TRIG 80 04/02/2019    TRIG 103 12/19/2014     Lab Results   Component Value Date    HDL 52 04/02/2019    HDL 52 12/19/2014     Lab Results   Component Value Date    LDLCALC 97 04/02/2019    LDLCALC 138 (H) 12/19/2014     Lab Results   Component Value Date    LABVLDL 16 04/02/2019    LABVLDL 21 12/19/2014     Radiology Review:  Pertinent images / reports were reviewed as a part of this visit and reveals the following:    [id number] Score for Atrial Fibrillation Stroke Risk   Risk   Factors  Component Value   C CHF No 0   H HTN Yes 1   A2 Age >= 76 No,  (67 y.o.) 0   D DM No 0   S2 Prior Stroke/TIA No 0   V Vascular Disease No 1   A Age 74-69 Yes,  (67 y.o.) 1   Sc Sex female 1 [id number]  Score  4   Score last updated 9/21/20 12:32 PM EDT    Cardiac cath: 8/13/20:  LM-Normal   LAD-mid 50%  Cx-normal  OM- normal  RCA-dominant normal  RPDA- normal  LVEF- 35  LVG- Global  LVEDP- 25     Impression  ~Coronary Angiography w/ nonobstructive CAD  ~LVG with LVEF of 35 and global regional wall motion abnormalities  ~high LVEDP    Last Echo: Aug '20:  Summary   Left ventricular systolic function is mild to moderately reduced with   ejection fraction estimated at 40%.   There is mild concentric left ventricular hypertrophy.   Grade I diastolic dysfunction.   Normal right ventricular size and function.   Pacer / ICD wire is visualized in the right ventricle.   Mild mitral regurgitation.   Aortic valve appears sclerotic but opens adequately.   Mild tricuspid regurgitation. Systolic pulmonary artery pressure (SPAP) is   normal and estimated at 25-30 mmHg.     Last Stress Test: Feb '20  Summary     There is normal isotope uptake at stress and rest. There is no evidence of     myocardial ischemia or scar.          Normal LV function.          Overall findings represent a low risk scan.          Device interrogation : 8/24/20  AP 0.4%.  <0.1%. No episodes/events noted. Optivol was recently elevated but back at baseline today. Assessment:      Diagnosis Orders   1. Dilated cardiomyopathy (HCC)   ~ LVEF of 35 and global regional wall motion abnormalities  ~moderately reduced LVF on echo, EF 40% Aug '20  ~BB  ~non-obst cor disease by cath    2. Systolic heart failure, chronic (HCC)   ~stable : neg to exam for crackles or edema  ~wt unknown. Stated to be unchanged from home scales  ~Optival : thoracic impedence : episode of mild decompensation approx one week ago by trend  ~remains on lasix / spironolactone / valsartan     3. Paroxysmal atrial fibrillation (HCC)  ~controlled AP  ~ <0.1% AF/AT with device interrogation   ~CHADsVASc 4     4.  Ventricular tachycardia, nonsustained ([address]   ~s/p ICD : no days/week  Discussed Low saturated fat/WILBER diet. Est fluid intake < 64 oz / day    Thank you for allowing to us to participate in the care of Ad Knights.     YEIMY Dupont    Documentation of today's visit sent to PCP

## 2020-09-22 ENCOUNTER — TELEPHONE (OUTPATIENT)
Dept: CARDIOLOGY CLINIC | Age: 65
End: 2020-09-22

## 2020-09-22 NOTE — TELEPHONE ENCOUNTER
Called patient with results and instructions      YEIMY Ashley CNP   9/22/2020 10:17 AM       Labs ok; recheck in three months

## 2020-09-25 ENCOUNTER — OFFICE VISIT (OUTPATIENT)
Dept: PRIMARY CARE CLINIC | Age: 65
End: 2020-09-25
Payer: MEDICARE

## 2020-09-25 PROCEDURE — 99211 OFF/OP EST MAY X REQ PHY/QHP: CPT | Performed by: NURSE PRACTITIONER

## 2020-09-25 PROCEDURE — G8417 CALC BMI ABV UP PARAM F/U: HCPCS | Performed by: NURSE PRACTITIONER

## 2020-09-25 PROCEDURE — G8428 CUR MEDS NOT DOCUMENT: HCPCS | Performed by: NURSE PRACTITIONER

## 2020-09-25 NOTE — PATIENT INSTRUCTIONS

## 2020-09-25 NOTE — PROGRESS NOTES
Janes Ramirez received a viral test for COVID-19. They were educated on isolation and quarantine as appropriate. For any symptoms, they were directed to seek care from their PCP, given contact information to establish with a doctor, directed to an urgent care or the emergency room.

## 2020-09-26 LAB — SARS-COV-2, NAA: NOT DETECTED

## 2020-10-08 ENCOUNTER — HOSPITAL ENCOUNTER (OUTPATIENT)
Dept: CARDIAC CATH/INVASIVE PROCEDURES | Age: 65
Discharge: HOME | End: 2020-10-09
Attending: INTERNAL MEDICINE | Admitting: INTERNAL MEDICINE
Payer: MEDICARE

## 2020-10-08 ENCOUNTER — ANESTHESIA (OUTPATIENT)
Dept: CARDIAC CATH/INVASIVE PROCEDURES | Age: 65
End: 2020-10-08
Payer: MEDICARE

## 2020-10-08 ENCOUNTER — ANESTHESIA EVENT (OUTPATIENT)
Dept: CARDIAC CATH/INVASIVE PROCEDURES | Age: 65
End: 2020-10-08
Payer: MEDICARE

## 2020-10-08 VITALS
SYSTOLIC BLOOD PRESSURE: 134 MMHG | OXYGEN SATURATION: 96 % | TEMPERATURE: 97.9 F | RESPIRATION RATE: 7 BRPM | DIASTOLIC BLOOD PRESSURE: 61 MMHG

## 2020-10-08 PROBLEM — I48.0 PAF (PAROXYSMAL ATRIAL FIBRILLATION) (HCC): Status: ACTIVE | Noted: 2020-10-08

## 2020-10-08 PROBLEM — I10 ESSENTIAL HYPERTENSION: Status: ACTIVE | Noted: 2020-10-08

## 2020-10-08 LAB
A/G RATIO: 1.1 (ref 1.1–2.2)
ABO/RH: NORMAL
ALBUMIN SERPL-MCNC: 3.9 G/DL (ref 3.4–5)
ALP BLD-CCNC: 108 U/L (ref 40–129)
ALT SERPL-CCNC: 17 U/L (ref 10–40)
ANION GAP SERPL CALCULATED.3IONS-SCNC: 11 MMOL/L (ref 3–16)
ANTIBODY SCREEN: NORMAL
AST SERPL-CCNC: 19 U/L (ref 15–37)
BILIRUB SERPL-MCNC: 0.6 MG/DL (ref 0–1)
BUN BLDV-MCNC: 9 MG/DL (ref 7–20)
CALCIUM SERPL-MCNC: 9.4 MG/DL (ref 8.3–10.6)
CHLORIDE BLD-SCNC: 103 MMOL/L (ref 99–110)
CO2: 26 MMOL/L (ref 21–32)
CREAT SERPL-MCNC: 0.6 MG/DL (ref 0.6–1.2)
EKG ATRIAL RATE: 84 BPM
EKG DIAGNOSIS: NORMAL
EKG P AXIS: 64 DEGREES
EKG P-R INTERVAL: 170 MS
EKG Q-T INTERVAL: 414 MS
EKG QRS DURATION: 94 MS
EKG QTC CALCULATION (BAZETT): 489 MS
EKG R AXIS: 1 DEGREES
EKG T AXIS: 57 DEGREES
EKG VENTRICULAR RATE: 84 BPM
GFR AFRICAN AMERICAN: >60
GFR NON-AFRICAN AMERICAN: >60
GLOBULIN: 3.5 G/DL
GLUCOSE BLD-MCNC: 110 MG/DL (ref 70–99)
HCT VFR BLD CALC: 39.1 % (ref 36–48)
HEMOGLOBIN: 12.7 G/DL (ref 12–16)
INR BLD: 1.23 (ref 0.86–1.14)
LV EF: 40 %
LVEF MODALITY: NORMAL
MAGNESIUM: 2 MG/DL (ref 1.8–2.4)
MCH RBC QN AUTO: 28.8 PG (ref 26–34)
MCHC RBC AUTO-ENTMCNC: 32.3 G/DL (ref 31–36)
MCV RBC AUTO: 89 FL (ref 80–100)
PDW BLD-RTO: 15.7 % (ref 12.4–15.4)
PLATELET # BLD: 261 K/UL (ref 135–450)
PMV BLD AUTO: 8.7 FL (ref 5–10.5)
POC ACT LR: 243 SEC
POC ACT LR: 277 SEC
POC ACT LR: 371 SEC
POC ACT LR: 372 SEC
POTASSIUM SERPL-SCNC: 3.8 MMOL/L (ref 3.5–5.1)
PROTHROMBIN TIME: 14.3 SEC (ref 10–13.2)
RBC # BLD: 4.4 M/UL (ref 4–5.2)
SARS-COV-2, NAAT: NOT DETECTED
SODIUM BLD-SCNC: 140 MMOL/L (ref 136–145)
TOTAL PROTEIN: 7.4 G/DL (ref 6.4–8.2)
WBC # BLD: 12.1 K/UL (ref 4–11)

## 2020-10-08 PROCEDURE — 2580000003 HC RX 258: Performed by: INTERNAL MEDICINE

## 2020-10-08 PROCEDURE — 2500000003 HC RX 250 WO HCPCS: Performed by: NURSE ANESTHETIST, CERTIFIED REGISTERED

## 2020-10-08 PROCEDURE — 93010 ELECTROCARDIOGRAM REPORT: CPT | Performed by: INTERNAL MEDICINE

## 2020-10-08 PROCEDURE — C1759 CATH, INTRA ECHOCARDIOGRAPHY: HCPCS

## 2020-10-08 PROCEDURE — 93662 INTRACARDIAC ECG (ICE): CPT | Performed by: INTERNAL MEDICINE

## 2020-10-08 PROCEDURE — 85027 COMPLETE CBC AUTOMATED: CPT

## 2020-10-08 PROCEDURE — 93005 ELECTROCARDIOGRAM TRACING: CPT | Performed by: INTERNAL MEDICINE

## 2020-10-08 PROCEDURE — 6360000002 HC RX W HCPCS

## 2020-10-08 PROCEDURE — 93623 PRGRMD STIMJ&PACG IV RX NFS: CPT

## 2020-10-08 PROCEDURE — 93656 COMPRE EP EVAL ABLTJ ATR FIB: CPT | Performed by: INTERNAL MEDICINE

## 2020-10-08 PROCEDURE — 94760 N-INVAS EAR/PLS OXIMETRY 1: CPT

## 2020-10-08 PROCEDURE — 2500000003 HC RX 250 WO HCPCS

## 2020-10-08 PROCEDURE — 93655 ICAR CATH ABLTJ DSCRT ARRHYT: CPT | Performed by: INTERNAL MEDICINE

## 2020-10-08 PROCEDURE — 6360000002 HC RX W HCPCS: Performed by: NURSE ANESTHETIST, CERTIFIED REGISTERED

## 2020-10-08 PROCEDURE — 93325 DOPPLER ECHO COLOR FLOW MAPG: CPT

## 2020-10-08 PROCEDURE — 93613 INTRACARDIAC EPHYS 3D MAPG: CPT | Performed by: INTERNAL MEDICINE

## 2020-10-08 PROCEDURE — 86901 BLOOD TYPING SEROLOGIC RH(D): CPT

## 2020-10-08 PROCEDURE — 2720000010 HC SURG SUPPLY STERILE

## 2020-10-08 PROCEDURE — 2580000003 HC RX 258

## 2020-10-08 PROCEDURE — 2700000000 HC OXYGEN THERAPY PER DAY

## 2020-10-08 PROCEDURE — 6370000000 HC RX 637 (ALT 250 FOR IP): Performed by: NURSE PRACTITIONER

## 2020-10-08 PROCEDURE — 3700000001 HC ADD 15 MINUTES (ANESTHESIA)

## 2020-10-08 PROCEDURE — C1894 INTRO/SHEATH, NON-LASER: HCPCS

## 2020-10-08 PROCEDURE — 3700000000 HC ANESTHESIA ATTENDED CARE

## 2020-10-08 PROCEDURE — 83735 ASSAY OF MAGNESIUM: CPT

## 2020-10-08 PROCEDURE — 93613 INTRACARDIAC EPHYS 3D MAPG: CPT

## 2020-10-08 PROCEDURE — U0002 COVID-19 LAB TEST NON-CDC: HCPCS

## 2020-10-08 PROCEDURE — 93622 COMP EP EVAL L VENTR PAC&REC: CPT | Performed by: INTERNAL MEDICINE

## 2020-10-08 PROCEDURE — 2580000003 HC RX 258: Performed by: NURSE ANESTHETIST, CERTIFIED REGISTERED

## 2020-10-08 PROCEDURE — 93623 PRGRMD STIMJ&PACG IV RX NFS: CPT | Performed by: INTERNAL MEDICINE

## 2020-10-08 PROCEDURE — 86900 BLOOD TYPING SEROLOGIC ABO: CPT

## 2020-10-08 PROCEDURE — 7100000000 HC PACU RECOVERY - FIRST 15 MIN

## 2020-10-08 PROCEDURE — 93655 ICAR CATH ABLTJ DSCRT ARRHYT: CPT

## 2020-10-08 PROCEDURE — C1769 GUIDE WIRE: HCPCS

## 2020-10-08 PROCEDURE — 7100000001 HC PACU RECOVERY - ADDTL 15 MIN

## 2020-10-08 PROCEDURE — 93312 ECHO TRANSESOPHAGEAL: CPT

## 2020-10-08 PROCEDURE — 36415 COLL VENOUS BLD VENIPUNCTURE: CPT

## 2020-10-08 PROCEDURE — 93656 COMPRE EP EVAL ABLTJ ATR FIB: CPT

## 2020-10-08 PROCEDURE — 93662 INTRACARDIAC ECG (ICE): CPT

## 2020-10-08 PROCEDURE — 6370000000 HC RX 637 (ALT 250 FOR IP): Performed by: INTERNAL MEDICINE

## 2020-10-08 PROCEDURE — 86850 RBC ANTIBODY SCREEN: CPT

## 2020-10-08 PROCEDURE — 2709999900 HC NON-CHARGEABLE SUPPLY

## 2020-10-08 PROCEDURE — 93622 COMP EP EVAL L VENTR PAC&REC: CPT

## 2020-10-08 PROCEDURE — 85610 PROTHROMBIN TIME: CPT

## 2020-10-08 PROCEDURE — 80053 COMPREHEN METABOLIC PANEL: CPT

## 2020-10-08 PROCEDURE — 93320 DOPPLER ECHO COMPLETE: CPT

## 2020-10-08 PROCEDURE — C1732 CATH, EP, DIAG/ABL, 3D/VECT: HCPCS

## 2020-10-08 PROCEDURE — 85347 COAGULATION TIME ACTIVATED: CPT

## 2020-10-08 RX ORDER — ONDANSETRON 2 MG/ML
INJECTION INTRAMUSCULAR; INTRAVENOUS PRN
Status: DISCONTINUED | OUTPATIENT
Start: 2020-10-08 | End: 2020-10-08 | Stop reason: SDUPTHER

## 2020-10-08 RX ORDER — ASPIRIN 81 MG/1
81 TABLET ORAL DAILY
Status: DISCONTINUED | OUTPATIENT
Start: 2020-10-08 | End: 2020-10-09 | Stop reason: HOSPADM

## 2020-10-08 RX ORDER — PROPOFOL 10 MG/ML
INJECTION, EMULSION INTRAVENOUS PRN
Status: DISCONTINUED | OUTPATIENT
Start: 2020-10-08 | End: 2020-10-08 | Stop reason: SDUPTHER

## 2020-10-08 RX ORDER — FUROSEMIDE 10 MG/ML
INJECTION INTRAMUSCULAR; INTRAVENOUS PRN
Status: DISCONTINUED | OUTPATIENT
Start: 2020-10-08 | End: 2020-10-08 | Stop reason: SDUPTHER

## 2020-10-08 RX ORDER — SODIUM CHLORIDE 9 MG/ML
INJECTION, SOLUTION INTRAVENOUS CONTINUOUS PRN
Status: DISCONTINUED | OUTPATIENT
Start: 2020-10-08 | End: 2020-10-08 | Stop reason: SDUPTHER

## 2020-10-08 RX ORDER — SPIRONOLACTONE 25 MG/1
25 TABLET ORAL 2 TIMES DAILY
Status: DISCONTINUED | OUTPATIENT
Start: 2020-10-08 | End: 2020-10-09 | Stop reason: HOSPADM

## 2020-10-08 RX ORDER — HYDROCODONE BITARTRATE AND ACETAMINOPHEN 5; 325 MG/1; MG/1
1 TABLET ORAL EVERY 8 HOURS PRN
Status: DISCONTINUED | OUTPATIENT
Start: 2020-10-08 | End: 2020-10-09 | Stop reason: HOSPADM

## 2020-10-08 RX ORDER — HEPARIN SODIUM 10000 [USP'U]/100ML
INJECTION, SOLUTION INTRAVENOUS CONTINUOUS PRN
Status: DISCONTINUED | OUTPATIENT
Start: 2020-10-08 | End: 2020-10-08 | Stop reason: SDUPTHER

## 2020-10-08 RX ORDER — ONDANSETRON 2 MG/ML
4 INJECTION INTRAMUSCULAR; INTRAVENOUS
Status: DISCONTINUED | OUTPATIENT
Start: 2020-10-08 | End: 2020-10-08

## 2020-10-08 RX ORDER — HEPARIN SODIUM 1000 [USP'U]/ML
INJECTION, SOLUTION INTRAVENOUS; SUBCUTANEOUS PRN
Status: DISCONTINUED | OUTPATIENT
Start: 2020-10-08 | End: 2020-10-08 | Stop reason: SDUPTHER

## 2020-10-08 RX ORDER — SODIUM CHLORIDE 0.9 % (FLUSH) 0.9 %
10 SYRINGE (ML) INJECTION PRN
Status: DISCONTINUED | OUTPATIENT
Start: 2020-10-08 | End: 2020-10-09 | Stop reason: HOSPADM

## 2020-10-08 RX ORDER — AMIODARONE HYDROCHLORIDE 200 MG/1
200 TABLET ORAL DAILY
Status: DISCONTINUED | OUTPATIENT
Start: 2020-10-08 | End: 2020-10-09 | Stop reason: HOSPADM

## 2020-10-08 RX ORDER — SODIUM CHLORIDE 0.9 % (FLUSH) 0.9 %
10 SYRINGE (ML) INJECTION EVERY 12 HOURS SCHEDULED
Status: DISCONTINUED | OUTPATIENT
Start: 2020-10-08 | End: 2020-10-09 | Stop reason: HOSPADM

## 2020-10-08 RX ORDER — CEFAZOLIN SODIUM 1 G/3ML
INJECTION, POWDER, FOR SOLUTION INTRAMUSCULAR; INTRAVENOUS PRN
Status: DISCONTINUED | OUTPATIENT
Start: 2020-10-08 | End: 2020-10-08 | Stop reason: SDUPTHER

## 2020-10-08 RX ORDER — LIDOCAINE HYDROCHLORIDE 20 MG/ML
INJECTION, SOLUTION INFILTRATION; PERINEURAL PRN
Status: DISCONTINUED | OUTPATIENT
Start: 2020-10-08 | End: 2020-10-08 | Stop reason: SDUPTHER

## 2020-10-08 RX ORDER — ACETAMINOPHEN 325 MG/1
650 TABLET ORAL EVERY 4 HOURS PRN
Status: DISCONTINUED | OUTPATIENT
Start: 2020-10-08 | End: 2020-10-09 | Stop reason: HOSPADM

## 2020-10-08 RX ORDER — IBUPROFEN 400 MG/1
400 TABLET ORAL EVERY 4 HOURS PRN
Status: DISCONTINUED | OUTPATIENT
Start: 2020-10-08 | End: 2020-10-09 | Stop reason: HOSPADM

## 2020-10-08 RX ORDER — ATORVASTATIN CALCIUM 40 MG/1
40 TABLET, FILM COATED ORAL DAILY
Status: DISCONTINUED | OUTPATIENT
Start: 2020-10-08 | End: 2020-10-09 | Stop reason: HOSPADM

## 2020-10-08 RX ORDER — FUROSEMIDE 40 MG/1
40 TABLET ORAL 2 TIMES DAILY
Status: DISCONTINUED | OUTPATIENT
Start: 2020-10-08 | End: 2020-10-09 | Stop reason: HOSPADM

## 2020-10-08 RX ORDER — TRAMADOL HYDROCHLORIDE 50 MG/1
50 TABLET ORAL EVERY 6 HOURS PRN
Status: DISCONTINUED | OUTPATIENT
Start: 2020-10-08 | End: 2020-10-09 | Stop reason: HOSPADM

## 2020-10-08 RX ORDER — AMIODARONE HYDROCHLORIDE 200 MG/1
200 TABLET ORAL DAILY
Qty: 30 TABLET | Refills: 0 | Status: CANCELLED
Start: 2020-10-08

## 2020-10-08 RX ORDER — SUCCINYLCHOLINE CHLORIDE 20 MG/ML
INJECTION INTRAMUSCULAR; INTRAVENOUS PRN
Status: DISCONTINUED | OUTPATIENT
Start: 2020-10-08 | End: 2020-10-08 | Stop reason: SDUPTHER

## 2020-10-08 RX ORDER — HYDROCODONE BITARTRATE AND ACETAMINOPHEN 5; 325 MG/1; MG/1
1 TABLET ORAL
Status: DISCONTINUED | OUTPATIENT
Start: 2020-10-08 | End: 2020-10-08

## 2020-10-08 RX ORDER — VALSARTAN 160 MG/1
160 TABLET ORAL 2 TIMES DAILY
Status: DISCONTINUED | OUTPATIENT
Start: 2020-10-08 | End: 2020-10-09 | Stop reason: HOSPADM

## 2020-10-08 RX ORDER — HYDROMORPHONE HCL 110MG/55ML
0.25 PATIENT CONTROLLED ANALGESIA SYRINGE INTRAVENOUS EVERY 5 MIN PRN
Status: DISCONTINUED | OUTPATIENT
Start: 2020-10-08 | End: 2020-10-08

## 2020-10-08 RX ORDER — PROTAMINE SULFATE 10 MG/ML
INJECTION, SOLUTION INTRAVENOUS PRN
Status: DISCONTINUED | OUTPATIENT
Start: 2020-10-08 | End: 2020-10-08 | Stop reason: SDUPTHER

## 2020-10-08 RX ORDER — HYDROMORPHONE HCL 110MG/55ML
0.5 PATIENT CONTROLLED ANALGESIA SYRINGE INTRAVENOUS EVERY 5 MIN PRN
Status: DISCONTINUED | OUTPATIENT
Start: 2020-10-08 | End: 2020-10-08

## 2020-10-08 RX ORDER — ROCURONIUM BROMIDE 10 MG/ML
INJECTION, SOLUTION INTRAVENOUS PRN
Status: DISCONTINUED | OUTPATIENT
Start: 2020-10-08 | End: 2020-10-08 | Stop reason: SDUPTHER

## 2020-10-08 RX ORDER — GLYCOPYRROLATE 0.2 MG/ML
INJECTION INTRAMUSCULAR; INTRAVENOUS PRN
Status: DISCONTINUED | OUTPATIENT
Start: 2020-10-08 | End: 2020-10-08 | Stop reason: SDUPTHER

## 2020-10-08 RX ORDER — METOPROLOL SUCCINATE 50 MG/1
100 TABLET, EXTENDED RELEASE ORAL DAILY
Status: DISCONTINUED | OUTPATIENT
Start: 2020-10-08 | End: 2020-10-09 | Stop reason: HOSPADM

## 2020-10-08 RX ORDER — FENTANYL CITRATE 50 UG/ML
INJECTION, SOLUTION INTRAMUSCULAR; INTRAVENOUS PRN
Status: DISCONTINUED | OUTPATIENT
Start: 2020-10-08 | End: 2020-10-08 | Stop reason: SDUPTHER

## 2020-10-08 RX ORDER — DEXAMETHASONE SODIUM PHOSPHATE 4 MG/ML
INJECTION, SOLUTION INTRA-ARTICULAR; INTRALESIONAL; INTRAMUSCULAR; INTRAVENOUS; SOFT TISSUE PRN
Status: DISCONTINUED | OUTPATIENT
Start: 2020-10-08 | End: 2020-10-08 | Stop reason: SDUPTHER

## 2020-10-08 RX ADMIN — PROTAMINE SULFATE 10 MG: 10 INJECTION, SOLUTION INTRAVENOUS at 15:16

## 2020-10-08 RX ADMIN — HEPARIN SODIUM 5000 UNITS: 1000 INJECTION, SOLUTION INTRAVENOUS; SUBCUTANEOUS at 13:35

## 2020-10-08 RX ADMIN — APIXABAN 5 MG: 5 TABLET, FILM COATED ORAL at 20:39

## 2020-10-08 RX ADMIN — PHENYLEPHRINE HYDROCHLORIDE 100 MCG/MIN: 10 INJECTION INTRAVENOUS at 13:03

## 2020-10-08 RX ADMIN — ASPIRIN 81 MG: 81 TABLET, COATED ORAL at 17:29

## 2020-10-08 RX ADMIN — FUROSEMIDE 40 MG: 10 INJECTION, SOLUTION INTRAMUSCULAR; INTRAVENOUS at 15:16

## 2020-10-08 RX ADMIN — SODIUM CHLORIDE: 9 INJECTION, SOLUTION INTRAVENOUS at 12:01

## 2020-10-08 RX ADMIN — PROPOFOL 120 MG: 10 INJECTION, EMULSION INTRAVENOUS at 12:45

## 2020-10-08 RX ADMIN — SUCCINYLCHOLINE CHLORIDE 140 MG: 20 INJECTION, SOLUTION INTRAMUSCULAR; INTRAVENOUS at 12:45

## 2020-10-08 RX ADMIN — Medication 10 ML: at 20:40

## 2020-10-08 RX ADMIN — FENTANYL CITRATE 100 MCG: 50 INJECTION INTRAMUSCULAR; INTRAVENOUS at 14:52

## 2020-10-08 RX ADMIN — ROCURONIUM BROMIDE 30 MG: 10 INJECTION, SOLUTION INTRAVENOUS at 13:34

## 2020-10-08 RX ADMIN — PROPOFOL 80 MG: 10 INJECTION, EMULSION INTRAVENOUS at 13:34

## 2020-10-08 RX ADMIN — FENTANYL CITRATE 50 MCG: 50 INJECTION INTRAMUSCULAR; INTRAVENOUS at 12:52

## 2020-10-08 RX ADMIN — HYDROCODONE BITARTRATE AND ACETAMINOPHEN 1 TABLET: 5; 325 TABLET ORAL at 20:39

## 2020-10-08 RX ADMIN — ONDANSETRON 4 MG: 2 INJECTION INTRAMUSCULAR; INTRAVENOUS at 14:46

## 2020-10-08 RX ADMIN — SUGAMMADEX 200 MG: 100 INJECTION, SOLUTION INTRAVENOUS at 14:06

## 2020-10-08 RX ADMIN — HEPARIN SODIUM 7000 UNITS: 1000 INJECTION, SOLUTION INTRAVENOUS; SUBCUTANEOUS at 13:14

## 2020-10-08 RX ADMIN — DEXAMETHASONE SODIUM PHOSPHATE 8 MG: 4 INJECTION, SOLUTION INTRAMUSCULAR; INTRAVENOUS at 14:46

## 2020-10-08 RX ADMIN — SODIUM CHLORIDE: 9 INJECTION, SOLUTION INTRAVENOUS at 13:11

## 2020-10-08 RX ADMIN — AMIODARONE HYDROCHLORIDE 200 MG: 200 TABLET ORAL at 17:29

## 2020-10-08 RX ADMIN — HEPARIN SODIUM 1000 UNITS/HR: 10000 INJECTION, SOLUTION INTRAVENOUS at 13:53

## 2020-10-08 RX ADMIN — DESMOPRESSIN ACETATE 40 MG: 0.2 TABLET ORAL at 20:39

## 2020-10-08 RX ADMIN — PHENYLEPHRINE HYDROCHLORIDE 200 MCG: 10 INJECTION INTRAVENOUS at 12:59

## 2020-10-08 RX ADMIN — SODIUM CHLORIDE: 9 INJECTION, SOLUTION INTRAVENOUS at 15:25

## 2020-10-08 RX ADMIN — FENTANYL CITRATE 50 MCG: 50 INJECTION INTRAMUSCULAR; INTRAVENOUS at 12:22

## 2020-10-08 RX ADMIN — GLYCOPYRROLATE 0.3 MG: 0.2 INJECTION, SOLUTION INTRAMUSCULAR; INTRAVENOUS at 12:55

## 2020-10-08 RX ADMIN — LIDOCAINE HYDROCHLORIDE 40 MG: 20 INJECTION, SOLUTION INFILTRATION; PERINEURAL at 13:34

## 2020-10-08 RX ADMIN — PHENYLEPHRINE HYDROCHLORIDE 200 MCG: 10 INJECTION INTRAVENOUS at 13:01

## 2020-10-08 RX ADMIN — HEPARIN SODIUM 7000 UNITS: 1000 INJECTION, SOLUTION INTRAVENOUS; SUBCUTANEOUS at 14:14

## 2020-10-08 RX ADMIN — PROTAMINE SULFATE 20 MG: 10 INJECTION, SOLUTION INTRAVENOUS at 15:06

## 2020-10-08 RX ADMIN — VALSARTAN 160 MG: 160 TABLET, FILM COATED ORAL at 20:39

## 2020-10-08 RX ADMIN — LIDOCAINE HYDROCHLORIDE 100 MG: 20 INJECTION, SOLUTION INFILTRATION; PERINEURAL at 14:50

## 2020-10-08 RX ADMIN — TRAMADOL HYDROCHLORIDE 50 MG: 50 TABLET, FILM COATED ORAL at 18:23

## 2020-10-08 RX ADMIN — ACETAMINOPHEN 650 MG: 325 TABLET ORAL at 17:29

## 2020-10-08 RX ADMIN — CEFAZOLIN 2000 MG: 1 INJECTION, POWDER, FOR SOLUTION INTRAVENOUS at 12:57

## 2020-10-08 RX ADMIN — LIDOCAINE HYDROCHLORIDE 60 MG: 20 INJECTION, SOLUTION INFILTRATION; PERINEURAL at 12:45

## 2020-10-08 RX ADMIN — HEPARIN SODIUM 7000 UNITS: 1000 INJECTION, SOLUTION INTRAVENOUS; SUBCUTANEOUS at 13:53

## 2020-10-08 RX ADMIN — PHENYLEPHRINE HYDROCHLORIDE 100 MCG: 10 INJECTION INTRAVENOUS at 13:04

## 2020-10-08 RX ADMIN — ISOPROTERENOL HYDROCHLORIDE 2 MCG/MIN: 0.2 INJECTION, SOLUTION INTRAMUSCULAR; INTRAVENOUS at 14:22

## 2020-10-08 ASSESSMENT — PULMONARY FUNCTION TESTS
PIF_VALUE: 35
PIF_VALUE: 16
PIF_VALUE: 35
PIF_VALUE: 35
PIF_VALUE: 36
PIF_VALUE: 35
PIF_VALUE: 32
PIF_VALUE: 36
PIF_VALUE: 35
PIF_VALUE: 1
PIF_VALUE: 35
PIF_VALUE: 36
PIF_VALUE: 35
PIF_VALUE: 35
PIF_VALUE: 1
PIF_VALUE: 35
PIF_VALUE: 1
PIF_VALUE: 1
PIF_VALUE: 35
PIF_VALUE: 36
PIF_VALUE: 35
PIF_VALUE: 0
PIF_VALUE: 35
PIF_VALUE: 2
PIF_VALUE: 35
PIF_VALUE: 35
PIF_VALUE: 0
PIF_VALUE: 35
PIF_VALUE: 35
PIF_VALUE: 0
PIF_VALUE: 0
PIF_VALUE: 36
PIF_VALUE: 36
PIF_VALUE: 35
PIF_VALUE: 34
PIF_VALUE: 35
PIF_VALUE: 2
PIF_VALUE: 35
PIF_VALUE: 35
PIF_VALUE: 26
PIF_VALUE: 36
PIF_VALUE: 2
PIF_VALUE: 1
PIF_VALUE: 35
PIF_VALUE: 35
PIF_VALUE: 36
PIF_VALUE: 2
PIF_VALUE: 35
PIF_VALUE: 1
PIF_VALUE: 35
PIF_VALUE: 34
PIF_VALUE: 35
PIF_VALUE: 35
PIF_VALUE: 1
PIF_VALUE: 35
PIF_VALUE: 1
PIF_VALUE: 35
PIF_VALUE: 35
PIF_VALUE: 2
PIF_VALUE: 35
PIF_VALUE: 1
PIF_VALUE: 36
PIF_VALUE: 35
PIF_VALUE: 19
PIF_VALUE: 1
PIF_VALUE: 35
PIF_VALUE: 29
PIF_VALUE: 0
PIF_VALUE: 1
PIF_VALUE: 35
PIF_VALUE: 35
PIF_VALUE: 20
PIF_VALUE: 1
PIF_VALUE: 35
PIF_VALUE: 0
PIF_VALUE: 35
PIF_VALUE: 1
PIF_VALUE: 35
PIF_VALUE: 0
PIF_VALUE: 19
PIF_VALUE: 35
PIF_VALUE: 36
PIF_VALUE: 35
PIF_VALUE: 1
PIF_VALUE: 35
PIF_VALUE: 35
PIF_VALUE: 34
PIF_VALUE: 9
PIF_VALUE: 35
PIF_VALUE: 35
PIF_VALUE: 30
PIF_VALUE: 35
PIF_VALUE: 37
PIF_VALUE: 27
PIF_VALUE: 1
PIF_VALUE: 35
PIF_VALUE: 1
PIF_VALUE: 35
PIF_VALUE: 34
PIF_VALUE: 1
PIF_VALUE: 35
PIF_VALUE: 36
PIF_VALUE: 35
PIF_VALUE: 21
PIF_VALUE: 1
PIF_VALUE: 35
PIF_VALUE: 0
PIF_VALUE: 35
PIF_VALUE: 0
PIF_VALUE: 35
PIF_VALUE: 12
PIF_VALUE: 36
PIF_VALUE: 36
PIF_VALUE: 35
PIF_VALUE: 1
PIF_VALUE: 26
PIF_VALUE: 35
PIF_VALUE: 36
PIF_VALUE: 36
PIF_VALUE: 0
PIF_VALUE: 0
PIF_VALUE: 35
PIF_VALUE: 26
PIF_VALUE: 17
PIF_VALUE: 35
PIF_VALUE: 35
PIF_VALUE: 36
PIF_VALUE: 36
PIF_VALUE: 0
PIF_VALUE: 1

## 2020-10-08 ASSESSMENT — PAIN DESCRIPTION - ORIENTATION
ORIENTATION: RIGHT
ORIENTATION: RIGHT

## 2020-10-08 ASSESSMENT — PAIN SCALES - GENERAL
PAINLEVEL_OUTOF10: 6
PAINLEVEL_OUTOF10: 10
PAINLEVEL_OUTOF10: 3
PAINLEVEL_OUTOF10: 10

## 2020-10-08 ASSESSMENT — PAIN DESCRIPTION - PAIN TYPE: TYPE: SURGICAL PAIN

## 2020-10-08 ASSESSMENT — PAIN DESCRIPTION - LOCATION
LOCATION: GROIN
LOCATION: GROIN;SHOULDER

## 2020-10-08 NOTE — DISCHARGE SUMMARY
EP Discharge Summary  [facility]    Patient :Donna Morocho  Room/Bed: CVU-2902/2902-01  Medical Record: [id number]  YOB: 1955    Admit date: 10/8/2020  Discharge date: 10/09/20     Admitting Physician: Reuben Rowland MD   Discharge NP: Marquise Whittaker CNP    Admission Diagnoses: Paroxysmal atrial fibrillation [I48.0]  PAF (paroxysmal atrial fibrillation) ([address] [I48.0]  Discharge Diagnoses: PAF    Discharged Condition: good    Hospital Course: The patient is a 76 y.o. female with a past medical history of HTN, HLD, CHF, obesity, and AICD (2007 at [facility] AT [facility]). In August of 2020, pt presented from outside hospital with chest pain. She recently lost her brother and has not been feeling well. Pt admitted to non-compliance with medications secondary to COVID. She had an AICD shock. Her troponin was noted to be 3.5 with a potassium 2.5 in the ER at [facility]. She underwent cardiac cath, which showed non obstructive coronaries and EF of 35%    Interval HX: Patient presented for elective cardiac ablation. S/p RFCA with PVI yesterday with Dr. Erica Maddox. Uneventful post operative course. Right groin ablation site soft, no hematoma/ooozing/swelling noted. She is up in the chair, but has not ambulated in the hallway or voided post catheter removal yet. She is having right groin and left chest pain that is pleuritic in nature. It is improved with pain medication. Patient seen and examined. Notes, labs, and recent testing reviewed. Telemetry reviewed, pt in NSR  No new complaints today and no major events overnight. Denies having chest pain, palpitations, shortness of breath, edema or dizziness at the time of this visit.     Consults: none    Objective:   /60   Pulse 80   Temp 97.5 °F (36.4 °C) (Temporal)   Resp 17   Ht 5' 6\" (1.676 m)   Wt 281 lb 8.4 oz (127.7 kg)   SpO2 96%   BMI 45.44 kg/m²      Intake/Output Summary (Last 24 hours) at 10/9/2020 0956  Last data filed at 10/9/2020 0419  Gross per 10/08/2020       Disposition: home    Home Medications:   Pauline Soto   Home Medication Instructions [facility]:[id number]    Printed on:10/09/20 5297   Medication Information                      amiodarone (PACERONE) 400 MG tablet  Take 0.5 tablets by mouth daily             apixaban (ELIQUIS) 5 MG TABS tablet  Take 1 tablet by mouth 2 times daily             aspirin 81 MG EC tablet  Take 81 mg by mouth daily             atorvastatin (LIPITOR) 40 MG tablet  Take 1 tablet by mouth daily             furosemide (LASIX) 40 MG tablet  Take 1 tablet by mouth 2 times daily             HYDROcodone-acetaminophen (NORCO) 5-325 MG per tablet  Take 1 tablet by mouth every 6 hours as needed for Pain for up to 3 days. ibuprofen (ADVIL;MOTRIN) 200 MG tablet  Take 400 mg by mouth 2 times daily as needed for Pain             metoprolol succinate (TOPROL XL) 100 MG extended release tablet  Take 1 tablet by mouth daily             pantoprazole (PROTONIX) 40 MG tablet  Take 1 tablet by mouth every morning (before breakfast)             spironolactone (ALDACTONE) 25 MG tablet  Take 1 tablet by mouth 2 times daily             valsartan (DIOVAN) 160 MG tablet  Take 1 tablet by mouth 2 times daily                 Problem List:  Patient Active Problem List   Diagnosis    Hyperlipidemia    Morbid obesity ([facility]    Automatic implantable cardioverter-defibrillator in situ    Shortness of breath    Paroxysmal atrial fibrillation (HCC)    Chest pain    Cardiac arrhythmia    History of ventricular tachycardia    Systolic heart failure, chronic (HCC)    [facility]    Coronary artery disease due to lipid rich plaque    Hypokalemia    Class 3 severe obesity with serious comorbidity and body mass index (BMI) of 45.0 to 49.9 in adult ([facility]    TYLER (obstructive sleep apnea)    Essential hypertension    PAF ([facility]oxysmal [facility] fibrillation) [facility]        Assessment & Plan:    1.  Paroxysmal Atrial Fibrillation   - S/p RFCA with PVI (10/8/20, Dr. Maureen Funes)  - Right groin stable, no hematoma/swelling/oozing  - Will start PPI daily x1 month s/p ablation  - Educated on post ablation discharge instructions/restrictions, pt VU     - Currently in NSR   - Continue Toprol  mg QD   - Will continue amiodarone during post ablation blanking period then stop if no recurrence               ~ ALT 21, AST 23 (8/11/20)      - [id number] score: 4 (Age, Gender, CHF, HTN) ; [id number] Vasc score and anticoagulation discussed. High risk for stroke and thromboembolism. Anticoagulation is recommended. Risk of bleeding was discussed.    ~ On Eliquis 5 mg BID      - Afib risk factors including age, HTN, obesity, inactivity and TYLER were discussed with patient. Risk factor modification recommended                            - Treatment options including cardioversion, rate control strategy, antiarrhythmics, anticoagulation and possible ablation were discussed with patient. Risks, benefits and alternative of each treatment options were explained. All questions answered     2. Hx of AICD shock   - Occurred in setting of AF with RVR and hyperkalemia/fluid overload   - No recurrence   - Continue BB    3. Non-Ischemic cardiomyopathy   - S/p Dual chamber Medtronic AICD (2007 at [facility])   - On OMT   - Follow up with device clinic as scheduled     4. Chronic systolic heart failure (NYHA Class III)   - Appears compensated               ~ EF 40% per JOAN (10/20); 35% per LHC (8/20)   - Continue with Toprol  mg QD, valsartan 160 mg BID, spironolactone 25 mg BID, lasix 40 mg BID  - Aggressive medical therapy with risk factor modification  - Discussed with patient importance of monitoring weight, low sodium diet and fluid restriction    5. HTN  - Controlled: Goal <130/80  - Continue current medications  - Encouraged to monitor and log BP readings at home, then bring log to next visit  - Discussed importance of low sodium diet, weight control and exercise    6.  At

## 2020-10-08 NOTE — FLOWSHEET NOTE
4 Eyes Skin Assessment     NAME:  Masood Maurice  YOB: 1955  MEDICAL RECORD NUMBER:  [id number]    The patient is being assess for  Admission    I agree that 2 RN's have performed a thorough Head to Toe Skin Assessment on the patient. ALL assessment sites listed below have been assessed. Areas assessed by both nurses:   Head, Face, Ears, Shoulders, Back, Chest, Arms, Elbows, Hands, Sacrum. Buttock, Coccyx, Ischium and Legs. Feet and Heels        Does the Patient have a Wound?  No noted wound(s)       Jeremy Prevention initiated:  NA   Wound Care Orders initiated:  NA    Pressure Injury (Stage 3,4, Unstageable, DTI, NWPT, and Complex wounds) if present place consult order under [de-identified] NA    New and Established Ostomies if present place consult order under : No      Nurse 1 eSignature: Electronically signed by Taty Wells RN on 10/8/20 at 6:20 PM EDT    **SHARE this note so that the co-signing nurse is able to place an eSignature**    Nurse 2 eSignature: Electronically signed by Vivian Haddad RN on 10/8/20 at 11:19 PM EDT

## 2020-10-08 NOTE — ANESTHESIA PRE PROCEDURE
Department of Anesthesiology  Preprocedure Note       Name:  Karen Joyner   Age:  76 y.o.  :  1955                                          MRN:  [id number]         Date:  10/8/2020      Surgeon: * Surgery not found *    Procedure:     Medications prior to admission:   Prior to Admission medications    Medication Sig Start Date End Date Taking? Authorizing Provider   valsartan (DIOVAN) 160 MG tablet Take 1 tablet by mouth 2 times daily 20  Yes YEIMY Domingo CNP   ibuprofen (ADVIL;MOTRIN) 200 MG tablet Take 400 mg by mouth 2 times daily as needed for Pain   Yes Historical Provider, MD   atorvastatin (LIPITOR) 40 MG tablet Take 1 tablet by mouth daily 20  Yes YEIMY Domingo CNP   furosemide (LASIX) 40 MG tablet Take 1 tablet by mouth 2 times daily 20  Yes YEIMY Emerson CNP   spironolactone (ALDACTONE) 25 MG tablet Take 1 tablet by mouth 2 times daily 20  Yes YEIMY Emerson CNP   amiodarone (PACERONE) 400 MG tablet Take 1 tablet by mouth 2 times daily for 7 days, THEN 0.5 tablets 2 times daily for 7 days, THEN 0.5 tablets daily.   Patient taking differently: No sig reported 20 Yes YEIMY Chang CNP   metoprolol succinate (TOPROL XL) 100 MG extended release tablet Take 1 tablet by mouth daily 20  Yes YEIMY Chang CNP   apixaban (ELIQUIS) 5 MG TABS tablet Take 1 tablet by mouth 2 times daily 20  Yes YEIMY Chang CNP   aspirin 81 MG EC tablet Take 81 mg by mouth daily   Yes Historical Provider, MD   apixaban (ELIQUIS) 5 MG TABS tablet Take 1 tablet by mouth 2 times daily 20   YEIMY Domingo CNP       Current medications:    Current Facility-Administered Medications   Medication Dose Route Frequency Provider Last Rate Last Dose    HYDROcodone-acetaminophen (NORCO) 5-325 MG per tablet 1 tablet  1 tablet Oral Once PRN Teagan Dawn MD        ondansetron [facility]) injection 4 mg  4 mg Encounters:   10/08/20 127/80   09/21/20 (!) 140/80   08/24/20 (!) 160/82       NPO Status:                                                                                 BMI:   Wt Readings from Last 3 Encounters:   10/08/20 294 lb (133.4 kg)   08/18/20 283 lb 1.1 oz (128.4 kg)   02/29/20 264 lb (119.7 kg)     Body mass index is 47.45 kg/m². CBC:   Lab Results   Component Value Date    WBC 12.1 08/16/2020    RBC 3.99 08/16/2020    HGB 11.7 08/16/2020    HCT 36.9 08/16/2020    MCV 92.5 08/16/2020    RDW 20.0 08/16/2020     08/16/2020       CMP:   Lab Results   Component Value Date     09/21/2020    K 3.6 09/21/2020    K 3.4 08/12/2020     09/21/2020    CO2 26 09/21/2020    BUN 7 09/21/2020    CREATININE 0.6 09/21/2020    GFRAA >60 09/21/2020    AGRATIO 1.1 09/21/2020    LABGLOM >60 09/21/2020    GLUCOSE 99 09/21/2020    PROT 7.4 09/21/2020    CALCIUM 9.3 09/21/2020    BILITOT 0.5 09/21/2020    ALKPHOS 91 09/21/2020    AST 15 09/21/2020    ALT 12 09/21/2020       POC Tests: No results for input(s): POCGLU, POCNA, POCK, POCCL, POCBUN, POCHEMO, POCHCT in the last 72 hours.     Coags:   Lab Results   Component Value Date    PROTIME 12.0 04/05/2019    INR 1.05 04/05/2019    APTT 27.4 04/05/2019       HCG (If Applicable): No results found for: PREGTESTUR, PREGSERUM, HCG, HCGQUANT     ABGs: No results found for: PHART, PO2ART, DDN4COT, JXO8USB, BEART, R7MFTXGF     Type & Screen (If Applicable):  No results found for: LABABO, LABRH    Drug/Infectious Status (If Applicable):  No results found for: HIV, HEPCAB    COVID-19 Screening (If Applicable):   Lab Results   Component Value Date    COVID19 Not Detected 10/08/2020    COVID19 NOT DETECTED 09/25/2020         Anesthesia Evaluation  Patient summary reviewed no history of anesthetic complications:   Airway: Mallampati: III  TM distance: >3 FB   Neck ROM: full  Mouth opening: > = 3 FB Dental:    (+) edentulous      Pulmonary: breath sounds clear to auscultation  (+) sleep apnea (no cpap yet. pt yawning. says she is always tired):                            ROS comment: Likely TYLER no work up   Cardiovascular:    (+) hypertension:, pacemaker: pacemaker and AICD, past MI:, CAD: non-obstructive, dysrhythmias (PAF): atrial fibrillation, CHF (dialated cardiomyopathy):, hyperlipidemia        Rhythm: regular  Rate: normal                 ROS comment: 8/2020  Conclusions      Summary   Left ventricular systolic function is mild to moderately reduced with   ejection fraction estimated at 40%. There is mild concentric left ventricular hypertrophy. Grade I diastolic dysfunction. Normal right ventricular size and function. Pacer / ICD wire is visualized in the right ventricle. Mild mitral regurgitation. Aortic valve appears sclerotic but opens adequately. Mild tricuspid regurgitation. Systolic pulmonary artery pressure (SPAP) is   normal and estimated at 25-30 mmHg. Cardiac cath: 8/13/20:  LM-Normal   LAD-mid 50%  Cx-normal  OM- normal  RCA-dominant normal  RPDA- normal  LVEF- 35  LVG- Global  LVEDP- 25     Neuro/Psych:      (-) seizures, TIA and CVA           GI/Hepatic/Renal:   (+) morbid obesity     (-) GERD       Endo/Other:                     Abdominal:   (+) obese,         Vascular:                                    Anesthesia Plan      general     ASA 3     (Tulio Max for intubation. OETT)  Induction: intravenous. MIPS: Postoperative opioids intended, Prophylactic antiemetics administered and Postoperative trial extubation. Anesthetic plan and risks discussed with patient. Plan discussed with CRNA.                   Diony Telles MD   10/8/2020

## 2020-10-08 NOTE — PROGRESS NOTES
Patient received to PACU in stable condition. VSS. No signs of distress. Dressing to right groin clean dry and intact. Pressure dressing still in place from cathlab. Will continue to monitor.

## 2020-10-08 NOTE — FLOWSHEET NOTE
Pt transported via bed from PACU to [place] [address] room 2902 and connected to CVU monitoring. Pt alert but lethargic. Pt VSS, rt groin site has pressure dressing in place, but no signs of redness or oozing around site. Pulses palpable. No needs expressed at this time. Will monitor pt.  Electronically signed by Tracy Lucia RN on 10/8/2020 at 4:37 PM

## 2020-10-08 NOTE — ANESTHESIA POSTPROCEDURE EVALUATION
Department of Anesthesiology  Postprocedure Note    Patient: Amy Dueñas  MRN: [id number]  YOB: 1955  Date of evaluation: 10/8/2020  Time:  4:37 PM     Procedure Summary     Date:  10/08/20 Room / Location:  [facility] Cath Lab; [facility] Echocardiography    Anesthesia Start:  7006 Anesthesia Stop:  1537    Procedure:  ECHO JOAN IN CARDIAC CATH Diagnosis:       Paroxysmal atrial fibrillation      PAF (paroxysmal atrial fibrillation) ([facility]    Scheduled Providers:   Responsible Provider:      Anesthesia Type:  general ASA Status:  3          Anesthesia Type: general    Starla Phase I: Starla Score: 9    Starla Phase II:      Last vitals: Reviewed and per EMR flowsheets.        Anesthesia Post Evaluation    Patient location during evaluation: PACU  Patient participation: complete - patient participated  Level of consciousness: awake  Airway patency: patent  Nausea & Vomiting: no vomiting  Complications: no  Cardiovascular status: hemodynamically stable  Respiratory status: acceptable  Hydration status: euvolemic

## 2020-10-08 NOTE — H&P
arrhythmia 2020    Chest pain 2020    Shortness of breath 2016    Paroxysmal atrial fibrillation ([facility] 2016    Automatic implantable cardioverter-defibrillator in situ 2015    Hyperlipidemia 2014    Morbid obesity ([facility] 2014      There are no active hospital problems to display for this patient. EC20  NSR, rate 72, QTc 488     ECHO: 20   Left ventricular systolic function is mild to moderately reduced with   ejection fraction estimated at 40%.   There is mild concentric left ventricular hypertrophy.   Grade I diastolic dysfunction.   Normal right ventricular size and function.   Pacer / ICD wire is visualized in the right ventricle.   Mild mitral regurgitation.   Aortic valve appears sclerotic but opens adequately.   Mild tricuspid regurgitation. Systolic pulmonary artery pressure (SPAP) is normal and estimated at 25-30 mmHg.     Cath: 20  Anatomy:   LM-Normal   LAD-mid 50%  Cx-normal  OM- normal  RCA-dominant normal  RPDA- normal  LVEF- 35  LVG- Global  LVEDP- 25     Contrast: 84  Flouro Time: 4.8  Access: R radial     Impression  ~Coronary Angiography w/ nonobstructive CAD  ~LVG with LVEF of 35 and global regional wall motion abnormalities  ~high LVEDP       Assessment:     -Multiple AICD shock   Patient also had hyperkalemia and fluid overload   She is not taking her beta-blocker therapy     Noted to have repeated episodes of atrial fibrillation with rapid ventricular rate. Started on amiodarone therapy plan for short-term amiodarone    Treatment options including ablation were discussed with patient. Risks, benefits and alternative of each treatment options were explained. All questions answered. The risks, benefits and alternatives of the ablation procedure were discussed with the patient.  The risks including, but not limited to, the risks of bleeding, infection, radiation exposure, injury to vascular, cardiac and surrounding structures (including pneumothorax), stroke, cardiac perforation, tamponade, need for emergent open heart surgery, need for pacemaker implantation, Injury to the phrenic nerve, injury to the esophagus, myocardial infarction and death were discussed in detail. The patient opted to proceed with the ablation. Will schedule for outpatient ablation. Resume anticoagulation with Eliquis    - Oral amiodarone loadin mg BID x1 week, then 200 mg BID x1 week, then 200 mg daily    -Nonischemic cardiomyopathy     On optimal medical therapy. Needs follow-up with heart failure clinic    - Acute of chronic systolic heart failure:    Continue with medical therapy with Toprol- mg daily   Valsartan 160 daily   Spironolactone 25 mg daily   Continue with IV diuresis with Lasix   Strict intake output    Morbid obesity: Body mass index is 47.45 kg/m². At risk for TYLER   Needs outpatient sleep study       Multiple medical conditions with risk of decompensation. All questions and concerns were addressed to the patient/family. Alternatives to my treatment were discussed. I have discussed the above stated plan and the patient verbalized understanding and agreed with the plan. NOTE: This report was transcribed using voice recognition software. Every effort was made to ensure accuracy, however, inadvertent computerized transcription errors may be present. Domitila Oviedo MD, MPH  [place] [address]   Office: [phone]     H&P Update    I have reviewed the history and physical and examined the patient and updated with relevant changes. Consent: I have discussed with the patient and/or the patient representative the indication, alternatives, and the possible risks and/or complications of the planned procedure and the anesthesia methods. The patient and/or patient representative appear to understand and agree to proceed.     Vitals:    10/08/20 0912   BP: 127/80   Pulse: 84   Resp: 20   Temp: 98 °F provided by anesthesia team.     Electronically signed by Minh Ariza MD on 10/8/2020 at 11:26 AM

## 2020-10-08 NOTE — PROCEDURES
[facility]     Electrophysiology Procedure Note       Date of Procedure: 10/8/2020  Patient's Name: Matthew Aguila  YOB: 1955   Medical Record Number: [id number]  Referring Physician: Lashell Sullivan MD  Procedure Performed by: Sudha Nicole MD    Procedure performed:     · Electrophysiology study with radiofrequency ablation of atrial fibrillation and pulmonary vein isolation   · Additional ablation of CT isthmus atrial flutter   · 3-D electroanatomical mapping of the left atrium and  right atium. · Transseptal puncture through an intact septum    · Intracardiac echocardiography. · External cardioversion of the atrial arrhythmias   · Interrogation and programming of AICD before and after procedure       Indications for procedure: Symptomatic atrial fibrillation / flutter   Matthew Aguila is a 76 y.o. female presented with AICD shock. Multiple comorbidities including hypertension, hyperlipidemia, morbid obesity,HFrEF, status post AICD in 2007 at [facility] and remote history of MI and cardiac arrest. Interrogation of device revealed episodes of afib/flutter with rapid ventricular rates. She is here for ablation of atrial fibrillation/flutter. Details of Procedure: The risks, benefits and alternatives of the ablation procedure were discussed with the patient. The risks including, but not limited to, the risks of bleeding, infection, radiation exposure, injury to vascular, cardiac and surrounding structures (including pneumothorax), stroke, cardiac perforation, tamponade, need for emergent open heart surgery, need for pacemaker implantation, esophageal injury and fistula, myocardial infarction and death were discussed in detail. The patient opted to proceed with the ablation. Written informed consent was signed and placed in the chart. Patient was brought to the EP lab in a fasting non-sedate state.  Patient underwent general anesthesia by anesthesia team. We initially performed a veins were isolated. The power was limited to 30W with low flow, on the posterior wall and careful monitoring of esophageal temperature was done to prevent injury to esophagus. On the right side before ablating the anterior wall high amp pacing was performed to check and locate phrenic capture and avoid injury during ablation. Patient has history of atrial flutter. Radiofrequency lesions were delivered in a linear fashion to Cavotricuspid isthmus. While maintaining pacing from the proximal coronary sinus, additional lesions were delivered to the isthmus until bidirectional block was observed. Bidirectional block was confirmed by noting split potentials along the ablation line (> 100 ms) in addition to trans-isthmus conduction time of >150 ms. Differential pacing from medial and lateral side of the line also confirmed bidirectional block. Isuprel IV was started to check for induction of any other arrhythmia. Burst pacing up to 250 msec and programmed stimulation with 450/300/200 did not induce any sustained arrhythmia. Burst pacing and programmed stimulation with up to three premature atrial stimuli did not induce any sustained arrhythmia. Pacing with high output over the ablation lines was performed and areas that had captures on the line was further ablated till no further capture noted. Exit block was checked with high amp pacing inside and over the ablation lines. Both entrance and exit block was confirmed and pacing maneuvers. After ablation EP study and programmed stimulation was performed to rule out any other arrhythmia. The ablation catheter were moved from the left atrium to the left ventricular apex and His bundle position.  His bundle potentials was recorded and pacing and recordings were performed from right atrium, coronary sinus and LV apex with the following results:     AH interval was 73  msec  HV interval was 52  msec  Pacing from right atrium, 1:1 conduction over AV node with (AV wenckebach) was 420 msec  Pacing from atrium, AV ananya ERP was 450/300/200 msec   Atrial ERP was 450/200 ms  Pacing from LV apex, 1:1 retrograde conduction over AV node (VA wenckebach) was 340 msec    At the end of the procedure, using ICE we confirmed the lack of any pericardial effusion. Protamine was given to reverse the heapin. Figure of 8 suture was used and sheaths were removed using manual compression. The patient tolerated the procedure well and there were no complications. Patient was extubated and transferred to the floor in stable condition. EBL less than 20 ml. Plan:   The patient will be observed overnight. He will receive usual post ablation care.      Melina Jain MD, MPH  [facility] [place], operated by [facility]   Office: [phone]

## 2020-10-09 VITALS
HEART RATE: 80 BPM | HEIGHT: 66 IN | BODY MASS INDEX: 45.25 KG/M2 | SYSTOLIC BLOOD PRESSURE: 134 MMHG | TEMPERATURE: 97.5 F | WEIGHT: 281.53 LBS | OXYGEN SATURATION: 96 % | DIASTOLIC BLOOD PRESSURE: 60 MMHG | RESPIRATION RATE: 17 BRPM

## 2020-10-09 LAB
ANION GAP SERPL CALCULATED.3IONS-SCNC: 13 MMOL/L (ref 3–16)
BUN BLDV-MCNC: 10 MG/DL (ref 7–20)
CALCIUM SERPL-MCNC: 8.9 MG/DL (ref 8.3–10.6)
CHLORIDE BLD-SCNC: 103 MMOL/L (ref 99–110)
CO2: 22 MMOL/L (ref 21–32)
CREAT SERPL-MCNC: 0.6 MG/DL (ref 0.6–1.2)
EKG ATRIAL RATE: 78 BPM
EKG DIAGNOSIS: NORMAL
EKG P AXIS: 69 DEGREES
EKG P-R INTERVAL: 168 MS
EKG Q-T INTERVAL: 440 MS
EKG QRS DURATION: 92 MS
EKG QTC CALCULATION (BAZETT): 501 MS
EKG R AXIS: -4 DEGREES
EKG T AXIS: 44 DEGREES
EKG VENTRICULAR RATE: 78 BPM
GFR AFRICAN AMERICAN: >60
GFR NON-AFRICAN AMERICAN: >60
GLUCOSE BLD-MCNC: 142 MG/DL (ref 70–99)
HCT VFR BLD CALC: 39.2 % (ref 36–48)
HEMOGLOBIN: 12.5 G/DL (ref 12–16)
MCH RBC QN AUTO: 28.7 PG (ref 26–34)
MCHC RBC AUTO-ENTMCNC: 31.8 G/DL (ref 31–36)
MCV RBC AUTO: 90.3 FL (ref 80–100)
PDW BLD-RTO: 15.9 % (ref 12.4–15.4)
PLATELET # BLD: 266 K/UL (ref 135–450)
PMV BLD AUTO: 8.3 FL (ref 5–10.5)
POTASSIUM SERPL-SCNC: 4.2 MMOL/L (ref 3.5–5.1)
RBC # BLD: 4.34 M/UL (ref 4–5.2)
SODIUM BLD-SCNC: 138 MMOL/L (ref 136–145)
WBC # BLD: 16 K/UL (ref 4–11)

## 2020-10-09 PROCEDURE — 6370000000 HC RX 637 (ALT 250 FOR IP): Performed by: NURSE PRACTITIONER

## 2020-10-09 PROCEDURE — 93005 ELECTROCARDIOGRAM TRACING: CPT | Performed by: INTERNAL MEDICINE

## 2020-10-09 PROCEDURE — 93010 ELECTROCARDIOGRAM REPORT: CPT | Performed by: INTERNAL MEDICINE

## 2020-10-09 PROCEDURE — 85027 COMPLETE CBC AUTOMATED: CPT

## 2020-10-09 PROCEDURE — 2580000003 HC RX 258: Performed by: INTERNAL MEDICINE

## 2020-10-09 PROCEDURE — 94760 N-INVAS EAR/PLS OXIMETRY 1: CPT

## 2020-10-09 PROCEDURE — 6370000000 HC RX 637 (ALT 250 FOR IP): Performed by: INTERNAL MEDICINE

## 2020-10-09 PROCEDURE — 80048 BASIC METABOLIC PNL TOTAL CA: CPT

## 2020-10-09 PROCEDURE — 99217 PR OBSERVATION CARE DISCHARGE MANAGEMENT: CPT | Performed by: NURSE PRACTITIONER

## 2020-10-09 RX ORDER — AMIODARONE HYDROCHLORIDE 400 MG/1
200 TABLET ORAL DAILY
Qty: 59 TABLET | Refills: 0 | Status: SHIPPED
Start: 2020-10-09 | End: 2021-08-30 | Stop reason: SDUPTHER

## 2020-10-09 RX ORDER — PANTOPRAZOLE SODIUM 40 MG/1
40 TABLET, DELAYED RELEASE ORAL
Qty: 30 TABLET | Refills: 0 | Status: SHIPPED | OUTPATIENT
Start: 2020-10-09 | End: 2022-01-27

## 2020-10-09 RX ORDER — HYDROCODONE BITARTRATE AND ACETAMINOPHEN 5; 325 MG/1; MG/1
1 TABLET ORAL EVERY 6 HOURS PRN
Qty: 12 TABLET | Refills: 0 | Status: SHIPPED | OUTPATIENT
Start: 2020-10-09 | End: 2020-10-12

## 2020-10-09 RX ORDER — LIDOCAINE HYDROCHLORIDE 10 MG/ML
1 INJECTION, SOLUTION EPIDURAL; INFILTRATION; INTRACAUDAL; PERINEURAL
Status: DISCONTINUED | OUTPATIENT
Start: 2020-10-09 | End: 2020-10-09 | Stop reason: HOSPADM

## 2020-10-09 RX ORDER — SODIUM CHLORIDE 9 MG/ML
INJECTION, SOLUTION INTRAVENOUS CONTINUOUS
Status: DISCONTINUED | OUTPATIENT
Start: 2020-10-09 | End: 2020-10-09 | Stop reason: HOSPADM

## 2020-10-09 RX ADMIN — TRAMADOL HYDROCHLORIDE 50 MG: 50 TABLET, FILM COATED ORAL at 09:21

## 2020-10-09 RX ADMIN — Medication 10 ML: at 09:22

## 2020-10-09 RX ADMIN — HYDROCODONE BITARTRATE AND ACETAMINOPHEN 1 TABLET: 5; 325 TABLET ORAL at 04:51

## 2020-10-09 RX ADMIN — APIXABAN 5 MG: 5 TABLET, FILM COATED ORAL at 09:21

## 2020-10-09 RX ADMIN — ASPIRIN 81 MG: 81 TABLET, COATED ORAL at 09:21

## 2020-10-09 RX ADMIN — METOPROLOL SUCCINATE 100 MG: 50 TABLET, EXTENDED RELEASE ORAL at 09:21

## 2020-10-09 RX ADMIN — AMIODARONE HYDROCHLORIDE 200 MG: 200 TABLET ORAL at 09:21

## 2020-10-09 RX ADMIN — FUROSEMIDE 40 MG: 40 TABLET ORAL at 09:21

## 2020-10-09 RX ADMIN — SPIRONOLACTONE 25 MG: 25 TABLET ORAL at 09:21

## 2020-10-09 RX ADMIN — VALSARTAN 160 MG: 160 TABLET, FILM COATED ORAL at 09:22

## 2020-10-09 ASSESSMENT — PAIN DESCRIPTION - LOCATION
LOCATION: GROIN
LOCATION: GROIN

## 2020-10-09 ASSESSMENT — PAIN SCALES - GENERAL
PAINLEVEL_OUTOF10: 2
PAINLEVEL_OUTOF10: 6
PAINLEVEL_OUTOF10: 8
PAINLEVEL_OUTOF10: 5
PAINLEVEL_OUTOF10: 4
PAINLEVEL_OUTOF10: 0
PAINLEVEL_OUTOF10: 2
PAINLEVEL_OUTOF10: 6

## 2020-10-09 ASSESSMENT — PAIN DESCRIPTION - ORIENTATION
ORIENTATION: RIGHT
ORIENTATION: RIGHT

## 2020-10-09 ASSESSMENT — PAIN DESCRIPTION - PAIN TYPE
TYPE: SURGICAL PAIN
TYPE: SURGICAL PAIN

## 2020-10-09 NOTE — PROGRESS NOTES
CLINICAL PHARMACY NOTE: MEDS TO [facility] Patient?: No  Total # of Prescriptions Filled: 2   The following medications were delivered to the patient:  · Pantoprazole  · Hydrocodone/APAP  Total # of Interventions Completed: 0  Time Spent (min): 60    Additional Documentation:    Sister picked up in [address] [place] [address]

## 2020-10-09 NOTE — PROGRESS NOTES
Dumont discontinued, 250 UO drained, pt tolerated well, notified pt to save first void, hat placed on seat, pt acknowledges understanding, will monitor.

## 2020-10-09 NOTE — PLAN OF CARE
Problem: Pain:  Description: Pain management should include both nonpharmacologic and pharmacologic interventions.   Goal: Pain level will decrease  Description: Pain level will decrease  Outcome: Completed  Goal: Control of acute pain  Description: Control of acute pain  Outcome: Completed  Goal: Control of chronic pain  Description: Control of chronic pain  Outcome: Completed

## 2020-10-09 NOTE — PROGRESS NOTES
Figure 8 stitch removed from rt groin per protocol, site soft, no hematoma noted, no bleeding, cleansed area with chloroprep, applied tegaderm, pedal pulses palpable, VSS, pt tolerated well, will monitor.

## 2020-10-09 NOTE — PROGRESS NOTES
Social work consult ordered and note left for Barbra Marte, pt is requesting to be set up to have a shower chair at home, will monitor.

## 2021-05-30 ENCOUNTER — HOSPITAL ENCOUNTER (INPATIENT)
Age: 66
LOS: 3 days | Discharge: HOME HEALTH | DRG: 308 | End: 2021-06-02
Attending: INTERNAL MEDICINE | Admitting: INTERNAL MEDICINE
Payer: MEDICARE

## 2021-05-30 ENCOUNTER — APPOINTMENT (OUTPATIENT)
Dept: GENERAL RADIOLOGY | Age: 66
DRG: 308 | End: 2021-05-30
Payer: MEDICARE

## 2021-05-30 DIAGNOSIS — I10 ACCELERATED HYPERTENSION: ICD-10-CM

## 2021-05-30 DIAGNOSIS — R53.1 GENERAL WEAKNESS: ICD-10-CM

## 2021-05-30 DIAGNOSIS — S63.502A SPRAIN OF LEFT WRIST, INITIAL ENCOUNTER: ICD-10-CM

## 2021-05-30 DIAGNOSIS — R07.9 ACUTE CHEST PAIN: Primary | ICD-10-CM

## 2021-05-30 DIAGNOSIS — R60.9 PERIPHERAL EDEMA: ICD-10-CM

## 2021-05-30 LAB
A/G RATIO: 0.9 (ref 1.1–2.2)
ALBUMIN SERPL-MCNC: 3.7 G/DL (ref 3.4–5)
ALP BLD-CCNC: 91 U/L (ref 40–129)
ALT SERPL-CCNC: 13 U/L (ref 10–40)
ANION GAP SERPL CALCULATED.3IONS-SCNC: 15 MMOL/L (ref 3–16)
AST SERPL-CCNC: 22 U/L (ref 15–37)
BASOPHILS ABSOLUTE: 0.1 K/UL (ref 0–0.2)
BASOPHILS RELATIVE PERCENT: 0.5 %
BILIRUB SERPL-MCNC: 0.6 MG/DL (ref 0–1)
BUN BLDV-MCNC: 7 MG/DL (ref 7–20)
CALCIUM SERPL-MCNC: 9.4 MG/DL (ref 8.3–10.6)
CHLORIDE BLD-SCNC: 99 MMOL/L (ref 99–110)
CO2: 25 MMOL/L (ref 21–32)
CREAT SERPL-MCNC: 0.7 MG/DL (ref 0.6–1.2)
D DIMER: 953 NG/ML DDU (ref 0–229)
EOSINOPHILS ABSOLUTE: 0.1 K/UL (ref 0–0.6)
EOSINOPHILS RELATIVE PERCENT: 1 %
GFR AFRICAN AMERICAN: >60
GFR NON-AFRICAN AMERICAN: >60
GLOBULIN: 4.1 G/DL
GLUCOSE BLD-MCNC: 99 MG/DL (ref 70–99)
HCT VFR BLD CALC: 42.6 % (ref 36–48)
HEMOGLOBIN: 13.5 G/DL (ref 12–16)
LYMPHOCYTES ABSOLUTE: 1.8 K/UL (ref 1–5.1)
LYMPHOCYTES RELATIVE PERCENT: 13.1 %
MCH RBC QN AUTO: 27.4 PG (ref 26–34)
MCHC RBC AUTO-ENTMCNC: 31.8 G/DL (ref 31–36)
MCV RBC AUTO: 86.3 FL (ref 80–100)
MONOCYTES ABSOLUTE: 0.8 K/UL (ref 0–1.3)
MONOCYTES RELATIVE PERCENT: 6.2 %
NEUTROPHILS ABSOLUTE: 10.8 K/UL (ref 1.7–7.7)
NEUTROPHILS RELATIVE PERCENT: 79.2 %
PDW BLD-RTO: 15 % (ref 12.4–15.4)
PLATELET # BLD: 355 K/UL (ref 135–450)
PMV BLD AUTO: 7.8 FL (ref 5–10.5)
POTASSIUM SERPL-SCNC: 3.5 MMOL/L (ref 3.5–5.1)
PRO-BNP: 198 PG/ML (ref 0–124)
RBC # BLD: 4.94 M/UL (ref 4–5.2)
SODIUM BLD-SCNC: 139 MMOL/L (ref 136–145)
TOTAL PROTEIN: 7.8 G/DL (ref 6.4–8.2)
TROPONIN: <0.01 NG/ML
WBC # BLD: 13.6 K/UL (ref 4–11)

## 2021-05-30 PROCEDURE — 84484 ASSAY OF TROPONIN QUANT: CPT

## 2021-05-30 PROCEDURE — 94760 N-INVAS EAR/PLS OXIMETRY 1: CPT

## 2021-05-30 PROCEDURE — 6370000000 HC RX 637 (ALT 250 FOR IP): Performed by: INTERNAL MEDICINE

## 2021-05-30 PROCEDURE — 85379 FIBRIN DEGRADATION QUANT: CPT

## 2021-05-30 PROCEDURE — 80053 COMPREHEN METABOLIC PANEL: CPT

## 2021-05-30 PROCEDURE — 99283 EMERGENCY DEPT VISIT LOW MDM: CPT

## 2021-05-30 PROCEDURE — 99222 1ST HOSP IP/OBS MODERATE 55: CPT | Performed by: INTERNAL MEDICINE

## 2021-05-30 PROCEDURE — 83880 ASSAY OF NATRIURETIC PEPTIDE: CPT

## 2021-05-30 PROCEDURE — 2580000003 HC RX 258: Performed by: INTERNAL MEDICINE

## 2021-05-30 PROCEDURE — 6370000000 HC RX 637 (ALT 250 FOR IP): Performed by: PHYSICIAN ASSISTANT

## 2021-05-30 PROCEDURE — 80151 DRUG ASSAY AMIODARONE: CPT

## 2021-05-30 PROCEDURE — 93005 ELECTROCARDIOGRAM TRACING: CPT | Performed by: INTERNAL MEDICINE

## 2021-05-30 PROCEDURE — 73110 X-RAY EXAM OF WRIST: CPT

## 2021-05-30 PROCEDURE — 36415 COLL VENOUS BLD VENIPUNCTURE: CPT

## 2021-05-30 PROCEDURE — 71045 X-RAY EXAM CHEST 1 VIEW: CPT

## 2021-05-30 PROCEDURE — 85025 COMPLETE CBC W/AUTO DIFF WBC: CPT

## 2021-05-30 PROCEDURE — 1200000000 HC SEMI PRIVATE

## 2021-05-30 RX ORDER — AMIODARONE HYDROCHLORIDE 200 MG/1
200 TABLET ORAL DAILY
Status: DISCONTINUED | OUTPATIENT
Start: 2021-05-30 | End: 2021-06-02 | Stop reason: HOSPADM

## 2021-05-30 RX ORDER — 0.9 % SODIUM CHLORIDE 0.9 %
500 INTRAVENOUS SOLUTION INTRAVENOUS PRN
Status: DISCONTINUED | OUTPATIENT
Start: 2021-05-30 | End: 2021-06-02 | Stop reason: HOSPADM

## 2021-05-30 RX ORDER — POTASSIUM CHLORIDE 7.45 MG/ML
10 INJECTION INTRAVENOUS PRN
Status: DISCONTINUED | OUTPATIENT
Start: 2021-05-30 | End: 2021-05-30 | Stop reason: SDUPTHER

## 2021-05-30 RX ORDER — ASPIRIN 81 MG/1
81 TABLET ORAL DAILY
Status: DISCONTINUED | OUTPATIENT
Start: 2021-05-30 | End: 2021-05-30 | Stop reason: SDUPTHER

## 2021-05-30 RX ORDER — IBUPROFEN 400 MG/1
400 TABLET ORAL 2 TIMES DAILY PRN
Status: DISCONTINUED | OUTPATIENT
Start: 2021-05-30 | End: 2021-06-02 | Stop reason: HOSPADM

## 2021-05-30 RX ORDER — SODIUM CHLORIDE 0.9 % (FLUSH) 0.9 %
10 SYRINGE (ML) INJECTION PRN
Status: DISCONTINUED | OUTPATIENT
Start: 2021-05-30 | End: 2021-06-02 | Stop reason: HOSPADM

## 2021-05-30 RX ORDER — POTASSIUM CHLORIDE 20 MEQ/1
40 TABLET, EXTENDED RELEASE ORAL PRN
Status: DISCONTINUED | OUTPATIENT
Start: 2021-05-30 | End: 2021-06-02 | Stop reason: HOSPADM

## 2021-05-30 RX ORDER — NITROGLYCERIN 0.4 MG/1
0.4 TABLET SUBLINGUAL EVERY 5 MIN PRN
Status: DISCONTINUED | OUTPATIENT
Start: 2021-05-30 | End: 2021-06-02 | Stop reason: HOSPADM

## 2021-05-30 RX ORDER — METOPROLOL SUCCINATE 50 MG/1
100 TABLET, EXTENDED RELEASE ORAL DAILY
Status: DISCONTINUED | OUTPATIENT
Start: 2021-05-30 | End: 2021-06-02 | Stop reason: HOSPADM

## 2021-05-30 RX ORDER — SPIRONOLACTONE 25 MG/1
25 TABLET ORAL 2 TIMES DAILY
Status: DISCONTINUED | OUTPATIENT
Start: 2021-05-30 | End: 2021-06-01

## 2021-05-30 RX ORDER — SODIUM CHLORIDE 0.9 % (FLUSH) 0.9 %
5-40 SYRINGE (ML) INJECTION EVERY 12 HOURS SCHEDULED
Status: DISCONTINUED | OUTPATIENT
Start: 2021-05-30 | End: 2021-06-02 | Stop reason: HOSPADM

## 2021-05-30 RX ORDER — ISOSORBIDE MONONITRATE 30 MG/1
30 TABLET, EXTENDED RELEASE ORAL DAILY
Status: DISCONTINUED | OUTPATIENT
Start: 2021-05-31 | End: 2021-06-02 | Stop reason: HOSPADM

## 2021-05-30 RX ORDER — ACETAMINOPHEN 650 MG/1
650 SUPPOSITORY RECTAL EVERY 6 HOURS PRN
Status: DISCONTINUED | OUTPATIENT
Start: 2021-05-30 | End: 2021-06-02 | Stop reason: HOSPADM

## 2021-05-30 RX ORDER — ACETAMINOPHEN 325 MG/1
650 TABLET ORAL EVERY 6 HOURS PRN
Status: DISCONTINUED | OUTPATIENT
Start: 2021-05-30 | End: 2021-06-02 | Stop reason: HOSPADM

## 2021-05-30 RX ORDER — POTASSIUM CHLORIDE 7.45 MG/ML
10 INJECTION INTRAVENOUS PRN
Status: DISCONTINUED | OUTPATIENT
Start: 2021-05-30 | End: 2021-06-02 | Stop reason: HOSPADM

## 2021-05-30 RX ORDER — POTASSIUM CHLORIDE 20 MEQ/1
40 TABLET, EXTENDED RELEASE ORAL PRN
Status: DISCONTINUED | OUTPATIENT
Start: 2021-05-30 | End: 2021-05-30 | Stop reason: SDUPTHER

## 2021-05-30 RX ORDER — HYDROCODONE BITARTRATE AND ACETAMINOPHEN 5; 325 MG/1; MG/1
1 TABLET ORAL EVERY 6 HOURS PRN
Status: DISCONTINUED | OUTPATIENT
Start: 2021-05-30 | End: 2021-06-02 | Stop reason: HOSPADM

## 2021-05-30 RX ORDER — ASPIRIN 81 MG/1
324 TABLET, CHEWABLE ORAL ONCE
Status: COMPLETED | OUTPATIENT
Start: 2021-05-30 | End: 2021-05-30

## 2021-05-30 RX ORDER — FUROSEMIDE 10 MG/ML
40 INJECTION INTRAMUSCULAR; INTRAVENOUS 2 TIMES DAILY
Status: DISCONTINUED | OUTPATIENT
Start: 2021-05-31 | End: 2021-06-01

## 2021-05-30 RX ORDER — HYDRALAZINE HYDROCHLORIDE 20 MG/ML
10 INJECTION INTRAMUSCULAR; INTRAVENOUS EVERY 6 HOURS PRN
Status: DISCONTINUED | OUTPATIENT
Start: 2021-05-30 | End: 2021-06-02 | Stop reason: HOSPADM

## 2021-05-30 RX ORDER — ONDANSETRON 2 MG/ML
4 INJECTION INTRAMUSCULAR; INTRAVENOUS EVERY 6 HOURS PRN
Status: DISCONTINUED | OUTPATIENT
Start: 2021-05-30 | End: 2021-06-02 | Stop reason: HOSPADM

## 2021-05-30 RX ORDER — SODIUM CHLORIDE 9 MG/ML
25 INJECTION, SOLUTION INTRAVENOUS PRN
Status: DISCONTINUED | OUTPATIENT
Start: 2021-05-30 | End: 2021-06-02 | Stop reason: HOSPADM

## 2021-05-30 RX ORDER — FUROSEMIDE 40 MG/1
40 TABLET ORAL 2 TIMES DAILY
Status: DISCONTINUED | OUTPATIENT
Start: 2021-05-30 | End: 2021-06-02 | Stop reason: HOSPADM

## 2021-05-30 RX ORDER — ATORVASTATIN CALCIUM 40 MG/1
40 TABLET, FILM COATED ORAL DAILY
Status: DISCONTINUED | OUTPATIENT
Start: 2021-05-30 | End: 2021-05-30 | Stop reason: SDUPTHER

## 2021-05-30 RX ORDER — ATORVASTATIN CALCIUM 80 MG/1
40 TABLET, FILM COATED ORAL NIGHTLY
Status: DISCONTINUED | OUTPATIENT
Start: 2021-05-30 | End: 2021-06-02 | Stop reason: HOSPADM

## 2021-05-30 RX ORDER — HYDROCODONE BITARTRATE AND ACETAMINOPHEN 5; 325 MG/1; MG/1
1 TABLET ORAL ONCE
Status: COMPLETED | OUTPATIENT
Start: 2021-05-30 | End: 2021-05-30

## 2021-05-30 RX ORDER — PANTOPRAZOLE SODIUM 40 MG/1
40 TABLET, DELAYED RELEASE ORAL
Status: DISCONTINUED | OUTPATIENT
Start: 2021-05-31 | End: 2021-06-02 | Stop reason: HOSPADM

## 2021-05-30 RX ORDER — PROMETHAZINE HYDROCHLORIDE 25 MG/1
12.5 TABLET ORAL EVERY 6 HOURS PRN
Status: DISCONTINUED | OUTPATIENT
Start: 2021-05-30 | End: 2021-06-02 | Stop reason: HOSPADM

## 2021-05-30 RX ORDER — VALSARTAN 160 MG/1
160 TABLET ORAL 2 TIMES DAILY
Status: DISCONTINUED | OUTPATIENT
Start: 2021-05-30 | End: 2021-06-02 | Stop reason: HOSPADM

## 2021-05-30 RX ADMIN — ASPIRIN 324 MG: 81 TABLET, CHEWABLE ORAL at 13:54

## 2021-05-30 RX ADMIN — AMIODARONE HYDROCHLORIDE 200 MG: 200 TABLET ORAL at 16:10

## 2021-05-30 RX ADMIN — HYDROCODONE BITARTRATE AND ACETAMINOPHEN 1 TABLET: 5; 325 TABLET ORAL at 20:20

## 2021-05-30 RX ADMIN — SPIRONOLACTONE 25 MG: 25 TABLET ORAL at 17:39

## 2021-05-30 RX ADMIN — VALSARTAN 160 MG: 160 TABLET, FILM COATED ORAL at 20:20

## 2021-05-30 RX ADMIN — METOPROLOL SUCCINATE 100 MG: 50 TABLET, EXTENDED RELEASE ORAL at 16:09

## 2021-05-30 RX ADMIN — HYDROCODONE BITARTRATE AND ACETAMINOPHEN 1 TABLET: 5; 325 TABLET ORAL at 13:53

## 2021-05-30 RX ADMIN — FUROSEMIDE 40 MG: 40 TABLET ORAL at 17:39

## 2021-05-30 RX ADMIN — APIXABAN 5 MG: 5 TABLET, FILM COATED ORAL at 20:20

## 2021-05-30 RX ADMIN — Medication 10 ML: at 20:21

## 2021-05-30 RX ADMIN — ATORVASTATIN CALCIUM 40 MG: 80 TABLET, FILM COATED ORAL at 20:20

## 2021-05-30 RX ADMIN — NITROGLYCERIN 1 INCH: 20 OINTMENT TOPICAL at 13:55

## 2021-05-30 ASSESSMENT — ENCOUNTER SYMPTOMS
BACK PAIN: 0
ABDOMINAL PAIN: 0
EYE REDNESS: 0
COLOR CHANGE: 0
VOMITING: 0
NAUSEA: 0
EYE ITCHING: 0
CONSTIPATION: 0
PHOTOPHOBIA: 0
COUGH: 0
EYE PAIN: 0
EYE DISCHARGE: 0
STRIDOR: 0
WHEEZING: 0
DIARRHEA: 0
ABDOMINAL DISTENTION: 0
SHORTNESS OF BREATH: 1

## 2021-05-30 ASSESSMENT — PAIN DESCRIPTION - FREQUENCY: FREQUENCY: CONTINUOUS

## 2021-05-30 ASSESSMENT — PAIN SCALES - GENERAL
PAINLEVEL_OUTOF10: 9
PAINLEVEL_OUTOF10: 0
PAINLEVEL_OUTOF10: 8
PAINLEVEL_OUTOF10: 7

## 2021-05-30 ASSESSMENT — PAIN DESCRIPTION - ORIENTATION: ORIENTATION: RIGHT;LEFT

## 2021-05-30 ASSESSMENT — PAIN - FUNCTIONAL ASSESSMENT: PAIN_FUNCTIONAL_ASSESSMENT: PREVENTS OR INTERFERES SOME ACTIVE ACTIVITIES AND ADLS

## 2021-05-30 ASSESSMENT — PAIN DESCRIPTION - LOCATION
LOCATION: FOOT
LOCATION: ANKLE;FOOT

## 2021-05-30 ASSESSMENT — PAIN DESCRIPTION - DESCRIPTORS: DESCRIPTORS: THROBBING

## 2021-05-30 ASSESSMENT — PAIN DESCRIPTION - PAIN TYPE: TYPE: ACUTE PAIN

## 2021-05-30 NOTE — ED PROVIDER NOTES
photophobia, pain, discharge, redness, itching and visual disturbance. Respiratory: Positive for shortness of breath. Negative for cough, wheezing and stridor. Cardiovascular: Positive for chest pain and leg swelling. Negative for palpitations. Gastrointestinal: Negative for abdominal distention, abdominal pain, constipation, diarrhea, nausea and vomiting. Endocrine: Negative. Genitourinary: Negative. Musculoskeletal: Positive for myalgias. Negative for back pain, neck pain and neck stiffness. Skin: Negative for color change, pallor, rash and wound. Neurological: Positive for weakness. Negative for dizziness, tremors, seizures, syncope, facial asymmetry, speech difficulty, light-headedness, numbness and headaches. Psychiatric/Behavioral: Negative for confusion. All other systems reviewed and are negative. Positives and Pertinent negatives as per HPI. Except as noted above in the ROS, all other systems were reviewed and negative.        PAST MEDICAL HISTORY     Past Medical History:   Diagnosis Date    AICD (automatic cardioverter/defibrillator) present     Arthritis     [facility]     Gout     Hyperlipidemia     Hypertension     MI (myocardial infarction) ([facility]          SURGICAL HISTORY     Past Surgical History:   Procedure Laterality Date    CARDIAC DEFIBRILLATOR PLACEMENT      DILATION AND CURETTAGE OF UTERUS           CURRENTMEDICATIONS       Previous Medications    AMIODARONE (PACERONE) 400 MG TABLET    Take 0.5 tablets by mouth daily    APIXABAN (ELIQUIS) 5 MG TABS TABLET    Take 1 tablet by mouth 2 times daily    ASPIRIN 81 MG EC TABLET    Take 81 mg by mouth daily    ATORVASTATIN (LIPITOR) 40 MG TABLET    Take 1 tablet by mouth daily    FUROSEMIDE (LASIX) 40 MG TABLET    Take 1 tablet by mouth 2 times daily    IBUPROFEN (ADVIL;MOTRIN) 200 MG TABLET    Take 400 mg by mouth 2 times daily as needed for Pain    METOPROLOL SUCCINATE (TOPROL XL) 100 MG EXTENDED RELEASE TABLET    Take 1 tablet by mouth daily    PANTOPRAZOLE (PROTONIX) 40 MG TABLET    Take 1 tablet by mouth every morning (before breakfast)    SPIRONOLACTONE (ALDACTONE) 25 MG TABLET    Take 1 tablet by mouth 2 times daily    VALSARTAN (DIOVAN) 160 MG TABLET    Take 1 tablet by mouth 2 times daily         ALLERGIES     Patient has no known allergies. FAMILYHISTORY       Family History   Problem Relation Age of Onset    Heart Disease Mother     High Blood Pressure Mother     High Cholesterol Mother     Diabetes Mother     Cancer Father           SOCIAL HISTORY       Social History     Tobacco Use    Smoking status: Never Smoker    Smokeless tobacco: Never Used    Tobacco comment: QUIT AS A TEEN   Vaping Use    Vaping Use: Never used   Substance Use Topics    Alcohol use: No     Comment: RARE    Drug use: No       SCREENINGS             PHYSICAL EXAM    (up to 7 for level 4, 8 or more for level 5)     ED Triage Vitals [05/30/21 1138]   BP Temp Temp Source Pulse Resp SpO2 Height Weight   (!) 202/103 98.8 °F (37.1 °C) Oral 102 24 94 % 5' 6\" (1.676 m) 270 lb (122.5 kg)       Physical Exam  Vitals and nursing note reviewed. Constitutional:       Appearance: Normal appearance. She is well-developed. She is obese. She is not toxic-appearing or diaphoretic. HENT:      Head: Normocephalic and atraumatic. Right Ear: External ear normal.      Left Ear: External ear normal.      Nose: Nose normal.      Mouth/Throat:      Mouth: Mucous membranes are moist.      Pharynx: Oropharynx is clear. Eyes:      General: No scleral icterus. Right eye: No discharge. Left eye: No discharge. Extraocular Movements: Extraocular movements intact. Conjunctiva/sclera: Conjunctivae normal.      Pupils: Pupils are equal, round, and reactive to light. Cardiovascular:      Rate and Rhythm: Normal rate. Pulmonary:      Effort: Pulmonary effort is normal.      Breath sounds: No stridor. No wheezing or rhonchi. Abdominal:      General: Bowel sounds are normal.      Palpations: Abdomen is soft. Tenderness: There is no abdominal tenderness. There is no right CVA tenderness or left CVA tenderness. Musculoskeletal:         General: Normal range of motion. Right shoulder: Normal.      Left shoulder: Normal.      Right upper arm: Normal.      Left upper arm: Normal.      Right elbow: Normal.      Left elbow: Normal.      Right forearm: Normal.      Left forearm: Normal.      Right wrist: Normal.      Left wrist: Tenderness present. Right hand: Normal.      Left hand: Normal.      Cervical back: Normal and normal range of motion. Thoracic back: Normal.      Lumbar back: Normal.      Right knee: Normal.      Left knee: Normal.      Right lower le+ Edema present. Left lower le+ Edema present. Right ankle: Normal.      Left ankle: Normal.      Right foot: Normal.      Left foot: Normal.   Skin:     General: Skin is warm and dry. Capillary Refill: Capillary refill takes less than 2 seconds. Coloration: Skin is not jaundiced or pale. Findings: No bruising, erythema, lesion or rash. Neurological:      General: No focal deficit present. Mental Status: She is alert and oriented to person, place, and time. Cranial Nerves: No cranial nerve deficit (II-XII intact). Sensory: No sensory deficit. Motor: Weakness: generalized weakness, worse in the lower extremities without foot drop, saddle paresthesia or positive SLR. Deep Tendon Reflexes: Reflexes normal.      Comments: No pronator drift, facial droop or slurred speech. Normal finger to nose coordination. Normal rapid alternating hand movement. Normal heel to shin coordination   [place] negative without nystagmus.    Psychiatric:         Mood and Affect: Mood normal.         Behavior: Behavior normal.         DIAGNOSTIC RESULTS   LABS:    Labs Reviewed   CBC WITH AUTO DIFFERENTIAL - Abnormal; Notable for the following components:       Result Value    WBC 13.6 (*)     Neutrophils Absolute 10.8 (*)     All other components within normal limits    Narrative:     Performed at:  [facility] [address], [facility], [address] [facility]   Phone [phone]   COMPREHENSIVE METABOLIC PANEL - Abnormal; Notable for the following components:    Albumin/Globulin Ratio 0.9 (*)     All other components within normal limits    Narrative:     Performed at:  [facility] [address], [facility], [address] [facility]   Phone [phone]   BRAIN NATRIURETIC PEPTIDE - Abnormal; Notable for the following components:    Pro- (*)     All other components within normal limits    Narrative:     Performed at:  [facility] [address], [facility], [address] [facility]   Phone [phone]   TROPONIN    Narrative:     Performed at:  [facility] [address], [facility], [address] [facility]   Phone [phone]   AMIODARONE LEVEL       All other labs were within normal range or not returned as of this dictation. EKG: All EKG's are interpreted by the Emergency Department Physician in the absence of a cardiologist.  Please see their note for interpretation of EKG. RADIOLOGY:   Non-plain film images such as CT, Ultrasound and MRI are read by the radiologist. Plain radiographic images are visualized and preliminarily interpreted by the ED Provider with the below findings:        Interpretation per the Radiologist below, if available at the time of this note:    XR CHEST PORTABLE   Final Result   No active cardiopulmonary disease         XR WRIST LEFT (MIN 3 VIEWS)   Final Result   No evidence of fracture               PROCEDURES   Unless otherwise noted below, none     Procedures    CRITICAL CARE TIME   N/A    CONSULTS:  I spoke with medtronic interrogatorPierre, at 1320.   He states that patient did have one episode of nonsustained V. tach lasting for approximately 4 seconds on 5/23/2021. Otherwise, unremarkable interrogation. Prashanth Steele has agreed to admit (1140)    EMERGENCY DEPARTMENT COURSE and DIFFERENTIAL DIAGNOSIS/MDM:   Vitals:    Vitals:    05/30/21 1138 05/30/21 1315 05/30/21 1320 05/30/21 1330   BP: (!) 202/103 (!) 194/149 (!) 166/94 (!) 181/93   Pulse: 102 89 89 84   Resp: 24 21 19 25   Temp: 98.8 °F (37.1 °C)      TempSrc: Oral      SpO2: 94% 99% 96% 95%   Weight: 270 lb (122.5 kg)      Height: 5' 6\" (1.676 m)          Patient was given the following medications:  Medications   aspirin chewable tablet 324 mg (has no administration in time range)   nitroglycerin (NITRO-BID) 2 % ointment 1 inch (has no administration in time range)         This patient presents to the emergency department complaining of generalized weakness, fatigue, edema, chest pain. She fell recently and injured her left wrist.  X-ray of left wrist and chest x-ray appears stable. She denies head trauma or loss of consciousness and is neurologically intact. Given her cardiac history, I do feel admission is warranted for further evaluation. Dr. Maine Aguillon has agreed to admit. Patient understands and agrees with plan for admission.   My suspicion is low for cerebellar compromise, posterior stroke, cauda equina, central cord syndrome, acute spinal injury, skull or facial fracture, postconcussive syndrome,subungual hematoma, paronychia, eponychia, felon, flexor tenosynovitis,  thoracic outlet obstruction, SVC syndrome, foreign body, tendon rupture, compartment syndrome, acute fracture, dislocation, DVT, arterial compromise or occlusion, limb ischemia, gout, septic joint, abscess, cellulitis, osteomyelitis, carotid dissection, sinus abscess, acute fracture, acute CVA, ICH, SAH, TIA, meningitis, encephalitis, pseudotumor cerebri, temporal arteritis, sentinel bleed from ruptured aneurysm,  subdural hematoma, epidural hematoma, STEMI, PE, myocarditis, pericarditis, endocarditis, acute pulmonary edema, pleural effusion, pericardial effusion, cardiac tamponade,  thoracic aortic dissection, esophageal rupture, other life-threatening arrhythmia, hypertensive urgency or emergency, hemothorax, pulmonary contusion, subcutaneous emphysema, flail chest, pneumo mediastinum, rib fracture, pneumonia, pneumothorax, COVID-19, or other concerning pathology. FINAL IMPRESSION      1. Acute chest pain    2. General weakness    3. Peripheral edema    4. Sprain of left wrist, initial encounter    5. Accelerated hypertension          DISPOSITION/PLAN   DISPOSITION Decision To Admit 05/30/2021 01:18:55 PM      PATIENT REFERRED TO:  No follow-up provider specified.     DISCHARGE MEDICATIONS:  New Prescriptions    No medications on file       DISCONTINUED MEDICATIONS:  Discontinued Medications    No medications on file              (Please note that portions of this note were completed with a voice recognition program.  Efforts were made to edit the dictations but occasionally words are mis-transcribed.)    Ciarra Hdz PA-C (electronically signed)              Ciarra Hdz PA-C  05/30/21 2514

## 2021-05-30 NOTE — H&P
History and Physical  Dr. Maxwell Chambers  5/30/2021    PCP: Teena Bass MD    Cc:   Chief Complaint   Patient presents with    Foot Pain     pt wtih c/o bi-lateral foot pain \"for some days\" with fall yesterday, left wrist pain    Chest Pain     pt with c/o chest pain - mid sternal - ongoing for a few days as well- describes as cramping/ pressure. HPI:  Aldo Alas is a 78 y.o. female who has a past medical history of AICD (automatic cardioverter/defibrillator) present, Arthritis, CHF (congestive heart failure) ([facility] [address] Gout, Hyperlipidemia, Hypertension, and MI (myocardial infarction) ([facility] [address] Patient presents with Chest pain. HPI     78 y.o. female who presents with several complaints. First, patient states that for 1 week, she has been feeling increasingly short of breath, generally weak and fatigued and having cramping in her feet and increased swelling in her legs. Then for the past 2 days, she developed intermittent chest pain. She states that she has been getting around her home by using a computer chair for the past week. Then yesterday, she states that she fell in her bathroom and braced herself with both of her hands. She reports left wrist pain and swelling ever since. Dr. Myriam Meyer is her cardiologist.  She does have a [employer] pacemaker that has not been interrogated since October of last year. She denies head trauma or loss of consciousness. She is anticoagulated on Eliquis. EKG in ER shows normal sinus rhythm today  To be admitted for further eval  Cards to be consulted    Problem list of hospitalization thus far: Active Hospital Problems    Diagnosis     [facility] [S12.5]     Systolic [facility] failure, chronic [facility] [I50.22]     Chest pain [R07.9]     Hyperlipidemia [E78.5]     Morbid obesity ([facility] [E66.01]          Review of Systems: (1 system for EPF, 2-9 for detailed, 10+ for comprehensive)  Review of Systems   Constitutional: Positive for fatigue. Admission medications    Medication Sig Start Date End Date Taking?  Authorizing Provider   amiodarone (PACERONE) 400 MG tablet Take 0.5 tablets by mouth daily 10/9/20  Yes YEIMY Sanchez CNP   valsartan (DIOVAN) 160 MG tablet Take 1 tablet by mouth 2 times daily 9/21/20  Yes YEIMY Valerio CNP   ibuprofen (ADVIL;MOTRIN) 200 MG tablet Take 400 mg by mouth 2 times daily as needed for Pain   Yes Historical Provider, MD   atorvastatin (LIPITOR) 40 MG tablet Take 1 tablet by mouth daily 8/24/20  Yes YEIMY Valerio CNP   furosemide (LASIX) 40 MG tablet Take 1 tablet by mouth 2 times daily 8/18/20  Yes YEIMY Williamson CNP   spironolactone (ALDACTONE) 25 MG tablet Take 1 tablet by mouth 2 times daily 8/18/20  Yes YEIMY Williamson CNP   metoprolol succinate (TOPROL XL) 100 MG extended release tablet Take 1 tablet by mouth daily 8/14/20  Yes YEIMY Sanchez CNP   apixaban (ELIQUIS) 5 MG TABS tablet Take 1 tablet by mouth 2 times daily 8/13/20  Yes YEIMY Sanchez CNP   aspirin 81 MG EC tablet Take 81 mg by mouth daily   Yes Historical Provider, MD   pantoprazole (PROTONIX) 40 MG tablet Take 1 tablet by mouth every morning (before breakfast) 10/9/20   YEIMY Sanchez CNP         No Known Allergies          EXAM: (2-7 system for EPF/Detailed, ?8 for Comprehensive)  BP (!) 220/186   Pulse 82   Temp 98.8 °F (37.1 °C) (Oral)   Resp 23   Ht 5' 6\" (1.676 m)   Wt 270 lb (122.5 kg)   SpO2 98%   BMI 43.58 kg/m²   Constitutional: vitals as above: alert, appears stated age and cooperative  Psychiatric: normal insight and judgment, oriented to person, place, time, and general circumstances  Head: Normocephalic, without obvious abnormality, atraumatic  Eyes:lids and lashes normal, conjunctivae and sclerae normal and pupils equal, round, reactive to light and accomodation  EMNT: external ears normal, lips normal  Neck: no JVD, supple, symmetrical, trachea midline and thyroid not enlarged, symmetric, no tenderness/mass/nodules   Respiratory: clear to auscultation and percussion bilaterally with normal respiratory effort  Cardiovascular: normal rate, regular rhythm, normal S1 and S2 and no carotid bruits  Gastrointestinal: soft, non-tender, non-distended, normal bowel sounds, no masses or organomegaly  Lymphatic:   Extremities: 1+ edema, no clubbing  Skin:No rashes or nodules noted. Neurologic:    LABS:  Labs Reviewed   CBC WITH AUTO DIFFERENTIAL - Abnormal; Notable for the following components:       Result Value    WBC 13.6 (*)     Neutrophils Absolute 10.8 (*)     All other components within normal limits    Narrative:     Performed at:  [facility] [address] [facility] [employer] [address], [place]   Phone [phone]   COMPREHENSIVE METABOLIC PANEL - Abnormal; Notable for the following components:    Albumin/Globulin Ratio 0.9 (*)     All other components within normal limits    Narrative:     Performed at:  [facility] [address] [place]   Phone [phone]   BRAIN NATRIURETIC PEPTIDE - Abnormal; Notable for the following components:    Pro- (*)     All other components within normal limits    Narrative:     Performed at:  [facility] [address] [employer], [place]   Phone [phone]   TROPONIN    Narrative:     Performed at:  [facility] [address]  [place], [place]   Phone [phone]   AMIODARONE LEVEL   TROPONIN   TROPONIN   D-DIMER, QUANTITATIVE         IMAGING:  Imaging results from the ER have been reviewed in the computerized chart. XR WRIST LEFT (MIN 3 VIEWS)    Result Date: 5/30/2021  EXAMINATION: XRAY VIEWS OF THE LEFT WRIST 5/30/2021 12:13 pm COMPARISON: None.  HISTORY: ORDERING SYSTEM PROVIDED HISTORY: Injury TECHNOLOGIST PROVIDED HISTORY: Reason for exam:->Injury Reason for Exam: Fall a ordered. (1)History was not obtained from someone other than patient  (1) Old records  were reviewed - see HPI/MDM for pertinent details if review done. (2) Case wasdiscussed with another health care provider: Arely VAN  (2) Imaging was viewed by myself. (2) EKG  was viewed by myself. The patient isbeing placed in inpatient status with the expectation of requiring a hospital stay spanning at least two midnights for care and treatment of the problems noted in the problem list.  This determination is also based on thepatients comorbidities and past medical history, the severity and timing of the signs and symptoms upon presentation.         Electronically signed by: Zarina Mckeon MD 5/30/2021

## 2021-05-30 NOTE — ED NOTES
Bed: 05  Expected date:   Expected time:   Means of arrival:   Comments:  [address], [address] [facility]  05/30/21 3512

## 2021-05-30 NOTE — PROGRESS NOTES
1500-Assessment complete. VSS-BP is elevated-178/98 will monitor, respirations are even and unlabored. Afebrile. Pt is resting. Call light within reach. Fall risk precaution in place, non-skid socks on, bed in lowest position and locked. No other needs expressed. will continue to monitor. Home meds given, BP is now lower at 144/76.

## 2021-05-30 NOTE — CONSULTS
[facility]  [phone]      Chief Complaint   Patient presents with    Foot Pain     pt wtih c/o bi-lateral foot pain \"for some days\" with fall yesterday, left wrist pain    Chest Pain     pt with c/o chest pain - mid sternal - ongoing for a few days as well- describes as cramping/ pressure. History of Present Illness:  Duke Jalloh is a 78 y.o. patient who presented to the hospital with complaints of wrist pain, fall and shortness of breath. She reports that she has constant chest discomfort that has been present for several days. It feels \"like cramping\" in the chest. No association with motion or exertion. No associated nausea, vomiting or diaphoresis. Moderate in intensity. She reports that she has had foot pain. She states that she has been sleeping with 6 pillows for the last 6 months. Past Medical History:   has a past medical history of AICD (automatic cardioverter/defibrillator) present, Arthritis, CHF (congestive heart failure) ([facility] Gout, Hyperlipidemia, Hypertension, and MI (myocardial infarction) ([facility] [address] Surgical History:   has a past surgical history that includes Dilation and curettage of uterus and Cardiac defibrillator placement. Social History:   reports that she has never smoked. She has never used smokeless tobacco. She reports that she does not drink alcohol and does not use drugs. Family History:  No family history of premature coronary artery disease, aortic disease, or valve disease. Home Medications:  Were reviewed and are listed in nursing record. and/or listed below  Prior to Admission medications    Medication Sig Start Date End Date Taking?  Authorizing Provider   amiodarone (PACERONE) 400 MG tablet Take 0.5 tablets by mouth daily 10/9/20  Yes YEIMY Goldstein - CNP   valsartan (DIOVAN) 160 MG tablet Take 1 tablet by mouth 2 times daily 9/21/20  Yes YEIMY Brown - CNP   atorvastatin (LIPITOR) 40 MG tablet Take 1 tablet by mouth daily 8/24/20  Yes YEIMY Hart CNP   furosemide (LASIX) 40 MG tablet Take 1 tablet by mouth 2 times daily 8/18/20  Yes YEIMY Taylor CNP   spironolactone (ALDACTONE) 25 MG tablet Take 1 tablet by mouth 2 times daily 8/18/20  Yes YEIMY Taylor CNP   metoprolol succinate (TOPROL XL) 100 MG extended release tablet Take 1 tablet by mouth daily 8/14/20  Yes YEIMY Landaverde CNP   apixaban (ELIQUIS) 5 MG TABS tablet Take 1 tablet by mouth 2 times daily 8/13/20  Yes YEIMY Landaverde CNP   aspirin 81 MG EC tablet Take 81 mg by mouth daily   Yes Historical Provider, MD   pantoprazole (PROTONIX) 40 MG tablet Take 1 tablet by mouth every morning (before breakfast) 10/9/20   YEIMY Landaverde CNP   ibuprofen (ADVIL;MOTRIN) 200 MG tablet Take 400 mg by mouth 2 times daily as needed for Pain    Historical Provider, MD        Current Medications:  Current Facility-Administered Medications   Medication Dose Route Frequency Provider Last Rate Last Admin    sodium chloride flush 0.9 % injection 5-40 mL  5-40 mL Intravenous 2 times per day Penny Bhatti MD        sodium chloride flush 0.9 % injection 10 mL  10 mL Intravenous PRN Penny Bhatti MD        0.9 % sodium chloride infusion  25 mL Intravenous PRN Penny Bhatti MD        [facility]) tablet 12.5 mg  12.5 mg Oral Q6H PRN Penny Bhatti MD        Or    ondansetron [facility]) injection 4 mg  4 mg Intravenous Q6H PRN Penny Bhatti MD        acetaminophen (TYLENOL) tablet 650 mg  650 mg Oral Q6H PRN Penny Bhatti MD        Or    acetaminophen (TYLENOL) suppository 650 mg  650 mg Rectal Q6H PRN Penny Bhatti MD        magnesium hydroxide (MILK OF MAGNESIA) 400 MG/5ML suspension 30 mL  30 mL Oral Daily PRN Penny Bhatti MD        [START ON 5/31/2021] aspirin EC tablet 325 mg  325 mg Oral Daily Penny Bhatti MD        atorvastatin (LIPITOR) tablet 40 mg  40 mg Oral Nightly Iva Bird Babak Badillo MD        nitroGLYCERIN (NITROSTAT) SL tablet 0.4 mg  0.4 mg Sublingual Q5 Min PRN Li Martin MD        amiodarone (CORDARONE) tablet 200 mg  200 mg Oral Daily Li Martin MD   200 mg at 05/30/21 1610    apixaban (ELIQUIS) tablet 5 mg  5 mg Oral BID Li Martin MD        furosemide (LASIX) tablet 40 mg  40 mg Oral BID Li Martin MD   40 mg at 05/30/21 1739    ibuprofen (ADVIL;MOTRIN) tablet 400 mg  400 mg Oral BID PRN Li Martin MD        metoprolol succinate (TOPROL XL) extended release tablet 100 mg  100 mg Oral Daily Li Martin MD   100 mg at 05/30/21 1609    [START ON 5/31/2021] pantoprazole (PROTONIX) tablet 40 mg  40 mg Oral QAM AC Li Martin MD        spironolactone (ALDACTONE) tablet 25 mg  25 mg Oral BID Li Martin MD   25 mg at 05/30/21 1739    valsartan (DIOVAN) tablet 160 mg  160 mg Oral BID Li Martin MD        hydrALAZINE (APRESOLINE) injection 10 mg  10 mg Intravenous Q6H PRN iL Martin MD        0.9 % sodium chloride bolus  500 mL Intravenous PRN Li Martin MD        potassium chloride (KLOR-CON M) extended release tablet 40 mEq  40 mEq Oral PRN Li Martin MD        Or    potassium bicarb-citric acid (EFFER-K) effervescent tablet 40 mEq  40 mEq Oral PRN Li Martin MD        Or    potassium chloride 10 mEq/100 mL IVPB (Peripheral Line)  10 mEq Intravenous PRODALIS Martin MD        HYDROcodone-acetaminophen [facility]) 5-325 MG per tablet 1 tablet  1 tablet Oral Q6H PRN Li Martin MD            Allergies:  Patient has no known allergies. Review of Systems:     · Constitutional: there has been no unanticipated weight loss. There's been no change in energy level, sleep pattern, or activity level. · Eyes: No visual changes or diplopia. No scleral icterus. · ENT: No Headaches, hearing loss or vertigo. No mouth sores or sore throat.   · Cardiovascular: +chest pain   · Respiratory: +shortness of breath  · Gastrointestinal: No abdominal pain, appetite loss, blood in stools. No change in bowel or bladder habits. · Genitourinary: No dysuria, trouble voiding, or hematuria. · Musculoskeletal:  +left wrist pain   · Integumentary: No rash or pruritis. · Neurological: No headache, diplopia, change in muscle strength, numbness or tingling. No change in gait, balance, coordination, mood, affect, memory, mentation, behavior. · Psychiatric: No anxiety, no depression. · Endocrine: No malaise, fatigue or temperature intolerance. No excessive thirst, fluid intake, or urination. No tremor. · Hematologic/Lymphatic: No abnormal bruising or bleeding, blood clots or swollen lymph nodes. · Allergic/Immunologic: No nasal congestion or hives.   ·     Physical Examination:    Vitals:    05/30/21 1815   BP: (!) 144/76   Pulse: 85   Resp:    Temp:    SpO2:     Weight: 270 lb (122.5 kg)         General Appearance:  Alert, cooperative, no distress, appears stated age   Head:  Normocephalic, without obvious abnormality, atraumatic   Eyes:  PERRL, conjunctiva/corneas clear       Nose: Nares normal, no drainage or sinus tenderness   Throat: Lips, mucosa, and tongue normal   Neck: Supple, symmetrical, trachea midline, no adenopathy, thyroid: not enlarged, symmetric, no tenderness/mass/nodules, no carotid bruit or JVD       Lungs:   Clear to auscultation bilaterally, respirations unlabored   Chest Wall:  No tenderness or deformity   Heart:  Regular rate and rhythm, S1, S2 normal, no murmur, rub or gallop   Abdomen:   Soft, non-tender, bowel sounds active all four quadrants,  no masses, no organomegaly           Extremities: Left wrist swollen   Pulses: 2+ and symmetric   Skin: Skin color, texture, turgor normal, no rashes or lesions   Pysch: Normal mood and affect   Neurologic: Normal gross motor and sensory exam.         Labs  CBC:   Lab Results   Component Value Date    WBC 13.6 05/30/2021    RBC 4.94 05/30/2021    HGB 13.5 05/30/2021    HCT 42.6 05/30/2021    MCV 86.3 05/30/2021    RDW 15.0 05/30/2021     05/30/2021     CMP:    Lab Results   Component Value Date     05/30/2021    K 3.5 05/30/2021    K 3.4 08/12/2020    CL 99 05/30/2021    CO2 25 05/30/2021    BUN 7 05/30/2021    CREATININE 0.7 05/30/2021    GFRAA >60 05/30/2021    AGRATIO 0.9 05/30/2021    LABGLOM >60 05/30/2021    GLUCOSE 99 05/30/2021    PROT 7.8 05/30/2021    CALCIUM 9.4 05/30/2021    BILITOT 0.6 05/30/2021    ALKPHOS 91 05/30/2021    AST 22 05/30/2021    ALT 13 05/30/2021     PT/INR:  No results found for: PTINR  Lab Results   Component Value Date    TROPONINI <0.01 05/30/2021       EKG:  I have reviewed EKG with the following interpretation:  Impression:  Sinus rhythm, inferior q wave     Echo:  Left ventricular systolic function is moderately reduced with estimated   ejection fraction of 40%. There is moderate concentric left ventricular   hypertrophy. There is no evidence of mass or thrombus in the left atrium or appendage.   Stress:   Lodema Stacy is normal isotope uptake at stress and rest. There is no evidence of    myocardial ischemia or scar.        Normal LV function.        Overall findings represent a low risk scan.       Stress Protocols       Cath: 8/2020: mid lad 50%      Old notes reviewed  Telemetry reviewd  Ekg personally reviewed  Chest xray personally interpreted  Echocardiogram personally interpreted  Cath personally interpreted   Medications and labs reviewed  moderate complexity/medical decision making due to extensive data review, extensive history review, independent review of data  moderate risk due to acute illness, evaluation of drug-drug interactions, medication management and diagnostic interventions        Assessment  Patient Active Problem List   Diagnosis    Hyperlipidemia    Morbid obesity ([facility] [address]    Automatic implantable cardioverter-defibrillator in situ    Shortness of breath    Paroxysmal atrial fibrillation ([facility] [address]  Chest pain    Cardiac arrhythmia    History of ventricular tachycardia    Systolic heart failure, chronic (HCC)    Dilated cardiomyopathy (HCC)    Coronary artery disease due to lipid rich plaque    Hypokalemia    Class 3 severe obesity with serious comorbidity and body mass index (BMI) of 45.0 to 49.9 in adult (HCC)    TYLER (obstructive sleep apnea)    Essential hypertension    PAF (paroxysmal atrial fibrillation) ([facility] [address]         Plan:    I had the opportunity to review the clinical symptoms and presentation of Kimberlyn Husain. Assessment/Plan:  1. Atypical Chest pain  - new problem  Plan:  - trend troponin   - start imdur 30 mg    2. Atrial fibrillation and pacemaker  - new problem  Plan:  - interrogate pacemaker  - continue anticoagulation     3. Shortness of breath  - likely secondary to heart failure  - ACC C NYHA II  - worsening  Plan:  - diuresis  - continue home meds    4. Wrist pain  - new problem  Plan:  - add ice    I will address the patient's cardiac risk factors and adjusted pharmacologic treatment as needed. In addition, I have reinforced the need for patient directed risk factor modification. Tobacco use was discussed with the patient and educated on the negative effects. I have asked the patient to not utilize these agents. Thank you for allowing to us to participate in the care or Kimberlyn Husain. Further evaluation will be based upon the patient's clinical course and testing results. All questions and concerns were addressed to the patient/family. Alternatives to my treatment were discussed. The note was completed using EMR. Every effort was made to ensure accuracy; however, inadvertent computerized transcription errors may be present. All questions and concerns were addressed to the patient/family. Alternatives to my treatment were discussed. The note was completed using EMR.  Every effort was made to ensure accuracy; however, inadvertent computerized transcription errors may be present.   Clovis Breen DO, MD 5/30/2021 7:57 PM

## 2021-05-31 LAB
A/G RATIO: 0.7 (ref 1.1–2.2)
ALBUMIN SERPL-MCNC: 3.2 G/DL (ref 3.4–5)
ALP BLD-CCNC: 86 U/L (ref 40–129)
ALT SERPL-CCNC: 13 U/L (ref 10–40)
ANION GAP SERPL CALCULATED.3IONS-SCNC: 15 MMOL/L (ref 3–16)
AST SERPL-CCNC: 21 U/L (ref 15–37)
BASOPHILS ABSOLUTE: 0.1 K/UL (ref 0–0.2)
BASOPHILS RELATIVE PERCENT: 0.5 %
BILIRUB SERPL-MCNC: 0.4 MG/DL (ref 0–1)
BUN BLDV-MCNC: 11 MG/DL (ref 7–20)
CALCIUM SERPL-MCNC: 9.5 MG/DL (ref 8.3–10.6)
CHLORIDE BLD-SCNC: 101 MMOL/L (ref 99–110)
CHOLESTEROL, TOTAL: 189 MG/DL (ref 0–199)
CO2: 22 MMOL/L (ref 21–32)
CREAT SERPL-MCNC: 0.6 MG/DL (ref 0.6–1.2)
EKG ATRIAL RATE: 79 BPM
EKG ATRIAL RATE: 93 BPM
EKG DIAGNOSIS: NORMAL
EKG DIAGNOSIS: NORMAL
EKG P AXIS: 28 DEGREES
EKG P AXIS: 74 DEGREES
EKG P-R INTERVAL: 162 MS
EKG P-R INTERVAL: 166 MS
EKG Q-T INTERVAL: 392 MS
EKG Q-T INTERVAL: 444 MS
EKG QRS DURATION: 84 MS
EKG QRS DURATION: 90 MS
EKG QTC CALCULATION (BAZETT): 487 MS
EKG QTC CALCULATION (BAZETT): 509 MS
EKG R AXIS: -13 DEGREES
EKG R AXIS: 35 DEGREES
EKG T AXIS: 29 DEGREES
EKG T AXIS: 65 DEGREES
EKG VENTRICULAR RATE: 79 BPM
EKG VENTRICULAR RATE: 93 BPM
EOSINOPHILS ABSOLUTE: 0.2 K/UL (ref 0–0.6)
EOSINOPHILS RELATIVE PERCENT: 1.8 %
GFR AFRICAN AMERICAN: >60
GFR NON-AFRICAN AMERICAN: >60
GLOBULIN: 4.4 G/DL
GLUCOSE BLD-MCNC: 121 MG/DL (ref 70–99)
HCT VFR BLD CALC: 40.8 % (ref 36–48)
HDLC SERPL-MCNC: 39 MG/DL (ref 40–60)
HEMOGLOBIN: 13 G/DL (ref 12–16)
LDL CHOLESTEROL CALCULATED: 130 MG/DL
LYMPHOCYTES ABSOLUTE: 1.8 K/UL (ref 1–5.1)
LYMPHOCYTES RELATIVE PERCENT: 14.6 %
MAGNESIUM: 2 MG/DL (ref 1.8–2.4)
MCH RBC QN AUTO: 27.2 PG (ref 26–34)
MCHC RBC AUTO-ENTMCNC: 31.9 G/DL (ref 31–36)
MCV RBC AUTO: 85.2 FL (ref 80–100)
MONOCYTES ABSOLUTE: 0.9 K/UL (ref 0–1.3)
MONOCYTES RELATIVE PERCENT: 6.9 %
NEUTROPHILS ABSOLUTE: 9.7 K/UL (ref 1.7–7.7)
NEUTROPHILS RELATIVE PERCENT: 76.2 %
PDW BLD-RTO: 14.9 % (ref 12.4–15.4)
PLATELET # BLD: 341 K/UL (ref 135–450)
PMV BLD AUTO: 7.7 FL (ref 5–10.5)
POTASSIUM REFLEX MAGNESIUM: 3.4 MMOL/L (ref 3.5–5.1)
RBC # BLD: 4.79 M/UL (ref 4–5.2)
SODIUM BLD-SCNC: 138 MMOL/L (ref 136–145)
TOTAL PROTEIN: 7.6 G/DL (ref 6.4–8.2)
TRIGL SERPL-MCNC: 99 MG/DL (ref 0–150)
TROPONIN: <0.01 NG/ML
VLDLC SERPL CALC-MCNC: 20 MG/DL
WBC # BLD: 12.7 K/UL (ref 4–11)

## 2021-05-31 PROCEDURE — 93005 ELECTROCARDIOGRAM TRACING: CPT | Performed by: INTERNAL MEDICINE

## 2021-05-31 PROCEDURE — 6360000002 HC RX W HCPCS: Performed by: INTERNAL MEDICINE

## 2021-05-31 PROCEDURE — 85025 COMPLETE CBC W/AUTO DIFF WBC: CPT

## 2021-05-31 PROCEDURE — 83735 ASSAY OF MAGNESIUM: CPT

## 2021-05-31 PROCEDURE — 84484 ASSAY OF TROPONIN QUANT: CPT

## 2021-05-31 PROCEDURE — 6370000000 HC RX 637 (ALT 250 FOR IP): Performed by: INTERNAL MEDICINE

## 2021-05-31 PROCEDURE — 80053 COMPREHEN METABOLIC PANEL: CPT

## 2021-05-31 PROCEDURE — 2580000003 HC RX 258: Performed by: INTERNAL MEDICINE

## 2021-05-31 PROCEDURE — 93010 ELECTROCARDIOGRAM REPORT: CPT | Performed by: INTERNAL MEDICINE

## 2021-05-31 PROCEDURE — 99233 SBSQ HOSP IP/OBS HIGH 50: CPT | Performed by: INTERNAL MEDICINE

## 2021-05-31 PROCEDURE — 1200000000 HC SEMI PRIVATE

## 2021-05-31 PROCEDURE — 80061 LIPID PANEL: CPT

## 2021-05-31 PROCEDURE — 36415 COLL VENOUS BLD VENIPUNCTURE: CPT

## 2021-05-31 RX ADMIN — APIXABAN 5 MG: 5 TABLET, FILM COATED ORAL at 09:13

## 2021-05-31 RX ADMIN — HYDROCODONE BITARTRATE AND ACETAMINOPHEN 1 TABLET: 5; 325 TABLET ORAL at 05:30

## 2021-05-31 RX ADMIN — VALSARTAN 160 MG: 160 TABLET, FILM COATED ORAL at 09:13

## 2021-05-31 RX ADMIN — AMIODARONE HYDROCHLORIDE 200 MG: 200 TABLET ORAL at 09:13

## 2021-05-31 RX ADMIN — ISOSORBIDE MONONITRATE 30 MG: 30 TABLET, EXTENDED RELEASE ORAL at 09:12

## 2021-05-31 RX ADMIN — SPIRONOLACTONE 25 MG: 25 TABLET ORAL at 09:13

## 2021-05-31 RX ADMIN — Medication 10 ML: at 21:05

## 2021-05-31 RX ADMIN — APIXABAN 5 MG: 5 TABLET, FILM COATED ORAL at 21:02

## 2021-05-31 RX ADMIN — METOPROLOL SUCCINATE 100 MG: 50 TABLET, EXTENDED RELEASE ORAL at 09:12

## 2021-05-31 RX ADMIN — ATORVASTATIN CALCIUM 40 MG: 80 TABLET, FILM COATED ORAL at 21:04

## 2021-05-31 RX ADMIN — Medication 10 ML: at 09:13

## 2021-05-31 RX ADMIN — ASPIRIN 325 MG: 325 TABLET, COATED ORAL at 09:12

## 2021-05-31 RX ADMIN — VALSARTAN 160 MG: 160 TABLET, FILM COATED ORAL at 21:02

## 2021-05-31 RX ADMIN — FUROSEMIDE 40 MG: 10 INJECTION, SOLUTION INTRAMUSCULAR; INTRAVENOUS at 09:13

## 2021-05-31 RX ADMIN — HYDROCODONE BITARTRATE AND ACETAMINOPHEN 1 TABLET: 5; 325 TABLET ORAL at 12:27

## 2021-05-31 RX ADMIN — HYDROCODONE BITARTRATE AND ACETAMINOPHEN 1 TABLET: 5; 325 TABLET ORAL at 18:13

## 2021-05-31 ASSESSMENT — PAIN SCALES - GENERAL
PAINLEVEL_OUTOF10: 8
PAINLEVEL_OUTOF10: 8
PAINLEVEL_OUTOF10: 7
PAINLEVEL_OUTOF10: 4
PAINLEVEL_OUTOF10: 5
PAINLEVEL_OUTOF10: 0
PAINLEVEL_OUTOF10: 3

## 2021-05-31 ASSESSMENT — PAIN DESCRIPTION - LOCATION
LOCATION: FOOT

## 2021-05-31 ASSESSMENT — PAIN DESCRIPTION - ORIENTATION
ORIENTATION: RIGHT;LEFT

## 2021-05-31 ASSESSMENT — PAIN DESCRIPTION - DESCRIPTORS
DESCRIPTORS: THROBBING
DESCRIPTORS: THROBBING

## 2021-05-31 ASSESSMENT — PAIN DESCRIPTION - PAIN TYPE
TYPE: ACUTE PAIN

## 2021-05-31 ASSESSMENT — PAIN - FUNCTIONAL ASSESSMENT
PAIN_FUNCTIONAL_ASSESSMENT: PREVENTS OR INTERFERES SOME ACTIVE ACTIVITIES AND ADLS
PAIN_FUNCTIONAL_ASSESSMENT: PREVENTS OR INTERFERES SOME ACTIVE ACTIVITIES AND ADLS

## 2021-05-31 ASSESSMENT — PAIN DESCRIPTION - FREQUENCY
FREQUENCY: CONTINUOUS
FREQUENCY: CONTINUOUS

## 2021-05-31 NOTE — PLAN OF CARE
Problem: Falls - Risk of:  Goal: Will remain free from falls  Description: Will remain free from falls  5/31/2021 1026 by Lesa Felty, RN  Outcome: Ongoing     Problem: Falls - Risk of:  Goal: Absence of physical injury  Description: Absence of physical injury  Outcome: Ongoing     Problem: OXYGENATION/RESPIRATORY FUNCTION  Goal: Patient will maintain patent airway  Outcome: Ongoing     Problem: OXYGENATION/RESPIRATORY FUNCTION  Goal: Patient will achieve/maintain normal respiratory rate/effort  Description: Respiratory rate and effort will be within normal limits for the patient  Outcome: Ongoing     Problem: Cardiovascular  Goal: No DVT, peripheral vascular complications  Outcome: Ongoing     Problem: Cardiovascular  Goal: Hemodynamic stability  5/31/2021 1026 by Lesa Felty, RN  Outcome: Ongoing     Problem: Cardiovascular  Goal: Anticoagulate/Hct stable  Outcome: Ongoing     Problem: Cardiovascular  Goal: Agreement to quit smoking  Outcome: Ongoing     Problem: Cardiovascular  Goal: Weight maintained or lost  Outcome: Ongoing     Problem: Cardiovascular  Goal: Understanding of dietary restrictions  Outcome: Ongoing     Problem: Skin Integrity:  Goal: Will show no infection signs and symptoms  Description: Will show no infection signs and symptoms  Outcome: Ongoing     Problem: Skin Integrity:  Goal: Absence of new skin breakdown  Description: Absence of new skin breakdown  Outcome: Ongoing

## 2021-05-31 NOTE — PROGRESS NOTES
Progress Note - Dr. Robbin Everett - Internal Medicine  PCP: No primary care provider on file. No primary physician on file. None    Hospital Day: 1  Code Status: Full Code  Current Diet: Diet NPO, After Midnight  DIET CARDIAC; No Caffeine        CC: follow up on medical issues    Subjective:   Duke Jalloh is a 78 y.o. female. She denies problems    Doing ok  No chest pain this am      She denies chest pain, denies shortness of breath, denies nausea,  denies emesis. 10 system Review of Systems is reviewed with patient, and pertinent positives are noted in HPI above . Otherwise, Review of systems is negative. I have reviewed the patient's medical and social history in detail and updated the computerized patient record. To recap: She  has a past medical history of AICD (automatic cardioverter/defibrillator) present, Arthritis, CHF (congestive heart failure) ([facility] [address] Gout, Hyperlipidemia, Hypertension, and MI (myocardial infarction) ([facility] [address] . She  has a past surgical history that includes Dilation and curettage of uterus and Cardiac defibrillator placement. . She  reports that she has never smoked. She has never used smokeless tobacco. She reports that she does not drink alcohol and does not use drugs. .        Active Hospital Problems    Diagnosis Date Noted    [facility] fibrillation) ([facility] [I48.0] 21/53/6589    Systolic heart failure, chronic (HCC) [I50.22]     Chest pain [R07.9] 02/28/2020    Hyperlipidemia [E78.5] 12/18/2014    Morbid obesity ([facility] [address] [E66.01] 12/18/2014       Current Facility-Administered Medications: sodium chloride flush 0.9 % injection 5-40 mL, 5-40 mL, Intravenous, 2 times per day  sodium chloride flush 0.9 % injection 10 mL, 10 mL, Intravenous, PRN  0.9 % sodium chloride infusion, 25 mL, Intravenous, PRN  promethazine (PHENERGAN) tablet 12.5 mg, 12.5 mg, Oral, Q6H PRN **OR** ondansetron (ZOFRAN) injection 4 mg, 4 mg, Intravenous, Q6H PRN  acetaminophen (TYLENOL) tablet 650 mg, 650 mg, Oral, Q6H PRN **OR** acetaminophen (TYLENOL) suppository 650 mg, 650 mg, Rectal, Q6H PRN  magnesium hydroxide (MILK OF MAGNESIA) 400 MG/5ML suspension 30 mL, 30 mL, Oral, Daily PRN  aspirin EC tablet 325 mg, 325 mg, Oral, Daily  atorvastatin (LIPITOR) tablet 40 mg, 40 mg, Oral, Nightly  nitroGLYCERIN (NITROSTAT) SL tablet 0.4 mg, 0.4 mg, Sublingual, Q5 Min PRN  amiodarone (CORDARONE) tablet 200 mg, 200 mg, Oral, Daily  apixaban (ELIQUIS) tablet 5 mg, 5 mg, Oral, BID  furosemide (LASIX) tablet 40 mg, 40 mg, Oral, BID  ibuprofen (ADVIL;MOTRIN) tablet 400 mg, 400 mg, Oral, BID PRN  metoprolol succinate (TOPROL XL) extended release tablet 100 mg, 100 mg, Oral, Daily  pantoprazole (PROTONIX) tablet 40 mg, 40 mg, Oral, QAM AC  spironolactone (ALDACTONE) tablet 25 mg, 25 mg, Oral, BID  valsartan (DIOVAN) tablet 160 mg, 160 mg, Oral, BID  hydrALAZINE (APRESOLINE) injection 10 mg, 10 mg, Intravenous, Q6H PRN  0.9 % sodium chloride bolus, 500 mL, Intravenous, PRN  potassium chloride (KLOR-CON M) extended release tablet 40 mEq, 40 mEq, Oral, PRN **OR** potassium bicarb-citric acid (EFFER-K) effervescent tablet 40 mEq, 40 mEq, Oral, PRN **OR** potassium chloride 10 mEq/100 mL IVPB (Peripheral Line), 10 mEq, Intravenous, PRN  HYDROcodone-acetaminophen (NORCO) 5-325 MG per tablet 1 tablet, 1 tablet, Oral, Q6H PRN  isosorbide mononitrate (IMDUR) extended release tablet 30 mg, 30 mg, Oral, Daily  furosemide (LASIX) injection 40 mg, 40 mg, Intravenous, BID         Objective:  /83   Pulse 76   Temp 97.9 °F (36.6 °C) (Oral)   Resp 20   Ht 5' 6\" (1.676 m)   Wt 278 lb 12.8 oz (126.5 kg)   SpO2 96%   BMI 45.00 kg/m²      Patient Vitals for the past 24 hrs:   BP Temp Temp src Pulse Resp SpO2 Height Weight   05/31/21 0850 128/83 97.9 °F (36.6 °C) Oral 76 20 96 % -- --   05/31/21 0505 (!) 148/81 97.7 °F (36.5 °C) Oral 85 21 97 % -- 278 lb 12.8 oz (126.5 kg)   05/31/21 0053 117/76 98.2 °F (36.8 °C) Temporal 73 18 96 % -- -- 05/30/21 2049 (!) 151/82 98 °F (36.7 °C) Temporal 80 18 95 % -- --   05/30/21 1815 (!) 144/76 -- -- 85 -- -- -- --   05/30/21 1701 -- -- -- -- -- 96 % -- --   05/30/21 1445 (!) 178/98 98.3 °F (36.8 °C) Axillary 86 19 96 % -- --   05/30/21 1430 (!) 169/82 -- -- 79 23 98 % -- --   05/30/21 1415 -- -- -- 82 23 98 % -- --   05/30/21 1400 -- -- -- 82 22 96 % -- --   05/30/21 1345 (!) 186/99 -- -- 83 21 96 % -- --   05/30/21 1330 (!) 181/93 -- -- 84 25 95 % -- --   05/30/21 1320 (!) 166/94 -- -- 89 19 96 % -- --   05/30/21 1315 (!) 194/149 -- -- 89 21 99 % -- --   05/30/21 1138 (!) 202/103 98.8 °F (37.1 °C) Oral 102 24 94 % 5' 6\" (1.676 m) 270 lb (122.5 kg)     Patient Vitals for the past 96 hrs (Last 3 readings):   Weight   05/31/21 0505 278 lb 12.8 oz (126.5 kg)   05/30/21 1138 270 lb (122.5 kg)           Intake/Output Summary (Last 24 hours) at 5/31/2021 1027  Last data filed at 5/31/2021 0940  Gross per 24 hour   Intake --   Output 475 ml   Net -475 ml         Physical Exam:   /83   Pulse 76   Temp 97.9 °F (36.6 °C) (Oral)   Resp 20   Ht 5' 6\" (1.676 m)   Wt 278 lb 12.8 oz (126.5 kg)   SpO2 96%   BMI 45.00 kg/m²   General appearance: alert, appears stated age and cooperative  Head: Normocephalic, without obvious abnormality, atraumatic  Lungs: clear to auscultation bilaterally  Heart: regular rate and rhythm, S1, S2 normal, no murmur, click, rub or gallop  Abdomen: soft, non-tender; bowel sounds normal; no masses,  no organomegaly  Extremities: extremities normal, atraumatic, no cyanosis or edema    Labs:  Lab Results   Component Value Date    WBC 12.7 (H) 05/31/2021    HGB 13.0 05/31/2021    HCT 40.8 05/31/2021     05/31/2021    CHOL 165 04/02/2019    TRIG 80 04/02/2019    HDL 52 04/02/2019    ALT 13 05/31/2021    AST 21 05/31/2021     05/31/2021    K 3.4 (L) 05/31/2021     05/31/2021    CREATININE 0.6 05/31/2021    BUN 11 05/31/2021    CO2 22 05/31/2021    TSH 4.14 10/03/2018    INR 1.23 (H) 10/08/2020    LABA1C 6.3 12/19/2014    LABMICR Not Indicated 04/05/2019     Lab Results   Component Value Date    TROPONINI <0.01 05/31/2021       Recent Imaging Results are Reviewed:  XR WRIST LEFT (MIN 3 VIEWS)    Result Date: 5/30/2021  EXAMINATION: XRAY VIEWS OF THE LEFT WRIST 5/30/2021 12:13 pm COMPARISON: None. HISTORY: ORDERING SYSTEM PROVIDED HISTORY: Injury TECHNOLOGIST PROVIDED HISTORY: Reason for exam:->Injury Reason for Exam: Fall a few days ago, left wrist pain Acuity: Unknown Type of Exam: Unknown FINDINGS: No evidence of fracture or dislocation. Osteoarthritis of the 1st [address] 16 joint     No evidence of fracture     XR CHEST PORTABLE    Result Date: 5/30/2021  EXAMINATION: ONE XRAY VIEW OF THE CHEST 5/30/2021 12:13 pm COMPARISON: 08/12/2020 HISTORY: ORDERING SYSTEM PROVIDED HISTORY: SOB TECHNOLOGIST PROVIDED HISTORY: Reason for exam:->SOB Reason for Exam: Chest Pain (pt with c/o chest pain - mid sternal - ongoing for a few days as well- describes as cramping/ pressure. ) Acuity: Unknown Type of Exam: Unknown FINDINGS: There is a left subclavian AICD. Heart size and pulmonary vasculature appear within normal limits. Diffuse haziness of the lungs due to the patient's body habitus. Lungs grossly clear     No active cardiopulmonary disease       Assessment and Plan:  Principal Problem:    Chest pain -Established problem. Stable. Plan: cont to trend troponins. Appreciate Cards e anat  Active Problems:    Hyperlipidemia  Plan: stay on statin    Morbid obesity ([address]  Plan: advised to lose weight    Systolic heart failure, chronic ([address]  Plan: Continue present orders/plan. PAF (paroxysmal atrial fibrillation) ([address] - Established problem. Stable.     Plan: awaiting pacer interrogation (not sure if this happens on holiday)            Deepika Borges MD  5/31/2021

## 2021-05-31 NOTE — PROGRESS NOTES
Updated pt on plan of care. All questions answered. pt denies further questions at this time. pt denies need for RN to call and update family members at this time. RN educated pt to call for additional questions. pt satisfied.

## 2021-05-31 NOTE — PROGRESS NOTES
· Telemetry: Sinus rhythm, atrial paced, PVC  · Constitutional: Oriented. No distress. · Head: Normocephalic and atraumatic. · Mouth/Throat: Oropharynx is clear and moist.   · Eyes: Conjunctivae normal. EOM are normal.   · Neck: Neck supple. No rigidity. No JVD present. · Cardiovascular: Normal rate, regular rhythm, S1&S2. · Pulmonary/Chest: Bilateral respiratory sounds. No wheezes, No rhonchi. · Abdominal: Soft. Bowel sounds present. No distension, No tenderness. · Musculoskeletal: She has some tenderness to touch. No edema    · Lymphadenopathy: Has no cervical adenopathy. · Neurological: Alert and oriented. Cranial nerve appears intact, No Gross deficit   · Skin: Skin is warm and dry. No rash noted. · Psychiatric: Has a normal behavior     Labs, diagnostic and imaging results reviewed. Reviewed. Recent Labs     05/30/21  1227 05/31/21  0747    138   K 3.5 3.4*   CL 99 101   CO2 25 22   BUN 7 11   CREATININE 0.7 0.6     Recent Labs     05/30/21  1227 05/31/21  0747   WBC 13.6* 12.7*   HGB 13.5 13.0   HCT 42.6 40.8   MCV 86.3 85.2    341     Lab Results   Component Value Date    TROPONINI <0.01 05/31/2021     Estimated Creatinine Clearance: 124 mL/min (based on SCr of 0.6 mg/dL).    No results found for: BNP  Lab Results   Component Value Date    PROTIME 14.3 10/08/2020    PROTIME 12.0 04/05/2019    PROTIME 11.9 06/22/2013    INR 1.23 10/08/2020    INR 1.05 04/05/2019    INR 1.05 06/22/2013     Lab Results   Component Value Date    CHOL 165 04/02/2019    HDL 52 04/02/2019    TRIG 80 04/02/2019     LHC    8/13/20:  LM-Normal   LAD-mid 50%  Cx-normal  OM- normal  RCA-dominant normal  RPDA- normal  LVEF- 35  LVG- Global  LVEDP- 25     Impression  ~Coronary Angiography w/ nonobstructive CAD  ~LVG with LVEF of 35 and global regional wall motion abnormalities  ~high LVEDP     Scheduled Meds:   sodium chloride flush  5-40 mL Intravenous 2 times per day    aspirin  325 mg Oral Daily    atorvastatin  40 mg Oral Nightly    amiodarone  200 mg Oral Daily    apixaban  5 mg Oral BID    furosemide  40 mg Oral BID    metoprolol succinate  100 mg Oral Daily    pantoprazole  40 mg Oral QAM AC    spironolactone  25 mg Oral BID    valsartan  160 mg Oral BID    isosorbide mononitrate  30 mg Oral Daily    furosemide  40 mg Intravenous BID     Continuous Infusions:   sodium chloride       PRN Meds:sodium chloride flush, sodium chloride, promethazine **OR** ondansetron, acetaminophen **OR** acetaminophen, magnesium hydroxide, nitroGLYCERIN, ibuprofen, hydrALAZINE, sodium chloride, potassium chloride **OR** potassium alternative oral replacement **OR** potassium chloride, HYDROcodone 5 mg - acetaminophen     Patient Active Problem List    Diagnosis Date Noted    Essential hypertension 10/08/2020    PA[facility]ox[facility]mal [facility] fibrillation[facility] 10/08/2020    Coronary artery disease due to lipid rich plaque     Hypokalemia     Class 3 severe obesity with serious comorbidity and body mass index (BMI) of 45.0 to 49.9 in adult ([facility]     TYLER (obstructive sleep apnea)     Systolic heart failure, chronic ([facility]     Dilated cardiomyopathy [facility]     History of ventricular tachycardia 08/12/2020    Cardiac arrhythmia 08/11/2020    Chest pain 02/28/2020    Shortness of breath 11/29/2016    Paroxysmal atrial fibrillation ([facility] 11/29/2016    Automatic implantable cardioverter-defibrillator in situ 01/13/2015    Hyperlipidemia 12/18/2014    Morbid obesity ([facility] 12/18/2014      Active Hospital Problems    Diagnosis Date Noted    PAF (paroxysmal atrial fibrillation) ([facility] [I48.0] 07/69/5321    Systolic heart failure, chronic (HCC) [I50.22]     Chest pain [R07.9] 02/28/2020    Hyperlipidemia [E78.5] 12/18/2014    Morbid obesity ([facility] [E66.01] 12/18/2014       Assessment:       Plan:     1. Atypical Chest pain     Chest pain is clearly atypical.  - trend troponin shows no myocardial injury.   She had a left  heart cath last year that showed nonobstructive CAD. -Continue imdur 30 mg     2. Atrial fibrillation      She had ablation of atrial fibrillation in October 2020. She has had no follow-up since then. - interrogate ICD to assess for burden of atrial fibrillation and any recurrence. - continue anticoagulation      3. Shortness of breath and cardiomyopathy  - likely secondary to heart failure and low ejection fraction  - ACC C NYHA II  - worsening     - diuresis with p.o. Lasix and spironolactone  - continue home meds         4.  dual-chamber ICD, Medtronic    Normal function based on the telemetry. We will have it interrogated. 5.  Hypokalemia    Replace and monitor    As far as cardiology and electrophysiology is concerned, she can be discharged home today. She needs to follow-up with electrophysiology  and also with either cardiology or heart failure as outpatient. .    If for any reason she is stays in the hospital until tomorrow we can ask heart failure to see her however she does not need to stay for either device interrogation or being seen by heart failure. NOTE: This report was transcribed using voice recognition software. Every effort was made to ensure accuracy, however, inadvertent computerized transcription errors may be present.

## 2021-05-31 NOTE — PROGRESS NOTES
EKG completed. Placed in pt chart.     Electronically signed by Audra Wheeler RCP on 5/31/2021 at 5:42 AM

## 2021-06-01 ENCOUNTER — NURSE ONLY (OUTPATIENT)
Dept: CARDIOLOGY CLINIC | Age: 66
End: 2021-06-01
Payer: MEDICARE

## 2021-06-01 DIAGNOSIS — Z95.810 CARDIAC DEFIBRILLATOR IN PLACE: Primary | ICD-10-CM

## 2021-06-01 LAB
ANION GAP SERPL CALCULATED.3IONS-SCNC: 11 MMOL/L (ref 3–16)
BASOPHILS ABSOLUTE: 0.1 K/UL (ref 0–0.2)
BASOPHILS RELATIVE PERCENT: 0.7 %
BUN BLDV-MCNC: 11 MG/DL (ref 7–20)
CALCIUM SERPL-MCNC: 9.5 MG/DL (ref 8.3–10.6)
CHLORIDE BLD-SCNC: 100 MMOL/L (ref 99–110)
CO2: 27 MMOL/L (ref 21–32)
CREAT SERPL-MCNC: 0.6 MG/DL (ref 0.6–1.2)
EOSINOPHILS ABSOLUTE: 0.2 K/UL (ref 0–0.6)
EOSINOPHILS RELATIVE PERCENT: 1.9 %
GFR AFRICAN AMERICAN: >60
GFR NON-AFRICAN AMERICAN: >60
GLUCOSE BLD-MCNC: 123 MG/DL (ref 70–99)
HCT VFR BLD CALC: 40 % (ref 36–48)
HEMOGLOBIN: 12.5 G/DL (ref 12–16)
LYMPHOCYTES ABSOLUTE: 1.9 K/UL (ref 1–5.1)
LYMPHOCYTES RELATIVE PERCENT: 15.3 %
MCH RBC QN AUTO: 26.7 PG (ref 26–34)
MCHC RBC AUTO-ENTMCNC: 31.3 G/DL (ref 31–36)
MCV RBC AUTO: 85.2 FL (ref 80–100)
MONOCYTES ABSOLUTE: 0.9 K/UL (ref 0–1.3)
MONOCYTES RELATIVE PERCENT: 7.1 %
NEUTROPHILS ABSOLUTE: 9.3 K/UL (ref 1.7–7.7)
NEUTROPHILS RELATIVE PERCENT: 75 %
PDW BLD-RTO: 14.9 % (ref 12.4–15.4)
PLATELET # BLD: 383 K/UL (ref 135–450)
PMV BLD AUTO: 7.5 FL (ref 5–10.5)
POTASSIUM SERPL-SCNC: 3.2 MMOL/L (ref 3.5–5.1)
RBC # BLD: 4.7 M/UL (ref 4–5.2)
SODIUM BLD-SCNC: 138 MMOL/L (ref 136–145)
TROPONIN: <0.01 NG/ML
TSH SERPL DL<=0.05 MIU/L-ACNC: 3.9 UIU/ML (ref 0.27–4.2)
WBC # BLD: 12.4 K/UL (ref 4–11)

## 2021-06-01 PROCEDURE — 84443 ASSAY THYROID STIM HORMONE: CPT

## 2021-06-01 PROCEDURE — 99232 SBSQ HOSP IP/OBS MODERATE 35: CPT | Performed by: CLINICAL NURSE SPECIALIST

## 2021-06-01 PROCEDURE — 85025 COMPLETE CBC W/AUTO DIFF WBC: CPT

## 2021-06-01 PROCEDURE — 6370000000 HC RX 637 (ALT 250 FOR IP): Performed by: INTERNAL MEDICINE

## 2021-06-01 PROCEDURE — 1200000000 HC SEMI PRIVATE

## 2021-06-01 PROCEDURE — 97530 THERAPEUTIC ACTIVITIES: CPT

## 2021-06-01 PROCEDURE — 80048 BASIC METABOLIC PNL TOTAL CA: CPT

## 2021-06-01 PROCEDURE — 97166 OT EVAL MOD COMPLEX 45 MIN: CPT

## 2021-06-01 PROCEDURE — 97535 SELF CARE MNGMENT TRAINING: CPT

## 2021-06-01 PROCEDURE — 36415 COLL VENOUS BLD VENIPUNCTURE: CPT

## 2021-06-01 PROCEDURE — 97116 GAIT TRAINING THERAPY: CPT

## 2021-06-01 PROCEDURE — 84484 ASSAY OF TROPONIN QUANT: CPT

## 2021-06-01 PROCEDURE — 97162 PT EVAL MOD COMPLEX 30 MIN: CPT

## 2021-06-01 RX ORDER — SPIRONOLACTONE 25 MG/1
50 TABLET ORAL DAILY
Status: DISCONTINUED | OUTPATIENT
Start: 2021-06-02 | End: 2021-06-02 | Stop reason: HOSPADM

## 2021-06-01 RX ORDER — TRAMADOL HYDROCHLORIDE 50 MG/1
50 TABLET ORAL EVERY 8 HOURS PRN
Qty: 21 TABLET | Refills: 0 | Status: SHIPPED | OUTPATIENT
Start: 2021-06-01 | End: 2021-06-08

## 2021-06-01 RX ORDER — ISOSORBIDE MONONITRATE 30 MG/1
30 TABLET, EXTENDED RELEASE ORAL DAILY
Qty: 30 TABLET | Refills: 3 | Status: SHIPPED | OUTPATIENT
Start: 2021-06-01 | End: 2021-08-30 | Stop reason: SDUPTHER

## 2021-06-01 RX ADMIN — VALSARTAN 160 MG: 160 TABLET, FILM COATED ORAL at 10:26

## 2021-06-01 RX ADMIN — HYDROCODONE BITARTRATE AND ACETAMINOPHEN 1 TABLET: 5; 325 TABLET ORAL at 23:56

## 2021-06-01 RX ADMIN — HYDROCODONE BITARTRATE AND ACETAMINOPHEN 1 TABLET: 5; 325 TABLET ORAL at 12:07

## 2021-06-01 RX ADMIN — ATORVASTATIN CALCIUM 40 MG: 80 TABLET, FILM COATED ORAL at 21:46

## 2021-06-01 RX ADMIN — HYDROCODONE BITARTRATE AND ACETAMINOPHEN 1 TABLET: 5; 325 TABLET ORAL at 05:46

## 2021-06-01 RX ADMIN — VALSARTAN 160 MG: 160 TABLET, FILM COATED ORAL at 21:46

## 2021-06-01 RX ADMIN — SPIRONOLACTONE 25 MG: 25 TABLET ORAL at 10:26

## 2021-06-01 RX ADMIN — ASPIRIN 325 MG: 325 TABLET, COATED ORAL at 10:25

## 2021-06-01 RX ADMIN — FUROSEMIDE 40 MG: 40 TABLET ORAL at 10:25

## 2021-06-01 RX ADMIN — HYDROCODONE BITARTRATE AND ACETAMINOPHEN 1 TABLET: 5; 325 TABLET ORAL at 17:28

## 2021-06-01 RX ADMIN — APIXABAN 5 MG: 5 TABLET, FILM COATED ORAL at 21:46

## 2021-06-01 RX ADMIN — ISOSORBIDE MONONITRATE 30 MG: 30 TABLET, EXTENDED RELEASE ORAL at 10:26

## 2021-06-01 RX ADMIN — FUROSEMIDE 40 MG: 40 TABLET ORAL at 17:28

## 2021-06-01 RX ADMIN — AMIODARONE HYDROCHLORIDE 200 MG: 200 TABLET ORAL at 10:26

## 2021-06-01 RX ADMIN — APIXABAN 5 MG: 5 TABLET, FILM COATED ORAL at 10:26

## 2021-06-01 RX ADMIN — METOPROLOL SUCCINATE 100 MG: 50 TABLET, EXTENDED RELEASE ORAL at 10:26

## 2021-06-01 RX ADMIN — POTASSIUM CHLORIDE 40 MEQ: 1500 TABLET, EXTENDED RELEASE ORAL at 10:28

## 2021-06-01 ASSESSMENT — PAIN DESCRIPTION - LOCATION
LOCATION: FOOT
LOCATION: FOOT
LOCATION: FOOT;WRIST
LOCATION: ANKLE;FOOT

## 2021-06-01 ASSESSMENT — PAIN SCALES - GENERAL
PAINLEVEL_OUTOF10: 4
PAINLEVEL_OUTOF10: 0
PAINLEVEL_OUTOF10: 7
PAINLEVEL_OUTOF10: 8
PAINLEVEL_OUTOF10: 7
PAINLEVEL_OUTOF10: 4
PAINLEVEL_OUTOF10: 5
PAINLEVEL_OUTOF10: 8
PAINLEVEL_OUTOF10: 7
PAINLEVEL_OUTOF10: 6

## 2021-06-01 ASSESSMENT — PAIN DESCRIPTION - PAIN TYPE
TYPE: ACUTE PAIN

## 2021-06-01 ASSESSMENT — PAIN DESCRIPTION - ORIENTATION
ORIENTATION: RIGHT;LEFT
ORIENTATION: RIGHT;LEFT
ORIENTATION: LEFT;RIGHT
ORIENTATION: RIGHT;LEFT

## 2021-06-01 NOTE — DISCHARGE INSTR - COC
Continuity of Care Form  CH[facility]      _x_ Daily Visits x 3     _x_Cardiovascular Assessment. Titrate O2 to keep SaO2 greater than 90%    _x_ Daily Weights- Baseline Wt: 270 lb   Call MD if:   3 pound weight gain or loss in one day OR 5 pound weight gain in one week       _x_ Labs:   BMP,BNP     Please start on 6/3   Frequency: Weekly x 4              Fax results to: [facility]: [phone]        _x_ Med List attached:   Hold Coreg/Metoprolol if HR less than 45 or patient symptomatic*   Hold ACE/ARB if SBP less than 85 or patient symptomatic*   Do not hold Spironolactone (aldactone) for hypotension/bradycardia   Call MD for questions: [facility]: [phone]    _x_Follow up appointment with cardiology: date/time:  with Venkat Quezada            Patient Name: Duke Jalloh   :  1955  MRN:  [id number]    Admit date:  2021  Discharge date:  2021    Code Status Order: Full Code   Advance Directives:   [place] Directive Type of Healthcare Directive Copy in [address] [facility] [address] Agent's Name Healthcare Agent's Phone Number    [phone]  No, patient does not have an advance directive for healthcare treatment -- -- -- -- --            Admitting Physician:  Iesha Goodwin MD  PCP: No primary care provider on file. Discharging Nurse: [facility] [place] Unit/Room#: 3VN-5543/9147-17  Discharging Unit Phone Number: [phone]    Emergency Contact:   Extended Emergency Contact Information  Primary Emergency Contact: Karin Kemp  Phone: [phone]  Mobile Phone: [phone]  Relation: Grandchild  Preferred language: English    Past Surgical History:  Past Surgical History:   Procedure Laterality Date    CARDIAC DEFIBRILLATOR PLACEMENT      DILATION AND CURETTAGE OF UTERUS         Immunization History: There is no immunization history on file for this patient.     Active Problems:  Patient Conduit: No       Date of Last BM: 6/1/2021    Intake/Output Summary (Last 24 hours) at 6/1/2021 0930  Last data filed at 5/31/2021 1215  Gross per 24 hour   Intake 720 ml   Output 375 ml   Net 345 ml     I/O last 3 completed shifts: In: 5 [P.O.:720]  Out: 375 [Urine:375]    Safety Concerns: At Risk for Falls    Impairments/Disabilities:      None    Nutrition Therapy:  Current Nutrition Therapy:   - Oral Diet:  Cardiac    Routes of Feeding: Oral  Liquids: Thin Liquids  Daily Fluid Restriction: yes - amount 1500  Last Modified Barium Swallow with Video (Video Swallowing Test): not done    Treatments at the Time of Hospital Discharge:   Respiratory Treatments: none  Oxygen Therapy:  is not on home oxygen therapy.   Ventilator:    - No ventilator support    Rehab Therapies: Physical Therapy and Occupational Therapy  Weight Bearing Status/Restrictions: No weight bearing restirctions  Other Medical Equipment (for information only, NOT a DME order):  walker  Other Treatments: none    Patient's personal belongings (please select all that are sent with patient):  None    RN SIGNATURE:  Electronically signed by Kaelyn Pathak RN on 6/1/21 at 9:31 AM EDT    CASE MANAGEMENT/SOCIAL WORK SECTION    Inpatient Status Date: 5/30/2021    Readmission Risk Assessment Score:  Readmission Risk              Risk of Unplanned Readmission:  10           Discharging to Facility/ Agency   · Name:   [facility]   · Address:  · Phone:  [phone]  · Fax:  [phone]      PCP;  Dr Yoana Hawk-  Phone- [phone]  Fax;  [phone]      Dialysis Facility (if applicable)   · Name:     · Address:  · Dialysis Schedule:  · Phone:  · Fax:    / signature: Electronically signed by Lawrence Presley RN on 6/2/21 at 1:50 PM EDT    PHYSICIAN SECTION    Prognosis: Guarded    Condition at Discharge: Stable    Rehab Potential (if transferring to Rehab): Good    Recommended Labs or Other Treatments After

## 2021-06-01 NOTE — PROGRESS NOTES
Physical Therapy    Facility/Department: [address] NURSING  Initial Assessment    NAME: Deven Pires  : 1955  MRN: [id number]    Date of Service: 2021    Discharge Recommendations:  Deven Pires scored a 16/24 on the AM-PAC short mobility form. Current research shows that an AM-PAC score of 17 or less is typically not associated with a discharge to the patient's home setting. Based on the patient's AM-PAC score and their current functional mobility deficits, it is recommended that the patient have 5-7 sessions per week of Physical Therapy at d/c to increase the patient's independence. At this time, this patient demonstrates the endurance, and/or tolerance for 3 hours of therapy each day, with a treatment frequency of 5-7x/wk. Please see assessment section for further patient specific details. If patient discharges prior to next session this note will serve as a discharge summary. Please see below for the latest assessment towards goals. 5-7 sessions per week   PT Equipment Recommendations  Equipment Needed: No    Assessment   Body structures, Functions, Activity limitations: Decreased functional mobility ; Decreased endurance;Decreased strength;Decreased balance  Assessment: Patient not at baseline function and would benefit from skilled PT to address above deficits and facilitate return to baseline function. Patient limited by B feet pain and with increased time and difficulty needed to complete all tasks. Difficulty with transfers and ADL tasks due to L wrist pain.  Presents and increased risk of falls and not safe to reutrn to current home environment  Treatment Diagnosis: decreased functional mobility, impaired gait, decreased balance  Prognosis: Good  Decision Making: Medium Complexity  Clinical Presentation: evolving  PT Education: PT Role;Goals  Patient Education: d/c recommendations - verbalized understanding  Barriers to Learning: none  REQUIRES PT FOLLOW UP: Yes  Activity Tolerance  Activity Tolerance: Patient limited by endurance; Patient limited by pain  Activity Tolerance: max increased time needed to complete all functional mobility due to B feet pain       Patient Diagnosis(es): The primary encounter diagnosis was Acute chest pain. Diagnoses of General weakness, Peripheral edema, Sprain of left wrist, initial encounter, and Accelerated hypertension were also pertinent to this visit. has a past medical history of AICD (automatic cardioverter/defibrillator) present, Arthritis, CHF (congestive heart failure) ([facility] Gout, Hyperlipidemia, Hypertension, and MI (myocardial infarction) ([facility] has a past surgical history that includes Dilation and curettage of uterus and Cardiac defibrillator placement. Restrictions  Restrictions/Precautions  Restrictions/Precautions: Fall Risk (high fall risk)  Implants present? : Pacemaker (AICD device)  Position Activity Restriction  Other position/activity restrictions: Louis Comer is a 78 y.o. female who presents with several complaints. First, patient states that for 1 week, she has been feeling increasingly short of breath, generally weak and fatigued and having cramping in her feet and increased swelling in her legs. Then for the past 2 days, she developed intermittent chest pain. She states that she has been getting around her home by using a computer chair for the past week. Then yesterday, she states that she fell in her bathroom and braced herself with both of her hands. She reports left wrist pain and swelling ever since. Dr. Kalee Wei is her cardiologist.  She does have a Medtronic pacemaker that has not been interrogated since October of last year. She denies head trauma or loss of consciousness.   Vision/Hearing  Vision: Impaired  Vision Exceptions: Wears glasses at all times  Hearing: Within functional limits     Subjective  General  Chart Reviewed: Yes  Family / Caregiver Present: No  Diagnosis: chest pain  Follows Commands: Within Functional Limits  General Comment  Comments: supine in bed upon arrival with alarm on  Subjective  Subjective: Patient reported 7/10 B feet pain - states she is unable to put weight on her feet as they are so painful. States her pain has been present x 5 days. States her mobility has been significantly impacted due to pain and has been using rolling chair to get around her home for the past couple of days.   Pain Screening  Patient Currently in Pain: Yes  Pain Assessment  Pain Assessment: 0-10  Pain Level: 7  Pain Type: Acute pain  Pain Location: Foot  Pain Orientation: Left;Right  Vital Signs  Patient Currently in Pain: Yes       Orientation  Orientation  Overall Orientation Status: Within Functional Limits  Social/Functional History  Social/Functional History  Lives With: Alone (son will be able to stay with patient 1 week upon discharge)  Type of Home: Apartment  Home Access: Stairs to enter with rails  Entrance Stairs - Number of Steps: 2 SWETA from back with B rails, but unable to reach both rails at same time, states she always has help getting up/down steps  Entrance Stairs - Rails: Both  Bathroom Shower/Tub: Tub/Shower unit  Bathroom Toilet: Standard  Bathroom Equipment: Shower chair  Home Equipment: Rolling walker  Receives Help From: Family (grandchildren assist PRN)  ADL Assistance: Independent (has been sponge bathing over last week as she is fearful of falling in shower)  Homemaking Assistance:  (grocery shops with family as she needs to use electric cart in grocery store)  Ambulation Assistance: Independent (typically independent, has been using RW vs rolling chair for last week prior to admission - states using rolling chair most frequently in past week)  Transfer Assistance: Independent (states she has assistance with car transfers as needed)  Active : Yes  Patient's  Info: states she only drives during day, but grandchildren provide transportation PRN  Leisure & Hobbies: Watching tv, listening to music  Additional Comments: States she has been unable to ambulate x 5 days prior to arrival - using rolling chair for mobility. Interested in [facility] and tub transfer bench. States she has been having urinary incontinence issues recently as she has been unable to make it to bathrooom in time. Reports 1 fall (right before current admission), resulting in L wrist pain.     Objective          AROM RLE (degrees)  RLE AROM: WFL  AROM LLE (degrees)  LLE AROM : WFL  Strength RLE  Strength RLE: WFL  Strength LLE  Strength LLE: WFL  Strength Other  Other: unable to formally assess - patient reported LEs painful to touch     Sensation  Overall Sensation Status:  (difficult to assess as LEs painful to touch, denies numbess/tingling)  Bed mobility  Supine to Sit: Stand by assistance (HOB elevated, use of bed rail, increased difficulty as patient resistant to use LUE due to pain)  Sit to Supine: Stand by assistance  Scooting: Supervision  Transfers  Sit to Stand: Contact guard assistance (increased time, cues for hand placement, from bed x 2, from toilet, from chair)  Stand to sit: Contact guard assistance  Bed to Chair: Contact guard assistance (with RW chair to bed)  Ambulation  Ambulation?: Yes  Ambulation 1  Surface: level tile  Device: Rolling Walker  Assistance: Contact guard assistance  Quality of Gait: wide PILAR, minimal foot clearance and knee flexino initially with increased medial lateral sway with max increased time, progressed to more functional gati pattern, but continues with decreased knee flexion, decreased heel strike  Distance: 15' x 2     Balance  Sitting - Static: Good  Sitting - Dynamic:  (sat EOB ~10-15 minutes engaged in UE task without difficulty with balance)  Standing - Static:  (able to perform UE tasks with unilateral support on walker, minimal ability to perform static standing without UE support on walker)  Standing - Dynamic: [address] [place] [address]  Times per week: 3-5  Times per day: Daily  Current Treatment Recommendations: Strengthening, Transfer Training, Endurance Training, Balance Training, Gait Training, Functional Mobility Training, Stair training, Safety Education & Training, Home Exercise Program  Safety Devices  Type of devices: All fall risk precautions in place, Call light within reach, Nurse notified, Left in bed, Bed alarm in place  Restraints  Initially in place: No      AM-PAC Score  AM-PAC Inpatient Mobility Raw Score : 16 (06/01/21 1112)  AM-PAC Inpatient T-Scale Score : 40.78 (06/01/21 1112)  Mobility Inpatient CMS 0-100% Score: 54.16 (06/01/21 1112)  Mobility Inpatient CMS G-Code Modifier : CK (06/01/21 1112)          Goals  Short term goals  Time Frame for Short term goals:  To be met prior to discharge  Short term goal 1: Bed mobility with supervision  Short term goal 2: Sit to/from stand with supervision  Short term goal 3: Ambulate 48' with RW and supervision  Short term goal 4: Navigate up/down 2 steps with rail and CGA  Patient Goals   Patient goals : did not state       Therapy Time   Individual Concurrent Group Co-treatment   Time In 0828         Time Out 1000         Minutes 92         Timed Code Treatment Minutes: NATHALIA Saba    Thanks, Maryhelen Goodpasture, PT, DPT [id number]

## 2021-06-01 NOTE — PROGRESS NOTES
CARRILLO done at [facility]. Transmission shows normal sensing and pacing function. Noted NSVT. Pt is on Toprol. EP to review.     Optivol is normal.

## 2021-06-01 NOTE — PROGRESS NOTES
Physician Progress Note      Jefferson Shah  [facility] #:                  [id number]  :                       1955  ADMIT DATE:       2021 11:29 AM  [address] [place] DATE:  RESPONDING  PROVIDER #:        Ossie Siemens MD          QUERY TEXT:    Dear Dr. Megan Thompson,    Pt admitted with dyspnea and chest pain. Pt noted to have atrial   fibrillation. If possible, please document in progress notes and discharge   summary if you are evaluating and/or treating any of the following: The medical record reflects the following:    Risk Factors: CAD, HTN  Clinical Indicators: Patient to ED with mid sternal chest pain described as   cramping pressure. EKG per ED provider sinus rhythm with old inferior   infarction, unchanged from previous. AICD interrogation in ED with Medtronic   showed nonsustained Vtach lasting 4 seconds on . Troponins 0.01 x 3. Treatment: Telemetry monitoring, Lab work, Imaging, Cards consult. Thank you      [email or web address]  Options provided:  -- Chest pain due to CAD with unstable angina  -- Chest pain due to NSTEMI  -- Chest pain due to GERD  -- Chest pain due to costochondritis  -- Chest pain due to atrial fibrillation  -- Other - I will add my own diagnosis  -- Disagree - Not applicable / Not valid  -- Disagree - Clinically unable to determine / Unknown  -- Refer to Clinical Documentation Reviewer    PROVIDER RESPONSE TEXT:    This patient has chest pain due to atrial fibrillation. Query created by: Khai Guzman on 2021 9:12 AM      QUERY TEXT:    Dear Dr. Megan Thompson,    Pt admitted with increasing shortness of breath, fatigue, intermittent chest   pain and has CHF documented.  If possible, please document in progress notes   and discharge summary further specificity regarding the type and acuity of   CHF:    The medical record reflects the following:  Risk Factors: CAD, HTN, cardiomyopathy  Clinical Indicators: Patient with chest pain with complaints of increasing LE   edema with dyspnea. Troponins negative. Pro-. Per Cards consult prior   history of CHF, MI with AICD placed. Per EPS progress note of 5/31 shortness   of breath and cardiomyopathy likely secondary to heart failure and low   ejection fraction, ACC C NYHA II worsening, diuresis with po Lasix and   spironolactone. Treatment: Telemetry, Lab work, Imaging, Cardiology consult. Thank you    [email or web address]  Options provided:  -- Acute on Chronic Systolic CHF/HFrEF  -- Acute on Chronic Diastolic CHF/HFpEF  -- Acute on Chronic Systolic and Diastolic CHF  -- Acute Systolic CHF/HFrEF  -- Acute Diastolic CHF/HFpEF  -- Acute Systolic and Diastolic CHF  -- Chronic Systolic CHF/HFrEF  -- Chronic Diastolic CHF/HFpEF  -- Chronic Systolic and Diastolic CHF  -- Other - I will add my own diagnosis  -- Disagree - Not applicable / Not valid  -- Disagree - Clinically unable to determine / Unknown  -- Refer to Clinical Documentation Reviewer    PROVIDER RESPONSE TEXT:    This patient is in acute on chronic systolic CHF/HFrEF.     Query created by: Farhat Lay on 6/1/2021 9:32 AM      Electronically signed by:  Shayla Simmonds MD 6/1/2021 1:44 PM

## 2021-06-01 NOTE — PROGRESS NOTES
Occupational Therapy   Occupational Therapy Initial Assessment  Date: 2021   Patient Name: Shantel Goldberg  MRN: [id number]     : 1955    Date of Service: 2021    Discharge Recommendations:  Shantel Goldberg scored a 15/24 on the AM-PAC ADL Inpatient form. Current research shows that an AM-PAC score of 17 or less is typically not associated with a discharge to the patient's home setting. Based on the patient's AM-PAC score and their current ADL deficits, it is recommended that the patient have 5-7 sessions per week of Occupational Therapy at d/c to increase the patient's independence. At this time, this patient demonstrates the endurance, and/or tolerance for 3 hours of therapy each day, with a treatment frequency of 5-7x/wk. Please see assessment section for further patient specific details. If patient discharges prior to next session this note will serve as a discharge summary. Please see below for the latest assessment towards goals. OT Equipment Recommendations  Equipment Needed: Yes  Mobility Devices: ADL Assistive Devices  ADL Assistive Devices: Transfer Tub Bench; Toileting - Heavy Duty Commode;Hand-held Shower;Long-handled Sponge    Assessment   Performance deficits / Impairments: Decreased functional mobility ; Decreased endurance;Decreased ADL status; Decreased high-level IADLs  Assessment: Pt is below her baseline level of occupational function, based on the above deficits associated with chest pain. Pt would benefit from continued skilled acute OT services to address these deficits. Treatment Diagnosis: Decreased ADL/IADL status, functional mobility and endurance associated with chest pain  Prognosis: Good  Decision Making: Medium Complexity  History: Pt 77 yo, lives alone, apt w/2 SWETA, with good family support; I ADLs, gets assist w/IADLs, limited ambulation, using RW vs office chair recently, 1 fall PTA.  PMH: CHF, HTN, AICD, gout, MI, morbid obesity  Exam: functional ADL assessment, 6 clicks, 4 performance deficits/impairments, evolvingpresentation  Assistance / Modification: CGA functional mobility, transfers, Mod A LB dressing, Min A LB dressing, toileting  OT Education: OT Role;Plan of Care;Transfer Training;ADL Adaptive Strategies; Equipment  Patient Education: Extensive time discussing needed equipment, d/c recommendation. Pt independently verbalized understanding. Barriers to Learning: None  REQUIRES OT FOLLOW UP: Yes  Activity Tolerance  Activity Tolerance: Patient Tolerated treatment well;Patient limited by pain  Safety Devices  Safety Devices in place: Yes  Type of devices: All fall risk precautions in place;Call light within reach;Nurse notified; Left in bed;Bed alarm in place;Gait belt;Patient at risk for falls           Patient Diagnosis(es): The primary encounter diagnosis was Acute chest pain. Diagnoses of General weakness, Peripheral edema, Sprain of left wrist, initial encounter, and Accelerated hypertension were also pertinent to this visit. has a past medical history of AICD (automatic cardioverter/defibrillator) present, Arthritis, CHF (congestive heart failure) ([facility] [address] Gout, Hyperlipidemia, Hypertension, and MI (myocardial infarction) ([facility] [address] has a past surgical history that includes Dilation and curettage of uterus and Cardiac defibrillator placement. Treatment Diagnosis: Decreased ADL/IADL status, functional mobility and endurance associated with chest pain      Restrictions  Restrictions/Precautions  Restrictions/Precautions: Fall Risk (high fall risk)  Implants present? : Pacemaker (AICD device)  Position Activity Restriction  Other position/activity restrictions: Kai Christopher is a 78 y.o. female who presents with several complaints. First, patient states that for 1 week, she has been feeling increasingly short of breath, generally weak and fatigued and having cramping in her feet and increased swelling in her legs.   Then for the past 2 days, she developed intermittent chest pain. She states that she has been getting around her home by using a computer chair for the past week. Then yesterday, she states that she fell in her bathroom and braced herself with both of her hands. She reports left wrist pain and swelling ever since. Dr. Volodymyr Garcia is her cardiologist.  She does have a Medtronic pacemaker that has not been interrogated since October of last year. She denies head trauma or loss of consciousness. Subjective   General  Chart Reviewed: Yes  Referring Practitioner: Ashley Amos MD, for d/c planning  Diagnosis: Chest pain  Subjective  Subjective: Pt supine in bed, pleasant and agreeable to OT eval. Pt reports 7/10 pain in feet and ankles. Pt complains, \"I can't get out; I can't get around the house. \"    Social/Functional History  Social/Functional History  Lives With: Alone (son will be able to stay with patient 1 week upon discharge)  Type of Home: Apartment  Home Access: Stairs to enter with rails  Entrance Stairs - Number of Steps: 2 SWETA from back with B rails, but unable to reach both rails at same time, states she always has help getting up/down steps  Entrance Stairs - Rails: Both  Bathroom Shower/Tub: Tub/Shower unit  Bathroom Toilet: Standard  Bathroom Equipment: Shower chair  Home Equipment: Rolling walker  Receives Help From: Family (grandchildren assist PRN)  ADL Assistance: Independent (has been sponge bathing over last week as she is fearful of falling in shower)  Homemaking Assistance:  (grocery shops with family as she needs to use electric cart in grocery store)  [address]: Independent (typically independent, has been using RW vs rolling chair for last week prior to admission - states using rolling chair most frequently in past week)  Transfer Assistance: Independent (states she has assistance with car transfers as needed)  Active : Yes  Patient's  Info: states she only drives during day, but grandchildren provide transportation PRN  Leisure & Hobbies: Watching tv, listening to music  Additional Comments: States she has been unable to ambulate x 5 days prior to arrival - using rolling chair for mobility. Interested in [facility] and tub transfer bench. States she has been having urinary incontinence issues recently as she has been unable to make it to bathrooom in time. Reports 1 fall (right before current admission), resulting in L wrist pain. Objective   Vision: Impaired  Vision Exceptions: Wears glasses at all times  Hearing: Within functional limits    Orientation  Overall Orientation Status: Within Normal Limits     Balance  Sitting Balance: Stand by assistance  Standing Balance: Contact guard assistance (w/RW)  Standing Balance  Time: ~2-3 min X 3  Activity: functional mobility to toilet, clothing mgt & transfer; toileting, functional mobility to chair, posterior hygiene; clothing mgt & functional mobility to chair  Comment: No LOB  Functional Mobility  Functional - Mobility Device: Rolling Walker  Activity: To/from bathroom  Assist Level: Contact guard assistance  Functional Mobility Comments: Education to not lean on forearms on walker. Pt taking very small steps d/t pain in feet. Toilet Transfers  Toilet - Technique: Ambulating  Equipment Used: Standard bedside commode (over toilet)  Toilet Transfer: Contact guard assistance  ADL  Grooming: Setup (wash face, rinse mouth seated on EOB)  UE Bathing: Setup (seated EOB)  LE Bathing: Moderate assistance (OT bathed buttocks, as pt couldn't reach; pt bathed anterior brodie area; did not bathe LEs and feet)  UE Dressing: Setup (pt donned pullover, long-sleeved night shirt)  LE Dressing:  Moderate assistance (D don socks; don hospital underpants, pt's pajama pants; assist for clothing mgt L side d/t painful wrist)  Toileting: Minimal assistance (L side clothing mgt assist)  Tone RUE  RUE Tone: Normotonic  Tone LUE  LUE Tone: Normotonic  Coordination  Movements Are Fluid And transfers to ADL surfaces w/RW  Short term goal 2: SBA for functional mobility w/RW for ADL activity  Short term goal 3: SBA for toileting  Short term goal 4: SBA for LB bathing/dressing with AE as needed       Therapy Time   Individual Concurrent Group Co-treatment   Time In 0828         Time Out 1000         Minutes 92               Timed Code Treatment Minutes:  77 Minutes    Total Treatment Minutes:  92 min    ALISSON/ Mike 66, OT   Freda Lilly., OTR/L, [id number]

## 2021-06-01 NOTE — CARE COORDINATION
Discharge Planning Assessment  RN/SW discharge planner met with patient to discuss reason for admission, current living situation, and potential needs at the time of discharge    [facility]    Current type of dwelling:  Apartment with 3 steps to get in through the back door and 9 steps from the front. She uses the back door    Patient from ECF/SW confirmed with:  N/A    Living arrangements: Lives alone but currently the son lives with her she she fell  last Thursday. Has pain to her legs and left wrist from the fall    Level of function/Support: Has been independent before the fall    PCP:  Dr Joel Telles     DME:  States None    Active with any community resources/agencies/skilled home care:  No    Medication compliance issues: Denies    Financial issues that could impact healthcare:   No      Tentative discharge plan: ARU vs home with hhc services    Discussed and provided facilities of choice if transition to a skilled nursing facility is required at the time of discharge  Therapy is recommending ARU, patient agreeable to ARU FF. Declines SNF ,if not accepted in ARU would like to go home with hhc services. Discussed with patient and/or family that on the day of discharge home tentative time of discharge will be between 10 AM and noon.     Transportation at the time of discharge: TBD on d/c

## 2021-06-01 NOTE — PROGRESS NOTES
Referral received for information regarding Advance Directives. LW and POAHC forms given and explained. Also provided printed information about the forms and process. Instructed patient to not sign either form until asking nurse to call for a  to return to help with completing the process. Patient aware her signature needs to be witnessed by 2 people not related to her in any way or notarized whether she is in hospital or at home. She expressed understanding. At beginning of visit pt was tearful. ..spiritual support provided through listening and encouraging. Prayed with patient. She seemed in somewhat better spirits by end of conversation. Spoke with nurse before and after visit.

## 2021-06-01 NOTE — PROGRESS NOTES
code  Discharge plans: possible ARU vs SNF     Family Present: no    Jose Eduardo Edouard was admitted to the hospital with chest pain and edema. Patient has been seen in the past for education. She states she still is not weighing herself. Unsure of baseline weight. She was experiencing lower extremity edema, chest pressure, and orthopnea. Patient states she remembered to limit extra added salt intake. She had been using Mrs. Mora to season and flavor foods. She had been trying to avoid canned soups but lives alone and will make turkey sandwiches and chips for lunch for the convenience. She mainly drinks hot tea all day and is concerned she drinks over 64 oz per day at times. She tells me she is compliant with medications but has admitted to her nurse she will at times skip her evening diuretics dose. Patient has been following with cardiology as an outpatient. Patient provided with both written and verbal education on CHF signs/ symptoms, causes, discharge medications, daily weights, low sodium diet, activity, and follow-up. Pt to call if gains 3 pounds in one day or 5 pounds in one week. Mutually agreed upon goals were discussed such as calling the MD as soon as they recognize symptoms and weight gain, maintaining his proper diet, taking medications as prescribed, joining rehab when able. Patient provided with CHF Zone Management tool and CHF symptoms magnet. Discussed importance of lifestyle changes:  Encouraged daily weights    PATIENT/CAREGIVER TEACHING:    Level of patient/caregiver understanding able to:   [x ] Verbalize understanding [ ] Demonstrate understanding [ ] Teach back   [ ] Needs reinforcement [ ] Other:       Time spent teachin mins    1. WEIGHT: Admit Weight: 270 lb (122.5 kg)      Today  Weight: 271 lb 9.6 oz (123.2 kg)   2.  I/O     Intake/Output Summary (Last 24 hours) at 2021 1047  Last data filed at 2021 1215  Gross per 24 hour   Intake 480 ml   Output 300 ml   Net 180 ml Recommendations:   1. Patient will need to follow back with cardiology after discharge  2. Educate further on fluid restriction 48 oz- 64 oz during inpatient stay so she can understand how to measure intake at home. 3. Continue to educate on S/S.   4. Emphasize daily weights, diet, and knowing when and who to call  5. Provided patient with CHF Resource Line for questions and concerns.    6.  If patient discharging to  ECF will place CHF pathway on nikhil-- will follow if discharging to [facility] [address], RN 6/1/2021 10:47 AM

## 2021-06-01 NOTE — PROGRESS NOTES
epworth Sleepiness ScThe Sylvester       The Seltzer Sleepiness Scale is widely used in the field of sleep medicine as a subjective measure of a patient's sleepiness. The test is a list of eight situations in which you rate your tendency to become sleepy on a scale of 0, no chance to 3, high chance of dozing. Your score is based on a scale of 0 to 24. The scale estimates whether you are experiencing excessive sleepiness that possibly requires medical attention. How Sleepy Are You? How sleepy are you to doze off or fall asleep in the following situations? You should rate your chances of dozing off, not just feeling tired. Even if you have not done some of these things recently try to determine how they would have affected you.  For each situation, decide whether or not you would have:     0 = No chance of dozing 1 = Slight chance of dozing   2 = Moderate chance of  dozing 3 = High change of dozing       Situation                                                                                     Chance of Dozing    Sitting and reading  0 =  []  1 =    [] 2 =    [] 3 =    [x]    Watching TV  0 =  []  1 =    [] 2 =    [] 3 =    [x]      Sitting inactive in public place (e.g., a theater or a meeting)  0 =  []  1 =    [] 2 =    [x] 3 =    []    As a passenger in a car for an hour without a break          0  =  []  1 =    [] 2 =    [x] 3 =    []    Lying down to rest in the afternoon when circumstances permit    0 =  []  1 =    [] 2 =    [] 3 =    [x]    Sitting and talking to someone  0 =  [x]  1 =    [] 2 =    [] 3 =    []      Sitting quietly after a lunch without alcohol  0 =  []  1 =    [] 2 =    [] 3 =    [x]    In a car, while stopped for a few minutes in traffic                                                                      0 =  [x]  1 =    [] 2 =    [] 3 =    []    Total Score = 16    If your total score is 10 or greater, you are experiencing excessive sleepiness and should consider seeking a medical follow-up. Take a copy of this screening test to your primary care physician on your next office visit. Interpretation:      0 -   7: It is unlikely that you are abnormally sleepy. 8 -   9: You have an average amount of daytime sleepiness. 10 - 15: You may be excessively sleepy depending on the situation. You may want to consider seeking medical attention. 16 - 24: You are excessively sleepy and should consider seeking medical attention.       Electronically signed by Eva Mckeon RCP on 6/1/2021 at 7:58 PM

## 2021-06-01 NOTE — DISCHARGE SUMMARY
[facility] -- Physician Discharge Summary     Louis Comer  1955  MRN: [id number]    Admit Date: 5/30/2021  Discharge Date: No discharge date for patient encounter. Attending MD: Elmo Quintana MD  Discharging MD: Elmo Quintana MD  PCP: No primary care provider on file. No primary physician on file. None    Admission Diagnosis: Chest pain [R07.9]  DISCHARGE DIAGNOSIS: same    Full Hospital Problem List:  Active Hospital Problems    Diagnosis Date Noted    [facility] [I48.0] 20/67/2384    Systolic heart failure, chronic (HCC) [I50.22]     Chest pain [R07.9] 02/28/2020    Hyperlipidemia [E78.5] 12/18/2014    Morbid obesity [facility] [E66.01] 12/18/2014           Hospital Course:  78 y.o. female who presents with several complaints.  First, patient states that for 1 week, she has been feeling increasingly short of breath, generally weak and fatigued and having cramping in her feet and increased swelling in her legs.  Then for the past 2 days, she developed intermittent chest pain.  She states that she has been getting around her home by using a computer chair for the past week.  Then yesterday, she states that she fell in her bathroom and braced herself with both of her hands.  She reports left wrist pain and swelling ever since.  Dr. Kalee Wei is her cardiologist. Ashtyn Barnes does have a Medtronic pacemaker that has not been interrogated since October of last year. Ashtyn Barnes denies head trauma or loss of consciousness.  She is anticoagulated on Eliquis.     EKG in ER shows normal sinus rhythm today  To be admitted for further eval  Cards to be consulted    Per their recs -   Plan:      1. Atypical Chest pain     Chest pain is clearly atypical.  - trend troponin shows no myocardial injury. She had a left  heart cath last year that showed nonobstructive CAD.   -Continue imdur 30 mg    Pt does have some pain to legs from swelling  This appears to be due in part to pt's noncompliance with lasix  She often does not take evening dose at home  Pt advised to take lasix  Given short term tramadol rx for symptomatic pain  Asked to f/u with PCP to discuss if this to be continued; though ideally, pain from edema would be lessened with regular lasix       Consults made during Hospitalization:  IP CONSULT TO INTERNAL MEDICINE  IP CONSULT TO CARDIOLOGY  IP CONSULT TO [facility]    Treatment team at time of Discharge: Treatment Team: Attending Provider: Deepika Borges MD; Consulting Physician: Deepika Borges MD; Consulting Physician: Jamal Lux MD; Respiratory Therapist (Day): Bj Wen RCP; Registered Nurse: Basil Luong RN; Utilization Reviewer: Scot Gallego RN; : Angelica Reyes RN    Imaging Results:  XR WRIST LEFT (MIN 3 VIEWS)    Result Date: 5/30/2021  EXAMINATION: XRAY VIEWS OF THE LEFT WRIST 5/30/2021 12:13 pm COMPARISON: None. HISTORY: ORDERING SYSTEM PROVIDED HISTORY: Injury TECHNOLOGIST PROVIDED HISTORY: Reason for exam:->Injury Reason for Exam: Fall a few days ago, left wrist pain Acuity: Unknown Type of Exam: Unknown FINDINGS: No evidence of fracture or dislocation. Osteoarthritis of the [facility]st [facility] joint     No evidence of fracture     XR CHEST PORTABLE    Result Date: 5/30/2021  EXAMINATION: ONE XRAY VIEW OF THE CHEST 5/30/2021 12:13 pm COMPARISON: 08/12/2020 HISTORY: ORDERING SYSTEM PROVIDED HISTORY: SOB TECHNOLOGIST PROVIDED HISTORY: Reason for exam:->SOB Reason for Exam: Chest Pain (pt with c/o chest pain - mid sternal - ongoing for a few days as well- describes as cramping/ pressure. ) Acuity: Unknown Type of Exam: Unknown FINDINGS: There is a left subclavian AICD. Heart size and pulmonary vasculature appear within normal limits. Diffuse haziness of the lungs due to the patient's body habitus.   Lungs grossly clear     No active cardiopulmonary disease         Discharge Exam:  /66   Pulse 78   Temp 97.8 °F (36.6 °C) (Oral)   Resp 18   Ht 5' 6\" (1.676 m)   Wt 278 lb 12.8 oz (126.5 kg)   SpO2 94%   BMI 45.00 kg/m²   General appearance: alert, appears stated age and cooperative  Head: Normocephalic, without obvious abnormality, atraumatic  Lungs: clear to auscultation bilaterally  Heart: regular rate and rhythm, S1, S2 normal, no murmur, click, rub or gallop  Abdomen: soft, non-tender; bowel sounds normal; no masses,  no organomegaly  Extremities: 1+ edema    Disposition: home    Condition: stable    Discharge Medications:   Niko Kathrinsteffany, [facility] Medication Instructions IQA:[id number]    Printed on:06/01/21 [id number]   Medication Information                      amiodarone (PACERONE) 400 MG tablet  Take 0.5 tablets by mouth daily             apixaban (ELIQUIS) 5 MG TABS tablet  Take 1 tablet by mouth 2 times daily             aspirin 81 MG EC tablet  Take 81 mg by mouth daily             atorvastatin (LIPITOR) 40 MG tablet  Take 1 tablet by mouth daily             furosemide (LASIX) 40 MG tablet  Take 1 tablet by mouth 2 times daily             ibuprofen (ADVIL;MOTRIN) 200 MG tablet  Take 400 mg by mouth 2 times daily as needed for Pain             isosorbide mononitrate (IMDUR) 30 MG extended release tablet  Take 1 tablet by mouth daily             metoprolol succinate (TOPROL XL) 100 MG extended release tablet  Take 1 tablet by mouth daily             pantoprazole (PROTONIX) 40 MG tablet  Take 1 tablet by mouth every morning (before breakfast)             spironolactone (ALDACTONE) 25 MG tablet  Take 1 tablet by mouth 2 times daily             traMADol (ULTRAM) 50 MG tablet  Take 1 tablet by mouth every 8 hours as needed for Pain for up to 7 days. Intended supply: 3 days. Take lowest dose possible to manage pain             valsartan (DIOVAN) 160 MG tablet  Take 1 tablet by mouth 2 times daily                 Allergies:  No Known Allergies    Follow up Instructions: Follow-up with PCP: No primary care provider on file.  in 2 wk .      Total time spent on day of discharge including face-to-face visit, examination, documentation, counseling, preparation of discharge plans and followup, and discharge medicine reconciliation and presciptions is 33 minutes    Signed:  Maile Suggs MD  6/1/2021

## 2021-06-01 NOTE — PROGRESS NOTES
[facility]   Daily Progress Note      Admit Date:  5/30/2021    HPI:    Ms. Mata Bethea is a 78year old female with history of ICD, sHF, hyperlipidemia, hypertension     She is admitted with shortness of breath, wrist pain and fall. Constant chest discomfort for several days along with sleeping with 6 pillows for the last 6 months. Troponin <0.01 EKG nsr    Subjective:  Patient is being seen for acute on chronic sHF. There were no acute overnight cardiac events. Creat 0.6 potassium 3.2 (on replacement) weight 278->271   She did not take her aldactone last night. She has been non compliant with her diuretics at home. Her optival shows normal thoracic impedence now but she is off the charts with impedence a lot. She had a sleep evaluation in 10/19 and never completed the cpap titration. Objective:   /66   Pulse 78   Temp 97.8 °F (36.6 °C) (Oral)   Resp 18   Ht 5' 6\" (1.676 m)   Wt 271 lb 9.6 oz (123.2 kg)   SpO2 94%   BMI 43.84 kg/m²       Intake/Output Summary (Last 24 hours) at 6/1/2021 0958  Last data filed at 5/31/2021 1215  Gross per 24 hour   Intake 480 ml   Output 300 ml   Net 180 ml          Physical Exam:  General:  Awake, alert, oriented in NAD  Skin:  Warm and dry. No unusual bruising or rash  Neck:  Supple. No JVD or carotid bruit appreciated  Chest:  Normal effort.   Clear to auscultation, no wheezes/rhonchi/rales  Cardiovascular:  RRR, S1/S2, no murmur/gallop/rub  Abdomen:  Soft, nontender, +bowel sounds, obese  Extremities:  No edema  Neurological: No focal deficits  Psychological: Normal mood and affect      Medications:    sodium chloride flush  5-40 mL Intravenous 2 times per day    aspirin  325 mg Oral Daily    atorvastatin  40 mg Oral Nightly    amiodarone  200 mg Oral Daily    apixaban  5 mg Oral BID    furosemide  40 mg Oral BID    metoprolol succinate  100 mg Oral Daily    pantoprazole  40 mg Oral QAM AC    spironolactone  25 mg Oral BID    valsartan 160 mg Oral BID    isosorbide mononitrate  30 mg Oral Daily    furosemide  40 mg Intravenous BID      sodium chloride         Lab Data:  CBC:   Recent Labs     05/30/21  1227 05/31/21  0747 06/01/21  0534   WBC 13.6* 12.7* 12.4*   HGB 13.5 13.0 12.5    341 383     BMP:    Recent Labs     05/30/21  1227 05/31/21  0747 06/01/21  0534    138 138   K 3.5 3.4* 3.2*   CO2 25 22 27   BUN 7 11 11   CREATININE 0.7 0.6 0.6     INR:  No results for input(s): INR in the last 72 hours. BNP:    Recent Labs     05/30/21  1227   PROBNP 198*         Diagnostics:  Echo 10/8/2020  Summary   Left ventricular systolic function is moderately reduced with estimated   ejection fraction of 40%. There is moderate concentric left ventricular   hypertrophy. There is no evidence of mass or thrombus in the left atrium or appendage.     [facility] 8/13/20:  LM-Normal   LAD-mid 50%  Cx-normal  OM- normal  RCA-dominant normal  RPDA- normal  LVEF- 35  LVG- Global  LVEDP- 25     Impression  ~Coronary Angiography w/ nonobstructive CAD  ~LVG with LVEF of 35 and global regional wall motion abnormalities  ~high LVEDP    Assessment:    1. Atypical chest pain, troponin no myocardial injury  2. Atrial fibrillation, ablation 10/20, continue anticoagulation and amiodarone, in NSR  3. Acute on chronic systolic heart failure, on arb, bb and aldosterone antagonist  4. Wrist pain, managed by hospitalist  5. ICD in place    Plan:    1. ICD interrogation in chart  2. Continue Lasix 40 mg bid  3. Continue imdur 30 mg daily  4. Continue valsartan 160 mg bid  5. Continue toprol 100 mg daily  6. Change aldactone to 50 mg once a day (monitor potassium closely)  7. Sleep study follow up at discharge  8. Fluid and sodium restrictions  9. CHF nurse following for diet education, fluid restriction and daily weights  10. Check tsh    She is being evaluated for ARU at discharge.   Compliance with medications  She may benefit from farxiga    NYHA III    Discussed with patient who is agreeable with plan of care. Thank you for allowing me to participate in the care of your patient.     Min Goldstein, YEIMY - CNS, CNS

## 2021-06-02 VITALS
SYSTOLIC BLOOD PRESSURE: 114 MMHG | WEIGHT: 274.3 LBS | OXYGEN SATURATION: 90 % | RESPIRATION RATE: 16 BRPM | DIASTOLIC BLOOD PRESSURE: 74 MMHG | HEIGHT: 66 IN | TEMPERATURE: 97.9 F | HEART RATE: 82 BPM | BODY MASS INDEX: 44.08 KG/M2

## 2021-06-02 LAB
AMIODARONE LEVEL: <0.3 UG/ML (ref 0.5–2)
DES-AMIOD: <0.3 UG/ML

## 2021-06-02 PROCEDURE — 99232 SBSQ HOSP IP/OBS MODERATE 35: CPT | Performed by: CLINICAL NURSE SPECIALIST

## 2021-06-02 PROCEDURE — 6370000000 HC RX 637 (ALT 250 FOR IP): Performed by: INTERNAL MEDICINE

## 2021-06-02 PROCEDURE — 93289 INTERROG DEVICE EVAL HEART: CPT | Performed by: INTERNAL MEDICINE

## 2021-06-02 PROCEDURE — 6370000000 HC RX 637 (ALT 250 FOR IP): Performed by: CLINICAL NURSE SPECIALIST

## 2021-06-02 PROCEDURE — 93290 INTERROG DEV EVAL ICPMS IP: CPT | Performed by: INTERNAL MEDICINE

## 2021-06-02 RX ADMIN — ISOSORBIDE MONONITRATE 30 MG: 30 TABLET, EXTENDED RELEASE ORAL at 08:13

## 2021-06-02 RX ADMIN — FUROSEMIDE 40 MG: 40 TABLET ORAL at 08:13

## 2021-06-02 RX ADMIN — PANTOPRAZOLE SODIUM 40 MG: 40 TABLET, DELAYED RELEASE ORAL at 05:16

## 2021-06-02 RX ADMIN — HYDROCODONE BITARTRATE AND ACETAMINOPHEN 1 TABLET: 5; 325 TABLET ORAL at 11:46

## 2021-06-02 RX ADMIN — APIXABAN 5 MG: 5 TABLET, FILM COATED ORAL at 08:13

## 2021-06-02 RX ADMIN — ASPIRIN 325 MG: 325 TABLET, COATED ORAL at 08:13

## 2021-06-02 RX ADMIN — METOPROLOL SUCCINATE 100 MG: 50 TABLET, EXTENDED RELEASE ORAL at 08:13

## 2021-06-02 RX ADMIN — SPIRONOLACTONE 50 MG: 25 TABLET ORAL at 08:13

## 2021-06-02 RX ADMIN — HYDROCODONE BITARTRATE AND ACETAMINOPHEN 1 TABLET: 5; 325 TABLET ORAL at 05:16

## 2021-06-02 RX ADMIN — AMIODARONE HYDROCHLORIDE 200 MG: 200 TABLET ORAL at 08:13

## 2021-06-02 RX ADMIN — POTASSIUM BICARBONATE 40 MEQ: 782 TABLET, EFFERVESCENT ORAL at 10:18

## 2021-06-02 RX ADMIN — VALSARTAN 160 MG: 160 TABLET, FILM COATED ORAL at 08:13

## 2021-06-02 ASSESSMENT — PAIN DESCRIPTION - PAIN TYPE
TYPE: ACUTE PAIN
TYPE: ACUTE PAIN

## 2021-06-02 ASSESSMENT — PAIN SCALES - GENERAL
PAINLEVEL_OUTOF10: 6
PAINLEVEL_OUTOF10: 6
PAINLEVEL_OUTOF10: 7
PAINLEVEL_OUTOF10: 5

## 2021-06-02 ASSESSMENT — PAIN DESCRIPTION - LOCATION
LOCATION: FOOT;WRIST
LOCATION: FOOT;WRIST

## 2021-06-02 ASSESSMENT — PAIN DESCRIPTION - ORIENTATION: ORIENTATION: LEFT;RIGHT

## 2021-06-02 NOTE — PROGRESS NOTES
CLINICAL PHARMACY NOTE: MEDS TO BEDS    Total # of Prescriptions Filled: 2   The following medications were delivered to the patient:  · Tramadol 50 mg  · Isosorbide ER 30    Additional Documentation:    Delivered to Patient  Glenard Rubinstein CPhT

## 2021-06-02 NOTE — PROGRESS NOTES
[facility]   Daily Progress Note      Admit Date:  5/30/2021    HPI:    Ms. Lorin Jolley is a 78year old female with history of ICD, sHF, hyperlipidemia, hypertension     She is admitted with shortness of breath, wrist pain and fall. Constant chest discomfort for several days along with sleeping with 6 pillows for the last 6 months. Troponin <0.01 EKG nsr. ICD interrogation showed constant elevation thoracic impedence (patient non compliant with diuretics at home)    Subjective:  Patient is being seen for acute on chronic sHF. There were no acute overnight cardiac events. Creat 0.6 potassium 3.2 weight 278-274 and -1.5 L out . She remains on room air. No chest pain, shortness of breath or edema. She was declined from ARU but is awaiting another rehab placement and if not accepted, home with home care, PT etc.  She is interested in completing the sleep evaluation. tsh is normal    Objective:   /71   Pulse 79   Temp 97.7 °F (36.5 °C) (Oral)   Resp 18   Ht 5' 6\" (1.676 m)   Wt 274 lb 4.8 oz (124.4 kg)   SpO2 95%   BMI 44.27 kg/m²       Intake/Output Summary (Last 24 hours) at 6/2/2021 0915  Last data filed at 6/2/2021 0900  Gross per 24 hour   Intake 480 ml   Output 1925 ml   Net -1445 ml          Physical Exam:  General:  Awake, alert, oriented in NAD  Skin:  Warm and dry. No unusual bruising or rash  Neck:  Supple. No JVD or carotid bruit appreciated  Chest:  Normal effort.   Clear to auscultation, no wheezes/rhonchi/rales  Cardiovascular:  RRR, S1/S2, no murmur/gallop/rub  Abdomen:  Soft, nontender, +bowel sounds, obese  Extremities:  No edema  Neurological: No focal deficits  Psychological: Normal mood and affect      Medications:    spironolactone  50 mg Oral Daily    sodium chloride flush  5-40 mL Intravenous 2 times per day    aspirin  325 mg Oral Daily    atorvastatin  40 mg Oral Nightly    amiodarone  200 mg Oral Daily    apixaban  5 mg Oral BID    furosemide  40 mg Oral medications expressed and she understands. If home, recommend home care with PT and OT. Follow up arranged for 6/8/2021  She may benefit from [address]  in follow up    NYHA III    Discussed with patient who is agreeable with plan of care. Thank you for allowing me to participate in the care of your patient.     Sharonda Trujillo, APRN - CNS, CNS

## 2021-06-02 NOTE — PLAN OF CARE
Problem: Falls - Risk of:  Goal: Will remain free from falls  Description: Will remain free from falls  Outcome: Ongoing  Note: Fall precautions in place, bed alarm on, nonskid foot wear applied, bed in lowest position, and call light within reach. Will continue to monitor.       Problem: OXYGENATION/RESPIRATORY FUNCTION  Goal: Patient will maintain patent airway  Outcome: Ongoing     Problem: Cardiovascular  Goal: Hemodynamic stability  Outcome: Ongoing     Problem: Skin Integrity:  Goal: Will show no infection signs and symptoms  Description: Will show no infection signs and symptoms  Outcome: Ongoing

## 2021-06-02 NOTE — PROGRESS NOTES
Data- discharge order received, pt verbalized agreement to discharge, needs for [facility] with [facility] for PT/OT and/or new Durable Medical Equipment through [employer] for bedside commode, DIANNE reviewed and signed by MD, to be completed by RN. Action- AVS prepared, discharge instructions prepared and given to pt, medication information packet given r/t NEW or CHANGED prescriptions, pt verbalized understanding further self-review. D/C instruction summary: Diet- Cardiac, no caffeine. Activity- up as tolerated, follow up with Primary Care Physician No primary care provider on file. None appointment in one week,  medications prescriptions to be filled at out pt pharmacy at [facility]. Contact information provided to above agencies used. Response- Case Management/ reported faxing completed DIANNE and AVS to needed HHC/DME services stated above. Pt belongings gathered, IV removed, pt dressed appropriately. Disposition is home with HHC/DME as stated above, transported with belongings and medications, taken to lobby via w/c with RN, no complications.

## 2021-06-02 NOTE — CARE COORDINATION
CM received a call back from [address] [place] [address] with [facility] agency, stated have accepted the referral, will  follow pt on discharge.   Requested the the information of the pt's PCP;  Dr Raul Real-  Phone- [phone]  Fax;  [phone]

## 2021-06-02 NOTE — DISCHARGE SUMMARY
[facility] -- Physician Discharge Summary     Nawaf Rodríguez  1955  MRN: [id number]    Admit Date: 5/30/2021  Discharge Date: No discharge date for patient encounter. Attending MD: Manford Schilder, MD  Discharging MD: Manford Schilder, MD  PCP: No primary care provider on file. No primary physician on file. None    Admission Diagnosis: Chest pain [R07.9]  DISCHARGE DIAGNOSIS: same    Full Hospital Problem List:  Active Hospital Problems    Diagnosis Date Noted    PAF (paroxysmal atrial fibrillation) ([facility] [address] [I48.0] 10/08/2020    Acute on chronic systolic heart failure due to valvular disease ([facility] [address] [I50.23, I38]     Acute chest pain [R07.9] 02/28/2020    ICD (implantable cardioverter-defibrillator) in place [Z95.810] 01/13/2015    Hyperlipidemia [E78.5] 12/18/2014    Morbid obesity ([facility] [address] [E66.01] 12/18/2014           Hospital Course:  78 y.o. female who presents with several complaints.  First, patient states that for 1 week, she has been feeling increasingly short of breath, generally weak and fatigued and having cramping in her feet and increased swelling in her legs.  Then for the past 2 days, she developed intermittent chest pain.  She states that she has been getting around her home by using a computer chair for the past week.  Then yesterday, she states that she fell in her bathroom and braced herself with both of her hands.  She reports left wrist pain and swelling ever since.  Dr. Krysten Bowers is her cardiologist. Keely Clinton does have a [employer] pacemaker that has not been interrogated since October of last year. Keely Clinton denies head trauma or loss of consciousness.  She is anticoagulated on Eliquis.     EKG in ER shows normal sinus rhythm today  To be admitted for further eval  Cards to be consulted    Per their recs -   Plan:      1. Atypical Chest pain     Chest pain is clearly atypical.  - trend troponin shows no myocardial injury.   She had a left  heart cath last year that showed nonobstructive CAD.  -Continue imdur 30 mg    Pt does have some pain to legs from swelling  This appears to be due in part to pt's noncompliance with lasix  She often does not take evening dose at home  Pt advised to take lasix  Given short term tramadol rx for symptomatic pain  Asked to f/u with PCP to discuss if this to be continued; though ideally, pain from edema would be lessened with regular lasix     D/C placed 6/1/21  ARU was consulted as pt is interested in going to acute rehab  Our unit here does not accept her  As such, she will be discharged home (refusing SNF)  Discharge order again placed 6/2/21, this time to home    Consults made during Hospitalization:  IP CONSULT TO INTERNAL MEDICINE  IP CONSULT TO CARDIOLOGY  IP CONSULT TO [facility] [address]     Treatment team at time of Discharge: Treatment Team: Attending Provider: Henok Bedolla MD; Consulting Physician: Henok Bedolla MD; Consulting Physician: Jan Beebe MD; Utilization Reviewer: Shakila Singh RN; : Del Gill RN; Consulting Physician: Arun Pérez MD; Respiratory Therapist (Day): Nell Solitario RCP; Registered Nurse: Justin Mckeon RN    Imaging Results:  XR WRIST LEFT (MIN 3 VIEWS)    Result Date: 5/30/2021  EXAMINATION: XRAY VIEWS OF THE LEFT WRIST 5/30/2021 12:13 pm COMPARISON: None. HISTORY: ORDERING SYSTEM PROVIDED HISTORY: Injury TECHNOLOGIST PROVIDED HISTORY: Reason for exam:->Injury Reason for Exam: Fall a few days ago, left wrist pain Acuity: Unknown Type of Exam: Unknown FINDINGS: No evidence of fracture or dislocation.   Osteoarthritis of the 1st [facility] joint     No evidence of fracture     XR CHEST PORTABLE    Result Date: 5/30/2021  EXAMINATION: ONE XRAY VIEW OF THE CHEST 5/30/2021 12:13 pm COMPARISON: 08/12/2020 HISTORY: ORDERING SYSTEM PROVIDED HISTORY: SOB TECHNOLOGIST PROVIDED HISTORY: Reason for exam:->SOB Reason for Exam: Chest Pain (pt with c/o chest pain - mid sternal - ongoing for a few days as well- describes as cramping/ pressure. ) Acuity: Unknown Type of Exam: Unknown FINDINGS: There is a left subclavian AICD. Heart size and pulmonary vasculature appear within normal limits. Diffuse haziness of the lungs due to the patient's body habitus.   Lungs grossly clear     No active cardiopulmonary disease         Discharge Exam:  /71   Pulse 79   Temp 97.7 °F (36.5 °C) (Oral)   Resp 18   Ht 5' 6\" (1.676 m)   Wt 274 lb 4.8 oz (124.4 kg)   SpO2 95%   BMI 44.27 kg/m²   General appearance: alert, appears stated age and cooperative  Head: Normocephalic, without obvious abnormality, atraumatic  Lungs: clear to auscultation bilaterally  Heart: regular rate and rhythm, S1, S2 normal, no murmur, click, rub or gallop  Abdomen: soft, non-tender; bowel sounds normal; no masses,  no organomegaly  Extremities: 1+ edema    Disposition: home    Condition: stable    Discharge Medications:   Valentina Galindo   Home Medication Instructions [id number]    Printed on:06/02/21 1931   Medication Information                      amiodarone (PACERONE) 400 MG tablet  Take 0.5 tablets by mouth daily             apixaban (ELIQUIS) 5 MG TABS tablet  Take 1 tablet by mouth 2 times daily             aspirin 81 MG EC tablet  Take 81 mg by mouth daily             atorvastatin (LIPITOR) 40 MG tablet  Take 1 tablet by mouth daily             furosemide (LASIX) 40 MG tablet  Take 1 tablet by mouth 2 times daily             ibuprofen (ADVIL;MOTRIN) 200 MG tablet  Take 400 mg by mouth 2 times daily as needed for Pain             isosorbide mononitrate (IMDUR) 30 MG extended release tablet  Take 1 tablet by mouth daily             metoprolol succinate (TOPROL XL) 100 MG extended release tablet  Take 1 tablet by mouth daily             pantoprazole (PROTONIX) 40 MG tablet  Take 1 tablet by mouth every morning (before breakfast) spironolactone (ALDACTONE) 25 MG tablet  Take 1 tablet by mouth 2 times daily             traMADol (ULTRAM) 50 MG tablet  Take 1 tablet by mouth every 8 hours as needed for Pain for up to 7 days. Intended supply: 3 days. Take lowest dose possible to manage pain             valsartan (DIOVAN) 160 MG tablet  Take 1 tablet by mouth 2 times daily                 Allergies:  No Known Allergies    Follow up Instructions: Follow-up with PCP: No primary care provider on file. in 2 wk .       Total time spent on day of discharge including face-to-face visit, examination, documentation, counseling, preparation of discharge plans and followup, and discharge medicine reconciliation and presciptions is 31 minutes    Signed:  Christie Albert MD  6/2/2021

## 2021-06-02 NOTE — CARE COORDINATION
The Plan for Transition of Care is related to the following treatment goals:  Skilled [facility] services    The Patient and/or patient representative - patient was provided with a choice of provider and agrees   with the discharge plan. [x] Yes [] No    Freedom of choice list was provided with basic dialogue that supports the patient's individualized plan of care/goals, treatment preferences and shares the quality data associated with the providers.  [] Yes [] No

## 2021-06-02 NOTE — CARE COORDINATION
Patient discharged on 6/2/21  to home with [facility] services via  [address]     Phone:  [phone]  Fax:  [phone]     All discharge needs met per case management

## 2021-06-02 NOTE — CONSULTS
Emma Francois  6/2/2021  [id number]    Rehab Brief Consult:    Patient is appropriate functionally for ARU but unfortunately would not be anticipated to be approved for ARU given her current diagnosis and [employer] MCR. Bret discussed with CM. We will sign off. Thank you for the consultation.     Reinier Lakhani MD 6/2/2021 8:56 AM

## 2021-06-02 NOTE — CARE COORDINATION
[facility]achell received a referral to this patient for a commode chair. Commode chair has been delivered to the patients room. Thank you for the referral.  Electronically signed by Bobbette Hodgkin on 6/2/2021 at 2:20 PM  Cell ph# [phone]    NOTE: After 5:00 pm, Weekends, Holidays: Call [facility] [employer] at [phone] to coordinate delivery of home medical equipment.

## 2021-06-04 ENCOUNTER — TELEPHONE (OUTPATIENT)
Dept: OTHER | Age: 66
End: 2021-06-04

## 2021-06-29 ENCOUNTER — TELEPHONE (OUTPATIENT)
Dept: CARDIOLOGY CLINIC | Age: 66
End: 2021-06-29

## 2021-06-29 NOTE — TELEPHONE ENCOUNTER
Called pt she missed [facility] appt too gonzalez that to 07/26/2021 12:00 pm and then gonzalez RMM appt to [facility] on 08/04/2021 10:45 am w/device ch

## 2021-06-29 NOTE — TELEPHONE ENCOUNTER
Pt states she needs to reschedule 7/27/21 hosp fu and device ck to a Mon or Wed morning appt. Please advise of date and time pt can be scheduled.

## 2021-07-07 PROBLEM — R07.9 ACUTE CHEST PAIN: Status: RESOLVED | Noted: 2020-02-28 | Resolved: 2021-07-07

## 2021-07-07 PROBLEM — I49.9 CARDIAC ARRHYTHMIA: Status: RESOLVED | Noted: 2020-08-11 | Resolved: 2021-07-07

## 2021-07-07 PROBLEM — I48.0 PAF (PAROXYSMAL ATRIAL FIBRILLATION) (HCC): Status: RESOLVED | Noted: 2020-10-08 | Resolved: 2021-07-07

## 2021-07-30 ENCOUNTER — TELEPHONE (OUTPATIENT)
Dept: CARDIOLOGY CLINIC | Age: 66
End: 2021-07-30

## 2021-07-30 NOTE — TELEPHONE ENCOUNTER
Pt called this morning and was rescheduled with [facility] on 8/9/21. Is that ok, Tried calling pt to schedule with noreen at [facility] and phone did not have a voicemail to leave message. Please advise.

## 2021-07-30 NOTE — TELEPHONE ENCOUNTER
----- Message from YEIMY Chris - CNS sent at 7/27/2021  4:34 PM EDT -----  If she does not show or cancels Friday appt no show/cancel twice  She needs scheduled with MD degroot

## 2021-08-26 NOTE — PROGRESS NOTES
[address]   Congestive Heart Failure    PrimaryCare Doctor:  No primary care provider on file. Chief Complaint:  CHF    History of Present Illness:  Amy Dueñas is a 78 y.o. female with PMH CAD, MI, ICM, HFmrEF, ICD, AF, HLD, HTN who presents today for CHF f/u. She was hospitalized in June for chest pain and new onset AF and then went to rehab. She has not f/u in this office since then but tells me PCP prescribed potassium 2 months ago. Today she c/o  chest pain, dyspnea, fatigue and leg pain/weakness, has had some falls. she denies palpitations, orthopnea, PND, exertional chest pressure/discomfort, early saiety, edema, syncope. Optivol: TI under baseline today    Baseline Weight:   Wt Readings from Last 3 Encounters:   06/02/21 274 lb 4.8 oz (124.4 kg)   10/09/20 281 lb 8.4 oz (127.7 kg)   08/18/20 283 lb 1.1 oz (128.4 kg)          EF: 40%  Cardiac Imaging:  Echo 10/8/2020  Summary   Left ventricular systolic function is moderately reduced with estimated   ejection fraction of 40%. There is moderate concentric left ventricular   hypertrophy.  Noreene Shouts is no evidence of mass or thrombus in the left atrium or appendage.     [facility] 8/13/20:  LM-Normal   LAD-mid 50%  Cx-normal  OM- normal  RCA-dominant normal  RPDA- normal  LVEF- 35  LVG- Global  LVEDP- 25     Impression  ~Coronary Angiography w/ nonobstructive CAD  ~LVG with LVEF of 35 and global regional wall motion abnormalities  ~high LVEDP     Device: Medtronic ICD with Optivol      Activity: at baseline  Can you walk 1-2 blocks or do a moderate amount of house/yard work? No      NYHA Class: II       Sodium Restrictions: 3g  Fluid Restrictions: 48-64 oz/day  Sodium and fluid restriction compliance: good    Pt Education: The patient has received education on the following topics: dietary sodium restriction, heart failure medications, the importance of physical activity, symptom management and weight monitoring       Past Medical History:   has a past medical history of AICD (automatic cardioverter/defibrillator) present, Arthritis, CHF (congestive heart failure) ([facility] [address] Gout, Hyperlipidemia, Hypertension, and MI (myocardial infarction) ([facility] [address] Surgical History:   has a past surgical history that includes Dilation and curettage of uterus and Cardiac defibrillator placement. Social History:   reports that she has never smoked. She has never used smokeless tobacco. She reports that she does not drink alcohol and does not use drugs. Family History:   Family History   Problem Relation Age of Onset    Heart Disease Mother     High Blood Pressure Mother     High Cholesterol Mother     Diabetes Mother     Cancer Father        HomeMedications:  Prior to Admission medications    Medication Sig Start Date End Date Taking?  Authorizing Provider   isosorbide mononitrate (IMDUR) 30 MG extended release tablet Take 1 tablet by mouth daily 6/1/21   Brandi Lara MD   amiodarone (PACERONE) 400 MG tablet Take 0.5 tablets by mouth daily 10/9/20   Debbra Frankel, APRN - CNP   pantoprazole (PROTONIX) 40 MG tablet Take 1 tablet by mouth every morning (before breakfast) 10/9/20   Debbra Frankel, APRN - CNP   valsartan (DIOVAN) 160 MG tablet Take 1 tablet by mouth 2 times daily 9/21/20   YEIMY Desai CNP   ibuprofen (ADVIL;MOTRIN) 200 MG tablet Take 400 mg by mouth 2 times daily as needed for Pain    Historical Provider, MD   atorvastatin (LIPITOR) 40 MG tablet Take 1 tablet by mouth daily 8/24/20   YEIMY Desai CNP   furosemide (LASIX) 40 MG tablet Take 1 tablet by mouth 2 times daily 8/18/20   YEIMY Cramer CNP   spironolactone (ALDACTONE) 25 MG tablet Take 1 tablet by mouth 2 times daily 8/18/20   YEIMY Cramer CNP   metoprolol succinate (TOPROL XL) 100 MG extended release tablet Take 1 tablet by mouth daily 8/14/20   Debbra Frankel, APRN - CNP   apixaban (ELIQUIS) 5 MG TABS tablet Take 1 tablet by mouth 2 times daily 8/13/20   Mary Jane Kimble, APRN - CNP   aspirin 81 MG EC tablet Take 81 mg by mouth daily    Historical Provider, MD        Allergies:  Patient has no known allergies. ROS:   Review of Systems   Constitutional: Positive for fatigue. Respiratory: Positive for shortness of breath. Cardiovascular: Positive for chest pain. Negative for palpitations and leg swelling. Gastrointestinal: Negative. Genitourinary: Negative. Musculoskeletal: Positive for myalgias. Skin: Negative. Neurological: Positive for numbness. Hematological: Negative. Psychiatric/Behavioral: Negative. Physical Examination:    Vitals:    08/30/21 0932   BP: 115/88   Site: Left Upper Arm   Pulse: 80   SpO2: 96%   Height: 5' 6\" (1.676 m)           Physical Exam  Vitals reviewed. Constitutional:       Appearance: Normal appearance. She is normal weight. HENT:      Head: Normocephalic and atraumatic. Eyes:      Extraocular Movements: Extraocular movements intact. Pupils: Pupils are equal, round, and reactive to light. Cardiovascular:      Rate and Rhythm: Normal rate and regular rhythm. Pulses: Normal pulses. Heart sounds: Normal heart sounds. Pulmonary:      Effort: Pulmonary effort is normal.      Breath sounds: Normal breath sounds. Abdominal:      Palpations: Abdomen is soft. Musculoskeletal:         General: Normal range of motion. Cervical back: Normal range of motion and neck supple. Skin:     General: Skin is warm and dry. Neurological:      General: No focal deficit present. Mental Status: She is alert. Mental status is at baseline. Psychiatric:         Mood and Affect: Mood normal.         Behavior: Behavior normal.         Thought Content:  Thought content normal.         Judgment: Judgment normal.         Lab Data:    CBC:   Lab Results   Component Value Date    WBC 12.4 06/01/2021    WBC 12.7 05/31/2021    WBC 13.6 05/30/2021    RBC 4.70 06/01/2021    RBC 4.79 05/31/2021 RBC 4.94 05/30/2021    HGB 12.5 06/01/2021    HGB 13.0 05/31/2021    HGB 13.5 05/30/2021    HCT 40.0 06/01/2021    HCT 40.8 05/31/2021    HCT 42.6 05/30/2021    MCV 85.2 06/01/2021    MCV 85.2 05/31/2021    MCV 86.3 05/30/2021    RDW 14.9 06/01/2021    RDW 14.9 05/31/2021    RDW 15.0 05/30/2021     06/01/2021     05/31/2021     05/30/2021     BMP:  Lab Results   Component Value Date     06/01/2021     05/31/2021     05/30/2021    K 3.2 06/01/2021    K 3.4 05/31/2021    K 3.5 05/30/2021    K 4.2 10/09/2020    K 3.4 08/12/2020     06/01/2021     05/31/2021    CL 99 05/30/2021    CO2 27 06/01/2021    CO2 22 05/31/2021    CO2 25 05/30/2021    BUN 11 06/01/2021    BUN 11 05/31/2021    BUN 7 05/30/2021    CREATININE 0.6 06/01/2021    CREATININE 0.6 05/31/2021    CREATININE 0.7 05/30/2021     BNP:   Lab Results   Component Value Date    PROBNP 198 05/30/2021    PROBNP 15 08/18/2020    PROBNP 95 08/12/2020     Iron Studies:  No components found for: FE,  TIBC,  FERRITIN      Assessment/Plan:    1. Systolic heart failure, chronic (HCC) - compensate, labs today, may need to decrease or stop dejon/kcl   2. Elevated glucose - aic today   3. ICD (implantable cardioverter-defibrillator) in place - optivol under baseline   4. Shortness of breath - stable   5. Paroxysmal atrial fibrillation (HCC) - on Amio, Ac, BB, needs EP f/u   6. Essential hypertension - controlled   7. Leg pain - check kcl, then statin holiday      Instructions:   1. Medications: continue current medications  2. Labs: today  3. Lifestyle Recommendations: Weigh yourself every day in the morning after urination, call Katheryn Yoel if wt increases 2-3lb in one day or 5lb in one week, Limit sodium to 2000mg/day and fluids to 2L or 64oz/day.    4. Follow up: schedule with EP for device and atrial [address] [facility] Heart Failure Hotline: [facility]: [phone]      I appreciate the opportunity of cooperating in the care of this individual.    YEIMY Plunkett - CNP, CNP, 8/26/2021,10:56 AM

## 2021-08-30 ENCOUNTER — OFFICE VISIT (OUTPATIENT)
Dept: CARDIOLOGY CLINIC | Age: 66
End: 2021-08-30
Payer: MEDICARE

## 2021-08-30 ENCOUNTER — TELEPHONE (OUTPATIENT)
Dept: CARDIOLOGY CLINIC | Age: 66
End: 2021-08-30

## 2021-08-30 ENCOUNTER — HOSPITAL ENCOUNTER (OUTPATIENT)
Age: 66
Discharge: HOME | End: 2021-08-30
Payer: MEDICARE

## 2021-08-30 VITALS
BODY MASS INDEX: 44.27 KG/M2 | HEIGHT: 66 IN | HEART RATE: 80 BPM | DIASTOLIC BLOOD PRESSURE: 88 MMHG | OXYGEN SATURATION: 96 % | SYSTOLIC BLOOD PRESSURE: 115 MMHG

## 2021-08-30 DIAGNOSIS — I48.0 PAROXYSMAL ATRIAL FIBRILLATION (HCC): ICD-10-CM

## 2021-08-30 DIAGNOSIS — I50.22 SYSTOLIC HEART FAILURE, CHRONIC (HCC): Primary | ICD-10-CM

## 2021-08-30 DIAGNOSIS — R73.09 ELEVATED GLUCOSE: ICD-10-CM

## 2021-08-30 DIAGNOSIS — R06.02 SHORTNESS OF BREATH: ICD-10-CM

## 2021-08-30 DIAGNOSIS — I10 ESSENTIAL HYPERTENSION: ICD-10-CM

## 2021-08-30 DIAGNOSIS — I50.22 SYSTOLIC HEART FAILURE, CHRONIC (HCC): ICD-10-CM

## 2021-08-30 DIAGNOSIS — Z95.810 ICD (IMPLANTABLE CARDIOVERTER-DEFIBRILLATOR) IN PLACE: ICD-10-CM

## 2021-08-30 LAB
ANION GAP SERPL CALCULATED.3IONS-SCNC: 11 MMOL/L (ref 3–16)
BUN BLDV-MCNC: 6 MG/DL (ref 7–20)
CALCIUM SERPL-MCNC: 8.7 MG/DL (ref 8.3–10.6)
CHLORIDE BLD-SCNC: 104 MMOL/L (ref 99–110)
CO2: 25 MMOL/L (ref 21–32)
CREAT SERPL-MCNC: 0.5 MG/DL (ref 0.6–1.2)
GFR AFRICAN AMERICAN: >60
GFR NON-AFRICAN AMERICAN: >60
GLUCOSE BLD-MCNC: 92 MG/DL (ref 70–99)
POTASSIUM SERPL-SCNC: 4 MMOL/L (ref 3.5–5.1)
PRO-BNP: 55 PG/ML (ref 0–124)
SODIUM BLD-SCNC: 140 MMOL/L (ref 136–145)

## 2021-08-30 PROCEDURE — 99214 OFFICE O/P EST MOD 30 MIN: CPT | Performed by: NURSE PRACTITIONER

## 2021-08-30 PROCEDURE — 83036 HEMOGLOBIN GLYCOSYLATED A1C: CPT

## 2021-08-30 PROCEDURE — 36415 COLL VENOUS BLD VENIPUNCTURE: CPT

## 2021-08-30 PROCEDURE — 83880 ASSAY OF NATRIURETIC PEPTIDE: CPT

## 2021-08-30 PROCEDURE — 80048 BASIC METABOLIC PNL TOTAL CA: CPT

## 2021-08-30 ASSESSMENT — ENCOUNTER SYMPTOMS
GASTROINTESTINAL NEGATIVE: 1
SHORTNESS OF BREATH: 1

## 2021-08-30 NOTE — TELEPHONE ENCOUNTER
Prescription refill    Last OV:09/21/2020 NPST    Last Refill:10/09/2020    Labs:08/30/2021    Future Appt:10/21/2021 NPAL    Patient seen today by Chastity Lemus has next appointment with EP. Patient has only seen you can you refill her prescriptions until she is seen by NPAL. Called patient today about lab results.    Please advise

## 2021-08-30 NOTE — PATIENT INSTRUCTIONS
Instructions:   1. Medications: continue current medications  2. Labs: today  3. Lifestyle Recommendations: Weigh yourself every day in the morning after urination, call Faheem Shock if wt increases 2-3lb in one day or 5lb in one week, Limit sodium to 2000mg/day and fluids to 2L or 64oz/day.    4. Follow up: schedule with EP for device and atrial fib

## 2021-08-31 LAB
ESTIMATED AVERAGE GLUCOSE: 131.2 MG/DL
HBA1C MFR BLD: 6.2 %

## 2021-08-31 RX ORDER — SPIRONOLACTONE 25 MG/1
25 TABLET ORAL 2 TIMES DAILY
Qty: 30 TABLET | Refills: 5 | Status: SHIPPED | OUTPATIENT
Start: 2021-08-31 | End: 2022-01-27 | Stop reason: SINTOL

## 2021-08-31 RX ORDER — FUROSEMIDE 40 MG/1
40 TABLET ORAL 2 TIMES DAILY
Qty: 60 TABLET | Refills: 5 | Status: SHIPPED | OUTPATIENT
Start: 2021-08-31 | End: 2022-01-27 | Stop reason: SDUPTHER

## 2021-09-01 RX ORDER — ISOSORBIDE MONONITRATE 30 MG/1
30 TABLET, EXTENDED RELEASE ORAL DAILY
Qty: 30 TABLET | Refills: 1 | Status: SHIPPED | OUTPATIENT
Start: 2021-09-01 | End: 2022-01-27 | Stop reason: SDUPTHER

## 2021-09-01 RX ORDER — VALSARTAN 160 MG/1
160 TABLET ORAL 2 TIMES DAILY
Qty: 60 TABLET | Refills: 1 | Status: SHIPPED | OUTPATIENT
Start: 2021-09-01 | End: 2022-01-27 | Stop reason: SDUPTHER

## 2021-09-01 RX ORDER — METOPROLOL SUCCINATE 100 MG/1
100 TABLET, EXTENDED RELEASE ORAL DAILY
Qty: 30 TABLET | Refills: 1 | Status: SHIPPED | OUTPATIENT
Start: 2021-09-01 | End: 2022-01-27 | Stop reason: SDUPTHER

## 2021-09-01 RX ORDER — AMIODARONE HYDROCHLORIDE 400 MG/1
200 TABLET ORAL DAILY
Qty: 15 TABLET | Refills: 1 | Status: SHIPPED | OUTPATIENT
Start: 2021-09-01 | End: 2022-01-27 | Stop reason: SDUPTHER

## 2021-09-07 ENCOUNTER — TELEPHONE (OUTPATIENT)
Dept: BARIATRICS/WEIGHT MGMT | Age: 66
End: 2021-09-07

## 2021-09-07 NOTE — TELEPHONE ENCOUNTER
Patient was mailed Dr. Essie Ivory digital seminar. She was instructed to contact the office once received and reviewed.  Thanks

## 2021-09-16 NOTE — TELEPHONE ENCOUNTER
Patient was sent Dr. Divina Steven digital bariatric seminar. Patient DOES have BWLS coverage with [facility] Medicare (No req diet. Needs PCP to send referral to [facility])     Bariatric benefit form scanned in media. *Spoke with pt. Info given. Pt verbalized understanding. Appt sched. Np pk mailed. Per pt: No HX of WLS.  BMI > 35

## 2021-10-08 ENCOUNTER — HOSPITAL ENCOUNTER (EMERGENCY)
Age: 66
Discharge: HOME | End: 2021-10-08
Payer: MEDICARE

## 2021-10-08 ENCOUNTER — APPOINTMENT (OUTPATIENT)
Dept: CT IMAGING | Age: 66
End: 2021-10-08
Payer: MEDICARE

## 2021-10-08 VITALS
DIASTOLIC BLOOD PRESSURE: 87 MMHG | RESPIRATION RATE: 16 BRPM | SYSTOLIC BLOOD PRESSURE: 152 MMHG | HEIGHT: 65 IN | BODY MASS INDEX: 45.82 KG/M2 | WEIGHT: 275 LBS | TEMPERATURE: 98.3 F | OXYGEN SATURATION: 99 % | HEART RATE: 99 BPM

## 2021-10-08 DIAGNOSIS — M54.42 LEFT-SIDED LOW BACK PAIN WITH LEFT-SIDED SCIATICA, UNSPECIFIED CHRONICITY: Primary | ICD-10-CM

## 2021-10-08 PROCEDURE — 6370000000 HC RX 637 (ALT 250 FOR IP): Performed by: PHYSICIAN ASSISTANT

## 2021-10-08 PROCEDURE — 6360000002 HC RX W HCPCS: Performed by: PHYSICIAN ASSISTANT

## 2021-10-08 PROCEDURE — 99283 EMERGENCY DEPT VISIT LOW MDM: CPT

## 2021-10-08 PROCEDURE — 96372 THER/PROPH/DIAG INJ SC/IM: CPT

## 2021-10-08 PROCEDURE — 72131 CT LUMBAR SPINE W/O DYE: CPT

## 2021-10-08 RX ORDER — PREDNISONE 10 MG/1
60 TABLET ORAL DAILY
Qty: 30 TABLET | Refills: 0 | Status: SHIPPED | OUTPATIENT
Start: 2021-10-08 | End: 2021-10-13

## 2021-10-08 RX ORDER — LIDOCAINE 50 MG/G
1 PATCH TOPICAL DAILY
Qty: 30 PATCH | Refills: 0 | Status: SHIPPED | OUTPATIENT
Start: 2021-10-08 | End: 2022-01-27

## 2021-10-08 RX ORDER — ORPHENADRINE CITRATE 30 MG/ML
60 INJECTION INTRAMUSCULAR; INTRAVENOUS ONCE
Status: COMPLETED | OUTPATIENT
Start: 2021-10-08 | End: 2021-10-08

## 2021-10-08 RX ORDER — LIDOCAINE 4 G/G
1 PATCH TOPICAL ONCE
Status: DISCONTINUED | OUTPATIENT
Start: 2021-10-08 | End: 2021-10-08 | Stop reason: HOSPADM

## 2021-10-08 RX ORDER — HYDROCODONE BITARTRATE AND ACETAMINOPHEN 5; 325 MG/1; MG/1
2 TABLET ORAL ONCE
Status: COMPLETED | OUTPATIENT
Start: 2021-10-08 | End: 2021-10-08

## 2021-10-08 RX ORDER — ONDANSETRON 4 MG/1
4 TABLET, ORALLY DISINTEGRATING ORAL ONCE
Status: COMPLETED | OUTPATIENT
Start: 2021-10-08 | End: 2021-10-08

## 2021-10-08 RX ORDER — PREDNISONE 20 MG/1
60 TABLET ORAL ONCE
Status: COMPLETED | OUTPATIENT
Start: 2021-10-08 | End: 2021-10-08

## 2021-10-08 RX ORDER — HYDROCODONE BITARTRATE AND ACETAMINOPHEN 5; 325 MG/1; MG/1
1 TABLET ORAL EVERY 6 HOURS PRN
Qty: 10 TABLET | Refills: 0 | Status: SHIPPED | OUTPATIENT
Start: 2021-10-08 | End: 2021-10-11

## 2021-10-08 RX ORDER — ONDANSETRON 4 MG/1
4 TABLET, ORALLY DISINTEGRATING ORAL EVERY 8 HOURS PRN
Qty: 20 TABLET | Refills: 0 | Status: SHIPPED | OUTPATIENT
Start: 2021-10-08 | End: 2022-01-27

## 2021-10-08 RX ADMIN — ORPHENADRINE CITRATE 60 MG: 60 INJECTION INTRAMUSCULAR; INTRAVENOUS at 11:09

## 2021-10-08 RX ADMIN — HYDROCODONE BITARTRATE AND ACETAMINOPHEN 2 TABLET: 5; 325 TABLET ORAL at 11:09

## 2021-10-08 RX ADMIN — ONDANSETRON 4 MG: 4 TABLET, ORALLY DISINTEGRATING ORAL at 11:10

## 2021-10-08 RX ADMIN — PREDNISONE 60 MG: 20 TABLET ORAL at 11:09

## 2021-10-08 ASSESSMENT — PAIN SCALES - GENERAL
PAINLEVEL_OUTOF10: 8
PAINLEVEL_OUTOF10: 8

## 2021-10-08 ASSESSMENT — ENCOUNTER SYMPTOMS
NAUSEA: 0
ABDOMINAL PAIN: 0
VOMITING: 0
BACK PAIN: 1

## 2021-10-08 ASSESSMENT — PAIN DESCRIPTION - PAIN TYPE: TYPE: ACUTE PAIN

## 2021-10-08 ASSESSMENT — PAIN DESCRIPTION - ORIENTATION: ORIENTATION: LEFT

## 2021-10-08 NOTE — ED PROVIDER NOTES
[address] [facility]        Pt Name: Francine Thompson  MRN: [id number]  [place] 1955  Date of evaluation: 10/8/2021  Provider: Soham Patel PA-C  PCP: No primary care provider on file. Note Started: 11:08 AM EDT       LE. I have evaluated this patient. My supervising physician was available for consultation. CHIEF COMPLAINT       Chief Complaint   Patient presents with    Leg Pain     left butt cheek pain that radiates down her leg states she is having a hard time walking       HISTORY OF PRESENT ILLNESS   (Location, Timing/Onset, Context/Setting, Quality, Duration, Modifying Factors, Severity, Associated Signs and Symptoms)  Note limiting factors. Chief Complaint: Left-sided low back pain, left leg pain    Francine Thompson is a 78 y.o. female who presents to the emergency department today for evaluation for left leg pain. The patient states that starting yesterday she noticed some pain around her left-sided low back, left buttock, which radiated down the back of her left leg. The patient denies falling or injuring herself in any way. The patient states that she does have a history of sciatica, and she states that this does feel similar. The patient is currently rating her pain as an 8/10, pain is worse with touch, certain movements. She states that her pain does improve with resting, she states that she tried to take ibuprofen with minimal relief. The patient denies any recent injections into the back. She denies any bowel bladder incontinence or retention. She has no saddle anesthesias. Patient is not a diabetic. No IV drug use. Patient is anticoagulant on Eliquis secondary to history of a defibrillator in place. The patient is still able to ambulate but she does experience pain. She has no abdominal pain. She has no urinary symptoms. No nausea or vomiting, no other complaints.       Nursing Notes were all reviewed and agreed with or any disagreements were addressed in the HPI. REVIEW OF SYSTEMS    (2-9 systems for level 4, 10 or more for level 5)     Review of Systems   Constitutional: Negative for activity change, appetite change and fever. Gastrointestinal: Negative for abdominal pain, nausea and vomiting. Musculoskeletal: Positive for back pain. Negative for gait problem. Neurological: Negative for weakness and numbness. Positives and Pertinent negatives as per HPI. Except as noted above in the ROS, all other systems were reviewed and negative.        PAST MEDICAL HISTORY     Past Medical History:   Diagnosis Date    AICD (automatic cardioverter/defibrillator) present     Arthritis     [facility]congest[facility]) ([facility] [address]     Gout     Hyperlipidemia     Hypertension     MI (myocardial infarction) ([facility] [address]          SURGICAL HISTORY     Past Surgical History:   Procedure Laterality Date    CARDIAC DEFIBRILLATOR PLACEMENT      DILATION AND CURETTAGE OF UTERUS           CURRENTMEDICATIONS       Discharge Medication List as of 10/8/2021 12:26 PM      CONTINUE these medications which have NOT CHANGED    Details   amiodarone (PACERONE) 400 MG tablet Take 0.5 tablets by mouth daily, Disp-15 tablet, R-1Normal      apixaban (ELIQUIS) 5 MG TABS tablet Take 1 tablet by mouth 2 times daily, Disp-60 tablet, R-1Normal      isosorbide mononitrate (IMDUR) 30 MG extended release tablet Take 1 tablet by mouth daily, Disp-30 tablet, R-1Normal      metoprolol succinate (TOPROL XL) 100 MG extended release tablet Take 1 tablet by mouth daily, Disp-30 tablet, R-1Normal      valsartan (DIOVAN) 160 MG tablet Take 1 tablet by mouth 2 times daily, Disp-60 tablet, R-1Normal      furosemide (LASIX) 40 MG tablet Take 1 tablet by mouth 2 times daily, Disp-60 tablet, R-5Normal      spironolactone (ALDACTONE) 25 MG tablet Take 1 tablet by mouth 2 times daily, Disp-30 tablet, R-5Normal      pantoprazole (PROTONIX) 40 MG tablet Take 1 tablet by mouth every morning (before breakfast), Disp-30 tablet,R-0Normal      ibuprofen (ADVIL;MOTRIN) 200 MG tablet Take 400 mg by mouth 2 times daily as needed for PainHistorical Med      atorvastatin (LIPITOR) 40 MG tablet Take 1 tablet by mouth daily, Disp-30 tablet,R-5Normal      aspirin 81 MG EC tablet Take 81 mg by mouth dailyHistorical Med               ALLERGIES     Patient has no known allergies. FAMILYHISTORY       Family History   Problem Relation Age of Onset    Heart Disease Mother     High Blood Pressure Mother     High Cholesterol Mother     Diabetes Mother     Cancer Father           SOCIAL HISTORY       Social History     Tobacco Use    Smoking status: Never Smoker    Smokeless tobacco: Never Used    Tobacco comment: QUIT AS A TEEN   Vaping Use    Vaping Use: Never used   Substance Use Topics    Alcohol use: No     Comment: RARE    Drug use: No       SCREENINGS             PHYSICAL EXAM    (up to 7 for level 4, 8 or more for level 5)     ED Triage Vitals [10/08/21 1038]   BP Temp Temp Source Pulse Resp SpO2 Height Weight   (!) 152/87 98.3 °F (36.8 °C) Oral 99 16 99 % 5' 5\" (1.651 m) 275 lb (124.7 kg)       Physical Exam  Vitals and nursing note reviewed. Constitutional:       Appearance: She is well-developed. She is not diaphoretic. HENT:      Head: Normocephalic and atraumatic. Right Ear: External ear normal.      Left Ear: External ear normal.      Nose: Nose normal.   Eyes:      General:         Right eye: No discharge. Left eye: No discharge. Neck:      Trachea: No tracheal deviation. Cardiovascular:      Pulses: Normal pulses. Pulmonary:      Effort: Pulmonary effort is normal. No respiratory distress. Musculoskeletal:         General: Normal range of motion. Cervical back: Normal range of motion and neck supple. Comments: There is tenderness palpation to the paraspinous muscles of the left lower lumbar spine, increasing over the SI joint.   There is no midline tenderness. No bony step-offs or crepitus   Skin:     General: Skin is warm and dry. Neurological:      Mental Status: She is alert and oriented to person, place, and time. Comments: Motor strength of bilateral lower extremities is 5/5 throughout. Normal sensation light touch. Patellar reflexesare 2+ bilaterally. Posterior tibialis pulse and dorsalis pedis pulses are 2+ bilaterally. Negative straight leg raise. Patient is able to ambulate without difficulty. Normal dorsiflexion and plantar flexion. Full range of motion of hip, knee and ankle joints. Psychiatric:         Behavior: Behavior normal.         DIAGNOSTIC RESULTS   LABS:    Labs Reviewed - No data to display    When ordered only abnormal lab results are displayed. All other labs were within normal range or not returned as of this dictation. EKG: When ordered, EKG's are interpreted by the Emergency Department Physician in the absence of a cardiologist.  Please see their note for interpretation of EKG. RADIOLOGY:   Non-plain film images such as CT, Ultrasound and MRI are read by the radiologist. Plain radiographic images are visualized and preliminarily interpreted by the ED Provider with the below findings:        Interpretation per the Radiologist below, if available at the time of this note:    CT LUMBAR SPINE WO CONTRAST   Final Result   No acute abnormality. Severe facet arthropathy. Moderate spinal stenosis at   L3-L4. Mild-to-moderate right-sided foraminal stenoses at L4-L5 and L5-S1. No results found.         PROCEDURES   Unless otherwise noted below, none     Procedures    CRITICAL CARE TIME   N/A    CONSULTS:  None      EMERGENCY DEPARTMENT COURSE and DIFFERENTIAL DIAGNOSIS/MDM:   Vitals:    Vitals:    10/08/21 1038   BP: (!) 152/87   Pulse: 99   Resp: 16   Temp: 98.3 °F (36.8 °C)   TempSrc: Oral   SpO2: 99%   Weight: 275 lb (124.7 kg)   Height: 5' 5\" (1.651 m)       Patient was given the following medications:  Medications   orphenadrine (NORFLEX) injection 60 mg (60 mg IntraMUSCular Given 10/8/21 1109)   HYDROcodone-acetaminophen (NORCO) 5-325 MG per tablet 2 tablet (2 tablets Oral Given 10/8/21 1109)   ondansetron (ZOFRAN-ODT) disintegrating tablet 4 mg (4 mg Oral Given 10/8/21 1110)   predniSONE (DELTASONE) tablet 60 mg (60 mg Oral Given 10/8/21 1109)           Briefly, this is a 50-year-old female who presents to the emergency department today with a 2-day history of left-sided low back pain with radiation down the left leg. There are no red flags. On examination she does have tenderness to the paraspinous muscles of the left-sided low back, increasing over the SI joint, no midline tenderness. No neurological deficits    CT shows no acute abnormality. She has moderate spinal stenosis at L3/L4 and right-sided foraminal stenosis at L4-L5 and L5/S1. Was given Norflex, Norco, Zofran and prednisone in the ED. Upon reevaluation the patient states that she is feeling much better. I feel that clinically her symptoms today are likely secondary to sciatica. Patient was made aware of her CT findings, and she was referred to [address] [place] [address] and spine, for follow-up within the next week. She will be given a prescription for Norco, Zofran and prednisone for home. Recommended that she follow-up within the next week. She is to return to the ED for any new or worsening symptoms patient voiced understanding is agreeable plan. She is stable for discharge    I estimate there is LOW risk for ABDOMINAL AORTIC ANEURYSM, CAUDA EQUINA SYNDROME, EPIDURAL MASS LESION, SPINAL STENOSIS, OR HERNIATED DISK CAUSING SEVERE STENOSIS, thus I consider the discharge disposition reasonable. Blaire Mcdonald and I have discussed the diagnosis and risks, and we agree with discharging home to follow-up with their primary doctor. We also discussed returning to the Emergency Department immediately if new or worsening symptoms occur.  We have discussed the symptoms which are most concerning (e.g., saddle anesthesia, urinary or bowel incontinence or retention, changing or worsening pain) that necessitate immediate return. I have discussed with the patient my clinical impression and the result of an evidence-based clinical evaluation to screen for spinal epidural abscess and other spinal emergencies, as well as the risk of further testing and hospitalization. The evidence shows that the risk for an acute spinal emergency is less than 1%. Although the risk of an acute spinal emergency has not been completely eliminated, the risks of further testing likely exceed any potential benefit, and the patient agrees with not pursuing further emergent evaluation for causes of back pain at this time. FINAL IMPRESSION      1. Left-sided low back pain with left-sided sciatica, unspecified chronicity          DISPOSITION/PLAN   DISPOSITION Decision To Discharge 10/08/2021 12:30:18 PM      PATIENT REFERRED TO:  [facility]  [address] [facility] [address] [facility] [address] [facility] [address]  Schedule an appointment as soon as possible for a visit in 1 week      [facility] Emergency Department  [address] [place] [address]  [phone]    As needed, If symptoms worsen      DISCHARGE MEDICATIONS:  Discharge Medication List as of 10/8/2021 12:26 PM      START taking these medications    Details   predniSONE (DELTASONE) 10 MG tablet Take 6 tablets by mouth daily for 5 doses, Disp-30 tablet, R-0Normal      HYDROcodone-acetaminophen (NORCO) 5-325 MG per tablet Take 1 tablet by mouth every 6 hours as needed for Pain for up to 3 days. , Disp-10 tablet, R-0Normal      ondansetron (ZOFRAN ODT) 4 MG disintegrating tablet Take 1 tablet by mouth every 8 hours as needed for Nausea, Disp-20 tablet, R-0Normal      lidocaine (LIDODERM) 5 % Place 1 patch onto the skin daily 12 hours on, 12 hours off., Disp-30 patch, R-0Normal             DISCONTINUED MEDICATIONS:  Discharge Medication List as of 10/8/2021 12:26 PM                 (Please note that portions of this note were completed with a voice recognition program.  Efforts were made to edit the dictations but occasionally words are mis-transcribed.)    Prem Davis PA-C (electronically signed)            Prem Davis PA-C  10/08/21 [id number]

## 2021-10-08 NOTE — CARE COORDINATION
Patient was not scheduled for New patient/ED Follow-Up appointment with [facility] today. MsAlicia Richardson Washburn has [facility] as a current PCP. Both the [facility] Physician referral number ([phone]) and the [address] [place] [address] phone number ([phone]) were provided to patient for any further questions/ concerns.

## 2021-10-08 NOTE — PROGRESS NOTES
CLINICAL PHARMACY NOTE: MEDS TO BEDS    Total # of Prescriptions Filled: 4   The following medications were delivered to the patient:  · Norco 5-325 mg  · Zofran 4 mg  · Prednisone 10 mg  · Lidocaine 5% patch    Additional Documentation:    Patient granddaughter picked up from Outpatient Pharmacy=signed  Aaron Wilson CPhT

## 2021-10-11 ENCOUNTER — TELEPHONE (OUTPATIENT)
Dept: BARIATRICS/WEIGHT MGMT | Age: 66
End: 2021-10-11

## 2021-10-12 ENCOUNTER — OFFICE VISIT (OUTPATIENT)
Dept: BARIATRICS/WEIGHT MGMT | Age: 66
End: 2021-10-12
Payer: MEDICARE

## 2021-10-12 VITALS
BODY MASS INDEX: 45.14 KG/M2 | OXYGEN SATURATION: 97 % | RESPIRATION RATE: 16 BRPM | HEIGHT: 64 IN | WEIGHT: 264.4 LBS | SYSTOLIC BLOOD PRESSURE: 138 MMHG | DIASTOLIC BLOOD PRESSURE: 88 MMHG | HEART RATE: 92 BPM

## 2021-10-12 DIAGNOSIS — Z95.810 ICD (IMPLANTABLE CARDIOVERTER-DEFIBRILLATOR) IN PLACE: ICD-10-CM

## 2021-10-12 DIAGNOSIS — I38 ACUTE ON CHRONIC SYSTOLIC HEART FAILURE DUE TO VALVULAR DISEASE (HCC): ICD-10-CM

## 2021-10-12 DIAGNOSIS — I25.83 CORONARY ARTERY DISEASE DUE TO LIPID RICH PLAQUE: ICD-10-CM

## 2021-10-12 DIAGNOSIS — E78.2 MIXED HYPERLIPIDEMIA: ICD-10-CM

## 2021-10-12 DIAGNOSIS — I10 ESSENTIAL HYPERTENSION: ICD-10-CM

## 2021-10-12 DIAGNOSIS — I42.0 DILATED CARDIOMYOPATHY (HCC): ICD-10-CM

## 2021-10-12 DIAGNOSIS — I25.10 CORONARY ARTERY DISEASE DUE TO LIPID RICH PLAQUE: ICD-10-CM

## 2021-10-12 DIAGNOSIS — E66.01 MORBID OBESITY WITH BMI OF 45.0-49.9, ADULT (HCC): Primary | ICD-10-CM

## 2021-10-12 DIAGNOSIS — G47.33 OSA (OBSTRUCTIVE SLEEP APNEA): ICD-10-CM

## 2021-10-12 DIAGNOSIS — I50.23 ACUTE ON CHRONIC SYSTOLIC HEART FAILURE DUE TO VALVULAR DISEASE (HCC): ICD-10-CM

## 2021-10-12 PROCEDURE — 99204 OFFICE O/P NEW MOD 45 MIN: CPT | Performed by: SURGERY

## 2021-10-12 NOTE — Clinical Note
Very pleasant lady 78 not a good surgical candidate due to extensive comorbid conditions mainly the heart as well as poor health overall.

## 2021-10-12 NOTE — PROGRESS NOTES
[facility]) Physicians   [employer] [facility] [employer]  Krunal Terry MD, [address] [facility], [address] [place] [address]    [address] [id number] . Phone: [phone]  Fax: [phone]       Medical Weight Loss Consultation         Chief Complaint   Patient presents with    Bariatric, Initial Visit     NP Rani Giron         HPI:    Fawad Uribe is a very pleasant 78 y.o. obese female ,   Body mass index is 45.38 kg/m². And multiple medical problems who is presenting via telemedicine for weight loss evaluation and consultation by Dr. Ravindra Mandel.    Patient has been struggling for several years now with obesity. Patient feels the weight is an obstacle to achieve and perform things in  daily living and is posing risk on health. Tries to diet, and exercise but can't keep the weight off. Patient tried [employer] Diet, low fat, grapefruit, intermittent fasting, keto and low carb. Patient has participated in meal replacement/liquid diets- Slimfast  Patient has not participated in weight loss medications and other regimens, but with no sustainable weight loss. Patient  is very determined to lose weight and be healthy, and is interested in joining our weight loss program to achieve this goal.    Otherwise patient denies any nausea, vomiting, fevers, chills, shortness of breath, chest pain, constipation or urinary symptoms.       Pain Assessment   Denies any abdominal pain     Past Medical History:   Diagnosis Date    AICD (automatic cardioverter/defibrillator) present     Arthritis     CHF (congestive heart failure) (HCC)     Gout     Hyperlipidemia     Hypertension     MI (myocardial infarction) ([address]      Past Surgical History:   Procedure Laterality Date    CARDIAC DEFIBRILLATOR PLACEMENT      DILATION AND CURETTAGE OF UTERUS       Family History   Problem Relation Age of Onset    Heart Disease Mother     High Blood Pressure Mother     High Cholesterol Mother     Diabetes Mother     Cancer Father      Social History     Tobacco Use    Smoking status: Never Smoker    Smokeless tobacco: Never Used    Tobacco comment: QUIT AS A TEEN   Substance Use Topics    Alcohol use: No     Comment: RARE     I counseled the patient on the importance of not smoking and risks of ETOH. No Known Allergies  Vitals:    10/12/21 1044   BP: 138/88   Pulse: 92   Resp: 16   SpO2: 97%   Weight: 264 lb 6.4 oz (119.9 kg)   Height: 5' 4\" (1.626 m)       Body mass index is 45.38 kg/m². Current Outpatient Medications:     amiodarone (PACERONE) 400 MG tablet, Take 0.5 tablets by mouth daily, Disp: 15 tablet, Rfl: 1    apixaban (ELIQUIS) 5 MG TABS tablet, Take 1 tablet by mouth 2 times daily, Disp: 60 tablet, Rfl: 1    isosorbide mononitrate (IMDUR) 30 MG extended release tablet, Take 1 tablet by mouth daily, Disp: 30 tablet, Rfl: 1    metoprolol succinate (TOPROL XL) 100 MG extended release tablet, Take 1 tablet by mouth daily, Disp: 30 tablet, Rfl: 1    valsartan (DIOVAN) 160 MG tablet, Take 1 tablet by mouth 2 times daily, Disp: 60 tablet, Rfl: 1    furosemide (LASIX) 40 MG tablet, Take 1 tablet by mouth 2 times daily, Disp: 60 tablet, Rfl: 5    ibuprofen (ADVIL;MOTRIN) 200 MG tablet, Take 400 mg by mouth 2 times daily as needed for Pain, Disp: , Rfl:     aspirin 81 MG EC tablet, Take 81 mg by mouth daily, Disp: , Rfl:     predniSONE (DELTASONE) 10 MG tablet, Take 6 tablets by mouth daily for 5 doses (Patient not taking: Reported on 10/12/2021), Disp: 30 tablet, Rfl: 0    ondansetron (ZOFRAN ODT) 4 MG disintegrating tablet, Take 1 tablet by mouth every 8 hours as needed for Nausea (Patient not taking: Reported on 10/12/2021), Disp: 20 tablet, Rfl: 0    lidocaine (LIDODERM) 5 %, Place 1 patch onto the skin daily 12 hours on, 12 hours off.  (Patient not taking: Reported on 10/12/2021), Disp: 30 patch, Rfl: 0    spironolactone (ALDACTONE) 25 MG tablet, Take 1 tablet by mouth 2 times daily (Patient not taking: Reported on 10/12/2021), Disp: 30 tablet, Rfl: 5    pantoprazole (PROTONIX) 40 MG tablet, Take 1 tablet by mouth every morning (before breakfast) (Patient not taking: Reported on 10/12/2021), Disp: 30 tablet, Rfl: 0    atorvastatin (LIPITOR) 40 MG tablet, Take 1 tablet by mouth daily (Patient not taking: Reported on 10/12/2021), Disp: 30 tablet, Rfl: 5      Review of Systems - History obtained from the patient  General ROS: negative  Psychological ROS: negative  Ophthalmic ROS: negative  Neurological ROS: negative  ENT ROS: negative  Allergy and Immunology ROS: negative  Hematological and Lymphatic ROS: negative  Endocrine ROS: negative  Breast ROS: negative  Respiratory ROS: negative  Cardiovascular ROS: negative  Gastrointestinal ROS:negative  Genito-Urinary ROS: negative  Musculoskeletal ROS: joint pain  Skin ROS: negative      Physical Exam   Constitutional: Patient is oriented to person, place, and time. Vital signs are normal. Patient  appears well-developed and well-nourished. Patient  is active and cooperative. Non-toxic appearance. No distress. HENT:   Head: Normocephalic and atraumatic. Head is without laceration. Right Ear: External ear normal. No lacerations. No drainage, swelling or tenderness. Left Ear: External ear normal. No lacerations. No drainage, swelling or tenderness. Nose, Mouth/Throat: Patient is wearing mask due to Covid-19 pandemic precautions, following CDC and health authorities guidelines. Eyes: Conjunctivae, EOM and lids are normal. Pupils are equal, round, and reactive to light. Right eye exhibits no discharge. No foreign body present in the right eye. Left eye exhibits no discharge. No foreign body present in the left eye. No scleral icterus. Neck: Trachea normal and normal range of motion. Neck supple. No JVD present. No tracheal tenderness present. Carotid bruit is not present. No rigidity. No tracheal deviation and no edema present. No thyromegaly present. Cardiovascular: Normal rate, regular rhythm, normal heart sounds, intact distal pulses and normal pulses. Pulmonary/Chest: Effort normal and breath sounds normal. No stridor. No respiratory distress. Patient  has no wheezes. Patient has no rales. Patient exhibits no tenderness and no crepitus. Abdominal: Soft. Normal appearance and bowel sounds are normal. Patient exhibits no distension, no abdominal bruit, no ascites and no mass. There is no hepatosplenomegaly. There is no tenderness. There is no rigidity, no rebound, no guarding and no CVA tenderness. No hernia. Hernia confirmed negative in the ventral area. Musculoskeletal: Normal range of motion. Patient exhibits no edema or tenderness. Lymphadenopathy:        Head (right side): No submental, no submandibular, no preauricular, no posterior auricular and no occipital adenopathy present. Head (left side): No submental, no submandibular, no preauricular, no posterior auricular and no occipital adenopathy present. Patient  has no cervical adenopathy. Right: No supraclavicular adenopathy present. Left: No supraclavicular adenopathy present. Neurological: Patient is alert and oriented to person, place, and time. Patient has normal strength. Coordination and gait normal. GCS eye subscore is 4. GCS verbal subscore is 5. GCS motor subscore is 6. Skin: Skin is warm and dry. No abrasion and no rash noted. Patient  is not diaphoretic. No cyanosis or erythema. Psychiatric: Patient has a normal mood and affect. speech is normal and behavior is normal. Cognition and memory are normal.       Rell was seen today for bariatric, initial visit. Diagnoses and all orders for this visit:    Morbid obesity with BMI of 45.0-49.9, adult ([email or web address]  -     EKG 12 Lead; Future  -     CBC Auto Differential; Future  -     Comprehensive Metabolic Panel; Future  -     Hemoglobin A1C; Future  -     Iron and TIBC; Future  -     Lipid Panel;  Future  - TSH with Reflex; Future  -     Vitamin A; Future  -     Vitamin B1, Whole Blood; Future  -     Vitamin B12 & Folate; Future  -     Vitamin D 25 Hydroxy; Future  -     Vitamin E; Future  -     Protime-INR; Future    TYLER (obstructive sleep apnea)  -     EKG 12 Lead; Future  -     CBC Auto Differential; Future  -     Comprehensive Metabolic Panel; Future  -     Hemoglobin A1C; Future  -     Iron and TIBC; Future  -     Lipid Panel; Future  -     TSH with Reflex; Future  -     Vitamin A; Future  -     Vitamin B1, Whole Blood; Future  -     Vitamin B12 & Folate; Future  -     Vitamin D 25 Hydroxy; Future  -     Vitamin E; Future  -     Protime-INR; Future    Mixed hyperlipidemia  -     EKG 12 Lead; Future  -     CBC Auto Differential; Future  -     Comprehensive Metabolic Panel; Future  -     Hemoglobin A1C; Future  -     Iron and TIBC; Future  -     Lipid Panel; Future  -     TSH with Reflex; Future  -     Vitamin A; Future  -     Vitamin B1, Whole Blood; Future  -     Vitamin B12 & Folate; Future  -     Vitamin D 25 Hydroxy; Future  -     Vitamin E; Future  -     Protime-INR; Future    ICD (implantable cardioverter-defibrillator) in place  -     EKG 12 Lead; Future  -     CBC Auto Differential; Future  -     Comprehensive Metabolic Panel; Future  -     Hemoglobin A1C; Future  -     Iron and TIBC; Future  -     Lipid Panel; Future  -     TSH with Reflex; Future  -     Vitamin A; Future  -     Vitamin B1, Whole Blood; Future  -     Vitamin B12 & Folate; Future  -     Vitamin D 25 Hydroxy; Future  -     Vitamin E; Future  -     Protime-INR; Future    Essential hypertension  -     EKG 12 Lead; Future  -     CBC Auto Differential; Future  -     Comprehensive Metabolic Panel; Future  -     Hemoglobin A1C; Future  -     Iron and TIBC; Future  -     Lipid Panel; Future  -     TSH with Reflex; Future  -     Vitamin A; Future  -     Vitamin B1, Whole Blood; Future  -     Vitamin B12 & Folate;  Future  -     Vitamin D 25 Hydroxy; Future  -     Vitamin E; Future  -     Protime-INR; Future    Dilated cardiomyopathy (HCC)  -     EKG 12 Lead; Future  -     CBC Auto Differential; Future  -     Comprehensive Metabolic Panel; Future  -     Hemoglobin A1C; Future  -     Iron and TIBC; Future  -     Lipid Panel; Future  -     TSH with Reflex; Future  -     Vitamin A; Future  -     Vitamin B1, Whole Blood; Future  -     Vitamin B12 & Folate; Future  -     Vitamin D 25 Hydroxy; Future  -     Vitamin E; Future  -     Protime-INR; Future    Coronary artery disease due to lipid rich plaque  -     EKG 12 Lead; Future  -     CBC Auto Differential; Future  -     Comprehensive Metabolic Panel; Future  -     Hemoglobin A1C; Future  -     Iron and TIBC; Future  -     Lipid Panel; Future  -     TSH with Reflex; Future  -     Vitamin A; Future  -     Vitamin B1, Whole Blood; Future  -     Vitamin B12 & Folate; Future  -     Vitamin D 25 Hydroxy; Future  -     Vitamin E; Future  -     Protime-INR; Future    Acute on chronic systolic heart failure due to valvular disease (HCC)  -     EKG 12 Lead; Future  -     CBC Auto Differential; Future  -     Comprehensive Metabolic Panel; Future  -     Hemoglobin A1C; Future  -     Iron and TIBC; Future  -     Lipid Panel; Future  -     TSH with Reflex; Future  -     Vitamin A; Future  -     Vitamin B1, Whole Blood; Future  -     Vitamin B12 & Folate; Future  -     Vitamin D 25 Hydroxy; Future  -     Vitamin E; Future  -     Protime-INR; Future          A/P    Amarilis Link is a very pleasant 78 y.o. female with Obesity,  Body mass index is 45.38 kg/m². and multiple obesity related co-morbidities. Amarilis Link is very motivated to lose weight and being more healthy. The patient underwent extensive dietary counseling. I have reviewed, discussed and agree with the dietary plan and coordinated treatment plan with dietitian. Co-morbidities include but not limited to,  specific to the patient obesity;     Patient Active Problem List   Diagnosis    Mixed hyperlipidemia    Morbid obesity ([facility] [address]    ICD (implantable cardioverter-defibrillator) in place    Shortness of breath    [facility]    History of ventricular tachycardia    Acute on chronic systolic heart failure due to valvular disease ([facility]    Dilated cardiomyopathy ([facility]    Coronary artery disease due to lipid rich plaque    Morbid obesity with BMI of 45.0-49.9, adult ([facility]    TYLER (obstructive sleep apnea)    Essential hypertension         I believe patient is an excellent candidate for weight loss, and doing so will help the patient with avoiding / improving obesity related comorbid conditions. Patient was advised on healthy diet habits and regular exercise. Medical weight loss and different surgical options were discussed in details with patient (Body mass index is 45.38 kg/m². ). Patient is interested in surgical or medical weight loss. After reviewing the patient history and having a discussion with her I think the safest option for the patient is to proceed with medical weight loss    Medical weight loss options include but not limited to ; Anti-Obesity Medications (AOM) for weight loss. If, following the complete review of the patient's medical history and current medications, the patient is not considered an appropriate candidate for AOM then they may be a candidate for our [facility] program based on their lab and EKG results. I have explained the above treatment plan to the patient, who verbalized agreement with plan. Patient received dietary handouts and education material. Also discussed in details the importance of follow up, as well following the recommendations and completing the whole program to improve outcomes when it comes to healthier lifestyle as well weight loss. Patient also advised about risks and benefits being on a strict dietary regimen as well using supplements.   Also discussed in details the different medication options for weight loss possible side effects and interaction with other medications. Patient questions answered. Patient agrees and wants to proceed with weight loss planning. Obesity as a disease is considered a high risk to patients overall health and should therefore be considered a high risk disease state. Now with Covid-19 pandemic, CDC and health authorities does classify obese patients as vulnerable and high risk as well. Which makes weight loss a priority for improvement of their wellbeing and overall health. CDC has issued the following statement as far Obese patients being at Increased Risk of being critically ill from SARS-Cov-2  \"Severe obesity increases the risk of a serious breathing problem called acute respiratory distress syndrome (ARDS), which is a major complication of PZPSA-48 and can cause difficulties with a doctors ability to provide respiratory support for seriously ill patients. People living with severe obesity can have multiple serious chronic diseases and underlying health conditions that can increase the risk of severe illness from COVID-19. \"           1- Medical Weight Loss. 2- Meal Plan provided. 3- Labs  4- EKG  5- Behavioral therapy &/or support groups  6- RTC in 1 month      Please note that some or all of this report was generated using voice recognition software. Please notify me in case of any questions about the content of this document, as some errors in transcription may have occurred .

## 2021-10-12 NOTE — PROGRESS NOTES
Anjel Ruth is a 78 y.o. female with a date of birth of 1955. Vitals:    10/12/21 1044   BP: 138/88   Pulse: 92   Resp: 16   SpO2: 97%    BMI: Body mass index is 45.38 kg/m². Obesity Classification: Class III    Weight History: Wt Readings from Last 3 Encounters:   10/12/21 264 lb 6.4 oz (119.9 kg)   10/08/21 275 lb (124.7 kg)   06/02/21 274 lb 4.8 oz (124.4 kg)       Patient's lowest adult weight was 145 lbs at age 27. Patient's highest adult weight was 288 lbs at age 71. Patient has participated in the following weight loss programs: [employer] Diet, low fat, grapefruit, intermittent fasting, keto and low carb. Patient has participated in meal replacement/liquid diets- Slimfast  Patient has not participated in weight loss medications. Patient is  lactose intolerant - milk, ice cream.  Patient does not have Restorationist/cultural food concerns. Patient does not have food allergies. Patient does not tolerate artificial sweeteners. Patient does have a regular sleep/wake schedule; sleep apnea? No cpap. She does not sleep well. 24 hour recall/food frequency chart:  Breakfast: no.   Snack: no.   Lunch: yes. Skips OR turkey and cheese sandwich  Snack: no.   Dinner: yes. Baked pork chop, spinach, potato salad  Snack: no.   Drinks throughout the day: hot green tea with added collagen or coconut oil, black tea, Ice sparkling water, kory Pepsi  Do you drink alcohol? Yes. How often/how much alcohol do you drink: 4 Mixed drinks per year. Patient does not meet the criteria for binge eating disorder. Patient does not have grazing. Patient does not have night eating. Patient does not have a history of emotional eating or eating out of boredom. Surgery  Patient does feel confident in her ability to make these changes. The patient's expectations of post-surgical eating habits are realistic. Patient states she does understand the consequences of not complying with post-op food guidelines.   Patient states she does understands the long term changes in food intake that will be necessary for all occasions after surgery for the rest of her life. Patient is deemed nutritionally appropriate to proceed. Goals  Weight: 160  Health Improvement: improve HTN, high cholesterol, CAD, afib - pt has defibrillator, arthritis back and knees, wrist, gout, sciatica    Assessment  Nutritional Needs: RMR=(9.99 x 120) + (6.25 x 163) - (4.92 x 78 y.o.) -161  = 1727 kcal x 1.4 (sedentary activity factor)= 2418 kcal - 1000 (for 2 lb weight loss/week)= 1418 kcal.    Plan  Plan/Recommendations: Start presurgical guidelines. Goals:   -Eat 4-5 times daily  -Avoid high fat and high sugar foods  -Include protein with all meals and snacks  -Avoid carbonation and caffeine  -Avoid calorie containing beverages  -Increase physical activity as tolerated    PES Statement:  Overweight/Obesity related to lack of exercise, sedentary lifestyle, unhealthy eating habits, and unsuccessful diet attempts as evidenced by BMI. Body mass index is 45.38 kg/m². Will follow up as necessary.     Jacquelin Ricardo, RD, LD

## 2021-11-16 ENCOUNTER — TELEPHONE (OUTPATIENT)
Dept: CARDIOLOGY CLINIC | Age: 66
End: 2021-11-16

## 2021-11-16 NOTE — TELEPHONE ENCOUNTER
She tried to call to cxl her appt yesterday because she didn't feel well but couldn't get through . She is calling today to rs her appt but there are no open appts with [facility] or [facility] . Who should she be scheduled with and when ?

## 2021-11-17 NOTE — TELEPHONE ENCOUNTER
Pt will call back she is getting set-up with counselor and aging for her transportation.  She will call back once she gets approved

## 2022-01-25 ENCOUNTER — TELEPHONE (OUTPATIENT)
Dept: CARDIOLOGY CLINIC | Age: 67
End: 2022-01-25

## 2022-01-25 NOTE — TELEPHONE ENCOUNTER
Pt states her defibrillator is sounding a signal. It is not shocking her. Offered her an appt tomorrow and she said she has a hurt foot and can not come in. Please call to advise.

## 2022-01-27 ENCOUNTER — NURSE ONLY (OUTPATIENT)
Dept: CARDIOLOGY CLINIC | Age: 67
End: 2022-01-27
Payer: MEDICARE

## 2022-01-27 ENCOUNTER — OFFICE VISIT (OUTPATIENT)
Dept: CARDIOLOGY CLINIC | Age: 67
End: 2022-01-27
Payer: MEDICARE

## 2022-01-27 ENCOUNTER — HOSPITAL ENCOUNTER (OUTPATIENT)
Age: 67
Discharge: HOME | End: 2022-01-27
Payer: MEDICARE

## 2022-01-27 VITALS
OXYGEN SATURATION: 96 % | SYSTOLIC BLOOD PRESSURE: 116 MMHG | BODY MASS INDEX: 42.68 KG/M2 | HEIGHT: 66 IN | HEART RATE: 96 BPM | DIASTOLIC BLOOD PRESSURE: 70 MMHG

## 2022-01-27 DIAGNOSIS — I38 ACUTE ON CHRONIC SYSTOLIC HEART FAILURE DUE TO VALVULAR DISEASE (HCC): Primary | ICD-10-CM

## 2022-01-27 DIAGNOSIS — I50.23 ACUTE ON CHRONIC SYSTOLIC HEART FAILURE DUE TO VALVULAR DISEASE (HCC): ICD-10-CM

## 2022-01-27 DIAGNOSIS — I38 ACUTE ON CHRONIC SYSTOLIC HEART FAILURE DUE TO VALVULAR DISEASE (HCC): ICD-10-CM

## 2022-01-27 DIAGNOSIS — I48.0 PAROXYSMAL ATRIAL FIBRILLATION (HCC): ICD-10-CM

## 2022-01-27 DIAGNOSIS — I50.23 ACUTE ON CHRONIC SYSTOLIC HEART FAILURE DUE TO VALVULAR DISEASE (HCC): Primary | ICD-10-CM

## 2022-01-27 DIAGNOSIS — I42.0 DILATED CARDIOMYOPATHY (HCC): ICD-10-CM

## 2022-01-27 DIAGNOSIS — Z95.810 ICD (IMPLANTABLE CARDIOVERTER-DEFIBRILLATOR) IN PLACE: ICD-10-CM

## 2022-01-27 DIAGNOSIS — I10 ESSENTIAL HYPERTENSION: ICD-10-CM

## 2022-01-27 LAB
ALBUMIN SERPL-MCNC: 4.1 G/DL (ref 3.4–5)
ALP BLD-CCNC: 101 U/L (ref 40–129)
ALT SERPL-CCNC: 12 U/L (ref 10–40)
ANION GAP SERPL CALCULATED.3IONS-SCNC: 18 MMOL/L (ref 3–16)
AST SERPL-CCNC: 18 U/L (ref 15–37)
BILIRUB SERPL-MCNC: 0.6 MG/DL (ref 0–1)
BILIRUBIN DIRECT: <0.2 MG/DL (ref 0–0.3)
BILIRUBIN, INDIRECT: NORMAL MG/DL (ref 0–1)
BUN BLDV-MCNC: 9 MG/DL (ref 7–20)
CALCIUM SERPL-MCNC: 9.9 MG/DL (ref 8.3–10.6)
CHLORIDE BLD-SCNC: 101 MMOL/L (ref 99–110)
CO2: 23 MMOL/L (ref 21–32)
CREAT SERPL-MCNC: 0.6 MG/DL (ref 0.6–1.2)
GFR AFRICAN AMERICAN: >60
GFR NON-AFRICAN AMERICAN: >60
GLUCOSE BLD-MCNC: 99 MG/DL (ref 70–99)
HCT VFR BLD CALC: 40.3 % (ref 36–48)
HEMOGLOBIN: 12.7 G/DL (ref 12–16)
MCH RBC QN AUTO: 27.3 PG (ref 26–34)
MCHC RBC AUTO-ENTMCNC: 31.5 G/DL (ref 31–36)
MCV RBC AUTO: 86.7 FL (ref 80–100)
PDW BLD-RTO: 15.9 % (ref 12.4–15.4)
PLATELET # BLD: 411 K/UL (ref 135–450)
PMV BLD AUTO: 7.9 FL (ref 5–10.5)
POTASSIUM SERPL-SCNC: 3.4 MMOL/L (ref 3.5–5.1)
PRO-BNP: 63 PG/ML (ref 0–124)
RBC # BLD: 4.65 M/UL (ref 4–5.2)
SODIUM BLD-SCNC: 142 MMOL/L (ref 136–145)
TOTAL PROTEIN: 7.6 G/DL (ref 6.4–8.2)
WBC # BLD: 15.9 K/UL (ref 4–11)

## 2022-01-27 PROCEDURE — 99214 OFFICE O/P EST MOD 30 MIN: CPT | Performed by: NURSE PRACTITIONER

## 2022-01-27 PROCEDURE — 85027 COMPLETE CBC AUTOMATED: CPT

## 2022-01-27 PROCEDURE — 80076 HEPATIC FUNCTION PANEL: CPT

## 2022-01-27 PROCEDURE — 80048 BASIC METABOLIC PNL TOTAL CA: CPT

## 2022-01-27 PROCEDURE — 83880 ASSAY OF NATRIURETIC PEPTIDE: CPT

## 2022-01-27 PROCEDURE — 36415 COLL VENOUS BLD VENIPUNCTURE: CPT

## 2022-01-27 RX ORDER — VALSARTAN 160 MG/1
160 TABLET ORAL 2 TIMES DAILY
Qty: 60 TABLET | Refills: 1 | Status: SHIPPED | OUTPATIENT
Start: 2022-01-27 | End: 2022-06-16 | Stop reason: SDUPTHER

## 2022-01-27 RX ORDER — SPIRONOLACTONE 25 MG/1
25 TABLET ORAL 2 TIMES DAILY
Qty: 30 TABLET | Refills: 5 | Status: CANCELLED | OUTPATIENT
Start: 2022-01-27

## 2022-01-27 RX ORDER — FUROSEMIDE 40 MG/1
40 TABLET ORAL 2 TIMES DAILY
Qty: 60 TABLET | Refills: 0 | Status: SHIPPED | OUTPATIENT
Start: 2022-01-27 | End: 2022-03-24

## 2022-01-27 RX ORDER — ISOSORBIDE MONONITRATE 30 MG/1
30 TABLET, EXTENDED RELEASE ORAL DAILY
Qty: 30 TABLET | Refills: 0 | Status: SHIPPED | OUTPATIENT
Start: 2022-01-27 | End: 2022-03-23

## 2022-01-27 RX ORDER — METOPROLOL SUCCINATE 100 MG/1
100 TABLET, EXTENDED RELEASE ORAL DAILY
Qty: 30 TABLET | Refills: 0 | Status: SHIPPED | OUTPATIENT
Start: 2022-01-27 | End: 2022-03-23

## 2022-01-27 RX ORDER — ATORVASTATIN CALCIUM 40 MG/1
40 TABLET, FILM COATED ORAL DAILY
Qty: 30 TABLET | Refills: 5 | Status: SHIPPED | OUTPATIENT
Start: 2022-01-27 | End: 2022-06-16 | Stop reason: SDUPTHER

## 2022-01-27 RX ORDER — AMIODARONE HYDROCHLORIDE 400 MG/1
200 TABLET ORAL DAILY
Qty: 15 TABLET | Refills: 0 | Status: SHIPPED | OUTPATIENT
Start: 2022-01-27 | End: 2022-03-01 | Stop reason: SDUPTHER

## 2022-01-27 ASSESSMENT — ENCOUNTER SYMPTOMS
GASTROINTESTINAL NEGATIVE: 1
SHORTNESS OF BREATH: 1

## 2022-01-27 NOTE — PROGRESS NOTES
[facility]   Congestive Heart Failure    PrimaryCare Doctor:  Alyson Pan MD    Chief Complaint:  CHF    History of Present Illness:  Clau Foster is a 75 y.o. female with PMH CAD, MI, ICM, HFmrEF, ICD, AF, HLD, HTN who presents today from device clinic for increased leg swelling for the past week. She also c/o  increased SOB, orthopnea, and chest tightness. She is giving me three stories today - one: that she takes her meds how she wants because she knows her body; Two: that she has been out of meds for about a month; three: she took all her meds this morning. She has been noncompliant with labs and fu in this office. No AF on device check today but optivol has been very elevated. No wt's at home and pt refuses today. Optivol: over baseline    Baseline Weight:   Wt Readings from Last 3 Encounters:   10/12/21 264 lb 6.4 oz (119.9 kg)   10/08/21 275 lb (124.7 kg)   06/02/21 274 lb 4.8 oz (124.4 kg)        EF: 40%  Cardiac Imaging:  Echo 10/8/2020  Summary   Left ventricular systolic function is moderately reduced with estimated   ejection fraction of 40%. There is moderate concentric left ventricular   hypertrophy.  Colo Sack is no evidence of mass or thrombus in the left atrium or appendage.     [facility] 8/13/20:  LM-Normal   LAD-mid 50%  Cx-normal  OM- normal  RCA-dominant normal  RPDA- normal  LVEF- 35  LVG- Global  LVEDP- 25     Impression  ~Coronary Angiography w/ nonobstructive CAD  ~LVG with LVEF of 35 and global regional wall motion abnormalities  ~high LVEDP     Device: Medtronic ICD with Optivol      Activity: at baseline  Can you walk 1-2 blocks or do a moderate amount of house/yard work? No      NYHA Class: III       Sodium Restrictions: 3g  Fluid Restrictions: 48-64 oz/day  Sodium and fluid restriction compliance: good    Pt Education: The patient has received education on the following topics: dietary sodium restriction, heart failure medications, the importance of physical activity, symptom management and weight monitoring       Past Medical History:   has a past medical history of AICD (automatic cardioverter/defibrillator) present, Arthritis, CHF (congestive heart failure) ([facility] [address] Gout, Hyperlipidemia, Hypertension, and MI (myocardial infarction) ([facility] [address] Surgical History:   has a past surgical history that includes Dilation and curettage of uterus and Cardiac defibrillator placement. Social History:   reports that she has never smoked. She has never used smokeless tobacco. She reports that she does not drink alcohol and does not use drugs. Family History:   Family History   Problem Relation Age of Onset    Heart Disease Mother     High Blood Pressure Mother     High Cholesterol Mother     Diabetes Mother     Cancer Father        HomeMedications:  Prior to Admission medications    Medication Sig Start Date End Date Taking?  Authorizing Provider   amiodarone (PACERONE) 400 MG tablet Take 0.5 tablets by mouth daily 9/1/21  Yes YEIMY Gomes CNP   apixaban (ELIQUIS) 5 MG TABS tablet Take 1 tablet by mouth 2 times daily 9/1/21  Yes Stewart StaffYEIMY - CNP   isosorbide mononitrate (IMDUR) 30 MG extended release tablet Take 1 tablet by mouth daily 9/1/21  Yes YEIMY Garza - CNP   metoprolol succinate (TOPROL XL) 100 MG extended release tablet Take 1 tablet by mouth daily 9/1/21  Yes Stewart StaffYEIMY - CNP   valsartan (DIOVAN) 160 MG tablet Take 1 tablet by mouth 2 times daily 9/1/21  Yes YEIMY Garza - CNP   furosemide (LASIX) 40 MG tablet Take 1 tablet by mouth 2 times daily 8/31/21  Yes YEIMY Garza - CNP   spironolactone (ALDACTONE) 25 MG tablet Take 1 tablet by mouth 2 times daily 8/31/21  Yes YEIMY Garza - CNP   ibuprofen (ADVIL;MOTRIN) 200 MG tablet Take 400 mg by mouth 2 times daily as needed for Pain   Yes Historical Provider, MD   atorvastatin (LIPITOR) 40 MG tablet Take 1 tablet by mouth daily 8/24/20  Yes YEIMY Gomes CNP   aspirin 81 MG EC tablet Take 81 mg by mouth daily   Yes Historical Provider, MD   lidocaine (LIDODERM) 5 % Place 1 patch onto the skin daily 12 hours on, 12 hours off. Patient not taking: Reported on 10/12/2021 10/8/21   Italo Jha PA-C        Allergies:  Patient has no known allergies. ROS:   Review of Systems   Constitutional: Positive for fatigue. Respiratory: Positive for shortness of breath. Cardiovascular: Positive for chest pain and leg swelling. Negative for palpitations. Gastrointestinal: Negative. Genitourinary: Negative. Musculoskeletal: Negative. Skin: Negative. Neurological: Negative. Hematological: Negative. Psychiatric/Behavioral: Negative. Physical Examination:    Vitals:    01/27/22 1454   BP: 116/70   Site: Right Upper Arm   Position: Sitting   Cuff Size: Large Adult   Pulse: 96   SpO2: 96%   Height: 5' 6\" (1.676 m)           Physical Exam  Vitals reviewed. Constitutional:       Appearance: Normal appearance. She is normal weight. HENT:      Head: Normocephalic and atraumatic. Eyes:      Extraocular Movements: Extraocular movements intact. Pupils: Pupils are equal, round, and reactive to light. Cardiovascular:      Rate and Rhythm: Normal rate and regular rhythm. Pulses: Normal pulses. Heart sounds: Normal heart sounds. Pulmonary:      Effort: Pulmonary effort is normal.      Breath sounds: Normal breath sounds. Abdominal:      Palpations: Abdomen is soft. Musculoskeletal:         General: Normal range of motion. Cervical back: Normal range of motion and neck supple. Right lower leg: Edema present. Left lower leg: Edema present. Comments: Trace around ankles   Skin:     General: Skin is warm and dry. Neurological:      General: No focal deficit present. Mental Status: She is alert and oriented to person, place, and time. Mental status is at baseline.    Psychiatric:         Mood and Affect: Mood normal. Behavior: Behavior normal.         Thought Content: Thought content normal.         Judgment: Judgment normal.         Lab Data:    CBC:   Lab Results   Component Value Date    WBC 12.4 06/01/2021    WBC 12.7 05/31/2021    WBC 13.6 05/30/2021    RBC 4.70 06/01/2021    RBC 4.79 05/31/2021    RBC 4.94 05/30/2021    HGB 12.5 06/01/2021    HGB 13.0 05/31/2021    HGB 13.5 05/30/2021    HCT 40.0 06/01/2021    HCT 40.8 05/31/2021    HCT 42.6 05/30/2021    MCV 85.2 06/01/2021    MCV 85.2 05/31/2021    MCV 86.3 05/30/2021    RDW 14.9 06/01/2021    RDW 14.9 05/31/2021    RDW 15.0 05/30/2021     06/01/2021     05/31/2021     05/30/2021     BMP:  Lab Results   Component Value Date     08/30/2021     06/01/2021     05/31/2021    K 4.0 08/30/2021    K 3.2 06/01/2021    K 3.4 05/31/2021    K 3.5 05/30/2021    K 3.4 08/12/2020     08/30/2021     06/01/2021     05/31/2021    CO2 25 08/30/2021    CO2 27 06/01/2021    CO2 22 05/31/2021    BUN 6 08/30/2021    BUN 11 06/01/2021    BUN 11 05/31/2021    CREATININE 0.5 08/30/2021    CREATININE 0.6 06/01/2021    CREATININE 0.6 05/31/2021     BNP:   Lab Results   Component Value Date    PROBNP 55 08/30/2021    PROBNP 198 05/30/2021    PROBNP 15 08/18/2020     Iron Studies:  No components found for: FE,  TIBC,  FERRITIN      Assessment/Plan:    1. Systolic heart failure, chronic (HCC) - overloaded, restart lasix, get labs today, stop dejon for noncompliance with labs           4. Shortness of breath - take meds   5. Paroxysmal atrial fibrillation (HCC) - cont Amio, AC - prob needs to be on Warfarin due to cost, BB, needs EP f/u   6. Essential hypertension - controlled         Instructions:   1. Medications: take medications as listed on med list  2. Labs: today  3.  Lifestyle Recommendations: Weigh yourself every day in the morning after urination, call Dinesh Fu if wt increases 2-3lb in one day or 5lb in one week, Limit sodium to 2000mg/day and fluids to 2L or 64oz/day.    4. Follow up: f/u in 4 weeks and schedule with EP for device and atrial fib        [place] CHF Resource Line: [phone]      I appreciate the opportunity of cooperating in the care of this individual.    Rosales Finney, APRN - CNP, CNP, 1/27/2022,2:57 PM

## 2022-01-27 NOTE — PATIENT INSTRUCTIONS
Instructions:   1. Medications: take medications as listed on med list  2. Labs: today  3. Lifestyle Recommendations: Weigh yourself every day in the morning after urination, call Kalyan Mathis if wt increases 2-3lb in one day or 5lb in one week, Limit sodium to 2000mg/day and fluids to 2L or 64oz/day.    4. Follow up: f/u in 4 with schedule with EP for device and atrial fib        [facility] CHF Resource Line: [phone]

## 2022-01-27 NOTE — PROGRESS NOTES
Patient comes in for programming evaluation for her defibrillator. All sensing and pacing parameters are within normal range. Brought Patient into the office due to device alarming. Device alert due to unable to send remote transmission. Patient does not have a Home Monitor and I turned off the alert. Battery life 3.2 years. AP 0.3%.  <0.1%. 6 NSVT episodes noted. Last on 10/30/2021, longest 1 second. 3 SVT-ST episodes noted. Last on 10/30/2021, longest 1 1/2 minutes. Patient remains on Eliquis, amiodarone and metoprolol. Please see interrogation for more detail. Optivol was recently elevated but back at baseline today. · Possible OptiVol fluid accumulation: 01-Nov-2021 -- 18-Jan-2022. Patient reports feet,ankle and leg swelling. Patient will see Angella Flores today follow up in 3 months in office.

## 2022-01-28 ENCOUNTER — TELEPHONE (OUTPATIENT)
Dept: CARDIOLOGY CLINIC | Age: 67
End: 2022-01-28

## 2022-01-28 PROCEDURE — 93283 PRGRMG EVAL IMPLANTABLE DFB: CPT | Performed by: INTERNAL MEDICINE

## 2022-01-28 NOTE — TELEPHONE ENCOUNTER
----- Message from YEIMY Estrada CNP sent at 1/28/2022  9:08 AM EST -----  Labs look ok, pt was out of some meds but unclear exactly which ones she has and takes consistently. She should take them as prescribed yesterday and increase potassium rich foods. We'll recheck electrolytes at her next OV after she has been taking meds consistently to see if we need to add a supplement.  Dwayne Chao

## 2022-01-31 NOTE — PROGRESS NOTES
[facility]   Electrophysiology  ZOILA Reddy  Attending EP: Dr. Alyssa Reyna    Date: 3/1/2022  I had the privilege of visiting Amada Fong in the office. Chief Complaint:   Chief Complaint   Patient presents with    Hypertension    Atrial Fibrillation    Coronary Artery Disease     History of Present Illness: History obtained from patient and medical record. Amada Fong is 75 y.o. female with a past medical history of HTN, HLD, CHF, obesity, and AICD (2007 at [facility]).  In August of 2020, pt presented from outside hospital with chest pain. She recently lost her brother and has not been feeling well. Pt admitted to non-compliance with medications secondary to [address] had an AICD shock. Her troponin was noted to be 3.5 with a potassium 2.5 in the ER at [facility]. She underwent cardiac cath, which showed non obstructive coronaries and EF of 35%. S/p RFCA with PVI (10/8/20, Dr. Alyssa Reyna)    -Interval history: Today, Amada Fong is being seen for EP follow up. She has not been seen since her ablation by EP service. Her device interrogation shows normal function, no arrhythmias. We discussed weaning her off the amiodarone. Her weight is stable, no s/s of CHF  She is concerned about her persistently elevated WBC. Pt denies any fevers, chills, or signs of infection. Pt states she saw an infectious disease doctor, but no cause was found. She is soon to have sleep study completed. Denies having chest pain, palpitations, shortness of breath, orthopnea/PND, cough, or dizziness at the time of this visit. With regard to medication therapy the patient has been compliant with prescribed regimen. They have tolerated therapy to date. Allergies:  No Known Allergies    Home Medications:  Prior to Visit Medications    Medication Sig Taking?  Authorizing Provider   amiodarone (CORDARONE) 200 MG tablet Reduce to 100 mg (1/2 tablet) daily until April 1st, then stop Yes YEIMY Reddy CNP   apixaban (ELIQUIS) 5 MG TABS tablet Take 1 tablet by mouth 2 times daily Yes YEIMY Gibson CNP   atorvastatin (LIPITOR) 40 MG tablet Take 1 tablet by mouth daily Yes YEIMY Gibson CNP   furosemide (LASIX) 40 MG tablet Take 1 tablet by mouth 2 times daily Yes YEIMY Gibson CNP   isosorbide mononitrate (IMDUR) 30 MG extended release tablet Take 1 tablet by mouth daily Yes YEIMY Gibson CNP   metoprolol succinate (TOPROL XL) 100 MG extended release tablet Take 1 tablet by mouth daily Yes YEIMY Gibson CNP   valsartan (DIOVAN) 160 MG tablet Take 1 tablet by mouth 2 times daily Yes YEIMY Gibson CNP   aspirin 81 MG EC tablet Take 81 mg by mouth daily Yes Historical Provider, MD      Past Medical History:  Past Medical History:   Diagnosis Date    AICD (automatic cardioverter/defibrillator) present     Arthritis     [facility]congest[facility] failure) ([facility] [address]     Gout     Hyperlipidemia     Hypertension     MI (myocardial infarction) ([facility] [address]      Past Surgical History:    has a past surgical history that includes Dilation and curettage of uterus and Cardiac defibrillator placement. Social History:  Reviewed. reports that she has never smoked. She has never used smokeless tobacco. She reports that she does not drink alcohol and does not use drugs. Family History:  Reviewed. family history includes Cancer in her father; Diabetes in her mother; Heart Disease in her mother; High Blood Pressure in her mother; High Cholesterol in her mother.      Review of Systems:  · Constitutional: Negative for fever, night sweats, chills, weight changes, or weakness  · Skin: Negative for rash, dry skin, pruritus, bruising, bleeding, blood clots, or changes in skin pigment  · HEENT: Negative for vision changes, ringing in the ears, sore throat, dysphagia, or swollen lymph nodes  · Respiratory: Reviewed in HPI  · Cardiovascular: Reviewed in HPI  · Gastrointestinal: Negative for abdominal pain, N/V/D, constipation, or black/tarry stools  · Genito-Urinary: Negative for dysuria, incontinence, urgency, or hematuria  · Musculoskeletal: Negative for joint swelling, muscle pain, or injuries  · Neurological/Psych: Negative for confusion, seizures, dizziness, headaches, balance issues or TIA-like symptoms. No anxiety, depression, or insomnia    Physical Examination:  Vitals:    03/01/22 1042   BP: 124/76   Pulse: 96   SpO2: 98%      Wt Readings from Last 3 Encounters:   03/01/22 265 lb 12.8 oz (120.6 kg)   10/12/21 264 lb 6.4 oz (119.9 kg)   10/08/21 275 lb (124.7 kg)     Constitutional: Cooperative and in no apparent distress, and appears well nourished  Skin: Warm and pink; no pallor, cyanosis, bruising, or clubbing  HEENT: Symmetric and normocephalic. PERRL, EOM intact. Conjunctiva pink with clear sclera. Mucus membranes pink and moist. Teeth intact. Thyroid smooth without nodules or goiter  Respiratory: Respirations symmetric and unlabored. Lungs clear to auscultation bilaterally, no wheezing, rhonchi, or crackles  Cardiovascular:  Regular rate and rhythm. S1/S2 present without murmurs, rubs, or gallops. Peripheral pulses 2+, capillary refill < 3 seconds. No elevation of JVP. No peripheral edema  Gastrointestinal: Abdomen soft and round. Bowel sounds normoactive in all quadrants without tenderness or masses. + Obese  Musculoskeletal: Bilateral upper and lower extremity strength 5/5 with full ROM. Neurological/Psych: Awake and orientated to person, place and time. Calm affect, appropriate mood.      Pertinent labs, diagnostic, device, and imaging results reviewed as a part of this visit    LABS    CBC:   Lab Results   Component Value Date    WBC 15.9 (H) 01/27/2022    HGB 12.7 01/27/2022    HCT 40.3 01/27/2022    MCV 86.7 01/27/2022     01/27/2022     BMP:   Lab Results   Component Value Date    CREATININE 0.6 01/27/2022    BUN 9 01/27/2022     01/27/2022    K 3.4 (L) 01/27/2022     01/27/2022 CO2 23 01/27/2022     Estimated Creatinine Clearance: 119 mL/min (based on SCr of 0.6 mg/dL). Thyroid:   Lab Results   Component Value Date    TSH 3.90 06/01/2021     Lipid Panel:   Lab Results   Component Value Date    CHOL 189 05/31/2021    HDL 39 05/31/2021    TRIG 99 05/31/2021     LFTs:  Lab Results   Component Value Date    ALT 12 01/27/2022    AST 18 01/27/2022    ALKPHOS 101 01/27/2022    BILITOT 0.6 01/27/2022     Coags:   Lab Results   Component Value Date    PROTIME 14.3 (H) 10/08/2020    INR 1.23 (H) 10/08/2020    APTT 27.4 04/05/2019       ECG: 3/1/2022  - NSR. Rate 97, QRS 98, QTc 440    Echo: 8/20   Left ventricular systolic function is mild to moderately reduced with ejection fraction estimated at 40%. There is mild concentric left ventricular hypertrophy. Grade I diastolic dysfunction. Normal right ventricular size and function. Pacer / ICD wire is visualized in the right ventricle. Mild mitral regurgitation. Aortic valve appears sclerotic but opens adequately. Mild tricuspid regurgitation. Systolic pulmonary artery pressure (SPAP) is normal and estimated at 25-30 mmHg. JOAN: 10/20   Left ventricular systolic function is moderately reduced with estimated ejection fraction of 40%. There is moderate concentric left ventricular hypertrophy. There is no evidence of mass or thrombus in the left atrium or appendage. GXT: 2/20  There is normal isotope uptake at stress and rest. There is no evidence of    myocardial ischemia or scar.        Normal LV function.        Overall findings represent a low risk scan. Assessment:    1. Paroxysmal Atrial Fibrillation, PAT              - S/p RFCA with PVI (10/8/20, Dr. Terrance Houston)                 - Currently in NSR              - Continue Toprol  mg QD              - Given risks of amiodarone and lack of recurrence on device, will wean off amiodarone.  Reduce to 100 mg daily, then stop on April 1st                 - [id number] score: 4 (Age, Gender, CHF, HTN) ; [id number] Vasc score and anticoagulation discussed. High risk for stroke and thromboembolism. Anticoagulation is recommended. Risk of bleeding was discussed.                          ~ On Eliquis 5 mg BID                 - Afib risk factors including age, HTN, obesity, inactivity and TYLER were discussed with patient. Risk factor modification recommended                 2. Hx of AICD shock              - Occurred in setting of AF with RVR and hyperkalemia/fluid overload              - No recurrence              - Continue BB     3. Non-Ischemic cardiomyopathy              - S/p Dual chamber Medtronic AICD (2007 at [facility])  - I reviewed device interrogation today. Device functioning normally.   ~ A-paced 0.3% V-paced <0.1%  ~ 2 years left on battery life expectancy  - Discussed with patient  - Follow up with device clinic as scheduled     4. Chronic systolic heart failure (NYHA Class III)              - Appears compensated                          ~ EF 40% per JOAN (10/20); 35% per LHC (8/20)              - Continue with Toprol  mg QD, valsartan 160 mg BID, lasix 40 mg BID  - Aggressive medical therapy with risk factor modification  - Discussed with patient importance of monitoring weight, low sodium diet and fluid restriction  - Followed by CHF team     5.  HTN  - Controlled: Goal <130/80  - Continue current medications  - Encouraged to monitor and log BP readings at home, then bring log to next visit  - Discussed importance of low sodium diet, weight control and exercise     6. At risk for TYLER              - Discussed long term risks of untreated TYLER              - Consider referral to sleep medicine at discharge     7. Leukocytosis   - Persistent on labs; asymptomatic   - Check echo to rule out any device infection    ~ If echo unremarkable, consider hematology referral    8. Obesity  - Body mass index is 42.9 kg/m². - Complicating assessment and treatment.  Placing patient at risk for communicating to family and coordination of patient care.     YEIMY Ledbetter-CNP  [facility]   Office: [phone]

## 2022-03-01 ENCOUNTER — OFFICE VISIT (OUTPATIENT)
Dept: CARDIOLOGY CLINIC | Age: 67
End: 2022-03-01
Payer: MEDICARE

## 2022-03-01 ENCOUNTER — NURSE ONLY (OUTPATIENT)
Dept: CARDIOLOGY CLINIC | Age: 67
End: 2022-03-01
Payer: MEDICARE

## 2022-03-01 VITALS
HEIGHT: 66 IN | DIASTOLIC BLOOD PRESSURE: 76 MMHG | OXYGEN SATURATION: 98 % | BODY MASS INDEX: 42.72 KG/M2 | WEIGHT: 265.8 LBS | SYSTOLIC BLOOD PRESSURE: 124 MMHG | HEART RATE: 96 BPM

## 2022-03-01 DIAGNOSIS — G47.33 OSA (OBSTRUCTIVE SLEEP APNEA): ICD-10-CM

## 2022-03-01 DIAGNOSIS — E78.2 MIXED HYPERLIPIDEMIA: ICD-10-CM

## 2022-03-01 DIAGNOSIS — I48.0 PAROXYSMAL ATRIAL FIBRILLATION (HCC): ICD-10-CM

## 2022-03-01 DIAGNOSIS — I42.0 DILATED CARDIOMYOPATHY (HCC): ICD-10-CM

## 2022-03-01 DIAGNOSIS — Z95.810 ICD (IMPLANTABLE CARDIOVERTER-DEFIBRILLATOR) IN PLACE: ICD-10-CM

## 2022-03-01 DIAGNOSIS — I25.83 CORONARY ARTERY DISEASE DUE TO LIPID RICH PLAQUE: ICD-10-CM

## 2022-03-01 DIAGNOSIS — E66.01 MORBID OBESITY WITH BMI OF 45.0-49.9, ADULT (HCC): ICD-10-CM

## 2022-03-01 DIAGNOSIS — I48.0 PAROXYSMAL ATRIAL FIBRILLATION (HCC): Primary | ICD-10-CM

## 2022-03-01 DIAGNOSIS — Z95.810 CARDIAC DEFIBRILLATOR IN PLACE: ICD-10-CM

## 2022-03-01 DIAGNOSIS — I10 ESSENTIAL HYPERTENSION: ICD-10-CM

## 2022-03-01 DIAGNOSIS — I50.22 CHRONIC SYSTOLIC HEART FAILURE (HCC): ICD-10-CM

## 2022-03-01 DIAGNOSIS — Z86.79 HISTORY OF VENTRICULAR TACHYCARDIA: ICD-10-CM

## 2022-03-01 DIAGNOSIS — D72.829 LEUKOCYTOSIS, UNSPECIFIED TYPE: ICD-10-CM

## 2022-03-01 DIAGNOSIS — I25.10 CORONARY ARTERY DISEASE DUE TO LIPID RICH PLAQUE: ICD-10-CM

## 2022-03-01 PROCEDURE — 99214 OFFICE O/P EST MOD 30 MIN: CPT | Performed by: NURSE PRACTITIONER

## 2022-03-01 PROCEDURE — 93000 ELECTROCARDIOGRAM COMPLETE: CPT | Performed by: NURSE PRACTITIONER

## 2022-03-01 RX ORDER — AMIODARONE HYDROCHLORIDE 200 MG/1
TABLET ORAL
Qty: 30 TABLET | Refills: 0 | Status: SHIPPED | OUTPATIENT
Start: 2022-03-01 | End: 2022-06-16 | Stop reason: ALTCHOICE

## 2022-03-01 NOTE — PROGRESS NOTES
Patient comes in for programming evaluation for her defibrillator. All sensing and pacing parameters are within normal range. Battery life 2.1 years. AP 0.3%.  <0.1%. 1 NSVT episodes noted on 2/3/2022 lasting 1 second. 1 AT/AF episode noted on 2/8/2022 lasting 47 seconds. Patient remains on Eliquis, amiodarone and metoprolol. Today we turned on Atrial therapies to the [address] settings. Please see interrogation for more detail. Optivol is WNL. Patient will see NPSR today follow up in 3 months in office.

## 2022-03-01 NOTE — PATIENT INSTRUCTIONS
1. Reduce amiodarone to 100 mg daily, then stop on April 1st  2. Check echo  3.  Complete sleep study

## 2022-03-02 PROCEDURE — 93283 PRGRMG EVAL IMPLANTABLE DFB: CPT | Performed by: INTERNAL MEDICINE

## 2022-03-13 ENCOUNTER — APPOINTMENT (OUTPATIENT)
Dept: CT IMAGING | Age: 67
DRG: 552 | End: 2022-03-13
Payer: MEDICARE

## 2022-03-13 ENCOUNTER — APPOINTMENT (OUTPATIENT)
Dept: GENERAL RADIOLOGY | Age: 67
DRG: 552 | End: 2022-03-13
Payer: MEDICARE

## 2022-03-13 ENCOUNTER — HOSPITAL ENCOUNTER (INPATIENT)
Age: 67
LOS: 5 days | Discharge: HOME | DRG: 552 | End: 2022-03-18
Attending: EMERGENCY MEDICINE | Admitting: INTERNAL MEDICINE
Payer: MEDICARE

## 2022-03-13 DIAGNOSIS — M79.89 SWELLING OF LEFT FOOT: ICD-10-CM

## 2022-03-13 DIAGNOSIS — R26.2 DIFFICULTY IN WALKING: ICD-10-CM

## 2022-03-13 DIAGNOSIS — M54.16 LUMBAR RADICULOPATHY: ICD-10-CM

## 2022-03-13 DIAGNOSIS — M54.41 CHRONIC BILATERAL LOW BACK PAIN WITH BILATERAL SCIATICA: Primary | ICD-10-CM

## 2022-03-13 DIAGNOSIS — G89.29 CHRONIC BILATERAL LOW BACK PAIN WITH BILATERAL SCIATICA: Primary | ICD-10-CM

## 2022-03-13 DIAGNOSIS — M54.42 CHRONIC BILATERAL LOW BACK PAIN WITH BILATERAL SCIATICA: Primary | ICD-10-CM

## 2022-03-13 PROBLEM — M54.10 BACK PAIN WITH RADICULOPATHY: Status: ACTIVE | Noted: 2022-03-13

## 2022-03-13 PROBLEM — M10.9 GOUT: Status: ACTIVE | Noted: 2022-03-13

## 2022-03-13 LAB
A/G RATIO: 1.2 (ref 1.1–2.2)
ALBUMIN SERPL-MCNC: 4.3 G/DL (ref 3.4–5)
ALP BLD-CCNC: 123 U/L (ref 40–129)
ALT SERPL-CCNC: 15 U/L (ref 10–40)
ANION GAP SERPL CALCULATED.3IONS-SCNC: 14 MMOL/L (ref 3–16)
AST SERPL-CCNC: 21 U/L (ref 15–37)
BACTERIA: ABNORMAL /HPF
BASOPHILS ABSOLUTE: 0.1 K/UL (ref 0–0.2)
BASOPHILS RELATIVE PERCENT: 0.8 %
BILIRUB SERPL-MCNC: 0.6 MG/DL (ref 0–1)
BILIRUBIN URINE: ABNORMAL
BLOOD, URINE: NEGATIVE
BUN BLDV-MCNC: 10 MG/DL (ref 7–20)
CALCIUM SERPL-MCNC: 9.8 MG/DL (ref 8.3–10.6)
CHLORIDE BLD-SCNC: 99 MMOL/L (ref 99–110)
CLARITY: CLEAR
CO2: 24 MMOL/L (ref 21–32)
COLOR: ABNORMAL
CREAT SERPL-MCNC: 0.7 MG/DL (ref 0.6–1.2)
EOSINOPHILS ABSOLUTE: 0.1 K/UL (ref 0–0.6)
EOSINOPHILS RELATIVE PERCENT: 0.6 %
EPITHELIAL CELLS, UA: 2 /HPF (ref 0–5)
GFR AFRICAN AMERICAN: >60
GFR NON-AFRICAN AMERICAN: >60
GLUCOSE BLD-MCNC: 99 MG/DL (ref 70–99)
GLUCOSE URINE: NEGATIVE MG/DL
HCT VFR BLD CALC: 42.9 % (ref 36–48)
HEMOGLOBIN: 13.6 G/DL (ref 12–16)
HYALINE CASTS: 0 /LPF (ref 0–8)
KETONES, URINE: NEGATIVE MG/DL
LEUKOCYTE ESTERASE, URINE: NEGATIVE
LYMPHOCYTES ABSOLUTE: 1.8 K/UL (ref 1–5.1)
LYMPHOCYTES RELATIVE PERCENT: 11.3 %
MCH RBC QN AUTO: 27.1 PG (ref 26–34)
MCHC RBC AUTO-ENTMCNC: 31.7 G/DL (ref 31–36)
MCV RBC AUTO: 85.4 FL (ref 80–100)
MICROSCOPIC EXAMINATION: YES
MONOCYTES ABSOLUTE: 0.9 K/UL (ref 0–1.3)
MONOCYTES RELATIVE PERCENT: 5.9 %
NEUTROPHILS ABSOLUTE: 12.9 K/UL (ref 1.7–7.7)
NEUTROPHILS RELATIVE PERCENT: 81.4 %
NITRITE, URINE: NEGATIVE
PDW BLD-RTO: 16.3 % (ref 12.4–15.4)
PH UA: 6 (ref 5–8)
PLATELET # BLD: 347 K/UL (ref 135–450)
PMV BLD AUTO: 7.9 FL (ref 5–10.5)
POTASSIUM SERPL-SCNC: 3.8 MMOL/L (ref 3.5–5.1)
PROTEIN UA: ABNORMAL MG/DL
RBC # BLD: 5.02 M/UL (ref 4–5.2)
RBC UA: 3 /HPF (ref 0–4)
SODIUM BLD-SCNC: 137 MMOL/L (ref 136–145)
SPECIFIC GRAVITY UA: >=1.03 (ref 1–1.03)
TOTAL PROTEIN: 7.8 G/DL (ref 6.4–8.2)
URIC ACID, SERUM: 10 MG/DL (ref 2.6–6)
URINE REFLEX TO CULTURE: ABNORMAL
URINE TYPE: ABNORMAL
UROBILINOGEN, URINE: 1 E.U./DL
WBC # BLD: 15.9 K/UL (ref 4–11)
WBC UA: 3 /HPF (ref 0–5)

## 2022-03-13 PROCEDURE — 85025 COMPLETE CBC W/AUTO DIFF WBC: CPT

## 2022-03-13 PROCEDURE — 72192 CT PELVIS W/O DYE: CPT

## 2022-03-13 PROCEDURE — P9612 CATHETERIZE FOR URINE SPEC: HCPCS

## 2022-03-13 PROCEDURE — 81001 URINALYSIS AUTO W/SCOPE: CPT

## 2022-03-13 PROCEDURE — 73630 X-RAY EXAM OF FOOT: CPT

## 2022-03-13 PROCEDURE — 6370000000 HC RX 637 (ALT 250 FOR IP): Performed by: INTERNAL MEDICINE

## 2022-03-13 PROCEDURE — 84550 ASSAY OF BLOOD/URIC ACID: CPT

## 2022-03-13 PROCEDURE — 6360000002 HC RX W HCPCS: Performed by: PHYSICIAN ASSISTANT

## 2022-03-13 PROCEDURE — 99284 EMERGENCY DEPT VISIT MOD MDM: CPT

## 2022-03-13 PROCEDURE — 1200000000 HC SEMI PRIVATE

## 2022-03-13 PROCEDURE — 36415 COLL VENOUS BLD VENIPUNCTURE: CPT

## 2022-03-13 PROCEDURE — 80053 COMPREHEN METABOLIC PANEL: CPT

## 2022-03-13 PROCEDURE — 96374 THER/PROPH/DIAG INJ IV PUSH: CPT

## 2022-03-13 PROCEDURE — 72131 CT LUMBAR SPINE W/O DYE: CPT

## 2022-03-13 PROCEDURE — 2580000003 HC RX 258: Performed by: INTERNAL MEDICINE

## 2022-03-13 PROCEDURE — 73564 X-RAY EXAM KNEE 4 OR MORE: CPT

## 2022-03-13 RX ORDER — AMIODARONE HYDROCHLORIDE 200 MG/1
100 TABLET ORAL DAILY
Status: DISCONTINUED | OUTPATIENT
Start: 2022-03-13 | End: 2022-03-18 | Stop reason: HOSPADM

## 2022-03-13 RX ORDER — POTASSIUM CHLORIDE 20 MEQ/1
40 TABLET, EXTENDED RELEASE ORAL PRN
Status: DISCONTINUED | OUTPATIENT
Start: 2022-03-13 | End: 2022-03-18 | Stop reason: HOSPADM

## 2022-03-13 RX ORDER — ASPIRIN 81 MG/1
81 TABLET ORAL DAILY
Status: DISCONTINUED | OUTPATIENT
Start: 2022-03-13 | End: 2022-03-18 | Stop reason: HOSPADM

## 2022-03-13 RX ORDER — HYDROMORPHONE HYDROCHLORIDE 1 MG/ML
1 INJECTION, SOLUTION INTRAMUSCULAR; INTRAVENOUS; SUBCUTANEOUS ONCE
Status: COMPLETED | OUTPATIENT
Start: 2022-03-13 | End: 2022-03-13

## 2022-03-13 RX ORDER — 0.9 % SODIUM CHLORIDE 0.9 %
500 INTRAVENOUS SOLUTION INTRAVENOUS PRN
Status: DISCONTINUED | OUTPATIENT
Start: 2022-03-13 | End: 2022-03-18 | Stop reason: HOSPADM

## 2022-03-13 RX ORDER — ATORVASTATIN CALCIUM 40 MG/1
40 TABLET, FILM COATED ORAL DAILY
Status: DISCONTINUED | OUTPATIENT
Start: 2022-03-13 | End: 2022-03-18 | Stop reason: HOSPADM

## 2022-03-13 RX ORDER — METOPROLOL SUCCINATE 50 MG/1
100 TABLET, EXTENDED RELEASE ORAL DAILY
Status: DISCONTINUED | OUTPATIENT
Start: 2022-03-13 | End: 2022-03-18 | Stop reason: HOSPADM

## 2022-03-13 RX ORDER — POTASSIUM CHLORIDE 7.45 MG/ML
10 INJECTION INTRAVENOUS PRN
Status: DISCONTINUED | OUTPATIENT
Start: 2022-03-13 | End: 2022-03-18 | Stop reason: HOSPADM

## 2022-03-13 RX ORDER — PREDNISONE 20 MG/1
40 TABLET ORAL DAILY
Status: DISCONTINUED | OUTPATIENT
Start: 2022-03-13 | End: 2022-03-18 | Stop reason: HOSPADM

## 2022-03-13 RX ORDER — SODIUM CHLORIDE 0.9 % (FLUSH) 0.9 %
5-40 SYRINGE (ML) INJECTION EVERY 12 HOURS SCHEDULED
Status: DISCONTINUED | OUTPATIENT
Start: 2022-03-13 | End: 2022-03-18 | Stop reason: HOSPADM

## 2022-03-13 RX ORDER — ACETAMINOPHEN 650 MG/1
650 SUPPOSITORY RECTAL EVERY 6 HOURS PRN
Status: DISCONTINUED | OUTPATIENT
Start: 2022-03-13 | End: 2022-03-18 | Stop reason: HOSPADM

## 2022-03-13 RX ORDER — POTASSIUM CHLORIDE 20 MEQ/1
40 TABLET, EXTENDED RELEASE ORAL PRN
Status: DISCONTINUED | OUTPATIENT
Start: 2022-03-13 | End: 2022-03-13

## 2022-03-13 RX ORDER — ACETAMINOPHEN 325 MG/1
650 TABLET ORAL EVERY 6 HOURS PRN
Status: DISCONTINUED | OUTPATIENT
Start: 2022-03-13 | End: 2022-03-18 | Stop reason: HOSPADM

## 2022-03-13 RX ORDER — ONDANSETRON 4 MG/1
4 TABLET, ORALLY DISINTEGRATING ORAL EVERY 8 HOURS PRN
Status: DISCONTINUED | OUTPATIENT
Start: 2022-03-13 | End: 2022-03-18 | Stop reason: HOSPADM

## 2022-03-13 RX ORDER — HYDROCODONE BITARTRATE AND ACETAMINOPHEN 5; 325 MG/1; MG/1
1 TABLET ORAL EVERY 6 HOURS PRN
Status: DISCONTINUED | OUTPATIENT
Start: 2022-03-13 | End: 2022-03-14

## 2022-03-13 RX ORDER — VALSARTAN 160 MG/1
160 TABLET ORAL 2 TIMES DAILY
Status: DISCONTINUED | OUTPATIENT
Start: 2022-03-13 | End: 2022-03-18 | Stop reason: HOSPADM

## 2022-03-13 RX ORDER — ISOSORBIDE MONONITRATE 30 MG/1
30 TABLET, EXTENDED RELEASE ORAL DAILY
Status: DISCONTINUED | OUTPATIENT
Start: 2022-03-13 | End: 2022-03-18 | Stop reason: HOSPADM

## 2022-03-13 RX ORDER — POTASSIUM CHLORIDE 7.45 MG/ML
10 INJECTION INTRAVENOUS PRN
Status: DISCONTINUED | OUTPATIENT
Start: 2022-03-13 | End: 2022-03-13

## 2022-03-13 RX ORDER — HYDRALAZINE HYDROCHLORIDE 20 MG/ML
10 INJECTION INTRAMUSCULAR; INTRAVENOUS EVERY 6 HOURS PRN
Status: DISCONTINUED | OUTPATIENT
Start: 2022-03-13 | End: 2022-03-18 | Stop reason: HOSPADM

## 2022-03-13 RX ORDER — FUROSEMIDE 40 MG/1
40 TABLET ORAL 2 TIMES DAILY
Status: DISCONTINUED | OUTPATIENT
Start: 2022-03-13 | End: 2022-03-18 | Stop reason: HOSPADM

## 2022-03-13 RX ORDER — COLCHICINE 0.6 MG/1
0.6 TABLET ORAL 2 TIMES DAILY
Status: DISCONTINUED | OUTPATIENT
Start: 2022-03-13 | End: 2022-03-14

## 2022-03-13 RX ORDER — SODIUM CHLORIDE 9 MG/ML
INJECTION, SOLUTION INTRAVENOUS CONTINUOUS
Status: DISCONTINUED | OUTPATIENT
Start: 2022-03-13 | End: 2022-03-18 | Stop reason: HOSPADM

## 2022-03-13 RX ORDER — SODIUM CHLORIDE 9 MG/ML
25 INJECTION, SOLUTION INTRAVENOUS PRN
Status: DISCONTINUED | OUTPATIENT
Start: 2022-03-13 | End: 2022-03-18 | Stop reason: HOSPADM

## 2022-03-13 RX ORDER — SODIUM CHLORIDE 0.9 % (FLUSH) 0.9 %
5-40 SYRINGE (ML) INJECTION PRN
Status: DISCONTINUED | OUTPATIENT
Start: 2022-03-13 | End: 2022-03-18 | Stop reason: HOSPADM

## 2022-03-13 RX ORDER — ONDANSETRON 2 MG/ML
4 INJECTION INTRAMUSCULAR; INTRAVENOUS EVERY 6 HOURS PRN
Status: DISCONTINUED | OUTPATIENT
Start: 2022-03-13 | End: 2022-03-18 | Stop reason: HOSPADM

## 2022-03-13 RX ADMIN — ISOSORBIDE MONONITRATE 30 MG: 30 TABLET, EXTENDED RELEASE ORAL at 20:07

## 2022-03-13 RX ADMIN — SODIUM CHLORIDE: 9 INJECTION, SOLUTION INTRAVENOUS at 17:08

## 2022-03-13 RX ADMIN — COLCHICINE 0.6 MG: 0.6 TABLET, FILM COATED ORAL at 20:07

## 2022-03-13 RX ADMIN — HYDROCODONE BITARTRATE AND ACETAMINOPHEN 1 TABLET: 5; 325 TABLET ORAL at 16:04

## 2022-03-13 RX ADMIN — HYDROMORPHONE HYDROCHLORIDE 1 MG: 1 INJECTION, SOLUTION INTRAMUSCULAR; INTRAVENOUS; SUBCUTANEOUS at 10:54

## 2022-03-13 RX ADMIN — APIXABAN 5 MG: 5 TABLET, FILM COATED ORAL at 20:08

## 2022-03-13 RX ADMIN — PREDNISONE 40 MG: 20 TABLET ORAL at 16:00

## 2022-03-13 RX ADMIN — AMIODARONE HYDROCHLORIDE 100 MG: 200 TABLET ORAL at 20:08

## 2022-03-13 RX ADMIN — METOPROLOL SUCCINATE 100 MG: 50 TABLET, EXTENDED RELEASE ORAL at 20:07

## 2022-03-13 RX ADMIN — HYDROCODONE BITARTRATE AND ACETAMINOPHEN 1 TABLET: 5; 325 TABLET ORAL at 22:13

## 2022-03-13 RX ADMIN — Medication 10 ML: at 20:12

## 2022-03-13 RX ADMIN — ASPIRIN 81 MG: 81 TABLET, COATED ORAL at 20:07

## 2022-03-13 RX ADMIN — VALSARTAN 160 MG: 160 TABLET, FILM COATED ORAL at 20:08

## 2022-03-13 RX ADMIN — ATORVASTATIN CALCIUM 40 MG: 40 TABLET, FILM COATED ORAL at 20:08

## 2022-03-13 RX ADMIN — FUROSEMIDE 40 MG: 40 TABLET ORAL at 20:08

## 2022-03-13 ASSESSMENT — ENCOUNTER SYMPTOMS
VOMITING: 0
STRIDOR: 0
CONSTIPATION: 0
BACK PAIN: 1
NAUSEA: 0
COUGH: 0
DIARRHEA: 0
SHORTNESS OF BREATH: 0
ABDOMINAL PAIN: 0
WHEEZING: 0
COLOR CHANGE: 0

## 2022-03-13 ASSESSMENT — PAIN SCALES - GENERAL
PAINLEVEL_OUTOF10: 10
PAINLEVEL_OUTOF10: 10
PAINLEVEL_OUTOF10: 0
PAINLEVEL_OUTOF10: 10
PAINLEVEL_OUTOF10: 8
PAINLEVEL_OUTOF10: 10

## 2022-03-13 ASSESSMENT — PAIN DESCRIPTION - LOCATION: LOCATION: BACK

## 2022-03-13 ASSESSMENT — PAIN DESCRIPTION - DIRECTION: RADIATING_TOWARDS: LEGS

## 2022-03-13 ASSESSMENT — PAIN - FUNCTIONAL ASSESSMENT: PAIN_FUNCTIONAL_ASSESSMENT: 0-10

## 2022-03-13 ASSESSMENT — PAIN DESCRIPTION - DESCRIPTORS: DESCRIPTORS: SHARP

## 2022-03-13 NOTE — ED PROVIDER NOTES
[address] [facility]        Pt Name: Kaia Reese  MRN: [id number]  [place] 1955  Date of evaluation: 3/13/2022  Provider: Mark Herzog PA-C  PCP: Tommie Carnes MD  Note Started: 10:24 AM EDT        I have seen and evaluated this patient with my supervising physician Kirk Paez, *. CHIEF COMPLAINT       Chief Complaint   Patient presents with    Back Pain     patient complains of low back pain with radiation into bilateral legs. she also states her left great toe is black. left ankle swollen. onset one month. HISTORY OF PRESENT ILLNESS   (Location, Timing/Onset, Context/Setting, Quality, Duration, Modifying Factors, Severity, Associated Signs and Symptoms)  Note limiting factors. Chief Complaint: Low back pain with radiation to left lower extremity    Kaia Reese is a 75 y.o. female who presents to the emergency department complaining of low back pain with radiation to left lower extremity for 3 to 4 weeks. She is not moving her left leg much because it hurts. She denies any numbness, tingling or weakness. She believes that because she is not moving her left leg, she has acquired some swelling in her left foot and ankle and she feels as though the left great toe is discolored. She is starting to experience some sharp pain in the right buttock over the past few days. Denies bowel or bladder incontinence or retention but states that her urine does appear darker and she thinks that she may be dehydrated. She has been getting around the house with a walker but states that it is too painful to walk at this point. She lives by herself. She does not have any way of transportation besides her sister and feels like she is an inconvenience to her. She rates her pain to be a 10 out of 10 on pain scale.      Nursing Notes were all reviewed and agreed with or any disagreements were addressed in the HPI.    REVIEW OF SYSTEMS    (2-9 systems for level 4, 10 or more for level 5)     Review of Systems   Constitutional: Negative for chills and fever. HENT: Negative. Eyes: Negative for visual disturbance. Respiratory: Negative for cough, shortness of breath, wheezing and stridor. Cardiovascular: Positive for leg swelling. Negative for chest pain and palpitations. Gastrointestinal: Negative for abdominal pain, constipation, diarrhea, nausea and vomiting. Genitourinary: Negative for difficulty urinating, dysuria, flank pain, frequency, hematuria, pelvic pain and urgency. Musculoskeletal: Positive for back pain, gait problem and myalgias. Negative for neck pain and neck stiffness. Skin: Negative for color change, pallor, rash and wound. Neurological: Negative for dizziness, tremors, seizures, syncope, facial asymmetry, speech difficulty, weakness, light-headedness, numbness and headaches. Psychiatric/Behavioral: Negative for confusion. All other systems reviewed and are negative. Positives and Pertinent negatives as per HPI. Except as noted above in the ROS, all other systems were reviewed and negative.        PAST MEDICAL HISTORY     Past Medical History:   Diagnosis Date    AICD (automatic cardioverter/defibrillator) present     Arthritis     [facility]est[facility] failure) [facility] [address]     Gout     Hyperlipidemia     Hypertension     MI (myocardial infarction) ([facility] [address]          SURGICAL HISTORY     Past Surgical History:   Procedure Laterality Date    CARDIAC DEFIBRILLATOR PLACEMENT      DILATION AND CURETTAGE OF UTERUS           CURRENTMEDICATIONS       Previous Medications    AMIODARONE (CORDARONE) 200 MG TABLET    Reduce to 100 mg (1/2 tablet) daily until April 1st, then stop    APIXABAN (ELIQUIS) 5 MG TABS TABLET    Take 1 tablet by mouth 2 times daily    ASPIRIN 81 MG EC TABLET    Take 81 mg by mouth daily    ATORVASTATIN (LIPITOR) 40 MG TABLET    Take 1 tablet by mouth daily FUROSEMIDE (LASIX) 40 MG TABLET    Take 1 tablet by mouth 2 times daily    ISOSORBIDE MONONITRATE (IMDUR) 30 MG EXTENDED RELEASE TABLET    Take 1 tablet by mouth daily    METOPROLOL SUCCINATE (TOPROL XL) 100 MG EXTENDED RELEASE TABLET    Take 1 tablet by mouth daily    VALSARTAN (DIOVAN) 160 MG TABLET    Take 1 tablet by mouth 2 times daily         ALLERGIES     Patient has no known allergies. FAMILYHISTORY       Family History   Problem Relation Age of Onset    Heart Disease Mother     High Blood Pressure Mother     High Cholesterol Mother     Diabetes Mother     Cancer Father           SOCIAL HISTORY       Social History     Tobacco Use    Smoking status: Never Smoker    Smokeless tobacco: Never Used    Tobacco comment: QUIT AS A TEEN   Vaping Use    Vaping Use: Never used   Substance Use Topics    Alcohol use: No     Comment: RARE    Drug use: No       SCREENINGS    [place] Coma Scale  Eye Opening: Spontaneous  Best Verbal Response: Oriented  Best Motor Response: Obeys commands  Andriy Coma Scale Score: 15        PHYSICAL EXAM    (up to 7 for level 4, 8 or more for level 5)     ED Triage Vitals [03/13/22 1013]   BP Temp Temp Source Pulse Resp SpO2 Height Weight   135/89 98.7 °F (37.1 °C) Oral 99 14 98 % -- --       Physical Exam  Vitals and nursing note reviewed. Constitutional:       Appearance: Normal appearance. She is well-developed. She is obese. She is not toxic-appearing or diaphoretic. HENT:      Head: Normocephalic and atraumatic. Right Ear: External ear normal.      Left Ear: External ear normal.      Nose: Nose normal.      Mouth/Throat:      Mouth: Mucous membranes are moist.      Pharynx: Oropharynx is clear. Eyes:      General: No scleral icterus. Right eye: No discharge. Left eye: No discharge. Extraocular Movements: Extraocular movements intact. Conjunctiva/sclera: Conjunctivae normal.      Pupils: Pupils are equal, round, and reactive to light. Neck:      Trachea: Trachea and phonation normal.      Meningeal: Brudzinski's sign and Kernig's sign absent. Cardiovascular:      Rate and Rhythm: Normal rate. Pulses:           Femoral pulses are 2+ on the right side and 2+ on the left side. Dorsalis pedis pulses are 2+ on the right side and 2+ on the left side. Posterior tibial pulses are 2+ on the right side and 2+ on the left side. Pulmonary:      Effort: Pulmonary effort is normal.      Breath sounds: Normal breath sounds. Abdominal:      General: Bowel sounds are normal. There is no abdominal bruit. Palpations: Abdomen is soft. There is no pulsatile mass. Tenderness: There is no abdominal tenderness. There is no right CVA tenderness, left CVA tenderness, guarding or rebound. Negative signs include Alford's sign, Rovsing's sign, McBurney's sign, psoas sign and obturator sign. Musculoskeletal:      Cervical back: Normal, full passive range of motion without pain, normal range of motion and neck supple. Thoracic back: Normal.      Lumbar back: Spasms and tenderness present. No swelling, edema, deformity, lacerations or bony tenderness. Decreased range of motion. Positive left straight leg raise test. Negative right straight leg raise test. No scoliosis. Right hip: Normal.      Left hip: Normal.      Right upper leg: Normal.      Left upper leg: Normal.      Right knee: Normal.      Left knee: No swelling, deformity, effusion, erythema, ecchymosis or lacerations. Normal range of motion. Tenderness present over the medial joint line and lateral joint line. Instability Tests: Anterior drawer test negative. Posterior drawer test negative. Anterior Lachman test negative. Medial Penny test negative and lateral Penny test negative. Right lower leg: Normal.      Left lower leg: Normal.      Right ankle: Normal.      Right Achilles Tendon: Normal.      Left ankle: Swelling present.  No deformity, ecchymosis or lacerations. No tenderness. Normal range of motion. Anterior drawer test negative. Normal pulse. Left Achilles Tendon: Normal.      Right foot: Normal.      Left foot: Normal range of motion and normal capillary refill. Swelling and tenderness present. No deformity, bunion, Charcot foot, foot drop, prominent metatarsal heads, laceration, bony tenderness or crepitus. Normal pulse. Comments: No major discoloration appreciated in the foot or toes. No leg edema appreciated. There is trace edema in the left ankle and foot. No erythema, red streaking, poikilothermia, pallor, paresthesia, pain out of proportion to physical findings, pain with passive ROM. No midline vertebral tenderness or step-off deformity. Positive SLR in LLE. Sharp shooting pain to right buttock and left buttock/posterior thigh. No saddle paresthesia or foot drop. Able to dorsiflex and plantarflex without difficulty or deficit.  symmetrical strength in all four extremities without focal weakness, paresthesia or radiculopathy     Lymphadenopathy:      Cervical: No cervical adenopathy. Skin:     General: Skin is warm and dry. Capillary Refill: Capillary refill takes less than 2 seconds. Coloration: Skin is not jaundiced or pale. Findings: No bruising, erythema, lesion or rash. Neurological:      General: No focal deficit present. Mental Status: She is alert and oriented to person, place, and time. Sensory: No sensory deficit. Motor: No weakness.       Deep Tendon Reflexes: Reflexes normal.   Psychiatric:         Behavior: Behavior normal.         DIAGNOSTIC RESULTS   LABS:    Labs Reviewed   CBC WITH AUTO DIFFERENTIAL - Abnormal; Notable for the following components:       Result Value    WBC 15.9 (*)     RDW 16.3 (*)     Neutrophils Absolute 12.9 (*)     All other components within normal limits   URINALYSIS WITH REFLEX TO CULTURE - Abnormal; Notable for the following components:    Bilirubin Urine SMALL (*)     Protein, UA TRACE (*)     All other components within normal limits   URIC ACID - Abnormal; Notable for the following components:    Uric Acid, Serum 10.0 (*)     All other components within normal limits   MICROSCOPIC URINALYSIS - Abnormal; Notable for the following components:    Bacteria, UA 2+ (*)     All other components within normal limits   COMPREHENSIVE METABOLIC PANEL       When ordered only abnormal lab results are displayed. All other labs were within normal range or not returned as of this dictation. EKG: When ordered, EKG's are interpreted by the Emergency Department Physician in the absence of a cardiologist.  Please see their note for interpretation of EKG. RADIOLOGY:   Non-plain film images such as CT, Ultrasound and MRI are read by the radiologist. Plain radiographic images are visualized and preliminarily interpreted by the ED Provider with the below findings:        Interpretation per the Radiologist below, if available at the time of this note:    CT PELVIS WO CONTRAST Additional Contrast? None   Final Result   No acute abnormality         CT LUMBAR SPINE WO CONTRAST   Final Result   No acute fracture or subluxation. L4-5 mild degenerative anterolisthesis. Multilevel spondylosis. XR KNEE LEFT (MIN 4 VIEWS)   Final Result   1. No acute abnormality. XR FOOT LEFT (MIN 3 VIEWS)   Final Result   1. Age-indeterminate 4th and 5th proximal phalangeal fractures, likely old;   correlate with point tenderness. Patient is not point tender to the lateral aspect/4/5 toes. Therefore likely an old finding on xray.       PROCEDURES   Unless otherwise noted below, none     Procedures    CRITICAL CARE TIME   n/a    CONSULTS:  Ayesha Saul will admit (1940)    EMERGENCY DEPARTMENT COURSE and DIFFERENTIAL DIAGNOSIS/MDM:   Vitals:    Vitals:    03/13/22 1142 03/13/22 1157 03/13/22 1212 03/13/22 1227   BP: 112/72 123/63 (!) 143/72 133/70   Pulse:    90   Resp:       Temp:       TempSrc:       SpO2: 95% 98% 95% 97%       Patient was given the following medications:  Medications   HYDROmorphone HCl PF (DILAUDID) injection 1 mg (1 mg IntraVENous Given 3/13/22 1054)         This patient presents to the emergency department complaining of difficulty walking due to severe pain in her low back and radiation to right buttock and left lower extremity. I believe that the swelling in her left ankle and foot is because she is not moving the leg much due to pain. She has good distal pulses and capillary refill is less than 2 seconds. I do not appreciate any necrosis or significant discoloration to the left foot. No evidence of pallor or poikilothermia or mottling. Abdomen is soft and nontender without pulsatile mass or CVA tenderness. She does have history of gout and uric acid level is elevated with discomfort to the left foot and left great toe. Therefore, she could have a gouty component causing the left foot and toe pain. I do not appreciate discoloration consistent with infection or neurovascular compromise. X-rays do not show any acute osseous abnormality of the left foot or knee. Likely an old phalange fracture to 4/5th toe of left foot. CT scan of lumbar spine shows chronic degenerative findings without any acute osseous abnormality. She has no saddle paresthesia, foot drop, bowel or bladder incontinence or retention. I do not feel that she meets criteria for emergent MRI. My suspicion is low for acute spine fracture or dislocation, epidural abscess or hematoma, discitis, meningitis, encephalitis, transverse myelitis, cauda quina,  pyelonephritis, perinephric abscess, urosepsis, kidney stone, AAA, dissection, shingles, or other concerning pathology. FINAL IMPRESSION      1. Chronic bilateral low back pain with bilateral sciatica    2. Swelling of left foot    3.  Difficulty in walking          DISPOSITION/PLAN   DISPOSITION Decision To Admit 03/13/2022 12:46:11 PM      PATIENT REFERRED TO:  No follow-up provider specified.     DISCHARGE MEDICATIONS:  New Prescriptions    No medications on file       DISCONTINUED MEDICATIONS:  Discontinued Medications    No medications on file              (Please note that portions of this note were completed with a voice recognition program.  Efforts were made to edit the dictations but occasionally words are mis-transcribed.)    Durga Ramos PA-C (electronically signed)           Durga Ramos PA-C  03/13/22 9617

## 2022-03-13 NOTE — H&P
History and Physical  Dr. Sharif Rascon  3/13/2022    PCP: Mony Vickers MD    Cc:   Chief Complaint   Patient presents with    Back Pain     patient complains of low back pain with radiation into bilateral legs. she also states her left great toe is black. left ankle swollen. onset one month. HPI:  La Infante is a 75 y.o. female who has a past medical history of AICD (automatic cardioverter/defibrillator) present, Arthritis, CHF (congestive heart failure) ([facility] [address] Gout, Hyperlipidemia, Hypertension, and MI (myocardial infarction) ([facility] [address] Patient presents with <principal problem not specified>. HPI  (1-3 for expanded problem focused, ?4 for detailed/comprehensive)      75 y.o. female who presents to the emergency department complaining of low back pain with radiation to left lower extremity for 3 to 4 weeks. She is not moving her left leg much because it hurts. She denies any numbness, tingling or weakness. She believes that because she is not moving her left leg, she has acquired some swelling in her left foot and ankle and she feels as though the left great toe is discolored. She is starting to experience some sharp pain in the right buttock over the past few days. Denies bowel or bladder incontinence or retention but states that her urine does appear darker and she thinks that she may be dehydrated. She has been getting around the house with a walker but states that it is too painful to walk at this point. She lives by herself. She does not have any way of transportation besides her sister and feels like she is an inconvenience to her. She rates her pain to be a 10 out of 10 on pain scale. At this time she is unable to ambulate on her own  Requires iv pain medicines  She lives at home by herself and there is some concern of her ability to care for herself    To be admitted, pt/ot to see, consider MRI back      Problem list of hospitalization thus far:   Active Hospital Problems    Diagnosis  Gout [M10.9]     Back pain with radiculopathy [M54.10]     Essential hypertension [I10]     Morbid obesity with BMI of 45.0-49.9, adult ([address] [E66.01, Z68.42]     Mixed hyperlipidemia [E78.2]          Review of Systems: (1 system for EPF, 2-9 for detailed, 10+ for comprehensive)  Constitutional: Negative for chills and fever. HENT: Negative for dental problem, nosebleeds and rhinorrhea. Eyes: Negative for photophobia and visual disturbance. Respiratory: Negative for cough, chest tightness and shortness of breath. Cardiovascular: Negative for chest pain and leg swelling. Gastrointestinal: Negative for diarrhea, nausea and vomiting. Endocrine: Negative for polydipsia and polyphagia. Genitourinary: Negative for frequency, hematuria and urgency. Musculoskeletal: positive for back pain and negative for myalgias. Skin: Negative for rash. Allergic/Immunologic: Negative for food allergies. Neurological: Negative for dizziness, seizures, syncope and facial asymmetry. Hematological: Negative for adenopathy. Psychiatric/Behavioral: Negative for dysphoric mood. The patient is not nervous/anxious.              Past Medical History:   Past Medical History:   Diagnosis Date    AICD (automatic cardioverter/defibrillator) present     Arthritis     CHF (congestive heart failure) (HCC)     Gout     Hyperlipidemia     Hypertension     MI (myocardial infarction) ([facility]        Past Surgical History:   Past Surgical History:   Procedure Laterality Date    CARDIAC DEFIBRILLATOR PLACEMENT      DILATION AND CURETTAGE OF UTERUS         Social History:   Social History     Tobacco History     Smoking Status  Never Smoker    Smokeless Tobacco Use  Never Used    Tobacco Comment  QUIT AS A TEEN          Alcohol History     Alcohol Use Status  No Comment  RARE          Drug Use     Drug Use Status  No          Sexual Activity     Sexually Active  Not Asked                Fam History:   Family History   Problem Relation Age of Onset    Heart Disease Mother     High Blood Pressure Mother     High Cholesterol Mother     Diabetes Mother     Cancer Father        PFSH: The above PMHx, PSHx, SocHx, FamHx has been reviewed by myself. (1 area for detailed, 2-3 for comprehensive)      Code Status: Prior    [facility] by myself  Prior to Admission medications    Medication Sig Start Date End Date Taking?  Authorizing Provider   amiodarone (CORDARONE) 200 MG tablet Reduce to 100 mg (1/2 tablet) daily until April 1st, then stop 3/1/22   YEIMY Rebolledo - CNP   apixaban (ELIQUIS) 5 MG TABS tablet Take 1 tablet by mouth 2 times daily 1/27/22   YEIMY Jo - CNP   atorvastatin (LIPITOR) 40 MG tablet Take 1 tablet by mouth daily 1/27/22   YEIMY Jo CNP   furosemide (LASIX) 40 MG tablet Take 1 tablet by mouth 2 times daily 1/27/22   YEIMY Jo CNP   isosorbide mononitrate (IMDUR) 30 MG extended release tablet Take 1 tablet by mouth daily 1/27/22   YEIMY Jo - CNP   metoprolol succinate (TOPROL XL) 100 MG extended release tablet Take 1 tablet by mouth daily 1/27/22   YEIMY Jo - CNP   valsartan (DIOVAN) 160 MG tablet Take 1 tablet by mouth 2 times daily 1/27/22   YEIMY Jo - CNP   aspirin 81 MG EC tablet Take 81 mg by mouth daily    Historical Provider, MD         No Known Allergies          EXAM: (2-7 system for EPF/Detailed, ?8 for Comprehensive)  /70   Pulse 90   Temp 98.7 °F (37.1 °C) (Oral)   Resp 14   SpO2 97%   Constitutional: vitals as above: alert, appears stated age and cooperative    Psychiatric: normal insight and judgment, oriented to person, place, time, and general circumstances    Head: Normocephalic, without obvious abnormality, atraumatic    Eyes:lids and lashes normal, conjunctivae and sclerae normal and pupils equal, round, reactive to light and accomodation    EMNT: external ears normal, nares midline    Neck: no carotid bruit, supple, symmetrical, trachea midline and thyroid not enlarged, symmetric, no tenderness/mass/nodules     Respiratory: clear to auscultation and percussion bilaterally with normal respiratory effort    Cardiovascular: normal rate, regular rhythm, normal S1 and S2 and no murmurs    Gastrointestinal: soft, non-tender, non-distended, normal bowel sounds, no masses or organomegaly    Extremities: no clubbing, no edema    Skin:No rashes or nodules noted. Neurologic:negative         LABS:  Labs Reviewed   CBC WITH AUTO DIFFERENTIAL - Abnormal; Notable for the following components:       Result Value    WBC 15.9 (*)     RDW 16.3 (*)     Neutrophils Absolute 12.9 (*)     All other components within normal limits   URINALYSIS WITH REFLEX TO CULTURE - Abnormal; Notable for the following components:    Bilirubin Urine SMALL (*)     Protein, UA TRACE (*)     All other components within normal limits   URIC ACID - Abnormal; Notable for the following components:    Uric Acid, Serum 10.0 (*)     All other components within normal limits   MICROSCOPIC URINALYSIS - Abnormal; Notable for the following components:    Bacteria, UA 2+ (*)     All other components within normal limits   COMPREHENSIVE METABOLIC PANEL         IMAGING:  Imaging results from the ER have been reviewed in the computerized chart. XR KNEE LEFT (MIN 4 VIEWS)    Result Date: 3/13/2022  EXAMINATION: FOUR XRAY VIEWS OF THE LEFT KNEE 3/13/2022 10:26 am COMPARISON: None. HISTORY: ORDERING SYSTEM PROVIDED HISTORY: pain TECHNOLOGIST PROVIDED HISTORY: Reason for exam:->pain Reason for Exam: Back Pain (patient complains of low back pain with radiation into bilateral legs. she also states her left great toe is black. left ankle swollen. onset one month.) FINDINGS: There is no acute fracture, dislocation or joint effusion. The bones are demineralized.   There are no bony destructive lesions. There is moderate to severe tricompartmental osteoarthritis, most significant in the medial compartment. Chondrocalcinosis involves the lateral meniscus. 1. No acute abnormality. XR FOOT LEFT (MIN 3 VIEWS)    Result Date: 3/13/2022  EXAMINATION: THREE XRAY VIEWS OF THE LEFT FOOT 3/13/2022 10:26 am COMPARISON: 10/02/2013 HISTORY: ORDERING SYSTEM PROVIDED HISTORY: pain TECHNOLOGIST PROVIDED HISTORY: Reason for exam:->pain Reason for Exam: Back Pain (patient complains of low back pain with radiation into bilateral legs. she also states her left great toe is black. left ankle swollen. onset one month.) FINDINGS: There age-indeterminate fractures involving the 4th and 5th proximal phalanges, likely old. The bones are demineralized. There are no bony destructive lesions. Mild-to-moderate degenerative changes involve the 1st MTP joint and midfoot. Mild soft tissue swelling involves the dorsal midfoot. 1. Age-indeterminate 4th and 5th proximal phalangeal fractures, likely old; correlate with point tenderness. CT LUMBAR SPINE WO CONTRAST    Result Date: 3/13/2022  EXAMINATION: CT OF THE LUMBAR SPINE WITHOUT CONTRAST  3/13/2022 TECHNIQUE: CT of the lumbar spine was performed without the administration of intravenous contrast. Multiplanar reformatted images are provided for review. Adjustment of mA and/or kV according to patient size was utilized. Dose modulation, iterative reconstruction, and/or weight based adjustment of the mA/kV was utilized to reduce the radiation dose to as low as reasonably achievable. COMPARISON: Lumbar x-rays series, 10/23/2015 HISTORY: ORDERING SYSTEM PROVIDED HISTORY: low back pain TECHNOLOGIST PROVIDED HISTORY: Reason for exam:->low back pain Decision Support Exception - unselect if not a suspected or confirmed emergency medical condition->Emergency Medical Condition (MA) FINDINGS: BONES/ALIGNMENT: At L4-5 there is mild anterolisthesis, 2.5 mm.   Alignment is otherwise normal.  There are no acute fractures. DEGENERATIVE CHANGES: At L1-2 and L2-3, the central canal neural foramina are patent. At L3-4, there is a posterior disc bulge with ligamentous hypertrophy resulting in mild narrowing of the central canal.  There is no significant foraminal narrowing. At L4-5, anterolisthesis is again noted. There is a mild posterior disc bulge associated with bilateral facet arthropathy. There is no significant central stenosis. There is mild bilateral foraminal narrowing. At L5-S1, there is moderate facet arthropathy. There is no significant central or foraminal stenosis. SOFT TISSUES/RETROPERITONEUM: No paraspinal mass is seen. No acute fracture or subluxation. L4-5 mild degenerative anterolisthesis. Multilevel spondylosis. CT PELVIS WO CONTRAST Additional Contrast? None    Result Date: 3/13/2022  EXAMINATION: CT OF THE PELVIS WITHOUT CONTRAST 3/13/2022 10:46 am TECHNIQUE: CT of the pelvis was performed without the administration of intravenous contrast.  Multiplanar reformatted images are provided for review. Adjustment of mA and/or kV according to patient size was utilized. Dose modulation, iterative reconstruction, and/or weight based adjustment of the mA/kV was utilized to reduce the radiation dose to as low as reasonably achievable. COMPARISON: None. HISTORY: ORDERING SYSTEM PROVIDED HISTORY: pain TECHNOLOGIST PROVIDED HISTORY: Reason for exam:->pain Additional Contrast?->None Decision Support Exception - unselect if not a suspected or confirmed emergency medical condition->Emergency Medical Condition (MA) Reason for Exam: low back pain FINDINGS: The osseous structures are intact without evidence of fracture, malalignment or bone destruction. There are degenerative changes in the lower lumbar spine which are better evaluated on the lumbar spine CT exam.  There is symmetric sacroiliac joint arthropathy. There is sigmoid diverticulosis.   There is no inflammation or free pelvic fluid. There is no bowel obstruction. The remainder of the pelvic soft tissue structures are unremarkable. No acute abnormality         EKG:     Lab Results   Component Value Date    GLUCOSE 99 03/13/2022     No results found for: POCGLU  /70   Pulse 90   Temp 98.7 °F (37.1 °C) (Oral)   Resp 14   SpO2 97%     MEDICAL DECISION MAKING:    Active Problems:    Mixed hyperlipidemia -Established problem. Stable. Plan: cont on statin    Morbid obesity with BMI of 45.0-49.9, adult ([address] -Established problem. Stable. Plan: Continue present orders/plan. Essential hypertension -Established problem. Stable. 133/70  Plan: Pt home BP meds reviewed and will be continued. IV Hydralazine ordered for control of extremely high blood pressures. Will monitor labs to assess Creat/K for possible complications of medications. Gout -Established problem. Uncontrolled. Pt with pain to toe, uric acid is 10  Plan: start colchicine; pt likely needs to be on allopruinol when acute flare resolved    Back pain with radiculopathy -New Problem to me. Pt unable to walk  Plan: admit, steroids, mri l/s. Pt/ot to see          Diagnoses as listed above, designated as new or established and plan outlined for each. Data Reviewed:   (1) Lab tests were reviewed or ordered. (1) Radiology tests were reviewed or ordered. (1) Medical test (Echo, EKG, PFT/dejon) were ordered. (1)History was not obtained from someone other than patient  (1) Old records were reviewed - see HPI/MDM for pertinent details if review done. (2) Case was discussed with another health care provider: tiffany VAN  (2) Imaging was viewed by myself. (2) EKG  was not viewed by myself.        The patient is being placed in inpatient status with the expectation of requiring a hospital stay spanning at least two midnights for care and treatment of the problems noted in the problem list.  This determination is also based on thepatients comorbidities and past medical history, the severity and timing of the signs and symptoms upon presentation.     Dr Moose Morelos will assume care 3/14; he normally covers pt's from pt's PCP    (Please note that portions of this note were completed with a voice recognition program.  Efforts were made to edit the dictations but occasionally words are mis-transcribed.)      Electronically signed by: Lazara Kirkland MD 3/13/2022

## 2022-03-13 NOTE — ED PROVIDER NOTES
[facility] Emergency Department      Pt Name: Tabitha Kendall  MRN: [id number]  [place] 1955  Date of evaluation: 3/13/2022  Provider: Jourdan Kirby MD  I independently performed a history and physical on Tabitha Kendall. All diagnostic, treatment, and disposition decisions were made by myself in conjunction with the advanced practice provider. HPI: Tabitha Kendall presented with   Chief Complaint   Patient presents with    Back Pain     patient complains of low back pain with radiation into bilateral legs. she also states her left great toe is black. left ankle swollen. onset one month. Tabitha Kendall has a past medical history of AICD (automatic cardioverter/defibrillator) present, Arthritis, CHF (congestive heart failure) ([facility] [address] Gout, Hyperlipidemia, Hypertension, and MI (myocardial infarction) ([facility] [address] She has a past surgical history that includes Dilation and curettage of uterus and Cardiac defibrillator placement. No current facility-administered medications on file prior to encounter.      Current Outpatient Medications on File Prior to Encounter   Medication Sig Dispense Refill    amiodarone (CORDARONE) 200 MG tablet Reduce to 100 mg (1/2 tablet) daily until April 1st, then stop 30 tablet 0    apixaban (ELIQUIS) 5 MG TABS tablet Take 1 tablet by mouth 2 times daily 60 tablet 0    atorvastatin (LIPITOR) 40 MG tablet Take 1 tablet by mouth daily 30 tablet 5    furosemide (LASIX) 40 MG tablet Take 1 tablet by mouth 2 times daily 60 tablet 0    isosorbide mononitrate (IMDUR) 30 MG extended release tablet Take 1 tablet by mouth daily 30 tablet 0    metoprolol succinate (TOPROL XL) 100 MG extended release tablet Take 1 tablet by mouth daily 30 tablet 0    valsartan (DIOVAN) 160 MG tablet Take 1 tablet by mouth 2 times daily 60 tablet 1    aspirin 81 MG EC tablet Take 81 mg by mouth daily       PHYSICAL EXAM  Vitals: /89   Pulse 99   Temp 98.7 °F (37.1 °C) (Oral)   Resp 14   SpO2 98% Constitutional:  75 y.o. female alert, cooperative  HENT:  Atraumatic scalp, mucous membranes moist  Eyes:   Conjunctiva clear, no icterus  Neck:  Supple, no visible JVD, no signs of injury  Cardiovascular:  Regular, no rubs  Thorax & Lungs:  No accessory muscle usage, clear  Abdomen:  Soft, non distended, NT  Back:  No deformity  Genitalia:  Deferred  Rectal:  Deferred  Extremities:  No cyanosis, mild edema, adequate perfusion  Skin:  Warm, dry  Neurologic:  Alert, no slurred speech, no foot drop, light touch sensation intact   Psychiatric:  Affect appropriate    Medical Decision Making and Plan:  Briefly, this is an 75 y. o.female who presented with back pain, radiates to leg. Difficulty ambulating due to pain. Diagnostic results below. Plan is to admit for further care. For further details of [address]  Emergency Department encounter, please see documentation by advanced practice provider REHAN Cruz. Labs Reviewed   CBC WITH AUTO DIFFERENTIAL - Abnormal; Notable for the following components:       Result Value    WBC 15.9 (*)     RDW 16.3 (*)     Neutrophils Absolute 12.9 (*)     All other components within normal limits   URINALYSIS WITH REFLEX TO CULTURE - Abnormal; Notable for the following components:    Bilirubin Urine SMALL (*)     Protein, UA TRACE (*)     All other components within normal limits   URIC ACID - Abnormal; Notable for the following components:    Uric Acid, Serum 10.0 (*)     All other components within normal limits   MICROSCOPIC URINALYSIS - Abnormal; Notable for the following components:    Bacteria, UA 2+ (*)     All other components within normal limits   COMPREHENSIVE METABOLIC PANEL     RADIOLOGY:     CT PELVIS WO CONTRAST Additional Contrast? None   Final Result   No acute abnormality         CT LUMBAR SPINE WO CONTRAST   Final Result   No acute fracture or subluxation. L4-5 mild degenerative anterolisthesis. Multilevel spondylosis.          XR KNEE LEFT (MIN 4 VIEWS)   Final Result   1. No acute abnormality. XR FOOT LEFT (MIN 3 VIEWS)   Final Result   1. Age-indeterminate 4th and 5th proximal phalangeal fractures, likely old;   correlate with point tenderness. Vitals:    03/13/22 1142 03/13/22 1157 03/13/22 1212 03/13/22 1227   BP: 112/72 123/63 (!) 143/72 133/70   Pulse:    90   Resp:       Temp:       TempSrc:       SpO2: 95% 98% 95% 97%     Medications administered:  Medications   HYDROmorphone HCl PF (DILAUDID) injection 1 mg (1 mg IntraVENous Given 3/13/22 1054)     FINAL IMPRESSION:    1. Chronic bilateral low back pain with bilateral sciatica    2. Swelling of left foot    3.  Difficulty in walking       DISPOSITION Admitted 03/13/2022 01:13:29 PM       Josep Alexis MD  03/13/22 1359

## 2022-03-14 ENCOUNTER — APPOINTMENT (OUTPATIENT)
Dept: GENERAL RADIOLOGY | Age: 67
DRG: 552 | End: 2022-03-14
Payer: MEDICARE

## 2022-03-14 LAB
A/G RATIO: 1.2 (ref 1.1–2.2)
ALBUMIN SERPL-MCNC: 3.8 G/DL (ref 3.4–5)
ALP BLD-CCNC: 123 U/L (ref 40–129)
ALT SERPL-CCNC: 18 U/L (ref 10–40)
ANION GAP SERPL CALCULATED.3IONS-SCNC: 12 MMOL/L (ref 3–16)
AST SERPL-CCNC: 29 U/L (ref 15–37)
BASOPHILS ABSOLUTE: 0.1 K/UL (ref 0–0.2)
BASOPHILS RELATIVE PERCENT: 0.6 %
BILIRUB SERPL-MCNC: 0.4 MG/DL (ref 0–1)
BUN BLDV-MCNC: 11 MG/DL (ref 7–20)
CALCIUM SERPL-MCNC: 9.5 MG/DL (ref 8.3–10.6)
CHLORIDE BLD-SCNC: 104 MMOL/L (ref 99–110)
CO2: 26 MMOL/L (ref 21–32)
CREAT SERPL-MCNC: 0.6 MG/DL (ref 0.6–1.2)
EOSINOPHILS ABSOLUTE: 0 K/UL (ref 0–0.6)
EOSINOPHILS RELATIVE PERCENT: 0.1 %
GFR AFRICAN AMERICAN: >60
GFR NON-AFRICAN AMERICAN: >60
GLUCOSE BLD-MCNC: 174 MG/DL (ref 70–99)
HCT VFR BLD CALC: 38.5 % (ref 36–48)
HEMOGLOBIN: 12.2 G/DL (ref 12–16)
LYMPHOCYTES ABSOLUTE: 1.2 K/UL (ref 1–5.1)
LYMPHOCYTES RELATIVE PERCENT: 6.5 %
MCH RBC QN AUTO: 26.6 PG (ref 26–34)
MCHC RBC AUTO-ENTMCNC: 31.6 G/DL (ref 31–36)
MCV RBC AUTO: 84.3 FL (ref 80–100)
MONOCYTES ABSOLUTE: 0.5 K/UL (ref 0–1.3)
MONOCYTES RELATIVE PERCENT: 2.7 %
NEUTROPHILS ABSOLUTE: 16.6 K/UL (ref 1.7–7.7)
NEUTROPHILS RELATIVE PERCENT: 90.1 %
PDW BLD-RTO: 16.1 % (ref 12.4–15.4)
PLATELET # BLD: 382 K/UL (ref 135–450)
PMV BLD AUTO: 7.5 FL (ref 5–10.5)
POTASSIUM REFLEX MAGNESIUM: 4 MMOL/L (ref 3.5–5.1)
PROCALCITONIN: 0.1 NG/ML (ref 0–0.15)
RBC # BLD: 4.57 M/UL (ref 4–5.2)
SODIUM BLD-SCNC: 142 MMOL/L (ref 136–145)
TOTAL PROTEIN: 7 G/DL (ref 6.4–8.2)
WBC # BLD: 18.4 K/UL (ref 4–11)

## 2022-03-14 PROCEDURE — 97162 PT EVAL MOD COMPLEX 30 MIN: CPT

## 2022-03-14 PROCEDURE — 1200000000 HC SEMI PRIVATE

## 2022-03-14 PROCEDURE — 85025 COMPLETE CBC W/AUTO DIFF WBC: CPT

## 2022-03-14 PROCEDURE — 71045 X-RAY EXAM CHEST 1 VIEW: CPT

## 2022-03-14 PROCEDURE — 84145 PROCALCITONIN (PCT): CPT

## 2022-03-14 PROCEDURE — 97535 SELF CARE MNGMENT TRAINING: CPT

## 2022-03-14 PROCEDURE — 36415 COLL VENOUS BLD VENIPUNCTURE: CPT

## 2022-03-14 PROCEDURE — 94760 N-INVAS EAR/PLS OXIMETRY 1: CPT

## 2022-03-14 PROCEDURE — 97530 THERAPEUTIC ACTIVITIES: CPT

## 2022-03-14 PROCEDURE — 97166 OT EVAL MOD COMPLEX 45 MIN: CPT

## 2022-03-14 PROCEDURE — 6360000002 HC RX W HCPCS: Performed by: INTERNAL MEDICINE

## 2022-03-14 PROCEDURE — 2580000003 HC RX 258: Performed by: INTERNAL MEDICINE

## 2022-03-14 PROCEDURE — 6370000000 HC RX 637 (ALT 250 FOR IP): Performed by: INTERNAL MEDICINE

## 2022-03-14 PROCEDURE — 80053 COMPREHEN METABOLIC PANEL: CPT

## 2022-03-14 RX ORDER — HYDROCODONE BITARTRATE AND ACETAMINOPHEN 5; 325 MG/1; MG/1
2 TABLET ORAL EVERY 6 HOURS PRN
Status: DISCONTINUED | OUTPATIENT
Start: 2022-03-14 | End: 2022-03-18 | Stop reason: HOSPADM

## 2022-03-14 RX ORDER — COLCHICINE 0.6 MG/1
0.6 TABLET ORAL DAILY
Status: DISCONTINUED | OUTPATIENT
Start: 2022-03-15 | End: 2022-03-18 | Stop reason: HOSPADM

## 2022-03-14 RX ORDER — CYCLOBENZAPRINE HCL 10 MG
10 TABLET ORAL 3 TIMES DAILY PRN
Status: DISCONTINUED | OUTPATIENT
Start: 2022-03-14 | End: 2022-03-18 | Stop reason: HOSPADM

## 2022-03-14 RX ADMIN — HYDROCODONE BITARTRATE AND ACETAMINOPHEN 2 TABLET: 5; 325 TABLET ORAL at 16:23

## 2022-03-14 RX ADMIN — ASPIRIN 81 MG: 81 TABLET, COATED ORAL at 20:59

## 2022-03-14 RX ADMIN — HYDROCODONE BITARTRATE AND ACETAMINOPHEN 2 TABLET: 5; 325 TABLET ORAL at 22:17

## 2022-03-14 RX ADMIN — VALSARTAN 160 MG: 160 TABLET, FILM COATED ORAL at 20:59

## 2022-03-14 RX ADMIN — Medication 1000 MG: at 14:45

## 2022-03-14 RX ADMIN — FUROSEMIDE 40 MG: 40 TABLET ORAL at 16:21

## 2022-03-14 RX ADMIN — AMIODARONE HYDROCHLORIDE 100 MG: 200 TABLET ORAL at 20:59

## 2022-03-14 RX ADMIN — HYDROCODONE BITARTRATE AND ACETAMINOPHEN 1 TABLET: 5; 325 TABLET ORAL at 09:59

## 2022-03-14 RX ADMIN — APIXABAN 5 MG: 5 TABLET, FILM COATED ORAL at 09:56

## 2022-03-14 RX ADMIN — PREDNISONE 40 MG: 20 TABLET ORAL at 09:56

## 2022-03-14 RX ADMIN — APIXABAN 5 MG: 5 TABLET, FILM COATED ORAL at 20:59

## 2022-03-14 RX ADMIN — Medication 10 ML: at 22:55

## 2022-03-14 RX ADMIN — Medication 10 ML: at 10:02

## 2022-03-14 RX ADMIN — COLCHICINE 0.6 MG: 0.6 TABLET, FILM COATED ORAL at 09:56

## 2022-03-14 RX ADMIN — ISOSORBIDE MONONITRATE 30 MG: 30 TABLET, EXTENDED RELEASE ORAL at 20:59

## 2022-03-14 RX ADMIN — VALSARTAN 160 MG: 160 TABLET, FILM COATED ORAL at 09:55

## 2022-03-14 RX ADMIN — ATORVASTATIN CALCIUM 40 MG: 40 TABLET, FILM COATED ORAL at 20:59

## 2022-03-14 RX ADMIN — METOPROLOL SUCCINATE 100 MG: 50 TABLET, EXTENDED RELEASE ORAL at 20:59

## 2022-03-14 RX ADMIN — CYCLOBENZAPRINE 10 MG: 10 TABLET, FILM COATED ORAL at 20:59

## 2022-03-14 RX ADMIN — FUROSEMIDE 40 MG: 40 TABLET ORAL at 10:01

## 2022-03-14 ASSESSMENT — PAIN SCALES - GENERAL
PAINLEVEL_OUTOF10: 9
PAINLEVEL_OUTOF10: 9
PAINLEVEL_OUTOF10: 3
PAINLEVEL_OUTOF10: 9
PAINLEVEL_OUTOF10: 4
PAINLEVEL_OUTOF10: 7
PAINLEVEL_OUTOF10: 8
PAINLEVEL_OUTOF10: 7

## 2022-03-14 ASSESSMENT — PAIN DESCRIPTION - LOCATION
LOCATION: OTHER (COMMENT)
LOCATION: BACK
LOCATION: BACK

## 2022-03-14 ASSESSMENT — PAIN DESCRIPTION - PAIN TYPE
TYPE: ACUTE PAIN

## 2022-03-14 ASSESSMENT — PAIN DESCRIPTION - DIRECTION: RADIATING_TOWARDS: LEGS

## 2022-03-14 ASSESSMENT — PAIN DESCRIPTION - DESCRIPTORS: DESCRIPTORS: SHARP

## 2022-03-14 NOTE — PROGRESS NOTES
Physical Therapy    Facility/Department: [address] ORTHO/NEURO NURSING  Initial Assessment    NAME: Kaia Reese  : 1955  MRN: [id number]    Date of Service: 3/14/2022    Discharge Recommendations: Kaia Reese scored a  on the AM-PAC short mobility form. Current research shows that an AM-PAC score of 17 or less is typically not associated with a discharge to the patient's home setting. Based on the patient's AM-PAC score and their current functional mobility deficits, it is recommended that the patient have 5-7 sessions per week of Physical Therapy at d/c to increase the patient's independence. At this time, this patient demonstrates the endurance, and/or tolerance for 3 hours of therapy each day, with a treatment frequency of 5-7x/wk. Please see assessment section for further patient specific details. If patient discharges prior to next session this note will serve as a discharge summary. Please see below for the latest assessment towards goals. PT Equipment Recommendations  Equipment Needed: No    Assessment   Body structures, Functions, Activity limitations: Decreased functional mobility ; Decreased strength;Decreased endurance;Decreased balance  Assessment: Pt presents with impaired functional strength and endurance and decreased standing balance impairing her ability to perform functional mobility safely and independently. Pt with PLOF of independence however currently requires max A for transfers and 2 person assist for stand step transfer with RW. Pt would benefit from acute PT services to address deficits and facilitate return to PLOF. Treatment Diagnosis: impaired gait, transfers, and balance  Prognosis: Good  Decision Making: Medium Complexity  Clinical Presentation: stable  PT Education: Goals;PT Role;Plan of Care;Precautions;Transfer Training;General Safety;Orientation; Functional Mobility Training  Patient Education: d/c recommendations--pt verbalizing understanding  Barriers to Learning: none  REQUIRES PT FOLLOW UP: Yes  Activity Tolerance  Activity Tolerance: Patient limited by pain  Activity Tolerance: pt required increased time to perform tasks       Patient Diagnosis(es): The primary encounter diagnosis was Chronic bilateral low back pain with bilateral sciatica. Diagnoses of Swelling of left foot and Difficulty in walking were also pertinent to this visit. has a past medical history of AICD (automatic cardioverter/defibrillator) present, Arthritis, CHF (congestive heart failure) ([facility] [address] Gout, Hyperlipidemia, Hypertension, and MI (myocardial infarction) ([facility] [address] has a past surgical history that includes Dilation and curettage of uterus and Cardiac defibrillator placement. Restrictions  Restrictions/Precautions  Restrictions/Precautions: Fall Risk (high fall risk)  Required Braces or Orthoses?: No  Position Activity Restriction  Other position/activity restrictions: Renetta Reyna is a 75 y.o. female who presents to the emergency department complaining of low back pain with radiation to left lower extremity for 3 to 4 weeks. She is not moving her left leg much because it hurts. She denies any numbness, tingling or weakness. She believes that because she is not moving her left leg, she has acquired some swelling in her left foot and ankle and she feels as though the left great toe is discolored. She is starting to experience some sharp pain in the right buttock over the past few days. Denies bowel or bladder incontinence or retention but states that her urine does appear darker and she thinks that she may be dehydrated. She has been getting around the house with a walker but states that it is too painful to walk at this point. She lives by herself. She does not have any way of transportation besides her sister and feels like she is an inconvenience to her. She rates her pain to be a 10 out of 10 on pain scale.      Vision/Hearing  Vision: Impaired  Vision Exceptions: Wears glasses for reading  Hearing: Within functional limits       Subjective  General  Chart Reviewed: Yes  Patient assessed for rehabilitation services?: Yes  Response To Previous Treatment: Not applicable  Family / Caregiver Present: No  Diagnosis: difficulty walking  Follows Commands: Within Functional Limits  General Comment  Comments: Pt supine in bed upon arrival.  Subjective  Subjective: Pt agreeable to PT/OT eval, reporting pain from the waist down however does not provide pain rating despite prompts--RN in room and aware. Pain Screening  Patient Currently in Pain: Yes  Pain Assessment  Pain Assessment:  (pt did not rate, stating \"I don't know, it just feels like pain\")  Vital Signs  Patient Currently in Pain: Yes       Orientation  Orientation  Overall Orientation Status: Within Functional Limits     Social/Functional History  Social/Functional History  Lives With: Alone  Type of Home: Apartment (first story)  Home Layout: One level,Performs ADL's on one level,Able to Live on Main level with bedroom/bathroom  Home Access: Stairs to enter with rails  Entrance Stairs - Number of Steps: 5 SWETA front, 1 SWETA back- primarily uses back  Entrance Stairs - Rails: Both  Bathroom Shower/Tub: Tub/Shower unit,Shower chair with back  Bathroom Toilet: Standard  Bathroom Equipment: Shower chair  Home Equipment: Rolling walker (started using 2 weeks before admission when BLE pain worsened)  ADL Assistance: Independent  Homemaking Assistance: Independent  Homemaking Responsibilities: Yes  Ambulation Assistance: Independent  Transfer Assistance: Independent  Active : No  Patient's  Info: family drives her  Additional Comments: no falls in last 6 months     Cognition   Cognition  Overall Cognitive Status: WFL    Objective  Observation/Palpation  Observation: Pt very shaky during stand step transfer from EOB to recliner with RW.  Pt reported that her muscles in her arms and legs have been spontaneously buffy over the past 2 weeks. AROM RLE (degrees)  RLE AROM: WFL  AROM LLE (degrees)  LLE AROM : WFL  Strength RLE  Strength RLE: Exception  Comment: grossly 3-/5  Strength LLE  Strength LLE: Exception  Comment: grossly 3-/5  Tone RLE  RLE Tone: Normotonic  Tone LLE  LLE Tone: Normotonic  Motor Control  Gross Motor?: WFL  Sensation  Overall Sensation Status: WFL  Bed mobility  Rolling to Left: Minimal assistance  Rolling to Right: Minimal assistance  Supine to Sit: Minimal assistance  Scooting: Stand by assistance  Comment: Rolling performed with use of bed rails for dependent pericare/brief change. Transfers  Sit to Stand: Maximum Assistance  Stand to sit: Maximum Assistance (decreased eccentric control)  Bed to Chair: Dependent/Total (Mod+Min A with RW)  Comment: Slight posterior lean noted upon initial stand. Ambulation  Ambulation?: Yes  Ambulation 1  Surface: level tile  Device: Rolling Walker  Assistance: Dependent/Total (Mod+Min)  Quality of Gait: wide Fanny, B knee flexion throughout gait cycle, decreased lalita, decreased B step lengths/heights, shuffling gait noted  Distance: 3'  Comments: Pt perform stand step transfer EOB>recliner, requiring increased time to perform. No LOB.   Stairs/Curb  Stairs?: No     Balance  Posture: Good  Sitting - Static: Good;- (SBA seated EOB ~5-8 minutes total)  Sitting - Dynamic: Good;- (SBA seated EOB for ADLs ~15 minutes total)  Standing - Static: Poor (Mod A standing with RW for UE support)  Standing - Dynamic: Poor (Mod+Min A for stand step transfer with RW)        Plan   Plan  Times per week: 5-7x  Times per day: Daily  Current Treatment Recommendations: Strengthening,Balance Training,Functional Mobility Training,Transfer Training,Gait Training,Endurance Training,Neuromuscular Re-education,Positioning,Modalities,Equipment Evaluation, Education, & procurement,Patient/Caregiver Education & Training,Safety Education & Training,Home Exercise Program  Safety Devices  Type of devices: All fall risk precautions in place,Call light within reach,Chair alarm in place,Gait belt,Patient at risk for falls,Nurse notified,Left in chair  Restraints  Initially in place: No    G-Code       OutComes Score       AM-PAC Score  AM-PAC Inpatient Mobility Raw Score : 11 (03/14/22 1552)  AM-PAC Inpatient T-Scale Score : 33.86 (03/14/22 1552)  Mobility Inpatient CMS 0-100% Score: 72.57 (03/14/22 1552)  Mobility Inpatient CMS G-Code Modifier : CL (03/14/22 1552)          Goals  Short term goals  Time Frame for Short term goals: upon d/c  Short term goal 1: Pt will perform bed mobility with supervision  Short term goal 2: Pt will perform transfers with LRAD and Mod Ax1  Short term goal 3: Pt will ambulate 8' with LRAD and Mod Ax1  Patient Goals   Patient goals : pt did not state       Therapy Time   Individual Concurrent Group Co-treatment   Time In 1425         Time Out 1523         Minutes 58              Timed Code Treatment Minutes:   43    Total Treatment Minutes:  [address] [place], [place] [address]

## 2022-03-14 NOTE — CARE COORDINATION
Discharge Planning Assessment  Risk of Readmission Score: 14%    RN discharge planner met with patient to discuss reason for admission, current living situation, and potential needs at the time of discharge. Demographics/Insurance verified Yes    Current type of dwelling: First floor apartment with one step to enter. Patient from [facility]/ confirmed with: N/A    Living arrangements: Alone    Level of function/Support: Patient states she is independent. PCP: Lindsey Ramirez MD    Last Visit to PCP: six months ago    DME: walker, shower seat    Active with any community resources/agencies/skilled home care: N/A    Medication compliance issues: The patient is compliant with her medications. Financial issues that could impact healthcare: None      Tentative discharge plan: PT/OT pending, The patient would like to return home with no needs. Transportation at the time of discharge: TBD, the patient might need a ride home.       ANDERS Gonzalez RN      Phone: [phone]

## 2022-03-14 NOTE — PROGRESS NOTES
Occupational Therapy   Occupational Therapy Initial Assessment  Date: 3/14/2022   Patient Name: Alyson Kunz  MRN: [id number]     : 1955    Date of Service: 3/14/2022    Discharge Recommendations: Alyson Kunz scored a 13/24 on the AM-PAC ADL Inpatient form. Current research shows that an AM-PAC score of 17 or less is typically not associated with a discharge to the patient's home setting. Based on the patient's AM-PAC score and their current ADL deficits, it is recommended that the patient have 5-7 sessions per week of Occupational Therapy at d/c to increase the patient's independence. At this time, this patient demonstrates the endurance, and/or tolerance for 3 hours of therapy each day, with a treatment frequency of 5-7x/wk. Please see assessment section for further patient specific details. If patient discharges prior to next session this note will serve as a discharge summary. Please see below for the latest assessment towards goals. OT Equipment Recommendations  Equipment Needed: No  Other: defer to next level of care    Assessment   Performance deficits / Impairments: Decreased functional mobility ; Decreased endurance;Decreased ADL status; Decreased sensation;Decreased balance;Decreased strength;Decreased high-level IADLs  Assessment: Pt presents with the above deficits which are below baseline and would benefit from continued skilled OT to address. Treatment Diagnosis: above deficits and difficulty with walking  Prognosis: Good  Decision Making: Medium Complexity  History: CHF,gout, arthritis, HLD, HTN, MI cardiac defibrillator placed  Exam: as above  Assistance / Modification: Min+Mod stand step transfer, Max A sit to stand/stand to sit. recommend Trina Aguiar  OT Education: OT Role;Plan of Care;Precautions;Transfer Training  Patient Education: Pt educated in the above areas and verbalized understanding.   REQUIRES OT FOLLOW UP: Yes  Activity Tolerance  Activity Tolerance: Patient limited by pain;Patient Tolerated treatment well  Safety Devices  Safety Devices in place: Yes  Type of devices: All fall risk precautions in place; Left in chair;Nurse notified;Call light within reach; Chair alarm in place;Gait belt;Patient at risk for falls  Restraints  Initially in place: No           Patient Diagnosis(es): The primary encounter diagnosis was Chronic bilateral low back pain with bilateral sciatica. Diagnoses of Swelling of left foot and Difficulty in walking were also pertinent to this visit. has a past medical history of AICD (automatic cardioverter/defibrillator) present, Arthritis, CHF (congestive heart failure) ([facility] [address] Gout, Hyperlipidemia, Hypertension, and MI (myocardial infarction) ([facility] [address] has a past surgical history that includes Dilation and curettage of uterus and Cardiac defibrillator placement. Treatment Diagnosis: above deficits and difficulty with walking      Restrictions  Restrictions/Precautions  Restrictions/Precautions: Fall Risk (high fall risk)  Required Braces or Orthoses?: No  Position Activity Restriction  Other position/activity restrictions: Angel Lyles is a 75 y.o. female who presents to the emergency department complaining of low back pain with radiation to left lower extremity for 3 to 4 weeks. She is not moving her left leg much because it hurts. She denies any numbness, tingling or weakness. She believes that because she is not moving her left leg, she has acquired some swelling in her left foot and ankle and she feels as though the left great toe is discolored. She is starting to experience some sharp pain in the right buttock over the past few days. Denies bowel or bladder incontinence or retention but states that her urine does appear darker and she thinks that she may be dehydrated. She has been getting around the house with a walker but states that it is too painful to walk at this point. She lives by herself.   She does not have any way of transportation besides her sister and feels like she is an inconvenience to her. She rates her pain to be a 10 out of 10 on pain scale. Subjective   General  Chart Reviewed: Yes  Patient assessed for rehabilitation services?: Yes  Additional Pertinent Hx: CHF,gout, arthritis, HLD, HTN, MI cardiac defibrillator placed  Family / Caregiver Present: No  Diagnosis: difficulty walking  Subjective  Subjective: Pt supine in bed, with nurse upon entry. Pt pleasant and agreeable to session. Patient Currently in Pain: Yes  Pain Assessment  Pain Assessment:  (pt did not rate, stating \"I don't know, it just feels like pain\")  Pain Type: Acute pain  Pain Location: Other (Comment) (from the waist down\")  Vital Signs  Patient Currently in Pain: Yes  Social/Functional History  Social/Functional History  Lives With: Alone  Type of Home: Apartment (first story)  Home Layout: One level,Performs ADL's on one level,Able to Live on Main level with bedroom/bathroom  Home Access: Stairs to enter with rails  Entrance Stairs - Number of Steps: 5 SWETA front, 1 SWETA back- primarily uses back  Entrance Stairs - Rails: Both  Bathroom Shower/Tub: Tub/Shower unit,Shower chair with back  Bathroom Toilet: Standard  Bathroom Equipment: Shower chair  Home Equipment: Rolling walker (started using 2 weeks before admission when BLE pain worsened)  ADL Assistance: Independent  Homemaking Assistance: Independent  Homemaking Responsibilities: Yes  Ambulation Assistance: Independent  Transfer Assistance: Independent  Active : No  Patient's  Info: family drives her  Additional Comments: no falls in last 6 months       Objective   Vision: Impaired  Vision Exceptions: Wears glasses for reading  Hearing: Within functional limits    Orientation  Overall Orientation Status: Within Normal Limits  Observation/Palpation  Observation: Pt very shaky during stand step transfer from EOB to recliner with RW.  Pt reported that her muscles in her arms and legs have been spontaneously buffy over the past 2 weeks. Balance  Sitting Balance: Stand by assistance  Standing Balance:  Moderate assistance  Standing Balance  Time: ~1 minute  Activity: stand step transfer from EOB to recliner with RW  Comment: Min A + Mod A  Functional Mobility  Functional - Mobility Device: Rolling Walker  Activity: Other  Assist Level: Dependent/Total  ADL  UE Bathing: Stand by assistance (seated EOB)  UE Dressing: Stand by assistance (seated EOB)  Toileting: Dependent/Total (brief change and prewick placed in bed.)  Additional Comments: Pt declined all other ADLs at this time  Tone RUE  RUE Tone: Normotonic  Tone LUE  LUE Tone: Normotonic  Coordination  Movements Are Fluid And Coordinated: Yes     Bed mobility  Rolling to Left: Minimal assistance  Rolling to Right: Minimal assistance  Supine to Sit: Minimal assistance  Comment: with use of bed rails  Transfers  Stand Step Transfers: 2 Person assistance;Dependent/Total (Min A + Mod A with RW)  Sit to stand: Maximum assistance  Stand to sit: Maximum assistance (for eccentric control)  Transfer Comments: with RW     Cognition  Overall Cognitive Status: WFL  Perception  Overall Perceptual Status: WFL     Sensation  Overall Sensation Status: WFL      Plan  Times per week: 3-5  Times per day: Daily  Current Treatment Recommendations: Strengthening,Patient/Caregiver Education & Training,Home Management Training,Balance Training,Functional Mobility Training,Endurance Training,Safety Education & Training,Self-Care / ADL,Pain Management              AM-PAC Score        -[facility] Inpatient Daily Activity Raw Score: 13 (03/14/22 1540)  -PAC Inpatient ADL T-Scale Score : 32.03 (03/14/22 1540)  ADL Inpatient CMS 0-100% Score: 63.03 (03/14/22 1540)  ADL Inpatient CMS G-Code Modifier : CL (03/14/22 1540)    Goals  Short term goals  Time Frame for Short term goals: discharge  Short term goal 1: Ind UB ADLs  Short term goal 2: Mod I LB ADLs  Short term goal 3: SBA fxl transfers with RW  Short term goal 4: SBA toileting  Short term goal 5: Increase standing endurance to at least 15 minutes with RW  Patient Goals   Patient goals : return home       Therapy Time   Individual Concurrent Group Co-treatment   Time In 5792         Time Out 1523         Minutes 58         Timed Code Treatment Minutes: 43 Minutes      Timed Code Treatment Minutes:   43    Total Treatment Minutes:  [address]/OT   Papo Wright OTR/L [id number] (I have reviewed and agree with the above documentation.  I provided supervision and guidance in decision making throughout session)

## 2022-03-14 NOTE — PROGRESS NOTES
MRS BAUTISTA'S DEFIBRILLATOR SYSTEM IS NOT COMPATIBLE FOR A MRI SCAN. PER [employer] IT IS NOT RECOMMENDED TO BE SCANNED. HER SYSTEM IS NOT A COMPLETE SYSTEM DUE TO THE GENERATOR BEING REPLACED AND OLD LEADS CONTINUED TO BE USED. QUESTIONS CALL MRI @ 48110.     JARED BEE, RT(MR)

## 2022-03-14 NOTE — PLAN OF CARE
Patient resting quietly. VSS, alert and oriented x4. Pain controlled with PRN pain medication. IV intact and patent, continuous fluids, IV abx therapy for elevated WBC. Patient was unable to complete MRI today due to defibrillator. [employer] was contacted and they advised against it. Fall precautions in place. Skin precautions in place. Patient is from home, discharge plans are pending. PT/OT following. Will continue to monitor.        Problem: Pain:  Goal: Pain level will decrease  Description: Pain level will decrease  Outcome: Ongoing  Goal: Control of acute pain  Description: Control of acute pain  Outcome: Ongoing  Goal: Control of chronic pain  Description: Control of chronic pain  Outcome: Ongoing     Problem: Falls - Risk of:  Goal: Will remain free from falls  Description: Will remain free from falls  Outcome: Ongoing  Goal: Absence of physical injury  Description: Absence of physical injury  Outcome: Ongoing     Problem: Skin Integrity:  Goal: Will show no infection signs and symptoms  Description: Will show no infection signs and symptoms  Outcome: Ongoing  Goal: Absence of new skin breakdown  Description: Absence of new skin breakdown  Outcome: Ongoing

## 2022-03-15 PROBLEM — M54.16 LUMBAR RADICULOPATHY: Status: ACTIVE | Noted: 2022-03-15

## 2022-03-15 PROBLEM — R29.898 WEAKNESS OF LEFT LOWER EXTREMITY: Status: ACTIVE | Noted: 2022-03-15

## 2022-03-15 LAB
ANION GAP SERPL CALCULATED.3IONS-SCNC: 11 MMOL/L (ref 3–16)
BUN BLDV-MCNC: 13 MG/DL (ref 7–20)
CALCIUM SERPL-MCNC: 9.5 MG/DL (ref 8.3–10.6)
CHLORIDE BLD-SCNC: 104 MMOL/L (ref 99–110)
CO2: 27 MMOL/L (ref 21–32)
CREAT SERPL-MCNC: 0.7 MG/DL (ref 0.6–1.2)
GFR AFRICAN AMERICAN: >60
GFR NON-AFRICAN AMERICAN: >60
GLUCOSE BLD-MCNC: 124 MG/DL (ref 70–99)
HCT VFR BLD CALC: 36.1 % (ref 36–48)
HEMOGLOBIN: 11.5 G/DL (ref 12–16)
MCH RBC QN AUTO: 27.2 PG (ref 26–34)
MCHC RBC AUTO-ENTMCNC: 31.8 G/DL (ref 31–36)
MCV RBC AUTO: 85.7 FL (ref 80–100)
PDW BLD-RTO: 15.8 % (ref 12.4–15.4)
PLATELET # BLD: 368 K/UL (ref 135–450)
PMV BLD AUTO: 8.1 FL (ref 5–10.5)
POTASSIUM SERPL-SCNC: 4 MMOL/L (ref 3.5–5.1)
RBC # BLD: 4.21 M/UL (ref 4–5.2)
SODIUM BLD-SCNC: 142 MMOL/L (ref 136–145)
WBC # BLD: 16.5 K/UL (ref 4–11)

## 2022-03-15 PROCEDURE — 6370000000 HC RX 637 (ALT 250 FOR IP): Performed by: INTERNAL MEDICINE

## 2022-03-15 PROCEDURE — 6360000002 HC RX W HCPCS: Performed by: INTERNAL MEDICINE

## 2022-03-15 PROCEDURE — 97530 THERAPEUTIC ACTIVITIES: CPT

## 2022-03-15 PROCEDURE — 2580000003 HC RX 258: Performed by: INTERNAL MEDICINE

## 2022-03-15 PROCEDURE — 85027 COMPLETE CBC AUTOMATED: CPT

## 2022-03-15 PROCEDURE — 1200000000 HC SEMI PRIVATE

## 2022-03-15 PROCEDURE — 97140 MANUAL THERAPY 1/> REGIONS: CPT

## 2022-03-15 PROCEDURE — 36415 COLL VENOUS BLD VENIPUNCTURE: CPT

## 2022-03-15 PROCEDURE — 80048 BASIC METABOLIC PNL TOTAL CA: CPT

## 2022-03-15 PROCEDURE — 97116 GAIT TRAINING THERAPY: CPT

## 2022-03-15 RX ADMIN — VALSARTAN 160 MG: 160 TABLET, FILM COATED ORAL at 08:08

## 2022-03-15 RX ADMIN — CYCLOBENZAPRINE 10 MG: 10 TABLET, FILM COATED ORAL at 08:16

## 2022-03-15 RX ADMIN — APIXABAN 5 MG: 5 TABLET, FILM COATED ORAL at 21:01

## 2022-03-15 RX ADMIN — FUROSEMIDE 40 MG: 40 TABLET ORAL at 18:15

## 2022-03-15 RX ADMIN — HYDROCODONE BITARTRATE AND ACETAMINOPHEN 2 TABLET: 5; 325 TABLET ORAL at 08:08

## 2022-03-15 RX ADMIN — ATORVASTATIN CALCIUM 40 MG: 40 TABLET, FILM COATED ORAL at 21:01

## 2022-03-15 RX ADMIN — Medication 10 ML: at 21:10

## 2022-03-15 RX ADMIN — HYDROCODONE BITARTRATE AND ACETAMINOPHEN 2 TABLET: 5; 325 TABLET ORAL at 21:01

## 2022-03-15 RX ADMIN — VALSARTAN 160 MG: 160 TABLET, FILM COATED ORAL at 21:01

## 2022-03-15 RX ADMIN — ISOSORBIDE MONONITRATE 30 MG: 30 TABLET, EXTENDED RELEASE ORAL at 21:01

## 2022-03-15 RX ADMIN — PREDNISONE 40 MG: 20 TABLET ORAL at 08:07

## 2022-03-15 RX ADMIN — FUROSEMIDE 40 MG: 40 TABLET ORAL at 08:07

## 2022-03-15 RX ADMIN — METOPROLOL SUCCINATE 100 MG: 50 TABLET, EXTENDED RELEASE ORAL at 21:01

## 2022-03-15 RX ADMIN — HYDROCODONE BITARTRATE AND ACETAMINOPHEN 2 TABLET: 5; 325 TABLET ORAL at 15:12

## 2022-03-15 RX ADMIN — AMIODARONE HYDROCHLORIDE 100 MG: 200 TABLET ORAL at 21:01

## 2022-03-15 RX ADMIN — ASPIRIN 81 MG: 81 TABLET, COATED ORAL at 21:01

## 2022-03-15 RX ADMIN — COLCHICINE 0.6 MG: 0.6 TABLET, FILM COATED ORAL at 08:07

## 2022-03-15 RX ADMIN — CYCLOBENZAPRINE 10 MG: 10 TABLET, FILM COATED ORAL at 15:17

## 2022-03-15 RX ADMIN — APIXABAN 5 MG: 5 TABLET, FILM COATED ORAL at 08:08

## 2022-03-15 RX ADMIN — Medication 1000 MG: at 15:12

## 2022-03-15 ASSESSMENT — PAIN SCALES - GENERAL
PAINLEVEL_OUTOF10: 8
PAINLEVEL_OUTOF10: 4
PAINLEVEL_OUTOF10: 8

## 2022-03-15 ASSESSMENT — PAIN DESCRIPTION - LOCATION
LOCATION: BUTTOCKS;BACK;LEG
LOCATION: BACK;BUTTOCKS;LEG

## 2022-03-15 ASSESSMENT — PAIN DESCRIPTION - FREQUENCY
FREQUENCY: CONTINUOUS
FREQUENCY: CONTINUOUS

## 2022-03-15 ASSESSMENT — PAIN DESCRIPTION - ONSET
ONSET: ON-GOING
ONSET: ON-GOING

## 2022-03-15 ASSESSMENT — PAIN - FUNCTIONAL ASSESSMENT
PAIN_FUNCTIONAL_ASSESSMENT: PREVENTS OR INTERFERES SOME ACTIVE ACTIVITIES AND ADLS
PAIN_FUNCTIONAL_ASSESSMENT: ACTIVITIES ARE NOT PREVENTED

## 2022-03-15 ASSESSMENT — PAIN DESCRIPTION - PROGRESSION
CLINICAL_PROGRESSION: NOT CHANGED
CLINICAL_PROGRESSION: NOT CHANGED

## 2022-03-15 ASSESSMENT — PAIN DESCRIPTION - DESCRIPTORS
DESCRIPTORS: SHARP
DESCRIPTORS: STABBING

## 2022-03-15 ASSESSMENT — PAIN DESCRIPTION - PAIN TYPE
TYPE: ACUTE PAIN
TYPE: ACUTE PAIN

## 2022-03-15 NOTE — PLAN OF CARE
Problem: Pain:  Goal: Pain level will decrease  Description: Pain level will decrease  3/15/2022 0755 by Panfilo Mccracken RN  Outcome: Ongoing     Problem: Pain:  Goal: Control of acute pain  Description: Control of acute pain  3/15/2022 0755 by Panfilo Mccracken RN  Outcome: Ongoing     Problem: Pain:  Goal: Control of chronic pain  Description: Control of chronic pain  3/15/2022 0755 by Panfilo Mccracken RN  Outcome: Ongoing     Problem: Falls - Risk of:  Goal: Will remain free from falls  Description: Will remain free from falls  3/15/2022 0755 by Panfilo Mccracken RN  Outcome: Ongoing     Problem: Falls - Risk of:  Goal: Absence of physical injury  Description: Absence of physical injury  3/15/2022 0755 by Panfilo Mccracken RN  Outcome: Ongoing     Problem: Skin Integrity:  Goal: Will show no infection signs and symptoms  Description: Will show no infection signs and symptoms  3/15/2022 0755 by Panfilo Mccracken RN  Outcome: Ongoing     Problem: Skin Integrity:  Goal: Absence of new skin breakdown  Description: Absence of new skin breakdown  3/15/2022 0755 by Panfilo Mccracken RN  Outcome: Ongoing

## 2022-03-15 NOTE — PLAN OF CARE
Problem: Pain:  Goal: Pain level will decrease  Description: Pain level will decrease  3/14/2022 2310 by Daniel Rothman RN  Outcome: Ongoing  3/14/2022 1556 by Anastasia Lara RN  Outcome: Ongoing  Goal: Control of acute pain  Description: Control of acute pain  3/14/2022 2310 by Daniel Rothman RN  Outcome: Ongoing  3/14/2022 1556 by Anastasia Lara RN  Outcome: Ongoing  Goal: Control of chronic pain  Description: Control of chronic pain  3/14/2022 2310 by Daniel Rothman RN  Outcome: Ongoing  3/14/2022 1556 by Anastasia Lara RN  Outcome: Ongoing     Problem: Falls - Risk of:  Goal: Will remain free from falls  Description: Will remain free from falls  3/14/2022 2310 by Daniel Rothman RN  Outcome: Ongoing  3/14/2022 1556 by Anastasia Lara RN  Outcome: Ongoing  Goal: Absence of physical injury  Description: Absence of physical injury  3/14/2022 2310 by Daniel Rothman RN  Outcome: Ongoing  3/14/2022 1556 by Anastasia Lara RN  Outcome: Ongoing     Problem: Skin Integrity:  Goal: Will show no infection signs and symptoms  Description: Will show no infection signs and symptoms  3/14/2022 2310 by Daniel Rothman RN  Outcome: Ongoing  3/14/2022 1556 by Anastasia Lara RN  Outcome: Ongoing  Goal: Absence of new skin breakdown  Description: Absence of new skin breakdown  3/14/2022 2310 by Daniel Rothman RN  Outcome: Ongoing  3/14/2022 1556 by Anastasia Lara RN  Outcome: Ongoing

## 2022-03-15 NOTE — PROGRESS NOTES
Physical Therapy  Facility/Department: [address] ORTHO/NEURO NURSING  Daily Treatment Note  NAME: Erick Burgos  : 1955  MRN: [id number]    Date of Service: 3/15/2022    Discharge Recommendations: Erick Burgos scored a 14/24 on the AM-PAC short mobility form. Current research shows that an AM-PAC score of 17 or less is typically not associated with a discharge to the patient's home setting. Based on the patient's AM-PAC score and their current functional mobility deficits, it is recommended that the patient have 5-7 sessions per week of Physical Therapy at d/c to increase the patient's independence. At this time, this patient demonstrates the endurance, and/or tolerance for 3 hours of therapy each day, with a treatment frequency of 5-7x/wk. Please see assessment section for further patient specific details. If patient discharges prior to next session this note will serve as a discharge summary. Please see below for the latest assessment towards goals. PT Equipment Recommendations  Equipment Needed: No    Assessment   Body structures, Functions, Activity limitations: Decreased functional mobility ; Decreased strength;Decreased endurance;Decreased balance; Increased pain  Assessment: Pt continues to require 2 person assist for transfers however able to initiate ambulation this date requiring Min Ax1 with RW. Pt does continue to present with increased pain, especially in LLE, and decreased activity tolerance requiring increased time to perform tasks and rest breaks. Pt would benefit from continued skilled PT services to address deficits. Treatment Diagnosis: impaired gait, transfers, and balance  Prognosis: Good  Decision Making: Low Complexity  Clinical Presentation: stable  PT Education: Goals;PT Role;Plan of Care;Precautions;Transfer Training;General Safety;Orientation; Functional Mobility Training;Gait Training  Patient Education: d/c recommendations--pt verbalizing understanding  Barriers to Learning: none  REQUIRES PT FOLLOW UP: Yes  Activity Tolerance  Activity Tolerance: Patient limited by pain  Activity Tolerance: pt required increased time to perform ambulation and required extended seated rest break     Patient Diagnosis(es): The primary encounter diagnosis was Chronic bilateral low back pain with bilateral sciatica. Diagnoses of Swelling of left foot and Difficulty in walking were also pertinent to this visit. has a past medical history of AICD (automatic cardioverter/defibrillator) present, Arthritis, CHF (congestive heart failure) ([facility] [address] Gout, Hyperlipidemia, Hypertension, and MI (myocardial infarction) ([facility] [address] has a past surgical history that includes Dilation and curettage of uterus and Cardiac defibrillator placement. Restrictions  Restrictions/Precautions  Restrictions/Precautions: Fall Risk (high fall risk)  Required Braces or Orthoses?: No  Position Activity Restriction  Other position/activity restrictions: Angel Lyles is a 75 y.o. female who presents to the emergency department complaining of low back pain with radiation to left lower extremity for 3 to 4 weeks. She is not moving her left leg much because it hurts. She denies any numbness, tingling or weakness. She believes that because she is not moving her left leg, she has acquired some swelling in her left foot and ankle and she feels as though the left great toe is discolored. She is starting to experience some sharp pain in the right buttock over the past few days. Denies bowel or bladder incontinence or retention but states that her urine does appear darker and she thinks that she may be dehydrated. She has been getting around the house with a walker but states that it is too painful to walk at this point. She lives by herself. She does not have any way of transportation besides her sister and feels like she is an inconvenience to her. She rates her pain to be a 10 out of 10 on pain scale.      Subjective General  Chart Reviewed: Yes  Response To Previous Treatment: Patient with no complaints from previous session. Family / Caregiver Present: No  Subjective  Subjective: Pt agreeable to PT/OT session, reporting 8/10 pain in buttocks and BLE, states she already got medication. General Comment  Comments: Pt seated in chair upon arrival.  Pain Screening  Patient Currently in Pain: Yes  Vital Signs  Patient Currently in Pain: Yes       Cognition   Cognition  Overall Cognitive Status: WFL     Objective   Bed mobility  Comment: Not addressed, pt seated in chair at beginning and end of session. Transfers  Sit to Stand: Dependent/Total (Mod Ax2 from recliner and from EOB)  Stand to sit: Moderate Assistance (VC for safe hand placement when transferring with RW)  Ambulation  Ambulation?: Yes  Ambulation 1  Surface: level tile  Device: Rolling Walker  Assistance: Minimal assistance  Quality of Gait: wide Fanny, decreased lalita, decreased B step lengths/heights (L more decreased than R), decreased toe off noted on LLE, shuffling gait noted  Distance: 13'+13'  Comments: Pt required significantly increased time to perform, no LOB. Increased WBing noted through RW with BUE.   Stairs/Curb  Stairs?: No     Balance  Posture: Good  Sitting - Static: Good (independent seated EOB while discussing hospitalization with PMR MD and during rest break ~5-8 minutes total)  Sitting - Dynamic: Good;- (supervision at EOB)  Standing - Static: Fair (CGA/Min A standing with RW for UE support)  Standing - Dynamic: Fair;- (Min A for mobillity with RW)         Strength RLE  Strength RLE: WFL  Comment: grossly 4+/5  Strength LLE  Strength LLE: Exception  Comment: hip grossly 3+/5; knee grossly 4+/5; ankle grossly 3+/5        Manual therapy  Soft Tissue Mobalization: trigger point release and massage applied to lower back and B proximal buttocks, ~8 minutes total, pt reporting decreased pain following application        G-Code     OutComes Score    AM-PAC Score  AM-PAC Inpatient Mobility Raw Score : 14 (03/15/22 1038)  AM-PAC Inpatient T-Scale Score : 38.1 (03/15/22 1038)  Mobility Inpatient CMS 0-100% Score: 61.29 (03/15/22 1038)  Mobility Inpatient CMS G-Code Modifier : CL (03/15/22 1038)          Goals  Short term goals  Time Frame for Short term goals: upon d/c  Short term goal 1: Pt will perform bed mobility with supervision  Short term goal 2: Pt will perform transfers with LRAD and Mod Ax1  Short term goal 3: Pt will ambulate 10' with LRAD and Mod Ax1--MET 3/15/22  Short term goal 4: Pt will ambulate 22' with LRAD and CGA  Patient Goals   Patient goals : pt did not state  1 GOAL MET THIS DATE    Plan    Plan  Times per week: 5-7x  Times per day: Daily  Current Treatment Recommendations: Strengthening,Balance Training,Functional Mobility Training,Transfer Training,Gait Training,Endurance Training,Neuromuscular Re-education,Positioning,Modalities,Equipment Evaluation, Education, & procurement,Patient/Caregiver Education & Training,Safety Education & Training,Home Exercise Program,Manual Therapy - Soft Tissue Mobilization  Safety Devices  Type of devices:  All fall risk precautions in place,Call light within reach,Chair alarm in place,Gait belt,Patient at risk for falls,Nurse notified,Left in chair  Restraints  Initially in place: No     Therapy Time   Individual Concurrent Group Co-treatment   Time In       0934   Time Out       1015   Minutes       41        Timed Code Treatment Minutes:   41    Total Treatment Minutes:  [address], [address], [address] [facility]

## 2022-03-15 NOTE — CONSULTS
Patient: Viviana Garcias  [id number]  Date: 3/15/2022      Chief Complaint: Left leg pain    History of Present Illness/Hospital Course:  48-year-old female with a history of arrhythmia s/p AICD, CHF, gout, HTN, HLD, and CAD who was admitted on 3/13 with severe low back pain and left leg pain. CT of the lumbar spine revealed multilevel spondylosis with neuroforaminal stenosis mainly located at L4-5 and spondylolisthesis at this level. Neurosurgery consult was currently pending. She was evaluated by therapy and suggested continuing an inpatient setting regarding returning home. She was previously independent and living alone prior to this pain exacerbation. She has also had smaller episodes of pain exacerbation leading to falls recently. Prior Level of Function:  Independent    Current Level of Function:  Anoop MCMAHON     has a past medical history of AICD (automatic cardioverter/defibrillator) present, Arthritis, CHF (congestive heart failure) ([facility] [address] Gout, Hyperlipidemia, Hypertension, and MI (myocardial infarction) ([facility] [address] has a past surgical history that includes Dilation and curettage of uterus and Cardiac defibrillator placement. reports that she has never smoked. She has never used smokeless tobacco. She reports that she does not drink alcohol and does not use drugs. family history includes Cancer in her father; Diabetes in her mother; Heart Disease in her mother; High Blood Pressure in her mother; High Cholesterol in her mother. REVIEW OF SYSTEMS:   CONSTITUTIONAL: negative for fevers, chills, diaphoresis, appetite change, night sweats and unexpected weight change. HEENT: negative for hearing loss, tinnitus, ear drainage, sinus pressure, nasal congestion, epistaxis and snoring. RESPIRATORY: Negative for hemoptysis, cough, sputum production. CARDIOVASCULAR: negative for chest pain, palpitations, exertional chest pressure/discomfort, edema, syncope.    GASTROINTESTINAL: negative for nausea, vomiting, diarrhea, constipation, blood in stool and abdominal pain. GENITOURINARY: negative for frequency, dysuria, urinary incontinence, decreased urine volume, and hematuria. HEMATOLOGIC/LYMPHATIC: negative for easy bruising, bleeding and lymphadenopathy. ALLERGIC/IMMUNOLOGIC: negative for recurrent infections, angioedema, anaphylaxis and drug reactions. ENDOCRINE: negative for weight changes and diabetic symptoms including polyuria, polydipsia and polyphagia. MUSCULOSKELETAL: negative for joint swelling, decreased range of motion and muscle weakness. NEUROLOGICAL: negative for headaches, slurred speech, unilateral weakness. PSYCHIATRIC/BEHAVIORAL: negative for hallucinations, behavioral problems, confusion and agitation. All pertinent positives are noted in the HPI. Physical Examination:  Vitals: Patient Vitals for the past 24 hrs:   BP Temp Temp src Pulse Resp SpO2   03/15/22 0745 111/69 98 °F (36.7 °C) Oral 80 17 97 %   03/15/22 0415 120/78 98.2 °F (36.8 °C) Oral 81 16 98 %   03/14/22 2330 121/81 98.9 °F (37.2 °C) Oral 76 17 97 %   03/14/22 2045 123/74 98.5 °F (36.9 °C) Oral 78 18 98 %   03/14/22 1617 (!) 148/74 97.5 °F (36.4 °C) Oral 75 16 97 %   03/14/22 1221 131/64 98.2 °F (36.8 °C) Oral 71 16 94 %     Psych: Stable mood, normal judgement, normal affect. Const: No distress  Eyes: Conjunctiva noninjected, no icterus noted; pupils equal, round. HENT: Atraumatic, normocephalic; Oral mucosa moist  Neck: Trachea midline, neck supple. No thyromegaly noted. CV: No audible murmurs  Resp: No increased WOB, no audible wheezing   GI: Nondistended   Neuro: Alert, oriented, appropriate. RLE 4/5 HF 5/5 distal LLE 4/5 diffusely. Sensation: intact at BL L3 dermatome and diminished at L4,5,S1 in LLE compared to RLE. Skin: No visible abnormalities  MSK: No joint abnormalities noted. Ext: No significant edema appreciated. No varicosities.     Lab Results   Component Value Date    WBC 16.5 (H) 03/15/2022    HGB 11.5 (L) 03/15/2022    HCT 36.1 03/15/2022    MCV 85.7 03/15/2022     03/15/2022     Lab Results   Component Value Date    INR 1.23 (H) 10/08/2020    INR 1.05 04/05/2019    INR 1.05 06/22/2013    PROTIME 14.3 (H) 10/08/2020    PROTIME 12.0 04/05/2019    PROTIME 11.9 06/22/2013     Lab Results   Component Value Date    CREATININE 0.7 03/15/2022    BUN 13 03/15/2022     03/15/2022    K 4.0 03/15/2022     03/15/2022    CO2 27 03/15/2022     Lab Results   Component Value Date    ALT 18 03/14/2022    AST 29 03/14/2022    ALKPHOS 123 03/14/2022    BILITOT 0.4 03/14/2022     Most recent imaging studies revealed   EXAMINATION:   CT OF THE LUMBAR SPINE WITHOUT CONTRAST  3/13/2022       TECHNIQUE:   CT of the lumbar spine was performed without the administration of   intravenous contrast. Multiplanar reformatted images are provided for review.    Adjustment of mA and/or kV according to patient size was utilized. Ashwini Mehul   modulation, iterative reconstruction, and/or weight based adjustment of the   mA/kV was utilized to reduce the radiation dose to as low as reasonably   achievable.       COMPARISON:   Lumbar x-rays series, 10/23/2015       HISTORY:   ORDERING SYSTEM PROVIDED HISTORY: low back pain   TECHNOLOGIST PROVIDED HISTORY:   Reason for exam:->low back pain   Decision Support Exception - unselect if not a suspected or confirmed   emergency medical condition->Emergency Medical Condition (MA)       FINDINGS:   BONES/ALIGNMENT: At L4-5 there is mild anterolisthesis, 2.5 mm.  Alignment is   otherwise normal.  There are no acute fractures.       DEGENERATIVE CHANGES:       At L1-2 and L2-3, the central canal neural foramina are patent.       At L3-4, there is a posterior disc bulge with ligamentous hypertrophy   resulting in mild narrowing of the central canal.  There is no significant   foraminal narrowing.       At L4-5, anterolisthesis is again noted.  There is a mild posterior disc bulge associated with bilateral facet arthropathy.  There is no significant   central stenosis.  There is mild bilateral foraminal narrowing.       At L5-S1, there is moderate facet arthropathy.  There is no significant   central or foraminal stenosis.       SOFT TISSUES/RETROPERITONEUM: No paraspinal mass is seen.           Impression   No acute fracture or subluxation.       L4-5 mild degenerative anterolisthesis.       Multilevel spondylosis. The above laboratory data have been reviewed. The above imaging data have been reviewed. The above medical testing have been reviewed. Body mass index is 43.05 kg/m². Assessment and Plan:  Left lumbar radiculopathy: prednisone, pain medications. NSGY eval. PT/OT  Arrhythmia: amiodarone, eliquis, s/p AICD  HTN  HLD  H/O Gout    Dispo: Patient is an appropriate ARU candidate. Able to tolerate the required 3 hours of therapy in an ARU setting. Awaiting NSGY consult. If no surgical intervention (which is anticipated), we will start precert for ARU. We will continue to follow. Thank you for the consultation. Lorena Castillo MD 3/15/2022, 11:53 AM     * This document was created using dictation software. While all precautions were taken to ensure accuracy, errors may have occurred. Please disregard any typographical errors.

## 2022-03-15 NOTE — PROGRESS NOTES
Occupational Therapy  Facility/Department: [address] ORTHO/NEURO NURSING  Daily Treatment Note  NAME: Gurinder Wills  : 1955  MRN: [id number]    Date of Service: 3/15/2022    Discharge Recommendations:    Gurinder Wills scored a 14/24 on the AM-PAC ADL Inpatient form. Current research shows that an AM-PAC score of 17 or less is typically not associated with a discharge to the patient's home setting. Based on the patient's AM-PAC score and their current ADL deficits, it is recommended that the patient have 5-7 sessions per week of Occupational Therapy at d/c to increase the patient's independence. At this time, this patient demonstrates the endurance, and/or tolerance for 3 hours of therapy each day, with a treatment frequency of 5-7x/wk. Please see assessment section for further patient specific details. If patient discharges prior to next session this note will serve as a discharge summary. Please see below for the latest assessment towards goals. OT Equipment Recommendations  Other: defer to next level of care    Assessment   Performance deficits / Impairments: Decreased functional mobility ; Decreased endurance;Decreased ADL status; Decreased sensation;Decreased balance;Decreased strength;Decreased high-level IADLs  Assessment: Pt requires Mod A x2 sit to stand and tolerated functional mobility ~13ft (slow and effortful) at Min A. Pt would benefit from intensive inpt therapy at d/c to maximize functional independence and safety. Continue with POC. Treatment Diagnosis: above deficits and difficulty with walking  Prognosis: Good  History: CHF,gout, arthritis, HLD, HTN, MI cardiac defibrillator placed  Exam: as above  Assistance / Modification: Mod A x 2 sit to stand, functional mobility Min A  OT Education: OT Role;Plan of Care;Transfer Training  Patient Education: Pt educated in the above areas and verbalized understanding.   REQUIRES OT FOLLOW UP: Yes  Activity Tolerance  Activity Tolerance: Patient Tolerated treatment well;Patient limited by pain  Safety Devices  Safety Devices in place: Yes  Type of devices: All fall risk precautions in place; Left in chair;Nurse notified;Call light within reach; Chair alarm in place;Gait belt;Patient at risk for falls         Patient Diagnosis(es): The primary encounter diagnosis was Chronic bilateral low back pain with bilateral sciatica. Diagnoses of Swelling of left foot and Difficulty in walking were also pertinent to this visit. has a past medical history of AICD (automatic cardioverter/defibrillator) present, Arthritis, CHF (congestive heart failure) ([facility] [address] Gout, Hyperlipidemia, Hypertension, and MI (myocardial infarction) ([facility] [address] has a past surgical history that includes Dilation and curettage of uterus and Cardiac defibrillator placement. Restrictions  Restrictions/Precautions  Restrictions/Precautions: Fall Risk (high fall risk)  Required Braces or Orthoses?: No  Position Activity Restriction  Other position/activity restrictions: Will Hewitt is a 75 y.o. female who presents to the emergency department complaining of low back pain with radiation to left lower extremity for 3 to 4 weeks. She is not moving her left leg much because it hurts. She denies any numbness, tingling or weakness. She believes that because she is not moving her left leg, she has acquired some swelling in her left foot and ankle and she feels as though the left great toe is discolored. She is starting to experience some sharp pain in the right buttock over the past few days. Denies bowel or bladder incontinence or retention but states that her urine does appear darker and she thinks that she may be dehydrated. She has been getting around the house with a walker but states that it is too painful to walk at this point. She lives by herself. She does not have any way of transportation besides her sister and feels like she is an inconvenience to her.   She rates her pain to be a 10 out of 10 on pain scale. Subjective   General  Chart Reviewed: Yes  Patient assessed for rehabilitation services?: Yes  Additional Pertinent Hx: CHF,gout, arthritis, HLD, HTN, MI cardiac defibrillator placed  Response to previous treatment: Patient with no complaints from previous session  Family / Caregiver Present: No  Diagnosis: difficulty walking  Subjective  Subjective: Pt seated in chair upon entry. Pt pleasant and agreeable to therapy session. General Comment  Comments: Pt sit to stand Mod A x 2, pt ambulated to EOB ~13ft at Min A (slow and effortful), pt stand to sit Mod A. Pt with seated rest break. Pt sit to stand Mod A x 2 and ambulated ~13ft Min A (slow and effortful) to recliner, stand to sit Mod A. Soft Tissue Mobalization: Trigger point release and massage applied to lower back (right side) and B proximal buttocks, ~8 minutes total, pt reporting decreased pain following application. Pt given ice packs for lower back. Call light in reach and chair alarm on.   Pain Assessment  Pain Level: 8  Pain Type: Acute pain  Pain Location: Buttocks;Back;Leg  Pain Descriptors: Stabbing  Pain Frequency: Continuous  Pain Onset: On-going  Clinical Progression: Not changed  Functional Pain Assessment: Prevents or interferes some active activities and ADLs  Pre Treatment Pain Screening  Intervention List: Patient able to continue with treatment;Nurse/Physician notified  Vital Signs  Patient Currently in Pain: Yes   Orientation  Orientation  Overall Orientation Status: Within Normal Limits  Objective             Balance  Standing Balance: Minimal assistance  Standing Balance  Time: ~5 min x 2 trials  Activity: ~13 ft x 2 trials  Functional Mobility  Functional - Mobility Device: Rolling Walker  Activity: Other  Assist Level: Minimal assistance     Transfers  Sit to stand: 2 Person assistance (Mod A x 2 (from EOB and recliner))  Stand to sit: Moderate assistance (for eccentric control) Cognition  Overall Cognitive Status: [facility]                                         Plan   Plan  Times per week: 3-5  Times per day: Daily  Current Treatment Recommendations: Strengthening,Patient/Caregiver Education & Training,Home Management Training,Balance Training,Functional Mobility Training,Endurance Training,Safety Education & Training,Self-Care / ADL,Pain Management  G-Code     OutComes Score                                                  AM-PAC Score        AM-PAC Inpatient Daily Activity Raw Score: 14 (03/15/22 1411)  AM-PAC Inpatient ADL T-Scale Score : 33.39 (03/15/22 1411)  ADL Inpatient CMS 0-100% Score: 59.67 (03/15/22 1411)  ADL Inpatient CMS G-Code Modifier : CK (03/15/22 1411)    Goals  Short term goals  Time Frame for Short term goals: discharge  Short term goal 1: Ind UB ADLs - goal not addressed 3/15  Short term goal 2: Mod I LB ADLs - goal not addressed 3/15  Short term goal 3: SBA fxl transfers with RW - goal not met 3/15  Short term goal 4: SBA toileting - pt declined 3/15  Short term goal 5:  Increase standing endurance to at least 15 minutes with RW - continue goal 3/15  Patient Goals   Patient goals : return home       Therapy Time   Individual Concurrent Group Co-treatment   Time In [address]         Time Out 1015         Minutes 41         Timed Code Treatment Minutes: [address]

## 2022-03-15 NOTE — PROGRESS NOTES
Shift assessment completed. A&O x4, VSS. Lung sounds clear and diminished. Bowel sounds active, purewick in place. Bed in lowest position, call light and table within reach, x2 side rails, no slip socks on and fall sign posted. The care plan and education has been reviewed and mutually agreed upon with the patient.

## 2022-03-15 NOTE — PROGRESS NOTES
Department of Internal Medicine  General Internal Medicine   Progress Note      SUBJECTIVE: intractable pain left leg numbness and weakness unable to ambulate     History obtained from chart review and the patient  General ROS: positive for  - fatigue, malaise and weight gain  negative for - chills, fever or night sweats  Psychological ROS: negative  Ophthalmic ROS: negative  Respiratory ROS: positive for - shortness of breath  negative for - hemoptysis or wheezing  Cardiovascular ROS: positive for - dyspnea on exertion  negative for - chest pain  Gastrointestinal ROS: no abdominal pain, change in bowel habits, or black or bloody stools  Genito-Urinary ROS: no dysuria, trouble voiding, or hematuria  Musculoskeletal ROS: uncontrolled pain   Neurological ROS: no TIA or stroke symptoms  Dermatological ROS: negative    OBJECTIVE      Medications      Current Facility-Administered Medications: HYDROcodone-acetaminophen (NORCO) 5-325 MG per tablet 2 tablet, 2 tablet, Oral, Q6H PRN  cefTRIAXone (ROCEPHIN) 1000 mg in sterile water 10 mL IV syringe, 1,000 mg, IntraVENous, Q24H  colchicine (COLCRYS) tablet 0.6 mg, 0.6 mg, Oral, Daily  cyclobenzaprine (FLEXERIL) tablet 10 mg, 10 mg, Oral, TID PRN  aspirin EC tablet 81 mg, 81 mg, Oral, Daily  apixaban (ELIQUIS) tablet 5 mg, 5 mg, Oral, BID  atorvastatin (LIPITOR) tablet 40 mg, 40 mg, Oral, Daily  furosemide (LASIX) tablet 40 mg, 40 mg, Oral, BID  isosorbide mononitrate (IMDUR) extended release tablet 30 mg, 30 mg, Oral, Daily  metoprolol succinate (TOPROL XL) extended release tablet 100 mg, 100 mg, Oral, Daily  valsartan (DIOVAN) tablet 160 mg, 160 mg, Oral, BID  amiodarone (CORDARONE) tablet 100 mg, 100 mg, Oral, Daily  0.9 % sodium chloride infusion, , IntraVENous, Continuous  sodium chloride flush 0.9 % injection 5-40 mL, 5-40 mL, IntraVENous, 2 times per day  sodium chloride flush 0.9 % injection 5-40 mL, 5-40 mL, IntraVENous, PRN  0.9 % sodium chloride infusion, 25 mL, IntraVENous, PRN  potassium chloride (KLOR-CON M) extended release tablet 40 mEq, 40 mEq, Oral, PRN **OR** potassium bicarb-citric acid (EFFER-K) effervescent tablet 40 mEq, 40 mEq, Oral, PRN **OR** potassium chloride 10 mEq/100 mL IVPB (Peripheral Line), 10 mEq, IntraVENous, PRN  ondansetron (ZOFRAN-ODT) disintegrating tablet 4 mg, 4 mg, Oral, Q8H PRN **OR** ondansetron (ZOFRAN) injection 4 mg, 4 mg, IntraVENous, Q6H PRN  magnesium hydroxide (MILK OF MAGNESIA) 400 MG/5ML suspension 30 mL, 30 mL, Oral, Daily PRN  acetaminophen (TYLENOL) tablet 650 mg, 650 mg, Oral, Q6H PRN **OR** acetaminophen (TYLENOL) suppository 650 mg, 650 mg, Rectal, Q6H PRN  hydrALAZINE (APRESOLINE) injection 10 mg, 10 mg, IntraVENous, Q6H PRN  0.9 % sodium chloride bolus, 500 mL, IntraVENous, PRN  predniSONE (DELTASONE) tablet 40 mg, 40 mg, Oral, Daily    Physical      Vitals: /69   Pulse 80   Temp 98 °F (36.7 °C) (Oral)   Resp 17   Ht 5' 6\" (1.676 m)   Wt 266 lb 11.2 oz (121 kg)   SpO2 97%   BMI 43.05 kg/m²   Temp: Temp: 98 °F (36.7 °C)  Max: Temp  Av.2 °F (36.8 °C)  Min: 97.5 °F (36.4 °C)  Max: 98.9 °F (37.2 °C)  Respiration range:  Resp  Av.8  Min: 16  Max: 18  Pulse Range:  Pulse  Av  Min: 75  Max: 81  Blood pressure range:  Systolic (68CFR), XDK:563 , Min:111 , NXQ:859   , Diastolic (83SKT), UGN:95, Min:69, Max:81    SpO2  Av.4 %  Min: 97 %  Max: 98 %    Intake/Output Summary (Last 24 hours) at 3/15/2022 1457  Last data filed at 3/14/2022 2255  Gross per 24 hour   Intake 490 ml   Output 600 ml   Net -110 ml       Vent settings:  Pulse  Av.7  Min: 71  Max: 99  Resp  Av.7  Min: 14  Max: 18  SpO2  Av.4 %  Min: 93 %  Max: 98 %    CONSTITUTIONAL:  awake, alert, cooperative, no apparent distress, and appears stated age  EYES:  Unremarkable   NECK:  Supple, symmetrical, trachea midline, no adenopathy, thyroid symmetric, not enlarged and no tenderness, skin normal  BACK:  symmetric and no curvature  LUNGS:  no increased work of breathing, good air exchange, no retractions and clear to auscultation  CARDIOVASCULAR:  RRR s1 S2 distant tones   ABDOMEN:  Soft BS + non tender   MUSCULOSKELETAL:  Tenderness Lumbar spine spasm rrestricted SLR   NEUROLOGIC:  Grossly intact   SKIN:  Warm and dry  and no bruising or bleeding    Data      Recent Results (from the past 96 hour(s))   CBC with Auto Differential    Collection Time: 03/13/22 10:32 AM   Result Value Ref Range    WBC 15.9 (H) 4.0 - 11.0 K/uL    RBC 5.02 4.00 - 5.20 M/uL    Hemoglobin 13.6 12.0 - 16.0 g/dL    Hematocrit 42.9 36.0 - 48.0 %    MCV 85.4 80.0 - 100.0 fL    MCH 27.1 26.0 - 34.0 pg    MCHC 31.7 31.0 - 36.0 g/dL    RDW 16.3 (H) 12.4 - 15.4 %    Platelets 607 342 - 846 K/uL    MPV 7.9 5.0 - 10.5 fL    Neutrophils % 81.4 %    Lymphocytes % 11.3 %    Monocytes % 5.9 %    Eosinophils % 0.6 %    Basophils % 0.8 %    Neutrophils Absolute 12.9 (H) 1.7 - 7.7 K/uL    Lymphocytes Absolute 1.8 1.0 - 5.1 K/uL    Monocytes Absolute 0.9 0.0 - 1.3 K/uL    Eosinophils Absolute 0.1 0.0 - 0.6 K/uL    Basophils Absolute 0.1 0.0 - 0.2 K/uL   Comprehensive Metabolic Panel    Collection Time: 03/13/22 10:32 AM   Result Value Ref Range    Sodium 137 136 - 145 mmol/L    Potassium 3.8 3.5 - 5.1 mmol/L    Chloride 99 99 - 110 mmol/L    CO2 24 21 - 32 mmol/L    Anion Gap 14 3 - 16    Glucose 99 70 - 99 mg/dL    BUN 10 7 - 20 mg/dL    CREATININE 0.7 0.6 - 1.2 mg/dL    GFR Non-African American >60 >60    GFR African American >60 >60    Calcium 9.8 8.3 - 10.6 mg/dL    Total Protein 7.8 6.4 - 8.2 g/dL    Albumin 4.3 3.4 - 5.0 g/dL    Albumin/Globulin Ratio 1.2 1.1 - 2.2    Total Bilirubin 0.6 0.0 - 1.0 mg/dL    Alkaline Phosphatase 123 40 - 129 U/L    ALT 15 10 - 40 U/L    AST 21 15 - 37 U/L   Uric Acid    Collection Time: 03/13/22 10:32 AM   Result Value Ref Range    Uric Acid, Serum 10.0 (H) 2.6 - 6.0 mg/dL   Urinalysis with Reflex to Culture    Collection Time: 03/13/22 12:44 PM    Specimen: Urine   Result Value Ref Range    Color, UA Myrtle Straw/Yellow    Clarity, UA Clear Clear    Glucose, Ur Negative Negative mg/dL    Bilirubin Urine SMALL (A) Negative    Ketones, Urine Negative Negative mg/dL    Specific Gravity, UA >=1.030 1.005 - 1.030    Blood, Urine Negative Negative    pH, UA 6.0 5.0 - 8.0    Protein, UA TRACE (A) Negative mg/dL    Urobilinogen, Urine 1.0 <2.0 E.U./dL    Nitrite, Urine Negative Negative    Leukocyte Esterase, Urine Negative Negative    Microscopic Examination YES     Urine Type NotGiven     Urine Reflex to Culture Not Indicated    Microscopic Urinalysis    Collection Time: 03/13/22 12:44 PM   Result Value Ref Range    Bacteria, UA 2+ (A) None Seen /HPF    Hyaline Casts, UA 0 0 - 8 /LPF    WBC, UA 3 0 - 5 /HPF    RBC, UA 3 0 - 4 /HPF    Epithelial Cells, UA 2 0 - 5 /HPF   Procalcitonin    Collection Time: 03/14/22  4:58 AM   Result Value Ref Range    Procalcitonin 0.10 0.00 - 0.15 ng/mL   Comprehensive Metabolic Panel w/ Reflex to MG    Collection Time: 03/14/22  4:59 AM   Result Value Ref Range    Sodium 142 136 - 145 mmol/L    Potassium reflex Magnesium 4.0 3.5 - 5.1 mmol/L    Chloride 104 99 - 110 mmol/L    CO2 26 21 - 32 mmol/L    Anion Gap 12 3 - 16    Glucose 174 (H) 70 - 99 mg/dL    BUN 11 7 - 20 mg/dL    CREATININE 0.6 0.6 - 1.2 mg/dL    GFR Non-African American >60 >60    GFR African American >60 >60    Calcium 9.5 8.3 - 10.6 mg/dL    Total Protein 7.0 6.4 - 8.2 g/dL    Albumin 3.8 3.4 - 5.0 g/dL    Albumin/Globulin Ratio 1.2 1.1 - 2.2    Total Bilirubin 0.4 0.0 - 1.0 mg/dL    Alkaline Phosphatase 123 40 - 129 U/L    ALT 18 10 - 40 U/L    AST 29 15 - 37 U/L   CBC with Auto Differential    Collection Time: 03/14/22  4:59 AM   Result Value Ref Range    WBC 18.4 (H) 4.0 - 11.0 K/uL    RBC 4.57 4.00 - 5.20 M/uL    Hemoglobin 12.2 12.0 - 16.0 g/dL    Hematocrit 38.5 36.0 - 48.0 %    MCV 84.3 80.0 - 100.0 fL    MCH 26.6 26.0 - 34.0 pg    MCHC 31.6 31.0 - 36.0 g/dL    RDW 16.1 (H) 12.4 - 15.4 %    Platelets 185 459 - 300 K/uL    MPV 7.5 5.0 - 10.5 fL    Neutrophils % 90.1 %    Lymphocytes % 6.5 %    Monocytes % 2.7 %    Eosinophils % 0.1 %    Basophils % 0.6 %    Neutrophils Absolute 16.6 (H) 1.7 - 7.7 K/uL    Lymphocytes Absolute 1.2 1.0 - 5.1 K/uL    Monocytes Absolute 0.5 0.0 - 1.3 K/uL    Eosinophils Absolute 0.0 0.0 - 0.6 K/uL    Basophils Absolute 0.1 0.0 - 0.2 K/uL   CBC    Collection Time: 03/15/22  5:05 AM   Result Value Ref Range    WBC 16.5 (H) 4.0 - 11.0 K/uL    RBC 4.21 4.00 - 5.20 M/uL    Hemoglobin 11.5 (L) 12.0 - 16.0 g/dL    Hematocrit 36.1 36.0 - 48.0 %    MCV 85.7 80.0 - 100.0 fL    MCH 27.2 26.0 - 34.0 pg    MCHC 31.8 31.0 - 36.0 g/dL    RDW 15.8 (H) 12.4 - 15.4 %    Platelets 644 242 - 142 K/uL    MPV 8.1 5.0 - 10.5 fL   Basic Metabolic Panel    Collection Time: 03/15/22  5:05 AM   Result Value Ref Range    Sodium 142 136 - 145 mmol/L    Potassium 4.0 3.5 - 5.1 mmol/L    Chloride 104 99 - 110 mmol/L    CO2 27 21 - 32 mmol/L    Anion Gap 11 3 - 16    Glucose 124 (H) 70 - 99 mg/dL    BUN 13 7 - 20 mg/dL    CREATININE 0.7 0.6 - 1.2 mg/dL    GFR Non-African American >60 >60    GFR African American >60 >60    Calcium 9.5 8.3 - 10.6 mg/dL       ASSESSMENT AND PLAN     Hospital Problems           Last Modified POA    * (Principal) Back pain with radiculopathy 3/13/2022 Yes    Mixed hyperlipidemia 3/13/2022 Yes    ICD (implantable cardioverter-defibrillator) in place 3/14/2022 Yes    Overview Signed 1/13/2015  2:49 PM by Tasha Peterson     Patient has [employer] Dual Chamber ICD w optivol.          Paroxysmal atrial fibrillation ([facility] [address] 3/14/2022 Yes    History of ventricular tachycardia 3/14/2022 Yes    Dilated cardiomyopathy ([facility] [address] 3/14/2022 Yes    Coronary artery disease due to lipid rich plaque 3/14/2022 Yes    Morbid obesity with BMI of 45.0-49.9, adult ([facility] 3/13/2022 Yes    TYLER (obstructive sleep apnea) 3/14/2022 Yes    Essential hypertension 3/13/2022 Yes    Gout 3/13/2022 Yes    Lumbar radiculopathy 3/15/2022 Yes    Weakness of left lower extremity 3/15/2022 Yes        Neurosurgery consult   MRI not possible due to pacemaker   PT OT    PO steroid   Empiric antibiotics due to  High WBC  But no obvious source of infection

## 2022-03-15 NOTE — CARE COORDINATION
PM&R referral received. Patient assessed by Dr. Mirtha Monroy. Patient is willing to come to ARU. However, patient still needs to be seen by Neurosurgery. After this evaluation, pre-certification can begin with payor source. ARU will continue to follow progress and update discharge plan as needed.     Edwin Rice, GENAN, .[phone]

## 2022-03-16 PROCEDURE — 97535 SELF CARE MNGMENT TRAINING: CPT

## 2022-03-16 PROCEDURE — 6370000000 HC RX 637 (ALT 250 FOR IP): Performed by: INTERNAL MEDICINE

## 2022-03-16 PROCEDURE — 2580000003 HC RX 258: Performed by: INTERNAL MEDICINE

## 2022-03-16 PROCEDURE — 6360000002 HC RX W HCPCS: Performed by: INTERNAL MEDICINE

## 2022-03-16 PROCEDURE — 94760 N-INVAS EAR/PLS OXIMETRY 1: CPT

## 2022-03-16 PROCEDURE — 1200000000 HC SEMI PRIVATE

## 2022-03-16 PROCEDURE — 97530 THERAPEUTIC ACTIVITIES: CPT

## 2022-03-16 RX ORDER — PREDNISONE 20 MG/1
40 TABLET ORAL DAILY
Qty: 20 TABLET | Refills: 0 | Status: SHIPPED | OUTPATIENT
Start: 2022-03-17 | End: 2022-03-27

## 2022-03-16 RX ORDER — COLCHICINE 0.6 MG/1
0.6 TABLET ORAL DAILY
Qty: 30 TABLET | Refills: 3 | Status: SHIPPED | OUTPATIENT
Start: 2022-03-17

## 2022-03-16 RX ORDER — ONDANSETRON 4 MG/1
4 TABLET, ORALLY DISINTEGRATING ORAL EVERY 8 HOURS PRN
Qty: 20 TABLET | Refills: 0 | Status: SHIPPED | OUTPATIENT
Start: 2022-03-16

## 2022-03-16 RX ORDER — HYDROCODONE BITARTRATE AND ACETAMINOPHEN 5; 325 MG/1; MG/1
2 TABLET ORAL EVERY 6 HOURS PRN
Qty: 24 TABLET | Refills: 0 | Status: SHIPPED | OUTPATIENT
Start: 2022-03-16 | End: 2022-03-19

## 2022-03-16 RX ORDER — CYCLOBENZAPRINE HCL 10 MG
10 TABLET ORAL 3 TIMES DAILY PRN
Qty: 30 TABLET | Refills: 0 | Status: SHIPPED | OUTPATIENT
Start: 2022-03-16 | End: 2022-03-26

## 2022-03-16 RX ADMIN — METOPROLOL SUCCINATE 100 MG: 50 TABLET, EXTENDED RELEASE ORAL at 21:43

## 2022-03-16 RX ADMIN — VALSARTAN 160 MG: 160 TABLET, FILM COATED ORAL at 08:38

## 2022-03-16 RX ADMIN — HYDROCODONE BITARTRATE AND ACETAMINOPHEN 2 TABLET: 5; 325 TABLET ORAL at 15:59

## 2022-03-16 RX ADMIN — HYDROCODONE BITARTRATE AND ACETAMINOPHEN 2 TABLET: 5; 325 TABLET ORAL at 21:46

## 2022-03-16 RX ADMIN — HYDROCODONE BITARTRATE AND ACETAMINOPHEN 2 TABLET: 5; 325 TABLET ORAL at 08:42

## 2022-03-16 RX ADMIN — Medication 10 ML: at 21:49

## 2022-03-16 RX ADMIN — ISOSORBIDE MONONITRATE 30 MG: 30 TABLET, EXTENDED RELEASE ORAL at 21:44

## 2022-03-16 RX ADMIN — CYCLOBENZAPRINE 10 MG: 10 TABLET, FILM COATED ORAL at 00:32

## 2022-03-16 RX ADMIN — COLCHICINE 0.6 MG: 0.6 TABLET, FILM COATED ORAL at 08:38

## 2022-03-16 RX ADMIN — CYCLOBENZAPRINE 10 MG: 10 TABLET, FILM COATED ORAL at 08:42

## 2022-03-16 RX ADMIN — PREDNISONE 40 MG: 20 TABLET ORAL at 08:38

## 2022-03-16 RX ADMIN — APIXABAN 5 MG: 5 TABLET, FILM COATED ORAL at 08:38

## 2022-03-16 RX ADMIN — Medication 1000 MG: at 13:38

## 2022-03-16 RX ADMIN — FUROSEMIDE 40 MG: 40 TABLET ORAL at 08:38

## 2022-03-16 RX ADMIN — ATORVASTATIN CALCIUM 40 MG: 40 TABLET, FILM COATED ORAL at 21:43

## 2022-03-16 RX ADMIN — FUROSEMIDE 40 MG: 40 TABLET ORAL at 17:33

## 2022-03-16 RX ADMIN — ASPIRIN 81 MG: 81 TABLET, COATED ORAL at 21:43

## 2022-03-16 RX ADMIN — AMIODARONE HYDROCHLORIDE 100 MG: 200 TABLET ORAL at 21:43

## 2022-03-16 RX ADMIN — APIXABAN 5 MG: 5 TABLET, FILM COATED ORAL at 21:43

## 2022-03-16 RX ADMIN — VALSARTAN 160 MG: 160 TABLET, FILM COATED ORAL at 21:44

## 2022-03-16 RX ADMIN — CYCLOBENZAPRINE 10 MG: 10 TABLET, FILM COATED ORAL at 17:43

## 2022-03-16 RX ADMIN — Medication 10 ML: at 08:39

## 2022-03-16 ASSESSMENT — PAIN SCALES - GENERAL
PAINLEVEL_OUTOF10: 8
PAINLEVEL_OUTOF10: 0
PAINLEVEL_OUTOF10: 8
PAINLEVEL_OUTOF10: 8
PAINLEVEL_OUTOF10: 7
PAINLEVEL_OUTOF10: 8
PAINLEVEL_OUTOF10: 8

## 2022-03-16 ASSESSMENT — PAIN DESCRIPTION - PAIN TYPE
TYPE: ACUTE PAIN

## 2022-03-16 ASSESSMENT — PAIN DESCRIPTION - FREQUENCY
FREQUENCY: INTERMITTENT
FREQUENCY: INTERMITTENT

## 2022-03-16 ASSESSMENT — PAIN DESCRIPTION - DIRECTION
RADIATING_TOWARDS: L LEG AND FOOT
RADIATING_TOWARDS: LEGS
RADIATING_TOWARDS: L LEG AND FOOT

## 2022-03-16 ASSESSMENT — PAIN SCALES - WONG BAKER: WONGBAKER_NUMERICALRESPONSE: 6

## 2022-03-16 ASSESSMENT — PAIN DESCRIPTION - ORIENTATION: ORIENTATION: LEFT;RIGHT;LOWER

## 2022-03-16 ASSESSMENT — PAIN DESCRIPTION - LOCATION
LOCATION: BACK
LOCATION: BACK;LEG;FOOT

## 2022-03-16 NOTE — PROGRESS NOTES
Physical Therapy  Facility/Department: [address] ORTHO/NEURO NURSING  Daily Treatment Note  NAME: Harry Shah  : 1955  MRN: [id number]    Date of Service: 3/16/2022    Discharge Recommendations: Harry Shah scored a 17/24 on the AM-PAC short mobility form. Current research shows that an AM-PAC score of 17 or less is typically not associated with a discharge to the patient's home setting. Based on the patient's AM-PAC score and their current functional mobility deficits, it is recommended that the patient have 5-7 sessions per week of Physical Therapy at d/c to increase the patient's independence. At this time, this patient demonstrates the endurance, and/or tolerance for 3 hours of therapy each day, with a treatment frequency of 5-7x/wk. Please see assessment section for further patient specific details. If patient discharges prior to next session this note will serve as a discharge summary. Please see below for the latest assessment towards goals. PT Equipment Recommendations  Equipment Needed: No    Assessment   Body structures, Functions, Activity limitations: Decreased functional mobility ; Decreased strength;Decreased endurance;Decreased balance; Increased pain  Assessment: Pt able to perform transfers with decreased assist this date, requiring one person this session. Pt able to ambulate 10'+10' with Min A with use of RW however ambulation distance limited due to pain this date. Pt would benefit from continued skilled PT services to address deficits and facilitate return to PLOF. Treatment Diagnosis: impaired gait, transfers, and balance  Prognosis: Good  Decision Making: Low Complexity  Clinical Presentation: stable  PT Education: Goals;PT Role;Plan of Care;Precautions;Transfer Training;General Safety;Orientation; Functional Mobility Training;Gait Training  Patient Education: d/c recommendations--pt verbalizing understanding  Barriers to Learning: none  REQUIRES PT FOLLOW UP: Yes  Activity Tolerance  Activity Tolerance: Patient limited by pain     Patient Diagnosis(es): The primary encounter diagnosis was Chronic bilateral low back pain with bilateral sciatica. Diagnoses of Swelling of left foot and Difficulty in walking were also pertinent to this visit. has a past medical history of AICD (automatic cardioverter/defibrillator) present, Arthritis, CHF (congestive heart failure) ([facility] [address] Gout, Hyperlipidemia, Hypertension, and MI (myocardial infarction) ([facility] [address] has a past surgical history that includes Dilation and curettage of uterus and Cardiac defibrillator placement. Restrictions  Restrictions/Precautions  Restrictions/Precautions: Fall Risk (high fall risk)  Required Braces or Orthoses?: No  Position Activity Restriction  Other position/activity restrictions: Kaia Reese is a 75 y.o. female who presents to the emergency department complaining of low back pain with radiation to left lower extremity for 3 to 4 weeks. She is not moving her left leg much because it hurts. She denies any numbness, tingling or weakness. She believes that because she is not moving her left leg, she has acquired some swelling in her left foot and ankle and she feels as though the left great toe is discolored. She is starting to experience some sharp pain in the right buttock over the past few days. Denies bowel or bladder incontinence or retention but states that her urine does appear darker and she thinks that she may be dehydrated. She has been getting around the house with a walker but states that it is too painful to walk at this point. She lives by herself. She does not have any way of transportation besides her sister and feels like she is an inconvenience to her. She rates her pain to be a 10 out of 10 on pain scale. Subjective   General  Chart Reviewed: Yes  Response To Previous Treatment: Patient with no complaints from previous session.   Family / Caregiver Present: No  Subjective  Subjective: Pt agreeable to PT/OT session, reporting 8/10 pain in lower back, states she already got medication. General Comment  Comments: Pt seated in chair upon arrival.  Pain Screening  Patient Currently in Pain: Yes  Vital Signs  Patient Currently in Pain: Yes       Orientation  Orientation  Overall Orientation Status: Within Functional Limits     Cognition   Cognition  Overall Cognitive Status: WFL     Objective   Bed mobility  Comment: Not addressed, pt seated in chair at beginning and end of session. Transfers  Sit to Stand: Minimal Assistance; Moderate Assistance (Min A from recliner; Mod A from toilet with use of grab bar)  Stand to sit: Minimal Assistance (x1 toilet, x1 recliner)  Ambulation  Ambulation?: Yes  Ambulation 1  Surface: level tile  Device: Rolling Walker  Assistance: Minimal assistance  Quality of Gait: wide Fanny, decreased lalita, decreased B step lengths/heights (R more decreased than L), decreased toe off noted on RLE, shuffling gait noted  Distance: 10'+10'  Comments: Pt required increased time to perform, no LOB. Pt reporting increased pain in lower back and RLE towards end of second bout of ambulation.   Stairs/Curb  Stairs?: No     Balance  Posture: Good  Sitting - Static: Good  Sitting - Dynamic: Good;- (SBA seated at toilet for ADLs ~10-12 minutes total)  Standing - Static: Fair;+ (CGA standing at sink for ADLs with BUE support; pt with one LOB towards R, requiring Min A to maintain balance)  Standing - Dynamic: Fair (Min A for transfers and ambulation with RW)          G-Code     OutComes Score       AM-PAC Score  AM-PAC Inpatient Mobility Raw Score : 17 (03/16/22 1304)  AM-PAC Inpatient T-Scale Score : 42.13 (03/16/22 1304)  Mobility Inpatient CMS 0-100% Score: 50.57 (03/16/22 1304)  Mobility Inpatient CMS G-Code Modifier : CK (03/16/22 1304)          Goals  Short term goals  Time Frame for Short term goals: upon d/c  Short term goal 1: Pt will perform bed mobility with supervision  Short term goal 2: Pt will perform transfers with LRAD and Mod Ax1--MET 3/16/22  Short term goal 3: Pt will ambulate 10' with LRAD and Mod Ax1--MET 3/15/22  Short term goal 4: Pt will ambulate 25' with LRAD and CGA  Short term goal 5: Pt will perform transfers with LRAD and CGA  Patient Goals   Patient goals : pt did not state  2 GOALS MET THIS DATE    Plan    Plan  Times per week: 5-7x  Times per day: Daily  Current Treatment Recommendations: Strengthening,Balance Training,Functional Mobility Training,Transfer Training,Gait Training,Endurance Training,Neuromuscular Re-education,Positioning,Modalities,Equipment Evaluation, Education, & procurement,Patient/Caregiver Education & Training,Safety Education & Training,Home Exercise Program,Manual Therapy - Soft Tissue Mobilization  Safety Devices  Type of devices:  All fall risk precautions in place,Call light within reach,Chair alarm in place,Gait belt,Patient at risk for falls,Nurse notified,Left in chair  Restraints  Initially in place: No     Therapy Time   Individual Concurrent Group Co-treatment   Time In       1157   Time Out       1229   Minutes       32        Timed Code Treatment Minutes:   32    Total Treatment Minutes:  [facility], 310 [facility], 316 [facility]

## 2022-03-16 NOTE — PROGRESS NOTES
Alyson Kunz  3/16/2022  [id number]    Chief Complaint: Back pain with radiculopathy    Subjective:   No overnight events. No current complaints. Feels her pain is somewhat improved today. She is strongly interested in going to ARU.     ROS: No CP, SOB, dyspnea    Objective:  Patient Vitals for the past 24 hrs:   BP Temp Temp src Pulse Resp SpO2   03/16/22 1130 138/71 97.4 °F (36.3 °C) Oral 75 16 91 %   03/16/22 0836 136/75 97.3 °F (36.3 °C) Oral 73 16 97 %   03/16/22 0026 118/78 97.5 °F (36.4 °C) Oral 73 16 94 %   03/15/22 2045 131/77 98.1 °F (36.7 °C) Oral 77 16 97 %   03/15/22 1700 118/72 97.6 °F (36.4 °C) Oral 79 17 --     Gen: No distress, pleasant. Resting in bedside chair  HEENT: Normocephalic, atraumatic  CV: No audible murmurs, well perfused extremities  Resp: No respiratory distress. No increased WOB  Abd: Soft, nontender nondistended  Ext: No edema  Neuro: Alert, oriented, appropriately interactive. RLE 4/5 HF 5/5 distal LLE 3/5 HF, 4/5 diffusely. Sensation: intact at BL L3 dermatome and diminished at L4,5,S1 in LLE compared to RLE. Laboratory data: Available via EMR. Therapy progress:  PT  Position Activity Restriction  Other position/activity restrictions: Alyson Kunz is a 75 y.o. female who presents to the emergency department complaining of low back pain with radiation to left lower extremity for 3 to 4 weeks. She is not moving her left leg much because it hurts. She denies any numbness, tingling or weakness. She believes that because she is not moving her left leg, she has acquired some swelling in her left foot and ankle and she feels as though the left great toe is discolored. She is starting to experience some sharp pain in the right buttock over the past few days. Denies bowel or bladder incontinence or retention but states that her urine does appear darker and she thinks that she may be dehydrated.   She has been getting around the house with a walker but states that it is too painful to walk at this point. She lives by herself. She does not have any way of transportation besides her sister and feels like she is an inconvenience to her. She rates her pain to be a 10 out of 10 on pain scale. Objective     Sit to Stand: Dependent/Total (Mod Ax2 from recliner and from EOB)  Stand to sit: Moderate Assistance (VC for safe hand placement when transferring with RW)  Bed to Chair: Dependent/Total (Mod+Min A with RW)  Device: Rolling Walker  Assistance: Minimal assistance  Distance: 13'+13'  OT  PT Equipment Recommendations  Equipment Needed: No     Assessment        SLP                Body mass index is 43.05 kg/m². Assessment:  Patient Active Problem List   Diagnosis    Mixed hyperlipidemia    ICD (implantable cardioverter-defibrillator) in place    Paroxysmal atrial fibrillation ([address]    History of ventricular tachycardia    Dilated cardiomyopathy ([facility] [address]    Coronary artery disease due to lipid rich plaque    Morbid obesity with BMI of 45.0-49.9, adult ([facility] [address]    TYLER (obstructive sleep apnea)    Essential hypertension    Gout    Back pain with radiculopathy    Lumbar radiculopathy    Weakness of left lower extremity       Plan:   Left lumbar radiculopathy: prednisone, pain medications. NSGY eval. PT/OT  Arrhythmia: amiodarone, eliquis, s/p AICD  HTN  HLD  H/O Gout     Dispo: Patient is an appropriate ARU candidate.  Able to tolerate the required 3 hours of therapy in an ARU setting. Awaiting NSGY consult. No surgical intervention anticipated. We will start precert for ARU. We will continue to follow. Patricia Pfeiffer MD 3/16/2022, 12:03 PM    * This document was created using dictation software. While all precautions were taken to ensure accuracy, errors may have occurred. Please disregard any typographical errors.

## 2022-03-16 NOTE — PLAN OF CARE
Patioent resting quietly this shift. VSS, alert and oriented x4, fall precautions in place, skin precautions in place. IV intact and patent, continuous fluids, IV abx therapy. Purewick intact. Pain controlled with PRN pain medication and muscle relaxer. Patient is from home, plans to discharge to [facility] ARU. Will continue to monitor.       Problem: Pain:  Goal: Pain level will decrease  Description: Pain level will decrease  Outcome: Ongoing  Goal: Control of acute pain  Description: Control of acute pain  Outcome: Ongoing  Goal: Control of chronic pain  Description: Control of chronic pain  Outcome: Ongoing     Problem: Falls - Risk of:  Goal: Will remain free from falls  Description: Will remain free from falls  Outcome: Ongoing  Goal: Absence of physical injury  Description: Absence of physical injury  Outcome: Ongoing     Problem: Skin Integrity:  Goal: Will show no infection signs and symptoms  Description: Will show no infection signs and symptoms  Outcome: Ongoing  Goal: Absence of new skin breakdown  Description: Absence of new skin breakdown  Outcome: Ongoing

## 2022-03-16 NOTE — CONSULTS
Neurosurgery Consult Note    Reason for Consult:  Requesting Provider:    Subjective:  CHIEF COMPLAINT / HPI:  Called to see this 74 yo patient with c/o LBP. She has a history of arrhythmia s/p AICD, CHF, gout, HTN, HLD, and CAD who was admitted on 3/13 with severe low back pain and left leg pain. CT of the lumbar spine revealed multilevel spondylosis with neuroforaminal stenosis mainly located at L4-5 and minimal  spondylolisthesis at this level. She was evaluated by therapy and suggested continuing an inpatient setting regarding returning home. She was previously independent and living alone prior to this pain exacerbation. She has also had smaller episodes of pain exacerbation leading to falls recently. Past Medical History:   Diagnosis Date    AICD (automatic cardioverter/defibrillator) present     Arthritis     [facility]     Gout     Hyperlipidemia     Hypertension     MI (myocardial infarction) ([facility] [address]      Past Surgical History:   Procedure Laterality Date    CARDIAC DEFIBRILLATOR PLACEMENT      DILATION AND CURETTAGE OF UTERUS       Patient has no known allergies.     Current Facility-Administered Medications:     HYDROcodone-acetaminophen (NORCO) 5-325 MG per tablet 2 tablet, 2 tablet, Oral, Q6H PRN, Yury Mejia MD, 2 tablet at 03/16/22 0842    cefTRIAXone (ROCEPHIN) 1000 mg in sterile water 10 mL IV syringe, 1,000 mg, IntraVENous, Q24H, Yury Mejia MD, 1,000 mg at 03/16/22 1338    colchicine (COLCRYS) tablet 0.6 mg, 0.6 mg, Oral, Daily, Yury Mejia MD, 0.6 mg at 03/16/22 3076    cyclobenzaprine (FLEXERIL) tablet 10 mg, 10 mg, Oral, TID PRN, Yury Mejia MD, 10 mg at 03/16/22 0842    aspirin EC tablet 81 mg, 81 mg, Oral, Daily, Elmo Quintana MD, 81 mg at 03/15/22 2101    apixaban (ELIQUIS) tablet 5 mg, 5 mg, Oral, BID, Elmo Quintana MD, 5 mg at 03/16/22 1851    atorvastatin (LIPITOR) tablet 40 mg, 40 mg, Oral, Daily, Lawence Dukes, MD, 40 mg at 03/15/22 2101    furosemide (LASIX) tablet 40 mg, 40 mg, Oral, BID, Christy Carlos MD, 40 mg at 03/16/22 4922    isosorbide mononitrate (IMDUR) extended release tablet 30 mg, 30 mg, Oral, Daily, Christy Carlos MD, 30 mg at 03/15/22 2101    metoprolol succinate (TOPROL XL) extended release tablet 100 mg, 100 mg, Oral, Daily, Christy Carlos MD, 100 mg at 03/15/22 2101    valsartan (DIOVAN) tablet 160 mg, 160 mg, Oral, BID, Christy Carlos MD, 160 mg at 03/16/22 4442    amiodarone (CORDARONE) tablet 100 mg, 100 mg, Oral, Daily, Christy Carlos MD, 100 mg at 03/15/22 2101    0.9 % sodium chloride infusion, , IntraVENous, Continuous, Hector Lopez MD, Last Rate: 25 mL/hr at 03/14/22 1901, Rate Change at 03/14/22 1901    sodium chloride flush 0.9 % injection 5-40 mL, 5-40 mL, IntraVENous, 2 times per day, Christy Carlos MD, 10 mL at 03/16/22 0839    sodium chloride flush 0.9 % injection 5-40 mL, 5-40 mL, IntraVENous, PRN, Christy Carlos MD    0.9 % sodium chloride infusion, 25 mL, IntraVENous, PRN, Christy Carlos MD    potassium chloride (KLOR-CON M) extended release tablet 40 mEq, 40 mEq, Oral, PRN **OR** potassium bicarb-citric acid (EFFER-K) effervescent tablet 40 mEq, 40 mEq, Oral, PRN **OR** potassium chloride 10 mEq/100 mL IVPB (Peripheral Line), 10 mEq, IntraVENous, PRN, Christy Carlos MD    ondansetron (ZOFRAN-ODT) disintegrating tablet 4 mg, 4 mg, Oral, Q8H PRN **OR** ondansetron (ZOFRAN) injection 4 mg, 4 mg, IntraVENous, Q6H PRN, Christy Carlos MD    magnesium hydroxide (MILK OF MAGNESIA) 400 MG/5ML suspension 30 mL, 30 mL, Oral, Daily PRN, Christy Carlos MD    acetaminophen (TYLENOL) tablet 650 mg, 650 mg, Oral, Q6H PRN **OR** acetaminophen (TYLENOL) suppository 650 mg, 650 mg, Rectal, Q6H PRN, Christy Carlos MD    hydrALAZINE (APRESOLINE) injection 10 mg, 10 mg, IntraVENous, Q6H PRN, hCristy Carlos MD    0.9 % sodium chloride bolus, 500 mL, IntraVENous, PRN, Abiodun Villasenor MD    predniSONE (DELTASONE) tablet 40 mg, 40 mg, Oral, Daily, Abiodun Villasenor MD, 40 mg at 03/16/22 7916  Social History     Socioeconomic History    Marital status:      Spouse name: Not on file    Number of children: Not on file    Years of education: Not on file    Highest education level: Not on file   Occupational History    Not on file   Tobacco Use    Smoking status: Never Smoker    Smokeless tobacco: Never Used    Tobacco comment: QUIT AS A TEEN   Vaping Use    Vaping Use: Never used   Substance and Sexual Activity    Alcohol use: No     Comment: RARE    Drug use: No    Sexual activity: Not on file   Other Topics Concern    Not on file   Social History Narrative    Not on file     Social Determinants of Health     Financial Resource Strain:     Difficulty of Paying Living Expenses: Not on file   Food Insecurity:     Worried About [address] in the Last Year: Not on file    [address] in the Last Year: Not on file   Transportation Needs:     Lack of Transportation (Medical): Not on file    Lack of Transportation (Non-Medical):  Not on file   Physical Activity:     Days of Exercise per Week: Not on file    Minutes of Exercise per Session: Not on file   Stress:     Feeling of Stress : Not on file   Social Connections:     Frequency of Communication with Friends and Family: Not on file    Frequency of Social Gatherings with Friends and Family: Not on file    Attends [employer] Services: Not on file    Active Member of Clubs or Organizations: Not on file    Attends Club or Organization Meetings: Not on file    Marital Status: Not on file   Intimate Partner Violence:     Fear of Current or Ex-Partner: Not on file    Emotionally Abused: Not on file    Physically Abused: Not on file    Sexually Abused: Not on file   Housing Stability:     Unable to Pay for Housing in the Last Year: Not on file    Number of [place] in the Last Year: Not on file    Unstable Housing in the Last Year: Not on file     Family History   Problem Relation Age of Onset    Heart Disease Mother     High Blood Pressure Mother     High Cholesterol Mother     Diabetes Mother     Cancer Father        ROS: Complete 10 point ROS was obtained with positives in HPI, otherwise negative. [place] [facility] PHYSICAL EXAMINATION:  /71   Pulse 75   Temp 97.4 °F (36.3 °C) (Oral)   Resp 16   Ht 5' 6\" (1.676 m)   Wt 266 lb 11.2 oz (121 kg)   SpO2 91%   BMI 43.05 kg/m²   Psych: Stable mood, normal judgement, normal affect. Const: No distress  Eyes: Conjunctiva noninjected, no icterus noted; pupils equal, round. HENT: Atraumatic, normocephalic; Oral mucosa moist  Neuro: Alert, oriented, appropriate. RLE 4/5 HF 5/5 distal LLE 4/5 diffusely. Sensation: intact at BL L3 dermatome and diminished at L4,5,S1 in LLE compared to RLE. LABORATORY DATA:   CBC:   Lab Results   Component Value Date    WBC 16.5 03/15/2022    RBC 4.21 03/15/2022    HGB 11.5 03/15/2022    HCT 36.1 03/15/2022    MCV 85.7 03/15/2022    MCH 27.2 03/15/2022    MCHC 31.8 03/15/2022    RDW 15.8 03/15/2022     03/15/2022    MPV 8.1 03/15/2022        IMAGING STUDIES:   CT of the Lumbar-Sacral Spine:  Date: 3/13/22; Result:   No acute fracture or subluxation.       L4-5 mild degenerative anterolisthesis.       Multilevel spondylosis. IMPRESSION/PLAN:  Principal Problem:    Back pain with radiculopathy  Active Problems:    Mixed hyperlipidemia    ICD (implantable cardioverter-defibrillator) in place    Paroxysmal atrial fibrillation (HCC)    History of ventricular tachycardia    Dilated cardiomyopathy (HCC)    Coronary artery disease due to lipid rich plaque    Morbid obesity with BMI of 45.0-49.9, adult (HCC)    TYLER (obstructive sleep apnea)    Essential hypertension    Gout    Lumbar radiculopathy    Weakness of left lower extremity  Noncontrast CT w/ multilevel DDD and spondylosis.  Some mild L45 listhesis. Unable to have MRI. Suspect this disease  is likely the cause of her pain. Given her obesity,  lack of a contrasted image, and lack of previous medical mangement  I do not recommend any surgical intervention at this time. Agree with transfer to rehab. Thank you again for this consultation.     Josh Davis MD  3/16/2022

## 2022-03-16 NOTE — PROGRESS NOTES
Occupational Therapy  Facility/Department: [address] ORTHO/NEURO NURSING  Daily Treatment Note  NAME: Thaddeus Grey  : 1955  MRN: [id number]    Date of Service: 3/16/2022    Discharge Recommendations: Thaddeus Grey scored a 16/24 on the AM-PAC ADL Inpatient form. Current research shows that an AM-PAC score of 17 or less is typically not associated with a discharge to the patient's home setting. Based on the patient's AM-PAC score and their current ADL deficits, it is recommended that the patient have 5-7 sessions per week of Occupational Therapy at d/c to increase the patient's independence. At this time, this patient demonstrates the endurance, and/or tolerance for 3 hours of therapy each day, with a treatment frequency of 5-7x/wk. Please see assessment section for further patient specific details. If patient discharges prior to next session this note will serve as a discharge summary. Please see below for the latest assessment towards goals. OT Equipment Recommendations  Equipment Needed: Yes  Mobility Devices: ADL Assistive Devices  ADL Assistive Devices: Transfer Tub Bench    Assessment   Performance deficits / Impairments: Decreased functional mobility ; Decreased endurance;Decreased ADL status; Decreased sensation;Decreased balance;Decreased strength;Decreased high-level IADLs  Assessment: Pt demonstrated deficits in the above areas which are below baseline and would benefit from continued skilled OT to address. Treatment Diagnosis: above deficits and difficulty with walking  Prognosis: Good  Decision Making: Medium Complexity  History: CHF,gout, arthritis, HLD, HTN, MI cardiac defibrillator placed  Exam: as above  Assistance / Modification: Mod A x 2 sit to stand, functional mobility Min A  OT Education: OT Role;Plan of Care;Transfer Training;Energy Conservation; ADL Adaptive Strategies;Precautions; Equipment  Patient Education: Pt educated in the above areas and verbalized understanding. REQUIRES OT FOLLOW UP: Yes  Activity Tolerance  Activity Tolerance: Patient Tolerated treatment well;Patient limited by pain  Safety Devices  Safety Devices in place: Yes  Type of devices: All fall risk precautions in place; Left in chair;Nurse notified;Call light within reach; Chair alarm in place;Gait belt;Patient at risk for falls  Restraints  Initially in place: No         Patient Diagnosis(es): The primary encounter diagnosis was Chronic bilateral low back pain with bilateral sciatica. Diagnoses of Swelling of left foot and Difficulty in walking were also pertinent to this visit. has a past medical history of AICD (automatic cardioverter/defibrillator) present, Arthritis, CHF (congestive heart failure) ([facility] [address] Gout, Hyperlipidemia, Hypertension, and MI (myocardial infarction) ([facility] [address] has a past surgical history that includes Dilation and curettage of uterus and Cardiac defibrillator placement. Restrictions  Restrictions/Precautions  Restrictions/Precautions: Fall Risk (high fall risk)  Required Braces or Orthoses?: No  Position Activity Restriction  Other position/activity restrictions: Tiffanie Soni is a 75 y.o. female who presents to the emergency department complaining of low back pain with radiation to left lower extremity for 3 to 4 weeks. She is not moving her left leg much because it hurts. She denies any numbness, tingling or weakness. She believes that because she is not moving her left leg, she has acquired some swelling in her left foot and ankle and she feels as though the left great toe is discolored. She is starting to experience some sharp pain in the right buttock over the past few days. Denies bowel or bladder incontinence or retention but states that her urine does appear darker and she thinks that she may be dehydrated. She has been getting around the house with a walker but states that it is too painful to walk at this point. She lives by herself.   She does not have any way of transportation besides her sister and feels like she is an inconvenience to her. She rates her pain to be a 10 out of 10 on pain scale. Subjective   General  Chart Reviewed: Yes  Patient assessed for rehabilitation services?: Yes  Additional Pertinent Hx: CHF,gout, arthritis, HLD, HTN, MI cardiac defibrillator placed  Response to previous treatment: Patient with no complaints from previous session  Family / Caregiver Present: No  Diagnosis: difficulty walking  Subjective  Subjective: Pt seated in chair upon entry. Pt pleasant and agreeable to therapy session. General Comment  Comments: Pt sit to stand Mod A x 2, pt ambulated to EOB ~13ft at Min A (slow and effortful), pt stand to sit Mod A. Pt with seated rest break. Pt sit to stand Mod A x 2 and ambulated ~13ft Min A (slow and effortful) to recliner, stand to sit Mod A. Soft Tissue Mobalization: Trigger point release and massage applied to lower back (right side) and B proximal buttocks, ~8 minutes total, pt reporting decreased pain following application. Pt given ice packs for lower back. Call light in reach and chair alarm on. Pain Assessment  Pain Assessment: 0-10  Pain Level: 8  Pain Type: Acute pain  Pain Location: Back;Leg;Foot  Pain Orientation: Left;Right; Lower  Pain Radiating Towards: L leg and foot  Pain Frequency: Intermittent  Vital Signs  Patient Currently in Pain: Yes   Orientation  Orientation  Overall Orientation Status: Within Normal Limits  Objective    ADL  Feeding: Setup (seated in recliner)  UE Bathing: Stand by assistance (seated on standard toilet)  UE Dressing: Stand by assistance (seated on standard toilet)  LE Dressing: Moderate assistance (Mod A doffing pull up brief. Pt required assistance pulling down over hips and off of feet.  Mod A donning pull up brief, threading BLE through brief and help pulling up over hips.)  Toileting: Minimal assistance (seated on standard toilet, Min A toilet hygiene, Mod A standing from toilet. Purewick placed again at end of session per patient's request.)  Additional Comments: Pt declined all other ADLs at this time        Balance  Sitting Balance: Stand by assistance  Standing Balance: Minimal assistance (CGA for standing balance with x1 LOB when standing at sink for oral hygiene with Min A to correct.)  Standing Balance  Time: ~8 minutes  Activity: from recliner to toilet, to sink, to recliner  Comment: with RW, up to Min A  Functional Mobility  Functional - Mobility Device: Rolling Walker  Activity: To/from bathroom  Assist Level: Minimal assistance  Toilet Transfers  Toilet - Technique: Ambulating  Equipment Used: Standard toilet  Toilet Transfer: Minimal assistance; Moderate assistance  Toilet Transfers Comments: Min A to sit, Mod A to stand  Bed mobility  Comment: Not addressed, pt seated in chair in beginning and EOS. Transfers  Sit to stand:  Moderate assistance (Min A from recliner, Mod A from standard toilet)  Stand to sit: Minimal assistance  Transfer Comments: with RW                       Cognition  Overall Cognitive Status: [facility]     Perception  Overall Perceptual Status: [facility]                                   Plan   Plan  Times per week: 5-7  Times per day: Daily  Current Treatment Recommendations: Strengthening,Patient/Caregiver Education & Training,Home Management Training,Balance Training,Functional Mobility Training,Endurance Training,Safety Education & Training,Self-Care / ADL,Pain Management                                                     AM-PAC Score        AM-[facility] Inpatient Daily Activity Raw Score: 16 (03/16/22 1322)  AM-PAC Inpatient ADL T-Scale Score : 35.96 (03/16/22 1322)  ADL Inpatient CMS 0-100% Score: 53.32 (03/16/22 1322)  ADL Inpatient CMS G-Code Modifier : CK (03/16/22 1322)    Goals  Short term goals  Time Frame for Short term goals: discharge  Short term goal 1: Ind UB ADLs - continue 3/16  Short term goal 2: Mod I LB ADLs - continue 3/16  Short term goal 3: SBA fxl transfers with RW - continue 3/16  Short term goal 4: SBA toileting - continue 3/15  Short term goal 5: Increase standing endurance to at least 15 minutes with RW - continue goal 3/16  Patient Goals   Patient goals : return home       Therapy Time   Individual Concurrent Group Co-treatment   Time In 1157         Time Out 1229         Minutes 32         Timed Code Treatment Minutes: 32 Minutes     Timed Code Treatment Minutes:   32    Total Treatment Minutes:  [address] Phan S/OT    Darlene Chambers, OTR/L [id number] (I have reviewed and agree with the above documentation.  I provided supervision and guidance in decision making throughout session)

## 2022-03-16 NOTE — CARE COORDINATION
Discharge Planning Note:      - Current discharge plan is for the patient to transfer to ARU. ARU is pending. Neuro is pending.    - Plan B: is for the patient to return home with home health care following. Patient is declining SNF. Patient will need home health care orders. Will continue to follow.     GENA FerreiraN RN      Phone: [phone]

## 2022-03-16 NOTE — DISCHARGE INSTR - COC
Continuity of Care Form    Patient Name: Leia Fallon   :  1955  MRN:  [id number]    Admit date:  3/13/2022  Discharge date:  2022    Code Status Order: Full Code   Advance Directives:      Admitting Physician:  Hernan Hu MD  PCP: John Guy MD    Discharging Nurse: [facility] Unit/Room#: [id number]-8009/9428-35  Discharging Unit Phone Number: ***    Emergency Contact:   Extended Emergency Contact Information  Primary Emergency Contact: Malou Ruiz  Mobile Phone: [phone]  Relation: Child  Secondary Emergency Contact: Milo Espinoza  Home Phone: [phone]  Mobile Phone: [phone]  Relation: Grandchild  Preferred language: Rajwinder Davidanisha    Past Surgical History:  Past Surgical History:   Procedure Laterality Date    CARDIAC DEFIBRILLATOR PLACEMENT      DILATION AND CURETTAGE OF UTERUS         Immunization History: There is no immunization history on file for this patient.     Active Problems:  Patient Active Problem List   Diagnosis Code    Mixed hyperlipidemia E78.2    ICD (implantable cardioverter-defibrillator) in place Z95.810    Paroxysmal atrial fibrillation (HCC) I48.0    History of ventricular tachycardia Z86.79    Dilated cardiomyopathy ([facility] [address] I42.0    Coronary artery disease due to lipid rich plaque I25.10, I25.83    Morbid obesity with BMI of 45.0-49.9, adult (HCC) E66.01, Z68.42    TYLER (obstructive sleep apnea) G47.33    Essential hypertension I10    Gout M10.9    Back pain with radiculopathy M54.10    Lumbar radiculopathy M54.16    Weakness of left lower extremity R29.898       Isolation/Infection:   Isolation            No Isolation          Patient Infection Status       Infection Onset Added Last Indicated Last Indicated By Review Planned Expiration Resolved Resolved By    None active    Resolved    COVID-19 (Rule Out) 10/08/20 10/08/20 10/08/20 COVID-19 (Ordered)   10/09/20 Perla Diaz RN    COVID-19 (Rule Out) 10/08/20 10/08/20 10/08/20 COVID-19 (Ordered)   10/08/20 Rule-Out Test Resulted            Nurse Assessment:  Last Vital Signs: /71   Pulse 75   Temp 97.4 °F (36.3 °C) (Oral)   Resp 16   Ht 5' 6\" (1.676 m)   Wt 266 lb 11.2 oz (121 kg)   SpO2 91%   BMI 43.05 kg/m²     Last documented pain score (0-10 scale): Pain Level: 8  Last Weight:   Wt Readings from Last 1 Encounters:   03/13/22 266 lb 11.2 oz (121 kg)     Mental Status:  {IP PT MENTAL STATUS:20030}    IV Access:  { DIANNE IV ACCESS:225116643}    Nursing Mobility/ADLs:  Walking   {CHP DME IWCY:413848301}  Transfer  {CHP DME ZNLR:863850989}  Bathing  {CHP DME GRGS:227452549}  Dressing  {CHP DME BSCY:022955647}  Toileting  {CHP DME FEOW:929983087}  Feeding  {CHP DME HOPM:397250409}  Med Admin  {CHP DME NJAE:481583770}  Med Delivery   { DIANNE MED Delivery:934281485}    Wound Care Documentation and Therapy:        Elimination:  Continence: Bowel: {YES / IU:21078}  Bladder: {YES / PW:21280}  Urinary Catheter: {Urinary Catheter:273038018}   Colostomy/Ileostomy/Ileal Conduit: {YES / GT:93087}       Date of Last BM: ***    Intake/Output Summary (Last 24 hours) at 3/16/2022 1357  Last data filed at 3/16/2022 1130  Gross per 24 hour   Intake 850 ml   Output 2000 ml   Net -1150 ml     I/O last 3 completed shifts:   In: 500 [P.O.:480; I.V.:20]  Out: 600 [Urine:600]    Safety Concerns:     [address] Safety Concerns:826516847}    Impairments/Disabilities:      [address] Impairments/Disabilities:831776158}    Nutrition Therapy:  Current Nutrition Therapy:   [address] Diet List:661604831}    Routes of Feeding: {CHP DME Other Feedings:908454725}  Liquids: {Slp liquid thickness:77817}  Daily Fluid Restriction: {CHP DME Yes amt example:034546288}  Last Modified Barium Swallow with Video (Video Swallowing Test): {Done Not Done [facility]:788020458}    Treatments at the Time of Hospital Discharge:   Respiratory Treatments: ***  Oxygen Therapy:  {Therapy; copd oxygen:90339}  Ventilator:    { CC Vent ACOW:716897373}    Rehab Therapies: {THERAPEUTIC INTERVENTION:6350104184}  Weight Bearing Status/Restrictions: [address] CC Weight Bearin}  Other Medical Equipment (for information only, NOT a DME order):  {EQUIPMENT:717914156}  Other Treatments: ***    Patient's personal belongings (please select all that are sent with patient):  {CHP DME Belongings:767667819}    RN SIGNATURE:  {Esignature:708360385}    CASE MANAGEMENT/SOCIAL WORK SECTION    Inpatient Status Date: ***    Readmission Risk Assessment Score:  Readmission Risk              Risk of Unplanned Readmission:  15           Discharging to Facility/ Agency   Name:   Address:  Phone:  Fax:    Dialysis Facility (if applicable)   Name:  Address:  Dialysis Schedule:  Phone:  Fax:    / signature: {Esignature:044021338}    PHYSICIAN SECTION    Prognosis: Guarded    Condition at Discharge: Stable    Rehab Potential (if transferring to Rehab): Fair    Recommended Labs or Other Treatments After Discharge: PT OT     Physician Certification: I certify the above information and transfer of Viviana Garcias  is necessary for the continuing treatment of the diagnosis listed and that she requires [facility] for less 30 days.      Update Admission H&P: No change in H&P    PHYSICIAN SIGNATURE:  Electronically signed by Arun Barnes MD on 3/16/22 at 1:58 PM EDT

## 2022-03-16 NOTE — PROGRESS NOTES
Shift assessment completed. A&O x4, VSS. Lung sounds diminished, bowel sounds active. Bed in lowest position, call light and table within reach, x2 side rails, no slip socks on and fall sign posted. The care plan and education has been reviewed and mutually agreed upon with the patient.

## 2022-03-17 PROBLEM — M54.17 LUMBOSACRAL RADICULOPATHY: Status: ACTIVE | Noted: 2022-03-17

## 2022-03-17 PROCEDURE — 6370000000 HC RX 637 (ALT 250 FOR IP): Performed by: INTERNAL MEDICINE

## 2022-03-17 PROCEDURE — 1200000000 HC SEMI PRIVATE

## 2022-03-17 PROCEDURE — 97535 SELF CARE MNGMENT TRAINING: CPT

## 2022-03-17 PROCEDURE — 97530 THERAPEUTIC ACTIVITIES: CPT

## 2022-03-17 PROCEDURE — 2580000003 HC RX 258: Performed by: INTERNAL MEDICINE

## 2022-03-17 PROCEDURE — G0378 HOSPITAL OBSERVATION PER HR: HCPCS

## 2022-03-17 PROCEDURE — 6360000002 HC RX W HCPCS: Performed by: INTERNAL MEDICINE

## 2022-03-17 RX ADMIN — HYDROCODONE BITARTRATE AND ACETAMINOPHEN 2 TABLET: 5; 325 TABLET ORAL at 05:56

## 2022-03-17 RX ADMIN — CYCLOBENZAPRINE 10 MG: 10 TABLET, FILM COATED ORAL at 08:29

## 2022-03-17 RX ADMIN — AMIODARONE HYDROCHLORIDE 100 MG: 200 TABLET ORAL at 20:37

## 2022-03-17 RX ADMIN — FUROSEMIDE 40 MG: 40 TABLET ORAL at 08:29

## 2022-03-17 RX ADMIN — Medication 10 ML: at 08:29

## 2022-03-17 RX ADMIN — COLCHICINE 0.6 MG: 0.6 TABLET, FILM COATED ORAL at 08:29

## 2022-03-17 RX ADMIN — VALSARTAN 160 MG: 160 TABLET, FILM COATED ORAL at 08:29

## 2022-03-17 RX ADMIN — Medication 1000 MG: at 14:29

## 2022-03-17 RX ADMIN — HYDROCODONE BITARTRATE AND ACETAMINOPHEN 2 TABLET: 5; 325 TABLET ORAL at 20:37

## 2022-03-17 RX ADMIN — PREDNISONE 40 MG: 20 TABLET ORAL at 08:29

## 2022-03-17 RX ADMIN — ISOSORBIDE MONONITRATE 30 MG: 30 TABLET, EXTENDED RELEASE ORAL at 20:37

## 2022-03-17 RX ADMIN — APIXABAN 5 MG: 5 TABLET, FILM COATED ORAL at 20:37

## 2022-03-17 RX ADMIN — HYDROCODONE BITARTRATE AND ACETAMINOPHEN 2 TABLET: 5; 325 TABLET ORAL at 11:51

## 2022-03-17 RX ADMIN — APIXABAN 5 MG: 5 TABLET, FILM COATED ORAL at 08:29

## 2022-03-17 RX ADMIN — VALSARTAN 160 MG: 160 TABLET, FILM COATED ORAL at 20:37

## 2022-03-17 RX ADMIN — ATORVASTATIN CALCIUM 40 MG: 40 TABLET, FILM COATED ORAL at 20:37

## 2022-03-17 RX ADMIN — FUROSEMIDE 40 MG: 40 TABLET ORAL at 15:59

## 2022-03-17 RX ADMIN — METOPROLOL SUCCINATE 100 MG: 50 TABLET, EXTENDED RELEASE ORAL at 20:37

## 2022-03-17 RX ADMIN — ASPIRIN 81 MG: 81 TABLET, COATED ORAL at 20:37

## 2022-03-17 ASSESSMENT — PAIN DESCRIPTION - DESCRIPTORS: DESCRIPTORS: STABBING

## 2022-03-17 ASSESSMENT — PAIN SCALES - GENERAL
PAINLEVEL_OUTOF10: 6
PAINLEVEL_OUTOF10: 10
PAINLEVEL_OUTOF10: 8
PAINLEVEL_OUTOF10: 0
PAINLEVEL_OUTOF10: 8
PAINLEVEL_OUTOF10: 9
PAINLEVEL_OUTOF10: 8

## 2022-03-17 ASSESSMENT — PAIN DESCRIPTION - ORIENTATION: ORIENTATION: LEFT;RIGHT;LOWER

## 2022-03-17 ASSESSMENT — PAIN DESCRIPTION - LOCATION: LOCATION: BACK

## 2022-03-17 ASSESSMENT — PAIN DESCRIPTION - PROGRESSION: CLINICAL_PROGRESSION: NOT CHANGED

## 2022-03-17 ASSESSMENT — PAIN DESCRIPTION - ONSET: ONSET: ON-GOING

## 2022-03-17 ASSESSMENT — PAIN - FUNCTIONAL ASSESSMENT: PAIN_FUNCTIONAL_ASSESSMENT: PREVENTS OR INTERFERES SOME ACTIVE ACTIVITIES AND ADLS

## 2022-03-17 ASSESSMENT — PAIN DESCRIPTION - FREQUENCY: FREQUENCY: INTERMITTENT

## 2022-03-17 ASSESSMENT — PAIN DESCRIPTION - PAIN TYPE: TYPE: ACUTE PAIN

## 2022-03-17 NOTE — PROGRESS NOTES
Physical Therapy  Facility/Department: [address] ORTHO/NEURO NURSING  Daily Treatment Note  NAME: Barbara Rodriguez  : 1955  MRN: [id number]    Date of Service: 3/17/2022    Discharge Recommendations:  Barbara Rodriguez scored a 17/24 on the AM-PAC short mobility form. Current research shows that an AM-PAC score of 17 or less is typically not associated with a discharge to the patient's home setting. Based on the patient's AM-PAC score and their current functional mobility deficits, it is recommended that the patient have 5-7 sessions per week of Physical Therapy at d/c to increase the patient's independence. At this time, this patient demonstrates the endurance, and/or tolerance for 3 hours of therapy each day, with a treatment frequency of 5-7x/wk. Please see assessment section for further patient specific details. If patient discharges prior to next session this note will serve as a discharge summary. Please see below for the latest assessment towards goals. PT Equipment Recommendations  Equipment Needed: No    Assessment   Body structures, Functions, Activity limitations: Decreased functional mobility ; Decreased strength;Decreased endurance;Decreased balance; Increased pain  Assessment: Pt slowly progressing with functional mobility but still very limited by pain in back and radiating down LEs. Pt walks with L heel WB only (toes elevated) to reduce radicular symptoms. Transfers requiring less assistance. Continued skilled PT to promote return towards PLOF. Treatment Diagnosis: impaired gait, transfers, and balance  Prognosis: Good  PT Education: Goals;PT Role;Plan of Care;Precautions;Transfer Training;General Safety;Orientation; Functional Mobility Training;Gait Training  Patient Education: d/c recommendations--pt verbalizing understanding  Barriers to Learning: none  REQUIRES PT FOLLOW UP: Yes  Activity Tolerance  Activity Tolerance: Patient limited by pain     Patient Diagnosis(es): The primary encounter diagnosis was Chronic bilateral low back pain with bilateral sciatica. Diagnoses of Swelling of left foot, Difficulty in walking, and Lumbar radiculopathy were also pertinent to this visit. has a past medical history of AICD (automatic cardioverter/defibrillator) present, Arthritis, CHF (congestive heart failure) ([facility] [address] Gout, Hyperlipidemia, Hypertension, and MI (myocardial infarction) ([facility] [address] has a past surgical history that includes Dilation and curettage of uterus and Cardiac defibrillator placement. Restrictions  Restrictions/Precautions  Restrictions/Precautions: Fall Risk (high fall risk)  Required Braces or Orthoses?: No  Position Activity Restriction  Other position/activity restrictions: Tabitha Kendall is a 75 y.o. female who presents to the emergency department complaining of low back pain with radiation to left lower extremity for 3 to 4 weeks. She is not moving her left leg much because it hurts. She denies any numbness, tingling or weakness. She believes that because she is not moving her left leg, she has acquired some swelling in her left foot and ankle and she feels as though the left great toe is discolored. She is starting to experience some sharp pain in the right buttock over the past few days. Denies bowel or bladder incontinence or retention but states that her urine does appear darker and she thinks that she may be dehydrated. She has been getting around the house with a walker but states that it is too painful to walk at this point. She lives by herself. She does not have any way of transportation besides her sister and feels like she is an inconvenience to her. She rates her pain to be a 10 out of 10 on pain scale. Subjective   General  Chart Reviewed: Yes  Response To Previous Treatment: Patient with no complaints from previous session. Family / Caregiver Present: No  Subjective  Subjective: Pt agreed to therapy session. Reporting that each day is getting better. Rated pain 8/10 with medication recently. General Comment  Comments: Seated in chair at arrival     Objective   Bed mobility  Comment: not observed  Transfers  Sit to Stand: Minimal Assistance (First STS required min A for end-range standing, others throughout session CGA)  Stand to sit: Contact guard assistance  Comment: Transfers performed from recliner, EOB, and low commode. Ambulation  Ambulation?: Yes  Ambulation 1  Surface: level tile  Device: Rolling Walker  Assistance: Contact guard assistance  Quality of Gait: Slightly flexed posture, wide PILAR, decreased lalita, WB on L heel only (toes remained elevated stating increased pain with flat foot), slight posterior lean secondary to L heel WB  Gait Deviations: Decreased step height;Decreased step length; Slow Lalita  Distance: 15 ft, 20 ft, 10 ft  Comments: Distances limited by pain in back and radiation down LEs  Stairs/Curb  Stairs?: No     Balance  Posture: Fair (slightly flexed posture)  Sitting - Static: Good  Sitting - Dynamic: Good;-  Standing - Static: Fair;+  Standing - Dynamic: Fair  Exercises  Comments: LLE passive knee extension with ankle DF for nerve gliding 2x10 reps     AM-PAC Score  AM-PAC Inpatient Mobility Raw Score : 17 (03/17/22 1229)  AM-PAC Inpatient T-Scale Score : 42.13 (03/17/22 1229)  Mobility Inpatient CMS 0-100% Score: 50.57 (03/17/22 1229)  Mobility Inpatient CMS G-Code Modifier : CK (03/17/22 1229)          Goals--none met this session   Short term goals  Time Frame for Short term goals: upon d/c  Short term goal 1: Pt will perform bed mobility with supervision  Short term goal 2: Pt will perform transfers with LRAD and Mod Ax1--MET 3/16/22  Short term goal 3: Pt will ambulate 10' with LRAD and Mod Ax1--MET 3/15/22  Short term goal 4: Pt will ambulate 25' with LRAD and CGA  Short term goal 5: Pt will perform transfers with LRAD and CGA  Patient Goals   Patient goals : pt did not state    Plan    Plan  Times per week: 5-7x  Times per day: Daily  Current Treatment Recommendations: Strengthening,Balance Training,Functional Mobility Training,Transfer Training,Gait Training,Endurance Training,Neuromuscular Re-education,Positioning,Modalities,Equipment Evaluation, Education, & procurement,Patient/Caregiver Education & Training,Safety Education & Training,Home Exercise Program,Manual Therapy - Soft Tissue Mobilization  Safety Devices  Type of devices:  All fall risk precautions in place,Call light within reach,Chair alarm in place,Gait belt,Patient at risk for falls,Nurse notified,Left in chair  Restraints  Initially in place: No     Therapy Time   Individual Concurrent Group Co-treatment   Time In 1030         Time Out 1100         Minutes 30         Timed Code Treatment Minutes: [id number], [id number]

## 2022-03-17 NOTE — PROGRESS NOTES
Occupational Therapy  Facility/Department: [address] ORTHO/NEURO NURSING  Daily Treatment Note  NAME: Anuel Washington  : 1955  MRN: [id number]    Date of Service: 3/17/2022    Discharge Recommendations:  Anuel Washington scored a 16/24 on the AM-PAC ADL Inpatient form. Current research shows that an AM-PAC score of 17 or less is typically not associated with a discharge to the patient's home setting. Based on the patient's AM-PAC score and their current ADL deficits, it is recommended that the patient have 5-7 sessions per week of Occupational Therapy at d/c to increase the patient's independence. At this time, this patient demonstrates the endurance, and/or tolerance for 3 hours of therapy each day, with a treatment frequency of 5-7x/wk. Please see assessment section for further patient specific details. If patient discharges prior to next session this note will serve as a discharge summary. Please see below for the latest assessment towards goals. OT Equipment Recommendations  Equipment Needed: Yes  ADL Assistive Devices: Transfer Tub Bench;Reacher;Sock-Aid Hard;Long-handled Sponge    Assessment   Performance deficits / Impairments: Decreased functional mobility ; Decreased endurance;Decreased ADL status; Decreased sensation;Decreased balance;Decreased strength;Decreased high-level IADLs  Assessment: Pt educated on use of AE for LB dressing. Recommend intensive inpt therapy at d/c to maximize functional independence and safety. If pt d/c home 24 hr assist and HHOT. Continue with POC. Treatment Diagnosis: above deficits and difficulty with walking  Prognosis: Good  History: CHF,gout, arthritis, HLD, HTN, MI cardiac defibrillator placed  Exam: as above  OT Education: OT Role;Plan of Care;ADL Adaptive Strategies  Patient Education: Pt educated in the above areas and verbalized understanding. REQUIRES OT FOLLOW UP: Yes  Activity Tolerance  Activity Tolerance: Patient limited by pain; Patient Tolerated treatment well  Safety Devices  Safety Devices in place: Yes  Type of devices: All fall risk precautions in place; Left in chair;Nurse notified;Call light within reach;Gait belt;Patient at risk for falls         Patient Diagnosis(es): The primary encounter diagnosis was Chronic bilateral low back pain with bilateral sciatica. Diagnoses of Swelling of left foot, Difficulty in walking, and Lumbar radiculopathy were also pertinent to this visit. has a past medical history of AICD (automatic cardioverter/defibrillator) present, Arthritis, CHF (congestive heart failure) ([facility] [address] Gout, Hyperlipidemia, Hypertension, and MI (myocardial infarction) ([facility] [address] has a past surgical history that includes Dilation and curettage of uterus and Cardiac defibrillator placement. Restrictions  Restrictions/Precautions  Restrictions/Precautions: Fall Risk (high fall risk)  Required Braces or Orthoses?: No  Position Activity Restriction  Other position/activity restrictions: Loli Chacon is a 75 y.o. female who presents to the emergency department complaining of low back pain with radiation to left lower extremity for 3 to 4 weeks. She is not moving her left leg much because it hurts. She denies any numbness, tingling or weakness. She believes that because she is not moving her left leg, she has acquired some swelling in her left foot and ankle and she feels as though the left great toe is discolored. She is starting to experience some sharp pain in the right buttock over the past few days. Denies bowel or bladder incontinence or retention but states that her urine does appear darker and she thinks that she may be dehydrated. She has been getting around the house with a walker but states that it is too painful to walk at this point. She lives by herself. She does not have any way of transportation besides her sister and feels like she is an inconvenience to her.   She rates her pain to be a 10 out of 10 on pain scale.  Subjective   General  Chart Reviewed: Yes  Patient assessed for rehabilitation services?: Yes  Additional Pertinent Hx: CHF,gout, arthritis, HLD, HTN, MI cardiac defibrillator placed  Response to previous treatment: Patient with no complaints from previous session  Family / Caregiver Present: No  Diagnosis: difficulty walking  Subjective  Subjective: Pt seated in recliner upon entry. Pt agreeable to therapy session, LB dressing techniques but declined out of chair. General Comment  Comments: Pt threaded personal brief with reacher, doffed  socks with reacher and donned new  socks with sock aid. Call light in reach and chair alarm on. Pain Assessment  Pain Level: 8  Pain Type: Acute pain  Pain Location: Back  Pain Orientation: Left;Right; Lower  Pain Descriptors: Stabbing  Pain Frequency: Intermittent  Pain Onset: On-going  Clinical Progression: Not changed  Functional Pain Assessment: Prevents or interferes some active activities and ADLs  Pre Treatment Pain Screening  Intervention List: Patient able to continue with treatment;Nurse called to administer meds;Nurse/Physician notified  Vital Signs  Patient Currently in Pain: Yes   Orientation  Orientation  Overall Orientation Status: Within Normal Limits  Objective    ADL  Equipment Provided: Reacher;Sock aid  LE Dressing:  (threaded personal panties (with reacher) and doffed/donned  socks with reacher/sock aid)                                      Cognition  Overall Cognitive Status: [facility]                                         Plan   Plan  Times per week: 5-7  Times per day: Daily  Current Treatment Recommendations: Strengthening,Patient/Caregiver Education & Training,Home Management Training,Balance Training,Functional Mobility Training,Endurance Training,Safety Education & Training,Self-Care / ADL,Pain Management  G-Code     OutComes Score                                                  AM-PAC Score        AM-PAC Inpatient Daily Activity Raw Score: 16 (03/17/22 1217)  AM-PAC Inpatient ADL T-Scale Score : 35.96 (03/17/22 1217)  ADL Inpatient CMS 0-100% Score: 53.32 (03/17/22 1217)  ADL Inpatient CMS G-Code Modifier : CK (03/17/22 1217)    Goals  Short term goals  Time Frame for Short term goals: discharge  Short term goal 1: Ind UB ADLs - continue 3/17  Short term goal 2: Mod I LB ADLs - continue 3/17  Short term goal 3: SBA fxl transfers with RW - continue 3/17  Short term goal 4: SBA toileting - continue 3/17  Short term goal 5: Increase standing endurance to at least 15 minutes with RW - continue goal 3/17  Patient Goals   Patient goals : return home       Therapy Time   Individual Concurrent Group Co-treatment   Time In 1140         Time Out 1158         Minutes 18         Timed Code Treatment Minutes: [address], [address]     After reviewing treatment documentation, I am in agreement with this session.     ANGELA Guidry OTR/L [id number]

## 2022-03-17 NOTE — PROGRESS NOTES
Department of Internal Medicine  General Internal Medicine   Progress Note      SUBJECTIVE: pain some what under control but ambulation very limited     History obtained from chart review and the patient  General ROS: positive for  - fatigue, malaise and weight gain  negative for - chills, fever or night sweats  Psychological ROS: negative  Ophthalmic ROS: negative  Respiratory ROS: positive for - shortness of breath  negative for - hemoptysis or wheezing  Cardiovascular ROS: positive for - dyspnea on exertion  negative for - chest pain  Gastrointestinal ROS: no abdominal pain, change in bowel habits, or black or bloody stools  Genito-Urinary ROS: no dysuria, trouble voiding, or hematuria  Musculoskeletal ROS: uncontrolled pain   Neurological ROS: no TIA or stroke symptoms  Dermatological ROS: negative    OBJECTIVE      Medications      Current Facility-Administered Medications: HYDROcodone-acetaminophen (NORCO) 5-325 MG per tablet 2 tablet, 2 tablet, Oral, Q6H PRN  cefTRIAXone (ROCEPHIN) 1000 mg in sterile water 10 mL IV syringe, 1,000 mg, IntraVENous, Q24H  colchicine (COLCRYS) tablet 0.6 mg, 0.6 mg, Oral, Daily  cyclobenzaprine (FLEXERIL) tablet 10 mg, 10 mg, Oral, TID PRN  aspirin EC tablet 81 mg, 81 mg, Oral, Daily  apixaban (ELIQUIS) tablet 5 mg, 5 mg, Oral, BID  atorvastatin (LIPITOR) tablet 40 mg, 40 mg, Oral, Daily  furosemide (LASIX) tablet 40 mg, 40 mg, Oral, BID  isosorbide mononitrate (IMDUR) extended release tablet 30 mg, 30 mg, Oral, Daily  metoprolol succinate (TOPROL XL) extended release tablet 100 mg, 100 mg, Oral, Daily  valsartan (DIOVAN) tablet 160 mg, 160 mg, Oral, BID  amiodarone (CORDARONE) tablet 100 mg, 100 mg, Oral, Daily  0.9 % sodium chloride infusion, , IntraVENous, Continuous  sodium chloride flush 0.9 % injection 5-40 mL, 5-40 mL, IntraVENous, 2 times per day  sodium chloride flush 0.9 % injection 5-40 mL, 5-40 mL, IntraVENous, PRN  0.9 % sodium chloride infusion, 25 mL, IntraVENous, PRN  potassium chloride (KLOR-CON M) extended release tablet 40 mEq, 40 mEq, Oral, PRN **OR** potassium bicarb-citric acid (EFFER-K) effervescent tablet 40 mEq, 40 mEq, Oral, PRN **OR** potassium chloride 10 mEq/100 mL IVPB (Peripheral Line), 10 mEq, IntraVENous, PRN  ondansetron (ZOFRAN-ODT) disintegrating tablet 4 mg, 4 mg, Oral, Q8H PRN **OR** ondansetron (ZOFRAN) injection 4 mg, 4 mg, IntraVENous, Q6H PRN  magnesium hydroxide (MILK OF MAGNESIA) 400 MG/5ML suspension 30 mL, 30 mL, Oral, Daily PRN  acetaminophen (TYLENOL) tablet 650 mg, 650 mg, Oral, Q6H PRN **OR** acetaminophen (TYLENOL) suppository 650 mg, 650 mg, Rectal, Q6H PRN  hydrALAZINE (APRESOLINE) injection 10 mg, 10 mg, IntraVENous, Q6H PRN  0.9 % sodium chloride bolus, 500 mL, IntraVENous, PRN  predniSONE (DELTASONE) tablet 40 mg, 40 mg, Oral, Daily    Physical      Vitals: /81   Pulse 73   Temp 98.4 °F (36.9 °C) (Oral)   Resp 17   Ht 5' 6\" (1.676 m)   Wt 266 lb 11.2 oz (121 kg)   SpO2 98%   BMI 43.05 kg/m²   Temp: Temp: 98.4 °F (36.9 °C)  Max: Temp  Av.7 °F (36.5 °C)  Min: 97.3 °F (36.3 °C)  Max: 98.4 °F (36.9 °C)  Respiration range:  Resp  Av.2  Min: 16  Max: 17  Pulse Range:  Pulse  Av.3  Min: 73  Max: 84  Blood pressure range:  Systolic (80SWD), ZTH:360 , Min:123 , XBL:427   , Diastolic (50CPO), XCK:21, Min:71, Max:81    SpO2  Av.8 %  Min: 91 %  Max: 98 %    Intake/Output Summary (Last 24 hours) at 3/17/2022 0823  Last data filed at 3/16/2022 2149  Gross per 24 hour   Intake 860 ml   Output 2000 ml   Net -1140 ml       Vent settings:  Pulse  Av.2  Min: 71  Max: 99  Resp  Av.5  Min: 14  Max: 18  SpO2  Av %  Min: 91 %  Max: 98 %    CONSTITUTIONAL:  awake, alert, cooperative, no apparent distress, and appears stated age  EYES:  Unremarkable   NECK:  Supple, symmetrical, trachea midline, no adenopathy, thyroid symmetric, not enlarged and no tenderness, skin normal  BACK:  symmetric and no curvature  LUNGS:  no increased work of breathing, good air exchange, no retractions and clear to auscultation  CARDIOVASCULAR:  RRR s1 S2 distant tones   ABDOMEN:  Soft BS + non tender   MUSCULOSKELETAL:  Tenderness Lumbar spine spasm rrestricted SLR   NEUROLOGIC:  Grossly intact   SKIN:  Warm and dry  and no bruising or bleeding    Data      Recent Results (from the past 96 hour(s))   CBC with Auto Differential    Collection Time: 03/13/22 10:32 AM   Result Value Ref Range    WBC 15.9 (H) 4.0 - 11.0 K/uL    RBC 5.02 4.00 - 5.20 M/uL    Hemoglobin 13.6 12.0 - 16.0 g/dL    Hematocrit 42.9 36.0 - 48.0 %    MCV 85.4 80.0 - 100.0 fL    MCH 27.1 26.0 - 34.0 pg    MCHC 31.7 31.0 - 36.0 g/dL    RDW 16.3 (H) 12.4 - 15.4 %    Platelets 307 587 - 176 K/uL    MPV 7.9 5.0 - 10.5 fL    Neutrophils % 81.4 %    Lymphocytes % 11.3 %    Monocytes % 5.9 %    Eosinophils % 0.6 %    Basophils % 0.8 %    Neutrophils Absolute 12.9 (H) 1.7 - 7.7 K/uL    Lymphocytes Absolute 1.8 1.0 - 5.1 K/uL    Monocytes Absolute 0.9 0.0 - 1.3 K/uL    Eosinophils Absolute 0.1 0.0 - 0.6 K/uL    Basophils Absolute 0.1 0.0 - 0.2 K/uL   Comprehensive Metabolic Panel    Collection Time: 03/13/22 10:32 AM   Result Value Ref Range    Sodium 137 136 - 145 mmol/L    Potassium 3.8 3.5 - 5.1 mmol/L    Chloride 99 99 - 110 mmol/L    CO2 24 21 - 32 mmol/L    Anion Gap 14 3 - 16    Glucose 99 70 - 99 mg/dL    BUN 10 7 - 20 mg/dL    CREATININE 0.7 0.6 - 1.2 mg/dL    GFR Non-African American >60 >60    GFR African American >60 >60    Calcium 9.8 8.3 - 10.6 mg/dL    Total Protein 7.8 6.4 - 8.2 g/dL    Albumin 4.3 3.4 - 5.0 g/dL    Albumin/Globulin Ratio 1.2 1.1 - 2.2    Total Bilirubin 0.6 0.0 - 1.0 mg/dL    Alkaline Phosphatase 123 40 - 129 U/L    ALT 15 10 - 40 U/L    AST 21 15 - 37 U/L   Uric Acid    Collection Time: 03/13/22 10:32 AM   Result Value Ref Range    Uric Acid, Serum 10.0 (H) 2.6 - 6.0 mg/dL   Urinalysis with Reflex to Culture    Collection Time: 03/13/22 12:44 PM    Specimen: Urine   Result Value Ref Range    Color, UA Myrtle Straw/Yellow    Clarity, UA Clear Clear    Glucose, Ur Negative Negative mg/dL    Bilirubin Urine SMALL (A) Negative    Ketones, Urine Negative Negative mg/dL    Specific Gravity, UA >=1.030 1.005 - 1.030    Blood, Urine Negative Negative    pH, UA 6.0 5.0 - 8.0    Protein, UA TRACE (A) Negative mg/dL    Urobilinogen, Urine 1.0 <2.0 E.U./dL    Nitrite, Urine Negative Negative    Leukocyte Esterase, Urine Negative Negative    Microscopic Examination YES     Urine Type NotGiven     Urine Reflex to Culture Not Indicated    Microscopic Urinalysis    Collection Time: 03/13/22 12:44 PM   Result Value Ref Range    Bacteria, UA 2+ (A) None Seen /HPF    Hyaline Casts, UA 0 0 - 8 /LPF    WBC, UA 3 0 - 5 /HPF    RBC, UA 3 0 - 4 /HPF    Epithelial Cells, UA 2 0 - 5 /HPF   Procalcitonin    Collection Time: 03/14/22  4:58 AM   Result Value Ref Range    Procalcitonin 0.10 0.00 - 0.15 ng/mL   Comprehensive Metabolic Panel w/ Reflex to MG    Collection Time: 03/14/22  4:59 AM   Result Value Ref Range    Sodium 142 136 - 145 mmol/L    Potassium reflex Magnesium 4.0 3.5 - 5.1 mmol/L    Chloride 104 99 - 110 mmol/L    CO2 26 21 - 32 mmol/L    Anion Gap 12 3 - 16    Glucose 174 (H) 70 - 99 mg/dL    BUN 11 7 - 20 mg/dL    CREATININE 0.6 0.6 - 1.2 mg/dL    GFR Non-African American >60 >60    GFR African American >60 >60    Calcium 9.5 8.3 - 10.6 mg/dL    Total Protein 7.0 6.4 - 8.2 g/dL    Albumin 3.8 3.4 - 5.0 g/dL    Albumin/Globulin Ratio 1.2 1.1 - 2.2    Total Bilirubin 0.4 0.0 - 1.0 mg/dL    Alkaline Phosphatase 123 40 - 129 U/L    ALT 18 10 - 40 U/L    AST 29 15 - 37 U/L   CBC with Auto Differential    Collection Time: 03/14/22  4:59 AM   Result Value Ref Range    WBC 18.4 (H) 4.0 - 11.0 K/uL    RBC 4.57 4.00 - 5.20 M/uL    Hemoglobin 12.2 12.0 - 16.0 g/dL    Hematocrit 38.5 36.0 - 48.0 %    MCV 84.3 80.0 - 100.0 fL    MCH 26.6 26.0 - 34.0 pg    MCHC 31.6 31.0 - Yes    Gout 3/13/2022 Yes    Lumbar radiculopathy 3/15/2022 Yes    Weakness of left lower extremity 3/15/2022 Yes        Neurosurgery consult noted    lack of contrast imaging  And extreme obesity totally excludes feasibility of surgical intervention , she must loos atleast 150 lbs before any definitive treatment can be considered , agree with ARU placement

## 2022-03-17 NOTE — PROGRESS NOTES
Kaia Reese  3/17/2022  [id number]    Chief Complaint: Back pain with radiculopathy    Subjective:   No overnight events. No current complaints. Feels her pain is again somewhat improved today. She is strongly interested in going to ARU. Refusing SNF if denied ARU.     ROS: No CP, SOB, dyspnea    Objective:  Patient Vitals for the past 24 hrs:   BP Temp Temp src Pulse Resp SpO2   03/17/22 0823 115/71 97.6 °F (36.4 °C) Oral 71 16 94 %   03/16/22 2143 123/81 98.4 °F (36.9 °C) Oral 73 17 98 %   03/16/22 1628 -- -- -- -- 16 94 %   03/16/22 1557 (!) 147/80 97.6 °F (36.4 °C) Oral 84 16 94 %   03/16/22 1130 138/71 97.4 °F (36.3 °C) Oral 75 16 91 %     Gen: No distress, pleasant. Resting in bedside chair  HEENT: Normocephalic, atraumatic  CV: No audible murmurs, well perfused extremities  Resp: No respiratory distress. No increased WOB  Abd: Soft, nontender nondistended  Ext: No edema  Neuro: Alert, oriented, appropriately interactive. Laboratory data: Available via EMR. Therapy progress:  PT  Position Activity Restriction  Other position/activity restrictions: Kaia Reese is a 75 y.o. female who presents to the emergency department complaining of low back pain with radiation to left lower extremity for 3 to 4 weeks. She is not moving her left leg much because it hurts. She denies any numbness, tingling or weakness. She believes that because she is not moving her left leg, she has acquired some swelling in her left foot and ankle and she feels as though the left great toe is discolored. She is starting to experience some sharp pain in the right buttock over the past few days. Denies bowel or bladder incontinence or retention but states that her urine does appear darker and she thinks that she may be dehydrated. She has been getting around the house with a walker but states that it is too painful to walk at this point. She lives by herself.   She does not have any way of transportation besides her sister and feels like she is an inconvenience to her. She rates her pain to be a 10 out of 10 on pain scale. Objective     Sit to Stand: Minimal Assistance,Moderate Assistance (Min A from recliner; Mod A from toilet with use of grab bar)  Stand to sit: Minimal Assistance (x1 toilet, x1 recliner)  Bed to Chair: Dependent/Total (Mod+Min A with RW)  Device: Rolling Walker  Assistance: Minimal assistance  Distance: 10'+10'  OT  PT Equipment Recommendations  Equipment Needed: No  Toilet - Technique: Ambulating  Equipment Used: Standard toilet  Toilet Transfers Comments: Min A to sit, Mod A to stand  Assessment        SLP                Body mass index is 43.05 kg/m². Assessment:  Patient Active Problem List   Diagnosis    Mixed hyperlipidemia    ICD (implantable cardioverter-defibrillator) in place    [facility]oxysmal [facility] fibrillation ([facility]    History of ventricular tachycardia    Dilated cardiomyopathy ([facility]    Coronary artery disease due to lipid rich plaque    Morbid obesity with BMI of 45.0-49.9, adult ([facility]    TYLER (obstructive sleep apnea)    Essential hypertension    Gout    Back pain with radiculopathy    Lumbar radiculopathy    Weakness of left lower extremity    Lumbosacral radiculopathy       Plan:   Left lumbar radiculopathy: prednisone, pain medications. NSGY suggests conservative Tx. PT/OT  Arrhythmia: amiodarone, eliquis, s/p AICD  HTN  HLD  H/O Gout     Dispo: Patient is an appropriate ARU candidate.  Able to tolerate the required 3 hours of therapy in an ARU setting. Precert remains pending from 3/16. We will continue to follow. Martín Weeks MD 3/17/2022, 10:31 AM    * This document was created using dictation software. While all precautions were taken to ensure accuracy, errors may have occurred. Please disregard any typographical errors.

## 2022-03-17 NOTE — PLAN OF CARE
Problem: Pain:  Goal: Pain level will decrease  Description: Pain level will decrease  3/16/2022 2332 by Morena Milan RN  Outcome: Ongoing  3/16/2022 1531 by Dmitry David RN  Outcome: Ongoing  Goal: Control of acute pain  Description: Control of acute pain  3/16/2022 2332 by Morena Milan RN  Outcome: Ongoing  3/16/2022 1531 by Dmitry David RN  Outcome: Ongoing  Goal: Control of chronic pain  Description: Control of chronic pain  3/16/2022 2332 by Morena Milan RN  Outcome: Ongoing  3/16/2022 1531 by Dmitry David RN  Outcome: Ongoing     Problem: Falls - Risk of:  Goal: Will remain free from falls  Description: Will remain free from falls  3/16/2022 2332 by Morena Milan RN  Outcome: Ongoing  3/16/2022 1531 by Dmitry David RN  Outcome: Ongoing  Goal: Absence of physical injury  Description: Absence of physical injury  3/16/2022 2332 by Morena Milan RN  Outcome: Ongoing  3/16/2022 1531 by Dmitry David RN  Outcome: Ongoing     Problem: Skin Integrity:  Goal: Will show no infection signs and symptoms  Description: Will show no infection signs and symptoms  3/16/2022 2332 by Morena Milan RN  Outcome: Ongoing  3/16/2022 1531 by Dmitry David RN  Outcome: Ongoing  Goal: Absence of new skin breakdown  Description: Absence of new skin breakdown  3/16/2022 2332 by Morena Milan RN  Outcome: Ongoing  3/16/2022 1531 by Dmitry David RN  Outcome: Ongoing

## 2022-03-17 NOTE — PLAN OF CARE
Problem: Falls - Risk of:  Goal: Will remain free from falls  Description: Will remain free from falls  3/17/2022 0953 by Sunil Ugalde RN  Outcome: Ongoing  Note: Pt remains free from falls. Safety precautions in place. Bed in lowest position, bed/chair wheels locked, call light with in reach, bedside table in reach. Will continue to monitor.

## 2022-03-17 NOTE — PROGRESS NOTES
Department of Internal Medicine  General Internal Medicine   Progress Note      SUBJECTIVE: pain improved substantially , mobility improved partially     History obtained from chart review and the patient  General ROS: positive for  - fatigue, malaise and weight gain  negative for - chills, fever or night sweats  Psychological ROS: negative  Ophthalmic ROS: negative  Respiratory ROS: positive for - shortness of breath  negative for - hemoptysis or wheezing  Cardiovascular ROS: positive for - dyspnea on exertion  negative for - chest pain  Gastrointestinal ROS: no abdominal pain, change in bowel habits, or black or bloody stools  Genito-Urinary ROS: no dysuria, trouble voiding, or hematuria  Musculoskeletal ROS: uncontrolled pain   Neurological ROS: no TIA or stroke symptoms  Dermatological ROS: negative    OBJECTIVE      Medications      Current Facility-Administered Medications: HYDROcodone-acetaminophen (NORCO) 5-325 MG per tablet 2 tablet, 2 tablet, Oral, Q6H PRN  cefTRIAXone (ROCEPHIN) 1000 mg in sterile water 10 mL IV syringe, 1,000 mg, IntraVENous, Q24H  colchicine (COLCRYS) tablet 0.6 mg, 0.6 mg, Oral, Daily  cyclobenzaprine (FLEXERIL) tablet 10 mg, 10 mg, Oral, TID PRN  aspirin EC tablet 81 mg, 81 mg, Oral, Daily  apixaban (ELIQUIS) tablet 5 mg, 5 mg, Oral, BID  atorvastatin (LIPITOR) tablet 40 mg, 40 mg, Oral, Daily  furosemide (LASIX) tablet 40 mg, 40 mg, Oral, BID  isosorbide mononitrate (IMDUR) extended release tablet 30 mg, 30 mg, Oral, Daily  metoprolol succinate (TOPROL XL) extended release tablet 100 mg, 100 mg, Oral, Daily  valsartan (DIOVAN) tablet 160 mg, 160 mg, Oral, BID  amiodarone (CORDARONE) tablet 100 mg, 100 mg, Oral, Daily  0.9 % sodium chloride infusion, , IntraVENous, Continuous  sodium chloride flush 0.9 % injection 5-40 mL, 5-40 mL, IntraVENous, 2 times per day  sodium chloride flush 0.9 % injection 5-40 mL, 5-40 mL, IntraVENous, PRN  0.9 % sodium chloride infusion, 25 mL, IntraVENous, PRN  potassium chloride (KLOR-CON M) extended release tablet 40 mEq, 40 mEq, Oral, PRN **OR** potassium bicarb-citric acid (EFFER-K) effervescent tablet 40 mEq, 40 mEq, Oral, PRN **OR** potassium chloride 10 mEq/100 mL IVPB (Peripheral Line), 10 mEq, IntraVENous, PRN  ondansetron (ZOFRAN-ODT) disintegrating tablet 4 mg, 4 mg, Oral, Q8H PRN **OR** ondansetron (ZOFRAN) injection 4 mg, 4 mg, IntraVENous, Q6H PRN  magnesium hydroxide (MILK OF MAGNESIA) 400 MG/5ML suspension 30 mL, 30 mL, Oral, Daily PRN  acetaminophen (TYLENOL) tablet 650 mg, 650 mg, Oral, Q6H PRN **OR** acetaminophen (TYLENOL) suppository 650 mg, 650 mg, Rectal, Q6H PRN  hydrALAZINE (APRESOLINE) injection 10 mg, 10 mg, IntraVENous, Q6H PRN  0.9 % sodium chloride bolus, 500 mL, IntraVENous, PRN  predniSONE (DELTASONE) tablet 40 mg, 40 mg, Oral, Daily    Physical      Vitals: /81   Pulse 73   Temp 98.4 °F (36.9 °C) (Oral)   Resp 17   Ht 5' 6\" (1.676 m)   Wt 266 lb 11.2 oz (121 kg)   SpO2 98%   BMI 43.05 kg/m²   Temp: Temp: 98.4 °F (36.9 °C)  Max: Temp  Av.7 °F (36.5 °C)  Min: 97.3 °F (36.3 °C)  Max: 98.4 °F (36.9 °C)  Respiration range:  Resp  Av.2  Min: 16  Max: 17  Pulse Range:  Pulse  Av.3  Min: 73  Max: 84  Blood pressure range:  Systolic (00SDQ), KBR:902 , Min:123 , IPR:358   , Diastolic (72CLN), IGI:08, Min:71, Max:81    SpO2  Av.8 %  Min: 91 %  Max: 98 %    Intake/Output Summary (Last 24 hours) at 3/17/2022 0825  Last data filed at 3/16/2022 2149  Gross per 24 hour   Intake 860 ml   Output 2000 ml   Net -1140 ml       Vent settings:  Pulse  Av.2  Min: 71  Max: 99  Resp  Av.5  Min: 14  Max: 18  SpO2  Av %  Min: 91 %  Max: 98 %    CONSTITUTIONAL:  awake, alert, cooperative, no apparent distress, and appears stated age  EYES:  Unremarkable   NECK:  Supple, symmetrical, trachea midline, no adenopathy, thyroid symmetric, not enlarged and no tenderness, skin normal  BACK:  symmetric and no curvature  LUNGS:  no increased work of breathing, good air exchange, no retractions and clear to auscultation  CARDIOVASCULAR:  RRR s1 S2 distant tones   ABDOMEN:  Soft BS + non tender   MUSCULOSKELETAL:  Tenderness Lumbar spine spasm rrestricted SLR   NEUROLOGIC:  Grossly intact   SKIN:  Warm and dry  and no bruising or bleeding    Data      Recent Results (from the past 96 hour(s))   CBC with Auto Differential    Collection Time: 03/13/22 10:32 AM   Result Value Ref Range    WBC 15.9 (H) 4.0 - 11.0 K/uL    RBC 5.02 4.00 - 5.20 M/uL    Hemoglobin 13.6 12.0 - 16.0 g/dL    Hematocrit 42.9 36.0 - 48.0 %    MCV 85.4 80.0 - 100.0 fL    MCH 27.1 26.0 - 34.0 pg    MCHC 31.7 31.0 - 36.0 g/dL    RDW 16.3 (H) 12.4 - 15.4 %    Platelets 043 003 - 375 K/uL    MPV 7.9 5.0 - 10.5 fL    Neutrophils % 81.4 %    Lymphocytes % 11.3 %    Monocytes % 5.9 %    Eosinophils % 0.6 %    Basophils % 0.8 %    Neutrophils Absolute 12.9 (H) 1.7 - 7.7 K/uL    Lymphocytes Absolute 1.8 1.0 - 5.1 K/uL    Monocytes Absolute 0.9 0.0 - 1.3 K/uL    Eosinophils Absolute 0.1 0.0 - 0.6 K/uL    Basophils Absolute 0.1 0.0 - 0.2 K/uL   Comprehensive Metabolic Panel    Collection Time: 03/13/22 10:32 AM   Result Value Ref Range    Sodium 137 136 - 145 mmol/L    Potassium 3.8 3.5 - 5.1 mmol/L    Chloride 99 99 - 110 mmol/L    CO2 24 21 - 32 mmol/L    Anion Gap 14 3 - 16    Glucose 99 70 - 99 mg/dL    BUN 10 7 - 20 mg/dL    CREATININE 0.7 0.6 - 1.2 mg/dL    GFR Non-African American >60 >60    GFR African American >60 >60    Calcium 9.8 8.3 - 10.6 mg/dL    Total Protein 7.8 6.4 - 8.2 g/dL    Albumin 4.3 3.4 - 5.0 g/dL    Albumin/Globulin Ratio 1.2 1.1 - 2.2    Total Bilirubin 0.6 0.0 - 1.0 mg/dL    Alkaline Phosphatase 123 40 - 129 U/L    ALT 15 10 - 40 U/L    AST 21 15 - 37 U/L   Uric Acid    Collection Time: 03/13/22 10:32 AM   Result Value Ref Range    Uric Acid, Serum 10.0 (H) 2.6 - 6.0 mg/dL   Urinalysis with Reflex to Culture    Collection Time: 03/13/22 12:44 PM    Specimen: Urine   Result Value Ref Range    Color, UA Myrtle Straw/Yellow    Clarity, UA Clear Clear    Glucose, Ur Negative Negative mg/dL    Bilirubin Urine SMALL (A) Negative    Ketones, Urine Negative Negative mg/dL    Specific Gravity, UA >=1.030 1.005 - 1.030    Blood, Urine Negative Negative    pH, UA 6.0 5.0 - 8.0    Protein, UA TRACE (A) Negative mg/dL    Urobilinogen, Urine 1.0 <2.0 E.U./dL    Nitrite, Urine Negative Negative    Leukocyte Esterase, Urine Negative Negative    Microscopic Examination YES     Urine Type NotGiven     Urine Reflex to Culture Not Indicated    Microscopic Urinalysis    Collection Time: 03/13/22 12:44 PM   Result Value Ref Range    Bacteria, UA 2+ (A) None Seen /HPF    Hyaline Casts, UA 0 0 - 8 /LPF    WBC, UA 3 0 - 5 /HPF    RBC, UA 3 0 - 4 /HPF    Epithelial Cells, UA 2 0 - 5 /HPF   Procalcitonin    Collection Time: 03/14/22  4:58 AM   Result Value Ref Range    Procalcitonin 0.10 0.00 - 0.15 ng/mL   Comprehensive Metabolic Panel w/ Reflex to MG    Collection Time: 03/14/22  4:59 AM   Result Value Ref Range    Sodium 142 136 - 145 mmol/L    Potassium reflex Magnesium 4.0 3.5 - 5.1 mmol/L    Chloride 104 99 - 110 mmol/L    CO2 26 21 - 32 mmol/L    Anion Gap 12 3 - 16    Glucose 174 (H) 70 - 99 mg/dL    BUN 11 7 - 20 mg/dL    CREATININE 0.6 0.6 - 1.2 mg/dL    GFR Non-African American >60 >60    GFR African American >60 >60    Calcium 9.5 8.3 - 10.6 mg/dL    Total Protein 7.0 6.4 - 8.2 g/dL    Albumin 3.8 3.4 - 5.0 g/dL    Albumin/Globulin Ratio 1.2 1.1 - 2.2    Total Bilirubin 0.4 0.0 - 1.0 mg/dL    Alkaline Phosphatase 123 40 - 129 U/L    ALT 18 10 - 40 U/L    AST 29 15 - 37 U/L   CBC with Auto Differential    Collection Time: 03/14/22  4:59 AM   Result Value Ref Range    WBC 18.4 (H) 4.0 - 11.0 K/uL    RBC 4.57 4.00 - 5.20 M/uL    Hemoglobin 12.2 12.0 - 16.0 g/dL    Hematocrit 38.5 36.0 - 48.0 %    MCV 84.3 80.0 - 100.0 fL    MCH 26.6 26.0 - 34.0 pg    MCHC 31.6 31.0 - 36.0 g/dL    RDW 16.1 (H) 12.4 - 15.4 %    Platelets 583 767 - 767 K/uL    MPV 7.5 5.0 - 10.5 fL    Neutrophils % 90.1 %    Lymphocytes % 6.5 %    Monocytes % 2.7 %    Eosinophils % 0.1 %    Basophils % 0.6 %    Neutrophils Absolute 16.6 (H) 1.7 - 7.7 K/uL    Lymphocytes Absolute 1.2 1.0 - 5.1 K/uL    Monocytes Absolute 0.5 0.0 - 1.3 K/uL    Eosinophils Absolute 0.0 0.0 - 0.6 K/uL    Basophils Absolute 0.1 0.0 - 0.2 K/uL   CBC    Collection Time: 03/15/22  5:05 AM   Result Value Ref Range    WBC 16.5 (H) 4.0 - 11.0 K/uL    RBC 4.21 4.00 - 5.20 M/uL    Hemoglobin 11.5 (L) 12.0 - 16.0 g/dL    Hematocrit 36.1 36.0 - 48.0 %    MCV 85.7 80.0 - 100.0 fL    MCH 27.2 26.0 - 34.0 pg    MCHC 31.8 31.0 - 36.0 g/dL    RDW 15.8 (H) 12.4 - 15.4 %    Platelets 283 064 - 979 K/uL    MPV 8.1 5.0 - 10.5 fL   Basic Metabolic Panel    Collection Time: 03/15/22  5:05 AM   Result Value Ref Range    Sodium 142 136 - 145 mmol/L    Potassium 4.0 3.5 - 5.1 mmol/L    Chloride 104 99 - 110 mmol/L    CO2 27 21 - 32 mmol/L    Anion Gap 11 3 - 16    Glucose 124 (H) 70 - 99 mg/dL    BUN 13 7 - 20 mg/dL    CREATININE 0.7 0.6 - 1.2 mg/dL    GFR Non-African American >60 >60    GFR African American >60 >60    Calcium 9.5 8.3 - 10.6 mg/dL       ASSESSMENT AND PLAN     Hospital Problems           Last Modified POA    * (Principal) Back pain with radiculopathy 3/13/2022 Yes    Mixed hyperlipidemia 3/13/2022 Yes    ICD (implantable cardioverter-defibrillator) in place 3/14/2022 Yes    Overview Signed 1/13/2015  2:49 PM by Amado Gomez     Patient has [employer] Dual Chamber ICD w optivol.          Paroxysmal atrial fibrillation ([facility] 3/14/2022 Yes    History of ventricular tachycardia 3/14/2022 Yes    Dilated cardiomyopathy ([facility] 3/14/2022 Yes    Coronary artery disease due to lipid rich plaque 3/14/2022 Yes    Morbid obesity with BMI of 45.0-49.9, adult ([facility] 3/13/2022 Yes    TYLER (obstructive sleep apnea) 3/14/2022 Yes    Essential hypertension 3/13/2022 Yes    Gout 3/13/2022 Yes    Lumbar radiculopathy 3/15/2022 Yes    Weakness of left lower extremity 3/15/2022 Yes        Neurosurgery consult awaited    MRI images not feasible    Morbid obesity a Major challenge   PT OT

## 2022-03-17 NOTE — PROGRESS NOTES
Shift assessment completed. A&O x4, VSS. Lung sounds clear and diminished. Bowel sounds active. Evening meds given per mar. Bed in lowest position, call light and table within reach, x2 side rails, no slip socks on and fall sign posted. The care plan and education has been reviewed and mutually agreed upon with the patient.

## 2022-03-18 VITALS
OXYGEN SATURATION: 99 % | SYSTOLIC BLOOD PRESSURE: 120 MMHG | DIASTOLIC BLOOD PRESSURE: 62 MMHG | RESPIRATION RATE: 17 BRPM | TEMPERATURE: 97.6 F | HEART RATE: 80 BPM | HEIGHT: 66 IN | BODY MASS INDEX: 42.86 KG/M2 | WEIGHT: 266.7 LBS

## 2022-03-18 PROBLEM — G82.20 PARAPARESIS OF BOTH LOWER LIMBS (HCC): Status: ACTIVE | Noted: 2022-03-18

## 2022-03-18 PROCEDURE — 2580000003 HC RX 258: Performed by: INTERNAL MEDICINE

## 2022-03-18 PROCEDURE — 97535 SELF CARE MNGMENT TRAINING: CPT

## 2022-03-18 PROCEDURE — G0378 HOSPITAL OBSERVATION PER HR: HCPCS

## 2022-03-18 PROCEDURE — 6370000000 HC RX 637 (ALT 250 FOR IP): Performed by: INTERNAL MEDICINE

## 2022-03-18 RX ADMIN — Medication 10 ML: at 09:26

## 2022-03-18 RX ADMIN — FUROSEMIDE 40 MG: 40 TABLET ORAL at 09:23

## 2022-03-18 RX ADMIN — HYDROCODONE BITARTRATE AND ACETAMINOPHEN 2 TABLET: 5; 325 TABLET ORAL at 02:25

## 2022-03-18 RX ADMIN — CYCLOBENZAPRINE 10 MG: 10 TABLET, FILM COATED ORAL at 09:23

## 2022-03-18 RX ADMIN — COLCHICINE 0.6 MG: 0.6 TABLET, FILM COATED ORAL at 09:23

## 2022-03-18 RX ADMIN — VALSARTAN 160 MG: 160 TABLET, FILM COATED ORAL at 09:23

## 2022-03-18 RX ADMIN — APIXABAN 5 MG: 5 TABLET, FILM COATED ORAL at 09:23

## 2022-03-18 RX ADMIN — HYDROCODONE BITARTRATE AND ACETAMINOPHEN 2 TABLET: 5; 325 TABLET ORAL at 09:23

## 2022-03-18 RX ADMIN — PREDNISONE 40 MG: 20 TABLET ORAL at 09:23

## 2022-03-18 RX ADMIN — SODIUM CHLORIDE: 9 INJECTION, SOLUTION INTRAVENOUS at 05:37

## 2022-03-18 ASSESSMENT — PAIN SCALES - GENERAL
PAINLEVEL_OUTOF10: 8
PAINLEVEL_OUTOF10: 7
PAINLEVEL_OUTOF10: 10
PAINLEVEL_OUTOF10: 0
PAINLEVEL_OUTOF10: 7

## 2022-03-18 ASSESSMENT — PAIN DESCRIPTION - ONSET: ONSET: ON-GOING

## 2022-03-18 ASSESSMENT — PAIN DESCRIPTION - PAIN TYPE
TYPE: ACUTE PAIN
TYPE: ACUTE PAIN

## 2022-03-18 ASSESSMENT — PAIN DESCRIPTION - PROGRESSION: CLINICAL_PROGRESSION: NOT CHANGED

## 2022-03-18 ASSESSMENT — PAIN DESCRIPTION - FREQUENCY: FREQUENCY: CONTINUOUS

## 2022-03-18 ASSESSMENT — PAIN DESCRIPTION - LOCATION
LOCATION: BACK
LOCATION: BACK

## 2022-03-18 ASSESSMENT — PAIN DESCRIPTION - DESCRIPTORS
DESCRIPTORS: ACHING;DISCOMFORT;SHARP
DESCRIPTORS: ACHING;SHARP

## 2022-03-18 ASSESSMENT — PAIN DESCRIPTION - ORIENTATION: ORIENTATION: LEFT;RIGHT

## 2022-03-18 ASSESSMENT — PAIN - FUNCTIONAL ASSESSMENT: PAIN_FUNCTIONAL_ASSESSMENT: ACTIVITIES ARE NOT PREVENTED

## 2022-03-18 ASSESSMENT — PAIN DESCRIPTION - DIRECTION: RADIATING_TOWARDS: LEGS

## 2022-03-18 NOTE — PLAN OF CARE
Problem: Pain:  Goal: Pain level will decrease  Description: Pain level will decrease  3/18/2022 1519 by Dev Bustamante RN  Outcome: Completed  3/18/2022 0119 by Nabor Sharma RN  Outcome: Ongoing  Goal: Control of acute pain  Description: Control of acute pain  3/18/2022 1519 by Dev Bustamante RN  Outcome: Completed  3/18/2022 0119 by Nabor Sharma RN  Outcome: Ongoing  Goal: Control of chronic pain  Description: Control of chronic pain  3/18/2022 1519 by Dev Bustamante RN  Outcome: Completed  3/18/2022 0119 by Nabor Sharma RN  Outcome: Ongoing  Goal: Patient's pain/discomfort is manageable  Description: Patient's pain/discomfort is manageable  3/18/2022 1519 by Dev Bustamante RN  Outcome: Completed  3/18/2022 0119 by Nabor Sharma RN  Outcome: Ongoing     Problem: Falls - Risk of:  Goal: Will remain free from falls  Description: Will remain free from falls  3/18/2022 1519 by Dev Bustamante RN  Outcome: Completed  3/18/2022 0119 by Nabor Sharma RN  Outcome: Ongoing  Goal: Absence of physical injury  Description: Absence of physical injury  3/18/2022 1519 by Dev Bustamante RN  Outcome: Completed  3/18/2022 0119 by Nabor Sharma RN  Outcome: Ongoing     Problem: Skin Integrity:  Goal: Will show no infection signs and symptoms  Description: Will show no infection signs and symptoms  3/18/2022 1519 by Dev Bustamante RN  Outcome: Completed  3/18/2022 0119 by Nabor Sharma RN  Outcome: Ongoing  Goal: Absence of new skin breakdown  Description: Absence of new skin breakdown  3/18/2022 1519 by Dev Bustamante RN  Outcome: Completed  3/18/2022 0119 by Nabor Sharma RN  Outcome: Ongoing     Problem: Skin Integrity:  Goal: Will show no infection signs and symptoms  Description: Will show no infection signs and symptoms  3/18/2022 1519 by Dev Bustamante RN  Outcome: Completed  3/18/2022 0119 by Nabor Sharma RN  Outcome: Ongoing  Goal: Absence of new skin breakdown  Description: Absence of new skin breakdown  3/18/2022 1519 by Aydee Fontaine RN  Outcome: Completed  3/18/2022 0119 by Ash Shankar RN  Outcome: Ongoing     Problem: Infection:  Goal: Will remain free from infection  Description: Will remain free from infection  3/18/2022 1519 by Aydee Fontaine RN  Outcome: Completed  3/18/2022 0119 by Ash Shankar RN  Outcome: Ongoing     Problem: Safety:  Goal: Free from accidental physical injury  Description: Free from accidental physical injury  3/18/2022 1519 by Aydee Fontaine RN  Outcome: Completed  3/18/2022 0119 by Ash Shankar RN  Outcome: Ongoing  Goal: Free from intentional harm  Description: Free from intentional harm  3/18/2022 1519 by Aydee Fontaine RN  Outcome: Completed  3/18/2022 0119 by Ash Shankar RN  Outcome: Ongoing     Problem: Daily Care:  Goal: Daily care needs are met  Description: Daily care needs are met  3/18/2022 1519 by Aydee Fontaine RN  Outcome: Completed  3/18/2022 0119 by Ash Shankar RN  Outcome: Ongoing     Problem: Skin Integrity:  Goal: Skin integrity will stabilize  Description: Skin integrity will stabilize  3/18/2022 1519 by Aydee Fontaine RN  Outcome: Completed  3/18/2022 0119 by Ash Shankar RN  Outcome: Ongoing     Problem: Discharge Planning:  Goal: Patients continuum of care needs are met  Description: Patients continuum of care needs are met  3/18/2022 1519 by Aydee Fontaine RN  Outcome: Completed  3/18/2022 0119 by Ash Shankar RN  Outcome: Ongoing     Problem: Discharge Planning:  Goal: Discharged to appropriate level of care  Description: Discharged to appropriate level of care  3/18/2022 1519 by Aydee Fontaine RN  Outcome: Completed  3/18/2022 0119 by Ash Shankar RN  Outcome: Ongoing

## 2022-03-18 NOTE — PROGRESS NOTES
Occupational Therapy  Facility/Department: [address] ORTHO/NEURO NURSING  Daily Treatment Note  NAME: Clau Foster  : 1955  MRN: [id number]    Date of Service: 3/18/2022    Discharge Recommendations:  Clau Foster scored a 17/24 on the AM-PAC ADL Inpatient form. Current research shows that an AM-PAC score of 17 or less is typically not associated with a discharge to the patient's home setting. Based on the patient's AM-PAC score and their current ADL deficits, it is recommended that the patient have 5-7 sessions per week of Occupational Therapy at d/c to increase the patient's independence. At this time, this patient demonstrates the endurance, and/or tolerance for 3 hours of therapy each day, with a treatment frequency of 5-7x/wk. Please see assessment section for further patient specific details. If patient discharges prior to next session this note will serve as a discharge summary. Please see below for the latest assessment towards goals. OT Equipment Recommendations  Equipment Needed: Yes  ADL Assistive Devices: Transfer Tub Bench;Reacher;Sock-Aid Hard;Long-handled Sponge    Assessment   Performance deficits / Impairments: Decreased functional mobility ; Decreased endurance;Decreased ADL status; Decreased sensation;Decreased balance;Decreased strength;Decreased high-level IADLs  Assessment: Pt with shower level ADL, Mod A for LB  bathing. Pt CGA for all functional mobility and transfers. Recommend intensive inpt therapy at d/c to maximize functional independence and safety. Continue with POC. Treatment Diagnosis: above deficits and difficulty with walking  Prognosis: Good  History: CHF,gout, arthritis, HLD, HTN, MI cardiac defibrillator placed  Exam: as above  Assistance / Modification: CGA functional mobility and transfers, Mod A LB bathing  OT Education: OT Role;Plan of Care;Transfer Training  Patient Education: Pt educated in the above areas and verbalized understanding.   REQUIRES OT FOLLOW UP: Yes  Activity Tolerance  Activity Tolerance: Patient Tolerated treatment well  Safety Devices  Safety Devices in place: Yes  Type of devices: All fall risk precautions in place; Left in chair;Nurse notified;Call light within reach;Gait belt;Patient at risk for falls         Patient Diagnosis(es): The primary encounter diagnosis was Chronic bilateral low back pain with bilateral sciatica. Diagnoses of Swelling of left foot, Difficulty in walking, and Lumbar radiculopathy were also pertinent to this visit. has a past medical history of AICD (automatic cardioverter/defibrillator) present, Arthritis, CHF (congestive heart failure) ([facility] [address] Gout, Hyperlipidemia, Hypertension, and MI (myocardial infarction) ([facility] [address] has a past surgical history that includes Dilation and curettage of uterus and Cardiac defibrillator placement. Restrictions  Restrictions/Precautions  Restrictions/Precautions: Fall Risk (high fall risk)  Required Braces or Orthoses?: No  Position Activity Restriction  Other position/activity restrictions: Tone Blank is a 75 y.o. female who presents to the emergency department complaining of low back pain with radiation to left lower extremity for 3 to 4 weeks. She is not moving her left leg much because it hurts. She denies any numbness, tingling or weakness. She believes that because she is not moving her left leg, she has acquired some swelling in her left foot and ankle and she feels as though the left great toe is discolored. She is starting to experience some sharp pain in the right buttock over the past few days. Denies bowel or bladder incontinence or retention but states that her urine does appear darker and she thinks that she may be dehydrated. She has been getting around the house with a walker but states that it is too painful to walk at this point. She lives by herself.   She does not have any way of transportation besides her sister and feels like she is an inconvenience to her. She rates her pain to be a 10 out of 10 on pain scale. Subjective   General  Chart Reviewed: Yes  Patient assessed for rehabilitation services?: Yes  Additional Pertinent Hx: CHF,gout, arthritis, HLD, HTN, MI cardiac defibrillator placed  Response to previous treatment: Patient with no complaints from previous session  Family / Caregiver Present: No  Diagnosis: difficulty walking  Subjective  Subjective: Pt seated in recliner upon entry. Pt agreeable to therapy session and requesting ADL shower. General Comment  Comments: Pt CGA sit to stand and ambulation with rw ~12 ft to shower. Shower transfer CGA. Pt washed UB setup/Mod I. Pt  washed LB (Mod A to wash both feet and dry buttocks). Pt CGA in stance to wash buttocks. Pt applied lotion and deoderant. Pt used reacher and sock aid for LB dressing and required assist to pull up brief. Pt CGA out of shower and to ambulate to recliner, stand to sit CGA. Call light in reach and chair alarm on. Pain Assessment  Pain Level: 7  Pain Type: Acute pain  Pain Location: Back  Pain Descriptors: Aching; Sharp  Pain Frequency: Continuous  Pain Onset: On-going  Clinical Progression: Not changed  Functional Pain Assessment: Activities are not prevented  Pre Treatment Pain Screening  Intervention List: Patient able to continue with treatment;Nurse/Physician notified  Vital Signs  Patient Currently in Pain: Yes   Orientation  Orientation  Overall Orientation Status: Within Normal Limits  Objective    ADL  Equipment Provided: Reacher;Sock aid  UE Bathing: Setup;Modified independent   LE Bathing:  Moderate assistance;Setup  LE Dressing: Setup;Minimal assistance        Balance  Sitting Balance: Modified independent   Standing Balance: Contact guard assistance  Standing Balance  Time: ~8 minutes  Activity: to/from bathroom with shower transfer and LB ADLs  Functional Mobility  Functional - Mobility Device: Rolling Walker  Activity: To/from bathroom  Assist Level: Contact guard assistance  Shower Transfers  Shower - Transfer From: Trino Murphy - Transfer Type: To and From  Shower - Transfer To:  (Shower bench)  Shower - Technique: Ambulating  Shower Transfers: Contact Guard     Transfers  Sit to stand: Contact guard assistance  Stand to sit: Contact guard assistance                       Cognition  Overall Cognitive Status: [facility]                                         Plan   Plan  Times per week: 5-7  Times per day: Daily  Current Treatment Recommendations: Strengthening,Patient/Caregiver Education & Training,Home Management Training,Balance Training,Functional Mobility Training,Endurance Training,Safety Education & Training,Self-Care / ADL,Pain Management  G-Code     OutComes Score                                                  AM-PAC Score        AM-PAC Inpatient Daily Activity Raw Score: 17 (03/18/22 0858)  AM-PAC Inpatient ADL T-Scale Score : 37.26 (03/18/22 0858)  ADL Inpatient CMS 0-100% Score: 50.11 (03/18/22 0858)  ADL Inpatient CMS G-Code Modifier : CK (03/18/22 0858)    Goals  Short term goals  Time Frame for Short term goals: discharge  Short term goal 1: Ind UB ADLs - continue 3/17, setup 3/18  Short term goal 2: Mod I LB ADLs - continue 3/17, 3/18  Short term goal 3: SBA fxl transfers with RW - continue 3/17, 3/18  Short term goal 4: SBA toileting - continue 3/17  Short term goal 5: Increase standing endurance to at least 15 minutes with RW - continue goal 3/17, 3/18  Patient Goals   Patient goals : return home       Therapy Time   Individual Concurrent Group Co-treatment   Time In 0805         Time Out 0845         Minutes 40         Timed Code Treatment Minutes: 1 [facility] [place] [id number]     After reviewing treatment documentation, I am in agreement with this session.     Loletha Castleman, MOT OTR/L [id number]

## 2022-03-18 NOTE — PROGRESS NOTES
Shift assessment complete, VSS. Pt up to chair, resting comfortably. OT assisted with showering. Pt has c/o pain \"everywhere below my waist\" and rates it as 10/10. Medication given per [facility]. Pt up to commode, voiding with no issues. Insurance denied ARU, pt to be d/c home. The care plan and education has been reviewed and mutually agreed upon with the patient.

## 2022-03-18 NOTE — CARE COORDINATION
Discharge Planning Note:    - ARU - DENIED    - The patient is declining any SNF. - The patient stated her sister will come and transport the patient home. - Tried to notify the RN, informed the charge RN. Will continue to follow.     GENA TalleyN RN      Phone: [phone]

## 2022-03-18 NOTE — PLAN OF CARE
Problem: Pain:  Goal: Pain level will decrease  Description: Pain level will decrease  Outcome: Ongoing  Goal: Control of acute pain  Description: Control of acute pain  Outcome: Ongoing  Goal: Control of chronic pain  Description: Control of chronic pain  Outcome: Ongoing  Goal: Patient's pain/discomfort is manageable  Description: Patient's pain/discomfort is manageable  Outcome: Ongoing     Problem: Falls - Risk of:  Goal: Will remain free from falls  Description: Will remain free from falls  Outcome: Ongoing  Goal: Absence of physical injury  Description: Absence of physical injury  Outcome: Ongoing     Problem: Skin Integrity:  Goal: Will show no infection signs and symptoms  Description: Will show no infection signs and symptoms  Outcome: Ongoing  Goal: Absence of new skin breakdown  Description: Absence of new skin breakdown  Outcome: Ongoing     Problem: Infection:  Goal: Will remain free from infection  Description: Will remain free from infection  Outcome: Ongoing     Problem: Safety:  Goal: Free from accidental physical injury  Description: Free from accidental physical injury  Outcome: Ongoing  Goal: Free from intentional harm  Description: Free from intentional harm  Outcome: Ongoing     Problem: Daily Care:  Goal: Daily care needs are met  Description: Daily care needs are met  Outcome: Ongoing     Problem: Skin Integrity:  Goal: Skin integrity will stabilize  Description: Skin integrity will stabilize  Outcome: Ongoing     Problem: Discharge Planning:  Goal: Patients continuum of care needs are met  Description: Patients continuum of care needs are met  Outcome: Ongoing     Problem: Discharge Planning:  Goal: Discharged to appropriate level of care  Description: Discharged to appropriate level of care  Outcome: Ongoing

## 2022-03-18 NOTE — PROGRESS NOTES
Alyson Kunz  3/18/2022  [id number]    Chief Complaint: Back pain with radiculopathy    Subjective:   No overnight events. No current complaints. Feels her pain is again somewhat improved today. She just received information she was denied and was on the phone with insurance for an answer. ROS: No CP, SOB, dyspnea    Objective:  Patient Vitals for the past 24 hrs:   BP Temp Temp src Pulse Resp SpO2   03/18/22 0923 120/62 -- -- -- -- --   03/18/22 0530 124/75 97.6 °F (36.4 °C) Oral 80 17 99 %   03/18/22 0224 111/72 98.4 °F (36.9 °C) Oral 70 18 96 %   03/17/22 2323 116/71 98.2 °F (36.8 °C) Oral 79 18 98 %   03/17/22 2030 130/71 98.9 °F (37.2 °C) Oral 79 18 94 %   03/17/22 1558 137/78 97.8 °F (36.6 °C) Oral 79 16 95 %     Gen: No distress, pleasant. Resting in bedside chair  HEENT: Normocephalic, atraumatic  CV: No audible murmurs, well perfused extremities  Resp: No respiratory distress. No increased WOB  Abd: Soft, nontender nondistended  Ext: No edema  Neuro: Alert, oriented, appropriately interactive. Laboratory data: Available via EMR. Therapy progress:  PT  Position Activity Restriction  Other position/activity restrictions: Alyson Kunz is a 75 y.o. female who presents to the emergency department complaining of low back pain with radiation to left lower extremity for 3 to 4 weeks. She is not moving her left leg much because it hurts. She denies any numbness, tingling or weakness. She believes that because she is not moving her left leg, she has acquired some swelling in her left foot and ankle and she feels as though the left great toe is discolored. She is starting to experience some sharp pain in the right buttock over the past few days. Denies bowel or bladder incontinence or retention but states that her urine does appear darker and she thinks that she may be dehydrated. She has been getting around the house with a walker but states that it is too painful to walk at this point.   She lives by were taken to ensure accuracy, errors may have occurred. Please disregard any typographical errors.

## 2022-03-24 RX ORDER — APIXABAN 5 MG/1
TABLET, FILM COATED ORAL
Qty: 60 TABLET | Refills: 0 | Status: SHIPPED | OUTPATIENT
Start: 2022-03-24 | End: 2022-06-16 | Stop reason: SDUPTHER

## 2022-03-24 RX ORDER — METOPROLOL SUCCINATE 100 MG/1
TABLET, EXTENDED RELEASE ORAL
Qty: 30 TABLET | Refills: 0 | Status: SHIPPED | OUTPATIENT
Start: 2022-03-24 | End: 2022-06-16 | Stop reason: SDUPTHER

## 2022-03-24 RX ORDER — FUROSEMIDE 40 MG/1
TABLET ORAL
Qty: 60 TABLET | Refills: 0 | Status: SHIPPED | OUTPATIENT
Start: 2022-03-24 | End: 2022-06-16 | Stop reason: SDUPTHER

## 2022-03-24 RX ORDER — ISOSORBIDE MONONITRATE 30 MG/1
TABLET, EXTENDED RELEASE ORAL
Qty: 30 TABLET | Refills: 0 | Status: SHIPPED | OUTPATIENT
Start: 2022-03-24 | End: 2022-06-16 | Stop reason: SDUPTHER

## 2022-04-20 NOTE — CARE COORDINATION
Consult to ARU called, CM spoke with the  RN liaison. Stated he is  looking at the case will  call back with updates. Quality 130: Documentation Of Current Medications In The Medical Record: Current Medications Documented Quality 431: Preventive Care And Screening: Unhealthy Alcohol Use - Screening: Patient not identified as an unhealthy alcohol user when screened for unhealthy alcohol use using a systematic screening method Detail Level: Detailed Quality 226: Preventive Care And Screening: Tobacco Use: Screening And Cessation Intervention: Patient screened for tobacco use and is an ex/non-smoker

## 2022-04-20 NOTE — PROGRESS NOTES
Patient seen , discharge dictated scripts given , arrangements made , DIANNE completed .  Discussed with nursing staff  And   If applicable ,  Discussed with  Patient's family , all questions answered and concerns addressed  When applicable

## 2022-04-21 NOTE — DISCHARGE SUMMARY
[place] [address]                     [facility], [address]                               DISCHARGE SUMMARY    PATIENT NAME: Susi Mccoy                        :        1955  MED REC NO:   [id number]                          ROOM:       4468  ACCOUNT NO:   [de-identified]                           ADMIT DATE: 2022  PROVIDER:     Chad Romero MD                  DISCHARGE DATE:  2022    FINAL DIAGNOSES:  1. Mixed hyperlipidemia. 2.  Morbid obesity. 3.  Essential hypertension. 4.  Gout. 5.  Chronic back pain with radiculopathy. 6.  The patient is unable to ambulate. DISCHARGE MEDICATIONS:  1. Norco 5/325 2 tablets every 6 hours p.r.n.  2.  Zofran orally disintegrating tablet 4 mg every 8 hours p.r.n.  3.  Prednisone 20 mg 2 tablets daily for 10 days. 4.  Colcrys 0.6 mg daily. 5.  Flexeril 10 mg p.o. t.i.d. p.r.n.  6.  Amiodarone 100 mg half tablet daily. 7.  Lipitor 40 mg once a day. 8.  Diovan 160 twice a day. 9.  Eliquis 5 mg twice a day. 10.  Lasix 40 mg twice a day. 11.  Isosorbide mononitrate as directed. 12.  Lasix 40 mg p.o. b.i.d.  13.  Metoprolol succinate 100 mg daily. 14.  Aspirin 81 mg once a day. HOSPITAL COURSE:  This 58-year-old white American lady with history of  severe bilateral lower extremity radicular pain. The patient was  admitted for pain control, unable to ambulate. Temperature 98.7, blood  pressure 133/70, pulse 90, respiration was 14. X-ray of the left foot  shows fourth and fifth proximal phalangeal fractures which was likely  old. CT of the lumbar spine showed no acute fracture, subluxation or  dislocation. L4-L5 mild degenerative anterolisthesis with multilevel  spondylosis. The patient also had a CT scan of the pelvis without  contrast that showed no fracture. EKG was normal.  The patient was  given parenteral pain relief, PT and OT. The patient may need skilled  nursing facility placement.

## 2022-06-01 ENCOUNTER — NURSE ONLY (OUTPATIENT)
Dept: CARDIOLOGY CLINIC | Age: 67
End: 2022-06-01
Payer: MEDICARE

## 2022-06-01 ENCOUNTER — TELEPHONE (OUTPATIENT)
Dept: CARDIOLOGY CLINIC | Age: 67
End: 2022-06-01

## 2022-06-01 DIAGNOSIS — I42.0 DILATED CARDIOMYOPATHY (HCC): ICD-10-CM

## 2022-06-01 DIAGNOSIS — Z95.810 CARDIAC DEFIBRILLATOR IN PLACE: ICD-10-CM

## 2022-06-01 DIAGNOSIS — I48.0 PAROXYSMAL ATRIAL FIBRILLATION (HCC): ICD-10-CM

## 2022-06-01 PROCEDURE — 93290 INTERROG DEV EVAL ICPMS IP: CPT | Performed by: INTERNAL MEDICINE

## 2022-06-01 PROCEDURE — 93283 PRGRMG EVAL IMPLANTABLE DFB: CPT | Performed by: INTERNAL MEDICINE

## 2022-06-01 NOTE — TELEPHONE ENCOUNTER
Saw patient in the device clinic today and her OptiVOl is elevated. Please advise. Report has been exported in to chart. Optivol is elevated. · Possible OptiVol fluid accumulation: 20-Apr-2022 -- ongoing. Patient is having swelling-always has SOB. Will send message to HF team. Patient confirmed she is taking her furosemide (LASIX) 40 MG tablet bid.

## 2022-06-06 NOTE — TELEPHONE ENCOUNTER
Spoke to patient she will call back. Needs to talk to daughter about bringing her on a Thurs or Fri.  With [facility] ( do not schedule with [facility])

## 2022-06-14 ASSESSMENT — ENCOUNTER SYMPTOMS: GASTROINTESTINAL NEGATIVE: 1

## 2022-06-14 NOTE — PROGRESS NOTES
[address]   Congestive Heart Failure    PrimaryCare Doctor:  Chanelle Moore MD    Chief Complaint:  CHF    History of Present Illness:  Ade Rodriguez is a 75 y.o. female with PMH CAD, MI, ICM, HFmrEF, ICD, AF, HLD, HTN who presents today for CHF. F/u. She is not a great historian, denies any change in SOB or other cardiac sx and it is unclear how many of her meds she is taking - she tells me she is taking them all but she is out of refills. Her HR is elevated today. She is c/o knee pain from a fall and thumb pain. No wt's at home and pt refuses wts here. Optivol: over baseline early June, now back under baseline    Baseline Weight:   Wt Readings from Last 3 Encounters:   06/16/22 260 lb (117.9 kg)   03/13/22 266 lb 11.2 oz (121 kg)   03/01/22 265 lb 12.8 oz (120.6 kg)      Hospital Admit April for leg pain    EF: 40%  Cardiac Imaging:  Echo 10/8/2020  Summary   Left ventricular systolic function is moderately reduced with estimated   ejection fraction of 40%. There is moderate concentric left ventricular   hypertrophy.  Cari Cheeks is no evidence of mass or thrombus in the left atrium or appendage.     [facility] 8/13/20:  LM-Normal   LAD-mid 50%  Cx-normal  OM- normal  RCA-dominant normal  RPDA- normal  LVEF- 35  LVG- Global  LVEDP- 25     Impression  ~Coronary Angiography w/ nonobstructive CAD  ~LVG with LVEF of 35 and global regional wall motion abnormalities  ~high LVEDP     Device: Medtronic ICD with Optivol      Activity: at baseline  Can you walk 1-2 blocks or do a moderate amount of house/yard work? No      NYHA Class: III       Sodium Restrictions: 3g  Fluid Restrictions: 48-64 oz/day  Sodium and fluid restriction compliance: fair    Pt Education: The patient has received education on the following topics: dietary sodium restriction, heart failure medications, the importance of physical activity, symptom management and weight monitoring       Past Medical History:   has a past medical history of AICD (automatic cardioverter/defibrillator) present, Arthritis, CHF (congestive heart failure) ([facility] [address] Gout, Hyperlipidemia, Hypertension, and MI (myocardial infarction) ([facility] [address] Surgical History:   has a past surgical history that includes Dilation and curettage of uterus and Cardiac defibrillator placement. Social History:   reports that she has never smoked. She has never used smokeless tobacco. She reports that she does not drink alcohol and does not use drugs. Family History:   Family History   Problem Relation Age of Onset    Heart Disease Mother     High Blood Pressure Mother     High Cholesterol Mother     Diabetes Mother     Cancer Father        HomeMedications:  Prior to Admission medications    Medication Sig Start Date End Date Taking?  Authorizing Provider   metoprolol succinate (TOPROL XL) 100 MG extended release tablet TAKE ONE TABLET BY MOUTH DAILY 3/24/22   Layman Numbers, APRN - CNP   isosorbide mononitrate (IMDUR) 30 MG extended release tablet TAKE ONE TABLET BY MOUTH DAILY 3/24/22   Layvickie Numbers, YEIMY - CNP   furosemide (LASIX) 40 MG tablet TAKE ONE TABLET BY MOUTH TWICE A DAY 3/24/22   Layman Numbers, APRN - CNP   ELIQUIS 5 MG TABS tablet TAKE ONE TABLET BY MOUTH TWICE A DAY 3/24/22   Layvickie Numbers, APRN - CNP   ondansetron (ZOFRAN-ODT) 4 MG disintegrating tablet Take 1 tablet by mouth every 8 hours as needed for Nausea or Vomiting 3/16/22   Chapis Zavala MD   colchicine (COLCRYS) 0.6 MG tablet Take 1 tablet by mouth daily 3/17/22   Chapis Zavala MD   amiodarone (CORDARONE) 200 MG tablet Reduce to 100 mg (1/2 tablet) daily until April 1st, then stop 3/1/22   YEIMY Rousseau - CNP   atorvastatin (LIPITOR) 40 MG tablet Take 1 tablet by mouth daily 1/27/22   Layvickie Numbers, APRN - CNP   valsartan (DIOVAN) 160 MG tablet Take 1 tablet by mouth 2 times daily 1/27/22   Tiana Lieberman, APRN - CNP   aspirin 81 MG EC tablet Take 81 mg by mouth daily    Historical Provider, MD Allergies:  Patient has no known allergies. ROS:   Review of Systems   Constitutional: Positive for fatigue. Respiratory: Negative. Cardiovascular: Positive for leg swelling. Negative for chest pain and palpitations. Gastrointestinal: Negative. Genitourinary: Negative. Musculoskeletal: Positive for arthralgias. Skin: Negative. Neurological: Negative. Hematological: Negative. Psychiatric/Behavioral: Negative. Physical Examination:    Vitals:    06/16/22 1051   BP: 120/70   Site: Left Upper Arm   Position: Sitting   Cuff Size: Large Adult   Pulse: 98   SpO2: 97%   Weight: 260 lb (117.9 kg)   Height: 5' 6\" (1.676 m)           Physical Exam  Vitals reviewed. Constitutional:       Appearance: Normal appearance. She is normal weight. HENT:      Head: Normocephalic and atraumatic. Eyes:      Extraocular Movements: Extraocular movements intact. Pupils: Pupils are equal, round, and reactive to light. Cardiovascular:      Rate and Rhythm: Normal rate and regular rhythm. Pulses: Normal pulses. Heart sounds: Normal heart sounds. Pulmonary:      Effort: Pulmonary effort is normal.      Breath sounds: Normal breath sounds. Abdominal:      Palpations: Abdomen is soft. Musculoskeletal:         General: Normal range of motion. Cervical back: Normal range of motion and neck supple. Right lower leg: Edema present. Left lower leg: Edema present. Comments: Trace around ankles   Skin:     General: Skin is warm and dry. Neurological:      General: No focal deficit present. Mental Status: She is alert and oriented to person, place, and time. Mental status is at baseline. Psychiatric:         Mood and Affect: Mood normal.         Behavior: Behavior normal.         Thought Content:  Thought content normal.         Judgment: Judgment normal.         Lab Data:    CBC:   Lab Results   Component Value Date    WBC 16.5 03/15/2022    WBC 18.4 03/14/2022 WBC 15.9 03/13/2022    RBC 4.21 03/15/2022    RBC 4.57 03/14/2022    RBC 5.02 03/13/2022    HGB 11.5 03/15/2022    HGB 12.2 03/14/2022    HGB 13.6 03/13/2022    HCT 36.1 03/15/2022    HCT 38.5 03/14/2022    HCT 42.9 03/13/2022    MCV 85.7 03/15/2022    MCV 84.3 03/14/2022    MCV 85.4 03/13/2022    RDW 15.8 03/15/2022    RDW 16.1 03/14/2022    RDW 16.3 03/13/2022     03/15/2022     03/14/2022     03/13/2022     BMP:  Lab Results   Component Value Date     03/15/2022     03/14/2022     03/13/2022    K 4.0 03/15/2022    K 4.0 03/14/2022    K 3.8 03/13/2022    K 3.4 01/27/2022    K 3.4 05/31/2021    K 3.4 08/12/2020     03/15/2022     03/14/2022    CL 99 03/13/2022    CO2 27 03/15/2022    CO2 26 03/14/2022    CO2 24 03/13/2022    BUN 13 03/15/2022    BUN 11 03/14/2022    BUN 10 03/13/2022    CREATININE 0.7 03/15/2022    CREATININE 0.6 03/14/2022    CREATININE 0.7 03/13/2022     BNP:   Lab Results   Component Value Date    PROBNP 63 01/27/2022    PROBNP 55 08/30/2021    PROBNP 198 05/30/2021     Iron Studies:  No components found for: FE,  TIBC,  FERRITIN      Assessment/Plan:    1. Systolic heart failure, chronic (HCC) - compensated, no change in diuretics   2. Cardiomyopathy - continue/restart ARB, BB, imdur   3. CAD - no CP, continue stain, imdur, BB, ASA, fasting labs due       5. Paroxysmal atrial fibrillation (HCC) - cont AC, BB, has EP f/u scheduled   6. Essential hypertension - controlled         Instructions:   1. Medications: take medications as listed on med list  2. Labs: in September, fasting  3. Lifestyle Recommendations: Weigh yourself every day in the morning after urination, call Micha[facility] November if wt increases 2-3lb in one day or 5lb in one week, Limit sodium to 2000mg/day and fluids to 2L or 64oz/day.    4. Follow up: f/u in sept with [address]: [phone]      I appreciate the opportunity of cooperating in the care of this individual.    Naomi Hitchcock, YEIMY - CNP, CNP, 6/14/2022,12:43 PM

## 2022-06-16 ENCOUNTER — NURSE ONLY (OUTPATIENT)
Dept: CARDIOLOGY CLINIC | Age: 67
End: 2022-06-16
Payer: MEDICARE

## 2022-06-16 ENCOUNTER — OFFICE VISIT (OUTPATIENT)
Dept: CARDIOLOGY CLINIC | Age: 67
End: 2022-06-16
Payer: MEDICARE

## 2022-06-16 VITALS
WEIGHT: 260 LBS | OXYGEN SATURATION: 97 % | DIASTOLIC BLOOD PRESSURE: 70 MMHG | BODY MASS INDEX: 41.78 KG/M2 | HEART RATE: 98 BPM | SYSTOLIC BLOOD PRESSURE: 120 MMHG | HEIGHT: 66 IN

## 2022-06-16 DIAGNOSIS — I42.0 DILATED CARDIOMYOPATHY (HCC): ICD-10-CM

## 2022-06-16 DIAGNOSIS — I25.10 CORONARY ARTERY DISEASE DUE TO LIPID RICH PLAQUE: ICD-10-CM

## 2022-06-16 DIAGNOSIS — I10 ESSENTIAL HYPERTENSION: ICD-10-CM

## 2022-06-16 DIAGNOSIS — I50.22 CHRONIC SYSTOLIC HEART FAILURE (HCC): Primary | ICD-10-CM

## 2022-06-16 DIAGNOSIS — I25.83 CORONARY ARTERY DISEASE DUE TO LIPID RICH PLAQUE: ICD-10-CM

## 2022-06-16 DIAGNOSIS — Z95.810 CARDIAC DEFIBRILLATOR IN PLACE: Primary | ICD-10-CM

## 2022-06-16 DIAGNOSIS — I48.0 PAROXYSMAL ATRIAL FIBRILLATION (HCC): ICD-10-CM

## 2022-06-16 PROCEDURE — 93290 INTERROG DEV EVAL ICPMS IP: CPT | Performed by: INTERNAL MEDICINE

## 2022-06-16 PROCEDURE — 99214 OFFICE O/P EST MOD 30 MIN: CPT | Performed by: NURSE PRACTITIONER

## 2022-06-16 PROCEDURE — 93283 PRGRMG EVAL IMPLANTABLE DFB: CPT | Performed by: INTERNAL MEDICINE

## 2022-06-16 PROCEDURE — 1123F ACP DISCUSS/DSCN MKR DOCD: CPT | Performed by: NURSE PRACTITIONER

## 2022-06-16 RX ORDER — AMIODARONE HYDROCHLORIDE 200 MG/1
TABLET ORAL
Qty: 30 TABLET | Refills: 0 | Status: CANCELLED | OUTPATIENT
Start: 2022-06-16

## 2022-06-16 RX ORDER — ISOSORBIDE MONONITRATE 30 MG/1
30 TABLET, EXTENDED RELEASE ORAL DAILY
Qty: 90 TABLET | Refills: 1 | Status: SHIPPED | OUTPATIENT
Start: 2022-06-16

## 2022-06-16 RX ORDER — FUROSEMIDE 40 MG/1
40 TABLET ORAL 2 TIMES DAILY
Qty: 180 TABLET | Refills: 1 | Status: SHIPPED | OUTPATIENT
Start: 2022-06-16

## 2022-06-16 RX ORDER — VALSARTAN 160 MG/1
160 TABLET ORAL 2 TIMES DAILY
Qty: 180 TABLET | Refills: 1 | Status: SHIPPED | OUTPATIENT
Start: 2022-06-16

## 2022-06-16 RX ORDER — ATORVASTATIN CALCIUM 40 MG/1
40 TABLET, FILM COATED ORAL DAILY
Qty: 90 TABLET | Refills: 1 | Status: SHIPPED | OUTPATIENT
Start: 2022-06-16

## 2022-06-16 RX ORDER — METOPROLOL SUCCINATE 100 MG/1
100 TABLET, EXTENDED RELEASE ORAL DAILY
Qty: 90 TABLET | Refills: 1 | Status: SHIPPED | OUTPATIENT
Start: 2022-06-16 | End: 2022-09-01 | Stop reason: SDUPTHER

## 2022-06-16 ASSESSMENT — ENCOUNTER SYMPTOMS: RESPIRATORY NEGATIVE: 1

## 2022-06-16 NOTE — PROGRESS NOTES
We received remote transmission from CARRILLO at [facility] cardiology office. Transmission shows normal sensing and pacing function. Noted NSVT. Pt is on Toprol. EP physician will review. See interrogation under cardiology tab in the [address] [place] [address] [address] field for more details. Optivol is within normal range.

## 2022-06-16 NOTE — PATIENT INSTRUCTIONS
Instructions:   1. Medications: take medications as listed on med list  2. Labs: in September, fasting  3. Lifestyle Recommendations: Weigh yourself every day in the morning after urination, call Anastasia Loomis if wt increases 2-3lb in one day or 5lb in one week, Limit sodium to 2000mg/day and fluids to 2L or 64oz/day.    4. Follow up: f/u in sept with [address]: [phone]

## 2022-06-29 ENCOUNTER — APPOINTMENT (OUTPATIENT)
Dept: GENERAL RADIOLOGY | Age: 67
End: 2022-06-29
Attending: PHYSICAL MEDICINE & REHABILITATION
Payer: MEDICARE

## 2022-06-29 ENCOUNTER — HOSPITAL ENCOUNTER (OUTPATIENT)
Age: 67
Setting detail: OUTPATIENT SURGERY
Discharge: HOME | End: 2022-06-29
Attending: PHYSICAL MEDICINE & REHABILITATION | Admitting: PHYSICAL MEDICINE & REHABILITATION
Payer: MEDICARE

## 2022-06-29 VITALS
HEART RATE: 89 BPM | TEMPERATURE: 97 F | WEIGHT: 262 LBS | RESPIRATION RATE: 18 BRPM | BODY MASS INDEX: 42.11 KG/M2 | SYSTOLIC BLOOD PRESSURE: 154 MMHG | HEIGHT: 66 IN | DIASTOLIC BLOOD PRESSURE: 82 MMHG | OXYGEN SATURATION: 97 %

## 2022-06-29 PROCEDURE — 3610000056 HC PAIN LEVEL 4 BASE (NON-OR): Performed by: PHYSICAL MEDICINE & REHABILITATION

## 2022-06-29 PROCEDURE — 77003 FLUOROGUIDE FOR SPINE INJECT: CPT

## 2022-06-29 PROCEDURE — 2500000003 HC RX 250 WO HCPCS: Performed by: PHYSICAL MEDICINE & REHABILITATION

## 2022-06-29 PROCEDURE — 6360000002 HC RX W HCPCS: Performed by: PHYSICAL MEDICINE & REHABILITATION

## 2022-06-29 PROCEDURE — 2709999900 HC NON-CHARGEABLE SUPPLY: Performed by: PHYSICAL MEDICINE & REHABILITATION

## 2022-06-29 PROCEDURE — 3610000057 HC PAIN LEVEL 4 ADDL 15 MIN (NON-OR): Performed by: PHYSICAL MEDICINE & REHABILITATION

## 2022-06-29 PROCEDURE — 99152 MOD SED SAME PHYS/QHP 5/>YRS: CPT | Performed by: PHYSICAL MEDICINE & REHABILITATION

## 2022-06-29 RX ORDER — FENTANYL CITRATE 50 UG/ML
INJECTION, SOLUTION INTRAMUSCULAR; INTRAVENOUS
Status: COMPLETED | OUTPATIENT
Start: 2022-06-29 | End: 2022-06-29

## 2022-06-29 RX ORDER — MIDAZOLAM HYDROCHLORIDE 1 MG/ML
INJECTION INTRAMUSCULAR; INTRAVENOUS
Status: COMPLETED | OUTPATIENT
Start: 2022-06-29 | End: 2022-06-29

## 2022-06-29 RX ORDER — LIDOCAINE HYDROCHLORIDE 10 MG/ML
INJECTION, SOLUTION EPIDURAL; INFILTRATION; INTRACAUDAL; PERINEURAL
Status: COMPLETED | OUTPATIENT
Start: 2022-06-29 | End: 2022-06-29

## 2022-06-29 RX ORDER — DEXAMETHASONE SODIUM PHOSPHATE 10 MG/ML
INJECTION, SOLUTION INTRAMUSCULAR; INTRAVENOUS
Status: COMPLETED | OUTPATIENT
Start: 2022-06-29 | End: 2022-06-29

## 2022-06-29 ASSESSMENT — PAIN - FUNCTIONAL ASSESSMENT: PAIN_FUNCTIONAL_ASSESSMENT: 0-10

## 2022-06-29 NOTE — H&P
HISTORY AND PHYSICAL/PRE-SEDATION ASSESSMENT    Patient:  Brett Diane   :  1955  Medical Record No.:  [id number]   Date:  2022  Physician:  Nj Temple MD  Facility: [address] [place] [address] Drive:                 The patient is a 75 y.o. female whom presents with low back pain. Review of the imaging and physical exam of the patient confirmed the pre-procedure diagnosis. After a thorough discussion of risks, benefits and alternatives informed consent was obtained. Diagnosis:  Spinal stenosis of lumbar region, unspecified whether neurogenic claudication present [M48.061]    Past Medical History:   Past Medical History:   Diagnosis Date    AICD (automatic cardioverter/defibrillator) present     Arthritis     [facility]congest[facility] failure) [facility])     Gout     Hyperlipidemia     Hypertension     MI (myocardial infarction) ([facility]         Past Surgical History:     Past Surgical History:   Procedure Laterality Date    CARDIAC DEFIBRILLATOR PLACEMENT      DILATION AND CURETTAGE OF UTERUS         Current Medications:   Prior to Admission medications    Medication Sig Start Date End Date Taking?  Authorizing Provider   atorvastatin (LIPITOR) 40 MG tablet Take 1 tablet by mouth daily 22   YEIMY Lemos CNP   apixaban (ELIQUIS) 5 MG TABS tablet Take 1 tablet by mouth 2 times daily 22   YEIMY Lemos CNP   furosemide (LASIX) 40 MG tablet Take 1 tablet by mouth 2 times daily 22   YEIMY Lemos CNP   isosorbide mononitrate (IMDUR) 30 MG extended release tablet Take 1 tablet by mouth daily 22   YEIMY Lemos CNP   metoprolol succinate (TOPROL XL) 100 MG extended release tablet Take 1 tablet by mouth daily 22   YEIMY Lemos CNP   valsartan (DIOVAN) 160 MG tablet Take 1 tablet by mouth 2 times daily 22   YEIMY Lemos CNP   ondansetron (ZOFRAN-ODT) 4 MG disintegrating tablet Take 1 tablet by mouth every 8 hours as needed for Nausea or Vomiting 3/16/22   Gina Chavez MD   colchicine (COLCRYS) 0.6 MG tablet Take 1 tablet by mouth daily 3/17/22   Gina Chavez MD   aspirin 81 MG EC tablet Take 81 mg by mouth daily    Historical Provider, MD       Allergies:  Patient has no known allergies. Social History:    reports that she has never smoked. She has never used smokeless tobacco. She reports that she does not drink alcohol and does not use drugs. Family History:   Family History   Problem Relation Age of Onset    Heart Disease Mother     High Blood Pressure Mother     High Cholesterol Mother     Diabetes Mother     Cancer Father         Vitals: Blood pressure (!) 159/91, pulse 95, temperature 97 °F (36.1 °C), temperature source Temporal, resp. rate 18, height 5' 6\" (1.676 m), weight 262 lb (118.8 kg), SpO2 98 %. PHYSICAL EXAM:  HENT: Airway patent and reviewed  Cardiovascular: Normal rate, regular rhythm, normal heart sounds. Pulmonary/Chest: No wheezes. No rhonchi. No rales. Abdominal: Soft. Bowel sounds are normal. No distension. Extremities: Moves all extremities equally  Lumbar Spine: Painful range of motion, no midline tenderness     ASA CLASS:         []   I. Normal, healthy adult           [x]   II.  Mild systemic disease            []   III. Severe systemic disease    Mallampati: Mallampati Class II - (soft palate, fauces & uvula are visible)      Sedation plan:   []  Local              [x]  Minimal                  []  General anesthesia    Treatment plan:  Patient's condition acceptable for planned procedure/sedation. Proceed with planned procedure   Post Procedure Plan   Return to same level of care   ______________________     The risks and benefits as well as alternatives to the procedure have been discussed with the patient and or family. The patient and/or next of kin understands and agrees to proceed.     Jose Huggins MD

## 2022-06-29 NOTE — OP NOTE
PATIENT:  Shahla Irvin  AGE:  75 yrs  MEDICAL RECORD #:  [id number]  YOB: 1955     DATE:  6/29/2022  PHYSICIAN: Marie Arauz M.D. PROCEDURE: Bilateral L5 transforaminal epidural steroid injection under fluoroscopy. PRE-OP DIAGNOSIS:  Low Back Pain/Radiculopathy     POST-OP DIAGNOSIS:  same     HISTORY OF PRESENT ILLNESS:  See office notes. Patient has failed previous less-invasive treatments. ALLERGIES:  Patient has no known allergies. MEDICATIONS:    No current facility-administered medications for this encounter. PHYSICAL EXAMINATION:              General:  Awake, alert              Heart:  No audible murmurs, extremities well perfused              Lungs:  No increased WOB or audible wheezing              Extremities:  Normal tone. Warm. No swelling. Anesthesia: 1 mg Versed and 50 mcg fentanyl    Estimated blood loss: None    DESCRIPTION OF PROCEDURE:     Components of the procedure were again reviewed with the patient prior to the procedure. She is aware of risks including infection, bleeding, allergic reaction, and nerve injury. She had ample opportunity for additional questions. She elected to proceed with treatment. The patient was placed in the prone position. Cardiovascular monitoring was initiated, and vital signs were stable prior to, during, and after the procedure. Utilizing fluoroscopy, the L5 vertebrae were identified. The area was sterilely prepped and draped. Skin anesthesia was achieved at each entry site using 1-2 cc of Lidocaine 1%. A 22 g 5.0 inch spinal needle was slowly inserted into the bilateral L5 neuroforamen using AP, lateral and oblique fluoroscopic imaging. Negative aspiration was confirmed. 1 cc Isovue-M 300 was injected at each site showing contrast spread into the epidural space and along the nerve root. A combination of 1 cc Lidocaine 1% and 10 mg dexamethasone were slowly injected at each site.  The needles were removed after the stylets were repositioned. A sterile bandage was applied. The patient was brought to recovery in stable condition. The patient tolerated the procedure well. DISPOSITION:  The patient was transported to recovery. The patient was monitored for 15 to 20 minutes post-procedure. Precautions were discussed and written instructions provided. Comment: Good epidural and NR flow at both sites.

## 2022-08-31 NOTE — PROGRESS NOTES
[facility]   Electrophysiology  YEIMY Buchanan-CNP  Attending EP: Dr. Gita Meadows    Date: 9/1/2022  I had the privilege of visiting Kandee Landau in the office. Chief Complaint:   Chief Complaint   Patient presents with    Follow-up     Pt reports no cardiac concerns. Atrial Fibrillation     History of Present Illness: History obtained from patient and medical record. Kandee Landau is 75 y.o. female with a past medical history of HTN, HLD, CHF, obesity, and AICD (2007 at [facility]). In August of 2020, pt presented from outside hospital with chest pain. She recently lost her brother and has not been feeling well. Pt admitted to non-compliance with medications secondary to COVID. She had an AICD shock. Her troponin was noted to be 3.5 with a potassium 2.5 in the ER at [place]. She underwent cardiac cath, which showed non obstructive coronaries and EF of 35%. S/p RFCA with PVI (10/8/20, Dr. Gita Meadows)    -Interval history: Today, Kandee Landau is being seen for routine follow up. Her son is present for the visit. She is doing pretty well. She remains in sinus rhythm on EKG today. Her device shows brief episodes of PAT/NSVT. She denies any symptoms with the episodes. She does not weight herself at home. She did not complete the echo as discussed at our last visit. She is very sedentary at home due to issues with her back. Pt reports she had a fall a few months ago that messed it up. She is worried she might need surgery in the future. Denies having chest pain, palpitations, shortness of breath, orthopnea/PND, cough, or dizziness at the time of this visit. With regard to medication therapy the patient has been compliant with prescribed regimen. They have tolerated therapy to date. Allergies:  No Known Allergies    Home Medications:  Prior to Visit Medications    Medication Sig Taking?  Authorizing Provider   ibuprofen (ADVIL;MOTRIN) 200 MG tablet Take 400 mg by mouth every 6 hours as needed Yes Historical Date    WBC 16.5 (H) 03/15/2022    HGB 11.5 (L) 03/15/2022    HCT 36.1 03/15/2022    MCV 85.7 03/15/2022     03/15/2022     BMP:   Lab Results   Component Value Date    CREATININE 0.7 03/15/2022    BUN 13 03/15/2022     03/15/2022    K 4.0 03/15/2022     03/15/2022    CO2 27 03/15/2022     Estimated Creatinine Clearance: 101 mL/min (based on SCr of 0.7 mg/dL). Thyroid:   Lab Results   Component Value Date    TSH 3.90 2021     Lipid Panel:   Lab Results   Component Value Date/Time    CHOL 189 2021 07:47 AM    HDL 39 2021 07:47 AM    TRIG 99 2021 07:47 AM     LFTs:  Lab Results   Component Value Date    ALT 18 2022    AST 29 2022    ALKPHOS 123 2022    BILITOT 0.4 2022     Coags:   Lab Results   Component Value Date    PROTIME 14.3 (H) 10/08/2020    INR 1.23 (H) 10/08/2020    APTT 27.4 2019       EC2022  - NSR. Rate 93, , QTc 423    Echo:    Left ventricular systolic function is mild to moderately reduced with ejection fraction estimated at 40%. There is mild concentric left ventricular hypertrophy. Grade I diastolic dysfunction. Normal right ventricular size and function. Pacer / ICD wire is visualized in the right ventricle. Mild mitral regurgitation. Aortic valve appears sclerotic but opens adequately. Mild tricuspid regurgitation. Systolic pulmonary artery pressure (SPAP) is normal and estimated at 25-30 mmHg. JOAN: 10/20   Left ventricular systolic function is moderately reduced with estimated ejection fraction of 40%. There is moderate concentric left ventricular hypertrophy. There is no evidence of mass or thrombus in the left atrium or appendage. GXT:   There is normal isotope uptake at stress and rest. There is no evidence of    myocardial ischemia or scar. Normal LV function. Overall findings represent a low risk scan. Assessment:    1.  Paroxysmal Atrial Fibrillation, PAT - S/p RFCA with PVI (10/8/20, Dr. Presley Langford)                 - Currently in NSR              - Continue Toprol  mg QAM; will add 50 mg QPM               ~ Previously on amiodarone                - [id number] score: 4 (Age, Gender, CHF, HTN) ; [id number] Vasc score and anticoagulation discussed. High risk for stroke and thromboembolism. Anticoagulation is recommended. Risk of bleeding was discussed.                          ~ On Eliquis 5 mg BID                 - Afib risk factors including age, HTN, obesity, inactivity and TYLER were discussed with patient. Risk factor modification recommended                 2. Hx of AICD shock              - Occurred in setting of AF with RVR and hyperkalemia/fluid overload              - No recurrence              - Continue BB     3. NSVT   - Noted on device   - Increase BB dosage    4. Non-Ischemic cardiomyopathy              - S/p Dual chamber Medtronic AICD (2007 at [facility])  - I reviewed device interrogation today. Device functioning normally.   ~ A-paced 0.6% V-paced 0.1%  ~ 20 months left on battery life expectancy  - Discussed with patient  - Follow up with device clinic as scheduled     5. Chronic systolic heart failure (NYHA Class III)              - Appears compensated                          ~ EF 40% per JOAN (10/20); 35% per LHC (8/20)              - Continue with Toprol  mg QAM/50 mg QPM, valsartan 160 mg BID, lasix 40 mg BID  - Aggressive medical therapy with risk factor modification  - Discussed with patient importance of monitoring weight, low sodium diet and fluid restriction  - Followed by CHF team     6. HTN  - Slightly uncontrolled: Goal <130/80  - Continue current medications   ~ Will increase BB dose  - Encouraged to monitor and log BP readings at home, then bring log to next visit  - Discussed importance of low sodium diet, weight control and exercise     7.  At risk for TYLER              - Discussed long term risks of untreated TYLER              - including reviewing patient history/prior tests/prior consults, performing a medical exam, counseling and educating patient/family/caregiver, ordering medications/tests/procedures, referring and communicating with PCPs and other pertinent consultants, documenting information in the EMR, independently interpreting results and communicating to family and coordination of patient care.     YEIMY Vences-HANS  [address]   Office: [phone]

## 2022-09-01 ENCOUNTER — OFFICE VISIT (OUTPATIENT)
Dept: CARDIOLOGY CLINIC | Age: 67
End: 2022-09-01
Payer: MEDICARE

## 2022-09-01 ENCOUNTER — NURSE ONLY (OUTPATIENT)
Dept: CARDIOLOGY CLINIC | Age: 67
End: 2022-09-01
Payer: MEDICARE

## 2022-09-01 VITALS
BODY MASS INDEX: 42.11 KG/M2 | HEIGHT: 66 IN | DIASTOLIC BLOOD PRESSURE: 82 MMHG | OXYGEN SATURATION: 97 % | HEART RATE: 89 BPM | SYSTOLIC BLOOD PRESSURE: 138 MMHG | WEIGHT: 262 LBS

## 2022-09-01 DIAGNOSIS — G47.33 OSA (OBSTRUCTIVE SLEEP APNEA): ICD-10-CM

## 2022-09-01 DIAGNOSIS — I50.22 CHRONIC SYSTOLIC (CONGESTIVE) HEART FAILURE (HCC): ICD-10-CM

## 2022-09-01 DIAGNOSIS — I25.83 CORONARY ARTERY DISEASE DUE TO LIPID RICH PLAQUE: ICD-10-CM

## 2022-09-01 DIAGNOSIS — I10 ESSENTIAL HYPERTENSION: ICD-10-CM

## 2022-09-01 DIAGNOSIS — I47.29 NSVT (NONSUSTAINED VENTRICULAR TACHYCARDIA): ICD-10-CM

## 2022-09-01 DIAGNOSIS — Z95.810 CARDIAC DEFIBRILLATOR IN PLACE: ICD-10-CM

## 2022-09-01 DIAGNOSIS — I42.0 DILATED CARDIOMYOPATHY (HCC): ICD-10-CM

## 2022-09-01 DIAGNOSIS — I25.10 CORONARY ARTERY DISEASE DUE TO LIPID RICH PLAQUE: ICD-10-CM

## 2022-09-01 DIAGNOSIS — I48.0 PAROXYSMAL ATRIAL FIBRILLATION (HCC): Primary | ICD-10-CM

## 2022-09-01 DIAGNOSIS — I42.0 DILATED CARDIOMYOPATHY (HCC): Primary | ICD-10-CM

## 2022-09-01 DIAGNOSIS — I48.0 PAROXYSMAL ATRIAL FIBRILLATION (HCC): ICD-10-CM

## 2022-09-01 DIAGNOSIS — E66.01 MORBID OBESITY WITH BMI OF 45.0-49.9, ADULT (HCC): ICD-10-CM

## 2022-09-01 DIAGNOSIS — Z95.810 ICD (IMPLANTABLE CARDIOVERTER-DEFIBRILLATOR) IN PLACE: ICD-10-CM

## 2022-09-01 DIAGNOSIS — Z86.79 HISTORY OF VENTRICULAR TACHYCARDIA: ICD-10-CM

## 2022-09-01 PROBLEM — R29.898 WEAKNESS OF LEFT LOWER EXTREMITY: Status: RESOLVED | Noted: 2022-03-15 | Resolved: 2022-09-01

## 2022-09-01 PROCEDURE — 93283 PRGRMG EVAL IMPLANTABLE DFB: CPT | Performed by: INTERNAL MEDICINE

## 2022-09-01 PROCEDURE — 1123F ACP DISCUSS/DSCN MKR DOCD: CPT | Performed by: NURSE PRACTITIONER

## 2022-09-01 PROCEDURE — 99214 OFFICE O/P EST MOD 30 MIN: CPT | Performed by: NURSE PRACTITIONER

## 2022-09-01 PROCEDURE — 93290 INTERROG DEV EVAL ICPMS IP: CPT | Performed by: INTERNAL MEDICINE

## 2022-09-01 RX ORDER — METOPROLOL SUCCINATE 100 MG/1
TABLET, EXTENDED RELEASE ORAL
Qty: 90 TABLET | Refills: 1
Start: 2022-09-01

## 2022-09-01 RX ORDER — IBUPROFEN 200 MG
400 TABLET ORAL EVERY 6 HOURS PRN
COMMUNITY

## 2022-09-01 NOTE — PROGRESS NOTES
Patient comes in for programming evaluation for her defibrillator. All sensing and pacing parameters are within normal range. Battery life 20 months  AP 0.3%.  <0.1%. 7 NSVT episodes noted. Last on 9/1/2022, longest 2 seconds. 1 High Rate NSVT episode noted on 7/1/2022 lasting 1 second. PVC Runs (2-4 beats) 2.9 per hour  PVC Singles 60.8 per hour  Patient remains on Eliquis, amiodarone and metoprolol. No changes made this Session. Please see interrogation for more detail. Optivol was recently elevated. · Possible OptiVol fluid accumulation: 19-Aug-2022 -- 24-Aug-2022. Is back at baseline today. Patient will see NPSR today and follow up in 3 months in office.

## 2022-11-20 ENCOUNTER — APPOINTMENT (OUTPATIENT)
Dept: GENERAL RADIOLOGY | Age: 67
End: 2022-11-20
Payer: MEDICARE

## 2022-11-20 ENCOUNTER — HOSPITAL ENCOUNTER (EMERGENCY)
Age: 67
Discharge: HOME | End: 2022-11-20
Payer: MEDICARE

## 2022-11-20 VITALS
BODY MASS INDEX: 42.27 KG/M2 | SYSTOLIC BLOOD PRESSURE: 196 MMHG | HEART RATE: 87 BPM | OXYGEN SATURATION: 99 % | WEIGHT: 263 LBS | RESPIRATION RATE: 20 BRPM | TEMPERATURE: 97.1 F | HEIGHT: 66 IN | DIASTOLIC BLOOD PRESSURE: 101 MMHG

## 2022-11-20 DIAGNOSIS — M54.50 ACUTE EXACERBATION OF CHRONIC LOW BACK PAIN: Primary | ICD-10-CM

## 2022-11-20 DIAGNOSIS — G89.29 ACUTE EXACERBATION OF CHRONIC LOW BACK PAIN: Primary | ICD-10-CM

## 2022-11-20 DIAGNOSIS — S90.121A CONTUSION OF LESSER TOE OF RIGHT FOOT WITHOUT DAMAGE TO NAIL, INITIAL ENCOUNTER: ICD-10-CM

## 2022-11-20 PROCEDURE — 99284 EMERGENCY DEPT VISIT MOD MDM: CPT

## 2022-11-20 PROCEDURE — 73630 X-RAY EXAM OF FOOT: CPT

## 2022-11-20 PROCEDURE — 96372 THER/PROPH/DIAG INJ SC/IM: CPT

## 2022-11-20 PROCEDURE — 6370000000 HC RX 637 (ALT 250 FOR IP): Performed by: PHYSICIAN ASSISTANT

## 2022-11-20 PROCEDURE — 6360000002 HC RX W HCPCS: Performed by: PHYSICIAN ASSISTANT

## 2022-11-20 RX ORDER — CYCLOBENZAPRINE HCL 10 MG
10 TABLET ORAL 3 TIMES DAILY PRN
Qty: 20 TABLET | Refills: 0 | Status: ON HOLD | OUTPATIENT
Start: 2022-11-20 | End: 2022-11-24

## 2022-11-20 RX ORDER — LIDOCAINE 4 G/G
1 PATCH TOPICAL ONCE
Status: DISCONTINUED | OUTPATIENT
Start: 2022-11-20 | End: 2022-11-20 | Stop reason: HOSPADM

## 2022-11-20 RX ORDER — KETOROLAC TROMETHAMINE 30 MG/ML
30 INJECTION, SOLUTION INTRAMUSCULAR; INTRAVENOUS ONCE
Status: COMPLETED | OUTPATIENT
Start: 2022-11-20 | End: 2022-11-20

## 2022-11-20 RX ORDER — HYDROCODONE BITARTRATE AND ACETAMINOPHEN 5; 325 MG/1; MG/1
2 TABLET ORAL ONCE
Status: COMPLETED | OUTPATIENT
Start: 2022-11-20 | End: 2022-11-20

## 2022-11-20 RX ORDER — PREDNISONE 10 MG/1
TABLET ORAL
Qty: 30 TABLET | Refills: 0 | Status: ON HOLD
Start: 2022-11-20 | End: 2022-11-29 | Stop reason: HOSPADM

## 2022-11-20 RX ORDER — ORPHENADRINE CITRATE 30 MG/ML
60 INJECTION INTRAMUSCULAR; INTRAVENOUS ONCE
Status: COMPLETED | OUTPATIENT
Start: 2022-11-20 | End: 2022-11-20

## 2022-11-20 RX ADMIN — KETOROLAC TROMETHAMINE 30 MG: 30 INJECTION, SOLUTION INTRAMUSCULAR at 11:40

## 2022-11-20 RX ADMIN — ORPHENADRINE CITRATE 60 MG: 30 INJECTION INTRAMUSCULAR; INTRAVENOUS at 11:40

## 2022-11-20 RX ADMIN — HYDROCODONE BITARTRATE AND ACETAMINOPHEN 2 TABLET: 5; 325 TABLET ORAL at 11:40

## 2022-11-20 ASSESSMENT — PAIN - FUNCTIONAL ASSESSMENT: PAIN_FUNCTIONAL_ASSESSMENT: 0-10

## 2022-11-20 ASSESSMENT — LIFESTYLE VARIABLES: HOW OFTEN DO YOU HAVE A DRINK CONTAINING ALCOHOL: MONTHLY OR LESS

## 2022-11-20 ASSESSMENT — PAIN SCALES - GENERAL
PAINLEVEL_OUTOF10: 10
PAINLEVEL_OUTOF10: 10

## 2022-11-20 NOTE — ED PROVIDER NOTES
[facility]        Pt Name: Viky Garcia  MRN: [id number]  [place] 1955  Date of evaluation: 11/20/2022  Provider: Abhay Neri PA-C  PCP: Seamus Holt MD  Note Started: 11:49 AM EST 11/20/2022        LE. I have evaluated this patient. My supervising physician was available for consultation. CHIEF COMPLAINT       Chief Complaint   Patient presents with    Back Pain     Moves down both legs    Toe Injury     R 4th toe stubbed       HISTORY OF PRESENT ILLNESS   (Location, Timing/Onset, Context/Setting, Quality, Duration, Modifying Factors, Severity, Associated Signs and Symptoms)  Note limiting factors. Chief Complaint: Back pain, right toe pain    Viky Garcia is a 75 y.o. female who presents to the emergency department with a chief complaint of 2 symptoms. She states 3 days ago she stubbed her right fourth toe on the bedpost and has some pain there. Secondarily she has been having some worsening pain over the past few days that radiates down both legs worse on the left. She still been able to bear weight and ambulate. Has been having to use a walker occasionally. Denies difficulty urinating, loss of bowel or bladder function. Has history of chronic back pain with frequent flareups like this and had a flare similar to this in March that she actually to be admitted for and states it was worse at that time. Pain is worse with movement. Denies abdominal pain, nausea, vomiting, fevers. At the time she had CT imaging of her lumbar spine that revealed some mild degenerative change. Nursing Notes were all reviewed and agreed with or any disagreements were addressed in the HPI. REVIEW OF SYSTEMS    (2-9 systems for level 4, 10 or more for level 5)     Review of Systems    Positives and Pertinent negatives as per HPI. Except as noted above in the ROS, all other systems were reviewed and negative.        PAST MEDICAL HISTORY     Past Medical History:   Diagnosis Date    AICD (automatic cardioverter/defibrillator) present     Arthritis     CHF (congestive heart failure) ([facility] [address]     Gout     Hyperlipidemia     Hypertension     MI (myocardial infarction) ([facility] [address]          SURGICAL HISTORY     Past Surgical History:   Procedure Laterality Date    CARDIAC DEFIBRILLATOR PLACEMENT      DILATION AND CURETTAGE OF UTERUS      PAIN MANAGEMENT PROCEDURE Bilateral 6/29/2022    BILATERAL L5 TRANSFORAMINAL EPIDURAL STEROID INJECTION WITH FLUOROSCOPY performed by Kathrene Severs, MD at [address] [place] [address]       Discharge Medication List as of 11/20/2022 12:39 PM        CONTINUE these medications which have NOT CHANGED    Details   ibuprofen (ADVIL;MOTRIN) 200 MG tablet Take 400 mg by mouth every 6 hours as neededHistorical Med      metoprolol succinate (TOPROL XL) 100 MG extended release tablet Take 1 tablet in the morning and a 1/2 tablet at night, Disp-90 tablet, R-1Adjust Sig      atorvastatin (LIPITOR) 40 MG tablet Take 1 tablet by mouth daily, Disp-90 tablet, R-1Normal      apixaban (ELIQUIS) 5 MG TABS tablet Take 1 tablet by mouth 2 times daily, Disp-180 tablet, R-1Normal      furosemide (LASIX) 40 MG tablet Take 1 tablet by mouth 2 times daily, Disp-180 tablet, R-1Normal      isosorbide mononitrate (IMDUR) 30 MG extended release tablet Take 1 tablet by mouth daily, Disp-90 tablet, R-1Normal      valsartan (DIOVAN) 160 MG tablet Take 1 tablet by mouth 2 times daily, Disp-180 tablet, R-1Normal      ondansetron (ZOFRAN-ODT) 4 MG disintegrating tablet Take 1 tablet by mouth every 8 hours as needed for Nausea or Vomiting, Disp-20 tablet, R-0Normal      colchicine (COLCRYS) 0.6 MG tablet Take 1 tablet by mouth daily, Disp-30 tablet, R-3Normal      aspirin 81 MG EC tablet Take 81 mg by mouth dailyHistorical Med               ALLERGIES     Patient has no known allergies.     FAMILYHISTORY       Family History   Problem Relation Age of Onset    Heart Disease Mother     High Blood Pressure Mother     High Cholesterol Mother     Diabetes Mother     Cancer Father           SOCIAL HISTORY       Social History     Tobacco Use    Smoking status: Never    Smokeless tobacco: Never    Tobacco comments:     QUIT AS A TEEN   Vaping Use    Vaping Use: Never used   Substance Use Topics    Alcohol use: No     Comment: RARE    Drug use: No       SCREENINGS    Hammett Coma Scale  Eye Opening: Spontaneous  Best Verbal Response: Oriented  Best Motor Response: Obeys commands  Andriy Coma Scale Score: 15        PHYSICAL EXAM    (up to 7 for level 4, 8 or more for level 5)     ED Triage Vitals [11/20/22 1111]   BP Temp Temp src Heart Rate Resp SpO2 Height Weight   (!) 196/101 97.1 °F (36.2 °C) -- 87 20 99 % 5' 5.5\" (1.664 m) 263 lb (119.3 kg)       Physical Exam  Vitals and nursing note reviewed. Constitutional:       Appearance: She is well-developed. She is not diaphoretic. HENT:      Head: Atraumatic. Nose: Nose normal.   Eyes:      General:         Right eye: No discharge. Left eye: No discharge. Cardiovascular:      Rate and Rhythm: Normal rate and regular rhythm. Heart sounds: No murmur heard. No friction rub. No gallop. Pulmonary:      Effort: Pulmonary effort is normal. No respiratory distress. Breath sounds: No stridor. No wheezing, rhonchi or rales. Abdominal:      General: Bowel sounds are normal. There is no distension. Palpations: Abdomen is soft. There is no mass. Tenderness: There is no abdominal tenderness. There is no guarding or rebound. Hernia: No hernia is present. Musculoskeletal:         General: Tenderness present. No swelling. Normal range of motion. Cervical back: Normal range of motion. Comments: Tenderness and bruising over the right fourth toe. No tenderness over the remainder of the right foot.   Full range of motion with flexion extension of bilateral.  Sensation light touch of bilateral lower extremities intact. Patient able to ambulate without foot drop. Does ambulate slowly. Some mild decreased range of motion flexion of bilateral hips secondary to pain. Skin:     General: Skin is warm and dry. Findings: No erythema or rash. Neurological:      Mental Status: She is alert and oriented to person, place, and time. Cranial Nerves: No cranial nerve deficit. Psychiatric:         Behavior: Behavior normal.       DIAGNOSTIC RESULTS   LABS:    Labs Reviewed - No data to display    When ordered only abnormal lab results are displayed. All other labs were within normal range or not returned as of this dictation. EKG: When ordered, EKG's are interpreted by the Emergency Department Physician in the absence of a cardiologist.  Please see their note for interpretation of EKG. RADIOLOGY:   Non-plain film images such as CT, Ultrasound and MRI are read by the radiologist. Plain radiographic images are visualized and preliminarily interpreted by the ED Provider with the below findings:        Interpretation per the Radiologist below, if available at the time of this note:    XR FOOT RIGHT (MIN 3 VIEWS)   Final Result   No acute osseous abnormality           No results found.         PROCEDURES   Unless otherwise noted below, none     Procedures    CRITICAL CARE TIME       CONSULTS:  None      EMERGENCY DEPARTMENT COURSE and DIFFERENTIAL DIAGNOSIS/MDM:   Vitals:    Vitals:    11/20/22 1111   BP: (!) 196/101   Pulse: 87   Resp: 20   Temp: 97.1 °F (36.2 °C)   SpO2: 99%   Weight: 263 lb (119.3 kg)   Height: 5' 5.5\" (1.664 m)       Patient was given the following medications:  Medications   lidocaine 4 % external patch 1 patch (1 patch TransDERmal Patch Applied 11/20/22 1141)   HYDROcodone-acetaminophen (NORCO) 5-325 MG per tablet 2 tablet (2 tablets Oral Given 11/20/22 1140)   ketorolac (TORADOL) injection 30 mg (30 mg IntraMUSCular Given 11/20/22 1140)   orphenadrine (NORFLEX) injection 60 mg (60 mg IntraMUSCular Given 11/20/22 1140)         Is this patient to be included in the SEP-1 Core Measure due to severe sepsis or septic shock? No   Exclusion criteria - the patient is NOT to be included for SEP-1 Core Measure due to: Infection is not suspected    Patient presented with some right foot pain over her toe after stubbing this a few days ago. X-ray imaging unremarkable. Suspect contusion. This is a closed injury. Nothing to suggest gangrene, cellulitis or infectious etiology. Secondarily is also having a exacerbation of her chronic back pain. States she has had exacerbations like this multiple times in the past.  She sees a back doctor. Does not have a primary care physician. She is distally neurovascularly intact. States she has been worsening this in the past.  She believes Voltaren gel has helped in the past and would like to try this again. Low suspicion for acute fracture, AAA, pyelonephritis, epidural abscess, cauda equina or other emergent etiology. There is no injury or trauma. Do not believe x-ray imaging is warranted at this time. She will follow-up as an outpatient and return here for any worsening of symptoms or problems at home. FINAL IMPRESSION      1. Acute exacerbation of chronic low back pain    2.  Contusion of lesser toe of right foot without damage to nail, initial encounter          DISPOSITION/PLAN   DISPOSITION Decision To Discharge 11/20/2022 12:37:04 PM      PATIENT REFERRED TO:  Abimbola Juárez  [phone]  Schedule an appointment as soon as possible for a visit in 3 days  For re-check    [facility] Emergency Department  [address] [place] [address]  [phone]    As needed    DISCHARGE MEDICATIONS:  Discharge Medication List as of 11/20/2022 12:39 PM        START taking these medications    Details   predniSONE (DELTASONE) 10 MG tablet 5 tabs po qam for 2 days then 4,3,2,1 tabs qam for 2 days each total of 10 days, Disp-30 tablet, R-0Normal      cyclobenzaprine (FLEXERIL) 10 MG tablet Take 1 tablet by mouth 3 times daily as needed for Muscle spasms, Disp-20 tablet, R-0Normal      diclofenac sodium (VOLTAREN) 1 % GEL Apply 4 g topically 4 times daily, Topical, 4 TIMES DAILY Starting Sun 11/20/2022, Disp-50 g, R-0, Normal             DISCONTINUED MEDICATIONS:  Discharge Medication List as of 11/20/2022 12:39 PM                 (Please note that portions of this note were completed with a voice recognition program.  Efforts were made to edit the dictations but occasionally words are mis-transcribed.)    Paula Melvin PA-C (electronically signed)            Paula Melvin PA-C  11/20/22 [id number]

## 2022-11-24 ENCOUNTER — NURSE ONLY (OUTPATIENT)
Dept: CARDIOLOGY CLINIC | Age: 67
End: 2022-11-24
Payer: MEDICARE

## 2022-11-24 ENCOUNTER — APPOINTMENT (OUTPATIENT)
Dept: GENERAL RADIOLOGY | Age: 67
DRG: 287 | End: 2022-11-24
Payer: MEDICARE

## 2022-11-24 ENCOUNTER — HOSPITAL ENCOUNTER (INPATIENT)
Age: 67
LOS: 7 days | Discharge: HOME | DRG: 287 | End: 2022-12-01
Attending: EMERGENCY MEDICINE | Admitting: INTERNAL MEDICINE
Payer: MEDICARE

## 2022-11-24 DIAGNOSIS — M54.16 LUMBAR RADICULOPATHY: ICD-10-CM

## 2022-11-24 DIAGNOSIS — I47.20 VENTRICULAR TACHYCARDIA: Primary | ICD-10-CM

## 2022-11-24 DIAGNOSIS — D72.829 LEUKOCYTOSIS, UNSPECIFIED TYPE: ICD-10-CM

## 2022-11-24 DIAGNOSIS — I42.0 DILATED CARDIOMYOPATHY (HCC): ICD-10-CM

## 2022-11-24 DIAGNOSIS — Z45.02 ICD (IMPLANTABLE CARDIOVERTER-DEFIBRILLATOR) DISCHARGE: Primary | ICD-10-CM

## 2022-11-24 DIAGNOSIS — E66.01 CLASS 3 SEVERE OBESITY DUE TO EXCESS CALORIES WITH SERIOUS COMORBIDITY AND BODY MASS INDEX (BMI) OF 40.0 TO 44.9 IN ADULT (HCC): ICD-10-CM

## 2022-11-24 DIAGNOSIS — I47.20 VT (VENTRICULAR TACHYCARDIA): ICD-10-CM

## 2022-11-24 LAB
A/G RATIO: 1.1 (ref 1.1–2.2)
ALBUMIN SERPL-MCNC: 4.2 G/DL (ref 3.4–5)
ALP BLD-CCNC: 99 U/L (ref 40–129)
ALT SERPL-CCNC: 12 U/L (ref 10–40)
ANION GAP SERPL CALCULATED.3IONS-SCNC: 14 MMOL/L (ref 3–16)
ANTI-XA UNFRAC HEPARIN: >1.1 IU/ML (ref 0.3–0.7)
ANTI-XA UNFRAC HEPARIN: >1.1 IU/ML (ref 0.3–0.7)
APTT: 28.1 SEC (ref 23–34.3)
APTT: 29.3 SEC (ref 23–34.3)
AST SERPL-CCNC: 25 U/L (ref 15–37)
BASOPHILS ABSOLUTE: 0.2 K/UL (ref 0–0.2)
BASOPHILS RELATIVE PERCENT: 1.1 %
BILIRUB SERPL-MCNC: 0.3 MG/DL (ref 0–1)
BUN BLDV-MCNC: 8 MG/DL (ref 7–20)
CALCIUM SERPL-MCNC: 9.9 MG/DL (ref 8.3–10.6)
CHLORIDE BLD-SCNC: 101 MMOL/L (ref 99–110)
CO2: 26 MMOL/L (ref 21–32)
CREAT SERPL-MCNC: 0.6 MG/DL (ref 0.6–1.2)
EKG ATRIAL RATE: 123 BPM
EKG DIAGNOSIS: NORMAL
EKG P AXIS: 63 DEGREES
EKG P-R INTERVAL: 150 MS
EKG Q-T INTERVAL: 302 MS
EKG QRS DURATION: 82 MS
EKG QTC CALCULATION (BAZETT): 432 MS
EKG R AXIS: -6 DEGREES
EKG T AXIS: 86 DEGREES
EKG VENTRICULAR RATE: 123 BPM
EOSINOPHILS ABSOLUTE: 0 K/UL (ref 0–0.6)
EOSINOPHILS RELATIVE PERCENT: 0 %
GFR SERPL CREATININE-BSD FRML MDRD: >60 ML/MIN/{1.73_M2}
GLUCOSE BLD-MCNC: 137 MG/DL (ref 70–99)
HCT VFR BLD CALC: 44 % (ref 36–48)
HEMOGLOBIN: 14.1 G/DL (ref 12–16)
INR BLD: 1.13 (ref 0.87–1.14)
LYMPHOCYTES ABSOLUTE: 1.6 K/UL (ref 1–5.1)
LYMPHOCYTES RELATIVE PERCENT: 10 %
MCH RBC QN AUTO: 27.6 PG (ref 26–34)
MCHC RBC AUTO-ENTMCNC: 32 G/DL (ref 31–36)
MCV RBC AUTO: 86.3 FL (ref 80–100)
MONOCYTES ABSOLUTE: 0.6 K/UL (ref 0–1.3)
MONOCYTES RELATIVE PERCENT: 3.9 %
NEUTROPHILS ABSOLUTE: 13.8 K/UL (ref 1.7–7.7)
NEUTROPHILS RELATIVE PERCENT: 85 %
PDW BLD-RTO: 16.5 % (ref 12.4–15.4)
PLATELET # BLD: 359 K/UL (ref 135–450)
PMV BLD AUTO: 7.9 FL (ref 5–10.5)
POTASSIUM REFLEX MAGNESIUM: 3.6 MMOL/L (ref 3.5–5.1)
PRO-BNP: 42 PG/ML (ref 0–124)
PROCALCITONIN: 0.03 NG/ML (ref 0–0.15)
PROTHROMBIN TIME: 14.4 SEC (ref 11.7–14.5)
RBC # BLD: 5.11 M/UL (ref 4–5.2)
REPORT: NORMAL
RESPIRATORY PANEL PCR: NORMAL
SODIUM BLD-SCNC: 141 MMOL/L (ref 136–145)
TOTAL PROTEIN: 8.1 G/DL (ref 6.4–8.2)
TROPONIN: 0.03 NG/ML
TROPONIN: 0.34 NG/ML
TROPONIN: 0.43 NG/ML
WBC # BLD: 16.3 K/UL (ref 4–11)

## 2022-11-24 PROCEDURE — 93005 ELECTROCARDIOGRAM TRACING: CPT | Performed by: EMERGENCY MEDICINE

## 2022-11-24 PROCEDURE — 6370000000 HC RX 637 (ALT 250 FOR IP): Performed by: INTERNAL MEDICINE

## 2022-11-24 PROCEDURE — C1751 CATH, INF, PER/CENT/MIDLINE: HCPCS

## 2022-11-24 PROCEDURE — 6360000002 HC RX W HCPCS: Performed by: INTERNAL MEDICINE

## 2022-11-24 PROCEDURE — 71045 X-RAY EXAM CHEST 1 VIEW: CPT

## 2022-11-24 PROCEDURE — 83880 ASSAY OF NATRIURETIC PEPTIDE: CPT

## 2022-11-24 PROCEDURE — 36415 COLL VENOUS BLD VENIPUNCTURE: CPT

## 2022-11-24 PROCEDURE — 2500000003 HC RX 250 WO HCPCS: Performed by: INTERNAL MEDICINE

## 2022-11-24 PROCEDURE — 80053 COMPREHEN METABOLIC PANEL: CPT

## 2022-11-24 PROCEDURE — 85025 COMPLETE CBC W/AUTO DIFF WBC: CPT

## 2022-11-24 PROCEDURE — 99285 EMERGENCY DEPT VISIT HI MDM: CPT

## 2022-11-24 PROCEDURE — 85520 HEPARIN ASSAY: CPT

## 2022-11-24 PROCEDURE — 84145 PROCALCITONIN (PCT): CPT

## 2022-11-24 PROCEDURE — 85730 THROMBOPLASTIN TIME PARTIAL: CPT

## 2022-11-24 PROCEDURE — 93290 INTERROG DEV EVAL ICPMS IP: CPT | Performed by: INTERNAL MEDICINE

## 2022-11-24 PROCEDURE — 93010 ELECTROCARDIOGRAM REPORT: CPT | Performed by: INTERNAL MEDICINE

## 2022-11-24 PROCEDURE — 87040 BLOOD CULTURE FOR BACTERIA: CPT

## 2022-11-24 PROCEDURE — 0202U NFCT DS 22 TRGT SARS-COV-2: CPT

## 2022-11-24 PROCEDURE — 36569 INSJ PICC 5 YR+ W/O IMAGING: CPT

## 2022-11-24 PROCEDURE — 93289 INTERROG DEVICE EVAL HEART: CPT | Performed by: INTERNAL MEDICINE

## 2022-11-24 PROCEDURE — 87086 URINE CULTURE/COLONY COUNT: CPT

## 2022-11-24 PROCEDURE — 93005 ELECTROCARDIOGRAM TRACING: CPT | Performed by: INTERNAL MEDICINE

## 2022-11-24 PROCEDURE — 84484 ASSAY OF TROPONIN QUANT: CPT

## 2022-11-24 PROCEDURE — 1200000000 HC SEMI PRIVATE

## 2022-11-24 PROCEDURE — 85610 PROTHROMBIN TIME: CPT

## 2022-11-24 PROCEDURE — 2580000003 HC RX 258: Performed by: INTERNAL MEDICINE

## 2022-11-24 PROCEDURE — 02HV33Z INSERTION OF INFUSION DEVICE INTO SUPERIOR VENA CAVA, PERCUTANEOUS APPROACH: ICD-10-PCS | Performed by: INTERNAL MEDICINE

## 2022-11-24 PROCEDURE — 99223 1ST HOSP IP/OBS HIGH 75: CPT | Performed by: INTERNAL MEDICINE

## 2022-11-24 RX ORDER — ACETAMINOPHEN 325 MG/1
650 TABLET ORAL EVERY 6 HOURS PRN
Status: DISCONTINUED | OUTPATIENT
Start: 2022-11-24 | End: 2022-12-01 | Stop reason: HOSPADM

## 2022-11-24 RX ORDER — ATORVASTATIN CALCIUM 40 MG/1
40 TABLET, FILM COATED ORAL DAILY
Status: DISCONTINUED | OUTPATIENT
Start: 2022-11-24 | End: 2022-12-01 | Stop reason: HOSPADM

## 2022-11-24 RX ORDER — SODIUM CHLORIDE 9 MG/ML
INJECTION, SOLUTION INTRAVENOUS PRN
Status: DISCONTINUED | OUTPATIENT
Start: 2022-11-24 | End: 2022-12-01 | Stop reason: HOSPADM

## 2022-11-24 RX ORDER — SODIUM CHLORIDE 0.9 % (FLUSH) 0.9 %
5-40 SYRINGE (ML) INJECTION EVERY 12 HOURS SCHEDULED
Status: DISCONTINUED | OUTPATIENT
Start: 2022-11-24 | End: 2022-12-01 | Stop reason: HOSPADM

## 2022-11-24 RX ORDER — HEPARIN SODIUM 1000 [USP'U]/ML
4000 INJECTION, SOLUTION INTRAVENOUS; SUBCUTANEOUS ONCE
Status: DISCONTINUED | OUTPATIENT
Start: 2022-11-24 | End: 2022-11-24

## 2022-11-24 RX ORDER — ASPIRIN 81 MG/1
162 TABLET, CHEWABLE ORAL ONCE
Status: COMPLETED | OUTPATIENT
Start: 2022-11-24 | End: 2022-11-24

## 2022-11-24 RX ORDER — ISOSORBIDE MONONITRATE 30 MG/1
30 TABLET, EXTENDED RELEASE ORAL DAILY
Status: DISCONTINUED | OUTPATIENT
Start: 2022-11-24 | End: 2022-11-25

## 2022-11-24 RX ORDER — SODIUM CHLORIDE 0.9 % (FLUSH) 0.9 %
5-40 SYRINGE (ML) INJECTION PRN
Status: DISCONTINUED | OUTPATIENT
Start: 2022-11-24 | End: 2022-12-01 | Stop reason: HOSPADM

## 2022-11-24 RX ORDER — METOPROLOL SUCCINATE 50 MG/1
100 TABLET, EXTENDED RELEASE ORAL DAILY
Status: DISCONTINUED | OUTPATIENT
Start: 2022-11-24 | End: 2022-11-25

## 2022-11-24 RX ORDER — PROMETHAZINE HYDROCHLORIDE 25 MG/1
12.5 TABLET ORAL EVERY 6 HOURS PRN
Status: DISCONTINUED | OUTPATIENT
Start: 2022-11-24 | End: 2022-12-01 | Stop reason: HOSPADM

## 2022-11-24 RX ORDER — MORPHINE SULFATE 2 MG/ML
2 INJECTION, SOLUTION INTRAMUSCULAR; INTRAVENOUS EVERY 4 HOURS PRN
Status: DISCONTINUED | OUTPATIENT
Start: 2022-11-24 | End: 2022-11-26

## 2022-11-24 RX ORDER — MAGNESIUM SULFATE 1 G/100ML
2000 INJECTION INTRAVENOUS PRN
Status: DISCONTINUED | OUTPATIENT
Start: 2022-11-24 | End: 2022-12-01 | Stop reason: HOSPADM

## 2022-11-24 RX ORDER — POTASSIUM CHLORIDE 20 MEQ/1
20 TABLET, EXTENDED RELEASE ORAL 2 TIMES DAILY WITH MEALS
Status: COMPLETED | OUTPATIENT
Start: 2022-11-24 | End: 2022-11-25

## 2022-11-24 RX ORDER — CYCLOBENZAPRINE HCL 10 MG
10 TABLET ORAL 3 TIMES DAILY PRN
Status: DISCONTINUED | OUTPATIENT
Start: 2022-11-24 | End: 2022-11-24 | Stop reason: ALTCHOICE

## 2022-11-24 RX ORDER — HEPARIN SODIUM 10000 [USP'U]/100ML
5-30 INJECTION, SOLUTION INTRAVENOUS CONTINUOUS
Status: DISCONTINUED | OUTPATIENT
Start: 2022-11-24 | End: 2022-11-25

## 2022-11-24 RX ORDER — POTASSIUM CHLORIDE 7.45 MG/ML
10 INJECTION INTRAVENOUS PRN
Status: DISCONTINUED | OUTPATIENT
Start: 2022-11-24 | End: 2022-12-01 | Stop reason: HOSPADM

## 2022-11-24 RX ORDER — ASPIRIN 81 MG/1
81 TABLET ORAL DAILY
Status: DISCONTINUED | OUTPATIENT
Start: 2022-11-25 | End: 2022-12-01 | Stop reason: HOSPADM

## 2022-11-24 RX ORDER — SODIUM CHLORIDE 9 MG/ML
INJECTION, SOLUTION INTRAVENOUS CONTINUOUS
Status: DISCONTINUED | OUTPATIENT
Start: 2022-11-25 | End: 2022-11-25

## 2022-11-24 RX ORDER — ONDANSETRON 2 MG/ML
4 INJECTION INTRAMUSCULAR; INTRAVENOUS EVERY 6 HOURS PRN
Status: DISCONTINUED | OUTPATIENT
Start: 2022-11-24 | End: 2022-12-01 | Stop reason: HOSPADM

## 2022-11-24 RX ORDER — SODIUM CHLORIDE 0.9 % (FLUSH) 0.9 %
10 SYRINGE (ML) INJECTION EVERY 12 HOURS SCHEDULED
Status: DISCONTINUED | OUTPATIENT
Start: 2022-11-24 | End: 2022-12-01 | Stop reason: HOSPADM

## 2022-11-24 RX ORDER — ACETAMINOPHEN 650 MG/1
650 SUPPOSITORY RECTAL EVERY 6 HOURS PRN
Status: DISCONTINUED | OUTPATIENT
Start: 2022-11-24 | End: 2022-12-01 | Stop reason: HOSPADM

## 2022-11-24 RX ORDER — MORPHINE SULFATE 4 MG/ML
4 INJECTION, SOLUTION INTRAMUSCULAR; INTRAVENOUS ONCE
Status: COMPLETED | OUTPATIENT
Start: 2022-11-24 | End: 2022-11-24

## 2022-11-24 RX ORDER — SODIUM CHLORIDE 0.9 % (FLUSH) 0.9 %
10 SYRINGE (ML) INJECTION PRN
Status: DISCONTINUED | OUTPATIENT
Start: 2022-11-24 | End: 2022-12-01 | Stop reason: HOSPADM

## 2022-11-24 RX ORDER — COLCHICINE 0.6 MG/1
0.6 TABLET ORAL DAILY
Status: DISCONTINUED | OUTPATIENT
Start: 2022-11-24 | End: 2022-11-24 | Stop reason: ALTCHOICE

## 2022-11-24 RX ORDER — HEPARIN SODIUM 1000 [USP'U]/ML
2000 INJECTION, SOLUTION INTRAVENOUS; SUBCUTANEOUS PRN
Status: DISCONTINUED | OUTPATIENT
Start: 2022-11-24 | End: 2022-11-25

## 2022-11-24 RX ORDER — VALSARTAN 80 MG/1
160 TABLET ORAL 2 TIMES DAILY
Status: DISCONTINUED | OUTPATIENT
Start: 2022-11-24 | End: 2022-12-01 | Stop reason: HOSPADM

## 2022-11-24 RX ORDER — LIDOCAINE HYDROCHLORIDE 10 MG/ML
5 INJECTION, SOLUTION EPIDURAL; INFILTRATION; INTRACAUDAL; PERINEURAL ONCE
Status: COMPLETED | OUTPATIENT
Start: 2022-11-24 | End: 2022-11-24

## 2022-11-24 RX ORDER — SODIUM CHLORIDE 9 MG/ML
25 INJECTION, SOLUTION INTRAVENOUS PRN
Status: DISCONTINUED | OUTPATIENT
Start: 2022-11-24 | End: 2022-12-01 | Stop reason: HOSPADM

## 2022-11-24 RX ORDER — HEPARIN SODIUM 1000 [USP'U]/ML
4000 INJECTION, SOLUTION INTRAVENOUS; SUBCUTANEOUS PRN
Status: DISCONTINUED | OUTPATIENT
Start: 2022-11-24 | End: 2022-11-25

## 2022-11-24 RX ADMIN — HEPARIN SODIUM 8 UNITS/KG/HR: 10000 INJECTION, SOLUTION INTRAVENOUS at 20:34

## 2022-11-24 RX ADMIN — ISOSORBIDE MONONITRATE 30 MG: 30 TABLET, EXTENDED RELEASE ORAL at 08:19

## 2022-11-24 RX ADMIN — VALSARTAN 160 MG: 160 TABLET, FILM COATED ORAL at 20:11

## 2022-11-24 RX ADMIN — MORPHINE SULFATE 2 MG: 2 INJECTION, SOLUTION INTRAMUSCULAR; INTRAVENOUS at 16:11

## 2022-11-24 RX ADMIN — MORPHINE SULFATE 4 MG: 4 INJECTION, SOLUTION INTRAMUSCULAR; INTRAVENOUS at 05:52

## 2022-11-24 RX ADMIN — METOPROLOL SUCCINATE 100 MG: 50 TABLET, EXTENDED RELEASE ORAL at 06:29

## 2022-11-24 RX ADMIN — AMIODARONE HYDROCHLORIDE 0.5 MG/MIN: 50 INJECTION, SOLUTION INTRAVENOUS at 20:29

## 2022-11-24 RX ADMIN — ASPIRIN 162 MG: 81 TABLET, CHEWABLE ORAL at 04:53

## 2022-11-24 RX ADMIN — LIDOCAINE HYDROCHLORIDE 5 ML: 10 INJECTION, SOLUTION EPIDURAL; INFILTRATION; INTRACAUDAL; PERINEURAL at 16:16

## 2022-11-24 RX ADMIN — MORPHINE SULFATE 2 MG: 2 INJECTION, SOLUTION INTRAMUSCULAR; INTRAVENOUS at 09:48

## 2022-11-24 RX ADMIN — AMIODARONE HYDROCHLORIDE 1 MG/MIN: 50 INJECTION, SOLUTION INTRAVENOUS at 14:19

## 2022-11-24 RX ADMIN — APIXABAN 5 MG: 5 TABLET, FILM COATED ORAL at 08:19

## 2022-11-24 RX ADMIN — VALSARTAN 160 MG: 160 TABLET, FILM COATED ORAL at 08:19

## 2022-11-24 RX ADMIN — Medication 10 ML: at 08:20

## 2022-11-24 RX ADMIN — MORPHINE SULFATE 2 MG: 2 INJECTION, SOLUTION INTRAMUSCULAR; INTRAVENOUS at 21:20

## 2022-11-24 RX ADMIN — Medication 10 ML: at 20:36

## 2022-11-24 RX ADMIN — POTASSIUM CHLORIDE 20 MEQ: 1500 TABLET, EXTENDED RELEASE ORAL at 16:11

## 2022-11-24 RX ADMIN — ACETAMINOPHEN 650 MG: 325 TABLET ORAL at 06:29

## 2022-11-24 RX ADMIN — ATORVASTATIN CALCIUM 40 MG: 40 TABLET, FILM COATED ORAL at 08:18

## 2022-11-24 RX ADMIN — AMIODARONE HYDROCHLORIDE 0.5 MG/MIN: 50 INJECTION, SOLUTION INTRAVENOUS at 22:26

## 2022-11-24 ASSESSMENT — PAIN SCALES - GENERAL
PAINLEVEL_OUTOF10: 7
PAINLEVEL_OUTOF10: 6
PAINLEVEL_OUTOF10: 7
PAINLEVEL_OUTOF10: 8
PAINLEVEL_OUTOF10: 8
PAINLEVEL_OUTOF10: 7
PAINLEVEL_OUTOF10: 7

## 2022-11-24 ASSESSMENT — PAIN DESCRIPTION - ONSET
ONSET: ON-GOING

## 2022-11-24 ASSESSMENT — PAIN DESCRIPTION - LOCATION
LOCATION: CHEST
LOCATION: RIB CAGE;CHEST
LOCATION: CHEST

## 2022-11-24 ASSESSMENT — PAIN DESCRIPTION - DESCRIPTORS
DESCRIPTORS: ACHING

## 2022-11-24 ASSESSMENT — PAIN DESCRIPTION - PAIN TYPE
TYPE: ACUTE PAIN

## 2022-11-24 ASSESSMENT — PAIN DESCRIPTION - FREQUENCY
FREQUENCY: CONTINUOUS

## 2022-11-24 ASSESSMENT — PAIN DESCRIPTION - ORIENTATION
ORIENTATION: MID

## 2022-11-24 ASSESSMENT — PAIN - FUNCTIONAL ASSESSMENT
PAIN_FUNCTIONAL_ASSESSMENT: ACTIVITIES ARE NOT PREVENTED
PAIN_FUNCTIONAL_ASSESSMENT: 0-10

## 2022-11-24 NOTE — PLAN OF CARE
Patient seen and examined by one of her partners earlier today. I agree with their assessment and plan and will continue to follow while in the hospital.    Cardiology board;- device interrogated; 8 shocks for 1:1 conduction of SVT. Start amiodarone, transfer to CVU     Elevated troponin; started on heparin drip.   Plan for cardiac cath     Jas Cherry MD

## 2022-11-24 NOTE — PROGRESS NOTES
Pt admitted to room 3317 from ED. VSS. Pt alert and oriented rates chest pain 6/10. POC updated with pt, all questions answered. Oriented pt to room and call light. Call light and bedside table within reach.  Instructed to call out with any needs, v/u.

## 2022-11-24 NOTE — CONSULTS
PICC line education:    -Risks  -Benefits  -Alternatives  -Procedure    Discussed the above with patient, verbalized understanding, answered all questions. Provided with information on PICC care to review. PICC tip verified via [employer] (Ok to use). Reported off to patient's  Nurse Fazal Yee.

## 2022-11-24 NOTE — CONSULTS
[facility]  [phone]      Chief Complaint   Patient presents with    Tachycardia     Pt from home. She reports her defibrillator has gone off 4 times today. Bianca found her to be in SVT when arrived. She was in the 140 and upto the 210's. She did convert back to ST. She is complaining of a pinching pain in her chest.      Chest Pain            History of Present Illness:  Kacey Shaw is a 75 y.o. patient who presented to the hospital with complaints of defibrillator went off. I have been asked to provide consultation regarding further management and testing. The patient reports that she has had upper respiratory infection and cough over the last few weeks. She reports that she has been busy cooking the last few days. She went to bed at 5 pm (!) on 11/23/22. She reports waking up at 2 am to urinate and \"Felt weird. \" She states that she took a few steps and her defibrillator went off. She reports that's she was shocked one more time at home and two times on the way to the ER. She denies syncope. She reports cem she has chest soreness today. She reports that it worsens with position or breathing. No nausea or vomiting. Past Medical History:   has a past medical history of AICD (automatic cardioverter/defibrillator) present, Arthritis, CHF (congestive heart failure) ([address] Gout, Hyperlipidemia, Hypertension, and MI (myocardial infarction) ([address] Surgical History:   has a past surgical history that includes Dilation and curettage of uterus; Cardiac defibrillator placement; and Pain management procedure (Bilateral, 6/29/2022). Social History:   reports that she has never smoked. She has never used smokeless tobacco. She reports that she does not drink alcohol and does not use drugs. Family History:  No family history of premature coronary artery disease, aortic disease, or valve disease. Home Medications:  Were reviewed and are listed in nursing record.  and/or listed below  Prior to Admission medications    Medication Sig Start Date End Date Taking?  Authorizing Provider   predniSONE (DELTASONE) 10 MG tablet 5 tabs po qam for 2 days then 4,3,2,1 tabs qam for 2 days each total of 10 days 11/20/22 11/30/22  [facility]   cyclobenzaprine (FLEXERIL) 10 MG tablet Take 1 tablet by mouth 3 times daily as needed for Muscle spasms  Patient not taking: Reported on 11/24/2022 11/20/22 11/30/22  [facility]   diclofenac sodium (VOLTAREN) 1 % GEL Apply 4 g topically 4 times daily 11/20/22   [facility]   ibuprofen (ADVIL;MOTRIN) 200 MG tablet Take 400 mg by mouth every 6 hours as needed    Historical Provider, MD   metoprolol succinate (TOPROL XL) 100 MG extended release tablet Take 1 tablet in the morning and a 1/2 tablet at night 9/1/22   YEIMY Adame CNP   atorvastatin (LIPITOR) 40 MG tablet Take 1 tablet by mouth daily 6/16/22   YEIMY Botello CNP   apixaban (ELIQUIS) 5 MG TABS tablet Take 1 tablet by mouth 2 times daily 6/16/22   YEIMY Botello CNP   furosemide (LASIX) 40 MG tablet Take 1 tablet by mouth 2 times daily 6/16/22   YEIMY Botello CNP   isosorbide mononitrate (IMDUR) 30 MG extended release tablet Take 1 tablet by mouth daily 6/16/22   YEIMY Botello CNP   valsartan (DIOVAN) 160 MG tablet Take 1 tablet by mouth 2 times daily 6/16/22   YEIMY Botello CNP   ondansetron (ZOFRAN-ODT) 4 MG disintegrating tablet Take 1 tablet by mouth every 8 hours as needed for Nausea or Vomiting 3/16/22   Carine Vega MD   colchicine (COLCRYS) 0.6 MG tablet Take 1 tablet by mouth daily 3/17/22   Carine Vega MD   aspirin 81 MG EC tablet Take 81 mg by mouth daily    Historical Provider, MD        Current Medications:  Current Facility-Administered Medications   Medication Dose Route Frequency Provider Last Rate Last Admin    sodium chloride flush 0.9 % injection 10 mL  10 mL IntraVENous 2 times per day Alcon Foster DO 10 mL at 11/24/22 0820    sodium chloride flush 0.9 % injection 10 mL  10 mL IntraVENous PRN Ahmad A Apolonia, DO        0.9 % sodium chloride infusion   IntraVENous PRN Ahmad A Apolonia, DO        potassium chloride 10 mEq/100 mL IVPB (Peripheral Line)  10 mEq IntraVENous PRN Ahmad A Apolonia, DO        magnesium sulfate 1000 mg in dextrose 5% 100 mL IVPB  2,000 mg IntraVENous PRN Gelacio Peed A Apolonia, DO        promethazine (PHENERGAN) tablet 12.5 mg  12.5 mg Oral Q6H PRN Ahmad A Apolonia, DO        Or    ondansetron (ZOFRAN) injection 4 mg  4 mg IntraVENous Q6H PRN Ahmad A Apolonia, DO        acetaminophen (TYLENOL) tablet 650 mg  650 mg [facility] Q6H [facility]   650 mg at 11/24/22 7899    Or    acetaminophen (TYLENOL) suppository 650 mg  650 mg Rectal Q6H PRN Hari Lat Apolonia, DO        apixaban (ELIQUIS) tablet 5 mg  5 mg Oral BID [facility], [facility]   5 mg at 11/24/22 0819    [START ON 11/25/2022] aspirin EC tablet 81 mg  81 mg Oral Daily [facility] Apolonia, DO        atorvastatin (LIPITOR) tablet 40 mg  40 mg Oral Daily [facility] Apolonia, DO   40 mg at 11/24/22 0818    isosorbide mononitrate (IMDUR) extended release tablet 30 mg  30 mg Oral Daily [facility] Apol[facility], DO   30 mg at 11/24/22 0819    metoprolol succinate (TOPROL XL) extended release tablet 100 mg  100 mg Oral Daily [facility] Ap[facility], DO   100 mg at 11/24/22 9859    valsartan (DIOVAN) tablet 160 mg  160 mg Oral BID [facility] Ap[facility], DO   160 mg at 11/24/22 0819    morphine (PF) injection 2 mg  2 mg IntraVENous Q4H PRN mad A Apolonia, DO            Allergies:  Patient has no known allergies. Review of Systems:     Constitutional: there has been no unanticipated weight loss. There's been no change in energy level, sleep pattern, or activity level. Eyes: No visual changes or diplopia. No scleral icterus. ENT: No Headaches, hearing loss or vertigo. No mouth sores or sore throat.   Cardiovascular: Reviewed in HPI. + chest pain   Respiratory: No cough or wheezing, no sputum production. No hematemesis. Gastrointestinal: No abdominal pain, appetite loss, blood in stools. No change in bowel or bladder habits. Genitourinary: No dysuria, trouble voiding, or hematuria. Musculoskeletal:  No gait disturbance, weakness or joint complaints. Integumentary: No rash or pruritis. Neurological: No headache, diplopia, change in muscle strength, numbness or tingling. No change in gait, balance, coordination, mood, affect, memory, mentation, behavior. Psychiatric: No anxiety, no depression. Endocrine: No malaise, fatigue or temperature intolerance. No excessive thirst, fluid intake, or urination. No tremor. Hematologic/Lymphatic: No abnormal bruising or bleeding, blood clots or swollen lymph nodes. Allergic/Immunologic: No nasal congestion or hives.       Physical Examination:    Vitals:    11/24/22 0620   BP: (!) 172/80   Pulse: 100   Resp: 18   Temp: 97.9 °F (36.6 °C)   SpO2: 96%    Weight: 262 lb (118.8 kg)         General Appearance:  Alert, cooperative, no distress, appears stated age   Head:  Normocephalic, without obvious abnormality, atraumatic   Eyes:  PERRL, conjunctiva/corneas clear       Nose: Nares normal, no drainage or sinus tenderness   Throat: Lips, mucosa, and tongue normal   Neck: Supple, symmetrical, trachea midline, no adenopathy, thyroid: not enlarged, symmetric, no tenderness/mass/nodules, no carotid bruit or JVD       Lungs:   Clear to auscultation bilaterally, respirations unlabored   Chest Wall:  + tender to palpation    Heart:  Regular rate and rhythm, S1, S2 normal, no murmur, rub or gallop   Abdomen:   Soft, non-tender, bowel sounds active all four quadrants,  no masses, no organomegaly           Extremities: Extremities normal, atraumatic, no cyanosis or edema   Pulses: 2+ and symmetric   Skin: Skin color, texture, turgor normal, no rashes or lesions   Pysch: Normal mood and affect   Neurologic: Normal gross motor and sensory exam.         Labs  CBC:   Lab Results   Component Value Date/Time    WBC 16.3 11/24/2022 02:30 AM    RBC 5.11 11/24/2022 02:30 AM    HGB 14.1 11/24/2022 02:30 AM    HCT 44.0 11/24/2022 02:30 AM    MCV 86.3 11/24/2022 02:30 AM    RDW 16.5 11/24/2022 02:30 AM     11/24/2022 02:30 AM     CMP:    Lab Results   Component Value Date/Time     11/24/2022 02:30 AM    K 3.6 11/24/2022 02:30 AM     11/24/2022 02:30 AM    CO2 26 11/24/2022 02:30 AM    BUN 8 11/24/2022 02:30 AM    CREATININE 0.6 11/24/2022 02:30 AM    GFRAA >60 03/15/2022 05:05 AM    AGRATIO 1.1 11/24/2022 02:30 AM    LABGLOM >60 11/24/2022 02:30 AM    GLUCOSE 137 11/24/2022 02:30 AM    PROT 8.1 11/24/2022 02:30 AM    CALCIUM 9.9 11/24/2022 02:30 AM    BILITOT 0.3 11/24/2022 02:30 AM    ALKPHOS 99 11/24/2022 02:30 AM    AST 25 11/24/2022 02:30 AM    ALT 12 11/24/2022 02:30 AM     PT/INR:  No results found for: PTINR  Lab Results   Component Value Date    TROPONINI 0.03 (H) 11/24/2022       EKG:  I have reviewed EKG with the following interpretation:  Impression:  sinus tachycardia, possible anterior infarct    Echo:  Left ventricular systolic function is moderately reduced with estimated   ejection fraction of 40%. There is moderate concentric left ventricular   hypertrophy. There is no evidence of mass or thrombus in the left atrium or appendage.   Stress 2020: no ischemia  Cath  8/20: LM-Normal   LAD-mid 50%  Cx-normal  OM- normal  RCA-dominant normal  RPDA- normal  LVEF- 35  LVG- Global  LVEDP- 25      Old notes reviewed  Telemetry reviewd  Ekg personally reviewed  Chest xray personally reviewed  Echo, cath, and   Medications and labs reviewed  high complexity/medical decision making due to extensive data review, extensive history review, independent review of data  high risk due to acute illness, evaluation of drug-drug interactions, medication management and diagnostic interventions      Assessment  Patient Active Problem List   Diagnosis    Mixed hyperlipidemia ICD (implantable cardioverter-defibrillator) in place    Paroxysmal atrial fibrillation (HCC)    History of ventricular tachycardia    Dilated cardiomyopathy ([facility] [address]    Coronary artery disease due to lipid rich plaque    Class 3 severe obesity due to excess calories with serious comorbidity and body mass index (BMI) of 40.0 to 44.9 in adult (HCC)    TYLER (obstructive sleep apnea)    Essential hypertension    Gout    Back pain with radiculopathy    Lumbar radiculopathy    Lumbosacral radiculopathy    Paraparesis of both lower limbs (HCC)    NSVT (nonsustained ventricular tachycardia)    ICD (implantable cardioverter-defibrillator) discharge         Plan:    I had the opportunity to review the clinical symptoms and presentation of Gracie Ceja. Assessment/Plan:  Device discharge  - new problem  Plan:  - continue betablocker  - interrogate device. 8 shocks for 1:1 conduction of SVT. Start amiodarone, transfer to CVU    2. Nonischemic cardiomyopathy  - ef 40%  Plan:  - repeat echo    3. Elevated troponin  - history of non-obstructive CAD  - new problem  Plan:  Heparin drip  Aspirin and statin  Needs cath    4. Atrial fibrillation  - stable  Plan:  - hold apixiban     5. Chronic leukocytosis        Arturo Vidal DO, MD 11/24/2022 9:08 AM

## 2022-11-24 NOTE — H&P
Hospital Medicine History & Physical      PCP: Agustin Gonzalez MD    Date of Admission: 11/24/2022    Date of Service: Pt seen/examined on 11/24/2022    Pt seen/examined face to face on and admitted as inpatient with expected LOS greater than two midnights due to medical therapy      Chief Complaint:    Chief Complaint   Patient presents with    Tachycardia     Pt from home. She reports her defibrillator has gone off 4 times today. Squad found her to be in SVT when arrived. She was in the 140 and upto the 210's. She did convert back to ST. She is complaining of a pinching pain in her chest.      Chest Pain        History Of Present Illness:      75 y.o. female who presented to [facility] with past medical history of hypertension, hyperlipidemia, gout, HFrEF EF 40% presented to the ED with chief complaint of increased heart rate and chest pain. Patient reported that she has sick contacts from her daughter, patient reported that she has been having some cough been going on for 2 weeks. Patient reported that she was in bad went up to go to the bathroom use the bathroom and then while she was going to the kitchen she did not make it and started calling her daughter. The first ICD felt discharge started around 1 AM.  Patient reported noticed that her defibrillator for 3-4 times due to his heart rate being very fast.  Patient did have associated shortness of breath, chest pain otherwise no shortness of breath abdominal pain or dysuria. Did report intermittent cough and phlegm production.   Patient called EMS        Past Medical History:          Diagnosis Date    AICD (automatic cardioverter/defibrillator) present     Arthritis     CHF (congestive heart failure) [address] [address]     Gout     Hyperlipidemia     Hypertension     MI (myocardial infarction) ([facility] [address]        Past Surgical History:          Procedure Laterality Date    CARDIAC DEFIBRILLATOR PLACEMENT      DILATION AND CURETTAGE OF UTERUS      PAIN MANAGEMENT PROCEDURE Bilateral 6/29/2022    BILATERAL L5 TRANSFORAMINAL EPIDURAL STEROID INJECTION WITH FLUOROSCOPY performed by Harjinder Weber MD at [facility]       Medications Prior to Admission:      Prior to Admission medications    Medication Sig Start Date End Date Taking? Authorizing Provider   predniSONE (DELTASONE) 10 MG tablet 5 tabs po qam for 2 days then 4,3,2,1 tabs qam for 2 days each total of 10 days 11/20/22 11/30/22  Alicja Marie PA-C   cyclobenzaprine (FLEXERIL) 10 MG tablet Take 1 tablet by mouth 3 times daily as needed for Muscle spasms 11/20/22 11/30/22  Alicja Marie PA-C   diclofenac sodium (VOLTAREN) 1 % GEL Apply 4 g topically 4 times daily 11/20/22   Alicja Marie PA-C   ibuprofen (ADVIL;MOTRIN) 200 MG tablet Take 400 mg by mouth every 6 hours as needed    Historical Provider, MD   metoprolol succinate (TOPROL XL) 100 MG extended release tablet Take 1 tablet in the morning and a 1/2 tablet at night 9/1/22   Marycarmen Lopez, APRN - CNP   atorvastatin (LIPITOR) 40 MG tablet Take 1 tablet by mouth daily 6/16/22   Earlyne Lines, APRN - CNP   apixaban (ELIQUIS) 5 MG TABS tablet Take 1 tablet by mouth 2 times daily 6/16/22   Earlyne Lines, APRN - CNP   furosemide (LASIX) 40 MG tablet Take 1 tablet by mouth 2 times daily 6/16/22   Earlyne Lines, APRN - CNP   isosorbide mononitrate (IMDUR) 30 MG extended release tablet Take 1 tablet by mouth daily 6/16/22   Earlyne Lines, APRN - CNP   valsartan (DIOVAN) 160 MG tablet Take 1 tablet by mouth 2 times daily 6/16/22   Earlyne Lines, APRN - CNP   ondansetron (ZOFRAN-ODT) 4 MG disintegrating tablet Take 1 tablet by mouth every 8 hours as needed for Nausea or Vomiting 3/16/22   Juaquin Villasenor MD   colchicine (COLCRYS) 0.6 MG tablet Take 1 tablet by mouth daily 3/17/22   Juaquin Villasenor MD   aspirin 81 MG EC tablet Take 81 mg by mouth daily    Historical Provider, MD       Allergies:  Patient has no known allergies.     Social History: TOBACCO:   reports that she has never smoked. She has never used smokeless tobacco.  ETOH:   reports no history of alcohol use. E-cigarette/Vaping       Questions Responses    E-cigarette/Vaping Use Never User    Start Date     Passive Exposure     Quit Date     Counseling Given Yes    Comments               Family History:      Family History reviewed with patient, and does not pertain and non-contributory to the current illness  Positive maternally for heart disease      Problem Relation Age of Onset    Heart Disease Mother     High Blood Pressure Mother     High Cholesterol Mother     Diabetes Mother     Cancer Father        REVIEW OF SYSTEMS:     Constitutional:  No Fever, No Chills, No Night Sweats  ENT/Mouth:  No Nasal Congestion,  No Hoarseness, No new mouth lesion  Eyes:  No Eye Pain, No Redness, No Discharge  Cardiovascular: + Chest Pain, No Orthopnea, + palpitations  Respiratory: + Cough,  + sputum, No Dyspnea  Gastrointestinal: No Vomiting, No Diarrhea, No abdominal pain  Genitourinary: No Urinary Frequency, No Hematuria, No Urinary pain  Musculoskeletal:  No worsening Arthralgias, No worsening Myalgias  Skin:  No new Skin Lesions, No new skin rash  Neuro:  No new weakness, No new numbness. Psych:  No suicial ideation, No Violence ideation    PHYSICAL EXAM PERFORMED:    BP (!) 158/100   Pulse (!) 127   Temp 97.8 °F (36.6 °C) (Oral)   Resp 22   Ht 5' 6\" (1.676 m)   Wt 262 lb (118.8 kg)   SpO2 94%   BMI 42.29 kg/m²     General appearance:  mild acute distress, appears older than stated age  HEENT:   atraumatic, sclera anicteric, Conjunctivae clear. Neck: Supple,Trachea midline, no goiter  Respiratory:minimal accessory muscle usage, Normal respiratory effort. Clear to auscultation, bilaterally without wheezing  Cardiovascular: Tachycardia, capillary refill 2 seconds  Abdomen: Soft, non-tender, non-distended with normal bowel sounds. Musculoskeletal:  No clubbing, cyanosis.  trace edema LE bilaterally. Skin: turgor normal.  No new rashes or lesions. Neurologic: Alert and oriented x4, no new focal sensory/motor deficits. Labs:     Recent Labs     11/24/22 0230   WBC 16.3*   HGB 14.1   HCT 44.0        Recent Labs     11/24/22 0230      K 3.6      CO2 26   BUN 8   CREATININE 0.6   CALCIUM 9.9     Recent Labs     11/24/22 0230   AST 25   ALT 12   BILITOT 0.3   ALKPHOS 99     No results for input(s): INR in the last 72 hours. Recent Labs     11/24/22 0230   TROPONINI 0.03*       Urinalysis:      Lab Results   Component Value Date/Time    NITRU Negative 03/13/2022 12:44 PM    WBCUA 3 03/13/2022 12:44 PM    BACTERIA 2+ 03/13/2022 12:44 PM    RBCUA 3 03/13/2022 12:44 PM    BLOODU Negative 03/13/2022 12:44 PM    SPECGRAV >=1.030 03/13/2022 12:44 PM    GLUCOSEU Negative 03/13/2022 12:44 PM       Radiology:     CXR: I have reviewed the CXR with the following interpretation:   No acute process  EKG:  I have reviewed the EKG with the following interpretation:   Sinus tachycardia  XR CHEST PORTABLE   Final Result   Unchanged appearance of the chest without acute airspace disease identified. ASSESSMENT AND PLAN:    Active Hospital Problems    Diagnosis Date Noted    ICD (implantable cardioverter-defibrillator) discharge [Z45.02] 11/24/2022     Priority: Medium     ICD discharge:  Interrogation showed 2 shocks were at 215 a.m. and 6 more shocks to 216  Cardiology consulted, appreciated    NSTEMI:  Etiology is suspected secondary to ICD discharge  Aspirin ordered   Patient continued to have chest tightness, morphine ordered  continue trend troponin  Cardiology on board, much appreciated    Acute cough: Onset almost 2 weeks  Patient did report sick contacts  COVID and influenza ordered    Essential Hypertension: Continue home medication  Hyperlipidemia: Controlled on home Statin.  Outpatient PCP follow up post-discharge  Gout: Not in acute exacerbation, continue medication  Paroxysmal Atrial fibrilliation: unspecified and clinically unable to determine etiology. Controlled on home medication. currently Anticoagulated with Eliquis and Monitored on tele  Class III obesity:Complicating assessment and treatment. Placing patient at risk for multiple co-morbidities as well as early death and contributing to the patient's presentation.  Education, and counseling       Diet: NPO except meds ordered    DVT Prophylaxis: On Eliquis    Dispo:   Expected LOS greater than two [place], [place]

## 2022-11-24 NOTE — ED PROVIDER NOTES
[address] [facility]  eMERGENCY dEPARTMENT eNCOUnter        Pt Name: Jose F Stacy  MRN: [id number]  [place] 1955  Date of evaluation: 11/24/2022  Provider: Chio Amezquita MD  PCP: Lester Cazares MD      CHIEF COMPLAINT       Chief Complaint   Patient presents with    Tachycardia     Pt from home. She reports her defibrillator has gone off 4 times today. Squad found her to be in SVT when arrived. She was in the 140 and upto the 210's. She did convert back to ST. She is complaining of a pinching pain in her chest.      Chest Pain       HISTORY OFPRESENT ILLNESS   (Location/Symptom, Timing/Onset, Context/Setting, Quality, Duration, Modifying Factors,Severity)  Note limiting factors. Jose F Stacy is a 75 y.o. female patient woke up to go to the bathroom and noticed via her defibrillator went off 3 or 4 times tachycardia no chest pain no shortness of breath but stated that her heart rate was in the 200 range and then went down to 120 or 30 without treatment    Nursing Notes were all reviewed and agreed with or any disagreements were addressed  in the HPI. REVIEW OF SYSTEMS    (2-9 systems for level 4, 10 or more for level 5)       REVIEW OF SYSTEMS    Constitutional:  Denies fever, chills, or weakness   Eyes:  Denies vision changes  HENT:  Denies sore throat or neck pain   Respiratory:  Denies cough or shortness of breath   Cardiovascular:  Denies chest pain  GI:  Denies abdominal pain, nausea, vomiting, or diarrhea   Musculoskeletal:  Denies back pain   Skin: no rash or vesicles   Neurologic:  no headache weakness focal    Lymphatic:  no swollen  nodes   Psychiatric: no si or hs thoughts     All systems negative except as marked. Positives and Pertinent negatives as per HPI. Except as noted above in the ROS, all other systems were reviewed andnegative.        PASTMEDICAL HISTORY     Past Medical History:   Diagnosis Date    AICD (automatic cardioverter/defibrillator) present     Arthritis     CHF (congestive heart failure) (HCC)     Gout     Hyperlipidemia     Hypertension     MI (myocardial infarction) ([facility] [address]          SURGICAL HISTORY       Past Surgical History:   Procedure Laterality Date    CARDIAC DEFIBRILLATOR PLACEMENT      DILATION AND CURETTAGE OF UTERUS      PAIN MANAGEMENT PROCEDURE Bilateral 6/29/2022    BILATERAL L5 TRANSFORAMINAL EPIDURAL STEROID INJECTION WITH FLUOROSCOPY performed by Nick Brown MD at [address] [facility] [address]       Previous Medications    APIXABAN (ELIQUIS) 5 MG TABS TABLET    Take 1 tablet by mouth 2 times daily    ASPIRIN 81 MG EC TABLET    Take 81 mg by mouth daily    ATORVASTATIN (LIPITOR) 40 MG TABLET    Take 1 tablet by mouth daily    COLCHICINE (COLCRYS) 0.6 MG TABLET    Take 1 tablet by mouth daily    CYCLOBENZAPRINE (FLEXERIL) 10 MG TABLET    Take 1 tablet by mouth 3 times daily as needed for Muscle spasms    DICLOFENAC SODIUM (VOLTAREN) 1 % GEL    Apply 4 g topically 4 times daily    FUROSEMIDE (LASIX) 40 MG TABLET    Take 1 tablet by mouth 2 times daily    IBUPROFEN (ADVIL;MOTRIN) 200 MG TABLET    Take 400 mg by mouth every 6 hours as needed    ISOSORBIDE MONONITRATE (IMDUR) 30 MG EXTENDED RELEASE TABLET    Take 1 tablet by mouth daily    METOPROLOL SUCCINATE (TOPROL XL) 100 MG EXTENDED RELEASE TABLET    Take 1 tablet in the morning and a 1/2 tablet at night    ONDANSETRON (ZOFRAN-ODT) 4 MG DISINTEGRATING TABLET    Take 1 tablet by mouth every 8 hours as needed for Nausea or Vomiting    PREDNISONE (DELTASONE) 10 MG TABLET    5 tabs po qam for 2 days then 4,3,2,1 tabs qam for 2 days each total of 10 days    VALSARTAN (DIOVAN) 160 MG TABLET    Take 1 tablet by mouth 2 times daily       ALLERGIES     Patient has no known allergies.     FAMILY HISTORY       Family History   Problem Relation Age of Onset    Heart Disease Mother     High Blood Pressure Mother     High Cholesterol Mother     Diabetes Mother     Cancer Father           SOCIAL HISTORY       Social History     Socioeconomic History    Marital status:      Spouse name: None    Number of children: None    Years of education: None    Highest education level: None   Tobacco Use    Smoking status: Never    Smokeless tobacco: Never    Tobacco comments:     QUIT AS A TEEN   Vaping Use    Vaping Use: Never used   Substance and Sexual Activity    Alcohol use: No     Comment: RARE    Drug use: No       SCREENINGS             PHYSICAL EXAM    (up to 7 for level 4, 8 or more for level 5)     ED Triage Vitals   BP Temp Temp src Pulse Resp SpO2 Height Weight   -- -- -- -- -- -- -- --           General Appearance:  Alert, cooperative, no distress, appears stated age. Head:  Normocephalic, without obvious abnormality, atraumatic. Eyes:  conjunctiva/corneas clear, EOM's intact. Sclera anicteric. ENT: Mucous membranes moist.   Neck: Supple, symmetrical, trachea midline, no adenopathy. No jugular venous distention. Lungs:   No Respiratory Distress. no rales  rhonchi rub   Chest Wall:  Nontender  no deformity   Heart:  Tachycardia no murmer gallop    Abdomen:   Soft nontender no organomegally    Extremities:  Full range of motion. no deformity   Pulses: Equal  upper and lower    Skin:  No rashes or lesions to exposed skin. Neurologic: Alert and oriented X 3. Motor grossly normal.  Speech clear.  Cr n 2-12 intact       DIAGNOSTIC RESULTS   LABS:    Labs Reviewed   CBC WITH AUTO DIFFERENTIAL - Abnormal; Notable for the following components:       Result Value    WBC 16.3 (*)     RDW 16.5 (*)     Neutrophils Absolute 13.8 (*)     All other components within normal limits   COMPREHENSIVE METABOLIC PANEL W/ REFLEX TO MG FOR LOW K - Abnormal; Notable for the following components:    Glucose 137 (*)     All other components within normal limits   TROPONIN - Abnormal; Notable for the following components:    Troponin 0.03 (*)     All other components within normal limits   BRAIN NATRIURETIC PEPTIDE       All other labs were within normal range or not returned as of thisdictation. EKG: All EKG's are interpreted by the Emergency Department Physician who either signs or Co-signs this chart in the absence of a cardiologist.    EKG Interpretation    Interpreted by emergency department physician    Rhythm: sinus tachycardia  Rate: 120-130  Axis: normal  Ectopy: none  Conduction: normal  ST Segments: no acute change  T Waves: no acute change  Q Waves: none    Clinical Impression: Sinus tachycardia    Lisa Ferreira MD      RADIOLOGY:   Non-plain film images such as CT, Ultrasound and MRI are read by the radiologist. Plainradiographic images are visualized and preliminarily interpreted by the  ED Provider with the belowfindings:        Interpretation per the Radiologist below, if available at the time of this note:    XR CHEST PORTABLE   Final Result   Unchanged appearance of the chest without acute airspace disease identified. PROCEDURES   Unless otherwise noted below, none     Procedures    CRITICAL CARE TIME   N/A      CONSULTS:  None    EMERGENCY DEPARTMENT COURSE and DIFFERENTIAL DIAGNOSIS/MDM:   Vitals:    Vitals:    11/24/22 0210   BP: (!) 158/100   Pulse: (!) 127   Resp: 22   Temp: 97.8 °F (36.6 °C)   TempSrc: Oral   SpO2: 94%   Weight: 262 lb (118.8 kg)   Height: 5' 6\" (1.676 m)       Patient was given the following medications:  Medications - No data to display  Interrogation of the [employer] defibrillator pacemaker shows 2 episodes of V. tach and 6 more episodes of V. tach that were shocked with a rate of about 240 and that he was paced out of arhythm earlier in the evening before the ventricular tachycardia      Is this patient to be included in the SEP-1 Core Measure due to severe sepsis or septic shock? No   Exclusion criteria - the patient is NOT to be included for SEP-1 Core Measure due to:   Infection is not suspected    Patient has suspected recurrent V. tach and needs further evaluation by cardiology      FINAL IMPRESSION      1. Ventricular tachycardia        DISPOSITION/PLAN   DISPOSITION Decision To Admit 11/24/2022 03:38:30 AM      PATIENT REFERRED TO:  No follow-up provider specified.     DISCHARGE MEDICATIONS:  New Prescriptions    No medications on file       DISCONTINUED MEDICATIONS:  Discontinued Medications    No medications on file              (Please note that portions of this note were completed with a voice recognition program.  Efforts were made to edit the dictations but occasionally words aremis-transcribed.)    Aniceto Poole MD (electronically signed)          Aniceto Poole MD  11/24/22 0172

## 2022-11-25 LAB
A/G RATIO: 1.1 (ref 1.1–2.2)
ALBUMIN SERPL-MCNC: 3.5 G/DL (ref 3.4–5)
ALP BLD-CCNC: 87 U/L (ref 40–129)
ALT SERPL-CCNC: 11 U/L (ref 10–40)
ANION GAP SERPL CALCULATED.3IONS-SCNC: 10 MMOL/L (ref 3–16)
APTT: 35 SEC (ref 23–34.3)
AST SERPL-CCNC: 25 U/L (ref 15–37)
BASOPHILS ABSOLUTE: 0.1 K/UL (ref 0–0.2)
BASOPHILS RELATIVE PERCENT: 0.8 %
BILIRUB SERPL-MCNC: 0.4 MG/DL (ref 0–1)
BUN BLDV-MCNC: 19 MG/DL (ref 7–20)
CALCIUM SERPL-MCNC: 9 MG/DL (ref 8.3–10.6)
CHLORIDE BLD-SCNC: 101 MMOL/L (ref 99–110)
CO2: 28 MMOL/L (ref 21–32)
CREAT SERPL-MCNC: 0.8 MG/DL (ref 0.6–1.2)
EKG ATRIAL RATE: 84 BPM
EKG DIAGNOSIS: NORMAL
EKG P AXIS: 69 DEGREES
EKG P-R INTERVAL: 170 MS
EKG Q-T INTERVAL: 408 MS
EKG QRS DURATION: 94 MS
EKG QTC CALCULATION (BAZETT): 482 MS
EKG R AXIS: -3 DEGREES
EKG T AXIS: 65 DEGREES
EKG VENTRICULAR RATE: 84 BPM
EOSINOPHILS ABSOLUTE: 0.1 K/UL (ref 0–0.6)
EOSINOPHILS RELATIVE PERCENT: 0.7 %
GFR SERPL CREATININE-BSD FRML MDRD: >60 ML/MIN/{1.73_M2}
GLUCOSE BLD-MCNC: 177 MG/DL (ref 70–99)
HCT VFR BLD CALC: 39.7 % (ref 36–48)
HEMOGLOBIN: 12.6 G/DL (ref 12–16)
LEFT VENTRICULAR EJECTION FRACTION MODE: NORMAL
LV EF: 25 %
LYMPHOCYTES ABSOLUTE: 4.5 K/UL (ref 1–5.1)
LYMPHOCYTES RELATIVE PERCENT: 30.7 %
MCH RBC QN AUTO: 27.9 PG (ref 26–34)
MCHC RBC AUTO-ENTMCNC: 31.8 G/DL (ref 31–36)
MCV RBC AUTO: 87.8 FL (ref 80–100)
MONOCYTES ABSOLUTE: 1.2 K/UL (ref 0–1.3)
MONOCYTES RELATIVE PERCENT: 8.2 %
NEUTROPHILS ABSOLUTE: 8.7 K/UL (ref 1.7–7.7)
NEUTROPHILS RELATIVE PERCENT: 59.6 %
PDW BLD-RTO: 16.4 % (ref 12.4–15.4)
PLATELET # BLD: 361 K/UL (ref 135–450)
PMV BLD AUTO: 7.9 FL (ref 5–10.5)
POTASSIUM REFLEX MAGNESIUM: 4 MMOL/L (ref 3.5–5.1)
RBC # BLD: 4.52 M/UL (ref 4–5.2)
SODIUM BLD-SCNC: 139 MMOL/L (ref 136–145)
TOTAL PROTEIN: 6.8 G/DL (ref 6.4–8.2)
URINE CULTURE, ROUTINE: NORMAL
WBC # BLD: 14.6 K/UL (ref 4–11)

## 2022-11-25 PROCEDURE — 4A023N7 MEASUREMENT OF CARDIAC SAMPLING AND PRESSURE, LEFT HEART, PERCUTANEOUS APPROACH: ICD-10-PCS | Performed by: INTERNAL MEDICINE

## 2022-11-25 PROCEDURE — 99153 MOD SED SAME PHYS/QHP EA: CPT

## 2022-11-25 PROCEDURE — 6370000000 HC RX 637 (ALT 250 FOR IP): Performed by: INTERNAL MEDICINE

## 2022-11-25 PROCEDURE — 93458 L HRT ARTERY/VENTRICLE ANGIO: CPT | Performed by: INTERNAL MEDICINE

## 2022-11-25 PROCEDURE — 99152 MOD SED SAME PHYS/QHP 5/>YRS: CPT

## 2022-11-25 PROCEDURE — 6360000002 HC RX W HCPCS: Performed by: INTERNAL MEDICINE

## 2022-11-25 PROCEDURE — 93005 ELECTROCARDIOGRAM TRACING: CPT | Performed by: INTERNAL MEDICINE

## 2022-11-25 PROCEDURE — 80053 COMPREHEN METABOLIC PANEL: CPT

## 2022-11-25 PROCEDURE — 93010 ELECTROCARDIOGRAM REPORT: CPT | Performed by: INTERNAL MEDICINE

## 2022-11-25 PROCEDURE — 6360000002 HC RX W HCPCS

## 2022-11-25 PROCEDURE — 2580000003 HC RX 258: Performed by: INTERNAL MEDICINE

## 2022-11-25 PROCEDURE — 99024 POSTOP FOLLOW-UP VISIT: CPT | Performed by: INTERNAL MEDICINE

## 2022-11-25 PROCEDURE — 2500000003 HC RX 250 WO HCPCS

## 2022-11-25 PROCEDURE — 93458 L HRT ARTERY/VENTRICLE ANGIO: CPT

## 2022-11-25 PROCEDURE — B2111ZZ FLUOROSCOPY OF MULTIPLE CORONARY ARTERIES USING LOW OSMOLAR CONTRAST: ICD-10-PCS | Performed by: INTERNAL MEDICINE

## 2022-11-25 PROCEDURE — C1894 INTRO/SHEATH, NON-LASER: HCPCS

## 2022-11-25 PROCEDURE — 85730 THROMBOPLASTIN TIME PARTIAL: CPT

## 2022-11-25 PROCEDURE — 6360000004 HC RX CONTRAST MEDICATION: Performed by: INTERNAL MEDICINE

## 2022-11-25 PROCEDURE — 99152 MOD SED SAME PHYS/QHP 5/>YRS: CPT | Performed by: INTERNAL MEDICINE

## 2022-11-25 PROCEDURE — 2709999900 HC NON-CHARGEABLE SUPPLY

## 2022-11-25 PROCEDURE — 85025 COMPLETE CBC W/AUTO DIFF WBC: CPT

## 2022-11-25 PROCEDURE — C1769 GUIDE WIRE: HCPCS

## 2022-11-25 PROCEDURE — B2151ZZ FLUOROSCOPY OF LEFT HEART USING LOW OSMOLAR CONTRAST: ICD-10-PCS | Performed by: INTERNAL MEDICINE

## 2022-11-25 PROCEDURE — 1200000000 HC SEMI PRIVATE

## 2022-11-25 RX ORDER — ONDANSETRON 2 MG/ML
4 INJECTION INTRAMUSCULAR; INTRAVENOUS EVERY 6 HOURS PRN
OUTPATIENT
Start: 2022-11-25

## 2022-11-25 RX ORDER — OXYCODONE HYDROCHLORIDE AND ACETAMINOPHEN 5; 325 MG/1; MG/1
1 TABLET ORAL EVERY 4 HOURS PRN
OUTPATIENT
Start: 2022-11-25

## 2022-11-25 RX ORDER — SODIUM CHLORIDE 0.9 % (FLUSH) 0.9 %
5-40 SYRINGE (ML) INJECTION PRN
OUTPATIENT
Start: 2022-11-25

## 2022-11-25 RX ORDER — ACETAMINOPHEN 325 MG/1
650 TABLET ORAL EVERY 4 HOURS PRN
OUTPATIENT
Start: 2022-11-25

## 2022-11-25 RX ORDER — SODIUM CHLORIDE 0.9 % (FLUSH) 0.9 %
5-40 SYRINGE (ML) INJECTION EVERY 12 HOURS SCHEDULED
OUTPATIENT
Start: 2022-11-25

## 2022-11-25 RX ORDER — METOPROLOL SUCCINATE 50 MG/1
100 TABLET, EXTENDED RELEASE ORAL
Status: COMPLETED | OUTPATIENT
Start: 2022-11-25 | End: 2022-11-25

## 2022-11-25 RX ORDER — METOPROLOL SUCCINATE 50 MG/1
200 TABLET, EXTENDED RELEASE ORAL DAILY
Status: DISCONTINUED | OUTPATIENT
Start: 2022-11-26 | End: 2022-12-01 | Stop reason: HOSPADM

## 2022-11-25 RX ORDER — SODIUM CHLORIDE 9 MG/ML
INJECTION, SOLUTION INTRAVENOUS PRN
OUTPATIENT
Start: 2022-11-25

## 2022-11-25 RX ORDER — METOPROLOL SUCCINATE 50 MG/1
100 TABLET, EXTENDED RELEASE ORAL ONCE
Status: DISCONTINUED | OUTPATIENT
Start: 2022-11-25 | End: 2022-11-25

## 2022-11-25 RX ORDER — SODIUM CHLORIDE 9 MG/ML
INJECTION, SOLUTION INTRAVENOUS CONTINUOUS
OUTPATIENT
Start: 2022-11-25

## 2022-11-25 RX ORDER — OXYCODONE HYDROCHLORIDE AND ACETAMINOPHEN 5; 325 MG/1; MG/1
2 TABLET ORAL EVERY 4 HOURS PRN
OUTPATIENT
Start: 2022-11-25

## 2022-11-25 RX ADMIN — MORPHINE SULFATE 2 MG: 2 INJECTION, SOLUTION INTRAMUSCULAR; INTRAVENOUS at 19:50

## 2022-11-25 RX ADMIN — VALSARTAN 160 MG: 160 TABLET, FILM COATED ORAL at 08:23

## 2022-11-25 RX ADMIN — METOPROLOL SUCCINATE 100 MG: 50 TABLET, EXTENDED RELEASE ORAL at 08:23

## 2022-11-25 RX ADMIN — Medication 10 ML: at 19:52

## 2022-11-25 RX ADMIN — SODIUM CHLORIDE: 9 INJECTION, SOLUTION INTRAVENOUS at 05:28

## 2022-11-25 RX ADMIN — ISOSORBIDE MONONITRATE 30 MG: 30 TABLET, EXTENDED RELEASE ORAL at 08:23

## 2022-11-25 RX ADMIN — METOPROLOL SUCCINATE 100 MG: 50 TABLET, FILM COATED, EXTENDED RELEASE ORAL at 15:30

## 2022-11-25 RX ADMIN — APIXABAN 5 MG: 5 TABLET, FILM COATED ORAL at 15:13

## 2022-11-25 RX ADMIN — HEPARIN SODIUM 12 UNITS/KG/HR: 10000 INJECTION, SOLUTION INTRAVENOUS at 05:26

## 2022-11-25 RX ADMIN — POTASSIUM CHLORIDE 20 MEQ: 1500 TABLET, EXTENDED RELEASE ORAL at 08:23

## 2022-11-25 RX ADMIN — APIXABAN 5 MG: 5 TABLET, FILM COATED ORAL at 19:50

## 2022-11-25 RX ADMIN — MORPHINE SULFATE 2 MG: 2 INJECTION, SOLUTION INTRAMUSCULAR; INTRAVENOUS at 13:46

## 2022-11-25 RX ADMIN — ATORVASTATIN CALCIUM 40 MG: 40 TABLET, FILM COATED ORAL at 08:23

## 2022-11-25 RX ADMIN — ASPIRIN 81 MG: 81 TABLET, COATED ORAL at 08:23

## 2022-11-25 RX ADMIN — IOPAMIDOL 73 ML: 755 INJECTION, SOLUTION INTRAVENOUS at 09:45

## 2022-11-25 RX ADMIN — HEPARIN SODIUM 4000 UNITS: 1000 INJECTION INTRAVENOUS; SUBCUTANEOUS at 05:23

## 2022-11-25 ASSESSMENT — PAIN DESCRIPTION - LOCATION
LOCATION: CHEST

## 2022-11-25 ASSESSMENT — PAIN SCALES - GENERAL
PAINLEVEL_OUTOF10: 3
PAINLEVEL_OUTOF10: 9
PAINLEVEL_OUTOF10: 6
PAINLEVEL_OUTOF10: 4
PAINLEVEL_OUTOF10: 6
PAINLEVEL_OUTOF10: 3

## 2022-11-25 ASSESSMENT — PAIN DESCRIPTION - ORIENTATION
ORIENTATION: MID

## 2022-11-25 ASSESSMENT — PAIN DESCRIPTION - DESCRIPTORS
DESCRIPTORS: DISCOMFORT
DESCRIPTORS: ACHING
DESCRIPTORS: DISCOMFORT
DESCRIPTORS: DISCOMFORT

## 2022-11-25 NOTE — OP NOTE
Operative Note  Patient:  Jesus Stone   :   1955    Procedural Summary  ~Consent:   Obtained written and verbal consent      Risks/benefits explained in detail  ~Procedure:    Left Heart Catheterization  ~Medications:    Procedural sedation with minimal conscious sedation  ~Complications:   None  ~Blood Loss:    <10cc  ~Specimens:    None obtained  ~Pre-sedation re-evaluation: Performed immediately prior to procedure. Medication and Procedural Reconciliation:  An independent trained observer pushed medications at my direction. We monitored the patient's level of consciousness and vital signs/physiologic status throughout the procedure duration (see start and stop times below). Sedation: 1.5 mg Versed, 50 mcg Fentanyl  Sedation start: 901  Sedation stop: 934    Cardiac Cath LVG:  Anatomy:   LM-nml   LAD-mid 40%  Cx-nml  OM- nml  RCA-dom nml  RPDA- nml  LVEF- 25%  LVG- severe global hypokinesis  LVEDP- 15    Contrast: 73  Flouro Time: 2.3  Access: R radial a    Impression  ~Coronary Angiography w/ nonobstructive CAD  ~LVG with LVEF of 25% and global regional wall motion abnormalities  ~No ischemic cause for VT/VF        Recommendation  ~Aggressive medical treatment and risk factor modification  ~1. Medications reviewed, no changes at this time. ECHO PENDING  2. Stop heparin gtt. Post cath IVF. Bedrest. Stop amiodarone. 3. Increase toprol xl to 200mg. 4. Patient has been advised on the importance of regular exercise of at least 20-30 minutes daily alternating with aerobic and isometric activities. 5. Patient counseled about and offered assistance for smoking cessation   6. No indication for cardiac rehab  7. OK to discharge home today.  Follow up in 2 months with cardiology            Iva Cormier MD, MD 2022 9:38 AM

## 2022-11-25 NOTE — PROGRESS NOTES
Pt unaware of [facility] tomorrow and would like to speak w/ MD before signing consent. Consent placed in chart.

## 2022-11-25 NOTE — PRE SEDATION
Brief Pre-Op Note/Sedation Assessment      Trang Oscar  1955  [id number]  8:37 AM    Planned Procedure: Cardiac Catheterization Procedure  Post Procedure Plan: Return to same level of care  Consent: I have discussed with the patient and/or the patient representative the indication, alternatives, and the possible risks and/or complications of the planned procedure and the anesthesia methods. The patient and/or patient representative appear to understand and agree to proceed. Chief Complaint:   Chest Pain/Pressure  NSTEMI      Indications for Cath Procedure:  Presentation:  ACS > 24 hrs, New Onset Angina <= 2 months, Worsening Angina, Suspected CAD, and Cardiac Arrythmia  2. Anginal Classification within 2 weeks:  CCS IV - Inability to perform any activity without angina or angina at rest, i.e., severe limitation  3. Angina Symptoms Assessment:  Atypical Chest Pain  4. Heart Failure Class within last 2 weeks:  No symptoms  5. Cardiovascular Instability:  Yes:  Ventricular arrhythmias and Persistent ischemic symptoms (CP, ST Elevation)    Prior Ischemic Workup/Eval:  Pre-Procedural Medications: Yes: Aspirin, Beta Blockers, and STATIN  2. Stress Test Completed? No    Does Patient need surgery?   Cath Valve Surgery:  No    Pre-Procedure Medical History:  Vital Signs:  /77   Pulse 80   Temp 97.2 °F (36.2 °C) (Temporal)   Resp 14   Ht 5' 6\" (1.676 m)   Wt 262 lb 5.6 oz (119 kg)   SpO2 98%   BMI 42.34 kg/m²     Allergies:  No Known Allergies  Medications:    Current Facility-Administered Medications   Medication Dose Route Frequency Provider Last Rate Last Admin    sodium chloride flush 0.9 % injection 10 mL  10 mL IntraVENous 2 times per day Aliza Barksdale DO   10 mL at 11/24/22 2036    sodium chloride flush 0.9 % injection 10 mL  10 mL IntraVENous PRN Beto Barksdale DO        0.9 % sodium chloride infusion   IntraVENous PRN Beto Barksdale DO        potassium chloride 10 mEq/100 mL IVPB (Peripheral Line)  10 mEq IntraVENous PRN Ahmad A Apolonia, DO        magnesium sulfate 1000 mg in dextrose 5% 100 mL IVPB  2,000 mg IntraVENous PRN Alverna Sin A Apolonia, DO        promethazine (PHENERGAN) tablet 12.5 mg  12.5 mg Oral Q6H PRN Ahmad A Apolonia, DO        Or    ondansetron (ZOFRAN) injection 4 mg  4 mg IntraVENous Q6H PRN Ahmad A Apolonia, DO        acetaminophen (TYLENOL) tablet 650 mg  650 mg Oral Q6H PRN Ahmad A Apolonia, DO   650 mg at 11/24/22 7512    Or    acetaminophen (TYLENOL) suppository 650 mg  650 mg Rectal Q6H PRN Ahmad A Apolonia, DO        aspirin EC tablet 81 mg  81 mg Oral Daily Ahmad A Apolonia, DO   81 mg at 11/25/22 0823    atorvastatin (LIPITOR) tablet 40 mg  40 mg Oral Daily Ahmad A Apolonia, DO   40 mg at 11/25/22 0823    isosorbide mononitrate (IMDUR) extended release tablet 30 mg  30 mg Oral Daily Ahmad A Apolonia, DO   30 mg at 11/25/22 3965    metoprolol succinate (TOPROL XL) extended release tablet 100 mg  100 mg Oral Daily [facility]   100 mg at 11/25/22 0823    valsartan (DIOVAN) tablet 160 mg  160 mg Oral BID Ahmad A Apolonia, DO   160 mg at 11/25/22 0815    morphine (PF) injection 2 mg  2 mg IntraVENous Q4H PRN Lior Costa Apolonia, DO   2 mg at 11/24/22 2120    heparin (porcine) injection 4,000 Units  4,000 Units IntraVENous PRN Sandy Loan, DO   4,000 Units at 11/25/22 9684    heparin (porcine) injection 2,000 Units  2,000 Units IntraVENous PRN Sandy Loan, DO        heparin 25,000 units in dextrose 5% 250 mL (premix) infusion  5-30 Units/kg/hr IntraVENous Continuous Sandy Loan, DO 14.3 mL/hr at 11/25/22 0526 12 Units/kg/hr at 11/25/22 0526    amiodarone (CORDARONE) 450 mg in dextrose 5 % 250 mL infusion  0.5 mg/min IntraVENous Continuous Sandy DO Flora 16.7 mL/hr at 11/25/22 0137 0.5 mg/min at 11/25/22 0137    sodium chloride flush 0.9 % injection 5-40 mL  5-40 mL IntraVENous 2 times per day Marycarmen Mccullough MD        sodium chloride flush 0.9 % injection 5-40 mL  5-40 mL IntraVENous PRN Nichelle Rivas MD        0.9 % sodium chloride infusion  25 mL IntraVENous PRN Nichelle Rivas MD        0.9 % sodium chloride infusion   IntraVENous Continuous Sherren ,  mL/hr at 11/25/22 0528 New Bag at 11/25/22 0528       Past Medical History:    Past Medical History:   Diagnosis Date    AICD (automatic cardioverter/defibrillator) present     Arthritis     CHF (congestive heart failure) ([facility] [address]     Gout     Hyperlipidemia     Hypertension     MI (myocardial infarction) [facility])        Surgical History:    Past Surgical History:   Procedure Laterality Date    CARDIAC DEFIBRILLATOR PLACEMENT      DILATION AND CURETTAGE OF UTERUS      PAIN MANAGEMENT PROCEDURE Bilateral 6/29/2022    BILATERAL L5 TRANSFORAMINAL EPIDURAL STEROID INJECTION WITH FLUOROSCOPY performed by Mervin Esparza MD at [facility]:  Pre-Sedation Documentation and Exam:  I have assessed the patient and reviewed the H&P on the chart. Prior History of Anesthesia Complications:   none    Modified Mallampati:  II (soft palate, uvula, fauces visible)    ASA Classification:  Class 3 - A patient with severe systemic disease that limits activity but is not incapacitating    Starla Scale: Activity:  2 - Able to move 4 extremities voluntarily on command  Respiration:  2 - Able to breathe deeply and cough freely  Circulation:  2 - BP+/- 20mmHg of normal  Consciousness:  2 - Fully awake  Oxygen Saturation (color):  2 - Able to maintain oxygen saturation >92% on room air    Sedation/Anesthesia Plan:  Guard the patient's safety and welfare. Minimize physical discomfort and pain. Minimize negative psychological responses to treatment by providing sedation and analgesia and maximize the potential amnesia. Patient to meet pre-procedure discharge plan.     Medication Planned:  midazolam intravenously and fentanyl intravenously    Patient is an appropriate candidate for plan of sedation:   yes      Electronically signed by Sheryll Libman, MD on 11/25/2022 at 8:37 AM

## 2022-11-25 NOTE — PROGRESS NOTES
Patient brought over to CVU from cath lab and attached to CVU monitoring. Report reviewed with cath lab RN. Right radial soft and no hematoma or oozing noted. Right radial TR band in place and 2cc air will be removed every 15 minutes once patient is s/p intervention for 60 minutes.

## 2022-11-25 NOTE — PROGRESS NOTES
11/24/2022 - present  [facility] transmission MFF ED. Transmission shows normal sensing and pacing function. EP physician will review. See interrogation under the cardiology tab in the [address] [place] [address] [address] field for more details. Will continue to monitor remotely. ADDITIONAL NOTES  Patient Name: Peter Ballesteros Reason for Check: Came in SVT. Device fired. Present rhythm: atrial sensed and ventricular sensed at 120 bpm DEVICE ASSESSMENT: Available daily battery/lead measurements within expected range. Lead trends stable. Device appears to be oversensing on the atrial lead. See Treated VF zone episode for details. Device appears to be undersensing on atrial lead. See Treated VF episode for details. ARRHYTHMIA SUMMARY: Since data last cleared on 01-Sep-2022 Based on programmed zones, device detected/treated: 2 Treated VF zone episodes with 8 shocks on 24-Nov-2022 1 Treated VT zone episode with 1 ATP on 23-Nov-2022 17 VT-NS episodes most recent on 24-Nov-2022 6 SVT episodes most recent on 24-Nov-2022 Recommend review of stored EGMs for rhythm determination. See attached episode list & stored EGMs for details.  OBSERVATIONS

## 2022-11-25 NOTE — PROGRESS NOTES
Pt took eliquis yesterday morning. Anti-XA factor contraindicated per protocol.  APTT panel to be used for heparin gtt

## 2022-11-25 NOTE — PROGRESS NOTES
HOSPITALISTS PROGRESS NOTE    11/25/2022 9:40 AM        Name: Martin Byrne . Admitted: 11/24/2022  Primary Care Provider: Sonido Carmichael MD (Tel: [phone])      Brief Course: This 75 y.o. female with PMHx of hypertension, hyperlipidemia, gout, HFrEF EF 40% presented to the ED with tachycardia, chest pain and defibrillator going off. Interval history:   Pt seen and examined today   Overnight events noted and interval ancillary notes reviewed. Status post cardiac cath; reported she is not feeling good and endorsed      Assessment & Plan:     Elevated troponin;  Cardiology consulted; stable cardiac cath which showed nonobstructive CAD. EF of 25%, global regional wall motion abnormalities. Continue medical management. Device discharge:  Cardiology consulted; device interrogated 8 shocks for 1:1 conduction of SVT. Toprol XL dose increased to 200 mg daily    Nonischemic cardiomyopathy. Repeat limited echo showed EF of 25%  Continue medical management    Hx of: Continue Eliquis and beta-blocker  Monitor telemetry    Chronic leukocytosis: Patient afebrile, patient afebrile, afebrile cultures -ve.         DVT PPX: Eliquis  Code:Full Code    Disposition: Once acute medical issues have resolved    Current Medications  [START ON 11/26/2022] metoprolol succinate (TOPROL XL) extended release tablet 200 mg, Daily  metoprolol succinate (TOPROL XL) extended release tablet 100 mg, Once  sodium chloride flush 0.9 % injection 10 mL, 2 times per day  sodium chloride flush 0.9 % injection 10 mL, PRN  0.9 % sodium chloride infusion, PRN  potassium chloride 10 mEq/100 mL IVPB (Peripheral Line), PRN  magnesium sulfate 1000 mg in dextrose 5% 100 mL IVPB, PRN  promethazine (PHENERGAN) tablet 12.5 mg, Q6H PRN   Or  ondansetron (ZOFRAN) injection 4 mg, Q6H PRN  acetaminophen (TYLENOL) tablet 650 mg, Q6H PRN   Or  acetaminophen (TYLENOL) suppository 650 mg, Q6H PRN  aspirin EC tablet 81 mg, Daily  atorvastatin (LIPITOR) tablet 40 mg, Daily  isosorbide mononitrate (IMDUR) extended release tablet 30 mg, Daily  valsartan (DIOVAN) tablet 160 mg, BID  morphine (PF) injection 2 mg, Q4H PRN  sodium chloride flush 0.9 % injection 5-40 mL, 2 times per day  sodium chloride flush 0.9 % injection 5-40 mL, PRN  0.9 % sodium chloride infusion, PRN  0.9 % sodium chloride infusion, Continuous        Objective:  /77   Pulse 80   Temp 97.2 °F (36.2 °C) (Temporal)   Resp 14   Ht 5' 6\" (1.676 m)   Wt 262 lb 5.6 oz (119 kg)   SpO2 98%   BMI 42.34 kg/m²     Intake/Output Summary (Last 24 hours) at 11/25/2022 0940  Last data filed at 11/25/2022 0756  Gross per 24 hour   Intake 786.26 ml   Output 1450 ml   Net -663.74 ml      Wt Readings from Last 3 Encounters:   11/25/22 262 lb 5.6 oz (119 kg)   11/20/22 263 lb (119.3 kg)   09/01/22 262 lb (118.8 kg)       Physical Examination:   General appearance:  No apparent distress, appears stated age and cooperative. HEENT: Normocephalic, sclera clear., PERRLA. Trachea midline, no adenopathy. Cardiovascular: Regular rate and rhythm, normal S1, S2. No murmur. Respiratory:Clear to auscultation bilaterally, no wheeze or crackles. GI: Abdomen soft, no tenderness, not distended, normal bowel sounds  Musculoskeletal: No cyanosis in digits. No BLE edema present  Neurology: CN 2-12 grossly intact.  No speech or motor deficits  Psych: Not agitated, appropriate affect  Skin: Warm, dry, normal turgor    Labs and Tests:  CBC:   Recent Labs     11/24/22  0230 11/25/22  0447   WBC 16.3* 14.6*   HGB 14.1 12.6    361     BMP:    Recent Labs     11/24/22  0230 11/25/22  0447    139   K 3.6 4.0    101   CO2 26 28   BUN 8 19   CREATININE 0.6 0.8   GLUCOSE 137* 177*     Hepatic:   Recent Labs     11/24/22  0230 11/25/22  0447   AST 25 25   ALT 12 11   BILITOT 0.3 0.4   ALKPHOS 99 87     XR CHEST PORTABLE   Final Result   Unchanged appearance of the chest without acute airspace disease identified. Problem List  Principal Problem:    ICD (implantable cardioverter-defibrillator) discharge  Resolved Problems:    * No resolved hospital problems.  Shira Bartlett MD   11/25/2022 9:40 AM

## 2022-11-25 NOTE — PROGRESS NOTES
[address]   Electrophysiology Progress Note     Date: 11/26/2022  Admit Date: 11/24/2022     Reason for consultation: AICD Shock    Chief Complaint:   Chief Complaint   Patient presents with    Tachycardia     Pt from home. She reports her defibrillator has gone off 4 times today. Bianca found her to be in SVT when arrived. She was in the 140 and upto the 210's. She did convert back to ST. She is complaining of a pinching pain in her chest.      Chest Pain       History of Present Illness: History obtained from patient and medical record. Jyoti Julien is a 75 y.o. female with a past medical history of HTN, HLD, CHF, obesity, and AICD (2007 at [facility] [place] [facility]). In August of 2020, pt presented from outside hospital with chest pain. She recently lost her brother and has not been feeling well. Pt admitted to non-compliance with medications secondary to COVID. She had an AICD shock. Her troponin was noted to be 3.5 with a potassium 2.5 in the ER at [facility]. She underwent cardiac cath, which showed non obstructive coronaries and EF of 35%. S/p RFCA with PVI (10/8/20, Dr. Andrew Hunter)    Pt presented to hospital after AICD shock. She had episodes of sustained tachycardia >200 BPM    Interval Hx: Today, she is being seen for follow up. She is doing well from a cardiac standpoint. She remains in sinus rhythm, no recurrent atrial arrhythmias. Her right wrist cath site is stable, no swelling or hematoma. Her BP is stable. Her main complaint is back and buttocks discomfort. She states this is a chronic issue for her and she may need back surgery in the future. Patient seen and examined. Clinical notes reviewed. Telemetry reviewed. No new complaints today. No major events overnight. Denies having chest pain, palpitations, shortness of breath, orthopnea/PND, cough, or dizziness at the time of this visit. Allergies:  No Known Allergies    Home Meds:  Prior to Visit Medications    Medication Sig Taking?  Authorizing Provider   predniSONE (DELTASONE) 10 MG tablet 5 tabs po qam for 2 days then 4,3,2,1 tabs qam for 2 days each total of 10 days  Waldemar Marie PA-C   diclofenac sodium (VOLTAREN) 1 % GEL Apply 4 g topically 4 times daily  Waldemar Marie PA-C   ibuprofen (ADVIL;MOTRIN) 200 MG tablet Take 400 mg by mouth every 6 hours as needed  Historical Provider, MD   metoprolol succinate (TOPROL XL) 100 MG extended release tablet Take 1 tablet in the morning and a 1/2 tablet at night  YEIMY Jay CNP   atorvastatin (LIPITOR) 40 MG tablet Take 1 tablet by mouth daily  YEIMY Arnold CNP   apixaban (ELIQUIS) 5 MG TABS tablet Take 1 tablet by mouth 2 times daily  YEIMY Arnold CNP   furosemide (LASIX) 40 MG tablet Take 1 tablet by mouth 2 times daily  YEIMY Arnold CNP   isosorbide mononitrate (IMDUR) 30 MG extended release tablet Take 1 tablet by mouth daily  YEIMY Arnold CNP   valsartan (DIOVAN) 160 MG tablet Take 1 tablet by mouth 2 times daily  YEIMY Arnold CNP   ondansetron (ZOFRAN-ODT) 4 MG disintegrating tablet Take 1 tablet by mouth every 8 hours as needed for Nausea or Vomiting  Jose Gilbert MD   colchicine (COLCRYS) 0.6 MG tablet Take 1 tablet by mouth daily  Jose Gilbert MD   aspirin 81 MG EC tablet Take 81 mg by mouth daily  Historical Provider, MD      Scheduled Meds:   metoprolol succinate  200 mg Oral Daily    apixaban  5 mg Oral BID    sodium chloride flush  10 mL IntraVENous 2 times per day    aspirin  81 mg Oral Daily    atorvastatin  40 mg Oral Daily    valsartan  160 mg Oral BID    sodium chloride flush  5-40 mL IntraVENous 2 times per day     Continuous Infusions:   sodium chloride      sodium chloride       PRN Meds:sodium chloride flush, sodium chloride, potassium chloride, magnesium sulfate, promethazine **OR** ondansetron, acetaminophen **OR** acetaminophen, morphine, sodium chloride flush, sodium chloride     Past Medical History:  Past Medical History:   Diagnosis Date    AICD (automatic cardioverter/defibrillator) present     Arthritis     CHF (congestive heart failure) (HCC)     Gout     Hyperlipidemia     Hypertension     MI (myocardial infarction) [facility])       Past Surgical History:    has a past surgical history that includes Dilation and curettage of uterus; Cardiac defibrillator placement; and Pain management procedure (Bilateral, 6/29/2022). Social History:  Reviewed. reports that she has never smoked. She has never used smokeless tobacco. She reports that she does not drink alcohol and does not use drugs. Family History:  Reviewed. family history includes Cancer in her father; Diabetes in her mother; Heart Disease in her mother; High Blood Pressure in her mother; High Cholesterol in her mother. Review of Systems:  Constitutional: Negative for fever, night sweats, chills, weight changes, or weakness  Skin: Negative for rash, dry skin, pruritus, bruising, bleeding, blood clots, or changes in skin pigment  HEENT: Negative for vision changes, ringing in the ears, sore throat, dysphagia, or swollen lymph nodes  Respiratory: Reviewed in HPI  Cardiovascular: Reviewed in HPI  Gastrointestinal: Negative for abdominal pain, N/V/D, constipation, or black/tarry stools  Genito-Urinary: Negative for dysuria, incontinence, urgency, or hematuria  Musculoskeletal: Positive for back and buttocks pain. Negative for joint swelling, muscle pain, or injuries  Neurological/Psych: Negative for confusion, seizures, headaches, balance issues or TIA-like symptoms.  No anxiety, depression, or insomnia    Physical Examination:  Vitals:    11/25/22 1700   BP: 120/64   Pulse: 75   Resp: 21   Temp:    SpO2:       In: 790 [P.O.:790]  Out: 1520    Wt Readings from Last 3 Encounters:   11/25/22 262 lb 5.6 oz (119 kg)   11/20/22 263 lb (119.3 kg)   09/01/22 262 lb (118.8 kg)       Intake/Output Summary (Last 24 hours) at 11/26/2022 6826  Last data filed at 11/26/2022 0000  Gross per 24 hour   Intake 790 ml   Output 1520 ml   Net -730 ml       Telemetry: Personally Reviewed  - Sinus rhythm with PVC  Constitutional: Cooperative and in no apparent distress, and appears well nourished  Skin: Warm and pink; no pallor, cyanosis, bruising, or clubbing. Right radial cath site stable, no swelling/hematoma  HEENT: Symmetric and normocephalic. PERRL, EOM intact. Conjunctiva pink with clear sclera. Mucus membranes pink and moist. Teeth intact. Thyroid smooth without nodules or goiter. Cardiovascular: Regular rate and rhythm. S1/S2 present without murmurs, rubs, or gallops. Peripheral pulses 2+, capillary refill < 3 seconds. No elevation of JVP. No peripheral edema  Respiratory: Respirations symmetric and unlabored. Lungs clear to auscultation bilaterally, no wheezing, crackles, or rhonchi  Gastrointestinal: Abdomen soft and round. Bowel sounds normoactive in all quadrants without tenderness or masses. ++ Obese  Musculoskeletal: Bilateral upper and lower extremity strength 5/5 with full ROM  Neurologic/Psych: Awake and orientated to person, place and time. Calm affect, appropriate mood    Pertinent labs, diagnostic, device, and imaging results reviewed as a part of this visit    Labs:    BMP:   Recent Labs     11/24/22  0230 11/25/22 0447 11/26/22  0604    139 141   K 3.6 4.0 4.0    101 104   CO2 26 28 27   BUN 8 19 14   CREATININE 0.6 0.8 0.6     Estimated Creatinine Clearance: 118 mL/min (based on SCr of 0.6 mg/dL).    CBC:   Recent Labs     11/24/22  0230 11/25/22 0447 11/26/22  0604   WBC 16.3* 14.6* 17.5*   HGB 14.1 12.6 12.4   HCT 44.0 39.7 39.7   MCV 86.3 87.8 87.6    361 328     Thyroid:   Lab Results   Component Value Date/Time    TSH 3.90 06/01/2021 05:34 AM     Lipids:   Lab Results   Component Value Date/Time    CHOL 189 05/31/2021 07:47 AM    HDL 39 05/31/2021 07:47 AM    TRIG 99 05/31/2021 07:47 AM     LFTS:   Lab Results   Component Value Date/Time    ALT 11 2022 06:04 AM    AST 20 2022 06:04 AM    ALKPHOS 89 2022 06:04 AM    PROT 6.8 2022 06:04 AM    AGRATIO 1.1 2022 06:04 AM    BILITOT 0.6 2022 06:04 AM     Cardiac Enzymes:   Lab Results   Component Value Date/Time    TROPONINI 0.34 2022 04:00 PM    TROPONINI 0.43 2022 10:48 AM    TROPONINI 0.03 2022 02:30 AM     Coags:   Lab Results   Component Value Date/Time    PROTIME 14.4 2022 10:48 AM    INR 1.13 2022 10:48 AM       EC22 (Personally Interpreted)   - NSR. Rate 71, QRS 94, QTc 491    ECHO: 10/20   Left ventricular systolic function is moderately reduced with estimated ejection fraction of 40%. There is moderate concentric left ventricular hypertrophy. There is no evidence of mass or thrombus in the left atrium or appendage.     Cath: 22  Anatomy:   LM-nml   LAD-mid 40%  Cx-nml  OM- nml  RCA-dom nml  RPDA- nml  LVEF- 25%  LVG- severe global hypokinesis  LVEDP- 15      Impression  ~Coronary Angiography w/ nonobstructive CAD  ~LVG with LVEF of 25% and global regional wall motion abnormalities  ~No ischemic cause for VT/VF    Problem List:   Patient Active Problem List    Diagnosis Date Noted    ICD (implantable cardioverter-defibrillator) discharge 2022    NSVT (nonsustained ventricular tachycardia) 2022    Paraparesis of both lower limbs ([facility] [address] 2022    Lumbosacral radiculopathy 2022    Lumbar radiculopathy 03/15/2022    Gout 2022    Back pain with radiculopathy 2022    Essential hypertension 10/08/2020    Coronary artery disease due to lipid rich plaque     Class 3 severe obesity due to excess calories with serious comorbidity and body mass index (BMI) of 40.0 to 44.9 in adult (HCC)     TYLER (obstructive sleep apnea)     Dilated cardiomyopathy ([facility] [address]     History of ventricular tachycardia 2020    Paroxysmal atrial fibrillation ([facility] [address] 2016    ICD (implantable cardioverter-defibrillator) in pertinent information and plan of care discussed with the rounding physician. All questions and concerns were addressed to the patient. Alternatives to my treatment were discussed. I have discussed the above stated plan with patient and the nurse. The patient verbalized understanding and agreed with the plan. Thank you for allowing to us to participate in the care of Trang Oscar    The patient was seen for >35 minutes. I reviewed interval history, physical exam, review of data including labs, imaging, development and implementation of treatment plan and coordination of complex care.  More than 50% of the time was devoted to counseling the patient on their diagnoses/treatments, as well as coordination of care with the other care teams    YEIMY [facility]  [address]   Office: [phone]

## 2022-11-26 ENCOUNTER — APPOINTMENT (OUTPATIENT)
Dept: CT IMAGING | Age: 67
DRG: 287 | End: 2022-11-26
Payer: MEDICARE

## 2022-11-26 LAB
A/G RATIO: 1.1 (ref 1.1–2.2)
ALBUMIN SERPL-MCNC: 3.5 G/DL (ref 3.4–5)
ALP BLD-CCNC: 89 U/L (ref 40–129)
ALT SERPL-CCNC: 11 U/L (ref 10–40)
ANION GAP SERPL CALCULATED.3IONS-SCNC: 10 MMOL/L (ref 3–16)
AST SERPL-CCNC: 20 U/L (ref 15–37)
BASOPHILS ABSOLUTE: 0.1 K/UL (ref 0–0.2)
BASOPHILS RELATIVE PERCENT: 0.6 %
BILIRUB SERPL-MCNC: 0.6 MG/DL (ref 0–1)
BUN BLDV-MCNC: 14 MG/DL (ref 7–20)
CALCIUM SERPL-MCNC: 8.8 MG/DL (ref 8.3–10.6)
CHLORIDE BLD-SCNC: 104 MMOL/L (ref 99–110)
CO2: 27 MMOL/L (ref 21–32)
CREAT SERPL-MCNC: 0.6 MG/DL (ref 0.6–1.2)
EKG ATRIAL RATE: 71 BPM
EKG DIAGNOSIS: NORMAL
EKG P AXIS: 82 DEGREES
EKG P-R INTERVAL: 172 MS
EKG Q-T INTERVAL: 452 MS
EKG QRS DURATION: 94 MS
EKG QTC CALCULATION (BAZETT): 491 MS
EKG R AXIS: 56 DEGREES
EKG T AXIS: 85 DEGREES
EKG VENTRICULAR RATE: 71 BPM
EOSINOPHILS ABSOLUTE: 0.1 K/UL (ref 0–0.6)
EOSINOPHILS RELATIVE PERCENT: 0.8 %
GFR SERPL CREATININE-BSD FRML MDRD: >60 ML/MIN/{1.73_M2}
GLUCOSE BLD-MCNC: 116 MG/DL (ref 70–99)
HCT VFR BLD CALC: 39.7 % (ref 36–48)
HEMOGLOBIN: 12.4 G/DL (ref 12–16)
LYMPHOCYTES ABSOLUTE: 2.8 K/UL (ref 1–5.1)
LYMPHOCYTES RELATIVE PERCENT: 15.8 %
MAGNESIUM: 1.9 MG/DL (ref 1.8–2.4)
MCH RBC QN AUTO: 27.3 PG (ref 26–34)
MCHC RBC AUTO-ENTMCNC: 31.2 G/DL (ref 31–36)
MCV RBC AUTO: 87.6 FL (ref 80–100)
MONOCYTES ABSOLUTE: 1.4 K/UL (ref 0–1.3)
MONOCYTES RELATIVE PERCENT: 8 %
NEUTROPHILS ABSOLUTE: 13.1 K/UL (ref 1.7–7.7)
NEUTROPHILS RELATIVE PERCENT: 74.8 %
PDW BLD-RTO: 16.5 % (ref 12.4–15.4)
PLATELET # BLD: 328 K/UL (ref 135–450)
PMV BLD AUTO: 7.5 FL (ref 5–10.5)
POTASSIUM REFLEX MAGNESIUM: 4 MMOL/L (ref 3.5–5.1)
RBC # BLD: 4.53 M/UL (ref 4–5.2)
SODIUM BLD-SCNC: 141 MMOL/L (ref 136–145)
TOTAL PROTEIN: 6.8 G/DL (ref 6.4–8.2)
WBC # BLD: 17.5 K/UL (ref 4–11)

## 2022-11-26 PROCEDURE — 99233 SBSQ HOSP IP/OBS HIGH 50: CPT | Performed by: NURSE PRACTITIONER

## 2022-11-26 PROCEDURE — 36592 COLLECT BLOOD FROM PICC: CPT

## 2022-11-26 PROCEDURE — 2580000003 HC RX 258: Performed by: INTERNAL MEDICINE

## 2022-11-26 PROCEDURE — 6360000002 HC RX W HCPCS: Performed by: INTERNAL MEDICINE

## 2022-11-26 PROCEDURE — 85025 COMPLETE CBC W/AUTO DIFF WBC: CPT

## 2022-11-26 PROCEDURE — 83735 ASSAY OF MAGNESIUM: CPT

## 2022-11-26 PROCEDURE — 1200000000 HC SEMI PRIVATE

## 2022-11-26 PROCEDURE — 84443 ASSAY THYROID STIM HORMONE: CPT

## 2022-11-26 PROCEDURE — 72131 CT LUMBAR SPINE W/O DYE: CPT

## 2022-11-26 PROCEDURE — 6370000000 HC RX 637 (ALT 250 FOR IP): Performed by: INTERNAL MEDICINE

## 2022-11-26 PROCEDURE — 36415 COLL VENOUS BLD VENIPUNCTURE: CPT

## 2022-11-26 PROCEDURE — 6370000000 HC RX 637 (ALT 250 FOR IP): Performed by: NURSE PRACTITIONER

## 2022-11-26 PROCEDURE — 94760 N-INVAS EAR/PLS OXIMETRY 1: CPT

## 2022-11-26 PROCEDURE — 80053 COMPREHEN METABOLIC PANEL: CPT

## 2022-11-26 PROCEDURE — 93010 ELECTROCARDIOGRAM REPORT: CPT | Performed by: INTERNAL MEDICINE

## 2022-11-26 RX ORDER — SPIRONOLACTONE 25 MG/1
25 TABLET ORAL DAILY
Status: DISCONTINUED | OUTPATIENT
Start: 2022-11-26 | End: 2022-12-01 | Stop reason: HOSPADM

## 2022-11-26 RX ORDER — HYDROMORPHONE HYDROCHLORIDE 1 MG/ML
1 INJECTION, SOLUTION INTRAMUSCULAR; INTRAVENOUS; SUBCUTANEOUS ONCE
Status: COMPLETED | OUTPATIENT
Start: 2022-11-26 | End: 2022-11-26

## 2022-11-26 RX ORDER — OXYCODONE HYDROCHLORIDE AND ACETAMINOPHEN 5; 325 MG/1; MG/1
1 TABLET ORAL EVERY 8 HOURS PRN
Status: DISCONTINUED | OUTPATIENT
Start: 2022-11-26 | End: 2022-11-28

## 2022-11-26 RX ORDER — DICLOFENAC SODIUM 25 MG/1
50 TABLET, DELAYED RELEASE ORAL 2 TIMES DAILY
Status: DISCONTINUED | OUTPATIENT
Start: 2022-11-26 | End: 2022-11-27 | Stop reason: ALTCHOICE

## 2022-11-26 RX ORDER — PREDNISONE 20 MG/1
40 TABLET ORAL DAILY
Status: COMPLETED | OUTPATIENT
Start: 2022-11-26 | End: 2022-11-30

## 2022-11-26 RX ORDER — CYCLOBENZAPRINE HCL 10 MG
10 TABLET ORAL 3 TIMES DAILY PRN
Status: DISCONTINUED | OUTPATIENT
Start: 2022-11-26 | End: 2022-12-01 | Stop reason: HOSPADM

## 2022-11-26 RX ADMIN — CYCLOBENZAPRINE 10 MG: 10 TABLET, FILM COATED ORAL at 12:31

## 2022-11-26 RX ADMIN — APIXABAN 5 MG: 5 TABLET, FILM COATED ORAL at 09:28

## 2022-11-26 RX ADMIN — ASPIRIN 81 MG: 81 TABLET, COATED ORAL at 09:28

## 2022-11-26 RX ADMIN — Medication 10 ML: at 21:53

## 2022-11-26 RX ADMIN — DICLOFENAC SODIUM 50 MG: 25 TABLET, DELAYED RELEASE ORAL at 12:32

## 2022-11-26 RX ADMIN — VALSARTAN 160 MG: 160 TABLET, FILM COATED ORAL at 09:36

## 2022-11-26 RX ADMIN — APIXABAN 5 MG: 5 TABLET, FILM COATED ORAL at 21:30

## 2022-11-26 RX ADMIN — SPIRONOLACTONE 25 MG: 25 TABLET ORAL at 09:28

## 2022-11-26 RX ADMIN — METOPROLOL SUCCINATE 200 MG: 50 TABLET, EXTENDED RELEASE ORAL at 09:28

## 2022-11-26 RX ADMIN — PREDNISONE 40 MG: 20 TABLET ORAL at 12:32

## 2022-11-26 RX ADMIN — MORPHINE SULFATE 2 MG: 2 INJECTION, SOLUTION INTRAMUSCULAR; INTRAVENOUS at 09:30

## 2022-11-26 RX ADMIN — VALSARTAN 160 MG: 160 TABLET, FILM COATED ORAL at 21:52

## 2022-11-26 RX ADMIN — Medication 10 ML: at 09:28

## 2022-11-26 RX ADMIN — ATORVASTATIN CALCIUM 40 MG: 40 TABLET, FILM COATED ORAL at 09:28

## 2022-11-26 RX ADMIN — OXYCODONE HYDROCHLORIDE AND ACETAMINOPHEN 1 TABLET: 5; 325 TABLET ORAL at 12:31

## 2022-11-26 RX ADMIN — DICLOFENAC SODIUM 50 MG: 25 TABLET, DELAYED RELEASE ORAL at 21:53

## 2022-11-26 RX ADMIN — HYDROMORPHONE HYDROCHLORIDE 1 MG: 1 INJECTION, SOLUTION INTRAMUSCULAR; INTRAVENOUS; SUBCUTANEOUS at 01:05

## 2022-11-26 ASSESSMENT — PAIN SCALES - GENERAL
PAINLEVEL_OUTOF10: 0
PAINLEVEL_OUTOF10: 2
PAINLEVEL_OUTOF10: 10
PAINLEVEL_OUTOF10: 10
PAINLEVEL_OUTOF10: 9
PAINLEVEL_OUTOF10: 9

## 2022-11-26 ASSESSMENT — PAIN DESCRIPTION - DESCRIPTORS: DESCRIPTORS: ACHING

## 2022-11-26 ASSESSMENT — PAIN DESCRIPTION - ORIENTATION
ORIENTATION: RIGHT
ORIENTATION: RIGHT

## 2022-11-26 ASSESSMENT — PAIN DESCRIPTION - LOCATION
LOCATION: BUTTOCKS;LEG
LOCATION: BUTTOCKS;LEG

## 2022-11-26 NOTE — PROGRESS NOTES
HOSPITALISTS PROGRESS NOTE    11/26/2022 10:57 AM        Name: Elio Caraballo . Admitted: 11/24/2022  Primary Care Provider: Iram Nassar MD (Tel: [phone])      Brief Course: This 75 y.o. female with PMHx of hypertension, hyperlipidemia, gout, HFrEF EF 40% presented to the ED with tachycardia, chest pain and defibrillator going off. Interval history:   Pt seen and examined today. Overnight events noted, interval ancillary notes and labs reviewed. Reported severe back pain radiating to right leg similar to her sciatic pain in the past   denied cough, SOB, chest pain, nausea, vomiting or abdominal pain        Assessment & Plan:     Elevated troponin;  Cardiology consulted; stable cardiac cath which showed nonobstructive CAD. EF of 25%, global regional wall motion abnormalities. Continue medical management. Device discharge:  Cardiology consulted; device interrogated 8 shocks for 1:1 conduction of SVT. Toprol XL dose increased to 200 mg daily    Nonischemic cardiomyopathy. Repeat limited echo showed EF of 25%  Continue medical management    Hx of: Continue Eliquis and beta-blocker. Monitor telemetry    Sciatic pain: Pain management and orthopedic surgery consulted  Started on steroids months of insulin. Pain meds    Chronic leukocytosis: Patient afebrile, patient afebrile, afebrile cultures -ve.         DVT PPX: Eliquis  Code:Full Code    Disposition: Once acute medical issues have resolved    Current Medications  spironolactone (ALDACTONE) tablet 25 mg, Daily  oxyCODONE-acetaminophen (PERCOCET) 5-325 MG per tablet 1 tablet, Q8H PRN  metoprolol succinate (TOPROL XL) extended release tablet 200 mg, Daily  apixaban (ELIQUIS) tablet 5 mg, BID  sodium chloride flush 0.9 % injection 10 mL, 2 times per day  sodium chloride flush 0.9 % injection 10 mL, PRN  0.9 % sodium chloride infusion, PRN  potassium chloride 10 mEq/100 mL IVPB (Peripheral Line), PRN  magnesium sulfate 1000 mg in dextrose 5% 100 mL IVPB, PRN  promethazine (PHENERGAN) tablet 12.5 mg, Q6H PRN   Or  ondansetron (ZOFRAN) injection 4 mg, Q6H PRN  acetaminophen (TYLENOL) tablet 650 mg, Q6H PRN   Or  acetaminophen (TYLENOL) suppository 650 mg, Q6H PRN  aspirin EC tablet 81 mg, Daily  atorvastatin (LIPITOR) tablet 40 mg, Daily  valsartan (DIOVAN) tablet 160 mg, BID  sodium chloride flush 0.9 % injection 5-40 mL, 2 times per day  sodium chloride flush 0.9 % injection 5-40 mL, PRN  0.9 % sodium chloride infusion, PRN      Objective:  BP (!) 152/83   Pulse 88   Temp 98.4 °F (36.9 °C) (Temporal)   Resp 22   Ht 5' 6\" (1.676 m)   Wt 262 lb 12.8 oz (119.2 kg)   SpO2 95%   BMI 42.42 kg/m²     Intake/Output Summary (Last 24 hours) at 11/26/2022 1057  Last data filed at 11/26/2022 0900  Gross per 24 hour   Intake 790 ml   Output 1050 ml   Net -260 ml        Wt Readings from Last 3 Encounters:   11/26/22 262 lb 12.8 oz (119.2 kg)   11/20/22 263 lb (119.3 kg)   09/01/22 262 lb (118.8 kg)       Physical Examination:   General appearance:  No apparent distress, appears stated age and cooperative. HEENT: Normocephalic, sclera clear., PERRLA. Trachea midline, no adenopathy. Cardiovascular: Regular rate and rhythm, normal S1, S2. No murmur. Respiratory:Clear to auscultation bilaterally, no wheeze or crackles. GI: Abdomen soft, no tenderness, not distended, normal bowel sounds  Musculoskeletal: No cyanosis in digits. No BLE edema present  Neurology: CN 2-12 grossly intact.  No speech or motor deficits  Psych: Not agitated, appropriate affect  Skin: Warm, dry, normal turgor    Labs and Tests:  CBC:   Recent Labs     11/24/22  0230 11/25/22  0447 11/26/22  0604   WBC 16.3* 14.6* 17.5*   HGB 14.1 12.6 12.4    361 328       BMP:    Recent Labs     11/24/22  0230 11/25/22  0447 11/26/22  0604    139 141   K 3.6 4.0 4.0    101 104   CO2 26 28 27   BUN 8 19 14   CREATININE 0.6 0.8 0.6   GLUCOSE 137* 177* 116*       Hepatic:   Recent Labs     11/24/22  0230 11/25/22  0447 11/26/22  0604   AST 25 25 20   ALT 12 11 11   BILITOT 0.3 0.4 0.6   ALKPHOS 99 87 89       CT LUMBAR SPINE WO CONTRAST   Final Result   No acute fracture or traumatic malalignment of the lumbar spine. Similar appearance of multilevel spondylotic change. If pain persists, MRI   may be of benefit. Evidence of recent contrast administration. XR CHEST PORTABLE   Final Result   Unchanged appearance of the chest without acute airspace disease identified. Problem List  Principal Problem:    ICD (implantable cardioverter-defibrillator) discharge  Resolved Problems:    * No resolved hospital problems.  Jessica Goodwin MD   11/26/2022 10:57 AM

## 2022-11-26 NOTE — PROGRESS NOTES
Pt with complaints of aching chest pain. PRNs given, see MAR. Vitals WNL. No change in rhythm at this time. R Radial puncture site is clean, dry, and intact. Patient updated on plan of care and denies any questions at this time. Patient encouraged to call for assistance as needed.

## 2022-11-27 LAB
ANION GAP SERPL CALCULATED.3IONS-SCNC: 12 MMOL/L (ref 3–16)
BASOPHILS ABSOLUTE: 0.2 K/UL (ref 0–0.2)
BASOPHILS RELATIVE PERCENT: 1 %
BUN BLDV-MCNC: 14 MG/DL (ref 7–20)
CALCIUM SERPL-MCNC: 9.1 MG/DL (ref 8.3–10.6)
CHLORIDE BLD-SCNC: 103 MMOL/L (ref 99–110)
CO2: 26 MMOL/L (ref 21–32)
CREAT SERPL-MCNC: 0.7 MG/DL (ref 0.6–1.2)
EOSINOPHILS ABSOLUTE: 0.1 K/UL (ref 0–0.6)
EOSINOPHILS RELATIVE PERCENT: 0.4 %
GFR SERPL CREATININE-BSD FRML MDRD: >60 ML/MIN/{1.73_M2}
GLUCOSE BLD-MCNC: 95 MG/DL (ref 70–99)
HCT VFR BLD CALC: 40.7 % (ref 36–48)
HEMOGLOBIN: 12.8 G/DL (ref 12–16)
LYMPHOCYTES ABSOLUTE: 3.1 K/UL (ref 1–5.1)
LYMPHOCYTES RELATIVE PERCENT: 14.8 %
MCH RBC QN AUTO: 27.6 PG (ref 26–34)
MCHC RBC AUTO-ENTMCNC: 31.4 G/DL (ref 31–36)
MCV RBC AUTO: 87.8 FL (ref 80–100)
MONOCYTES ABSOLUTE: 1.5 K/UL (ref 0–1.3)
MONOCYTES RELATIVE PERCENT: 7 %
NEUTROPHILS ABSOLUTE: 15.9 K/UL (ref 1.7–7.7)
NEUTROPHILS RELATIVE PERCENT: 76.8 %
PDW BLD-RTO: 16.6 % (ref 12.4–15.4)
PLATELET # BLD: 382 K/UL (ref 135–450)
PMV BLD AUTO: 8.2 FL (ref 5–10.5)
POTASSIUM REFLEX MAGNESIUM: 3.9 MMOL/L (ref 3.5–5.1)
RBC # BLD: 4.63 M/UL (ref 4–5.2)
SODIUM BLD-SCNC: 141 MMOL/L (ref 136–145)
TSH REFLEX: 3.34 UIU/ML (ref 0.27–4.2)
WBC # BLD: 20.7 K/UL (ref 4–11)

## 2022-11-27 PROCEDURE — 6370000000 HC RX 637 (ALT 250 FOR IP): Performed by: INTERNAL MEDICINE

## 2022-11-27 PROCEDURE — 1200000000 HC SEMI PRIVATE

## 2022-11-27 PROCEDURE — 36592 COLLECT BLOOD FROM PICC: CPT

## 2022-11-27 PROCEDURE — 85025 COMPLETE CBC W/AUTO DIFF WBC: CPT

## 2022-11-27 PROCEDURE — 2580000003 HC RX 258: Performed by: INTERNAL MEDICINE

## 2022-11-27 PROCEDURE — 6370000000 HC RX 637 (ALT 250 FOR IP): Performed by: NURSE PRACTITIONER

## 2022-11-27 PROCEDURE — 80048 BASIC METABOLIC PNL TOTAL CA: CPT

## 2022-11-27 RX ADMIN — VALSARTAN 160 MG: 160 TABLET, FILM COATED ORAL at 19:55

## 2022-11-27 RX ADMIN — APIXABAN 5 MG: 5 TABLET, FILM COATED ORAL at 10:44

## 2022-11-27 RX ADMIN — METOPROLOL SUCCINATE 200 MG: 50 TABLET, EXTENDED RELEASE ORAL at 10:43

## 2022-11-27 RX ADMIN — CYCLOBENZAPRINE 10 MG: 10 TABLET, FILM COATED ORAL at 19:55

## 2022-11-27 RX ADMIN — OXYCODONE HYDROCHLORIDE AND ACETAMINOPHEN 1 TABLET: 5; 325 TABLET ORAL at 01:42

## 2022-11-27 RX ADMIN — PREDNISONE 40 MG: 20 TABLET ORAL at 10:44

## 2022-11-27 RX ADMIN — DICLOFENAC SODIUM 4 G: 10 GEL TOPICAL at 11:27

## 2022-11-27 RX ADMIN — Medication 10 ML: at 10:45

## 2022-11-27 RX ADMIN — ATORVASTATIN CALCIUM 40 MG: 40 TABLET, FILM COATED ORAL at 10:44

## 2022-11-27 RX ADMIN — OXYCODONE HYDROCHLORIDE AND ACETAMINOPHEN 1 TABLET: 5; 325 TABLET ORAL at 10:45

## 2022-11-27 RX ADMIN — OXYCODONE HYDROCHLORIDE AND ACETAMINOPHEN 1 TABLET: 5; 325 TABLET ORAL at 19:56

## 2022-11-27 RX ADMIN — CYCLOBENZAPRINE 10 MG: 10 TABLET, FILM COATED ORAL at 10:43

## 2022-11-27 RX ADMIN — SPIRONOLACTONE 25 MG: 25 TABLET ORAL at 10:42

## 2022-11-27 RX ADMIN — Medication 10 ML: at 20:00

## 2022-11-27 RX ADMIN — Medication 10 ML: at 19:59

## 2022-11-27 RX ADMIN — CYCLOBENZAPRINE 10 MG: 10 TABLET, FILM COATED ORAL at 01:42

## 2022-11-27 RX ADMIN — ASPIRIN 81 MG: 81 TABLET, COATED ORAL at 10:44

## 2022-11-27 RX ADMIN — APIXABAN 5 MG: 5 TABLET, FILM COATED ORAL at 19:56

## 2022-11-27 ASSESSMENT — PAIN SCALES - GENERAL
PAINLEVEL_OUTOF10: 0
PAINLEVEL_OUTOF10: 2
PAINLEVEL_OUTOF10: 8
PAINLEVEL_OUTOF10: 0
PAINLEVEL_OUTOF10: 8
PAINLEVEL_OUTOF10: 8
PAINLEVEL_OUTOF10: 0

## 2022-11-27 ASSESSMENT — PAIN DESCRIPTION - FREQUENCY: FREQUENCY: CONTINUOUS

## 2022-11-27 ASSESSMENT — PAIN - FUNCTIONAL ASSESSMENT: PAIN_FUNCTIONAL_ASSESSMENT: PREVENTS OR INTERFERES SOME ACTIVE ACTIVITIES AND ADLS

## 2022-11-27 ASSESSMENT — PAIN DESCRIPTION - ORIENTATION
ORIENTATION: RIGHT
ORIENTATION: RIGHT

## 2022-11-27 ASSESSMENT — PAIN DESCRIPTION - LOCATION
LOCATION: BACK;LEG
LOCATION: BACK;LEG

## 2022-11-27 ASSESSMENT — PAIN DESCRIPTION - DESCRIPTORS: DESCRIPTORS: ACHING

## 2022-11-27 NOTE — PLAN OF CARE
Neurosurgery Plan of Care    76 y/o woman presents to hospital with chest pain and acute back and radicular pain. Neurointact per report. CT L-spine reveals multilevel degenerative spondylitic changes. Pain management per primary but could consider steroids, gabapentin, muscle relaxers and/or opiate based pain medications. Recommend MRI L-spine for further work up.     Mukul Jaramillo MD  Neurosurgery  [facility]  [phone]

## 2022-11-27 NOTE — PROGRESS NOTES
HOSPITALISTS PROGRESS NOTE    11/27/2022 9:11 AM        Name: Wes Helms . Admitted: 11/24/2022  Primary Care Provider: Agustin Gonzalez MD (Tel: [phone])      Brief Course: This 75 y.o. female with PMHx of hypertension, hyperlipidemia, gout, HFrEF EF 40% presented to the ED with tachycardia, chest pain and defibrillator going off. Interval history:   Pt seen and examined today. Overnight events noted, interval ancillary notes and labs reviewed. Reported severe back pain radiating to her right leg breath but denied any fever, chills, SOB, chest, pain bowel bladder dysfunction, any focal sensorimotor deficits      Assessment & Plan:     Elevated troponin; Cardiology consulted; stable cardiac cath which showed nonobstructive CAD. EF of 25%, global regional wall motion abnormalities. Continue medical management. Device discharge: Cardiology consulted; device interrogated 8 shocks for 1:1 conduction of SVT. Toprol XL dose increased to 200 mg daily    Nonischemic cardiomyopathy. Repeat limited echo showed EF of 25%  Continue medical management    Hx of: Continue Eliquis and beta-blocker. Monitor telemetry    Sciatic pain: Pain management and neurosurgery surgery consulted  CT lumbar spine negative for any acute fracture or traumatic malalignment of lumbar spine. Similar appearance of multilevel spondylitic change  Started on steroids, muscle relaxant and MS pain meds  MRI ordered due to persistent pain. Chronic leukocytosis: Patient afebrile, patient afebrile, afebrile cultures -ve.         DVT PPX: Eliquis  Code:Full Code    Disposition: Once acute medical issues have resolved    Current Medications  spironolactone (ALDACTONE) tablet 25 mg, Daily  oxyCODONE-acetaminophen (PERCOCET) 5-325 MG per tablet 1 tablet, Q8H PRN  predniSONE (DELTASONE) tablet 40 mg, Daily  diclofenac (VOLTAREN) EC tablet 50 mg, BID  cyclobenzaprine (FLEXERIL) tablet 10 mg, TID PRN  metoprolol succinate (TOPROL XL) extended release tablet 200 mg, Daily  apixaban (ELIQUIS) tablet 5 mg, BID  sodium chloride flush 0.9 % injection 10 mL, 2 times per day  sodium chloride flush 0.9 % injection 10 mL, PRN  0.9 % sodium chloride infusion, PRN  potassium chloride 10 mEq/100 mL IVPB (Peripheral Line), PRN  magnesium sulfate 1000 mg in dextrose 5% 100 mL IVPB, PRN  promethazine (PHENERGAN) tablet 12.5 mg, Q6H PRN   Or  ondansetron (ZOFRAN) injection 4 mg, Q6H PRN  acetaminophen (TYLENOL) tablet 650 mg, Q6H PRN   Or  acetaminophen (TYLENOL) suppository 650 mg, Q6H PRN  aspirin EC tablet 81 mg, Daily  atorvastatin (LIPITOR) tablet 40 mg, Daily  valsartan (DIOVAN) tablet 160 mg, BID  sodium chloride flush 0.9 % injection 5-40 mL, 2 times per day  sodium chloride flush 0.9 % injection 5-40 mL, PRN  0.9 % sodium chloride infusion, PRN      Objective:  /79   Pulse 75   Temp 97.7 °F (36.5 °C) (Temporal)   Resp 16   Ht 5' 6\" (1.676 m)   Wt 260 lb 12.8 oz (118.3 kg)   SpO2 95%   BMI 42.09 kg/m²     Intake/Output Summary (Last 24 hours) at 11/27/2022 0911  Last data filed at 11/26/2022 1241  Gross per 24 hour   Intake --   Output 150 ml   Net -150 ml        Wt Readings from Last 3 Encounters:   11/27/22 260 lb 12.8 oz (118.3 kg)   11/20/22 263 lb (119.3 kg)   09/01/22 262 lb (118.8 kg)       Physical Examination:   General appearance:  No apparent distress, appears stated age and cooperative. HEENT: Normocephalic, sclera clear., PERRLA. Trachea midline, no adenopathy. Cardiovascular: Regular rate and rhythm, normal S1, S2. No murmur. Respiratory:Clear to auscultation bilaterally, no wheeze or crackles. GI: Abdomen soft, no tenderness, not distended, normal bowel sounds  Musculoskeletal: No cyanosis in digits. No BLE edema present  Neurology: CN 2-12 grossly intact.  No speech or motor deficits  Psych: Not agitated, appropriate affect  Skin: Warm, dry, normal turgor    Labs and Tests:  CBC:   Recent Labs     11/25/22 0447 11/26/22  0604   WBC 14.6* 17.5*   HGB 12.6 12.4    328       BMP:    Recent Labs     11/25/22 0447 11/26/22  0604    141   K 4.0 4.0    104   CO2 28 27   BUN 19 14   CREATININE 0.8 0.6   GLUCOSE 177* 116*       Hepatic:   Recent Labs     11/25/22 0447 11/26/22  0604   AST 25 20   ALT 11 11   BILITOT 0.4 0.6   ALKPHOS 87 89       CT LUMBAR SPINE WO CONTRAST   Final Result   No acute fracture or traumatic malalignment of the lumbar spine. Similar appearance of multilevel spondylotic change. If pain persists, MRI   may be of benefit. Evidence of recent contrast administration. XR CHEST PORTABLE   Final Result   Unchanged appearance of the chest without acute airspace disease identified. Problem List  Principal Problem:    ICD (implantable cardioverter-defibrillator) discharge  Resolved Problems:    * No resolved hospital problems.  Jamir Magallon MD   11/27/2022 9:11 AM

## 2022-11-28 LAB
ANION GAP SERPL CALCULATED.3IONS-SCNC: 10 MMOL/L (ref 3–16)
ANISOCYTOSIS: ABNORMAL
BASOPHILS ABSOLUTE: 0.1 K/UL (ref 0–0.2)
BASOPHILS RELATIVE PERCENT: 0.6 %
BLOOD CULTURE, ROUTINE: NORMAL
BUN BLDV-MCNC: 14 MG/DL (ref 7–20)
CALCIUM SERPL-MCNC: 9.3 MG/DL (ref 8.3–10.6)
CHLORIDE BLD-SCNC: 104 MMOL/L (ref 99–110)
CO2: 27 MMOL/L (ref 21–32)
CREAT SERPL-MCNC: 0.6 MG/DL (ref 0.6–1.2)
CULTURE, BLOOD 2: NORMAL
EOSINOPHILS ABSOLUTE: 0 K/UL (ref 0–0.6)
EOSINOPHILS RELATIVE PERCENT: 0.2 %
GFR SERPL CREATININE-BSD FRML MDRD: >60 ML/MIN/{1.73_M2}
GLUCOSE BLD-MCNC: 111 MG/DL (ref 70–99)
HCT VFR BLD CALC: 40.4 % (ref 36–48)
HEMOGLOBIN: 12.8 G/DL (ref 12–16)
LYMPHOCYTES ABSOLUTE: 2.6 K/UL (ref 1–5.1)
LYMPHOCYTES RELATIVE PERCENT: 13.3 %
MCH RBC QN AUTO: 27.9 PG (ref 26–34)
MCHC RBC AUTO-ENTMCNC: 31.7 G/DL (ref 31–36)
MCV RBC AUTO: 88.1 FL (ref 80–100)
MONOCYTES ABSOLUTE: 1.6 K/UL (ref 0–1.3)
MONOCYTES RELATIVE PERCENT: 8 %
NEUTROPHILS ABSOLUTE: 15.4 K/UL (ref 1.7–7.7)
NEUTROPHILS RELATIVE PERCENT: 77.9 %
PDW BLD-RTO: 16.6 % (ref 12.4–15.4)
PLATELET # BLD: 385 K/UL (ref 135–450)
PLATELET SLIDE REVIEW: ADEQUATE
PMV BLD AUTO: 8.4 FL (ref 5–10.5)
POLYCHROMASIA: ABNORMAL
POTASSIUM REFLEX MAGNESIUM: 4.5 MMOL/L (ref 3.5–5.1)
RBC # BLD: 4.59 M/UL (ref 4–5.2)
SLIDE REVIEW: ABNORMAL
SODIUM BLD-SCNC: 141 MMOL/L (ref 136–145)
WBC # BLD: 19.7 K/UL (ref 4–11)

## 2022-11-28 PROCEDURE — 97162 PT EVAL MOD COMPLEX 30 MIN: CPT

## 2022-11-28 PROCEDURE — 2580000003 HC RX 258: Performed by: INTERNAL MEDICINE

## 2022-11-28 PROCEDURE — 99223 1ST HOSP IP/OBS HIGH 75: CPT | Performed by: INTERNAL MEDICINE

## 2022-11-28 PROCEDURE — 6370000000 HC RX 637 (ALT 250 FOR IP): Performed by: INTERNAL MEDICINE

## 2022-11-28 PROCEDURE — 97530 THERAPEUTIC ACTIVITIES: CPT

## 2022-11-28 PROCEDURE — 80048 BASIC METABOLIC PNL TOTAL CA: CPT

## 2022-11-28 PROCEDURE — 6370000000 HC RX 637 (ALT 250 FOR IP): Performed by: NURSE PRACTITIONER

## 2022-11-28 PROCEDURE — 97166 OT EVAL MOD COMPLEX 45 MIN: CPT

## 2022-11-28 PROCEDURE — 97535 SELF CARE MNGMENT TRAINING: CPT

## 2022-11-28 PROCEDURE — 1200000000 HC SEMI PRIVATE

## 2022-11-28 PROCEDURE — 97116 GAIT TRAINING THERAPY: CPT

## 2022-11-28 PROCEDURE — 85025 COMPLETE CBC W/AUTO DIFF WBC: CPT

## 2022-11-28 PROCEDURE — 6360000002 HC RX W HCPCS: Performed by: INTERNAL MEDICINE

## 2022-11-28 RX ORDER — GABAPENTIN 100 MG/1
100 CAPSULE ORAL 3 TIMES DAILY
Status: DISCONTINUED | OUTPATIENT
Start: 2022-11-28 | End: 2022-12-01 | Stop reason: HOSPADM

## 2022-11-28 RX ORDER — OXYCODONE HYDROCHLORIDE 5 MG/1
10 TABLET ORAL EVERY 6 HOURS PRN
Status: DISCONTINUED | OUTPATIENT
Start: 2022-11-28 | End: 2022-12-01 | Stop reason: HOSPADM

## 2022-11-28 RX ORDER — HYDROMORPHONE HYDROCHLORIDE 1 MG/ML
1 INJECTION, SOLUTION INTRAMUSCULAR; INTRAVENOUS; SUBCUTANEOUS EVERY 4 HOURS PRN
Status: DISCONTINUED | OUTPATIENT
Start: 2022-11-28 | End: 2022-12-01 | Stop reason: HOSPADM

## 2022-11-28 RX ADMIN — Medication 10 ML: at 20:43

## 2022-11-28 RX ADMIN — SPIRONOLACTONE 25 MG: 25 TABLET ORAL at 11:15

## 2022-11-28 RX ADMIN — OXYCODONE HYDROCHLORIDE AND ACETAMINOPHEN 1 TABLET: 5; 325 TABLET ORAL at 06:27

## 2022-11-28 RX ADMIN — CYCLOBENZAPRINE 10 MG: 10 TABLET, FILM COATED ORAL at 16:06

## 2022-11-28 RX ADMIN — ATORVASTATIN CALCIUM 40 MG: 40 TABLET, FILM COATED ORAL at 11:14

## 2022-11-28 RX ADMIN — APIXABAN 5 MG: 5 TABLET, FILM COATED ORAL at 11:14

## 2022-11-28 RX ADMIN — ASPIRIN 81 MG: 81 TABLET, COATED ORAL at 11:15

## 2022-11-28 RX ADMIN — Medication 10 ML: at 11:19

## 2022-11-28 RX ADMIN — GABAPENTIN 100 MG: 100 CAPSULE ORAL at 20:35

## 2022-11-28 RX ADMIN — PREDNISONE 40 MG: 20 TABLET ORAL at 11:15

## 2022-11-28 RX ADMIN — OXYCODONE 10 MG: 5 TABLET ORAL at 20:35

## 2022-11-28 RX ADMIN — DICLOFENAC SODIUM 4 G: 10 GEL TOPICAL at 11:19

## 2022-11-28 RX ADMIN — GABAPENTIN 100 MG: 100 CAPSULE ORAL at 13:38

## 2022-11-28 RX ADMIN — HYDROMORPHONE HYDROCHLORIDE 1 MG: 1 INJECTION, SOLUTION INTRAMUSCULAR; INTRAVENOUS; SUBCUTANEOUS at 16:07

## 2022-11-28 RX ADMIN — APIXABAN 5 MG: 5 TABLET, FILM COATED ORAL at 20:35

## 2022-11-28 RX ADMIN — CYCLOBENZAPRINE 10 MG: 10 TABLET, FILM COATED ORAL at 06:26

## 2022-11-28 RX ADMIN — METOPROLOL SUCCINATE 200 MG: 50 TABLET, EXTENDED RELEASE ORAL at 11:15

## 2022-11-28 RX ADMIN — OXYCODONE 10 MG: 5 TABLET ORAL at 13:38

## 2022-11-28 ASSESSMENT — PAIN SCALES - GENERAL
PAINLEVEL_OUTOF10: 8
PAINLEVEL_OUTOF10: 8
PAINLEVEL_OUTOF10: 7
PAINLEVEL_OUTOF10: 9
PAINLEVEL_OUTOF10: 8
PAINLEVEL_OUTOF10: 8

## 2022-11-28 ASSESSMENT — PAIN DESCRIPTION - ORIENTATION
ORIENTATION: RIGHT
ORIENTATION: RIGHT

## 2022-11-28 ASSESSMENT — PAIN DESCRIPTION - DESCRIPTORS
DESCRIPTORS: ACHING
DESCRIPTORS: ACHING

## 2022-11-28 ASSESSMENT — PAIN DESCRIPTION - LOCATION
LOCATION: BACK
LOCATION: BACK

## 2022-11-28 NOTE — PROGRESS NOTES
Hospitalist Progress Note      PCP: Lambert Ward MD    Date of Admission: 11/24/2022    Chief Complaint: ICD discharge    Hospital Course:  76 yo F with CAD, h/o VT s/p ICD, HTN, morbid obesity, pAfib, hyperlipidemia, gout, chronic LBP with radiculopathy who did not improve after ANAHY in past came to ER with ICD discharge. Admitted as inpatient for further management. Followed by Cardiology. EP recommends: \"ECG and interrogation of AICD revealed a flutter with rapid ventricular response resulting in multiple AICD shock. I discussed her treatment options which include redo ablation for a flutter/AT versus antiarrhythmic therapy with amiodarone. Risk-benefit alternative discussed. Also discussed side effects of amiodarone therapy. She would like to consider antiarrhythmic therapy for now. Start amiodarone 200 mg twice daily for 2 weeks followed by 200 mg daily  Device has been programmed   Continue Toprol-XL  Continue valsartan\"    Continued to complain of back pain with RLE radiculopathy. CT L spine:  No acute fracture or traumatic malalignment of the lumbar spine. Similar appearance of multilevel spondylotic change. If pain persists, MRI   may be of benefit. Evidence of recent contrast administration. Seen by NS. Her ICD is NOT MRI COMPATIBLE. Lumbar XR Flexion & Extension pending. Started on Prednisone, Gabapentin, muscle relaxers and Oxycodone PRN. PT/OT recommend ARU. PMR consulted. Subjective: Patient complaining of back pain with RLE radiculopathy. Worse since ICD discharges. No CP, SOB, HA or abdominal pain. No urine or fecal incontinence.        Medications:  Reviewed    Infusion Medications    sodium chloride      sodium chloride       Scheduled Medications    gabapentin  100 mg Oral TID    diclofenac sodium  4 g Topical BID    spironolactone  25 mg Oral Daily    predniSONE  40 mg Oral Daily    metoprolol succinate  200 mg Oral Daily    apixaban  5 mg Oral BID    sodium chloride flush  10 mL IntraVENous 2 times per day    aspirin  81 mg Oral Daily    atorvastatin  40 mg Oral Daily    valsartan  160 mg Oral BID    sodium chloride flush  5-40 mL IntraVENous 2 times per day     PRN Meds: HYDROmorphone, oxyCODONE, cyclobenzaprine, sodium chloride flush, sodium chloride, potassium chloride, magnesium sulfate, promethazine **OR** ondansetron, acetaminophen **OR** acetaminophen, sodium chloride flush, sodium chloride      Intake/Output Summary (Last 24 hours) at 11/28/2022 1833  Last data filed at 11/28/2022 1200  Gross per 24 hour   Intake 240 ml   Output 700 ml   Net -460 ml       Physical Exam Performed:    /87   Pulse 75   Temp 97.6 °F (36.4 °C) (Temporal)   Resp 18   Ht 5' 6\" (1.676 m)   Wt 269 lb 6.4 oz (122.2 kg)   SpO2 95%   BMI 43.48 kg/m²     General appearance: No apparent distress, appears stated age and cooperative. HEENT: Pupils equal, round, and reactive to light. Conjunctivae/corneas clear. Neck: Supple, with full range of motion. No jugular venous distention. Trachea midline. Respiratory:  Normal respiratory effort. Clear to auscultation, bilaterally without Rales/Wheezes/Rhonchi. Cardiovascular: Regular rate and rhythm with normal S1/S2 without murmurs, rubs or gallops. L ICD  Abdomen: Soft, obese, non-tender, non-distended with normal bowel sounds. Musculoskeletal: No clubbing, cyanosis or edema bilaterally. Full range of motion without deformity. Skin: Skin color, texture, turgor normal.  No rashes or lesions. Neurologic:  Neurovascularly intact without any focal sensory/motor deficits.  Cranial nerves: II-XII intact, grossly non-focal.  Positive SLR on RLE  Psychiatric: Alert and oriented, thought content appropriate, normal insight  Capillary Refill: Brisk, 3 seconds, normal   Peripheral Pulses: +2 palpable, equal bilaterally       Labs:   Recent Labs     11/26/22  0604 11/27/22  0959 11/28/22  0553   WBC 17.5* 20.7* 19.7* HGB 12.4 12.8 12.8   HCT 39.7 40.7 40.4    382 385     Recent Labs     11/26/22  0604 11/27/22  0959 11/28/22  0553    141 141   K 4.0 3.9 4.5    103 104   CO2 27 26 27   BUN 14 14 14   CREATININE 0.6 0.7 0.6   CALCIUM 8.8 9.1 9.3     Recent Labs     11/26/22  0604   AST 20   ALT 11   BILITOT 0.6   ALKPHOS 89     No results for input(s): INR in the last 72 hours. No results for input(s): Brittanie Coombes in the last 72 hours. Urinalysis:      Lab Results   Component Value Date/Time    NITRU Negative 03/13/2022 12:44 PM    WBCUA 3 03/13/2022 12:44 PM    BACTERIA 2+ 03/13/2022 12:44 PM    RBCUA 3 03/13/2022 12:44 PM    BLOODU Negative 03/13/2022 12:44 PM    SPECGRAV >=1.030 03/13/2022 12:44 PM    GLUCOSEU Negative 03/13/2022 12:44 PM       Radiology:  CT LUMBAR SPINE WO CONTRAST   Final Result   No acute fracture or traumatic malalignment of the lumbar spine. Similar appearance of multilevel spondylotic change. If pain persists, MRI   may be of benefit. Evidence of recent contrast administration. XR CHEST PORTABLE   Final Result   Unchanged appearance of the chest without acute airspace disease identified.          XR LUMBAR SPINE FLEXION AND EXTENSION ONLY    (Results Pending)       IP CONSULT TO CARDIOLOGY  IP CONSULT TO PAIN MANAGEMENT  IP CONSULT TO NEUROSURGERY  IP CONSULT TO PHYSICAL MEDICINE REHAB    Assessment/Plan:    Active Hospital Problems    Diagnosis     ICD (implantable cardioverter-defibrillator) discharge [Z45.02]      Priority: Medium    Back pain with radiculopathy [M54.10]     Essential hypertension [I10]     Coronary artery disease due to lipid rich plaque [I25.10, I25.83]     TYLER (obstructive sleep apnea) [G47.33]     Class 3 severe obesity due to excess calories with serious comorbidity and body mass index (BMI) of 40.0 to 44.9 in adult ([facility]) [E66.01, Z68.41]     History of ventricular tachycardia [Z86.79]     Paroxysmal atrial fibrillation ([facility] [address] [I48.0]     ICD (implantable cardioverter-defibrillator) in place [Z95.810]      Continue Prednisone for back pain  Add Gabapentin 100 mg PO tid  Continue Flexeril PRN  Change Morphine IV to Dilaudid IV PRN  Add Oxycodone PRN, stop Percocet  PMR consult for ARU eval  NS input appreciated  Get lumbar XR requested by NS  ICD IS NOT MRI COMPATIBLE, MRI NOT POSSIBLE  Continue ASA, Eliquis, Lipitor, Toprol XL, Aldactone and Diovan    DVT Prophylaxis: Eliquis  Diet: ADULT DIET; Regular; Low Fat/Low Chol/High Fiber/WILBER  Code Status: Full Code  PT/OT Eval Status: Following    Dispo - ARU vs SNF    Discussed EXTENSIVELY with patient, nursing and CM. D/W sister at bedside. Given failed ANAHY in past, I am not optimistic about any pain resolution. I do not feel she is a good surgical candidate given cardiac history, morbid obesity and age. She understood nature of chronic LBP. I discussed narcotic therapy is reserved for life impairing limitations. We will work to better tolerate back pain and continue to rehab. She will go to [facility] if ARU denied. All questions addressed from patient and sister.     Siomne Jerome MD

## 2022-11-28 NOTE — PROGRESS NOTES
[address] [facility],[address] Msb Department   Phone: [phone]    Occupational Therapy    [x] Initial Evaluation            [] Daily Treatment Note         [] Discharge Summary      Patient: Viky Garcia   : 1955   MRN: [id number]   Date of Service:  2022    Admitting Diagnosis:  ICD (implantable cardioverter-defibrillator) discharge    Current Admission Summary: Admitting Diagnosis: ICD (implantable cardioverter-defibrillator) discharge    Current Admission Summary: 75 y.o. female who presented to [facility] with past medical history of hypertension, hyperlipidemia, gout, HFrEF EF 40% presented to the ED with chief complaint of increased heart rate and chest pain. Patient reported that she has sick contacts from her daughter, patient reported that she has been having some cough been going on for 2 weeks. Patient reported that she was in bad went up to go to the bathroom use the bathroom and then while she was going to the kitchen she did not make it and started calling her daughter. The first ICD felt discharge started around 1 AM.  Patient reported noticed that her defibrillator for 3-4 times due to his heart rate being very fast.  Patient did have associated shortness of breath, chest pain otherwise no shortness of breath abdominal pain or dysuria. Did report intermittent cough and phlegm production. Patient called EMS. She was found to be in SVT by paramedics and then ventricular tachycardia upon arrival to hospital.  She underwent left heart cath by Dr. Sharita Diaz 2022 without intervention. Past Medical History:  has a past medical history of AICD (automatic cardioverter/defibrillator) present, Arthritis, CHF (congestive heart failure) ([address] Gout, Hyperlipidemia, Hypertension, and MI (myocardial infarction) ([facility] [address]   Past Surgical History:  has a past surgical history that includes Dilation and curettage of uterus; Cardiac defibrillator placement; and Pain management procedure (Bilateral, 6/29/2022). Discharge Recommendations: Jerman Majano scored a 15/24 on the AM-PAC ADL Inpatient form. Current research shows that an AM-PAC score of 17 or less is typically not associated with a discharge to the patient's home setting. Based on the patient's AM-PAC score and their current ADL deficits, it is recommended that the patient have 5-7 sessions per week of Occupational Therapy at d/c to increase the patient's independence. At this time, this patient demonstrates complex nursing, medical, and rehabilitative needs, and would benefit from intensive rehabilitation services upon discharge from the Inpatient setting. This patient demonstrates the ability to participate in and benefit from an intensive therapy program with a coordinated interdisciplinary team approach to foster frequent, structured, and documented communication among disciplines, who will work together to establish, prioritize, and achieve treatment goals. Please see assessment section for further patient specific details. If patient discharges prior to next session this note will serve as a discharge summary. Please see below for the latest assessment towards goals. Patient does NOT want to go to a SNF. [facility]. Patient lives alone and is in need of more help. DME Required For Discharge: rollator (4 wheel walker), wheelchair, tub transfer bench    Precautions/Restrictions: high fall risk, up as tolerated  Weight Bearing Restrictions: no restrictions  [] Right Upper Extremity  [] Left Upper Extremity [] Right Lower Extremity  [] Left Lower Extremity     Required Braces/Orthotics: no braces required   [] Right  [] Left  Positional Restrictions:no positional restrictions  Patient has pending ortho consult for sciatica. 76 y/o woman presents to hospital with chest pain and acute back and radicular pain. Neurointact per report.  CT L-spine reveals multilevel degenerative spondylitic changes. Pain management per primary but could consider steroids, gabapentin, muscle relaxers and/or opiate based pain medications. Recommend MRI L-spine for further work up. Fidel Uriostegui MD  Neurosurgery    Pre-Admission Information   Lives With: alone                     Type of Home: apartment  Home Layout: one level  Home Access:  4 step to enter with handrail. Handrails are located on R side. Bathroom Layout: tub/shower unit  Bathroom Equipment: shower chair  Toilet Height: standard height  Home Equipment: rollator - 4 wheeled walker, back scratcher used to help with underwear and socks  Transfer Assistance: modified independent with use of rollator. Ambulation Assistance:modified independent with use of rollator. ADL Assistance: independent with all ADL's  IADL Assistance: independent with homemaking tasks  Active :        [] Yes                 [x] No - granddaughter, daughter, sisters help drive  Hand Dominance: [] Left                 [x] Right  Current Employment: retired  Hobbies: make own hair products  Recent Falls: Berdie Portal when defibrillator went off this last time before admission. States she falls an estimated 3x/month. Told she needs a transport chair. Can't stand up for long periods of time d/t low back pain. Patient has not sought help at home as she feels there are people worse off than her who need the help more.       Examination   Vision:   Vision Corrective Device: wears glasses for reading  Hearing:   [employer]  Perception:   WFL  Observation:   General Observation:  obese female, head forward, protracted shoulders  Posture:   Slow gait, flexed posture in stance due to pain   Sensation:   denies numbness and tingling in UEs, see PT notes for LE  Proprioception:    WFL  Tone:   Normotonic  Coordination Testing:   WFL    ROM:   (B) UE AROM WFL  Strength:   (B) UE strength grossly HUSSAIN/[facility] [facility]    Therapist Clinical Decision Making (Complexity): medium complexity  Clinical Presentation: evolving      Subjective  General: Patient sitting in recliner, pleasant and cooperative. Very motivated to remain independent at home. HR increased to 120s during ambulation, at rest was at 80-90s. Patient reports falls about 3 times a month. Pain: 8/10. Location: right lower side above hip, radiates down right leg. Dx of sciatica  Pain Interventions: pain medication in place prior to arrival, heat applied, and repositioned         Activities of Daily Living  Basic Activities of Daily Living  Feeding: Independent  Grooming: setup assistance  Upper Extremity Dressing: minimal assistance  Lower Extremity Dressing: dependent  Dressing Comments: min assist to don hospital gown like a robe, dep socks. Patient uses 2 back scratchers for LB dressing  Toileting: moderate assistance. Toileting Comments: ambulated into bathroom to use commode, very slow gait, mod assist   General Comments: ambulated @ 10 feet with 4WW, very slow gait  Instrumental Activities of Daily Living  No IADL completed on this date. Functional Mobility  Bed Mobility  Bed mobility not completed on this date. Comments:  Transfers  Sit to stand transfer:moderate assistance  Stand to sit transfer: minimal assistance  Toilet transfer: moderate assistance  Toilet transfer comments: mod assist to stand from recliner chair and from commode, heavy reliance on left grab bar  Comments:  Functional Mobility:  Functional Mobility Comment: very slow gait, HR increased to 120s, difficulty stepping with left LE (increased pain when weight on right LE).  See PT for further gait analysis    Other Therapeutic Interventions    Functional Outcomes  AM-PAC Inpatient Daily Activity Raw Score: 15    Cognition  WFL  Orientation:    alert and oriented x 4  Command Following:   [facility]     Education  Barriers To Learning: physical  Patient Education: patient educated on goals, OT role and benefits, adaptive device training, transfer training, discharge recommendations  Learning Assessment:  patient verbalizes and demonstrates understanding    Assessment  Activity Tolerance: Limited ability to ambulate and perform ADLs this date due to pain. Patient lives alone and needs increased assistance due to pain and cardiac issues. Impairments Requiring Therapeutic Intervention: decreased functional mobility, decreased ADL status, decreased endurance, decreased IADL  Prognosis: good  Clinical Assessment: Patient pleasant and cooperative, lives alone. Would have difficulty performing 4 steps up to apartment, needs increased assist for ADLs, Recommend ARU, if ARU not able to take then recommend [address] (patient lives in [place] and would consider [address]).  Patient is not safe to return home alone at this time, if patient does go home recommend 24/7 care and supervision and home therapy  Safety Interventions: patient left in chair, chair alarm in place, call light within reach, and nurse notified    Plan  Frequency: 3-5 x/per week  Current Treatment Recommendations: strengthening, balance training, functional mobility training, transfer training, endurance training, ADL/self-care training, IADL training, and home management training    Goals  Patient Goals: Go home, decrease pain    Short Term Goals:  Time Frame: until discharge  Patient will complete lower body ADL at minimal assistance   Patient will complete toileting at set up assistance   Patient will complete functional transfers at supervision   Patient will increase [facility] ADL score = to or > than 20/24    Therapy Session Time     Individual Group Co-treatment   Time In    0828   Time Out    0921   Minutes    53        Timed Code Treatment Minutes:  38    Total Treatment Minutes:  53       Electronically Signed By: LORRAINE Costa/MICHAEL [phone]

## 2022-11-28 NOTE — PROGRESS NOTES
Spoke with [employer] representative regarding pt's defibrillator and MRI compatibility. She is NOT able to have a MRI. Dr. Annelise Domingo notified.

## 2022-11-28 NOTE — PROGRESS NOTES
[facility] [address] Department   Phone: [phone]    Physical Therapy    [x] Initial Evaluation            [] Daily Treatment Note         [] Discharge Summary      Patient: Viky Garcia   : 1955   MRN: [id number]   Date of Service:  2022  Admitting Diagnosis: ICD (implantable cardioverter-defibrillator) discharge  Current Admission Summary: 75 y.o. female who presented to [facility] with past medical history of hypertension, hyperlipidemia, gout, HFrEF EF 40% presented to the ED with chief complaint of increased heart rate and chest pain. Patient reported that she has sick contacts from her daughter, patient reported that she has been having some cough been going on for 2 weeks. Patient reported that she was in bad went up to go to the bathroom use the bathroom and then while she was going to the kitchen she did not make it and started calling her daughter. The first ICD felt discharge started around 1 AM.  Patient reported noticed that her defibrillator for 3-4 times due to his heart rate being very fast.  Patient did have associated shortness of breath, chest pain otherwise no shortness of breath abdominal pain or dysuria. Did report intermittent cough and phlegm production. Patient called EMS. She was found to be in SVT by paramedics and then ventricular tachycardia upon arrival to hospital.  She underwent left heart cath by Dr. Sharita Diaz 2022 without intervention. Past Medical History:  has a past medical history of AICD (automatic cardioverter/defibrillator) present, Arthritis, CHF (congestive heart failure) ([address] Gout, Hyperlipidemia, Hypertension, and MI (myocardial infarction) ([facility] [address] Past Surgical History:  has a past surgical history that includes Dilation and curettage of uterus; Cardiac defibrillator placement; and Pain management procedure (Bilateral, 2022).     Discharge Recommendations: Viky Garcia scored a 15/24 on the AM-PAC short mobility form. Current research shows that an AM-PAC score of 17 or less is typically not associated with a discharge to the patient's home setting. Based on the patient's AM-PAC score and their current functional mobility deficits, it is recommended that the patient have 5-7 sessions per week of Physical Therapy at d/c to increase the patient's independence. At this time, this patient demonstrates complex nursing, medical, and rehabilitative needs, and would benefit from intensive rehabilitation services upon discharge from the Inpatient setting. This patient demonstrates the ability to participate in and benefit from an intensive therapy program with a coordinated interdisciplinary team approach to foster frequent, structured, and documented communication among disciplines, who will work together to establish, prioritize, and achieve treatment goals. Please see assessment section for further patient specific details. If patient discharges prior to next session this note will serve as a discharge summary. Please see below for the latest assessment towards goals. DME Required For Discharge: rollator (4 wheel walker), wheelchair, tub transfer bench   Precautions/Restrictions: high fall risk  Weight Bearing Restrictions: weight bearing as tolerated  [] Right Upper Extremity  [] Left Upper Extremity [] Right Lower Extremity  [] Left Lower Extremity     Required Braces/Orthotics: no braces required   [] Right  [] Left  Positional Restrictions:no positional restrictions    Pre-Admission Information   Lives With: alone    Type of Home: apartment  Home Layout: one level  Home Access:  4 step to enter with handrail. Handrails are located on R side.   Bathroom Layout: tub/shower unit  Bathroom Equipment: shower chair  Toilet Height: standard height  Home Equipment: rollator - 4 wheeled walker, back scratcher used to help with underwear and socks  Transfer Assistance: modified independent with use of rollator. Ambulation Assistance:modified independent with use of rollator. ADL Assistance: independent with all ADL's  IADL Assistance: independent with homemaking tasks  Active :        [] Yes  [x] No - granddaughter, daughter, sisters help drive  Hand Dominance: [] Left  [x] Right  Current Employment: retired  Hobbies: make own hair products  Recent Falls: [place]limpia [place] when defibrillator went off this last time before admission. States she falls an estimated 3x/month. Told she needs a transport bench. Can't stand up for long periods of time d/t low back pain. Patient has not sought help at home as she feels there are people worse off than her who need the help more. Examination   Vision:   Vision Gross Assessment: Impaired and Vision Corrective Device: wears glasses for reading  Hearing:   [facility]  Observation:   General Observation:  Patient sitting in recliner at time of entrance. Posture:   Slight forward flexion. Sensation:   accurately detects gross touch to all extremities and denies numbness and tingling - hypersensitive to firm touch in RLE and with MMT  Proprioception:    WFL  Tone:   Normotonic    ROM:   WFL except to extend right knee in sitting, patient reports pain. Strength:   (L) Hip: 3        (R) Hip: -2  (L) Knee: +4     (R) Knee: 2  (L) Ankle: 4     (R) Ankle: +3  Therapist Clinical Decision Making (Complexity): medium complexity  Clinical Presentation: evolving      Subjective  General: Patient reports pain in low back and chest with low back being worse. She states they tried giving her morphine which did nothing for her pain. Changed medication to Percocet 5mg every 8 hours which also is not helping. She was taking 6 ibuprofen 3x/day at home and told she needed to stop d/t concerns about kidney function. Pain: 8/10. Location: low back  Pain Interventions: RN notified and repositioned        Functional Mobility  Bed Mobility  Bed mobility not completed on this date.   Comments: Patient sitting up in recliner. Transfers  Sit to stand transfer: moderate assistance  Stand to sit transfer: minimal assistance  Stand pivot transfer: contact guard assistance  Comments:  Ambulation  Surface:level surface  Assistive Device: rollator (0NBS)  Assistance: contact guard assistance  Distance: 10', 10'  Gait Mechanics: dec'd lalita, antalgic, dec'd step length, inc'd pain with RLE WB  Comments:  Patient's heart rate increased from 83 to 128 with SOB noted. Patient's ambulation very effortful and difficult, high fall risk. Stair Mobility  Stair mobility not completed on this date. Comments:  Wheelchair Mobility:  No w/c mobility completed on this date. Comments:  Balance  Static Sitting Balance: fair (+): maintains balance at SBA/supervision without use of UE support  Dynamic Sitting Balance: fair (+): maintains balance at SBA/supervision without use of UE support  Static Standing Balance: fair: maintains balance at CGA without use of UE support  Dynamic Standing Balance: fair: maintains balance at CGA without use of UE support  Comments:    Other Therapeutic Interventions:  Moist heat to low back post treatment, education regarding condition and treatments, equipment recommendations. Functional Outcomes  -PAC Inpatient Mobility Raw Score : 15              Cognition  WFL  Orientation:    alert and oriented x 4  Command Following:   [facility]    Education  Barriers To Learning: none  Patient Education: patient educated on goals, PT role and benefits, plan of care, general safety, functional mobility training, proper use of assistive device/equipment, energy conservation, disease specific education, injury prevention, transfer training, discharge recommendations  Learning Assessment:  patient verbalizes and demonstrates understanding    Assessment  Activity Tolerance: Limited by pain, endurance, and strength.   Impairments Requiring Therapeutic Intervention: decreased functional mobility, decreased ADL status, decreased ROM, decreased strength, decreased endurance, decreased balance, increased pain, decreased posture  Prognosis: good  Clinical Assessment: Patient presents from home where she was MOD I w/ rolling walker but having consistent falls almost weekly and greater difficulty with mobility secondary to worsening back pain. Presently, she is unable to stand from chair on her own requiring MOD assist, and CGA for ambulation limited to 10' w/ rollator d/t pain/endurance deficits (heart rate elevated from  with 10' of ambulation). She has stairs at home to enter her apartment which she cannot presently perform. She is not safe to return home at this time. Recommend 24 hour assist and continued therapy at d/c. Patient would benefit from rollator, wheelchair, and transfer tub bench at home. Safety Interventions: patient left in chair, chair alarm in place, call light within reach, gait belt, patient at risk for falls, and nurse notified    Plan  Frequency: 3-5 x/per week  Current Treatment Recommendations: strengthening, balance training, functional mobility training, transfer training, gait training, stair training, endurance training, patient/caregiver education, pain management, safety education, and equipment evaluation/education    Goals  Patient Goals: Return home, improve pain. Short Term Goals:  Time Frame: Discharge.   Patient will complete bed mobility at minimal assistance   Patient will complete transfers at stand by assistance   Patient will ambulate 50 ft with use of LRAD at stand by assistance  Patient will ascend/descend 4 stairs with (R) ascending handrail at contact guard assistance    Therapy Session Time      Individual Group Co-treatment   Time In     0828   Time Out     0921   Minutes     53     Timed Code Treatment Minutes:  38 Minutes  Total Treatment Minutes:  53       Electronically Signed By: Starr Velazquez PT, DPT, ATC-R [id number]

## 2022-11-28 NOTE — CONSULTS
Dameon Coelho is a 75 y.o. female who has presented to the hospital with chest pain and AICD shock. Patient states that she had cough and was taking over-the-counter cough medicine. She states that she was compliant with her heart medication. On the day of admission she noticed palpitation and received multiple AICD shock. [place] chest pressure and came to the emergency room. On arrival she was in narrow complex tachycardia and then converted to sinus rhythm. Patient has a complex past medical history significant for cardiac arrest s/p AICD implantation in 2007 at [facility], nonobstructive coronary artery disease, HFrEF with severe LV dysfunction, morbid obesity, history of PAF/flutter with rapid ventricular response resulting in ICD shock in the past, s/p ablation of A. fib with PVI and CTI flutter ablation in 10/2000. She was doing well since 2000. Her amiodarone was stopped during outpatient follow-up. She is on valsartan and Toprol-XL and is compliant with those. She also complains of back pain and sciatica. AVSS  No focal neurologic findings, +SLR on right  CT L sp[ine   No acute fracture or traumatic malalignment of the lumbar spine. Similar appearance of multilevel spondylotic change. If pain persists, MRI   may be of benefit. Evidence of recent contrast administration. A/P 76 yo admitted because of chest pain and defibrillator going off. Also c/o right leg sciatica. Cardiac w/u in progress. CT of LS spine w/o surgical pathology. Needs Lumbar MRI and flexion extension xrays when cleared by cardiology to completely exlude HNP, spondylolisthesis.

## 2022-11-28 NOTE — CONSULTS
[address]   Electrophysiology Consultation   Date: 11/28/2022  Reason for Consultation: AICD shock    Consult Requesting Physician: Dr. Izaiah Mendoza MD   Chief Complaint   Patient presents with    Tachycardia     Pt from home. She reports her defibrillator has gone off 4 times today. Squad found her to be in SVT when arrived. She was in the 140 and upto the 210's. She did convert back to ST. She is complaining of a pinching pain in her chest.      Chest Pain       CC: Chest pain    HPI: Kacey Shaw is a 75 y.o. female who has presented to the hospital with chest pain and AICD shock. Patient states that she had cough and was taking over-the-counter cough medicine. She states that she was compliant with her heart medication. On the day of admission she noticed palpitation and received multiple AICD shock. [place] chest pressure and came to the emergency room. On arrival she was in narrow complex tachycardia and then converted to sinus rhythm. She also complains of back pain and sciatica. EP has been asked by Dr. Izaiah Mendoza to see the patient while in hospital.     Patient has a complex past medical history significant for cardiac arrest s/p AICD implantation in 2007 at [facility], nonobstructive coronary artery disease, HFrEF with severe LV dysfunction, morbid obesity, history of PAF/flutter with rapid ventricular response resulting in ICD shock in the past, s/p ablation of A. fib with PVI and CTI flutter ablation in 10/2000. She was doing well since 2000. Her amiodarone was stopped during outpatient follow-up. She is on valsartan and Toprol-XL and is compliant with those. Assessment:   AT/Aflutter with rapid ventricular response   History of PAF/Flutter s/p PVI and CTI ablation in 10/2000  HFrEF and LV dysfunction   History of remote MI   History of AICD shock due to AF/Flutter and RVR  Morbid obesity: Body mass index is 43.48 kg/m².      Plan:   ECG and interrogation of AICD revealed a flutter with rapid ventricular response resulting in multiple AICD shock. I discussed her treatment options which include redo ablation for a flutter/AT versus antiarrhythmic therapy with amiodarone. Risk-benefit alternative discussed. Also discussed side effects of amiodarone therapy. She would like to consider antiarrhythmic therapy for now. Start amiodarone 200 mg twice daily for 2 weeks followed by 200 mg daily  Device has been programmed   Continue Toprol-XL  Continue valsartan    We will follow-up in office and will consider ablation if she has recurrent arrhythmias. Can be discharged from EP standpoint. Discussed with nursing staff. Active Hospital Problems    Diagnosis Date Noted    ICD (implantable cardioverter-defibrillator) discharge [Z45.02] 2022     Priority: Medium       Diagnostic studies:   ECG:#1 AT/Aflutter   ECG#2 Sinus rhythm, prolonged QT  Echo: 10/2020: EF: 40%. CXR: Unchanged appearance of the chest without acute airspace disease identified. Cath:   LM-nml   LAD-mid 40%  Cx-nml  OM- nml  RCA-dom nml  RPDA- nml  LVEF- 25%  LVG- severe global hypokinesis  LVEDP- 15  I independently reviewed the cardiac diagnostic studies, ECG and relevant imaging studies.      Lab Results   Component Value Date    LVEF 25 2022    LVEFMODE Cardiac Cath 2022     Lab Results   Component Value Date    TSH 3.90 2021       Physical Examination:  Vitals:    22 0551   BP: (!) 124/58   Pulse: 62   Resp: 16   Temp: 97.1 °F (36.2 °C)   SpO2: 97%      In: -   Out: 700    Wt Readings from Last 3 Encounters:   22 269 lb 6.4 oz (122.2 kg)   22 260 lb 12.9 oz (118.3 kg)   22 263 lb (119.3 kg)     Temp  Av.2 °F (36.2 °C)  Min: 96.3 °F (35.7 °C)  Max: 98 °F (36.7 °C)  Pulse  Av.7  Min: 62  Max: 82  BP  Min: 108/52  Max: 136/62  SpO2  Av.3 %  Min: 91 %  Max: 97 %    Intake/Output Summary (Last 24 hours) at 2022 0800  Last data filed at 11/28/2022 0551  Gross per 24 hour   Intake --   Output 700 ml   Net -700 ml         I independently reviewed all cardiac tracing from cardiac telemetry. Constitutional: Oriented. No distress. Head: Normocephalic and atraumatic. Mouth/Throat: Oropharynx is clear and moist.   Eyes: Conjunctivae normal. EOM are normal.   Neck: Neck supple. No JVD present. Cardiovascular: Normal rate, regular rhythm, S1&S2. Pulmonary/Chest: Bilateral respiratory sounds. No rhonchi. Abdominal: Soft. No tenderness. Musculoskeletal: No tenderness. No edema    Lymphadenopathy: Has no cervical adenopathy. Neurological: Alert and oriented. Follows command, No Gross deficit   Skin: Skin is warm, No rash noted. Psychiatric: Has a normal behavior       Scheduled Meds:   diclofenac sodium  4 g Topical BID    spironolactone  25 mg Oral Daily    predniSONE  40 mg Oral Daily    metoprolol succinate  200 mg Oral Daily    apixaban  5 mg Oral BID    sodium chloride flush  10 mL IntraVENous 2 times per day    aspirin  81 mg Oral Daily    atorvastatin  40 mg Oral Daily    valsartan  160 mg Oral BID    sodium chloride flush  5-40 mL IntraVENous 2 times per day     Continuous Infusions:   sodium chloride      sodium chloride       PRN Meds:.oxyCODONE-acetaminophen, cyclobenzaprine, sodium chloride flush, sodium chloride, potassium chloride, magnesium sulfate, promethazine **OR** ondansetron, acetaminophen **OR** acetaminophen, sodium chloride flush, sodium chloride     Review of System:  [x] Full ROS obtained and negative except as mentioned in HPI    Prior to Admission medications    Medication Sig Start Date End Date Taking?  Authorizing Provider   predniSONE (DELTASONE) 10 MG tablet 5 tabs po qam for 2 days then 4,3,2,1 tabs qam for 2 days each total of 10 days 11/20/22 11/30/22  Nitza Marie PA-C   diclofenac sodium (VOLTAREN) 1 % GEL Apply 4 g topically 4 times daily 11/20/22   Nitza Marie PA-C   ibuprofen (ADVIL;MOTRIN) 200 MG tablet Take 400 mg by mouth every 6 hours as needed    Historical Provider, MD   metoprolol succinate (TOPROL XL) 100 MG extended release tablet Take 1 tablet in the morning and a 1/2 tablet at night 9/1/22   Marichuy Arroyo, APRN - CNP   atorvastatin (LIPITOR) 40 MG tablet Take 1 tablet by mouth daily 6/16/22   Tor Oven, APRN - CNP   apixaban (ELIQUIS) 5 MG TABS tablet Take 1 tablet by mouth 2 times daily 6/16/22   Tor Oven, APRN - CNP   furosemide (LASIX) 40 MG tablet Take 1 tablet by mouth 2 times daily 6/16/22   Tor Oven, APRN - CNP   isosorbide mononitrate (IMDUR) 30 MG extended release tablet Take 1 tablet by mouth daily 6/16/22   Tor Oven, APRN - CNP   valsartan (DIOVAN) 160 MG tablet Take 1 tablet by mouth 2 times daily 6/16/22   Tor Oven, APRN - CNP   ondansetron (ZOFRAN-ODT) 4 MG disintegrating tablet Take 1 tablet by mouth every 8 hours as needed for Nausea or Vomiting 3/16/22   Bettie Mckenzie MD   colchicine (COLCRYS) 0.6 MG tablet Take 1 tablet by mouth daily 3/17/22   Bettie Mckenzie MD   aspirin 81 MG EC tablet Take 81 mg by mouth daily    Historical Provider, MD       Past Medical History:   Diagnosis Date    AICD (automatic cardioverter/defibrillator) present     Arthritis     CHF (congestive heart failure) ([facility] [address]     Gout     Hyperlipidemia     Hypertension     MI (myocardial infarction) ([facility] [address]         Past Surgical History:   Procedure Laterality Date    CARDIAC DEFIBRILLATOR PLACEMENT      DILATION AND CURETTAGE OF UTERUS      PAIN MANAGEMENT PROCEDURE Bilateral 6/29/2022    BILATERAL L5 TRANSFORAMINAL EPIDURAL STEROID INJECTION WITH FLUOROSCOPY performed by Nirmal Lucero MD at [facility]       No Known Allergies    Social History:  Reviewed. reports that she has never smoked. She has never used smokeless tobacco. She reports that she does not drink alcohol and does not use drugs. Family History:  Reviewed. Reviewed. No family history of SCD. Relevant and available labs, and cardiovascular diagnostics reviewed. Reviewed. Recent Labs     11/26/22  0604 11/27/22  0959 11/28/22  0553    141 141   K 4.0 3.9 4.5    103 104   CO2 27 26 27   BUN 14 14 14   CREATININE 0.6 0.7 0.6     Recent Labs     11/26/22  0604 11/27/22  0959 11/28/22  0553   WBC 17.5* 20.7* 19.7*   HGB 12.4 12.8 12.8   HCT 39.7 40.7 40.4   MCV 87.6 87.8 88.1    382 385     Estimated Creatinine Clearance: 120 mL/min (based on SCr of 0.6 mg/dL). No results found for: BNP    I independently reviewed all cardiac tracing from cardiac telemetry. I independently reviewed relevant and available cardiac diagnostic tests ECG, CXR, Echo, Stress test, Device interrogation, Holter, CT scan. Outside medical records via Care everywhere reviewed and summarized in H&P above. Complex medical condition with multiple medical problems affecting prognosis and outcome of EP interventions  Severe exacerbation of underlying medical condition requiring hospitalization and at risk of decompensation. All questions and concerns were addressed to the patient/family. Alternatives to my treatment were discussed. I have discussed the above stated plan and the patient verbalized understanding and agreed with the plan. NOTE: This report was transcribed using voice recognition software. Every effort was made to ensure accuracy, however, inadvertent computerized transcription errors may be present.      Markel Ely MD, MPH  [facility]   Office: [phone]  Fax: [phone]

## 2022-11-28 NOTE — CARE COORDINATION
Discharge Planning Note:  The Patient has an ARU consult. The family has requested to go to [facility] if they are denied ARU.   This cm called and left a messaged and faxed over the information to [facility] for review in case of they ARU denial.  Left a message with Yina _[phone]      Electronically signed by Cassidy Olmos RN on 11/28/2022 at 3:02 PM

## 2022-11-29 ENCOUNTER — APPOINTMENT (OUTPATIENT)
Dept: GENERAL RADIOLOGY | Age: 67
DRG: 287 | End: 2022-11-29
Payer: MEDICARE

## 2022-11-29 LAB
ANION GAP SERPL CALCULATED.3IONS-SCNC: 8 MMOL/L (ref 3–16)
BASOPHILS ABSOLUTE: 0.1 K/UL (ref 0–0.2)
BASOPHILS RELATIVE PERCENT: 0.5 %
BLOOD SMEAR REVIEW: NORMAL
BUN BLDV-MCNC: 17 MG/DL (ref 7–20)
CALCIUM SERPL-MCNC: 9.3 MG/DL (ref 8.3–10.6)
CHLORIDE BLD-SCNC: 103 MMOL/L (ref 99–110)
CO2: 28 MMOL/L (ref 21–32)
CREAT SERPL-MCNC: 0.7 MG/DL (ref 0.6–1.2)
EOSINOPHILS ABSOLUTE: 0.1 K/UL (ref 0–0.6)
EOSINOPHILS RELATIVE PERCENT: 0.4 %
GFR SERPL CREATININE-BSD FRML MDRD: >60 ML/MIN/{1.73_M2}
GLUCOSE BLD-MCNC: 119 MG/DL (ref 70–99)
HCT VFR BLD CALC: 40.8 % (ref 36–48)
HEMATOLOGY PATH CONSULT: YES
HEMOGLOBIN: 12.7 G/DL (ref 12–16)
LACTATE DEHYDROGENASE: 230 U/L (ref 100–190)
LYMPHOCYTES ABSOLUTE: 2.8 K/UL (ref 1–5.1)
LYMPHOCYTES RELATIVE PERCENT: 15.9 %
MCH RBC QN AUTO: 27.5 PG (ref 26–34)
MCHC RBC AUTO-ENTMCNC: 31.2 G/DL (ref 31–36)
MCV RBC AUTO: 88.2 FL (ref 80–100)
MONOCYTES ABSOLUTE: 1.2 K/UL (ref 0–1.3)
MONOCYTES RELATIVE PERCENT: 6.6 %
NEUTROPHILS ABSOLUTE: 13.4 K/UL (ref 1.7–7.7)
NEUTROPHILS RELATIVE PERCENT: 76.6 %
PDW BLD-RTO: 16.7 % (ref 12.4–15.4)
PLATELET # BLD: 369 K/UL (ref 135–450)
PMV BLD AUTO: 8 FL (ref 5–10.5)
POTASSIUM REFLEX MAGNESIUM: 4.2 MMOL/L (ref 3.5–5.1)
RBC # BLD: 4.63 M/UL (ref 4–5.2)
SODIUM BLD-SCNC: 139 MMOL/L (ref 136–145)
WBC # BLD: 17.6 K/UL (ref 4–11)

## 2022-11-29 PROCEDURE — 6370000000 HC RX 637 (ALT 250 FOR IP): Performed by: INTERNAL MEDICINE

## 2022-11-29 PROCEDURE — 99233 SBSQ HOSP IP/OBS HIGH 50: CPT | Performed by: NURSE PRACTITIONER

## 2022-11-29 PROCEDURE — 80048 BASIC METABOLIC PNL TOTAL CA: CPT

## 2022-11-29 PROCEDURE — 81207 BCR/ABL1 GENE MINOR BP: CPT

## 2022-11-29 PROCEDURE — 2580000003 HC RX 258: Performed by: INTERNAL MEDICINE

## 2022-11-29 PROCEDURE — 97530 THERAPEUTIC ACTIVITIES: CPT

## 2022-11-29 PROCEDURE — 1200000000 HC SEMI PRIVATE

## 2022-11-29 PROCEDURE — 72120 X-RAY BEND ONLY L-S SPINE: CPT

## 2022-11-29 PROCEDURE — 36415 COLL VENOUS BLD VENIPUNCTURE: CPT

## 2022-11-29 PROCEDURE — 6370000000 HC RX 637 (ALT 250 FOR IP): Performed by: NURSE PRACTITIONER

## 2022-11-29 PROCEDURE — 81208 BCR/ABL1 GENE OTHER BP: CPT

## 2022-11-29 PROCEDURE — 81270 JAK2 GENE: CPT

## 2022-11-29 PROCEDURE — 81206 BCR/ABL1 GENE MAJOR BP: CPT

## 2022-11-29 PROCEDURE — 36592 COLLECT BLOOD FROM PICC: CPT

## 2022-11-29 PROCEDURE — 97110 THERAPEUTIC EXERCISES: CPT

## 2022-11-29 PROCEDURE — 97535 SELF CARE MNGMENT TRAINING: CPT

## 2022-11-29 PROCEDURE — 85025 COMPLETE CBC W/AUTO DIFF WBC: CPT

## 2022-11-29 PROCEDURE — 6360000002 HC RX W HCPCS: Performed by: INTERNAL MEDICINE

## 2022-11-29 PROCEDURE — 83615 LACTATE (LD) (LDH) ENZYME: CPT

## 2022-11-29 RX ORDER — SPIRONOLACTONE 25 MG/1
25 TABLET ORAL DAILY
Qty: 30 TABLET | Refills: 0
Start: 2022-11-30 | End: 2022-12-01 | Stop reason: SDUPTHER

## 2022-11-29 RX ORDER — OXYCODONE HYDROCHLORIDE 10 MG/1
10 TABLET ORAL EVERY 6 HOURS PRN
Qty: 12 TABLET | Refills: 0 | Status: SHIPPED | OUTPATIENT
Start: 2022-11-29 | End: 2022-12-01 | Stop reason: SDUPTHER

## 2022-11-29 RX ORDER — CYCLOBENZAPRINE HCL 10 MG
10 TABLET ORAL 3 TIMES DAILY PRN
Qty: 9 TABLET | Refills: 0 | Status: SHIPPED | OUTPATIENT
Start: 2022-11-29 | End: 2022-12-01 | Stop reason: SDUPTHER

## 2022-11-29 RX ORDER — METOPROLOL SUCCINATE 200 MG/1
200 TABLET, EXTENDED RELEASE ORAL DAILY
Qty: 30 TABLET | Refills: 0
Start: 2022-11-30 | End: 2022-12-01 | Stop reason: SDUPTHER

## 2022-11-29 RX ORDER — PREDNISONE 20 MG/1
40 TABLET ORAL DAILY
Qty: 2 TABLET | Refills: 0
Start: 2022-11-30 | End: 2022-12-01 | Stop reason: HOSPADM

## 2022-11-29 RX ORDER — GABAPENTIN 100 MG/1
100 CAPSULE ORAL 3 TIMES DAILY
Qty: 9 CAPSULE | Refills: 0 | Status: SHIPPED | OUTPATIENT
Start: 2022-11-29 | End: 2022-12-01 | Stop reason: SDUPTHER

## 2022-11-29 RX ADMIN — DICLOFENAC SODIUM 4 G: 10 GEL TOPICAL at 09:54

## 2022-11-29 RX ADMIN — APIXABAN 5 MG: 5 TABLET, FILM COATED ORAL at 20:46

## 2022-11-29 RX ADMIN — Medication 10 ML: at 20:48

## 2022-11-29 RX ADMIN — Medication 10 ML: at 09:54

## 2022-11-29 RX ADMIN — HYDROMORPHONE HYDROCHLORIDE 1 MG: 1 INJECTION, SOLUTION INTRAMUSCULAR; INTRAVENOUS; SUBCUTANEOUS at 20:48

## 2022-11-29 RX ADMIN — OXYCODONE 10 MG: 5 TABLET ORAL at 05:45

## 2022-11-29 RX ADMIN — DICLOFENAC SODIUM 4 G: 10 GEL TOPICAL at 20:51

## 2022-11-29 RX ADMIN — OXYCODONE 10 MG: 5 TABLET ORAL at 15:35

## 2022-11-29 RX ADMIN — CYCLOBENZAPRINE 10 MG: 10 TABLET, FILM COATED ORAL at 05:45

## 2022-11-29 RX ADMIN — Medication 10 ML: at 09:53

## 2022-11-29 RX ADMIN — GABAPENTIN 100 MG: 100 CAPSULE ORAL at 09:51

## 2022-11-29 RX ADMIN — ASPIRIN 81 MG: 81 TABLET, COATED ORAL at 09:51

## 2022-11-29 RX ADMIN — HYDROMORPHONE HYDROCHLORIDE 1 MG: 1 INJECTION, SOLUTION INTRAMUSCULAR; INTRAVENOUS; SUBCUTANEOUS at 11:28

## 2022-11-29 RX ADMIN — APIXABAN 5 MG: 5 TABLET, FILM COATED ORAL at 09:52

## 2022-11-29 RX ADMIN — GABAPENTIN 100 MG: 100 CAPSULE ORAL at 15:35

## 2022-11-29 RX ADMIN — METOPROLOL SUCCINATE 200 MG: 50 TABLET, EXTENDED RELEASE ORAL at 09:51

## 2022-11-29 RX ADMIN — GABAPENTIN 100 MG: 100 CAPSULE ORAL at 20:47

## 2022-11-29 RX ADMIN — Medication 10 ML: at 20:51

## 2022-11-29 RX ADMIN — CYCLOBENZAPRINE 10 MG: 10 TABLET, FILM COATED ORAL at 15:35

## 2022-11-29 RX ADMIN — PREDNISONE 40 MG: 20 TABLET ORAL at 09:52

## 2022-11-29 RX ADMIN — SPIRONOLACTONE 25 MG: 25 TABLET ORAL at 09:52

## 2022-11-29 RX ADMIN — ATORVASTATIN CALCIUM 40 MG: 40 TABLET, FILM COATED ORAL at 09:52

## 2022-11-29 ASSESSMENT — PAIN SCALES - GENERAL
PAINLEVEL_OUTOF10: 8
PAINLEVEL_OUTOF10: 8
PAINLEVEL_OUTOF10: 6
PAINLEVEL_OUTOF10: 0
PAINLEVEL_OUTOF10: 8
PAINLEVEL_OUTOF10: 8

## 2022-11-29 ASSESSMENT — PAIN DESCRIPTION - LOCATION
LOCATION: BACK
LOCATION: BACK

## 2022-11-29 ASSESSMENT — PAIN DESCRIPTION - ORIENTATION: ORIENTATION: RIGHT

## 2022-11-29 ASSESSMENT — PAIN DESCRIPTION - DESCRIPTORS: DESCRIPTORS: ACHING

## 2022-11-29 NOTE — PROGRESS NOTES
Physical Therapy  Rell Lorenz   Chart reviewed and PT treatment attempted however pt is currently working with OT. Will attempt to follow up with pt as schedule allows.    Jane Camp, [address] [facility], DPT [id number]

## 2022-11-29 NOTE — PROGRESS NOTES
[address]   Electrophysiology Progress Note     Date: 11/29/2022  Admit Date: 11/24/2022     Reason for consultation: AICD Shock    Chief Complaint:   Chief Complaint   Patient presents with    Tachycardia     Pt from home. She reports her defibrillator has gone off 4 times today. Squad found her to be in SVT when arrived. She was in the 140 and upto the 210's. She did convert back to ST. She is complaining of a pinching pain in her chest.      Chest Pain       History of Present Illness: History obtained from patient and medical record. Judie Raphael is a 75 y.o. female with a past medical history of HTN, HLD, CHF, obesity, and AICD (2007 at [facility]). In August of 2020, pt presented from outside hospital with chest pain. She recently lost her brother and has not been feeling well. Pt admitted to non-compliance with medications secondary to COVID. She had an AICD shock. Her troponin was noted to be 3.5 with a potassium 2.5 in the ER at [facility]. She underwent cardiac cath, which showed non obstructive coronaries and EF of 35%. S/p RFCA with PVI (10/8/20, Dr. Aquiles Finney)    Pt presented to hospital after AICD shock. She had episodes of sustained tachycardia >200 BPM    Interval Hx: Today, she is being seen for EP follow up. She is up in the chair and feeling better. She still has back pain, but it has improved with modification to her medication regimen. She remains in sinus rhythm with stable BP. No recurrent arrhythmias. We discussed her options, including repeat ablation vs amiodarone. Patient seen and examined. Clinical notes reviewed. Telemetry reviewed. No new complaints today. No major events overnight. Denies having chest pain, palpitations, shortness of breath, orthopnea/PND, cough, or dizziness at the time of this visit. Allergies:  No Known Allergies    Home Meds:  Prior to Visit Medications    Medication Sig Taking?  Authorizing Provider   predniSONE (DELTASONE) 10 MG tablet 5 tabs po qam for 2 days then 4,3,2,1 tabs qam for 2 days each total of 10 days  Joanna Marie PA-C   diclofenac sodium (VOLTAREN) 1 % GEL Apply 4 g topically 4 times daily  Joanna Marie PA-C   ibuprofen (ADVIL;MOTRIN) 200 MG tablet Take 400 mg by mouth every 6 hours as needed  Historical Provider, MD   metoprolol succinate (TOPROL XL) 100 MG extended release tablet Take 1 tablet in the morning and a 1/2 tablet at night  Enma Prescott, YEIMY - CNP   atorvastatin (LIPITOR) 40 MG tablet Take 1 tablet by mouth daily  Diantha Motts, APRN - CNP   apixaban (ELIQUIS) 5 MG TABS tablet Take 1 tablet by mouth 2 times daily  Diantha Motts, APRN - CNP   furosemide (LASIX) 40 MG tablet Take 1 tablet by mouth 2 times daily  Diantha Motts, APRN - CNP   isosorbide mononitrate (IMDUR) 30 MG extended release tablet Take 1 tablet by mouth daily  Diantha Motts, APRN - CNP   valsartan (DIOVAN) 160 MG tablet Take 1 tablet by mouth 2 times daily  Diantha Motts, APRN - CNP   ondansetron (ZOFRAN-ODT) 4 MG disintegrating tablet Take 1 tablet by mouth every 8 hours as needed for Nausea or Vomiting  Annabelle Nova MD   colchicine (COLCRYS) 0.6 MG tablet Take 1 tablet by mouth daily  Annabelle Nova MD   aspirin 81 MG EC tablet Take 81 mg by mouth daily  Historical Provider, MD      Scheduled Meds:   gabapentin  100 mg Oral TID    diclofenac sodium  4 g Topical BID    spironolactone  25 mg Oral Daily    predniSONE  40 mg Oral Daily    metoprolol succinate  200 mg Oral Daily    apixaban  5 mg Oral BID    sodium chloride flush  10 mL IntraVENous 2 times per day    aspirin  81 mg Oral Daily    atorvastatin  40 mg Oral Daily    valsartan  160 mg Oral BID    sodium chloride flush  5-40 mL IntraVENous 2 times per day     Continuous Infusions:   sodium chloride      sodium chloride       PRN Meds:HYDROmorphone, oxyCODONE, cyclobenzaprine, sodium chloride flush, sodium chloride, potassium chloride, magnesium sulfate, promethazine **OR** ondansetron, acetaminophen **OR** acetaminophen, sodium chloride flush, sodium chloride     Past Medical History:  Past Medical History:   Diagnosis Date    AICD (automatic cardioverter/defibrillator) present     Arthritis     CHF (congestive heart failure) ([facility] [address]     Gout     Hyperlipidemia     Hypertension     MI (myocardial infarction) [facility])       Past Surgical History:    has a past surgical history that includes Dilation and curettage of uterus; Cardiac defibrillator placement; and Pain management procedure (Bilateral, 6/29/2022). Social History:  Reviewed. reports that she has never smoked. She has never used smokeless tobacco. She reports that she does not drink alcohol and does not use drugs. Family History:  Reviewed. family history includes Cancer in her father; Diabetes in her mother; Heart Disease in her mother; High Blood Pressure in her mother; High Cholesterol in her mother. Review of Systems:  Constitutional: Negative for fever, night sweats, chills, weight changes, or weakness  Skin: Negative for rash, dry skin, pruritus, bruising, bleeding, blood clots, or changes in skin pigment  HEENT: Negative for vision changes, ringing in the ears, sore throat, dysphagia, or swollen lymph nodes  Respiratory: Reviewed in HPI  Cardiovascular: Reviewed in HPI  Gastrointestinal: Negative for abdominal pain, N/V/D, constipation, or black/tarry stools  Genito-Urinary: Negative for dysuria, incontinence, urgency, or hematuria  Musculoskeletal: Positive for back and buttocks pain. Negative for joint swelling, muscle pain, or injuries  Neurological/Psych: Negative for confusion, seizures, headaches, balance issues or TIA-like symptoms.  No anxiety, depression, or insomnia    Physical Examination:  Vitals:    11/29/22 0449   BP: 139/86   Pulse: 78   Resp: 22   Temp: 98 °F (36.7 °C)   SpO2: 94%      In: 720 [P.O.:720]  Out: 200    Wt Readings from Last 3 Encounters:   11/29/22 274 lb 7.6 oz (124.5 kg)   11/27/22 260 lb 12.9 oz (118.3 kg)   11/20/22 263 lb (119.3 kg)       Intake/Output Summary (Last 24 hours) at 11/29/2022 0850  Last data filed at 11/29/2022 0449  Gross per 24 hour   Intake 720 ml   Output 200 ml   Net 520 ml       Telemetry: Personally Reviewed  - Sinus rhythm with PVC  Constitutional: Cooperative and in no apparent distress, and appears well nourished  Skin: Warm and pink; no pallor, cyanosis, bruising, or clubbing. Right radial cath site stable, no swelling/hematoma  HEENT: Symmetric and normocephalic. PERRL, EOM intact. Conjunctiva pink with clear sclera. Mucus membranes pink and moist. Teeth intact. Thyroid smooth without nodules or goiter. Cardiovascular: Regular rate and rhythm. S1/S2 present without murmurs, rubs, or gallops. Peripheral pulses 2+, capillary refill < 3 seconds. No elevation of JVP. No peripheral edema  Respiratory: Respirations symmetric and unlabored. Lungs clear to auscultation bilaterally, no wheezing, crackles, or rhonchi  Gastrointestinal: Abdomen soft and round. Bowel sounds normoactive in all quadrants without tenderness or masses. ++ Obese  Musculoskeletal: Bilateral upper and lower extremity strength 5/5 with full ROM  Neurologic/Psych: Awake and orientated to person, place and time. Calm affect, appropriate mood    Pertinent labs, diagnostic, device, and imaging results reviewed as a part of this visit    Labs:    BMP:   Recent Labs     11/27/22  0959 11/28/22  0553 11/29/22  0451    141 139   K 3.9 4.5 4.2    104 103   CO2 26 27 28   BUN 14 14 17   CREATININE 0.7 0.6 0.7     Estimated Creatinine Clearance: 104 mL/min (based on SCr of 0.7 mg/dL).    CBC:   Recent Labs     11/27/22  0959 11/28/22  0553 11/29/22  0451   WBC 20.7* 19.7* 17.6*   HGB 12.8 12.8 12.7   HCT 40.7 40.4 40.8   MCV 87.8 88.1 88.2    385 369     Thyroid:   Lab Results   Component Value Date/Time    TSH 3.90 06/01/2021 05:34 AM     Lipids:   Lab Results   Component Value Date/Time    CHOL 189 2021 07:47 AM    HDL 39 2021 07:47 AM    TRIG 99 2021 07:47 AM     LFTS:   Lab Results   Component Value Date/Time    ALT 11 2022 06:04 AM    AST 20 2022 06:04 AM    ALKPHOS 89 2022 06:04 AM    PROT 6.8 2022 06:04 AM    AGRATIO 1.1 2022 06:04 AM    BILITOT 0.6 2022 06:04 AM     Cardiac Enzymes:   Lab Results   Component Value Date/Time    TROPONINI 0.34 2022 04:00 PM    TROPONINI 0.43 2022 10:48 AM    TROPONINI 0.03 2022 02:30 AM     Coags:   Lab Results   Component Value Date/Time    PROTIME 14.4 2022 10:48 AM    INR 1.13 2022 10:48 AM       EC22 (Personally Interpreted)   - NSR. Rate 71, QRS 94, QTc 491    ECHO: 10/20   Left ventricular systolic function is moderately reduced with estimated ejection fraction of 40%. There is moderate concentric left ventricular hypertrophy. There is no evidence of mass or thrombus in the left atrium or appendage.     Cath: 22  Anatomy:   LM-nml   LAD-mid 40%  Cx-nml  OM- nml  RCA-dom nml  RPDA- nml  LVEF- 25%  LVG- severe global hypokinesis  LVEDP- 15      Impression  ~Coronary Angiography w/ nonobstructive CAD  ~LVG with LVEF of 25% and global regional wall motion abnormalities  ~No ischemic cause for VT/VF    Problem List:   Patient Active Problem List    Diagnosis Date Noted    ICD (implantable cardioverter-defibrillator) discharge 2022    NSVT (nonsustained ventricular tachycardia) 2022    Paraparesis of both lower limbs ([address] 2022    Lumbosacral radiculopathy 2022    Lumbar radiculopathy 03/15/2022    Gout 2022    Back pain with radiculopathy 2022    Essential hypertension 10/08/2020    Coronary artery disease due to lipid rich plaque     Class 3 severe obesity due to excess calories with serious comorbidity and body mass index (BMI) of 40.0 to 44.9 in adult (HCC)     TYLER (obstructive sleep apnea)     Dilated cardiomyopathy ([facility] History of ventricular tachycardia 08/12/2020    Paroxysmal atrial fibrillation ([facility] [address] 11/29/2016    ICD (implantable cardioverter-defibrillator) in place 01/13/2015    Mixed hyperlipidemia 12/18/2014        Assessment and Plan:     AICD Shock  - Occurred on 11/24; appears to be secondary to episode of SVT  - No recurrence  - Keep K+ >4 and mag >2 (add mag to AM labs; not checked since 2021)      2. SVT   - Device shows VT/VF, but appears to SVT based on review on interrogation  - Cath with non-obstructive CAD   - Continue BB (increased to 200 mg daily this admin)    ~ If she has recurrent episodes or AICD shock, may need to consider starting back on amiodarone. Discussed with pt and she is not interested in starting at this time due to the side effect profile     - If recurrent episodes, will need to either start amiodarone or consider EP study/ablation. Pt VU    3. CAD  - Stable   ~ Cath non obstructive. Right radial cath site stable  - No complaints of angina  - Continue ASA, ARB, BB, and statin    4. Chronic systolic heart failure (NYHA Class II/III)  - Appears compensated   ~ EF 20% per echo (11/22); previously higher  - Continue with BB, ARB, spironolactone  - Aggressive medical therapy with risk factor modification  - Discussed with patient importance of monitoring weight, low sodium diet and fluid restriction  - Follow up with CHF NP on 12/2    5. Paroxysmal Atrial Fibrillation  -  S/p RFCA with PVI (10/8/20, Dr. Kat Pruitt)  - Currently in NSR  - Continue Toprol  mg QD  - [id number] score:high; [id number] Vasc score and anticoagulation discussed. High risk for stroke and thromboembolism. Anticoagulation is recommended. Risk of bleeding was discussed.  ~ On Eliquis 5 mg BID    - TSH 3.34 (11/22)    6. HTN  - Fairly Controlled: Goal <130/80  - Continue current medications    7. Back Pain   - Work up on going   - Her device is NOT MRI compatible   - Followed by neurosurgery    8.  Leukocytosis   - Persistent since 2013   - Pt requesting hematology consult while admitted    No further cardiology recommendations. EP will sign off. Will have her follow up with EP team in 2 months. Please call if there are any questions. Thank you for consult. All pertinent information and plan of care discussed with the rounding physician. All questions and concerns were addressed to the patient. Alternatives to my treatment were discussed. I have discussed the above stated plan with patient and the nurse. The patient verbalized understanding and agreed with the plan. Thank you for allowing to us to participate in the care of Elio Caraballo    The patient was seen for >35 minutes. I reviewed interval history, physical exam, review of data including labs, imaging, development and implementation of treatment plan and coordination of complex care.  More than 50% of the time was devoted to counseling the patient on their diagnoses/treatments, as well as coordination of care with the other care teams    ZOILA Clifford  [address]   Office: [phone]

## 2022-11-29 NOTE — PROGRESS NOTES
[address] [facility],[address] Msb Department   Phone: [phone]    Occupational Therapy    [] Initial Evaluation            [x] Daily Treatment Note         [] Discharge Summary      Patient: Trang Oscar   : 1955   MRN: [id number]   Date of Service:  2022    Admitting Diagnosis:  ICD (implantable cardioverter-defibrillator) discharge    Current Admission Summary: Admitting Diagnosis: ICD (implantable cardioverter-defibrillator) discharge    Current Admission Summary: 75 y.o. female who presented to [facility] with past medical history of hypertension, hyperlipidemia, gout, HFrEF EF 40% presented to the ED with chief complaint of increased heart rate and chest pain. Patient reported that she has sick contacts from her daughter, patient reported that she has been having some cough been going on for 2 weeks. Patient reported that she was in bad went up to go to the bathroom use the bathroom and then while she was going to the kitchen she did not make it and started calling her daughter. The first ICD felt discharge started around 1 AM.  Patient reported noticed that her defibrillator for 3-4 times due to his heart rate being very fast.  Patient did have associated shortness of breath, chest pain otherwise no shortness of breath abdominal pain or dysuria. Did report intermittent cough and phlegm production. Patient called EMS. She was found to be in SVT by paramedics and then ventricular tachycardia upon arrival to hospital.  She underwent left heart cath by Dr. Felix Harrison 2022 without intervention. Past Medical History:  has a past medical history of AICD (automatic cardioverter/defibrillator) present, Arthritis, CHF (congestive heart failure) ([address] Gout, Hyperlipidemia, Hypertension, and MI (myocardial infarction) ([facility] [address]   Past Surgical History:  has a past surgical history that includes Dilation and curettage of uterus; Cardiac defibrillator with chest pain and acute back and radicular pain. Neurointact per report. CT L-spine reveals multilevel degenerative spondylitic changes. Pain management per primary but could consider steroids, gabapentin, muscle relaxers and/or opiate based pain medications. Recommend MRI L-spine for further work up. Lisette Guzman MD  Neurosurgery    Pre-Admission Information   Lives With: alone                     Type of Home: apartment  Home Layout: one level  Home Access:  4 step to enter with handrail. Handrails are located on R side. Bathroom Layout: tub/shower unit  Bathroom Equipment: shower chair  Toilet Height: standard height  Home Equipment: rollator - 4 wheeled walker, back scratcher used to help with underwear and socks  Transfer Assistance: modified independent with use of rollator. Ambulation Assistance:modified independent with use of rollator. ADL Assistance: independent with all ADL's  IADL Assistance: independent with homemaking tasks  Active :        [] Yes                 [x] No - granddaughter, daughter, sisters help drive  Hand Dominance: [] Left                 [x] Right  Current Employment: retired  Hobbies: make own hair products  Recent Falls: [place] when defibrillator went off this last time before admission. States she falls an estimated 3x/month. Told she needs a transport chair. Can't stand up for long periods of time d/t low back pain. Patient has not sought help at home as she feels there are people worse off than her who need the help more.       Examination   Vision:   Vision Corrective Device: wears glasses for reading  Hearing:   [facility]  Perception:   WFL  Observation:   General Observation:  obese female, head forward, protracted shoulders  Posture:   Slow gait, flexed posture in stance due to pain   Sensation:   denies numbness and tingling in UEs, see PT notes for LE  Proprioception:    WFL  Tone:   Normotonic  Coordination Testing:   WFL    ROM:   (B) UE AROM WFL  Strength: (B) UE strength grossly [facility]    Therapist Clinical Decision Making (Complexity): medium complexity  Clinical Presentation: evolving      Subjective  General: Patient sitting in recliner, pleasant and cooperative. Patient reports a lot of pain going down right leg. Patient is agreeable to SNF or will try to have someone stay with her 24/7. Patient aware can not go home alone at this time. Pain: 8/10. Location: right lower side above hip, radiates down right leg. Dx of sciatica  Pain Interventions: pain medication in place prior to arrival, heat applied, and repositioned         Activities of Daily Living  Basic Activities of Daily Living  Feeding: Independent  Grooming: setup assistance  Bathing Comments: patient reports had a shower early this AM with nursing  Dressing Comments: Patient uses 2 back scratchers for LB dressing  Toileting: contact guard assistance. Toileting Comments: ambulated into bathroom to use commode, very slow gait, CGA with toileting    General Comments: ambulated @ 10 feet with 4WW, very slow gait from chair > commode > bed        Instrumental Activities of Daily Living  No IADL completed on this date. Functional Mobility  Bed Mobility  Sit to Supine: moderate assistance  Comments: need assist with BOTH LEGS sit to supine. Transfers  Sit to stand transfer:contact guard assistance, minimal assistance  Stand to sit transfer: contact guard assistance, minimal assistance  Bed / Chair transfer: contact guard assistance, minimal assistance. Toilet transfer: contact guard assistance, minimal assistance  Toilet transfer comments: CGA/min assist to stand from recliner chair and from commode, heavy reliance on left grab bar        Functional Mobility:  Functional Mobility Comment: very slow gait, HR in  80s once back to bed, room air, 100%, difficulty stepping with left LE. Patient reporting pain down right LE during ambulation.  See PT for further gait analysis    Other Therapeutic Interventions    Functional Outcomes  AM-PAC Inpatient Daily Activity Raw Score: 15    Cognition  WFL  Orientation:    alert and oriented x 4  Command Following:   [facility]  Barriers To Learning: physical  Patient Education: patient educated on goals, OT role and benefits, adaptive device training, transfer training, discharge recommendations  Learning Assessment:  patient verbalizes and demonstrates understanding    Assessment  Activity Tolerance: Limited ability to ambulate and perform ADLs this date due to pain. Patient lives alone and needs increased assistance due to pain and cardiac issues. Impairments Requiring Therapeutic Intervention: decreased functional mobility, decreased ADL status, decreased endurance, decreased IADL  Prognosis: good  Clinical Assessment: Patient pleasant and cooperative, lives alone. Would have difficulty performing 4 steps up to apartment, needs increased assist for ADLs, Recommend ARU, if ARU not able to take then recommend [address] (patient lives in [place] and would consider [address]).  Patient is not safe to return home alone at this time, if patient does go home recommend 24/7 care and supervision and home therapy      Safety Interventions: patient left in bed, bed alarm in place, call light within reach, and nurse notified    Plan  Frequency: 3-5 x/per week  Current Treatment Recommendations: strengthening, balance training, functional mobility training, transfer training, endurance training, ADL/self-care training, IADL training, and home management training    Goals  Patient Goals: Go home, decrease pain    Short Term Goals:  Time Frame: until discharge-- NO GOALS MET 11/29/22    Patient will complete lower body ADL at minimal assistance   Patient will complete toileting at set up assistance   Patient will complete functional transfers at supervision   Patient will increase [facility] ADL score = to or > than 20/24    Therapy Session Time     Individual Group Co-treatment   Time In 1045     Time Out 1109     Minutes 24          Timed Code Treatment Minutes:  24 Minutes  Total Treatment Minutes:  24       Electronically Signed By: Husam Mathew OTR/MICHAEL [phone]

## 2022-11-29 NOTE — PROGRESS NOTES
Physician Progress Note      Prakash Mendiola  CSN #:                  [id number]  :                       1955  ADMIT DATE:       2022 1:59 AM  DISCH DATE:  RESPONDING  PROVIDER #:        Jose Daniel Castro MD          QUERY TEXT:    Patient admitted with ICD discharge. Documentation reflects NSTEMI in H&P   note. If possible, please document in the progress notes and discharge   summary if NSTEMI was: The medical record reflects the following:  Risk Factors: elevated troponin, chest tightness  Clinical Indicators: H&P note documented \"NSTEMI: Etiology is suspected   secondary to ICD discharge, Aspirin ordered, Patient continued to have chest   tightness, morphine ordered, continue trend troponin. \"   internal medicine progress note documented \"Elevated troponin;   Cardiology consulted; stable cardiac cath which showed nonobstructive CAD. EF   of 25%, global regional wall motion abnormalities. Continue medical   management. \"  Trop 0.03, 0.43, 0.34  Treatment: trend troponin, Cards consult, Aspirin, left heart catherization  Options provided:  -- NSTEMI confirmed after study  -- NSTEMI ruled out after study  -- Other - I will add my own diagnosis  -- Disagree - Not applicable / Not valid  -- Disagree - Clinically unable to determine / Unknown  -- Refer to Clinical Documentation Reviewer    PROVIDER RESPONSE TEXT:    NSTEMI ruled out after study.     Query created by: Joseph Granados on 2022 4:18 PM      Electronically signed by:  Jose Daniel Castro MD 2022 7:12 PM

## 2022-11-29 NOTE — ACP (ADVANCE CARE PLANNING)
Advanced Care Planning Note. Purpose of Encounter: Advanced care planning in light of CAD  Parties In Attendance: Patient,   Decisional Capacity: Yes  Subjective: Patient with back pain and RLE radiculopathy  Objective: Cr 0.6  Goals of Care Determination: Patient wants full support (CPR, vent, surgery, HD, CONSIDER trach and PEG, no long term dependent care)  Plan:  Cardio, PMR and NS consults. PT/OT  Code Status: Full code   Time spent on Advanced care Plannin minutes  Advanced Care Planning Documents: Completed advanced directives on chart, sister is the POA.     Tari Gomez MD  2022 7:12 PM

## 2022-11-29 NOTE — DISCHARGE INSTR - COC
Continuity of Care Form    Patient Name: Jyoti Julien   :  1955  MRN:  [id number]    Admit date:  2022  Discharge date:  2022    Code Status Order: Full Code   Advance Directives:     Admitting Physician:  Bridgette Vogt DO  PCP: Shanthi Carolina MD    Discharging Nurse: [facility] Unit/Room#: CVU-2912/2912-01  Discharging Unit Phone Number: [phone]    Emergency Contact:   Extended Emergency Contact Information  Primary Emergency Contact: [facility] Phone: [phone]  Mobile Phone: [phone]  Relation: Child  Secondary Emergency Contact: Fawad Gates Phone: [phone]  Mobile Phone: [phone]  Relation: Grandchild  Preferred language: English    Past Surgical History:  Past Surgical History:   Procedure Laterality Date    CARDIAC DEFIBRILLATOR PLACEMENT      DILATION AND CURETTAGE OF UTERUS      PAIN MANAGEMENT PROCEDURE Bilateral 2022    BILATERAL L5 TRANSFORAMINAL EPIDURAL STEROID INJECTION WITH FLUOROSCOPY performed by Nick Brown MD at [facility]       Immunization History: There is no immunization history on file for this patient.     Active Problems:  Patient Active Problem List   Diagnosis Code    Mixed hyperlipidemia E78.2    ICD (implantable cardioverter-defibrillator) in place Z95.810    Paroxysmal atrial fibrillation (HCC) I48.0    History of ventricular tachycardia Z86.79    Dilated cardiomyopathy (HCC) I42.0    Coronary artery disease due to lipid rich plaque I25.10, I25.83    Class 3 severe obesity due to excess calories with serious comorbidity and body mass index (BMI) of 40.0 to 44.9 in adult (HCC) E66.01, Z68.41    TYLER (obstructive sleep apnea) G47.33    Essential hypertension I10    Gout M10.9    Back pain with radiculopathy M54.10    Lumbar radiculopathy M54.16    Lumbosacral radiculopathy M54.17    Paraparesis of both lower limbs (HCC) G82.20    NSVT (nonsustained ventricular tachycardia) I47.29    ICD (implantable cardioverter-defibrillator) discharge Z45.02       Isolation/Infection:   Isolation            No Isolation          Patient Infection Status       Infection Onset Added Last Indicated Last Indicated By Review Planned Expiration Resolved Resolved By    None active    Resolved    COVID-19 (Rule Out) 11/24/22 11/24/22 11/24/22 Respiratory Panel, Molecular, with COVID-19 (Restricted: peds pts or suitable admitted adults) (Ordered)   11/24/22 Rule-Out Test Resulted    COVID-19 (Rule Out) 10/08/20 10/08/20 10/08/20 COVID-19 (Ordered)   10/09/20 Zev Javierchleisa, BROOK    COVID-19 (Rule Out) 10/08/20 10/08/20 10/08/20 COVID-19 (Ordered)   10/08/20 Rule-Out Test Resulted            Nurse Assessment:  Last Vital Signs: /69   Pulse 73   Temp 96.9 °F (36.1 °C) (Temporal)   Resp 18   Ht 5' 6\" (1.676 m)   Wt 274 lb 7.6 oz (124.5 kg)   SpO2 96%   BMI 44.30 kg/m²     Last documented pain score (0-10 scale): Pain Level: 8  Last Weight:   Wt Readings from Last 1 Encounters:   11/29/22 274 lb 7.6 oz (124.5 kg)     Mental Status:  oriented    IV Access:  - None    Nursing Mobility/ADLs:  Walking   Assisted  Transfer  Assisted  Bathing  Assisted  Dressing  Assisted  Toileting  Assisted  Feeding  Independent  Med Admin  Independent  Med Delivery   whole    Wound Care Documentation and Therapy:        Elimination:  Continence: Bowel: Yes  Bladder: Yes  Urinary Catheter: None   Colostomy/Ileostomy/Ileal Conduit: No       Date of Last BM: 11/30/22    Intake/Output Summary (Last 24 hours) at 11/29/2022 1336  Last data filed at 11/29/2022 0449  Gross per 24 hour   Intake 480 ml   Output 200 ml   Net 280 ml     I/O last 3 completed shifts: In: 5 [P.O.:720]  Out: 900 [Urine:900]    Safety Concerns:     None    Impairments/Disabilities:      None    Nutrition Therapy:  Current Nutrition Therapy:   - Oral Diet:  General    Routes of Feeding: Oral  Liquids:  Thin Liquids  Daily Fluid Restriction: no  Last Modified Barium Swallow with Video (Video Swallowing Test): not done    Treatments at the Time of Hospital Discharge:   Respiratory Treatments: n/a  Oxygen Therapy:  is not on home oxygen therapy. Ventilator:    - No ventilator support    Rehab Therapies: Physical Therapy and Occupational Therapy  Weight Bearing Status/Restrictions: No weight bearing restrictions  Other Medical Equipment (for information only, NOT a DME order):  walker  Other Treatments: n/a    Patient's personal belongings (please select all that are sent with patient):  None    RN SIGNATURE:  Electronically signed by Gabe Foreman RN on 12/1/22 at 11:52 AM EST    CASE MANAGEMENT/SOCIAL WORK SECTION    Inpatient Status Date: ***    Readmission Risk Assessment Score:  Readmission Risk              Risk of Unplanned Readmission:  10           Discharging to Facility/ [address] [address]  Phone: [phone]  Fax: [phone]       Accepted patient for home care and will follow at [address]  Phone [phone]  Fax [phone]     / signature: Rayna Aj RN, BSN  [phone]     PHYSICIAN SECTION    Prognosis: Guarded    Condition at Discharge: Stable    Rehab Potential (if transferring to Rehab): Guarded    Recommended Labs or Other Treatments After Discharge:     Physician Certification: I certify the above information and transfer of Evelyn Womack  is necessary for the continuing treatment of the diagnosis listed and that she requires home care for less 30 days.      Update Admission H&P: No change in H&P    PHYSICIAN SIGNATURE:  Electronically signed by Jie Parra MD on 12/1/22 at 1:36 PM [id number]

## 2022-11-29 NOTE — PROGRESS NOTES
Physical Therapy    [facility]   Phone: [phone]    Physical Therapy    [] Initial Evaluation            [x] Daily Treatment Note         [] Discharge Summary      Patient: Judie Raphael   : 1955   MRN: [id number]   Date of Service:  2022  Admitting Diagnosis: ICD (implantable cardioverter-defibrillator) discharge  Current Admission Summary: 75 y.o. female who presented to [facility] with past medical history of hypertension, hyperlipidemia, gout, HFrEF EF 40% presented to the ED with chief complaint of increased heart rate and chest pain. Patient reported that she has sick contacts from her daughter, patient reported that she has been having some cough been going on for 2 weeks. Patient reported that she was in bad went up to go to the bathroom use the bathroom and then while she was going to the kitchen she did not make it and started calling her daughter. The first ICD felt discharge started around 1 AM.  Patient reported noticed that her defibrillator for 3-4 times due to his heart rate being very fast.  Patient did have associated shortness of breath, chest pain otherwise no shortness of breath abdominal pain or dysuria. Did report intermittent cough and phlegm production. Patient called EMS. She was found to be in SVT by paramedics and then ventricular tachycardia upon arrival to hospital.  She underwent left heart cath by Dr. Caren Rodriguez 2022 without intervention. Past Medical History:  has a past medical history of AICD (automatic cardioverter/defibrillator) present, Arthritis, CHF (congestive heart failure) ([address] Gout, Hyperlipidemia, Hypertension, and MI (myocardial infarction) ([address] Past Surgical History:  has a past surgical history that includes Dilation and curettage of uterus; Cardiac defibrillator placement; and Pain management procedure (Bilateral, 2022).      Discharge Recommendations: Judie Raphael scored a 15/24 on the AM-PAC short mobility form. Current research shows that an AM-PAC score of 17 or less is typically not associated with a discharge to the patient's home setting. Based on the patient's AM-PAC score and their current functional mobility deficits, it is recommended that the patient have 5-7 sessions per week of Physical Therapy at d/c to increase the patient's independence. At this time, this patient demonstrates complex nursing, medical, and rehabilitative needs, and would benefit from intensive rehabilitation services upon discharge from the Inpatient setting. This patient demonstrates the ability to participate in and benefit from an intensive therapy program with a coordinated interdisciplinary team approach to foster frequent, structured, and documented communication among disciplines, who will work together to establish, prioritize, and achieve treatment goals. Please see assessment section for further patient specific details. If patient discharges prior to next session this note will serve as a discharge summary. Please see below for the latest assessment towards goals. DME Required For Discharge: rollator (4 wheel walker), wheelchair, tub transfer bench   Precautions/Restrictions: high fall risk  Weight Bearing Restrictions: weight bearing as tolerated  [] Right Upper Extremity        [] Left Upper Extremity         [] Right Lower Extremity         [] Left Lower Extremity             Required Braces/Orthotics: no braces required                   [] Right            [] Left  Positional Restrictions:no positional restrictions     Pre-Admission Information   Lives With: alone                     Type of Home: apartment  Home Layout: one level  Home Access:  4 step to enter with handrail. Handrails are located on R side.   Bathroom Layout: tub/shower unit  Bathroom Equipment: shower chair  Toilet Height: standard height  Home Equipment: rollator - 4 wheeled walker, back scratcher used to help with underwear and socks  Transfer Assistance: modified independent with use of rollator. Ambulation Assistance:modified independent with use of rollator. ADL Assistance: independent with all ADL's  IADL Assistance: independent with homemaking tasks  Active :        [] Yes                 [x] No - granddaughter, daughter, sisters help drive  Hand Dominance: [] Left                 [x] Right  Current Employment: retired  Hobbies: make own hair products  Recent Falls: [place] [facility] when defibrillator went off this last time before admission. States she falls an estimated 3x/month. Told she needs a transport bench. Can't stand up for long periods of time d/t low back pain. Patient has not sought help at home as she feels there are people worse off than her who need the help more. Subjective  General: Pt supine in bed upon arrival. Pt agreeable to PT treatment with encouragement. Reports she does not want to do much because she already worked with OT and she is having a bad day. Pain: 8/10. Location: low back  Pain Interventions: RN notified and repositioned      Functional Mobility  Bed Mobility  Supine to sit: SBA   Sit to Supine: mod A   Bridging: SBA   Scooting: SBA   Comments:  Increased time and effort due to back pain. Assist with B LE for sit>supine. Pt able to complete supine bridge and scoot toward EOB   Transfers  Sit to stand transfer: minimal assistance   Stand to sit transfer: minimal assistance  Comments: STS from EOB up to RW   Ambulation  Comments: Pt declines ambulation this date stating she has been up in room with nursing and occupational therapy today. Pt educated on importance of OOB activity   Stair Mobility  Stair mobility not completed on this date. Comments:  Wheelchair Mobility:  No w/c mobility completed on this date.   Comments:  Balance  Static Sitting Balance: fair (+): maintains balance at SBA/supervision without use of UE support  Dynamic Sitting Balance: fair (+): maintains balance at SBA/supervision without use of UE support  Static Standing Balance: fair: maintains balance at CGA without use of UE support  Dynamic Standing Balance: fair: maintains balance at CGA without use of UE support  Comments: Pt sits EOB ~10 mins with SBA completing seated exercise      Other Therapeutic Interventions:  Moist heat to low back post treatment      Functional Outcomes  AM-PAC Inpatient Mobility Raw Score : 15               Cognition  WFL  Orientation:    alert and oriented x 4  Command Following:   [facility]     Education  Barriers To Learning: none  Patient Education: patient educated on goals, PT role and benefits, plan of care, general safety, functional mobility training, proper use of assistive device/equipment, energy conservation, disease specific education, injury prevention, transfer training, discharge recommendations  Learning Assessment:  patient verbalizes and demonstrates understanding     Assessment  Activity Tolerance: Limited by pain, endurance, and strength. Impairments Requiring Therapeutic Intervention: decreased functional mobility, decreased ADL status, decreased ROM, decreased strength, decreased endurance, decreased balance, increased pain, decreased posture  Prognosis: good  Clinical Assessment: Patient presents from home where she was MOD I w/ rolling walker but having consistent falls almost weekly and greater difficulty with mobility secondary to worsening back pain. At this time, pt requires mod A for bed mobility and min A for STS transfers. Pt declines ambulation at this time due to her back pain and reporting she walked with nursing and OT earlier today. Pt provided with education on importance of OOB activity and consequences of prolonged bed rest. Pt has steps to enter home and hx of falls. She is not safe to return home at this time. Recommend 24 hour assist and continued therapy at d/c.   Patient would benefit from rollator, wheelchair, and transfer tub bench at home.  Safety Interventions: patient left in bed, bed alarm in place, call light within reach, gait belt, patient at risk for falls, and nurse notified     Plan  Frequency: 3-5 x/per week  Current Treatment Recommendations: strengthening, balance training, functional mobility training, transfer training, gait training, stair training, endurance training, patient/caregiver education, pain management, safety education, and equipment evaluation/education     Goals  Patient Goals: Return home, improve pain. Short Term Goals:  Time Frame: Discharge.   Patient will complete bed mobility at minimal assistance   Patient will complete transfers at stand by assistance   Patient will ambulate 50 ft with use of LRAD at stand by assistance  Patient will ascend/descend 4 stairs with (R) ascending handrail at contact guard assistance  NO GOALS MET THIS DATE 11/29/22     Therapy Session Time      Individual Group Co-treatment   Time In 1315       Time Out 1353       Minutes 38         Total Treatment Minutes:  38       Electronically Signed By: Judi Carroll [facility], [address] [address]

## 2022-11-29 NOTE — CONSULTS
[facility]    Consult Note      Requesting Provider:  YEIMY Lopez CNP    CHIEF COMPLAINT:  Back pain      HISTORY OF PRESENT ILLNESS:      Ms. Tom Saavedra  is a 75 y.o. female who is admitted with tachycardia and chest pain. She has a defibrillator that discharged twice prior to admission. She has been undergoing workup and treatment for NSTEMI. She is also being treated for back pain and sciatica. She is currently being treated with gabapentin, oxycodone and prednisone. She has been evaluated by neurosurgery. She is unable to have MRIs because ICD is not MRI compatible. She is noted to have leukocytosis this admissions. This is chronic since 2013. She has never had hematology evaluation. She does not smoke. No chronic infections or history of autoimmune diseases. No fevers or chills. She reports occasional soaking night sweats since going through menopause. No unexplained weight loss. No personal or family history of cancer.      Past Medical History:    Past Medical History:   Diagnosis Date    AICD (automatic cardioverter/defibrillator) present     Arthritis     CHF (congestive heart failure) ([address]     Gout     Hyperlipidemia     Hypertension     MI (myocardial infarction) ([address]        Past Surgical History:    Past Surgical History:   Procedure Laterality Date    CARDIAC DEFIBRILLATOR PLACEMENT      DILATION AND CURETTAGE OF UTERUS      PAIN MANAGEMENT PROCEDURE Bilateral 6/29/2022    BILATERAL L5 TRANSFORAMINAL EPIDURAL STEROID INJECTION WITH FLUOROSCOPY performed by Robert Peguero MD at [facility]       Current Medications:    Current Facility-Administered Medications   Medication Dose Route Frequency Provider Last Rate Last Admin    HYDROmorphone HCl PF (DILAUDID) injection 1 mg  1 mg IntraVENous Q4H PRN Simone Jerome MD   1 mg at 11/28/22 1607    gabapentin (NEURONTIN) capsule 100 mg  100 mg Oral TID Rosibel Rodriguez MD   100 mg at 11/29/22 0951    oxyCODONE (ROXICODONE) immediate release tablet 10 mg  10 mg Oral Q6H PRN Rosibel Rodriguez MD   10 mg at 11/29/22 0545    diclofenac sodium (VOLTAREN) 1 % gel 4 g  4 g Topical BID Telma Velez MD   4 g at 11/29/22 8981    spironolactone (ALDACTONE) tablet 25 mg  25 mg Oral Daily YEIMY Hernandez - CNP   25 mg at 11/29/22 9440    predniSONE (DELTASONE) tablet 40 mg  40 mg Oral Daily Telma Velez MD   40 mg at 11/29/22 9118    cyclobenzaprine (FLEXERIL) tablet 10 mg  10 mg Oral TID PRN Telma Velez MD   10 mg at 11/29/22 0545    metoprolol succinate (TOPROL XL) extended release tablet 200 mg  200 mg Oral Daily Cheryl Maurer MD   200 mg at 11/29/22 0951    apixaban (ELIQUIS) tablet 5 mg  5 mg Oral BID Cheryl Maurer MD   5 mg at 11/29/22 9786    sodium chloride flush 0.9 % injection 10 mL  10 mL IntraVENous 2 times per day Yasimn Spies A Apolonia, DO   10 mL at 11/29/22 0953    sodium chloride flush 0.9 % injection 10 mL  10 mL IntraVENous PRN Ahmad A Apolonia, DO        0.9 % sodium chloride infusion   IntraVENous PRN Yasmin Spies A Apolonia, DO        potassium chloride 10 mEq/100 mL IVPB (Peripheral Line)  10 mEq IntraVENous PRN Ahmad A Apolonia, DO        magnesium sulfate 1000 mg in dextrose 5% 100 mL IVPB  2,000 mg IntraVENous PRN Yasmin Spies A Apolonia, DO        promethazine (PHENERGAN) tablet 12.5 mg  12.5 mg Oral Q6H PRN Ahmad A Apolonia, DO        Or    ondansetron (ZOFRAN) injection 4 mg  4 mg IntraVENous Q6H PRN Ahmad A Apolonia, DO        acetaminophen (TYLENOL) tablet 650 mg  650 mg Oral Q6H PRN Ahmad A Apolonia, DO   650 mg at 11/24/22 3040    Or    acetaminophen (TYLENOL) suppository 650 mg  650 mg Rectal Q6H PRN Beto Barksdale, DO        aspirin EC tablet 81 mg  81 mg Oral Daily Beto Barksdale, DO   81 mg at 11/29/22 0951    atorvastatin (LIPITOR) tablet 40 mg  40 mg Oral Daily Beto Barksdale, DO   40 mg at 11/29/22 0952    valsartan (DIOVAN) tablet 160 mg 160 mg Oral BID Beto Barksdale, DO   160 mg at 11/27/22 1955    sodium chloride flush 0.9 % injection 5-40 mL  5-40 mL IntraVENous 2 times per day Emma Lu MD   10 mL at 11/29/22 0954    sodium chloride flush 0.9 % injection 5-40 mL  5-40 mL IntraVENous PRN Emma Lu MD        0.9 % sodium chloride infusion  25 mL IntraVENous PRN Emma Lu MD         Allergies:  No Known Allergies    Social History:   Social History     Socioeconomic History    Marital status:      Spouse name: Not on file    Number of children: Not on file    Years of education: Not on file    Highest education level: Not on file   Occupational History    Not on file   Tobacco Use    Smoking status: Never    Smokeless tobacco: Never    Tobacco comments:     QUIT AS A TEEN   Vaping Use    Vaping Use: Never used   Substance and Sexual Activity    Alcohol use: No     Comment: RARE    Drug use: No    Sexual activity: Not on file   Other Topics Concern    Not on file   Social History Narrative    Not on file     Social Determinants of Health     Financial Resource Strain: Not on file   Food Insecurity: Not on file   Transportation Needs: Not on file   Physical Activity: Not on file   Stress: Not on file   Social Connections: Not on file   Intimate Partner Violence: Not on file   Housing Stability: Not on file          Family History:     Family History   Problem Relation Age of Onset    Heart Disease Mother     High Blood Pressure Mother     High Cholesterol Mother     Diabetes Mother     Cancer Father        REVIEW OF SYSTEMS:      Constitutional:  No weight loss, No fever, No chills, No night sweats.     Eyes:  No impairment or change in vision  ENT / Mouth:  No pain, abnormal ulceration, bleeding, nasal drip or change in voice or hearing  Cardiovascular:  No chest pain, palpitations, new edema, or calf discomfort  Respiratory:  No pain, hemoptysis, change to breathing  Gastrointestinal:  No pain, cramping, jaundice, change to eating and bowel habits  Urinary:  No pain, bleeding or change in continence  Musculoskeletal:  No redness, edema or weakness. + Back pain. Skin:  No pruritus, rash, change to nodules or lesions  Neurologic:  No discomfort, change in mental status, speech, sensory or motor activity  Psychiatric:  No change in concentration or change to affect or mood  Endocrine:  No hot flashes, increased thirst, or change to urine production  Hematologic: No petechiae, ecchymosis or bleeding  Lymphatic:  No lymphadenopathy or lymphedema  Allergy / Immunologic:  No eczema, hives, frequent or recurrent infections    PHYSICAL EXAM:      Vitals:  /73   Pulse 78   Temp 97.3 °F (36.3 °C) (Temporal)   Resp 20   Ht 5' 6\" (1.676 m)   Wt 274 lb 7.6 oz (124.5 kg)   SpO2 97%   BMI 44.30 kg/m²     General appearance:  Appears comfortable  Eyes: Sclera clear. Pupils equal.  ENT: Moist oral mucosa. Trachea midline, no adenopathy. Cardiovascular: Regular rhythm, normal S1, S2. No murmur. No edema in lower extremities  Respiratory: Not using accessory muscles. Good inspiratory effort. Clear to auscultation bilaterally, no wheeze or crackles. GI: Abdomen soft, no tenderness, not distended  Musculoskeletal: No cyanosis in digits, neck supple  Neurology: CN 2-12 grossly intact. No speech or motor deficits  Psych: Normal affect. Alert and oriented in time, place and person  Skin: Warm, dry, normal turgor      DATA:    PT/INR:  No results for input(s): PROT, INR in the last 72 hours. PTT:  No results for input(s): APTT in the last 72 hours.   CMP:    Lab Results   Component Value Date/Time     11/29/2022 04:51 AM    K 4.2 11/29/2022 04:51 AM     11/29/2022 04:51 AM    CO2 28 11/29/2022 04:51 AM    BUN 17 11/29/2022 04:51 AM    PROT 6.8 11/26/2022 06:04 AM     :    Lab Results   Component Value Date/Time    MG 1.90 11/26/2022 06:04 AM     Phosphorus:  No components found for: PO4  Calcium:  No results found for: CA  CBC: Lab Results   Component Value Date/Time    WBC 17.6 11/29/2022 04:51 AM    RBC 4.63 11/29/2022 04:51 AM    HGB 12.7 11/29/2022 04:51 AM    HCT 40.8 11/29/2022 04:51 AM    MCV 88.2 11/29/2022 04:51 AM    RDW 16.7 11/29/2022 04:51 AM     11/29/2022 04:51 AM     DIFF:  Lab Results   Component Value Date/Time    MCV 88.2 11/29/2022 04:51 AM    RDW 16.7 11/29/2022 04:51 AM          IMAGING:    Narrative   EXAMINATION:   3 XRAY VIEWS OF THE RIGHT FOOT       11/20/2022 11:36 am       COMPARISON:   03/13/2022       HISTORY:   ORDERING SYSTEM PROVIDED HISTORY: INJURY   TECHNOLOGIST PROVIDED HISTORY:   Reason for exam:->INJURY   Reason for Exam: injury       FINDINGS:   metatarsal alignment appears normal. No acute fracture or malalignment noted. Moderate spurring is seen at the 1st metatarsal phalangeal joint. Spurring   is seen at the Achilles insertion. Impression   No acute osseous abnormality         Narrative   EXAMINATION:   ONE XRAY VIEW OF THE CHEST       11/24/2022 2:36 am       COMPARISON:   03/14/2022       HISTORY:   ORDERING SYSTEM PROVIDED HISTORY: deFibrillator fired with  tachycardia   TECHNOLOGIST PROVIDED HISTORY:   Reason for exam:->deFibrillator fired with  tachycardia   Reason for Exam: deFibrillator fired with tachycardia   Relevant Medical/Surgical History: previous MI,CHF,hld and htn       FINDINGS:   The cardiomediastinal silhouette is unchanged in appearance. Left subclavian   ICD in place. There is no consolidation, pneumothorax, or evidence of edema. No effusion is appreciated. The osseous structures are unchanged in   appearance. Impression   Unchanged appearance of the chest without acute airspace disease identified.          Narrative   EXAMINATION:   CT OF THE LUMBAR SPINE WITHOUT CONTRAST  11/26/2022       TECHNIQUE:   CT of the lumbar spine was performed without the administration of   intravenous contrast. Multiplanar reformatted images are provided for review. Adjustment of mA and/or kV according to patient size was utilized. Automated   exposure control, iterative reconstruction, and/or weight based adjustment of   the mA/kV was utilized to reduce the radiation dose to as low as reasonably   achievable. COMPARISON:   03/13/2022       HISTORY:   ORDERING SYSTEM PROVIDED HISTORY: severe low back pain   TECHNOLOGIST PROVIDED HISTORY:   Reason for exam:->severe low back pain       FINDINGS:   BONES/ALIGNMENT: There is normal alignment of the spine. The vertebral body   heights are maintained. No osseous destructive lesion is seen. DEGENERATIVE CHANGES: Unchanged appearance of multilevel spondylosis. These   findings are most prominent at L4-5 and L5-S1. SOFT TISSUES/RETROPERITONEUM: No paraspinal mass. Diverticulosis. High   attenuation material in bladder, likely excreted contrast.           Impression   No acute fracture or traumatic malalignment of the lumbar spine. Similar appearance of multilevel spondylotic change. If pain persists, MRI   may be of benefit. Evidence of recent contrast administration.          Problem List  Patient Active Problem List   Diagnosis    Mixed hyperlipidemia    ICD (implantable cardioverter-defibrillator) in place    Paroxysmal atrial fibrillation (HCC)    History of ventricular tachycardia    Dilated cardiomyopathy (HCC)    Coronary artery disease due to lipid rich plaque    Class 3 severe obesity due to excess calories with serious comorbidity and body mass index (BMI) of 40.0 to 44.9 in adult (HCC)    TYLER (obstructive sleep apnea)    Essential hypertension    Gout    Back pain with radiculopathy    Lumbar radiculopathy    Lumbosacral radiculopathy    Paraparesis of both lower limbs (HCC)    NSVT (nonsustained ventricular tachycardia)    ICD (implantable cardioverter-defibrillator) discharge       IMPRESSION/RECOMMENDATIONS:    75year old female with a history of a-fib, CAD, CHF, HLD, HTN, MI, osteoarthritis, obesity. She has:    1. Neutrophilic leukocytosis:  -Chronic since 2013. Today her WBC is 17.6, ANC is 13.4.  -Exact etiology unclear.   -Will check LDH, BCR/ABL, and JAK2.  -Peripheral smear to be reviewed. 2. SVT/AICD Shock  -Has been evaluated buy cardiology.   -No recurrence. 3. Back pain  -Has been evaluated by neurosurgery.  -Currently on gabapentin, oxycodone and prednisone. -ARU vs SNF at discharge. This plan was discussed with the patient and he/she verbalized understanding. Thank you for allowing us to participate in the care of this patient.       Ashley Hart, [facility]  [facility], [address] [place] [address].  [phone]

## 2022-11-29 NOTE — CONSULTS
Martin Byrne  11/29/2022  [id number]    Rehab Brief Consult:    Patient is unfortunately unlikely to be approved for ARU given her admission diagnoses. Would suggest SNF for continued therapies prior to discharge to home. Patient should follow-up in my office for repeat radiculopathy treatment and interventions. She did not follow-up as instructed after her epidural in June. Liaison discussed with Dr. Leticia Singh. We will sign off. Thank you for the consultation.     Suma Segura MD 11/29/2022 10:22 AM

## 2022-11-29 NOTE — CARE COORDINATION
CM spoke to pt and provided [facility] brochure and spoke to Melissa at [facility] with pt and pt aware all rooms are private. Yina started pre cert.     Angelic Murdock, RN, BSN  [phone]

## 2022-11-30 PROCEDURE — 97530 THERAPEUTIC ACTIVITIES: CPT

## 2022-11-30 PROCEDURE — 97535 SELF CARE MNGMENT TRAINING: CPT

## 2022-11-30 PROCEDURE — 6370000000 HC RX 637 (ALT 250 FOR IP): Performed by: INTERNAL MEDICINE

## 2022-11-30 PROCEDURE — 2580000003 HC RX 258: Performed by: INTERNAL MEDICINE

## 2022-11-30 PROCEDURE — 6360000002 HC RX W HCPCS: Performed by: INTERNAL MEDICINE

## 2022-11-30 PROCEDURE — 6370000000 HC RX 637 (ALT 250 FOR IP): Performed by: NURSE PRACTITIONER

## 2022-11-30 PROCEDURE — 94760 N-INVAS EAR/PLS OXIMETRY 1: CPT

## 2022-11-30 PROCEDURE — 97116 GAIT TRAINING THERAPY: CPT

## 2022-11-30 PROCEDURE — 1200000000 HC SEMI PRIVATE

## 2022-11-30 PROCEDURE — 2060000000 HC ICU INTERMEDIATE R&B

## 2022-11-30 RX ADMIN — Medication 10 ML: at 08:12

## 2022-11-30 RX ADMIN — ASPIRIN 81 MG: 81 TABLET, COATED ORAL at 08:11

## 2022-11-30 RX ADMIN — ATORVASTATIN CALCIUM 40 MG: 40 TABLET, FILM COATED ORAL at 08:12

## 2022-11-30 RX ADMIN — Medication 10 ML: at 21:13

## 2022-11-30 RX ADMIN — SPIRONOLACTONE 25 MG: 25 TABLET ORAL at 08:12

## 2022-11-30 RX ADMIN — Medication 10 ML: at 21:14

## 2022-11-30 RX ADMIN — VALSARTAN 160 MG: 160 TABLET, FILM COATED ORAL at 21:13

## 2022-11-30 RX ADMIN — APIXABAN 5 MG: 5 TABLET, FILM COATED ORAL at 21:13

## 2022-11-30 RX ADMIN — HYDROMORPHONE HYDROCHLORIDE 1 MG: 1 INJECTION, SOLUTION INTRAMUSCULAR; INTRAVENOUS; SUBCUTANEOUS at 21:13

## 2022-11-30 RX ADMIN — OXYCODONE 10 MG: 5 TABLET ORAL at 18:10

## 2022-11-30 RX ADMIN — DICLOFENAC SODIUM 4 G: 10 GEL TOPICAL at 08:13

## 2022-11-30 RX ADMIN — HYDROMORPHONE HYDROCHLORIDE 1 MG: 1 INJECTION, SOLUTION INTRAMUSCULAR; INTRAVENOUS; SUBCUTANEOUS at 12:31

## 2022-11-30 RX ADMIN — APIXABAN 5 MG: 5 TABLET, FILM COATED ORAL at 08:12

## 2022-11-30 RX ADMIN — VALSARTAN 160 MG: 160 TABLET, FILM COATED ORAL at 08:14

## 2022-11-30 RX ADMIN — GABAPENTIN 100 MG: 100 CAPSULE ORAL at 21:13

## 2022-11-30 RX ADMIN — PREDNISONE 40 MG: 20 TABLET ORAL at 08:11

## 2022-11-30 RX ADMIN — OXYCODONE 10 MG: 5 TABLET ORAL at 05:09

## 2022-11-30 RX ADMIN — GABAPENTIN 100 MG: 100 CAPSULE ORAL at 14:16

## 2022-11-30 RX ADMIN — GABAPENTIN 100 MG: 100 CAPSULE ORAL at 08:12

## 2022-11-30 RX ADMIN — METOPROLOL SUCCINATE 200 MG: 50 TABLET, EXTENDED RELEASE ORAL at 08:11

## 2022-11-30 RX ADMIN — HYDROMORPHONE HYDROCHLORIDE 1 MG: 1 INJECTION, SOLUTION INTRAMUSCULAR; INTRAVENOUS; SUBCUTANEOUS at 08:11

## 2022-11-30 ASSESSMENT — PAIN DESCRIPTION - LOCATION: LOCATION: CHEST

## 2022-11-30 ASSESSMENT — PAIN DESCRIPTION - DESCRIPTORS
DESCRIPTORS: ACHING
DESCRIPTORS: ACHING

## 2022-11-30 ASSESSMENT — PAIN DESCRIPTION - ORIENTATION
ORIENTATION: MID
ORIENTATION: MID

## 2022-11-30 ASSESSMENT — PAIN SCALES - GENERAL
PAINLEVEL_OUTOF10: 8
PAINLEVEL_OUTOF10: 0
PAINLEVEL_OUTOF10: 0
PAINLEVEL_OUTOF10: 8

## 2022-11-30 ASSESSMENT — PAIN - FUNCTIONAL ASSESSMENT: PAIN_FUNCTIONAL_ASSESSMENT: ACTIVITIES ARE NOT PREVENTED

## 2022-11-30 NOTE — PROGRESS NOTES
[facility] PROGRESS NOTE      SUBJECTIVE: Patient doing okay. Continues to have back pain. She's eager to go home. Does not want to go to a rehab facility. ROS:     Constitutional:  No weight loss, No fever, No chills, No night sweats. Eyes:  No impairment or change in vision  ENT / Mouth:  No pain, abnormal ulceration, bleeding, nasal drip or change in voice or hearing  Cardiovascular:  No chest pain, palpitations, new edema, or calf discomfort  Respiratory:  No pain, hemoptysis, change to breathing  Gastrointestinal:  No pain, cramping, jaundice, change to eating and bowel habits  Urinary:  No pain, bleeding or change in continence  Musculoskeletal:  No redness, pain, edema or weakness  Skin:  No pruritus, rash, change to nodules or lesions  Neurologic:  No discomfort, change in mental status, speech, sensory or motor activity  Psychiatric:  No change in concentration or change to affect or mood  Endocrine:  No hot flashes, increased thirst, or change to urine production  Hematologic: No petechiae, ecchymosis or bleeding  Lymphatic:  No lymphadenopathy or lymphedema  Allergy / Immunologic:  No eczema, hives, frequent or recurrent infections    OBJECTIVE      Physical      VITALS:  /86   Pulse 80   Temp 98.2 °F (36.8 °C) (Temporal)   Resp 16   Ht 5' 6\" (1.676 m)   Wt 273 lb 5.9 oz (124 kg)   SpO2 96%   BMI 44.12 kg/m²   TEMPERATURE:  Current - Temp: 98.2 °F (36.8 °C); Max - Temp  Av.5 °F (36.4 °C)  Min: 96.9 °F (36.1 °C)  Max: 98.2 °F (36.8 °C)  PULSE OXIMETRY RANGE: SpO2  Av %  Min: 95 %  Max: 97 %  24HR INTAKE/OUTPUT:    Intake/Output Summary (Last 24 hours) at 2022 1109  Last data filed at 2022 0931  Gross per 24 hour   Intake 250 ml   Output 1150 ml   Net -900 ml       General appearance:  Appears comfortable  Eyes: Sclera clear. Pupils equal.  ENT: Moist oral mucosa. Trachea midline, no adenopathy.   Cardiovascular: Regular rhythm, normal S1, S2. No murmur. No edema in lower extremities  Respiratory: Not using accessory muscles. Good inspiratory effort. Clear to auscultation bilaterally, no wheeze or crackles. GI: Abdomen soft, no tenderness, not distended  Musculoskeletal: No cyanosis in digits, neck supple  Neurology: CN 2-12 grossly intact. No speech or motor deficits  Psych: Normal affect. Alert and oriented in time, place and person  Skin: Warm, dry, normal turgor      Data  Recent Labs     11/29/22 0451 11/28/22  0553 11/27/22  0959   WBC 17.6* 19.7* 20.7*   NEUTROABS 13.4* 15.4* 15.9*   LYMPHOPCT 15.9 13.3 14.8   RBC 4.63 4.59 4.63   HGB 12.7 12.8 12.8   HCT 40.8 40.4 40.7   MCV 88.2 88.1 87.8   MCH 27.5 27.9 27.6   MCHC 31.2 31.7 31.4   RDW 16.7* 16.6* 16.6*    385 382        Recent Labs     11/28/22 0553 11/29/22 0451    139   K 4.5 4.2    103   CO2 27 28   BUN 14 17   CREATININE 0.6 0.7     No results for input(s): AST, ALT, ALB, BILIDIR, BILITOT, ALKPHOS in the last 72 hours. Magnesium:    Lab Results   Component Value Date/Time    MG 1.90 11/26/2022 06:04 AM    MG 2.00 05/31/2021 07:47 AM    MG 2.00 10/08/2020 09:35 AM       Imaging  XR LUMBAR SPINE FLEXION AND EXTENSION ONLY  Narrative: EXAMINATION:  3  XRAY VIEWS OF THE LUMBAR SPINE    11/29/2022 2:31 pm    COMPARISON:  CT lumbar spine 11/26/2022    HISTORY:  ORDERING SYSTEM PROVIDED HISTORY: R/O listhesis L4/5    FINDINGS:  5 typical lumbar vertebra present maintain normal height. Grade 1  degenerative anterolisthesis L3 on L4 (3 mm) and L4 on L5 (7 mm) does not  change on flexion/extension imaging. No inducible listhesis with  flexion/extension imaging. Mild to moderate severity degenerative disc  disease and spondylosis is noted throughout the lumbar spine. Mild, bilaterally symmetrical SI joint osteoarthritis is noted. Vascular  calcifications are noted reflecting calcific atherosclerosis. Impression: 1.  Grade 1 degenerative anterolisthesis L3 on L4 (3 mm) and L4 on L5 (7 mm)  does not change on flexion/extension imaging. 2. Degenerative changes lumbar spine. 3. Mild, bilaterally symmetrical SI joint osteoarthritis. Problem List  Patient Active Problem List   Diagnosis    Mixed hyperlipidemia    ICD (implantable cardioverter-defibrillator) in place    Paroxysmal atrial fibrillation (HCC)    History of ventricular tachycardia    Dilated cardiomyopathy (HCC)    Coronary artery disease due to lipid rich plaque    Class 3 severe obesity due to excess calories with serious comorbidity and body mass index (BMI) of 40.0 to 44.9 in adult (HCC)    TYLER (obstructive sleep apnea)    Essential hypertension    Gout    Back pain with radiculopathy    Lumbar radiculopathy    Lumbosacral radiculopathy    Paraparesis of both lower limbs (HCC)    NSVT (nonsustained ventricular tachycardia)    ICD (implantable cardioverter-defibrillator) discharge       ASSESSMENT AND PLAN:    75year old female with a history of a-fib, CAD, CHF, HLD, HTN, MI, osteoarthritis, obesity. She has:     1. Neutrophilic leukocytosis:  -Chronic since 2013. Today her WBC is 17.6, ANC is 13.4.  -Exact etiology unclear. -LDH is slightly elevated at 230.  -BCR/ABL and JAK2 are pending. 2. SVT/AICD Shock  -Has been evaluated by cardiology.   -No recurrence. 3. Back pain  -Has been evaluated by neurosurgery.  -Currently on gabapentin, oxycodone and prednisone.  -She is refusing SNF at discharge. Dispo: Okay for discharge from hematology standpoint. Follow up with Dr. Alisa Miller in 1-2 weeks.       Daiva Phalen, [facility]  [facility], [address] [place] [address].  [phone]

## 2022-11-30 NOTE — PROGRESS NOTES
Hospitalist Progress Note      PCP: Kami López MD    Date of Admission: 11/24/2022    Chief Complaint: ICD discharge    Hospital Course:  76 yo F with CAD, h/o VT s/p ICD, HTN, morbid obesity, pAfib, hyperlipidemia, gout, chronic LBP with radiculopathy who did not improve after ANAHY in past came to ER with ICD discharge. Admitted as inpatient for further management. Followed by Cardiology. EP recommends: \"ECG and interrogation of AICD revealed a flutter with rapid ventricular response resulting in multiple AICD shock. I discussed her treatment options which include redo ablation for a flutter/AT versus antiarrhythmic therapy with amiodarone. Risk-benefit alternative discussed. Also discussed side effects of amiodarone therapy. She would like to consider antiarrhythmic therapy for now. Start amiodarone 200 mg twice daily for 2 weeks followed by 200 mg daily  Device has been programmed   Continue Toprol-XL  Continue valsartan\"    Continued to complain of back pain with RLE radiculopathy. CT L spine:  No acute fracture or traumatic malalignment of the lumbar spine. Similar appearance of multilevel spondylotic change. If pain persists, MRI   may be of benefit. Evidence of recent contrast administration. Seen by NS. Her ICD is NOT MRI COMPATIBLE. Lumbar XR Flexion & Extension pending. Started on Prednisone, Gabapentin, muscle relaxers and Oxycodone PRN. PT/OT recommend ARU. PMR consulted. Subjective: Patient still with RLE radiculopathy from back pain. Wants to try to get stronger. Had BM she says. No CP, SOB, HA or abdominal pain.        Medications:  Reviewed    Infusion Medications    sodium chloride      sodium chloride       Scheduled Medications    gabapentin  100 mg Oral TID    diclofenac sodium  4 g Topical BID    spironolactone  25 mg Oral Daily    metoprolol succinate  200 mg Oral Daily    apixaban  5 mg Oral BID    sodium chloride flush  10 mL IntraVENous 2 times per day    aspirin  81 mg Oral Daily    atorvastatin  40 mg Oral Daily    valsartan  160 mg Oral BID    sodium chloride flush  5-40 mL IntraVENous 2 times per day     PRN Meds: HYDROmorphone, oxyCODONE, cyclobenzaprine, sodium chloride flush, sodium chloride, potassium chloride, magnesium sulfate, promethazine **OR** ondansetron, acetaminophen **OR** acetaminophen, sodium chloride flush, sodium chloride      Intake/Output Summary (Last 24 hours) at 11/30/2022 1353  Last data filed at 11/30/2022 1329  Gross per 24 hour   Intake 10 ml   Output 1250 ml   Net -1240 ml         Physical Exam Performed:    BP (!) 147/86   Pulse 80   Temp 98.5 °F (36.9 °C) (Temporal)   Resp 18   Ht 5' 6\" (1.676 m)   Wt 273 lb 5.9 oz (124 kg)   SpO2 95%   BMI 44.12 kg/m²     General appearance: No apparent distress, appears stated age and cooperative. HEENT: Pupils equal, round, and reactive to light. Conjunctivae/corneas clear. Neck: Supple, with full range of motion. No jugular venous distention. Trachea midline. Respiratory:  Normal respiratory effort. Clear to auscultation, bilaterally without Rales/Wheezes/Rhonchi. Cardiovascular: Regular rate and rhythm with normal S1/S2 without murmurs, rubs or gallops. L ICD  Abdomen: Soft, obese, non-tender, non-distended with normal bowel sounds. Musculoskeletal: No clubbing, cyanosis or edema bilaterally. Full range of motion without deformity. Skin: Skin color, texture, turgor normal.  No rashes or lesions. Neurologic:  Neurovascularly intact without any focal sensory/motor deficits.  Cranial nerves: II-XII intact, grossly non-focal.  Positive SLR on RLE  Psychiatric: Alert and oriented, thought content appropriate, normal insight  Capillary Refill: Brisk, 3 seconds, normal   Peripheral Pulses: +2 palpable, equal bilaterally       Labs:   Recent Labs     11/28/22  0553 11/29/22  0451   WBC 19.7* 17.6*   HGB 12.8 12.7   HCT 40.4 40.8    369       Recent Labs     11/28/22  0553 11/29/22  0451    139   K 4.5 4.2    103   CO2 27 28   BUN 14 17   CREATININE 0.6 0.7   CALCIUM 9.3 9.3       No results for input(s): AST, ALT, BILIDIR, BILITOT, ALKPHOS in the last 72 hours. No results for input(s): INR in the last 72 hours. No results for input(s): Randene Holes in the last 72 hours. Urinalysis:      Lab Results   Component Value Date/Time    NITRU Negative 03/13/2022 12:44 PM    WBCUA 3 03/13/2022 12:44 PM    BACTERIA 2+ 03/13/2022 12:44 PM    RBCUA 3 03/13/2022 12:44 PM    BLOODU Negative 03/13/2022 12:44 PM    SPECGRAV >=1.030 03/13/2022 12:44 PM    GLUCOSEU Negative 03/13/2022 12:44 PM       Radiology:  XR LUMBAR SPINE FLEXION AND EXTENSION ONLY   Final Result   1. Grade 1 degenerative anterolisthesis L3 on L4 (3 mm) and L4 on L5 (7 mm)   does not change on flexion/extension imaging. 2. Degenerative changes lumbar spine. 3. Mild, bilaterally symmetrical SI joint osteoarthritis. CT LUMBAR SPINE WO CONTRAST   Final Result   No acute fracture or traumatic malalignment of the lumbar spine. Similar appearance of multilevel spondylotic change. If pain persists, MRI   may be of benefit. Evidence of recent contrast administration. XR CHEST PORTABLE   Final Result   Unchanged appearance of the chest without acute airspace disease identified.              IP CONSULT TO CARDIOLOGY  IP CONSULT TO PAIN MANAGEMENT  IP CONSULT TO NEUROSURGERY  IP CONSULT TO PHYSICAL MEDICINE REHAB  IP CONSULT TO ONCOLOGY    Assessment/Plan:    Active Hospital Problems    Diagnosis     ICD (implantable cardioverter-defibrillator) discharge [Z45.02]      Priority: Medium    Back pain with radiculopathy [M54.10]     Essential hypertension [I10]     Coronary artery disease due to lipid rich plaque [I25.10, I25.83]     TYLER (obstructive sleep apnea) [G47.33]     Class 3 severe obesity due to excess calories with serious comorbidity and body mass index (BMI) of 40.0 to 44.9 in adult (HCC) [E66.01, Z68.41]     History of ventricular tachycardia [Z86.79]     Paroxysmal atrial fibrillation (HCC) [I48.0]     ICD (implantable cardioverter-defibrillator) in place [Z95.810]      Continue Prednisone for back pain  Continue Gabapentin 100 mg PO tid  Continue Flexeril PRN  Try to minimize Dilaudid IV PRN   Continue Oxycodone PRN  PMR consult for ARU eval appreciated, needs SNF not ARU  NS input appreciated  ICD IS NOT MRI COMPATIBLE, MRI NOT POSSIBLE  Continue ASA, Eliquis, Lipitor, Toprol XL, Aldactone and Diovan    DVT Prophylaxis: Eliquis  Diet: ADULT DIET; Regular; Low Fat/Low Chol/High Fiber/WILBER  Code Status: Full Code  PT/OT Eval Status: Following    [address] SNF    Discussed with patient, nursing and CM. Medically stable for DC to SNF since 11/29/22. Awaiting pre-cert from insurance.     Gricel Little MD

## 2022-11-30 NOTE — PROGRESS NOTES
[address] [facility],[address] Msb Department   Phone: [phone]    Occupational Therapy    [] Initial Evaluation            [x] Daily Treatment Note         [] Discharge Summary      Patient: Trang Oscar   : 1955   MRN: [id number]   Date of Service:  2022    Admitting Diagnosis:  ICD (implantable cardioverter-defibrillator) discharge    Current Admission Summary: Admitting Diagnosis: ICD (implantable cardioverter-defibrillator) discharge    Current Admission Summary: 75 y.o. female who presented to [facility] with past medical history of hypertension, hyperlipidemia, gout, HFrEF EF 40% presented to the ED with chief complaint of increased heart rate and chest pain. Patient reported that she has sick contacts from her daughter, patient reported that she has been having some cough been going on for 2 weeks. Patient reported that she was in bad went up to go to the bathroom use the bathroom and then while she was going to the kitchen she did not make it and started calling her daughter. The first ICD felt discharge started around 1 AM.  Patient reported noticed that her defibrillator for 3-4 times due to his heart rate being very fast.  Patient did have associated shortness of breath, chest pain otherwise no shortness of breath abdominal pain or dysuria. Did report intermittent cough and phlegm production. Patient called EMS. She was found to be in SVT by paramedics and then ventricular tachycardia upon arrival to hospital.  She underwent left heart cath by Dr. Felix Harrison 2022 without intervention. Past Medical History:  has a past medical history of AICD (automatic cardioverter/defibrillator) present, Arthritis, CHF (congestive heart failure) ([address] Gout, Hyperlipidemia, Hypertension, and MI (myocardial infarction) ([facility] [address]   Past Surgical History:  has a past surgical history that includes Dilation and curettage of uterus; Cardiac defibrillator placement; and Pain management procedure (Bilateral, 6/29/2022). Discharge Recommendations: Effie Gonzalez scored a 15/24 on the AM-PAC ADL Inpatient form. Current research shows that an AM-PAC score of 17 or less is typically not associated with a discharge to the patient's home setting. Based on the patient's AM-PAC score and their current ADL deficits, it is recommended that the patient have 5-7 sessions per week of Occupational Therapy at d/c to increase the patient's independence. At this time, this patient demonstrates complex nursing, medical, and rehabilitative needs, and would benefit from intensive rehabilitation services upon discharge from the Inpatient setting. This patient demonstrates the ability to participate in and benefit from an intensive therapy program with a coordinated interdisciplinary team approach to foster frequent, structured, and documented communication among disciplines, who will work together to establish, prioritize, and achieve treatment goals. Please see assessment section for further patient specific details. 11/29/22-- CHART REVIEWED-- ARU NOT ABLE TO ACCEPT PATIENT. PLAN NOW IS [facility]  If patient discharges prior to next session this note will serve as a discharge summary. Please see below for the latest assessment towards goals. 11/30/22-- PATIENT IS REFUSING SNF. PATIENT STATED HER SON WILL STAY WITH HER EVERY NIGHT AND HER GRANDDAUGHTER WILL ASSIST DURING THE DAY. DO NOT RECOMMEND. RECOMMEND [facility] SNF WHICH IS NEAR HER HOUSE. Lengthy discussion with patient but patient continues to refuse SNF. IF patient returns home with family recommend 24/7 care and assist and home OT.    HOME HEALTH CARE: LEVEL 3 SAFETY     - Initial home health evaluation to occur within 24-48 hours, in patient home   - Therapy evaluations in home within 24-48 hours of discharge; including DME and home safety   - Frontload therapy 5 days, then 3x a week   - Therapy to evaluate if patient has [address] needs for personal care   -  evaluation within 24-48 hours, includes evaluation of resources and insurance to determine [place], [place], LTC, and Medicaid options      DME Required For Discharge: rollator (4 wheel walker), wheelchair, tub transfer bench    Precautions/Restrictions: high fall risk, up as tolerated  Weight Bearing Restrictions: no restrictions  [] Right Upper Extremity  [] Left Upper Extremity [] Right Lower Extremity  [] Left Lower Extremity     Required Braces/Orthotics: no braces required   [] Right  [] Left  Positional Restrictions:no positional restrictions  Patient has pending ortho consult for sciatica. 76 y/o woman presents to hospital with chest pain and acute back and radicular pain. Neurointact per report. CT L-spine reveals multilevel degenerative spondylitic changes. Pain management per primary but could consider steroids, gabapentin, muscle relaxers and/or opiate based pain medications. Recommend MRI L-spine for further work up. Yannick Khan MD  Neurosurgery    Pre-Admission Information   Lives With: alone                     Type of Home: apartment  Home Layout: one level  Home Access:  4 step to enter with handrail. Handrails are located on R side. Bathroom Layout: tub/shower unit  Bathroom Equipment: shower chair  Toilet Height: standard height  Home Equipment: rollator - 4 wheeled walker, back scratcher used to help with underwear and socks  Transfer Assistance: modified independent with use of rollator. Ambulation Assistance:modified independent with use of rollator.   ADL Assistance: independent with all ADL's  IADL Assistance: independent with homemaking tasks  Active :        [] Yes                 [x] No - granddaughter, daughter, sisters help drive  Hand Dominance: [] Left                 [x] Right  Current Employment: retired  Hobbies: make own hair products  Recent Falls: Kt Kays when defibrillator went off this last time before admission. States she falls an estimated 3x/month. Told she needs a transport chair. Can't stand up for long periods of time d/t low back pain. Patient has not sought help at home as she feels there are people worse off than her who need the help more. Examination   Vision:   Vision Corrective Device: wears glasses for reading  Hearing:   [facility]  Perception:   WFL  Observation:   General Observation:  obese female, head forward, protracted shoulders  Posture:   Slow gait, flexed posture in stance due to pain   Sensation:   denies numbness and tingling in UEs, see PT notes for LE  Proprioception:    WFL  Tone:   Normotonic  Coordination Testing:   WFL    ROM:   (B) UE AROM WFL  Strength:   (B) UE strength grossly WFL    Therapist Clinical Decision Making (Complexity): medium complexity  Clinical Presentation: evolving      Subjective  General: Patient supine in bed. Patient urinating all over bed, bed linens completely soaked with urine and needing full bed change. Patient requesting shower-- nurse approved and took patient off tele monitor. Pain: 8/10. Location: right lower side above hip, radiates down right leg. Dx of sciatica  Pain Interventions: pain medication in place prior to arrival, heat applied, and repositioned         Activities of Daily Living  Basic Activities of Daily Living-- full bed sheets soaked with urine, strong urine smell. Nurse aware. Feeding: Independent  Grooming: setup assistance  Upper Extremity Bathing: minimal assistance  Lower Extremity Bathing: maximum assistance   Bathing Comments: patient requesting shower this date, nurse approved, shower on shower bench in shower stall  Upper Extremity Dressing: minimal assistance  Lower Extremity Dressing: maximum assistance  Dressing Comments: Patient uses 2 back scratchers for LB dressing  Toileting: moderate assistance.     Toileting Comments: ambulated into bathroom to use commode, very slow gait, CGA with 0HH General Comments: ambulated @ 10 feet with 4WW, very slow gait from bed> shower bench> commode > recliner. Instrumental Activities of Daily Living  No IADL completed on this date. Functional Mobility  Bed Mobility  Supine to Sit: minimal assistance         Transfers  Sit to stand transfer:contact guard assistance, minimal assistance  Stand to sit transfer: contact guard assistance, minimal assistance  Stand step transfer: contact guard assistance  Bed / Chair transfer: contact guard assistance. Toilet transfer: contact guard assistance  Toilet transfer comments: CGA/min assist to stand from recliner chair and from commode, heavy reliance on left grab bar  Shower transfer: minimal assistance  Shower transfer comments: heavy reliance on grab bar into and out of shower stall         Functional Mobility:  Functional Mobility Comment: very slow gait with 4WW, c/o pain from sciatica. Other Therapeutic Interventions    Functional Outcomes  -PAC Inpatient Daily Activity Raw Score: 15    Cognition  WFL  Orientation:    alert and oriented x 4  Command Following:   [facility]     Education  Barriers To Learning: physical  Patient Education: patient educated on goals, OT role and benefits, adaptive device training, transfer training, discharge recommendations  Learning Assessment:  patient verbalizes and demonstrates understanding    Assessment  Activity Tolerance: Limited ability to ambulate and perform ADLs this date due to pain. Patient lives alone and needs increased assistance due to pain and cardiac issues. Patient refusing SNF, stated family will be able to provide 24/7 care. Impairments Requiring Therapeutic Intervention: decreased functional mobility, decreased ADL status, decreased endurance, decreased IADL  Prognosis: good  Clinical Assessment: Patient pleasant and cooperative, lives alone.  Would have difficulty performing 4 steps up to apartment, needs increased assist for ADLs, Recommend ARU, if ARU not able to take then recommend [facility] (patient lives in [facility] and would consider [facility]).  Patient is not safe to return home alone at this time, if patient does go home recommend 24/7 care and supervision and home therapy      Safety Interventions: patient left in bed, bed alarm in place, call light within reach, and nurse notified    Plan  Frequency: 3-5 x/per week  Current Treatment Recommendations: strengthening, balance training, functional mobility training, transfer training, endurance training, ADL/self-care training, IADL training, and home management training    Goals  Patient Goals: Go home, decrease pain    Short Term Goals:  Time Frame: until discharge-- NO GOALS MET 11/30/22    Patient will complete lower body ADL at minimal assistance   Patient will complete toileting at set up assistance   Patient will complete functional transfers at supervision   Patient will increase [facility] ADL score = to or > than 20/24    Therapy Session Time     Individual Group Co-treatment   Time In   1411   Time Out   1451   Minutes   40        Timed Code Treatment Minutes:  40 Minutes  Total Treatment Minutes:  40       Electronically Signed By: LORRAINE Rhodes/L [address]

## 2022-11-30 NOTE — PROGRESS NOTES
Nutrition Note    RECOMMENDATIONS  PO Diet: Continue to current diet    NUTRITION ASSESSMENT   Pt triggered LOS assesment. Pt reports appettite is fair, ate a banana for breakfast due to food is not hot enough. Observed 100% meal intake at lunch. Wt is stable per Pt/documentation. Pt eagers to go home and questions about wt loss diet and states she only eats one big meal at home. Healthy eating info provided to Pt including eating three meals a day with snacks as needed, increase vegetable/fruit/whole grain intake, low fat options, and healthy snack options. Pt states understanding has no additional questions. Nutrition Related Findings: LBM 11/29 per Pt,  GI WDL, Edema: generalized 1+,   Wounds: None  Nutrition Education:  Education completed   Nutrition Goals: PO intake 75% or greater     MALNUTRITION ASSESSMENT      Malnutrition Status: No malnutrition    NUTRITION DIAGNOSIS   No nutrition diagnosis at this time     CURRENT NUTRITION THERAPIES  ADULT DIET; Regular; Low Fat/Low Chol/High Fiber/WILBER     PO Intake: %   PO Supplement Intake:None Ordered    ANTHROPOMETRICS  Current Height: 5' 6\" (167.6 cm)  Current Weight: 273 lb 5.9 oz (124 kg)    Ideal Body Weight (IBW): 130 lbs  (59 kg)      BMI: 44.1    The patient will be monitored per nutrition standards of care. Consult dietitian if additional nutrition interventions are needed prior to RD reassessment.        Margarito Garcia RD    Contact: [phone]

## 2022-11-30 NOTE — CARE COORDINATION
Per RN pt now agrees to wait for pre cert and go to snf. CM did get [facility] to accept pt. DEBORA talked to David Perdomo with [employer] [phone] and he will follow pt to snf and then for home. DEBORA placed [facility] information on nikhil.     Rayna Aj RN, BSN  [phone]

## 2022-11-30 NOTE — PROGRESS NOTES
Hospitalist Progress Note (Late entry for 11/29/22)      PCP: Alli Quezada MD    Date of Admission: 11/24/2022    Chief Complaint: ICD discharge    Hospital Course:  74 yo F with CAD, h/o VT s/p ICD, HTN, morbid obesity, pAfib, hyperlipidemia, gout, chronic LBP with radiculopathy who did not improve after ANAHY in past came to ER with ICD discharge. Admitted as inpatient for further management. Followed by Cardiology. EP recommends: \"ECG and interrogation of AICD revealed a flutter with rapid ventricular response resulting in multiple AICD shock. I discussed her treatment options which include redo ablation for a flutter/AT versus antiarrhythmic therapy with amiodarone. Risk-benefit alternative discussed. Also discussed side effects of amiodarone therapy. She would like to consider antiarrhythmic therapy for now. Start amiodarone 200 mg twice daily for 2 weeks followed by 200 mg daily  Device has been programmed   Continue Toprol-XL  Continue valsartan\"    Continued to complain of back pain with RLE radiculopathy. CT L spine:  No acute fracture or traumatic malalignment of the lumbar spine. Similar appearance of multilevel spondylotic change. If pain persists, MRI   may be of benefit. Evidence of recent contrast administration. Seen by NS. Her ICD is NOT MRI COMPATIBLE. Lumbar XR Flexion & Extension pending. Started on Prednisone, Gabapentin, muscle relaxers and Oxycodone PRN. PT/OT recommend ARU. PMR consulted. Subjective: Patient with same back pain. Willing to go to SNF. No CP, SOB, HA or abdominal pain.        Medications:  Reviewed    Infusion Medications    sodium chloride      sodium chloride       Scheduled Medications    gabapentin  100 mg Oral TID    diclofenac sodium  4 g Topical BID    spironolactone  25 mg Oral Daily    metoprolol succinate  200 mg Oral Daily    apixaban  5 mg Oral BID    sodium chloride flush  10 mL IntraVENous 2 times per day aspirin  81 mg Oral Daily    atorvastatin  40 mg Oral Daily    valsartan  160 mg Oral BID    sodium chloride flush  5-40 mL IntraVENous 2 times per day     PRN Meds: HYDROmorphone, oxyCODONE, cyclobenzaprine, sodium chloride flush, sodium chloride, potassium chloride, magnesium sulfate, promethazine **OR** ondansetron, acetaminophen **OR** acetaminophen, sodium chloride flush, sodium chloride      Intake/Output Summary (Last 24 hours) at 11/30/2022 1351  Last data filed at 11/30/2022 1329  Gross per 24 hour   Intake 10 ml   Output 1250 ml   Net -1240 ml         Physical Exam Performed:    BP (!) 147/86   Pulse 80   Temp 98.5 °F (36.9 °C) (Temporal)   Resp 18   Ht 5' 6\" (1.676 m)   Wt 273 lb 5.9 oz (124 kg)   SpO2 95%   BMI 44.12 kg/m²     General appearance: No apparent distress, appears stated age and cooperative. HEENT: Pupils equal, round, and reactive to light. Conjunctivae/corneas clear. Neck: Supple, with full range of motion. No jugular venous distention. Trachea midline. Respiratory:  Normal respiratory effort. Clear to auscultation, bilaterally without Rales/Wheezes/Rhonchi. Cardiovascular: Regular rate and rhythm with normal S1/S2 without murmurs, rubs or gallops. L ICD  Abdomen: Soft, obese, non-tender, non-distended with normal bowel sounds. Musculoskeletal: No clubbing, cyanosis or edema bilaterally. Full range of motion without deformity. Skin: Skin color, texture, turgor normal.  No rashes or lesions. Neurologic:  Neurovascularly intact without any focal sensory/motor deficits.  Cranial nerves: II-XII intact, grossly non-focal.  Positive SLR on RLE  Psychiatric: Alert and oriented, thought content appropriate, normal insight  Capillary Refill: Brisk, 3 seconds, normal   Peripheral Pulses: +2 palpable, equal bilaterally       Labs:   Recent Labs     11/28/22  0553 11/29/22  0451   WBC 19.7* 17.6*   HGB 12.8 12.7   HCT 40.4 40.8    369       Recent Labs     11/28/22  0553 11/29/22  0451    139   K 4.5 4.2    103   CO2 27 28   BUN 14 17   CREATININE 0.6 0.7   CALCIUM 9.3 9.3       No results for input(s): AST, ALT, BILIDIR, BILITOT, ALKPHOS in the last 72 hours. No results for input(s): INR in the last 72 hours. No results for input(s): [place] Laura in the last 72 hours. Urinalysis:      Lab Results   Component Value Date/Time    NITRU Negative 03/13/2022 12:44 PM    WBCUA 3 03/13/2022 12:44 PM    BACTERIA 2+ 03/13/2022 12:44 PM    RBCUA 3 03/13/2022 12:44 PM    BLOODU Negative 03/13/2022 12:44 PM    SPECGRAV >=1.030 03/13/2022 12:44 PM    GLUCOSEU Negative 03/13/2022 12:44 PM       Radiology:  XR LUMBAR SPINE FLEXION AND EXTENSION ONLY   Final Result   1. Grade 1 degenerative anterolisthesis L3 on L4 (3 mm) and L4 on L5 (7 mm)   does not change on flexion/extension imaging. 2. Degenerative changes lumbar spine. 3. Mild, bilaterally symmetrical SI joint osteoarthritis. CT LUMBAR SPINE WO CONTRAST   Final Result   No acute fracture or traumatic malalignment of the lumbar spine. Similar appearance of multilevel spondylotic change. If pain persists, MRI   may be of benefit. Evidence of recent contrast administration. XR CHEST PORTABLE   Final Result   Unchanged appearance of the chest without acute airspace disease identified.              IP CONSULT TO CARDIOLOGY  IP CONSULT TO PAIN MANAGEMENT  IP CONSULT TO NEUROSURGERY  IP CONSULT TO PHYSICAL MEDICINE REHAB  IP CONSULT TO ONCOLOGY    Assessment/Plan:    Active Hospital Problems    Diagnosis     ICD (implantable cardioverter-defibrillator) discharge [Z45.02]      Priority: Medium    Back pain with radiculopathy [M54.10]     Essential hypertension [I10]     Coronary artery disease due to lipid rich plaque [I25.10, I25.83]     TYLER (obstructive sleep apnea) [G47.33]     Class 3 severe obesity due to excess calories with serious comorbidity and body mass index (BMI) of 40.0 to 44.9 in adult ([facility] [address] [E66.01, Z68.41]     History of ventricular tachycardia [Z86.79]     Paroxysmal atrial fibrillation (HCC) [I48.0]     ICD (implantable cardioverter-defibrillator) in place [Z95.810]      Continue Prednisone for back pain  Get XR requested by NS  Continue Gabapentin 100 mg PO tid  Continue Flexeril PRN  Continue Dilaudid IV PRN  Continue Oxycodone PRN, stop Percocet  PMR consult for ARU eval appreciated, needs SNF not ARU  NS input appreciated  ICD IS NOT MRI COMPATIBLE, MRI NOT POSSIBLE  Continue ASA, Eliquis, Lipitor, Toprol XL, Aldactone and Diovan    DVT Prophylaxis: Eliquis  Diet: ADULT DIET; Regular; Low Fat/Low Chol/High Fiber/WILBER  Code Status: Full Code  PT/OT Eval Status: Following    [address] SNF    Discussed with patient, nursing and CM. Medically stable for DC to SNF. Awaiting pre-cert from insurance.     Saumya Aguilar MD

## 2022-11-30 NOTE — CARE COORDINATION
CM spoke to [facility] at [facility] and pre cert is still pending.     Dominick Beverly RN, BSN  [phone]

## 2022-11-30 NOTE — PROGRESS NOTES
Physical [facility]   Phone: [phone]    Physical Therapy    [] Initial Evaluation            [x] Daily Treatment Note         [] Discharge Summary      Patient: Santa Valadez   : 1955   MRN: [id number]   Date of Service:  2022  Admitting Diagnosis: ICD (implantable cardioverter-defibrillator) discharge  Current Admission Summary: 75 y.o. female who presented to [facility] with past medical history of hypertension, hyperlipidemia, gout, HFrEF EF 40% presented to the ED with chief complaint of increased heart rate and chest pain. Patient reported that she has sick contacts from her daughter, patient reported that she has been having some cough been going on for 2 weeks. Patient reported that she was in bad went up to go to the bathroom use the bathroom and then while she was going to the kitchen she did not make it and started calling her daughter. The first ICD felt discharge started around 1 AM.  Patient reported noticed that her defibrillator for 3-4 times due to his heart rate being very fast.  Patient did have associated shortness of breath, chest pain otherwise no shortness of breath abdominal pain or dysuria. Did report intermittent cough and phlegm production. Patient called EMS. She was found to be in SVT by paramedics and then ventricular tachycardia upon arrival to hospital.  She underwent left heart cath by Dr. Wojciech Trinidad 2022 without intervention. Past Medical History:  has a past medical history of AICD (automatic cardioverter/defibrillator) present, Arthritis, CHF (congestive heart failure) ([address] Gout, Hyperlipidemia, Hypertension, and MI (myocardial infarction) ([address] Past Surgical History:  has a past surgical history that includes Dilation and curettage of uterus; Cardiac defibrillator placement; and Pain management procedure (Bilateral, 2022).      Discharge Recommendations: Santa Valadez scored a 17/24 on the AM-PAC short mobility form. Current research shows that an AM-PAC score of 18 or greater is typically associated with a discharge to the patient's home setting. Based on the patient's AM-PAC score and their current functional mobility deficits, it is recommended that the patient have 2-3 sessions per week of Physical Therapy at d/c to increase the patient's independence. At this time, this patient demonstrates the endurance and safety to discharge home with home health PT and a follow up treatment frequency of 2-3x/wk. Please see assessment section for further patient specific details. If patient discharges prior to next session this note will serve as a discharge summary. Please see below for the latest assessment towards goals. 11/30/22-- PATIENT IS REFUSING SNF. PATIENT STATED HER SON WILL STAY WITH HER EVERY NIGHT AND HER GRANDDAUGHTER WILL ASSIST DURING THE DAY.      HOME HEALTH CARE: LEVEL 3 SAFETY  - Initial home health evaluation to occur within 24-48 hours, in patient home   - Therapy evaluations in home within 24-48 hours of discharge; including DME and home safety   - Frontload therapy 5 days, then 3x a week   - Therapy to evaluate if patient has [address] needs for personal care   -  evaluation within 24-48 hours, includes evaluation of resources and insurance to determine [place], [place], LTC, and Medicaid options        DME Required For Discharge: rollator (4 wheel walker), wheelchair, tub transfer bench   Precautions/Restrictions: high fall risk  Weight Bearing Restrictions: weight bearing as tolerated  [] Right Upper Extremity        [] Left Upper Extremity         [] Right Lower Extremity         [] Left Lower Extremity             Required Braces/Orthotics: no braces required                   [] Right            [] Left  Positional Restrictions:no positional restrictions     Pre-Admission Information   Lives With: alone                     Type of Home: apartment  Home Layout: one level  Home Access:  4 step to enter with handrail. Handrails are located on R side. Bathroom Layout: tub/shower unit  Bathroom Equipment: shower chair  Toilet Height: standard height  Home Equipment: rollator - 4 wheeled walker, back scratcher used to help with underwear and socks  Transfer Assistance: modified independent with use of rollator. Ambulation Assistance:modified independent with use of rollator. ADL Assistance: independent with all ADL's  IADL Assistance: independent with homemaking tasks  Active :        [] Yes                 [x] No - granddaughter, daughter, sisters help drive  Hand Dominance: [] Left                 [x] Right  Current Employment: retired  Hobbies: make own hair products  Recent Falls: Garret Petty when defibrillator went off this last time before admission. States she falls an estimated 3x/month. Told she needs a transport bench. Can't stand up for long periods of time d/t low back pain. Patient has not sought help at home as she feels there are people worse off than her who need the help more. Subjective  General: Patient supine in bed, wants to take a shower as she states he is wet with urine because Purewick isn't working. Bed noted to be hypersaturated with urine. Pain: 8/10. Location: low back  Pain Interventions: RN notified and repositioned      Functional Mobility  Bed Mobility  Supine to sit: MIN    Scooting: SBA   Comments:  Patient used bed rail for assistance, increased time to complete, hand hold to pull to sit. Transfers  Sit to stand transfer: CGA from higher heights, MIN assist from lower heights  Stand to sit transfer: MIN for lavon heights, CGA for lower heights  Comments: Rollator used for all transfers. Ambulation  Surface:level surface  Assistive Device: rollator (3TGG)  Assistance: stand by assistance  Distance: 10' to bathroom, 10' bathroom to chair  Gait Mechanics: antalgic, dec'd lalita  Comments:  Rollator used for ambulation. Unable to ambulate further d/t sciatic pain. Stair Mobility  Stair mobility not completed on this date. Comments:  Wheelchair Mobility:  No w/c mobility completed on this date. Comments:  Balance  Static Sitting Balance: fair (+): maintains balance at SBA/supervision without use of UE support  Dynamic Sitting Balance: fair (+): maintains balance at SBA/supervision without use of UE support  Static Standing Balance: fair: maintains balance at SBA without use of UE support  Dynamic Standing Balance: fair: maintains balance at SBA without use of UE support  Comments:      Other Therapeutic Interventions:  Patient education on therapy recommendations and rationale. Bed cleaned of urine and linens changed to prevent maceration. Functional Outcomes  AM-PAC Inpatient Mobility Raw Score : 15               Cognition  WFL  Orientation:    alert and oriented x 4  Command Following:   [facility]     Education  Barriers To Learning: none  Patient Education: patient educated on goals, PT role and benefits, plan of care, general safety, functional mobility training, proper use of assistive device/equipment, energy conservation, disease specific education, injury prevention, transfer training, discharge recommendations  Learning Assessment:  patient verbalizes and demonstrates understanding     Assessment  Activity Tolerance: Limited by pain, endurance, and strength. Impairments Requiring Therapeutic Intervention: decreased functional mobility, decreased ADL status, decreased ROM, decreased strength, decreased endurance, decreased balance, increased pain, decreased posture  Prognosis: good  Clinical Assessment: Patient continues to be limited by sciatic pain and requires assistance for transfers and bed mobility w/ SBA for ambulation assistance d/t concerns over safety given sciatic causing gait antalgia and history of falls. Initial recommendation was for SNF but patient adamantly refusing.   She reports she will have 24 hour assist from family at d/c and is agreeable to home health PT. Therefore, recommendations have changed to HHPT w/ 24 hour assist given improved AMPAC scores and patient's assurance of help at home. Safety Interventions: patient left in chair, bed alarm in place, call light within reach, gait belt, patient at risk for falls, and nurse notified     Plan  Frequency: 3-5 x/per week  Current Treatment Recommendations: strengthening, balance training, functional mobility training, transfer training, gait training, stair training, endurance training, patient/caregiver education, pain management, safety education, and equipment evaluation/education     Goals  Patient Goals: Return home, improve pain. Short Term Goals:  Time Frame: Discharge. Patient will complete bed mobility at minimal assistance. (Goal met 11/30/2022)  Patient will complete transfers at stand by assistance. (Not met)  Patient will ambulate 50 ft with use of LRAD at stand by assistance. (Not met)  Patient will ascend/descend 4 stairs with (R) ascending handrail at contact guard assistance. (Not met)  Updated goal:  Patient will complete bed mobility w/ SBA.     Therapy Session Time      Individual Group Co-treatment   Time In     1411   Time Out     1451   Minutes     40     Total Treatment Minutes:  40       Electronically Signed By:  Shana Pastor PT, DPT, [id number] [id number]

## 2022-11-30 NOTE — DISCHARGE INSTR - DIET
Good nutrition is important when healing from an illness, injury, or surgery. Follow any nutrition recommendations given to you during your hospital stay. If you were given an oral nutrition supplement while in the hospital, continue to take this supplement at home. You can take it with meals, in-between meals, and/or before bedtime. These supplements can be purchased at most local grocery stores, pharmacies, and chain super-stores. If you have any questions about your diet or nutrition, call the hospital and ask for the dietitian. General Healthy Eating  Eat at least 5 servings of fruits and vegetables every day. Dont focus only on green vegetables. There are special health benefits to eating blue-purple, yellow, orange, and red vegetables. Eat more legumes (like beans and lentils) and more whole grains. Try meatless alternatives. In place of meat, you can get your protein from eating eggs, fish, poultry, beans, peas, soy-based foods, and nuts/nut butters  Low-fat or fat-free dairy products are also good sources of protein. Keep your salt intake to a minimum (less than 2300 milligrams per day). Avoid adding salt, soy sauce or fish sauce to your food when cooking. Eat freshly prepared meals at home. Processed foods and restaurant foods contain more salt. Fresh fruits and vegetables are the best choices for snacks. When shopping, choose the products with lower sodium content. Limit your daily sugar intake. Sugar can be found in honey, syrups, jelly, fruit juice, and fruit juice concentrate. Limit sugar-sweetened beverages like soda pop and fruit juice, sugary snacks, and candy  Its best to avoid products with added sugar, but if you do eat them, read labels carefully so you know how much sugar is in each portion. It is better to eat unsaturated fats than saturated fats. Avoid trans fats as much as possible.   Unsaturated fat is found in fish, avocado, nuts, and oils like sunflower, canola, and olive oils. Saturated fat is found in fatty meat, butter, ice cream, palm and coconut oil, cream, cheese, and lard. Trans fats are found in many processed foods, margarines, fried foods, fast food items, convenience foods like frozen pizza and snack foods, and sweets including pies, cookies, and other pastries. Check nutrition labels. When cooking, use vegetable oil instead of animal oil. Boil, steam, or bake your food instead of frying. If you eat meat, remove the fatty part before cooking.

## 2022-11-30 NOTE — CARE COORDINATION
RN and Cm and therapy have spoken to pt about going to snf but pt stating wants to go home and son and granddaughter will be there and staying with her to help. At this time RN has MD talking to pt. Pt has [facility] which may be a barrier to hc as limited network. Chart review shows pt had [facility] last year. CM left vm for [facility] with [facility] [phone]. CM provided pt with [facility]. CM will monitor for pt plan.      Jose Luis Soliz RN, BSN  [phone]

## 2022-12-01 VITALS
DIASTOLIC BLOOD PRESSURE: 67 MMHG | RESPIRATION RATE: 18 BRPM | BODY MASS INDEX: 44.81 KG/M2 | WEIGHT: 278.8 LBS | TEMPERATURE: 97.4 F | HEIGHT: 66 IN | HEART RATE: 64 BPM | OXYGEN SATURATION: 96 % | SYSTOLIC BLOOD PRESSURE: 125 MMHG

## 2022-12-01 LAB — HEMATOLOGY PATH CONSULT: NORMAL

## 2022-12-01 PROCEDURE — 6370000000 HC RX 637 (ALT 250 FOR IP): Performed by: NURSE PRACTITIONER

## 2022-12-01 PROCEDURE — 6370000000 HC RX 637 (ALT 250 FOR IP): Performed by: INTERNAL MEDICINE

## 2022-12-01 PROCEDURE — 6360000002 HC RX W HCPCS: Performed by: INTERNAL MEDICINE

## 2022-12-01 PROCEDURE — 2580000003 HC RX 258: Performed by: INTERNAL MEDICINE

## 2022-12-01 RX ORDER — OXYCODONE HYDROCHLORIDE 10 MG/1
10 TABLET ORAL EVERY 8 HOURS PRN
Qty: 40 TABLET | Refills: 0 | Status: SHIPPED | OUTPATIENT
Start: 2022-12-01 | End: 2022-12-15

## 2022-12-01 RX ORDER — GABAPENTIN 100 MG/1
100 CAPSULE ORAL 3 TIMES DAILY
Qty: 90 CAPSULE | Refills: 0 | Status: SHIPPED | OUTPATIENT
Start: 2022-12-01 | End: 2022-12-31

## 2022-12-01 RX ORDER — SPIRONOLACTONE 25 MG/1
25 TABLET ORAL DAILY
Qty: 30 TABLET | Refills: 0 | Status: SHIPPED | OUTPATIENT
Start: 2022-12-01

## 2022-12-01 RX ORDER — METOPROLOL SUCCINATE 200 MG/1
200 TABLET, EXTENDED RELEASE ORAL DAILY
Qty: 30 TABLET | Refills: 0 | Status: SHIPPED | OUTPATIENT
Start: 2022-12-01

## 2022-12-01 RX ORDER — CYCLOBENZAPRINE HCL 10 MG
10 TABLET ORAL 3 TIMES DAILY PRN
Qty: 60 TABLET | Refills: 0 | Status: SHIPPED | OUTPATIENT
Start: 2022-12-01 | End: 2022-12-31

## 2022-12-01 RX ADMIN — OXYCODONE 10 MG: 5 TABLET ORAL at 07:47

## 2022-12-01 RX ADMIN — HYDROMORPHONE HYDROCHLORIDE 1 MG: 1 INJECTION, SOLUTION INTRAMUSCULAR; INTRAVENOUS; SUBCUTANEOUS at 03:06

## 2022-12-01 RX ADMIN — VALSARTAN 160 MG: 160 TABLET, FILM COATED ORAL at 07:47

## 2022-12-01 RX ADMIN — Medication 10 ML: at 08:02

## 2022-12-01 RX ADMIN — APIXABAN 5 MG: 5 TABLET, FILM COATED ORAL at 07:47

## 2022-12-01 RX ADMIN — OXYCODONE 10 MG: 5 TABLET ORAL at 00:07

## 2022-12-01 RX ADMIN — HYDROMORPHONE HYDROCHLORIDE 1 MG: 1 INJECTION, SOLUTION INTRAMUSCULAR; INTRAVENOUS; SUBCUTANEOUS at 10:26

## 2022-12-01 RX ADMIN — ATORVASTATIN CALCIUM 40 MG: 40 TABLET, FILM COATED ORAL at 07:47

## 2022-12-01 RX ADMIN — SPIRONOLACTONE 25 MG: 25 TABLET ORAL at 07:47

## 2022-12-01 RX ADMIN — GABAPENTIN 100 MG: 100 CAPSULE ORAL at 07:47

## 2022-12-01 RX ADMIN — METOPROLOL SUCCINATE 200 MG: 50 TABLET, EXTENDED RELEASE ORAL at 07:47

## 2022-12-01 RX ADMIN — ASPIRIN 81 MG: 81 TABLET, COATED ORAL at 07:47

## 2022-12-01 ASSESSMENT — PAIN SCALES - GENERAL
PAINLEVEL_OUTOF10: 8
PAINLEVEL_OUTOF10: 9

## 2022-12-01 ASSESSMENT — PAIN DESCRIPTION - ORIENTATION
ORIENTATION: RIGHT;LOWER
ORIENTATION: RIGHT;LOWER

## 2022-12-01 ASSESSMENT — PAIN DESCRIPTION - DESCRIPTORS
DESCRIPTORS: THROBBING
DESCRIPTORS: THROBBING

## 2022-12-01 ASSESSMENT — PAIN DESCRIPTION - LOCATION
LOCATION: BACK
LOCATION: BACK

## 2022-12-01 NOTE — PROGRESS NOTES
Physical Therapy Attempt Note  Attempted to see pt for PT tx however pt declining stating she is in too much pain. Pt had just tried to get back into bed from [facility] by herself stating she had called for help but no one came and she really had to go. Pt was very crooked in bed and down toward the end of bed. Got PCA to assist PT with positioning pt in bed to be comfortable. Educated pt on need to call for help so she did not wind up in this situation again. Will re-attempt as pt tolerates.   Josef Rodríguez PT [id number]

## 2022-12-01 NOTE — DISCHARGE SUMMARY
Hospital Medicine Discharge Summary    Patient: Jesus Stone     Gender: female  : 1955   Age: 75 y.o. MRN: [id number]    Admitting Physician: Sandrine Gayle DO  Discharge Physician: Ashleigh Sampson MD     Code Status: Full Code     Admit Date: 2022   Discharge Date: 2022      Disposition:  Home    Discharge Diagnoses: Active Hospital Problems    Diagnosis Date Noted    ICD (implantable cardioverter-defibrillator) discharge [Z45.02] 2022     Priority: Medium    Back pain with radiculopathy [M54.10] 2022    Essential hypertension [I10] 10/08/2020    Coronary artery disease due to lipid rich plaque [I25.10, I25.83]     TYLER (obstructive sleep apnea) [G47.33]     Class 3 severe obesity due to excess calories with serious comorbidity and body mass index (BMI) of 40.0 to 44.9 in adult ([facility] [address] [E66.01, Z68.41]     History of ventricular tachycardia [Z86.79] 2020    Paroxysmal atrial fibrillation ([facility] [address] [I48.0] 2016    ICD (implantable cardioverter-defibrillator) in place [Z95.810] 2015       Follow-up appointments:  one week    Outpatient to do list: F/U with PCP, Cardiology, NS and Oncology    Condition at Discharge:  Stable    Hospital Course:   74 yo F with CAD, h/o VT s/p ICD, HTN, morbid obesity, pAfib, hyperlipidemia, gout, chronic LBP with radiculopathy who did not improve after ANAHY in past came to ER with ICD discharge. Admitted as inpatient for further management. Followed by Cardiology. EP recommends: \"ECG and interrogation of AICD revealed a flutter with rapid ventricular response resulting in multiple AICD shock. I discussed her treatment options which include redo ablation for a flutter/AT versus antiarrhythmic therapy with amiodarone. Risk-benefit alternative discussed. Also discussed side effects of amiodarone therapy. She would like to consider antiarrhythmic therapy for now.   Start amiodarone 200 mg twice daily for 2 weeks followed by 200 mg daily  Device has been programmed   Continue Toprol-XL  Continue valsartan\"     Continued to complain of back pain with RLE radiculopathy. CT L spine:  No acute fracture or traumatic malalignment of the lumbar spine. Similar appearance of multilevel spondylotic change. If pain persists, MRI   may be of benefit. Evidence of recent contrast administration. Seen by NS. Her ICD is NOT MRI COMPATIBLE. Lumbar XR Flexion & Extension   1. Grade 1 degenerative anterolisthesis L3 on L4 (3 mm) and L4 on L5 (7 mm)   does not change on flexion/extension imaging. 2. Degenerative changes lumbar spine. 3. Mild, bilaterally symmetrical SI joint osteoarthritis. Started on Prednisone, Gabapentin, muscle relaxers and Oxycodone PRN. PT/OT recommend ARU. PMR consulted but do not feel ok for ARU. Offered SNF, her insurance was not in network. F/U with PCP, Cardiology and Oncology. Arranged for home PT/OT. Discharge Medications:   Discharge Medication List as of 12/1/2022 11:52 AM        START taking these medications    Details   gabapentin (NEURONTIN) 100 MG capsule Take 1 capsule by mouth 3 times daily for 3 days. , Disp-9 capsule, R-0Print      oxyCODONE (OXY-IR) 10 MG immediate release tablet Take 1 tablet by mouth every 6 hours as needed for Pain for up to 3 days. , Disp-12 tablet, R-0Print      spironolactone (ALDACTONE) 25 MG tablet Take 1 tablet by mouth daily, Disp-30 tablet, R-0NO PRINT           Discharge Medication List as of 12/1/2022 11:52 AM        CONTINUE these medications which have CHANGED    Details   cyclobenzaprine (FLEXERIL) 10 MG tablet Take 1 tablet by mouth 3 times daily as needed for Muscle spasms, Disp-9 tablet, R-0Print      metoprolol succinate (TOPROL XL) 200 MG extended release tablet Take 1 tablet by mouth daily, Disp-30 tablet, R-0NO PRINT      predniSONE (DELTASONE) 20 MG tablet Take 2 tablets by mouth daily for 1 dose, Disp-2 tablet, R-0NO PRINT Discharge Medication List as of 12/1/2022 11:52 AM        CONTINUE these medications which have NOT CHANGED    Details   diclofenac sodium (VOLTAREN) 1 % GEL Apply 4 g topically 4 times daily, Topical, 4 TIMES DAILY Starting Sun 11/20/2022, Disp-50 g, R-0, Normal      atorvastatin (LIPITOR) 40 MG tablet Take 1 tablet by mouth daily, Disp-90 tablet, R-1Normal      apixaban (ELIQUIS) 5 MG TABS tablet Take 1 tablet by mouth 2 times daily, Disp-180 tablet, R-1Normal      furosemide (LASIX) 40 MG tablet Take 1 tablet by mouth 2 times daily, Disp-180 tablet, R-1Normal      valsartan (DIOVAN) 160 MG tablet Take 1 tablet by mouth 2 times daily, Disp-180 tablet, R-1Normal      ondansetron (ZOFRAN-ODT) 4 MG disintegrating tablet Take 1 tablet by mouth every 8 hours as needed for Nausea or Vomiting, Disp-20 tablet, R-0Normal      aspirin 81 MG EC tablet Take 81 mg by mouth dailyHistorical Med           Discharge Medication List as of 12/1/2022 11:52 AM        STOP taking these medications       ibuprofen (ADVIL;MOTRIN) 200 MG tablet Comments:   Reason for Stopping:         isosorbide mononitrate (IMDUR) 30 MG extended release tablet Comments:   Reason for Stopping:         colchicine (COLCRYS) 0.6 MG tablet Comments:   Reason for Stopping:               Discharge Exam:    /67   Pulse 64   Temp 97.4 °F (36.3 °C) (Oral)   Resp 18   Ht 5' 6\" (1.676 m)   Wt 278 lb 12.8 oz (126.5 kg)   SpO2 96%   BMI 45.00 kg/m²   General appearance: No apparent distress, appears stated age and cooperative. HEENT: Pupils equal, round, and reactive to light. Conjunctivae/corneas clear. Neck: Supple, with full range of motion. No jugular venous distention. Trachea midline. Respiratory:  Normal respiratory effort. Clear to auscultation, bilaterally without Rales/Wheezes/Rhonchi. Cardiovascular: Regular rate and rhythm with normal S1/S2 without murmurs, rubs or gallops.   L ICD  Abdomen: Soft, obese, non-tender, non-distended with normal bowel sounds. Musculoskeletal: No clubbing, cyanosis or edema bilaterally. Full range of motion without deformity. Skin: Skin color, texture, turgor normal.  No rashes or lesions. Neurologic:  Neurovascularly intact without any focal sensory/motor deficits. Cranial nerves: II-XII intact, grossly non-focal.  Positive SLR on RLE  Psychiatric: Alert and oriented, thought content appropriate, normal insight  Capillary Refill: Brisk, 3 seconds, normal   Peripheral Pulses: +2 palpable, equal bilaterally        Labs: For convenience and continuity at follow-up the following most recent labs are provided:    Lab Results   Component Value Date/Time    WBC 17.6 11/29/2022 04:51 AM    HGB 12.7 11/29/2022 04:51 AM    HCT 40.8 11/29/2022 04:51 AM    MCV 88.2 11/29/2022 04:51 AM     11/29/2022 04:51 AM     11/29/2022 04:51 AM    K 4.2 11/29/2022 04:51 AM     11/29/2022 04:51 AM    CO2 28 11/29/2022 04:51 AM    BUN 17 11/29/2022 04:51 AM    CREATININE 0.7 11/29/2022 04:51 AM    CALCIUM 9.3 11/29/2022 04:51 AM    ALKPHOS 89 11/26/2022 06:04 AM    ALT 11 11/26/2022 06:04 AM    AST 20 11/26/2022 06:04 AM    BILITOT 0.6 11/26/2022 06:04 AM    BILIDIR <0.2 01/27/2022 03:54 PM    LABALBU 3.5 11/26/2022 06:04 AM    LDLCALC 130 05/31/2021 07:47 AM    TRIG 99 05/31/2021 07:47 AM     Lab Results   Component Value Date    INR 1.13 11/24/2022    INR 1.23 (H) 10/08/2020    INR 1.05 04/05/2019       Radiology:  XR FOOT RIGHT (MIN 3 VIEWS)    Result Date: 11/20/2022  EXAMINATION: 3 XRAY VIEWS OF THE RIGHT FOOT 11/20/2022 11:36 am COMPARISON: 03/13/2022 HISTORY: ORDERING SYSTEM PROVIDED HISTORY: INJURY TECHNOLOGIST PROVIDED HISTORY: Reason for exam:->INJURY Reason for Exam: injury FINDINGS: metatarsal alignment appears normal. No acute fracture or malalignment noted. Moderate spurring is seen at the 1st metatarsal phalangeal joint. Spurring is seen at the Achilles insertion.      No acute osseous abnormality     CT LUMBAR SPINE WO CONTRAST    Result Date: 11/26/2022  EXAMINATION: CT OF THE LUMBAR SPINE WITHOUT CONTRAST  11/26/2022 TECHNIQUE: CT of the lumbar spine was performed without the administration of intravenous contrast. Multiplanar reformatted images are provided for review. Adjustment of mA and/or kV according to patient size was utilized. Automated exposure control, iterative reconstruction, and/or weight based adjustment of the mA/kV was utilized to reduce the radiation dose to as low as reasonably achievable. COMPARISON: 03/13/2022 HISTORY: ORDERING SYSTEM PROVIDED HISTORY: severe low back pain TECHNOLOGIST PROVIDED HISTORY: Reason for exam:->severe low back pain FINDINGS: BONES/ALIGNMENT: There is normal alignment of the spine. The vertebral body heights are maintained. No osseous destructive lesion is seen. DEGENERATIVE CHANGES: Unchanged appearance of multilevel spondylosis. These findings are most prominent at L4-5 and L5-S1. SOFT TISSUES/RETROPERITONEUM: No paraspinal mass. Diverticulosis. High attenuation material in bladder, likely excreted contrast.     No acute fracture or traumatic malalignment of the lumbar spine. Similar appearance of multilevel spondylotic change. If pain persists, MRI may be of benefit. Evidence of recent contrast administration. XR CHEST PORTABLE    Result Date: 11/24/2022  EXAMINATION: ONE XRAY VIEW OF THE CHEST 11/24/2022 2:36 am COMPARISON: 03/14/2022 HISTORY: ORDERING SYSTEM PROVIDED HISTORY: deFibrillator fired with  tachycardia TECHNOLOGIST PROVIDED HISTORY: Reason for exam:->deFibrillator fired with  tachycardia Reason for Exam: deFibrillator fired with tachycardia Relevant Medical/Surgical History: previous MI,CHF,hld and htn FINDINGS: The cardiomediastinal silhouette is unchanged in appearance. Left subclavian ICD in place. There is no consolidation, pneumothorax, or evidence of edema. No effusion is appreciated.  The osseous structures are unchanged in appearance. Unchanged appearance of the chest without acute airspace disease identified. XR LUMBAR SPINE FLEXION AND EXTENSION ONLY    Result Date: 11/29/2022  EXAMINATION: 3  XRAY VIEWS OF THE LUMBAR SPINE 11/29/2022 2:31 pm COMPARISON: CT lumbar spine 11/26/2022 HISTORY: ORDERING SYSTEM PROVIDED HISTORY: R/O listhesis L4/5 FINDINGS: 5 typical lumbar vertebra present maintain normal height. Grade 1 degenerative anterolisthesis L3 on L4 (3 mm) and L4 on L5 (7 mm) does not change on flexion/extension imaging. No inducible listhesis with flexion/extension imaging. Mild to moderate severity degenerative disc disease and spondylosis is noted throughout the lumbar spine. Mild, bilaterally symmetrical SI joint osteoarthritis is noted. Vascular calcifications are noted reflecting calcific atherosclerosis. 1. Grade 1 degenerative anterolisthesis L3 on L4 (3 mm) and L4 on L5 (7 mm) does not change on flexion/extension imaging. 2. Degenerative changes lumbar spine. 3. Mild, bilaterally symmetrical SI joint osteoarthritis. The patient was seen and examined on day of discharge and this discharge summary is in conjunction with any daily progress note from day of discharge. Time Spent on discharge is 45 minutes  in the examination, evaluation, counseling and review of medications and discharge plan. Note that more than 30 minutes was spent in preparing discharge papers, discussing discharge with patient, medication review, etc.       Signed:    Nahomi Marvin MD   12/1/2022      Thank you Ayden Matthews MD for the opportunity to be involved in this patient's care.  If you have any questions or concerns please feel free to contact me at [address] [place] [address]

## 2022-12-01 NOTE — CARE COORDINATION
CM was notified by Arlyn Wyman that pt's [facility] insurance after waiting 2 days for pre cert, that they are not in network with her particular [facility]. CM notified MD and CM will talk to pt. MD reviewed PT notes from yesterday that recommend hc and if pt does decide to go home that is fine. CM talked with pt and she does want to go home. She states her son and granddaughter will be staying with her and she is going to call her sister to come and get her. Pt has [employer] but CM called Maycol Keating with [employer] [phone] and she will follow up with pt to provide any additional assistance. CM updated MD who will send pt's meds to outpt pharmacy and place hc orders. Therapy recommends rollator, wheelchair and tub transfer bench which pt does not have, RN verified for CM. CM messaged MD for orders and called Reilly Blair with [address] [place] [address] [phone] and he will watch for orders. DEBORA called Moon Rodriguez with [address] [phone] and he is aware pt discharging home and has [employer] access to obtain orders and d/c paperwork.     Shima Wilcox, RN, BSN  [phone]

## 2022-12-01 NOTE — ACP (ADVANCE CARE PLANNING)
Advanced Care Planning Note. Purpose of Encounter: Advanced care planning in light of CAD  Parties In Attendance: Patient  Decisional Capacity: Yes  Subjective: Patient with back pain  Objective: Cr 0.7  Goals of Care Determination: Patient wants full support (CPR, vent, surgery, HD, CONSIDER trach and PEG, no long term dependent care)  Plan:  Cardio, PMR and NS consults. PT/OT. SNF placement  Code Status: Full code   Time spent on Advanced care Plannin minutes  Advanced Care Planning Documents: Completed advanced directives on chart, sister is the POA.     Jose A Garcia MD  2022 9:40 PM

## 2022-12-01 NOTE — CARE COORDINATION
CM spoke to [facility] at [facility] and pre cert is still pending.     Bridgette Saul RN, BSN  [phone]

## 2022-12-01 NOTE — PROGRESS NOTES
CLINICAL PHARMACY NOTE: MEDS TO BEDS    Total # of Prescriptions Filled: 1   The following medications were delivered to the patient:  Oxycodone 10 mg    Additional Documentation:    Patient sister picked up from Outpatient Pharmacy=Signed  Coy Donaldson CPhT  We are not contracted with insurance, req transfer all other scripts to [facility]

## 2022-12-01 NOTE — CARE COORDINATION
Pt updated waiting on pre cert for snf still. She verbalized understanding.     Paul Kim RN, BSN  [phone]

## 2022-12-14 ASSESSMENT — ENCOUNTER SYMPTOMS
RESPIRATORY NEGATIVE: 1
GASTROINTESTINAL NEGATIVE: 1

## 2022-12-14 NOTE — PROGRESS NOTES
[facility]   Congestive Heart Failure    PrimaryCare Doctor:  Iram Nassar MD    Chief Complaint:  CHF    History of Present Illness:  Elio Caraballo is a 75 y.o. female with PMH CAD, MI, ICM, HFmrEF, ICD, AF, HLD, HTN who presents today for hospital f/u. She was admitted 11/24-12/1/22. Hospital Course: admitted for ICD shock    Since hospitalization: She c/o continued chest soreness and increased SOB due to pain but denies other cardiac sx. No wt's at home and pt refuses wts here. Optivol: under baseline    Baseline Weight:   Wt Readings from Last 3 Encounters:   12/01/22 278 lb 12.8 oz (126.5 kg)   11/27/22 260 lb 12.9 oz (118.3 kg)   11/20/22 263 lb (119.3 kg)      Admit BNP: 42    EF: 40%  Cardiac Imaging:   Echo 10/8/2020  Summary   Left ventricular systolic function is moderately reduced with estimated   ejection fraction of 40%. There is moderate concentric left ventricular   hypertrophy. There is no evidence of mass or thrombus in the left atrium or appendage. C 8/13/20:  LM-Normal   LAD-mid 50%  Cx-normal  OM- normal  RCA-dominant normal  RPDA- normal  LVEF- 35  LVG- Global  LVEDP- 25     Impression  ~Coronary Angiography w/ nonobstructive CAD  ~LVG with LVEF of 35 and global regional wall motion abnormalities  ~high LVEDP     Device: Medtronic ICD with Optivol      Activity: at baseline, very sedentary  Can you walk 1-2 blocks or do a moderate amount of house/yard work? No      NYHA Class: III       Sodium Restrictions: 3g  Fluid Restrictions: 48-64 oz/day  Sodium and fluid restriction compliance: fair    Pt Education: The patient has received education on the following topics: dietary sodium restriction, heart failure medications, the importance of physical activity, symptom management and weight monitoring       Past Medical History:   has a past medical history of AICD (automatic cardioverter/defibrillator) present, Arthritis, CHF (congestive heart failure) ([address] Gout, Hyperlipidemia, Hypertension, and MI (myocardial infarction) ([facility] [address] Surgical History:   has a past surgical history that includes Dilation and curettage of uterus; Cardiac defibrillator placement; and Pain management procedure (Bilateral, 6/29/2022). Social History:   reports that she has never smoked. She has never used smokeless tobacco. She reports that she does not drink alcohol and does not use drugs. Family History:   Family History   Problem Relation Age of Onset    Heart Disease Mother     High Blood Pressure Mother     High Cholesterol Mother     Diabetes Mother     Cancer Father        HomeMedications:  Prior to Admission medications    Medication Sig Start Date End Date Taking? Authorizing Provider   cyclobenzaprine (FLEXERIL) 10 MG tablet Take 1 tablet by mouth 3 times daily as needed for Muscle spasms 12/1/22 12/31/22  Simone Jerome MD   metoprolol succinate (TOPROL XL) 200 MG extended release tablet Take 1 tablet by mouth daily 12/1/22   Simone Jerome MD   gabapentin (NEURONTIN) 100 MG capsule Take 1 capsule by mouth 3 times daily for 30 days. 12/1/22 12/31/22  Simone Jerome MD   oxyCODONE HCl (OXY-IR) 10 MG immediate release tablet Take 1 tablet by mouth every 8 hours as needed for Pain for up to 14 days.  12/1/22 12/15/22  Simone Jerome MD   spironolactone (ALDACTONE) 25 MG tablet Take 1 tablet by mouth daily 12/1/22   Simone Jerome MD   diclofenac sodium (VOLTAREN) 1 % GEL Apply 4 g topically 4 times daily 11/20/22   Christa Marie PA-C   atorvastatin (LIPITOR) 40 MG tablet Take 1 tablet by mouth daily 6/16/22   YEIMY Breaux CNP   apixaban (ELIQUIS) 5 MG TABS tablet Take 1 tablet by mouth 2 times daily 6/16/22   YEIMY Breaux CNP   furosemide (LASIX) 40 MG tablet Take 1 tablet by mouth 2 times daily 6/16/22   YEIMY Breaux CNP   valsartan (DIOVAN) 160 MG tablet Take 1 tablet by mouth 2 times daily 6/16/22   YEIMY Breaux CNP   ondansetron (ZOFRAN-ODT) 4 MG disintegrating tablet Take 1 tablet by mouth every 8 hours as needed for Nausea or Vomiting 3/16/22   Santana David MD   aspirin 81 MG EC tablet Take 81 mg by mouth daily    Historical Provider, MD        Allergies:  Patient has no known allergies. ROS:   Review of Systems   Constitutional:  Positive for fatigue. Respiratory: Negative. Cardiovascular: Negative. Gastrointestinal: Negative. Genitourinary: Negative. Musculoskeletal:  Positive for arthralgias and back pain. Skin: Negative. Neurological: Negative. Hematological: Negative. Psychiatric/Behavioral: Negative. Physical Examination:    Vitals:    12/15/22 1326   BP: 118/80   Site: Right Upper Arm   Position: Sitting   Cuff Size: Large Adult   Pulse: 89   SpO2: 94%   Height: 5' 6\" (1.676 m)           Physical Exam  Vitals reviewed. Constitutional:       Appearance: Normal appearance. She is normal weight. HENT:      Head: Normocephalic and atraumatic. Eyes:      Extraocular Movements: Extraocular movements intact. Pupils: Pupils are equal, round, and reactive to light. Cardiovascular:      Rate and Rhythm: Normal rate and regular rhythm. Pulses: Normal pulses. Heart sounds: Normal heart sounds. Pulmonary:      Effort: Pulmonary effort is normal.      Breath sounds: Normal breath sounds. Abdominal:      Palpations: Abdomen is soft. Musculoskeletal:         General: Normal range of motion. Cervical back: Normal range of motion and neck supple. Skin:     General: Skin is warm and dry. Neurological:      General: No focal deficit present. Mental Status: She is alert and oriented to person, place, and time. Mental status is at baseline. Psychiatric:         Mood and Affect: Mood normal.         Behavior: Behavior normal.         Thought Content:  Thought content normal.         Judgment: Judgment normal.       Lab Data:    CBC:   Lab Results   Component Value Date/Time    WBC 17.6 11/29/2022 04:51 AM    WBC 19.7 11/28/2022 05:53 AM    WBC 20.7 11/27/2022 09:59 AM    RBC 4.63 11/29/2022 04:51 AM    RBC 4.59 11/28/2022 05:53 AM    RBC 4.63 11/27/2022 09:59 AM    HGB 12.7 11/29/2022 04:51 AM    HGB 12.8 11/28/2022 05:53 AM    HGB 12.8 11/27/2022 09:59 AM    HCT 40.8 11/29/2022 04:51 AM    HCT 40.4 11/28/2022 05:53 AM    HCT 40.7 11/27/2022 09:59 AM    MCV 88.2 11/29/2022 04:51 AM    MCV 88.1 11/28/2022 05:53 AM    MCV 87.8 11/27/2022 09:59 AM    RDW 16.7 11/29/2022 04:51 AM    RDW 16.6 11/28/2022 05:53 AM    RDW 16.6 11/27/2022 09:59 AM     11/29/2022 04:51 AM     11/28/2022 05:53 AM     11/27/2022 09:59 AM     BMP:  Lab Results   Component Value Date/Time     11/29/2022 04:51 AM     11/28/2022 05:53 AM     11/27/2022 09:59 AM    K 4.2 11/29/2022 04:51 AM    K 4.5 11/28/2022 05:53 AM    K 3.9 11/27/2022 09:59 AM     11/29/2022 04:51 AM     11/28/2022 05:53 AM     11/27/2022 09:59 AM    CO2 28 11/29/2022 04:51 AM    CO2 27 11/28/2022 05:53 AM    CO2 26 11/27/2022 09:59 AM    BUN 17 11/29/2022 04:51 AM    BUN 14 11/28/2022 05:53 AM    BUN 14 11/27/2022 09:59 AM    CREATININE 0.7 11/29/2022 04:51 AM    CREATININE 0.6 11/28/2022 05:53 AM    CREATININE 0.7 11/27/2022 09:59 AM     BNP:   Lab Results   Component Value Date/Time    PROBNP 42 11/24/2022 02:30 AM    PROBNP 63 01/27/2022 03:54 PM    PROBNP 55 08/30/2021 10:21 AM     Iron Studies:  No components found for: FE,  TIBC,  FERRITIN      Assessment/Plan:    1. Systolic heart failure, chronic (HCC) - compensated, no change in diuretics   2. Cardiomyopathy - continue ARB, BB           5. Paroxysmal atrial fibrillation (HCC) - cont AC, BB   6.  Essential hypertension - controlled         Instructions:   Medications: no change in meds  Labs: in [place] with office visit  Lifestyle Recommendations: Weigh yourself every day in the morning after urination, call [facility] if wt increases 2-3lb in one day or 5lb in one week, Limit sodium to 2000mg/day and fluids to 2L or 64oz/day.    Follow up: f/u in [address] with Katrina Aschoff in [address]: [phone]      I appreciate the opportunity of cooperating in the care of this individual.    YEIMY Bean - CNP, CNP, 12/14/2022,12:40 PM

## 2022-12-15 ENCOUNTER — OFFICE VISIT (OUTPATIENT)
Dept: CARDIOLOGY CLINIC | Age: 67
End: 2022-12-15

## 2022-12-15 VITALS
HEIGHT: 66 IN | OXYGEN SATURATION: 94 % | BODY MASS INDEX: 45 KG/M2 | HEART RATE: 89 BPM | DIASTOLIC BLOOD PRESSURE: 80 MMHG | SYSTOLIC BLOOD PRESSURE: 118 MMHG

## 2022-12-15 DIAGNOSIS — I10 ESSENTIAL HYPERTENSION: ICD-10-CM

## 2022-12-15 DIAGNOSIS — Z09 HOSPITAL DISCHARGE FOLLOW-UP: ICD-10-CM

## 2022-12-15 DIAGNOSIS — I48.0 PAROXYSMAL ATRIAL FIBRILLATION (HCC): ICD-10-CM

## 2022-12-15 DIAGNOSIS — I42.0 DILATED CARDIOMYOPATHY (HCC): Primary | ICD-10-CM

## 2022-12-15 ASSESSMENT — ENCOUNTER SYMPTOMS: BACK PAIN: 1

## 2022-12-15 NOTE — PATIENT INSTRUCTIONS
Instructions:   Medications: no change in meds  Labs: in [place] with office visit  Lifestyle Recommendations: Weigh yourself every day in the morning after urination, call [place] if wt increases 2-3lb in one day or 5lb in one week, Limit sodium to 2000mg/day and fluids to 2L or 64oz/day.    Follow up: f/u in [place] with Ole Haque in [address]: [phone]

## 2023-02-27 RX ORDER — ATORVASTATIN CALCIUM 40 MG/1
TABLET, FILM COATED ORAL
Qty: 90 TABLET | Refills: 1 | OUTPATIENT
Start: 2023-02-27

## 2023-02-27 RX ORDER — VALSARTAN 160 MG/1
TABLET ORAL
Qty: 180 TABLET | Refills: 0 | Status: SHIPPED | OUTPATIENT
Start: 2023-02-27

## 2023-04-04 NOTE — TELEPHONE ENCOUNTER
Patient will be in to get refills from [facility] on 04/14/2023

## 2023-05-10 RX ORDER — METOPROLOL SUCCINATE 100 MG/1
TABLET, EXTENDED RELEASE ORAL
Qty: 90 TABLET | Refills: 1 | OUTPATIENT
Start: 2023-05-10

## 2023-05-15 NOTE — TELEPHONE ENCOUNTER
Medication Refill    Medication needing refilled:  atorvastatin (LIPITOR) 40 MG tablet     Dosage of the medication:    How are you taking this medication (QD, BID, TID, QID, PRN): 1 a day    30 or 90 day supply called in: 90    When will you run out of your medication:    Which Pharmacy are we sending the medication to?:  Anton Comer [id number] [address] [place], [address] [facility],[address] [address]   Phone:  [phone]  Fax:  [phone]

## 2023-05-17 RX ORDER — ATORVASTATIN CALCIUM 40 MG/1
40 TABLET, FILM COATED ORAL DAILY
Qty: 30 TABLET | Refills: 0 | Status: SHIPPED | OUTPATIENT
Start: 2023-05-17

## 2023-05-17 RX ORDER — METOPROLOL SUCCINATE 200 MG/1
200 TABLET, EXTENDED RELEASE ORAL DAILY
Qty: 30 TABLET | Refills: 0 | Status: SHIPPED | OUTPATIENT
Start: 2023-05-17

## 2023-05-17 NOTE — TELEPHONE ENCOUNTER
Pt states she is out of both metoprolol and atorvastatin. States she is having trouble with transportation and has not been able to keep appts. Please call to advise.

## 2023-06-19 RX ORDER — ATORVASTATIN CALCIUM 40 MG/1
80 TABLET, FILM COATED ORAL DAILY
Qty: 90 TABLET | Refills: 3 | Status: SHIPPED | OUTPATIENT
Start: 2023-06-19

## 2023-06-20 ENCOUNTER — TELEPHONE (OUTPATIENT)
Dept: CARDIOLOGY CLINIC | Age: 68
End: 2023-06-20

## 2023-06-20 NOTE — TELEPHONE ENCOUNTER
Spoke with patient about lab results. She is agreeable to take lipitor 80 mg daily. Updated her on new prescription was sent to  [facility].

## 2023-06-20 NOTE — TELEPHONE ENCOUNTER
----- Message from YEIMY Talamantes CNP sent at 6/19/2023 12:20 PM EDT -----  Labs ok except LDL is very high, I reordered lipitor at a higher dose and we'll recheck lipids at her next OV.  Leandro Matson

## 2023-08-22 ENCOUNTER — TELEPHONE (OUTPATIENT)
Dept: CARDIOLOGY CLINIC | Age: 68
End: 2023-08-22

## 2023-08-22 NOTE — TELEPHONE ENCOUNTER
Please schedule her this Friday at 9:15 with Jo Chicas (per her OK). Patient already knows so no need to call her. Thank you.

## 2023-08-22 NOTE — TELEPHONE ENCOUNTER
Kaela Britt asks to come in on Friday so she can be sure to arrange a ride. She stated understanding to call 911 if any of the symptoms reoccur.

## 2023-08-22 NOTE — TELEPHONE ENCOUNTER
Pt called. On Saturday she had an episode of pressure on her chest. She had pain in her neck and jaw.

## 2023-08-22 NOTE — TELEPHONE ENCOUNTER
Last OV with you 6/16/23. I spoke to Carlycyndie. She states she developed left sided chest pressure like something \"heavy on my chest\" while sitting. She was not able to stand up. Her face and neck started throbbing, and her left arm became limp. She had trouble catching her breath. She took 6 baby asa at that time. She was waiting on a ride from a family member to take her to the ER when the symptoms resolved after about 15 minutes so she did not go. She had similar episode one month ago that lasted 5 minutes. Since Saturday, she's had no more symptoms but she feels very tired. She has nasal congestion. She initially called our office to make an john't with [facility] because she's overdue. Please advise. Thank you.

## 2023-08-23 ASSESSMENT — ENCOUNTER SYMPTOMS
GASTROINTESTINAL NEGATIVE: 1
BACK PAIN: 1
RESPIRATORY NEGATIVE: 1

## 2023-08-23 NOTE — PROGRESS NOTES
baseline. Psychiatric:         Mood and Affect: Mood normal.         Behavior: Behavior normal.         Thought Content: Thought content normal.         Judgment: Judgment normal.       Lab Data:    CBC:   Lab Results   Component Value Date/Time    WBC 15.0 06/16/2023 11:00 AM    WBC 17.6 11/29/2022 04:51 AM    WBC 19.7 11/28/2022 05:53 AM    RBC 4.40 06/16/2023 11:00 AM    RBC 4.63 11/29/2022 04:51 AM    RBC 4.59 11/28/2022 05:53 AM    HGB 11.8 06/16/2023 11:00 AM    HGB 12.7 11/29/2022 04:51 AM    HGB 12.8 11/28/2022 05:53 AM    HCT 36.5 06/16/2023 11:00 AM    HCT 40.8 11/29/2022 04:51 AM    HCT 40.4 11/28/2022 05:53 AM    MCV 83.1 06/16/2023 11:00 AM    MCV 88.2 11/29/2022 04:51 AM    MCV 88.1 11/28/2022 05:53 AM    RDW 15.4 06/16/2023 11:00 AM    RDW 16.7 11/29/2022 04:51 AM    RDW 16.6 11/28/2022 05:53 AM     06/16/2023 11:00 AM     11/29/2022 04:51 AM     11/28/2022 05:53 AM     BMP:  Lab Results   Component Value Date/Time     06/16/2023 11:00 AM     11/29/2022 04:51 AM     11/28/2022 05:53 AM    K 3.8 06/16/2023 11:00 AM    K 4.2 11/29/2022 04:51 AM    K 4.5 11/28/2022 05:53 AM    K 3.9 11/27/2022 09:59 AM     06/16/2023 11:00 AM     11/29/2022 04:51 AM     11/28/2022 05:53 AM    CO2 26 06/16/2023 11:00 AM    CO2 28 11/29/2022 04:51 AM    CO2 27 11/28/2022 05:53 AM    BUN 8 06/16/2023 11:00 AM    BUN 17 11/29/2022 04:51 AM    BUN 14 11/28/2022 05:53 AM    CREATININE 0.6 06/16/2023 11:00 AM    CREATININE 0.7 11/29/2022 04:51 AM    CREATININE 0.6 11/28/2022 05:53 AM     BNP:   Lab Results   Component Value Date/Time    PROBNP <36 06/16/2023 11:00 AM    PROBNP 42 11/24/2022 02:30 AM    PROBNP 63 01/27/2022 03:54 PM     Iron Studies:  No components found for: FE,  TIBC,  FERRITIN      Assessment/Plan:    1. Systolic heart failure, chronic ([address]) - appears compensated   2. Cardiomyopathy - continue ARB, BB and dejon, echo due   3.   NSVT - EP f/u overdue

## 2023-08-25 ENCOUNTER — OFFICE VISIT (OUTPATIENT)
Dept: CARDIOLOGY CLINIC | Age: 68
End: 2023-08-25
Payer: MEDICARE

## 2023-08-25 VITALS
HEART RATE: 83 BPM | HEIGHT: 66 IN | SYSTOLIC BLOOD PRESSURE: 102 MMHG | BODY MASS INDEX: 45 KG/M2 | DIASTOLIC BLOOD PRESSURE: 76 MMHG | OXYGEN SATURATION: 97 %

## 2023-08-25 DIAGNOSIS — I50.22 CHRONIC SYSTOLIC CONGESTIVE HEART FAILURE (HCC): ICD-10-CM

## 2023-08-25 DIAGNOSIS — I42.0 DILATED CARDIOMYOPATHY (HCC): Primary | ICD-10-CM

## 2023-08-25 DIAGNOSIS — I48.0 PAROXYSMAL ATRIAL FIBRILLATION (HCC): ICD-10-CM

## 2023-08-25 DIAGNOSIS — I10 ESSENTIAL HYPERTENSION: ICD-10-CM

## 2023-08-25 PROBLEM — Z45.02 ICD (IMPLANTABLE CARDIOVERTER-DEFIBRILLATOR) DISCHARGE: Status: RESOLVED | Noted: 2022-11-24 | Resolved: 2023-08-25

## 2023-08-25 PROBLEM — M54.16 LUMBAR RADICULOPATHY: Status: RESOLVED | Noted: 2022-03-15 | Resolved: 2023-08-25

## 2023-08-25 PROBLEM — I47.29 NSVT (NONSUSTAINED VENTRICULAR TACHYCARDIA) (HCC): Status: RESOLVED | Noted: 2022-09-01 | Resolved: 2023-08-25

## 2023-08-25 PROCEDURE — 3074F SYST BP LT 130 MM HG: CPT | Performed by: NURSE PRACTITIONER

## 2023-08-25 PROCEDURE — 99214 OFFICE O/P EST MOD 30 MIN: CPT | Performed by: NURSE PRACTITIONER

## 2023-08-25 PROCEDURE — 3078F DIAST BP <80 MM HG: CPT | Performed by: NURSE PRACTITIONER

## 2023-08-25 PROCEDURE — 1123F ACP DISCUSS/DSCN MKR DOCD: CPT | Performed by: NURSE PRACTITIONER

## 2023-08-25 NOTE — PATIENT INSTRUCTIONS
Instructions:   Medications: no change in meds, if you have chest pain and SOB again you can take an extra lasix  Labs: with next office visit  Lifestyle Recommendations: Weigh yourself every day in the morning after urination, call Jose Rosario if wt increases 2-3lb in one day or 5lb in one week, Limit sodium to 2000mg/day and fluids to 2L or 64oz/day.    Follow up:sav in 6months with Echo, schedule EP f/u        [address]: [phone]

## 2023-08-25 NOTE — PROGRESS NOTES
[facility]   Electrophysiology  YEIMY Noguera-CNP  Attending EP: Dr. Isatu Hirsch    Date: 9/8/2023  I had the privilege of visiting Romana Guy in the office. Chief Complaint:   Chief Complaint   Patient presents with    Follow-up     No Cardiac Symptoms      History of Present Illness: History obtained from patient and medical record. Romana Guy is 70 y.o. female with a past medical history of HTN, HLD, CHF, obesity, and AICD (2007 at [facility]). In August of 2020, pt presented from outside hospital with chest pain. She recently lost her brother and has not been feeling well. Pt admitted to non-compliance with medications secondary to COVID. She had an AICD shock. Her troponin was noted to be 3.5 with a potassium 2.5 in the ER at [facility]. She underwent cardiac cath, which showed non obstructive coronaries and EF of 35%. S/p RFCA with PVI (10/8/20, Dr. Isatu Hirsch). In November of [address], Pt presented to hospital after AICD shock. She had episodes of sustained AF >200 BPM    -Interval history: Today, Romana Guy is being seen for routine follow up. Her sister is present for the visit. She is doing pretty well. Her device is functioning normally. She has some episodes of SVT and NSVT. She does not feel symptoms with her arrhythmias. She does feel SOB when she is holding fluid. She will soon need her generator changed and is scheduled for October 25th. She has concerns about how she is treated by [facility] and is considering switching providers. Denies having chest pain, palpitations, shortness of breath, orthopnea/PND, cough, or dizziness at the time of this visit. With regard to medication therapy the patient has been compliant with prescribed regimen. They have tolerated therapy to date. Allergies:  No Known Allergies    Home Medications:  Prior to Visit Medications    Medication Sig Taking?  Authorizing Provider   atorvastatin (LIPITOR) 40 MG tablet Take 2 tablets by mouth daily Yes YEIMY Tapia

## 2023-08-28 ENCOUNTER — TELEPHONE (OUTPATIENT)
Dept: CARDIOLOGY CLINIC | Age: 68
End: 2023-08-28

## 2023-08-28 NOTE — TELEPHONE ENCOUNTER
FYI- Patient has approx 1 month remaining on device battery until it triggers J LUIS/ RRT. Patient has an upcoming appt in clinic with SR NP on 9/8/23.

## 2023-08-29 ENCOUNTER — TELEPHONE (OUTPATIENT)
Dept: CARDIOLOGY CLINIC | Age: 68
End: 2023-08-29

## 2023-08-29 NOTE — TELEPHONE ENCOUNTER
Spoke with the patient and got her scheduled for procedure. We went over instructions below and she verbalized understanding. She had some complaints about prior procedure to replace battery. I have forwarded those to the cath lab staff. Procedure - Gen Change ICD   Date: 10/25/2023  Arrival time: 12:00 pm   Procedure time: 1:00 pm     The day of your procedure you will need to check in at the registration desk, which is in the main lobby at [address] will need to fast for at least 8 hours prior to your procedure. You will need  to hold lasix for the morning of the procedure. You may take all other medications with a sip of water the morning of your procedure. The night before your procedure you will need to scrub with Hibiclens wash. Please have a responsible adult to drive you home upon discharge. The discharging unit will be giving you discharge instructions. If you have any questions regarding your procedure itself or your medications, please call [phone] and ask to talk to an EP nurse. You will be seen in the office in 1 week for a wound check and then 3 months following implantation.       Kristina updated / Added in [address] [facility] 15 / emailed cath lab

## 2023-09-08 ENCOUNTER — NURSE ONLY (OUTPATIENT)
Dept: CARDIOLOGY CLINIC | Age: 68
End: 2023-09-08

## 2023-09-08 ENCOUNTER — OFFICE VISIT (OUTPATIENT)
Dept: CARDIOLOGY CLINIC | Age: 68
End: 2023-09-08

## 2023-09-08 VITALS
OXYGEN SATURATION: 99 % | HEART RATE: 84 BPM | BODY MASS INDEX: 42.27 KG/M2 | DIASTOLIC BLOOD PRESSURE: 58 MMHG | SYSTOLIC BLOOD PRESSURE: 110 MMHG | WEIGHT: 263 LBS | HEIGHT: 66 IN

## 2023-09-08 DIAGNOSIS — I48.0 PAROXYSMAL ATRIAL FIBRILLATION (HCC): ICD-10-CM

## 2023-09-08 DIAGNOSIS — I42.0 DILATED CARDIOMYOPATHY (HCC): ICD-10-CM

## 2023-09-08 DIAGNOSIS — E66.01 CLASS 3 SEVERE OBESITY DUE TO EXCESS CALORIES WITH SERIOUS COMORBIDITY AND BODY MASS INDEX (BMI) OF 40.0 TO 44.9 IN ADULT (HCC): ICD-10-CM

## 2023-09-08 DIAGNOSIS — I25.10 CORONARY ARTERY DISEASE DUE TO LIPID RICH PLAQUE: ICD-10-CM

## 2023-09-08 DIAGNOSIS — Z95.810 ICD (IMPLANTABLE CARDIOVERTER-DEFIBRILLATOR) IN PLACE: Primary | ICD-10-CM

## 2023-09-08 DIAGNOSIS — G47.33 OSA (OBSTRUCTIVE SLEEP APNEA): ICD-10-CM

## 2023-09-08 DIAGNOSIS — I25.83 CORONARY ARTERY DISEASE DUE TO LIPID RICH PLAQUE: ICD-10-CM

## 2023-09-08 DIAGNOSIS — I47.20 VT (VENTRICULAR TACHYCARDIA) (HCC): ICD-10-CM

## 2023-09-08 DIAGNOSIS — I50.22 CHRONIC SYSTOLIC CONGESTIVE HEART FAILURE (HCC): ICD-10-CM

## 2023-09-08 DIAGNOSIS — I10 ESSENTIAL HYPERTENSION: ICD-10-CM

## 2023-09-08 DIAGNOSIS — I50.22 CHRONIC SYSTOLIC HEART FAILURE (HCC): ICD-10-CM

## 2023-09-08 DIAGNOSIS — I50.22 CHRONIC SYSTOLIC HF (HEART FAILURE) (HCC): ICD-10-CM

## 2023-09-08 DIAGNOSIS — Z86.79 HISTORY OF VENTRICULAR TACHYCARDIA: ICD-10-CM

## 2023-09-14 ENCOUNTER — TELEPHONE (OUTPATIENT)
Dept: CARDIOLOGY CLINIC | Age: 68
End: 2023-09-14

## 2023-09-14 NOTE — TELEPHONE ENCOUNTER
Spoke to the patient and we are moving her procedure up slightly due to the changes within [employer] and [address]. She has all prep instructions and the number to call [facility]. She verbalized understanding. I will be in touch if this doesn't get approved for some reason as well.      Procedure - Gen Change ICD   Date: 9/25/2023 - Ok'd by Violetta Hu time: 1:30 pm   Procedure time: 2:30 pm       Qgenda updated  / Updated Epic / emailed cath lab

## 2023-09-18 ENCOUNTER — TELEPHONE (OUTPATIENT)
Dept: CARDIOLOGY CLINIC | Age: 68
End: 2023-09-18

## 2023-09-18 NOTE — TELEPHONE ENCOUNTER
Spoke with the patient and advised we are still pending her PA for 9/25 procedure. I will be in touch with her this week to advise. Otherwise, it will need to be canceled until ins negotiations are completed.

## 2023-09-19 RX ORDER — APIXABAN 5 MG/1
5 TABLET, FILM COATED ORAL 2 TIMES DAILY
Qty: 180 TABLET | Refills: 1 | Status: SHIPPED | OUTPATIENT
Start: 2023-09-19

## 2023-09-20 ENCOUNTER — TELEPHONE (OUTPATIENT)
Dept: CARDIOLOGY CLINIC | Age: 68
End: 2023-09-20

## 2023-09-20 NOTE — TELEPHONE ENCOUNTER
Medication Refill    Medication needing refilled:  ELIQUIS     Dosage of the medication:  5 mg  How are you taking this medication (QD, BID, TID, QID, PRN):    30 or 90 day supply called in:  90  When will you run out of your medication:    Which Pharmacy are we sending the medication to?:    [address] [facility] [id number] [facility], [address] [place] [address], [address],[facility] [address]   Phone:  [phone]  Fax:  [phone]

## 2023-09-25 ENCOUNTER — HOSPITAL ENCOUNTER (OUTPATIENT)
Dept: CARDIAC CATH/INVASIVE PROCEDURES | Age: 68
Discharge: HOME | End: 2023-09-25
Attending: INTERNAL MEDICINE | Admitting: INTERNAL MEDICINE
Payer: MEDICARE

## 2023-09-25 VITALS
BODY MASS INDEX: 42.27 KG/M2 | SYSTOLIC BLOOD PRESSURE: 117 MMHG | HEART RATE: 81 BPM | WEIGHT: 263 LBS | RESPIRATION RATE: 22 BRPM | OXYGEN SATURATION: 100 % | HEIGHT: 66 IN | DIASTOLIC BLOOD PRESSURE: 81 MMHG

## 2023-09-25 LAB
ANION GAP SERPL CALCULATED.3IONS-SCNC: 10 MMOL/L (ref 3–16)
BUN SERPL-MCNC: 8 MG/DL (ref 7–20)
CALCIUM SERPL-MCNC: 9.4 MG/DL (ref 8.3–10.6)
CHLORIDE SERPL-SCNC: 103 MMOL/L (ref 99–110)
CO2 SERPL-SCNC: 27 MMOL/L (ref 21–32)
CREAT SERPL-MCNC: 0.6 MG/DL (ref 0.6–1.2)
DEPRECATED RDW RBC AUTO: 14.8 % (ref 12.4–15.4)
GFR SERPLBLD CREATININE-BSD FMLA CKD-EPI: >60 ML/MIN/{1.73_M2}
GLUCOSE SERPL-MCNC: 94 MG/DL (ref 70–99)
HCT VFR BLD AUTO: 42 % (ref 36–48)
HGB BLD-MCNC: 13 G/DL (ref 12–16)
MCH RBC QN AUTO: 25.5 PG (ref 26–34)
MCHC RBC AUTO-ENTMCNC: 31 G/DL (ref 31–36)
MCV RBC AUTO: 82.2 FL (ref 80–100)
PLATELET # BLD AUTO: 336 K/UL (ref 135–450)
PMV BLD AUTO: 8.5 FL (ref 5–10.5)
RBC # BLD AUTO: 5.11 M/UL (ref 4–5.2)
SODIUM SERPL-SCNC: 140 MMOL/L (ref 136–145)
WBC # BLD AUTO: 15.1 K/UL (ref 4–11)

## 2023-09-25 PROCEDURE — 93296 REM INTERROG EVL PM/IDS: CPT | Performed by: INTERNAL MEDICINE

## 2023-09-25 PROCEDURE — 80048 BASIC METABOLIC PNL TOTAL CA: CPT

## 2023-09-25 PROCEDURE — C1721 AICD, DUAL CHAMBER: HCPCS

## 2023-09-25 PROCEDURE — 99152 MOD SED SAME PHYS/QHP 5/>YRS: CPT | Performed by: INTERNAL MEDICINE

## 2023-09-25 PROCEDURE — 85027 COMPLETE CBC AUTOMATED: CPT

## 2023-09-25 PROCEDURE — 93295 DEV INTERROG REMOTE 1/2/MLT: CPT | Performed by: INTERNAL MEDICINE

## 2023-09-25 PROCEDURE — 99152 MOD SED SAME PHYS/QHP 5/>YRS: CPT

## 2023-09-25 PROCEDURE — 99153 MOD SED SAME PHYS/QHP EA: CPT

## 2023-09-25 PROCEDURE — 2580000003 HC RX 258

## 2023-09-25 PROCEDURE — 33241 REMOVE PULSE GENERATOR: CPT

## 2023-09-25 PROCEDURE — 33263 RMVL & RPLCMT DFB GEN 2 LEAD: CPT | Performed by: INTERNAL MEDICINE

## 2023-09-25 PROCEDURE — C1889 IMPLANT/INSERT DEVICE, NOC: HCPCS

## 2023-09-25 PROCEDURE — 93005 ELECTROCARDIOGRAM TRACING: CPT | Performed by: INTERNAL MEDICINE

## 2023-09-25 PROCEDURE — 2500000003 HC RX 250 WO HCPCS

## 2023-09-25 PROCEDURE — 36415 COLL VENOUS BLD VENIPUNCTURE: CPT

## 2023-09-25 PROCEDURE — 6360000002 HC RX W HCPCS

## 2023-09-25 RX ORDER — ASPIRIN 81 MG/1
81 TABLET, CHEWABLE ORAL DAILY
COMMUNITY

## 2023-09-25 NOTE — PROGRESS NOTES
PRE-PROCEDURE    DATE: 9/25/2023 ARRIVAL TO CATH LAB: 1:48 PM    ADMIT SOURCE: Outpatient    ID & ALLERGY BAND: On    CONSENT: Yes    NPO SINCE: Midnight    LABS/PREGNANCY TEST: N/A    PULSES: Left Radial Artery 2+    IV SITE : Started in left hand.   with fluids infusing at kvo 1:48 PM     EKG RHYTHM: Normal Sinus Rhythm

## 2023-09-25 NOTE — H&P
ALKPHOS 108 2023    BILITOT 0.3 2023     Coags:   Lab Results   Component Value Date    PROTIME 14.4 2022    INR 1.13 2022    APTT 35.0 (H) 2022       EC2023  - NSR. Rate 96, QRS 98, QTc 433    Echo:    Left ventricular systolic function is mild to moderately reduced with ejection fraction estimated at 40%. There is mild concentric left ventricular hypertrophy. Grade I diastolic dysfunction. Normal right ventricular size and function. Pacer / ICD wire is visualized in the right ventricle. Mild mitral regurgitation. Aortic valve appears sclerotic but opens adequately. Mild tricuspid regurgitation. Systolic pulmonary artery pressure (SPAP) is normal and estimated at 25-30 mmHg. JOAN: 10/20   Left ventricular systolic function is moderately reduced with estimated ejection fraction of 40%. There is moderate concentric left ventricular hypertrophy. There is no evidence of mass or thrombus in the left atrium or appendage. GXT:   There is normal isotope uptake at stress and rest. There is no evidence of    myocardial ischemia or scar. Normal LV function. Overall findings represent a low risk scan. Cath: 22  Anatomy:   LM-nml   LAD-mid 40%  Cx-nml  OM- nml  RCA-dom nml  RPDA- nml  LVEF- 25%  LVG- severe global hypokinesis  LVEDP- 15      Impression  ~Coronary Angiography w/ nonobstructive CAD  ~LVG with LVEF of 25% and global regional wall motion abnormalities  ~No ischemic cause for VT/VF    Assessment:    1. Paroxysmal Atrial Fibrillation, PAT/SVT              - S/p RFCA with PVI (10/8/20, Dr. Juliet Hensley)                 - Currently in NSR              - Continue Toprol  mg QD               ~ Previously on amiodarone                - [id number] score: 4 (Age, Gender, CHF, HTN) ; [id number] Vasc score and anticoagulation discussed. High risk for stroke and thromboembolism. Anticoagulation is recommended. Risk of bleeding was discussed. Hyperlipidemia     Hypertension     MI (myocardial infarction) [facility])      Past Surgical History:   Procedure Laterality Date    CARDIAC DEFIBRILLATOR PLACEMENT      DILATION AND CURETTAGE OF UTERUS      PAIN MANAGEMENT PROCEDURE Bilateral 6/29/2022    BILATERAL L5 TRANSFORAMINAL EPIDURAL STEROID INJECTION WITH FLUOROSCOPY performed by Rodney May MD at [facility]     No Known Allergies    Pre-Sedation Documentation and Exam:   I have personally completed a history, physical exam & review of systems for this patient (see notes).     Mallampati Airway Assessment:  Class II     Prior History of Anesthesia Complications:   None    ASA Classification:  Class 2 - A normal healthy patient with mild systemic disease  Class 3 - A patient with severe systemic disease that limits activity but is not incapacitating    Sedation/ Anesthesia Plan:   Intravenous sedation    Medications Planned:   Midazolam (Versed) and Fentanyl intravenously    Patient is an appropriate candidate for plan of sedation:   Yes    Electronically signed by Arianna Williamson MD on 9/25/2023 at 5:35 PM

## 2023-09-25 NOTE — DISCHARGE INSTRUCTIONS
ICD/PACEMAKER GENERATOR REPLACEMENT DISCHARGE INSTRUCTIONS    No Driving for 24 hours. We strongly recommend that a responsible adult stay with you for the next 24 hours. Leave outer dressing on until follow up appointment, if dressing becomes wet or soiled, remove dressing prior to follow up appointment. Leave steri strips intact. Do not get the incision wet for one week. You may be sore, bruised and have a small amount of drainage around the incision site.     Please contact the office if you notice any signs of infection:  Redness / swelling at the incision site  Fever  Yellow drainage at the site  Pain    Any questions or concerns, call Dr Angelica Eaton office at Phone: [phone]

## 2023-09-25 NOTE — PROCEDURES
[address]     Electrophysiology Procedure Note       Date of Procedure: 9/25/2023  Patient's Name: Candida Gregory  YOB: 1955   Medical Record Number: [id number]  Referring Physician: Naveen Broussard MD  Procedure Performed by: Naveen Broussard MD    Procedures performed:    Generator change out of dual chamber AICD  Electronic analysis of lead and device. IV sedation. Sedation start time: 8:20 PM  Sedation stop time: 19:30 PM  Fentanyl 6 mcg  Versed   250 mg  An independent trained observer pushed medications at my direction    Indication of the procedure: AICD  battery depletion  Candida Gregory is a 70 y.o. female with history of AICD implantation, VT with battery at the end of service requiring generator change. Details of procedure: The risks, benefits and alternatives of the procedure were discussed with the patient. The risks including, but not limited to, the risks of bleeding, infection, pain, device malfunction, lead dislodgement, injury to cardiac and surrounding structures, stroke, and death were discussed in detail. The patient opted to proceed with the device implantation. Written informed consent was signed and placed in the chart. Patient was brought to the electrophysiology lab in a fasting nonsedated state. Prophylactic antibiotic was given. Chest was prepped and draped in the usual sterile fashion. After injection of 2% lidocaine in the left pectoral area, an incision was made over the previous scar and extended to the pocket using electrocautery and blunt dissection. The old device was removed. The leads were tested and showed stable parameters. There was heavy calcification in the pocket. The pocket was vigorously irrigated with antibiotic solution and the leads were connected to the new pulse generator device which was implanted into the clean pocket. The pulse generator was sutured inside the pocket. TYRX was used to reduce risk of infection.  The pocket

## 2023-09-26 LAB
EKG ATRIAL RATE: 97 BPM
EKG DIAGNOSIS: NORMAL
EKG P AXIS: 74 DEGREES
EKG P-R INTERVAL: 160 MS
EKG Q-T INTERVAL: 370 MS
EKG QRS DURATION: 88 MS
EKG QTC CALCULATION (BAZETT): 469 MS
EKG R AXIS: 1 DEGREES
EKG T AXIS: 74 DEGREES
EKG VENTRICULAR RATE: 97 BPM

## 2023-09-26 PROCEDURE — 93010 ELECTROCARDIOGRAM REPORT: CPT | Performed by: INTERNAL MEDICINE

## 2023-09-29 ENCOUNTER — HOSPITAL ENCOUNTER (INPATIENT)
Age: 68
LOS: 7 days | Discharge: HOME HEALTH | End: 2023-10-06
Attending: HOSPITALIST | Admitting: STUDENT IN AN ORGANIZED HEALTH CARE EDUCATION/TRAINING PROGRAM
Payer: MEDICARE

## 2023-09-29 DIAGNOSIS — M10.9 GOUT, UNSPECIFIED CAUSE, UNSPECIFIED CHRONICITY, UNSPECIFIED SITE: ICD-10-CM

## 2023-09-29 DIAGNOSIS — M54.17 LUMBOSACRAL RADICULOPATHY: ICD-10-CM

## 2023-09-29 DIAGNOSIS — Z45.02 ICD (IMPLANTABLE CARDIOVERTER-DEFIBRILLATOR) DISCHARGE: ICD-10-CM

## 2023-09-29 DIAGNOSIS — M54.10 BACK PAIN WITH RADICULOPATHY: Primary | ICD-10-CM

## 2023-09-29 LAB
ALBUMIN SERPL-MCNC: 3.3 G/DL (ref 3.4–5)
ALBUMIN/GLOB SERPL: 0.9 {RATIO} (ref 1.1–2.2)
ALP SERPL-CCNC: 80 U/L (ref 40–129)
ALT SERPL-CCNC: 9 U/L (ref 10–40)
ANION GAP SERPL CALCULATED.3IONS-SCNC: 10 MMOL/L (ref 3–16)
ANTI-XA UNFRAC HEPARIN: 0.13 IU/ML (ref 0.3–0.7)
APTT BLD: 38 SEC (ref 22.7–35.9)
AST SERPL-CCNC: 21 U/L (ref 15–37)
BASOPHILS # BLD: 0.1 K/UL (ref 0–0.2)
BASOPHILS NFR BLD: 0.6 %
BILIRUB SERPL-MCNC: 0.4 MG/DL (ref 0–1)
BUN SERPL-MCNC: 7 MG/DL (ref 7–20)
CALCIUM SERPL-MCNC: 9 MG/DL (ref 8.3–10.6)
CHLORIDE SERPL-SCNC: 100 MMOL/L (ref 99–110)
CO2 SERPL-SCNC: 25 MMOL/L (ref 21–32)
CREAT SERPL-MCNC: 0.6 MG/DL (ref 0.6–1.2)
DEPRECATED RDW RBC AUTO: 14.5 % (ref 12.4–15.4)
EKG ATRIAL RATE: 38 BPM
EKG DIAGNOSIS: NORMAL
EKG Q-T INTERVAL: 368 MS
EKG QRS DURATION: 80 MS
EKG QTC CALCULATION (BAZETT): 402 MS
EKG R AXIS: 25 DEGREES
EKG T AXIS: 83 DEGREES
EKG VENTRICULAR RATE: 72 BPM
EOSINOPHIL # BLD: 0.2 K/UL (ref 0–0.6)
EOSINOPHIL NFR BLD: 1.6 %
GFR SERPLBLD CREATININE-BSD FMLA CKD-EPI: >60 ML/MIN/{1.73_M2}
GLUCOSE SERPL-MCNC: 118 MG/DL (ref 70–99)
HCT VFR BLD AUTO: 35.7 % (ref 36–48)
HGB BLD-MCNC: 11.1 G/DL (ref 12–16)
INR PPP: 1.01 (ref 0.84–1.16)
LEFT VENTRICULAR EJECTION FRACTION MODE: NORMAL
LV EF: 53 %
LYMPHOCYTES # BLD: 3.2 K/UL (ref 1–5.1)
LYMPHOCYTES NFR BLD: 20.9 %
MAGNESIUM SERPL-MCNC: 2.5 MG/DL (ref 1.8–2.4)
MCH RBC QN AUTO: 25.5 PG (ref 26–34)
MCHC RBC AUTO-ENTMCNC: 31.1 G/DL (ref 31–36)
MCV RBC AUTO: 82 FL (ref 80–100)
MONOCYTES # BLD: 1.1 K/UL (ref 0–1.3)
MONOCYTES NFR BLD: 7.4 %
NEUTROPHILS # BLD: 10.6 K/UL (ref 1.7–7.7)
NEUTROPHILS NFR BLD: 69.5 %
PLATELET # BLD AUTO: 296 K/UL (ref 135–450)
PMV BLD AUTO: 8 FL (ref 5–10.5)
POTASSIUM SERPL-SCNC: 3.3 MMOL/L (ref 3.5–5.1)
PROT SERPL-MCNC: 7.1 G/DL (ref 6.4–8.2)
PROTHROMBIN TIME: 13.3 SEC (ref 11.5–14.8)
RBC # BLD AUTO: 4.36 M/UL (ref 4–5.2)
SODIUM SERPL-SCNC: 135 MMOL/L (ref 136–145)
TROPONIN, HIGH SENSITIVITY: 76 NG/L (ref 0–14)
WBC # BLD AUTO: 15.2 K/UL (ref 4–11)

## 2023-09-29 PROCEDURE — 1200000000 HC SEMI PRIVATE

## 2023-09-29 PROCEDURE — 84484 ASSAY OF TROPONIN QUANT: CPT

## 2023-09-29 PROCEDURE — 2580000003 HC RX 258: Performed by: NURSE PRACTITIONER

## 2023-09-29 PROCEDURE — 85025 COMPLETE CBC W/AUTO DIFF WBC: CPT

## 2023-09-29 PROCEDURE — 85610 PROTHROMBIN TIME: CPT

## 2023-09-29 PROCEDURE — 83735 ASSAY OF MAGNESIUM: CPT

## 2023-09-29 PROCEDURE — 85730 THROMBOPLASTIN TIME PARTIAL: CPT

## 2023-09-29 PROCEDURE — 6360000002 HC RX W HCPCS: Performed by: NURSE PRACTITIONER

## 2023-09-29 PROCEDURE — 85520 HEPARIN ASSAY: CPT

## 2023-09-29 PROCEDURE — 36415 COLL VENOUS BLD VENIPUNCTURE: CPT

## 2023-09-29 PROCEDURE — 93005 ELECTROCARDIOGRAM TRACING: CPT | Performed by: STUDENT IN AN ORGANIZED HEALTH CARE EDUCATION/TRAINING PROGRAM

## 2023-09-29 PROCEDURE — 80053 COMPREHEN METABOLIC PANEL: CPT

## 2023-09-29 RX ORDER — ASPIRIN 81 MG/1
81 TABLET, CHEWABLE ORAL DAILY
Status: DISCONTINUED | OUTPATIENT
Start: 2023-09-30 | End: 2023-10-06 | Stop reason: HOSPADM

## 2023-09-29 RX ORDER — ONDANSETRON 2 MG/ML
4 INJECTION INTRAMUSCULAR; INTRAVENOUS EVERY 6 HOURS PRN
Status: DISCONTINUED | OUTPATIENT
Start: 2023-09-29 | End: 2023-10-06 | Stop reason: HOSPADM

## 2023-09-29 RX ORDER — POTASSIUM CHLORIDE 7.45 MG/ML
10 INJECTION INTRAVENOUS PRN
Status: DISCONTINUED | OUTPATIENT
Start: 2023-09-29 | End: 2023-10-06 | Stop reason: HOSPADM

## 2023-09-29 RX ORDER — ONDANSETRON 4 MG/1
4 TABLET, ORALLY DISINTEGRATING ORAL EVERY 8 HOURS PRN
Status: DISCONTINUED | OUTPATIENT
Start: 2023-09-29 | End: 2023-10-06 | Stop reason: HOSPADM

## 2023-09-29 RX ORDER — SODIUM CHLORIDE 0.9 % (FLUSH) 0.9 %
5-40 SYRINGE (ML) INJECTION PRN
Status: DISCONTINUED | OUTPATIENT
Start: 2023-09-29 | End: 2023-10-06 | Stop reason: HOSPADM

## 2023-09-29 RX ORDER — MORPHINE SULFATE 2 MG/ML
2 INJECTION, SOLUTION INTRAMUSCULAR; INTRAVENOUS EVERY 4 HOURS PRN
Status: DISCONTINUED | OUTPATIENT
Start: 2023-09-29 | End: 2023-10-01

## 2023-09-29 RX ORDER — FUROSEMIDE 40 MG/1
40 TABLET ORAL 2 TIMES DAILY
Status: DISCONTINUED | OUTPATIENT
Start: 2023-09-30 | End: 2023-10-06 | Stop reason: HOSPADM

## 2023-09-29 RX ORDER — SPIRONOLACTONE 25 MG/1
25 TABLET ORAL DAILY
Status: DISCONTINUED | OUTPATIENT
Start: 2023-09-30 | End: 2023-10-06 | Stop reason: HOSPADM

## 2023-09-29 RX ORDER — SODIUM CHLORIDE 9 MG/ML
INJECTION, SOLUTION INTRAVENOUS PRN
Status: DISCONTINUED | OUTPATIENT
Start: 2023-09-29 | End: 2023-10-06 | Stop reason: HOSPADM

## 2023-09-29 RX ORDER — SODIUM CHLORIDE 0.9 % (FLUSH) 0.9 %
5-40 SYRINGE (ML) INJECTION EVERY 12 HOURS SCHEDULED
Status: DISCONTINUED | OUTPATIENT
Start: 2023-09-29 | End: 2023-10-06 | Stop reason: HOSPADM

## 2023-09-29 RX ORDER — ACETAMINOPHEN 325 MG/1
650 TABLET ORAL EVERY 6 HOURS PRN
Status: DISCONTINUED | OUTPATIENT
Start: 2023-09-29 | End: 2023-10-06 | Stop reason: HOSPADM

## 2023-09-29 RX ORDER — POTASSIUM CHLORIDE 20 MEQ/1
40 TABLET, EXTENDED RELEASE ORAL PRN
Status: DISCONTINUED | OUTPATIENT
Start: 2023-09-29 | End: 2023-10-06 | Stop reason: HOSPADM

## 2023-09-29 RX ORDER — ACETAMINOPHEN 650 MG/1
650 SUPPOSITORY RECTAL EVERY 6 HOURS PRN
Status: DISCONTINUED | OUTPATIENT
Start: 2023-09-29 | End: 2023-10-06 | Stop reason: HOSPADM

## 2023-09-29 RX ORDER — LANOLIN ALCOHOL/MO/W.PET/CERES
3 CREAM (GRAM) TOPICAL NIGHTLY PRN
Status: DISCONTINUED | OUTPATIENT
Start: 2023-09-29 | End: 2023-10-06 | Stop reason: HOSPADM

## 2023-09-29 RX ORDER — ATORVASTATIN CALCIUM 80 MG/1
80 TABLET, FILM COATED ORAL DAILY
Status: DISCONTINUED | OUTPATIENT
Start: 2023-09-30 | End: 2023-10-06 | Stop reason: HOSPADM

## 2023-09-29 RX ORDER — OXYCODONE HYDROCHLORIDE 5 MG/1
5 TABLET ORAL EVERY 6 HOURS PRN
Status: DISCONTINUED | OUTPATIENT
Start: 2023-09-29 | End: 2023-10-01

## 2023-09-29 RX ORDER — HEPARIN SODIUM 10000 [USP'U]/100ML
5-30 INJECTION, SOLUTION INTRAVENOUS CONTINUOUS
Status: DISCONTINUED | OUTPATIENT
Start: 2023-09-29 | End: 2023-09-30

## 2023-09-29 RX ORDER — POLYETHYLENE GLYCOL 3350 17 G/17G
17 POWDER, FOR SOLUTION ORAL DAILY PRN
Status: DISCONTINUED | OUTPATIENT
Start: 2023-09-29 | End: 2023-09-30

## 2023-09-29 RX ORDER — METOPROLOL SUCCINATE 50 MG/1
200 TABLET, EXTENDED RELEASE ORAL DAILY
Status: DISCONTINUED | OUTPATIENT
Start: 2023-09-30 | End: 2023-10-06 | Stop reason: HOSPADM

## 2023-09-29 RX ORDER — VALSARTAN 160 MG/1
160 TABLET ORAL DAILY
Status: DISCONTINUED | OUTPATIENT
Start: 2023-09-30 | End: 2023-10-06 | Stop reason: HOSPADM

## 2023-09-29 RX ADMIN — AMIODARONE HYDROCHLORIDE 0.5 MG/MIN: 50 INJECTION, SOLUTION INTRAVENOUS at 22:39

## 2023-09-29 RX ADMIN — AMIODARONE HYDROCHLORIDE 0.5 MG/MIN: 50 INJECTION, SOLUTION INTRAVENOUS at 20:19

## 2023-09-29 RX ADMIN — Medication 10 ML: at 22:23

## 2023-09-29 RX ADMIN — MORPHINE SULFATE 2 MG: 2 INJECTION, SOLUTION INTRAMUSCULAR; INTRAVENOUS at 22:15

## 2023-09-29 RX ADMIN — HEPARIN SODIUM 7 UNITS/KG/HR: 10000 INJECTION, SOLUTION INTRAVENOUS at 22:17

## 2023-09-29 ASSESSMENT — PAIN DESCRIPTION - DESCRIPTORS: DESCRIPTORS: ACHING

## 2023-09-29 ASSESSMENT — PAIN DESCRIPTION - LOCATION: LOCATION: CHEST

## 2023-09-29 ASSESSMENT — PAIN SCALES - GENERAL
PAINLEVEL_OUTOF10: 10
PAINLEVEL_OUTOF10: 10

## 2023-09-29 NOTE — PROGRESS NOTES
[facility]    Pt with recent AICD. Went with chest pain. Had AICD fired 4x today. Was interrogated there in the hospital found to have lead likely oversensing p waves. Pt started on amio gtt. Mild elevated in troponin and on heparin gtt per hospitalist there. Ekg no acute finding.  Pt being transferred her for further eval

## 2023-09-30 ENCOUNTER — APPOINTMENT (OUTPATIENT)
Dept: GENERAL RADIOLOGY | Age: 68
End: 2023-09-30
Attending: HOSPITALIST
Payer: MEDICARE

## 2023-09-30 PROBLEM — T82.198A INAPPROPRIATE SHOCKS FROM ICD (IMPLANTABLE CARDIOVERTER-DEFIBRILLATOR): Status: ACTIVE | Noted: 2023-09-30

## 2023-09-30 PROBLEM — R79.89 ELEVATED TROPONIN: Status: ACTIVE | Noted: 2023-09-30

## 2023-09-30 PROBLEM — I48.91 ATRIAL FIBRILLATION WITH RAPID VENTRICULAR RESPONSE (HCC): Status: ACTIVE | Noted: 2023-09-30

## 2023-09-30 LAB
ALBUMIN SERPL-MCNC: 3.6 G/DL (ref 3.4–5)
ALBUMIN/GLOB SERPL: 1 {RATIO} (ref 1.1–2.2)
ALP SERPL-CCNC: 77 U/L (ref 40–129)
ALT SERPL-CCNC: 9 U/L (ref 10–40)
ANION GAP SERPL CALCULATED.3IONS-SCNC: 9 MMOL/L (ref 3–16)
ANTI-XA UNFRAC HEPARIN: 0.2 IU/ML (ref 0.3–0.7)
ANTI-XA UNFRAC HEPARIN: 0.24 IU/ML (ref 0.3–0.7)
AST SERPL-CCNC: 19 U/L (ref 15–37)
BASOPHILS # BLD: 0.1 K/UL (ref 0–0.2)
BASOPHILS NFR BLD: 0.9 %
BILIRUB SERPL-MCNC: 0.4 MG/DL (ref 0–1)
BUN SERPL-MCNC: 6 MG/DL (ref 7–20)
CALCIUM SERPL-MCNC: 9 MG/DL (ref 8.3–10.6)
CHLORIDE SERPL-SCNC: 99 MMOL/L (ref 99–110)
CO2 SERPL-SCNC: 28 MMOL/L (ref 21–32)
CREAT SERPL-MCNC: 0.7 MG/DL (ref 0.6–1.2)
DEPRECATED RDW RBC AUTO: 14 % (ref 12.4–15.4)
EKG ATRIAL RATE: 59 BPM
EKG DIAGNOSIS: NORMAL
EKG P AXIS: 70 DEGREES
EKG P-R INTERVAL: 176 MS
EKG Q-T INTERVAL: 466 MS
EKG QRS DURATION: 92 MS
EKG QTC CALCULATION (BAZETT): 461 MS
EKG R AXIS: -2 DEGREES
EKG T AXIS: 45 DEGREES
EKG VENTRICULAR RATE: 59 BPM
EOSINOPHIL # BLD: 0.3 K/UL (ref 0–0.6)
EOSINOPHIL NFR BLD: 2 %
GFR SERPLBLD CREATININE-BSD FMLA CKD-EPI: >60 ML/MIN/{1.73_M2}
GLUCOSE SERPL-MCNC: 118 MG/DL (ref 70–99)
HCT VFR BLD AUTO: 36.5 % (ref 36–48)
HGB BLD-MCNC: 11.2 G/DL (ref 12–16)
LYMPHOCYTES # BLD: 2.9 K/UL (ref 1–5.1)
LYMPHOCYTES NFR BLD: 19.3 %
MAGNESIUM SERPL-MCNC: 2.2 MG/DL (ref 1.8–2.4)
MCH RBC QN AUTO: 25.2 PG (ref 26–34)
MCHC RBC AUTO-ENTMCNC: 30.8 G/DL (ref 31–36)
MCV RBC AUTO: 81.6 FL (ref 80–100)
MONOCYTES # BLD: 1.1 K/UL (ref 0–1.3)
MONOCYTES NFR BLD: 7.1 %
NEUTROPHILS # BLD: 10.8 K/UL (ref 1.7–7.7)
NEUTROPHILS NFR BLD: 70.7 %
PHOSPHATE SERPL-MCNC: 4.6 MG/DL (ref 2.5–4.9)
PLATELET # BLD AUTO: 301 K/UL (ref 135–450)
PMV BLD AUTO: 8.4 FL (ref 5–10.5)
POTASSIUM SERPL-SCNC: 3.7 MMOL/L (ref 3.5–5.1)
PROT SERPL-MCNC: 7.2 G/DL (ref 6.4–8.2)
RBC # BLD AUTO: 4.47 M/UL (ref 4–5.2)
SODIUM SERPL-SCNC: 136 MMOL/L (ref 136–145)
TROPONIN, HIGH SENSITIVITY: 65 NG/L (ref 0–14)
WBC # BLD AUTO: 15.2 K/UL (ref 4–11)

## 2023-09-30 PROCEDURE — 71045 X-RAY EXAM CHEST 1 VIEW: CPT

## 2023-09-30 PROCEDURE — 6370000000 HC RX 637 (ALT 250 FOR IP): Performed by: INTERNAL MEDICINE

## 2023-09-30 PROCEDURE — 6370000000 HC RX 637 (ALT 250 FOR IP): Performed by: NURSE PRACTITIONER

## 2023-09-30 PROCEDURE — 2580000003 HC RX 258: Performed by: NURSE PRACTITIONER

## 2023-09-30 PROCEDURE — 93010 ELECTROCARDIOGRAM REPORT: CPT | Performed by: INTERNAL MEDICINE

## 2023-09-30 PROCEDURE — 36415 COLL VENOUS BLD VENIPUNCTURE: CPT

## 2023-09-30 PROCEDURE — 6360000002 HC RX W HCPCS: Performed by: NURSE PRACTITIONER

## 2023-09-30 PROCEDURE — 80053 COMPREHEN METABOLIC PANEL: CPT

## 2023-09-30 PROCEDURE — 1200000000 HC SEMI PRIVATE

## 2023-09-30 PROCEDURE — 84100 ASSAY OF PHOSPHORUS: CPT

## 2023-09-30 PROCEDURE — 84484 ASSAY OF TROPONIN QUANT: CPT

## 2023-09-30 PROCEDURE — 85025 COMPLETE CBC W/AUTO DIFF WBC: CPT

## 2023-09-30 PROCEDURE — 85520 HEPARIN ASSAY: CPT

## 2023-09-30 PROCEDURE — 83735 ASSAY OF MAGNESIUM: CPT

## 2023-09-30 PROCEDURE — 99223 1ST HOSP IP/OBS HIGH 75: CPT | Performed by: INTERNAL MEDICINE

## 2023-09-30 RX ORDER — SENNA AND DOCUSATE SODIUM 50; 8.6 MG/1; MG/1
2 TABLET, FILM COATED ORAL DAILY
Status: DISCONTINUED | OUTPATIENT
Start: 2023-09-30 | End: 2023-10-06 | Stop reason: HOSPADM

## 2023-09-30 RX ORDER — HEPARIN SODIUM 1000 [USP'U]/ML
2000 INJECTION, SOLUTION INTRAVENOUS; SUBCUTANEOUS PRN
Status: DISCONTINUED | OUTPATIENT
Start: 2023-09-30 | End: 2023-10-02

## 2023-09-30 RX ORDER — GINSENG 100 MG
CAPSULE ORAL DAILY
Status: DISCONTINUED | OUTPATIENT
Start: 2023-09-30 | End: 2023-10-06 | Stop reason: HOSPADM

## 2023-09-30 RX ORDER — AMIODARONE HYDROCHLORIDE 200 MG/1
400 TABLET ORAL 2 TIMES DAILY
Status: DISCONTINUED | OUTPATIENT
Start: 2023-09-30 | End: 2023-10-03

## 2023-09-30 RX ORDER — HEPARIN SODIUM 1000 [USP'U]/ML
4000 INJECTION, SOLUTION INTRAVENOUS; SUBCUTANEOUS PRN
Status: DISCONTINUED | OUTPATIENT
Start: 2023-09-30 | End: 2023-10-02

## 2023-09-30 RX ORDER — POLYETHYLENE GLYCOL 3350 17 G/17G
17 POWDER, FOR SOLUTION ORAL DAILY
Status: DISCONTINUED | OUTPATIENT
Start: 2023-09-30 | End: 2023-10-06 | Stop reason: HOSPADM

## 2023-09-30 RX ORDER — IBUPROFEN 200 MG
TABLET ORAL DAILY
Status: DISCONTINUED | OUTPATIENT
Start: 2023-09-30 | End: 2023-09-30

## 2023-09-30 RX ADMIN — NITROGLYCERIN 1 INCH: 20 OINTMENT TOPICAL at 15:46

## 2023-09-30 RX ADMIN — HEPARIN SODIUM 2000 UNITS: 1000 INJECTION INTRAVENOUS; SUBCUTANEOUS at 08:14

## 2023-09-30 RX ADMIN — NITROGLYCERIN 1 INCH: 20 OINTMENT TOPICAL at 13:07

## 2023-09-30 RX ADMIN — BACITRACIN: 500 OINTMENT TOPICAL at 14:04

## 2023-09-30 RX ADMIN — Medication 10 ML: at 09:00

## 2023-09-30 RX ADMIN — APIXABAN 5 MG: 5 TABLET, FILM COATED ORAL at 19:58

## 2023-09-30 RX ADMIN — MORPHINE SULFATE 2 MG: 2 INJECTION, SOLUTION INTRAMUSCULAR; INTRAVENOUS at 17:05

## 2023-09-30 RX ADMIN — SPIRONOLACTONE 25 MG: 25 TABLET ORAL at 08:20

## 2023-09-30 RX ADMIN — VALSARTAN 160 MG: 40 TABLET, FILM COATED ORAL at 08:20

## 2023-09-30 RX ADMIN — NITROGLYCERIN 1 INCH: 20 OINTMENT TOPICAL at 17:06

## 2023-09-30 RX ADMIN — AMIODARONE HYDROCHLORIDE 400 MG: 200 TABLET ORAL at 19:58

## 2023-09-30 RX ADMIN — MORPHINE SULFATE 2 MG: 2 INJECTION, SOLUTION INTRAMUSCULAR; INTRAVENOUS at 21:52

## 2023-09-30 RX ADMIN — FUROSEMIDE 40 MG: 40 TABLET ORAL at 08:21

## 2023-09-30 RX ADMIN — FUROSEMIDE 40 MG: 40 TABLET ORAL at 17:06

## 2023-09-30 RX ADMIN — METOPROLOL SUCCINATE 200 MG: 50 TABLET, EXTENDED RELEASE ORAL at 13:17

## 2023-09-30 RX ADMIN — NITROGLYCERIN 1 INCH: 20 OINTMENT TOPICAL at 14:10

## 2023-09-30 RX ADMIN — MORPHINE SULFATE 2 MG: 2 INJECTION, SOLUTION INTRAMUSCULAR; INTRAVENOUS at 04:46

## 2023-09-30 RX ADMIN — OXYCODONE HYDROCHLORIDE 5 MG: 5 TABLET ORAL at 14:22

## 2023-09-30 RX ADMIN — MORPHINE SULFATE 2 MG: 2 INJECTION, SOLUTION INTRAMUSCULAR; INTRAVENOUS at 09:24

## 2023-09-30 RX ADMIN — OXYCODONE HYDROCHLORIDE 5 MG: 5 TABLET ORAL at 20:10

## 2023-09-30 RX ADMIN — MORPHINE SULFATE 2 MG: 2 INJECTION, SOLUTION INTRAMUSCULAR; INTRAVENOUS at 13:06

## 2023-09-30 RX ADMIN — NITROGLYCERIN 1 INCH: 20 OINTMENT TOPICAL at 21:45

## 2023-09-30 RX ADMIN — NITROGLYCERIN 1 INCH: 20 OINTMENT TOPICAL at 11:30

## 2023-09-30 RX ADMIN — HEPARIN SODIUM 2000 UNITS: 1000 INJECTION INTRAVENOUS; SUBCUTANEOUS at 00:44

## 2023-09-30 RX ADMIN — Medication 10 ML: at 20:01

## 2023-09-30 RX ADMIN — NITROGLYCERIN 1 INCH: 20 OINTMENT TOPICAL at 10:30

## 2023-09-30 RX ADMIN — ATORVASTATIN CALCIUM 80 MG: 80 TABLET, FILM COATED ORAL at 08:21

## 2023-09-30 ASSESSMENT — PAIN DESCRIPTION - ORIENTATION
ORIENTATION: MID
ORIENTATION: LEFT
ORIENTATION: LEFT
ORIENTATION: MID

## 2023-09-30 ASSESSMENT — PAIN DESCRIPTION - DESCRIPTORS
DESCRIPTORS: ACHING

## 2023-09-30 ASSESSMENT — PAIN DESCRIPTION - LOCATION
LOCATION: CHEST
LOCATION: CHEST;ANKLE
LOCATION: CHEST
LOCATION: CHEST;HAND
LOCATION: CHEST

## 2023-09-30 ASSESSMENT — PAIN SCALES - GENERAL
PAINLEVEL_OUTOF10: 6
PAINLEVEL_OUTOF10: 7
PAINLEVEL_OUTOF10: 9
PAINLEVEL_OUTOF10: 8
PAINLEVEL_OUTOF10: 9
PAINLEVEL_OUTOF10: 7
PAINLEVEL_OUTOF10: 6
PAINLEVEL_OUTOF10: 9
PAINLEVEL_OUTOF10: 9
PAINLEVEL_OUTOF10: 10
PAINLEVEL_OUTOF10: 9
PAINLEVEL_OUTOF10: 7
PAINLEVEL_OUTOF10: 0
PAINLEVEL_OUTOF10: 7

## 2023-09-30 NOTE — H&P
V2.0  History and Physical      Name:  Ruben Ricardo /Age/Sex: 1955  (74 y.o. female)   MRN & CSN:  [id number] & [id number] Encounter Date/Time: 2023 8:52 PM EDT   Location:  [address] PCP: No primary care provider on file. Hospital Day: 1    Assessment and Plan:   Ruben Ricardo is a 70 y.o. female with a pmh of HLD, HTN, Nonischemic cardiomyopathy who presents with ICD (implantable cardioverter-defibrillator) discharge    Hospital Problems             Last Modified POA    * (Principal) ICD (implantable cardioverter-defibrillator) discharge 2023 Yes       Plan:  ICD discharge x 6  Admit inpatient   EP consult  NPO after midnight  Continue Amiodarone at 0.5mg/hr dose and Heparin   Hold glory   Has had some bradycardia but now NSR 70,  continue BB but will defer to EP if needs BB decreased. 2. Chest Pain  Chest pain is from surgical site, started after procedure  Prn pain medications, goal to transition to oral pain meds    3. Hypertension  BP stable continue home meds    4. Nonischemic Cardiomyopathy  Pt euvolemic, continue home meds      Disposition:   Current Living situation: home  Expected Disposition: home  Estimated D/C: 2    Diet No diet orders on file   DVT Prophylaxis [] Lovenox, [x]  Heparin, [] SCDs, [] Ambulation,  [] Eliquis, [] Xarelto, [] Coumadin   Code Status Prior   Surrogate Decision Maker/ POA      Personally reviewed Lab Studies and Imaging         History from:     patient    History of Present Illness:     Chief Complaint: chest pain   Ruben Ricardo is a 70 y.o. female with pmh hypertension, hyperlipidemia, CHF, nonischemic cardiomyopathy, obesity, AICD, A-fib, NSVT of  who presents with chest pain. Patient states this morning she was try get up from bed and felt 6 consecutive defibrillation shocks. She sat down on the floor and had an additional 5 more shocks. She initially presented to [facility] emergency room and was admitted.   She was started on IV Surgical site, dressing intact, no drainage or redness   She has small skin abrasions near site, appears to be from surgical tape or dressing  Pulmonary:      Effort: Pulmonary effort is normal. No respiratory distress. Breath sounds: Normal breath sounds. Abdominal:      General: Abdomen is flat. Bowel sounds are normal. There is no distension. Palpations: Abdomen is soft. Tenderness: There is no abdominal tenderness. Musculoskeletal:         General: Normal range of motion. Cervical back: Normal range of motion. Right lower leg: No edema. Left lower leg: No edema. Skin:     General: Skin is warm. Capillary Refill: Capillary refill takes less than 2 seconds. Neurological:      General: No focal deficit present. Mental Status: She is alert and oriented to person, place, and time. Cranial Nerves: No cranial nerve deficit. Psychiatric:         Mood and Affect: Mood normal.          Past Medical History:   PMHx   Past Medical History:   Diagnosis Date    AICD (automatic cardioverter/defibrillator) present     Arthritis     CHF (congestive heart failure) ([address])     Gout     Hyperlipidemia     Hypertension     MI (myocardial infarction) ([address])      PSHX:  has a past surgical history that includes Dilation and curettage of uterus; Cardiac defibrillator placement; and Pain management procedure (Bilateral, 6/29/2022). Allergies: No Known Allergies  Fam HX:  family history includes Cancer in her father; Diabetes in her mother; Heart Disease in her mother; High Blood Pressure in her mother; High Cholesterol in her mother.   Soc HX:   Social History     Socioeconomic History    Marital status:      Spouse name: None    Number of children: None    Years of education: None    Highest education level: None   Tobacco Use    Smoking status: Never    Smokeless tobacco: Never    Tobacco comments:     QUIT AS A TEEN   Vaping Use    Vaping Use: Never used   Substance and

## 2023-09-30 NOTE — PROGRESS NOTES
Bed in lowest position, bed alarm activated, call light and bedside table within reach. Pt. updated on POC, denies further questions. Will continue to monitor.     Mandeep Perez RN

## 2023-09-30 NOTE — PROGRESS NOTES
V2.0    [facility] [place]      Name:  Milady Franco /Age/Sex: 1955  (74 y.o. female)   MRN & CSN:  [id number] & [id number] Encounter Date/Time: 2023 5:02 PM EDT   Location:  [address] PCP: No primary care provider on file. Marciano Mesa MD       Hospital Day: 2    Assessment and Recommendations   Milady Franco is a 70 y.o. female with pmh of HLD, HTN, Nonischemic cardiomyopathy who presented with ICD (implantable cardioverter-defibrillator) discharge. Plan:     ICD firing:   Cardiology consulted: ICD interrogated with findings of AFIB RVR. Started on Amiodarone & Toprol-XL. Continue Eliquis. Pain control. Chest Pain with elevated Troponin:  Elevated troponin due to recent surgery and ICD shocks. Heparin drip discontinued. Resume Eliquis. Pain control. Hypertension: Monitor BP on home medications. Nonischemic Cardiomyopathy/Chronic HFrEF:  Appears compensated. Lasix, Valsartan, Aldactone & Toprol-XL. Chest wall ulcer: Local wound care. Diet ADULT DIET; Regular; Low Fat/Low Chol/High Fiber/WILBER   DVT Prophylaxis [] Lovenox, []  Heparin, [] SCDs, [] Ambulation,  [x] Eliquis, [] Xarelto  [] Coumadin   Code Status Full Code   Disposition From: Home  Expected Disposition: Home  Estimated Date of Discharge: 10/2  Patient requires continued admission due to ICD shocks   Surrogate Decision Maker/ POA  Daughter     Personally reviewed Lab Studies and Imaging     Subjective:     Chief Complaint: Chest pain    Patient seen and examined. Still with complaints of chest pain. No dyspnea, leg swelling, fever or chills. Review of Systems:      A 10- system review obtained and updated today and is unremarkable except as mentioned  in my interval history. Objective:      Intake/Output Summary (Last 24 hours) at 2023 1715  Last data filed at 2023 1043  Gross per 24 hour   Intake 50 ml   Output 1400 ml   Net -1350 ml      Vitals:   Vitals: 72 hours. BNP: No results for input(s): \"PROBNP\" in the last 72 hours. UA:  Lab Results   Component Value Date/Time    NITRU Negative 03/13/2022 12:44 PM    COLORU Myrtle 03/13/2022 12:44 PM    PHUR 6.0 03/13/2022 12:44 PM    WBCUA 3 03/13/2022 12:44 PM    RBCUA 3 03/13/2022 12:44 PM    BACTERIA 2+ 03/13/2022 12:44 PM    CLARITYU Clear 03/13/2022 12:44 PM    SPECGRAV >=1.030 03/13/2022 12:44 PM    LEUKOCYTESUR Negative 03/13/2022 12:44 PM    UROBILINOGEN 1.0 03/13/2022 12:44 PM    BILIRUBINUR SMALL 03/13/2022 12:44 PM    BLOODU Negative 03/13/2022 12:44 PM    GLUCOSEU Negative 03/13/2022 12:44 PM    KETUA Negative 03/13/2022 12:44 PM     Urine Cultures:   Lab Results   Component Value Date/Time    LABURIN  11/24/2022 02:05 PM     <10,000 CFU/ml mixed skin/urogenital brissa. No further workup     Blood Cultures:   Lab Results   Component Value Date/Time    BC No Growth after 4 days of incubation. 11/24/2022 08:02 AM     Lab Results   Component Value Date/Time    BLOODCULT2 No Growth after 4 days of incubation.  11/24/2022 08:02 AM     Organism: No results found for: \"ORG\"      Electronically signed by Ismael García MD on 9/30/2023 at 5:15 PM

## 2023-10-01 ENCOUNTER — APPOINTMENT (OUTPATIENT)
Dept: GENERAL RADIOLOGY | Age: 68
End: 2023-10-01
Attending: HOSPITALIST
Payer: MEDICARE

## 2023-10-01 LAB
BACTERIA URNS QL MICRO: ABNORMAL /HPF
BILIRUB UR QL STRIP.AUTO: NEGATIVE
CLARITY UR: CLEAR
COLOR UR: YELLOW
CRP SERPL-MCNC: 231.2 MG/L (ref 0–5.1)
DEPRECATED RDW RBC AUTO: 14.5 % (ref 12.4–15.4)
EPI CELLS #/AREA URNS AUTO: 7 /HPF (ref 0–5)
ERYTHROCYTE [SEDIMENTATION RATE] IN BLOOD BY WESTERGREN METHOD: >130 MM/HR (ref 0–30)
GLUCOSE UR STRIP.AUTO-MCNC: NEGATIVE MG/DL
HCT VFR BLD AUTO: 36 % (ref 36–48)
HGB BLD-MCNC: 11.4 G/DL (ref 12–16)
HGB UR QL STRIP.AUTO: NEGATIVE
HYALINE CASTS #/AREA URNS AUTO: 21 /LPF (ref 0–8)
KETONES UR STRIP.AUTO-MCNC: NEGATIVE MG/DL
LEUKOCYTE ESTERASE UR QL STRIP.AUTO: ABNORMAL
MCH RBC QN AUTO: 25.5 PG (ref 26–34)
MCHC RBC AUTO-ENTMCNC: 31.6 G/DL (ref 31–36)
MCV RBC AUTO: 80.8 FL (ref 80–100)
NITRITE UR QL STRIP.AUTO: NEGATIVE
PH UR STRIP.AUTO: 5.5 [PH] (ref 5–8)
PLATELET # BLD AUTO: 314 K/UL (ref 135–450)
PMV BLD AUTO: 7.8 FL (ref 5–10.5)
PROCALCITONIN SERPL IA-MCNC: 0.19 NG/ML (ref 0–0.15)
PROT UR STRIP.AUTO-MCNC: NEGATIVE MG/DL
RBC # BLD AUTO: 4.45 M/UL (ref 4–5.2)
RBC CLUMPS #/AREA URNS AUTO: 1 /HPF (ref 0–4)
SP GR UR STRIP.AUTO: 1.01 (ref 1–1.03)
UA COMPLETE W REFLEX CULTURE PNL UR: ABNORMAL
UA DIPSTICK W REFLEX MICRO PNL UR: YES
URATE SERPL-MCNC: 11.7 MG/DL (ref 2.6–6)
URN SPEC COLLECT METH UR: ABNORMAL
UROBILINOGEN UR STRIP-ACNC: 0.2 E.U./DL
WBC # BLD AUTO: 21.3 K/UL (ref 4–11)
WBC #/AREA URNS AUTO: 5 /HPF (ref 0–5)

## 2023-10-01 PROCEDURE — 87040 BLOOD CULTURE FOR BACTERIA: CPT

## 2023-10-01 PROCEDURE — 73600 X-RAY EXAM OF ANKLE: CPT

## 2023-10-01 PROCEDURE — 84145 PROCALCITONIN (PCT): CPT

## 2023-10-01 PROCEDURE — 85027 COMPLETE CBC AUTOMATED: CPT

## 2023-10-01 PROCEDURE — 86140 C-REACTIVE PROTEIN: CPT

## 2023-10-01 PROCEDURE — 6360000002 HC RX W HCPCS: Performed by: NURSE PRACTITIONER

## 2023-10-01 PROCEDURE — 84550 ASSAY OF BLOOD/URIC ACID: CPT

## 2023-10-01 PROCEDURE — 6370000000 HC RX 637 (ALT 250 FOR IP): Performed by: INTERNAL MEDICINE

## 2023-10-01 PROCEDURE — 1200000000 HC SEMI PRIVATE

## 2023-10-01 PROCEDURE — 73620 X-RAY EXAM OF FOOT: CPT

## 2023-10-01 PROCEDURE — 6370000000 HC RX 637 (ALT 250 FOR IP): Performed by: NURSE PRACTITIONER

## 2023-10-01 PROCEDURE — 85652 RBC SED RATE AUTOMATED: CPT

## 2023-10-01 PROCEDURE — 2580000003 HC RX 258: Performed by: NURSE PRACTITIONER

## 2023-10-01 PROCEDURE — 99233 SBSQ HOSP IP/OBS HIGH 50: CPT | Performed by: INTERNAL MEDICINE

## 2023-10-01 PROCEDURE — 81001 URINALYSIS AUTO W/SCOPE: CPT

## 2023-10-01 PROCEDURE — 73630 X-RAY EXAM OF FOOT: CPT

## 2023-10-01 PROCEDURE — 36415 COLL VENOUS BLD VENIPUNCTURE: CPT

## 2023-10-01 RX ORDER — MORPHINE SULFATE 15 MG/1
15 TABLET ORAL EVERY 4 HOURS PRN
Status: DISCONTINUED | OUTPATIENT
Start: 2023-10-01 | End: 2023-10-06 | Stop reason: HOSPADM

## 2023-10-01 RX ORDER — GABAPENTIN 100 MG/1
100 CAPSULE ORAL 3 TIMES DAILY
Status: DISCONTINUED | OUTPATIENT
Start: 2023-10-01 | End: 2023-10-06 | Stop reason: HOSPADM

## 2023-10-01 RX ORDER — COLCHICINE 0.6 MG/1
0.6 TABLET ORAL ONCE
Status: DISCONTINUED | OUTPATIENT
Start: 2023-10-01 | End: 2023-10-02

## 2023-10-01 RX ORDER — COLCHICINE 0.6 MG/1
1.2 TABLET ORAL ONCE
Status: DISCONTINUED | OUTPATIENT
Start: 2023-10-01 | End: 2023-10-01

## 2023-10-01 RX ORDER — MORPHINE SULFATE 15 MG/1
15 TABLET ORAL EVERY 6 HOURS PRN
Status: DISCONTINUED | OUTPATIENT
Start: 2023-10-01 | End: 2023-10-01

## 2023-10-01 RX ADMIN — MORPHINE SULFATE 15 MG: 15 TABLET ORAL at 15:37

## 2023-10-01 RX ADMIN — APIXABAN 5 MG: 5 TABLET, FILM COATED ORAL at 08:56

## 2023-10-01 RX ADMIN — Medication 10 ML: at 21:46

## 2023-10-01 RX ADMIN — POLYETHYLENE GLYCOL 3350 17 G: 17 POWDER, FOR SOLUTION ORAL at 08:56

## 2023-10-01 RX ADMIN — GABAPENTIN 100 MG: 100 CAPSULE ORAL at 21:45

## 2023-10-01 RX ADMIN — GABAPENTIN 100 MG: 100 CAPSULE ORAL at 15:37

## 2023-10-01 RX ADMIN — MORPHINE SULFATE 15 MG: 15 TABLET ORAL at 18:55

## 2023-10-01 RX ADMIN — AMIODARONE HYDROCHLORIDE 400 MG: 200 TABLET ORAL at 21:45

## 2023-10-01 RX ADMIN — NITROGLYCERIN 1 INCH: 20 OINTMENT TOPICAL at 05:53

## 2023-10-01 RX ADMIN — NITROGLYCERIN 1 INCH: 20 OINTMENT TOPICAL at 03:05

## 2023-10-01 RX ADMIN — NITROGLYCERIN 1 INCH: 20 OINTMENT TOPICAL at 21:46

## 2023-10-01 RX ADMIN — ASPIRIN 81 MG: 81 TABLET, CHEWABLE ORAL at 08:56

## 2023-10-01 RX ADMIN — VALSARTAN 160 MG: 40 TABLET, FILM COATED ORAL at 11:13

## 2023-10-01 RX ADMIN — NITROGLYCERIN 1 INCH: 20 OINTMENT TOPICAL at 18:36

## 2023-10-01 RX ADMIN — NITROGLYCERIN 1 INCH: 20 OINTMENT TOPICAL at 09:01

## 2023-10-01 RX ADMIN — Medication 10 ML: at 08:59

## 2023-10-01 RX ADMIN — MORPHINE SULFATE 2 MG: 2 INJECTION, SOLUTION INTRAMUSCULAR; INTRAVENOUS at 09:55

## 2023-10-01 RX ADMIN — NITROGLYCERIN 1 INCH: 20 OINTMENT TOPICAL at 15:18

## 2023-10-01 RX ADMIN — SENNOSIDES AND DOCUSATE SODIUM 2 TABLET: 50; 8.6 TABLET ORAL at 08:56

## 2023-10-01 RX ADMIN — FUROSEMIDE 40 MG: 40 TABLET ORAL at 08:56

## 2023-10-01 RX ADMIN — BACITRACIN: 500 OINTMENT TOPICAL at 09:01

## 2023-10-01 RX ADMIN — APIXABAN 5 MG: 5 TABLET, FILM COATED ORAL at 21:45

## 2023-10-01 RX ADMIN — METOPROLOL SUCCINATE 200 MG: 50 TABLET, EXTENDED RELEASE ORAL at 08:56

## 2023-10-01 RX ADMIN — FUROSEMIDE 40 MG: 40 TABLET ORAL at 15:37

## 2023-10-01 RX ADMIN — MORPHINE SULFATE 2 MG: 2 INJECTION, SOLUTION INTRAMUSCULAR; INTRAVENOUS at 03:11

## 2023-10-01 RX ADMIN — MORPHINE SULFATE 15 MG: 15 TABLET ORAL at 23:39

## 2023-10-01 RX ADMIN — SPIRONOLACTONE 25 MG: 25 TABLET ORAL at 11:13

## 2023-10-01 RX ADMIN — ATORVASTATIN CALCIUM 80 MG: 80 TABLET, FILM COATED ORAL at 08:56

## 2023-10-01 RX ADMIN — AMIODARONE HYDROCHLORIDE 400 MG: 200 TABLET ORAL at 08:56

## 2023-10-01 ASSESSMENT — PAIN DESCRIPTION - LOCATION
LOCATION: CHEST;ANKLE
LOCATION: ANKLE
LOCATION: CHEST;ANKLE
LOCATION: CHEST
LOCATION: ANKLE
LOCATION: ANKLE

## 2023-10-01 ASSESSMENT — PAIN DESCRIPTION - ORIENTATION
ORIENTATION: MID;LEFT
ORIENTATION: MID
ORIENTATION: LEFT;RIGHT
ORIENTATION: MID
ORIENTATION: LEFT;RIGHT
ORIENTATION: RIGHT;LEFT
ORIENTATION: RIGHT;LEFT

## 2023-10-01 ASSESSMENT — PAIN DESCRIPTION - DESCRIPTORS
DESCRIPTORS: ACHING

## 2023-10-01 ASSESSMENT — PAIN SCALES - GENERAL
PAINLEVEL_OUTOF10: 6
PAINLEVEL_OUTOF10: 8
PAINLEVEL_OUTOF10: 8
PAINLEVEL_OUTOF10: 9
PAINLEVEL_OUTOF10: 8
PAINLEVEL_OUTOF10: 8
PAINLEVEL_OUTOF10: 7
PAINLEVEL_OUTOF10: 6
PAINLEVEL_OUTOF10: 7
PAINLEVEL_OUTOF10: 8
PAINLEVEL_OUTOF10: 8

## 2023-10-01 NOTE — PROGRESS NOTES
Bed in lowest position, bed alarm activated, call light and bedside table within reach. Pt. updated on POC, denies further questions. Will continue to monitor.     Anshu Bhatti RN

## 2023-10-01 NOTE — PROGRESS NOTES
11/28/2022 05:53 AM    BUN 6 09/30/2023 07:14 AM    BUN 7 09/29/2023 09:18 PM    BUN 8 09/25/2023 01:50 PM    CREATININE 0.7 09/30/2023 07:14 AM    CREATININE 0.6 09/29/2023 09:18 PM    CREATININE 0.6 09/25/2023 01:50 PM    GLUCOSE 118 09/30/2023 07:14 AM    GLUCOSE 118 09/29/2023 09:18 PM     Lab Results   Component Value Date    PROBNP <36 06/16/2023    PROBNP 42 11/24/2022    PROBNP 63 01/27/2022     Lab Results   Component Value Date    ALT 9 (L) 09/30/2023    ALT 9 (L) 09/29/2023    AST 19 09/30/2023    AST 21 09/29/2023     Lab Results   Component Value Date/Time    HGB 11.4 10/01/2023 04:29 AM    HGB 11.2 09/30/2023 07:14 AM    HCT 36.0 10/01/2023 04:29 AM    HCT 36.5 09/30/2023 07:14 AM     10/01/2023 04:29 AM     09/30/2023 07:14 AM     Lab Results   Component Value Date/Time    TRIG 90 06/16/2023 11:00 AM    TRIG 99 05/31/2021 07:47 AM    HDL 49 06/16/2023 11:00 AM    HDL 39 05/31/2021 07:47 AM    LDLCALC 166 06/16/2023 11:00 AM    LDLCALC 130 05/31/2021 07:47 AM     Assessment:     ICD shocks  Chronic systolic HF  Afib with RVR. Device interrogation shows that she was shocked for afib with RVR. So the shocks were appropriate. Elevated troponin due to ICD shocks  hyperliidemia     Plan:   Her heart cath was negative in the past few years. Elevated troponin is due to ICD shocks  Continue eliquis 5 mg po qd  Continue amiodarone 400 mg po bid for afib recurrent  Continue home dose of lasix, valsartan, spironolactone for CHF  Continue toprol  mg qd  Wound care for ICD site  EP to see patient tomorrow  I spoke with Dr. Art Vasquez about abnormal WBC, ESR, CRP, etc.  Consider gout with feet pain. Hospitalist to address. Treat chest pain expectantly, due to ICD shocks and recent surgery. The patient was seen for > 50 minutes.  I reviewed interval history, physical exam, review of data including labs, imaging, development and implementation of treatment plan and coordination of complex

## 2023-10-01 NOTE — PROGRESS NOTES
V2.0    [facility] Progress Note      Name:  Laila Mota /Age/Sex: 1955  (74 y.o. female)   MRN & CSN:  [id number] & [id number] Encounter Date/Time: 10/1/2023 5:02 PM EDT   Location:  [address] PCP: No primary care provider on file. Lila Buckner MD       Hospital Day: 3    Assessment and Recommendations   Laila Mota is a 70 y.o. female with pmh of HLD, HTN, Nonischemic cardiomyopathy who presented with ICD (implantable cardioverter-defibrillator) discharge. Plan:     ICD firing:   Cardiology consulted: ICD interrogated with findings of AFIB RVR. Started on Amiodarone & Toprol-XL. Continue Eliquis. Pain control. Leukocytosis with elevated inflammatory markers:  Patient with acute on chronic leukocytosis (Denies evaluation in the past). CRP to 231.2, ESR >130. Procalcitonin 0.19. CXR without acute abnormality. Urinalysis, urine and blood cultures pending. ID consult. Bilateral foot and ankle pain:  Follow-up uric acid and X-rays of the foot. Difficulty ambulating; PT OT evaluation. Pain control. Elevated troponin due to recent surgery and ICD shocks. Heparin drip discontinued. Resumed Eliquis. Pain control. Hypertension: Monitor BP on home medications. Nonischemic Cardiomyopathy/recovered HFrEF:  Appears compensated. ECHO 23: 50 to 39%, grade 1 diastolic dysfunction. Continue Lasix, Valsartan, Aldactone & Toprol-XL. Anemia: Follow-up work up. Goal > 8 g/dL. Chest wall ulcer: Local wound care with bacitracin. Diet ADULT DIET;  Regular; Low Fat/Low Chol/High Fiber/WILBER   DVT Prophylaxis [] Lovenox, []  Heparin, [] SCDs, [] Ambulation,  [x] Eliquis, [] Xarelto  [] Coumadin   Code Status Full Code   Disposition From: Home  Expected Disposition: Home  Estimated Date of Discharge: 10/2  Patient requires continued admission due to ICD shocks   Surrogate Decision Maker/ POA  Daughter     Personally reviewed Lab Studies and Imaging

## 2023-10-02 PROBLEM — D72.825 BANDEMIA: Status: ACTIVE | Noted: 2023-10-02

## 2023-10-02 PROBLEM — L03.116 CELLULITIS OF LEFT ANKLE: Status: ACTIVE | Noted: 2023-10-02

## 2023-10-02 PROBLEM — I48.92 ATRIAL FLUTTER WITH RAPID VENTRICULAR RESPONSE (HCC): Status: ACTIVE | Noted: 2023-10-02

## 2023-10-02 PROBLEM — R05.8 PRODUCTIVE COUGH: Status: ACTIVE | Noted: 2023-10-02

## 2023-10-02 PROBLEM — R70.0 ELEVATED ERYTHROCYTE SEDIMENTATION RATE: Status: ACTIVE | Noted: 2023-10-02

## 2023-10-02 PROBLEM — R79.82 CRP ELEVATED: Status: ACTIVE | Noted: 2023-10-02

## 2023-10-02 LAB
ALBUMIN SERPL-MCNC: 3.6 G/DL (ref 3.4–5)
ALBUMIN/GLOB SERPL: 0.8 {RATIO} (ref 1.1–2.2)
ALP SERPL-CCNC: 88 U/L (ref 40–129)
ALT SERPL-CCNC: 9 U/L (ref 10–40)
ANION GAP SERPL CALCULATED.3IONS-SCNC: 13 MMOL/L (ref 3–16)
AST SERPL-CCNC: 31 U/L (ref 15–37)
BILIRUB SERPL-MCNC: 0.7 MG/DL (ref 0–1)
BUN SERPL-MCNC: 16 MG/DL (ref 7–20)
CALCIUM SERPL-MCNC: 9.7 MG/DL (ref 8.3–10.6)
CHLORIDE SERPL-SCNC: 96 MMOL/L (ref 99–110)
CO2 SERPL-SCNC: 28 MMOL/L (ref 21–32)
CREAT SERPL-MCNC: 1.3 MG/DL (ref 0.6–1.2)
CREAT UR-MCNC: 123.4 MG/DL (ref 28–259)
DEPRECATED RDW RBC AUTO: 14 % (ref 12.4–15.4)
GFR SERPLBLD CREATININE-BSD FMLA CKD-EPI: 44 ML/MIN/{1.73_M2}
GLUCOSE SERPL-MCNC: 118 MG/DL (ref 70–99)
HCT VFR BLD AUTO: 36.7 % (ref 36–48)
HGB BLD-MCNC: 11.6 G/DL (ref 12–16)
LACTATE BLDV-SCNC: 1.1 MMOL/L (ref 0.4–2)
MAGNESIUM SERPL-MCNC: 1.7 MG/DL (ref 1.8–2.4)
MCH RBC QN AUTO: 25.6 PG (ref 26–34)
MCHC RBC AUTO-ENTMCNC: 31.6 G/DL (ref 31–36)
MCV RBC AUTO: 81 FL (ref 80–100)
PHOSPHATE SERPL-MCNC: 5.5 MG/DL (ref 2.5–4.9)
PLATELET # BLD AUTO: 318 K/UL (ref 135–450)
PMV BLD AUTO: 8.1 FL (ref 5–10.5)
POTASSIUM SERPL-SCNC: 3.6 MMOL/L (ref 3.5–5.1)
PROT SERPL-MCNC: 7.9 G/DL (ref 6.4–8.2)
RBC # BLD AUTO: 4.53 M/UL (ref 4–5.2)
SODIUM SERPL-SCNC: 137 MMOL/L (ref 136–145)
WBC # BLD AUTO: 25.2 K/UL (ref 4–11)

## 2023-10-02 PROCEDURE — 6370000000 HC RX 637 (ALT 250 FOR IP): Performed by: INTERNAL MEDICINE

## 2023-10-02 PROCEDURE — 87449 NOS EACH ORGANISM AG IA: CPT

## 2023-10-02 PROCEDURE — 6360000002 HC RX W HCPCS: Performed by: INTERNAL MEDICINE

## 2023-10-02 PROCEDURE — 83605 ASSAY OF LACTIC ACID: CPT

## 2023-10-02 PROCEDURE — 6370000000 HC RX 637 (ALT 250 FOR IP): Performed by: NURSE PRACTITIONER

## 2023-10-02 PROCEDURE — 84100 ASSAY OF PHOSPHORUS: CPT

## 2023-10-02 PROCEDURE — 36415 COLL VENOUS BLD VENIPUNCTURE: CPT

## 2023-10-02 PROCEDURE — 81208 BCR/ABL1 GENE OTHER BP: CPT

## 2023-10-02 PROCEDURE — 97166 OT EVAL MOD COMPLEX 45 MIN: CPT

## 2023-10-02 PROCEDURE — 81207 BCR/ABL1 GENE MINOR BP: CPT

## 2023-10-02 PROCEDURE — 81206 BCR/ABL1 GENE MAJOR BP: CPT

## 2023-10-02 PROCEDURE — 2580000003 HC RX 258: Performed by: NURSE PRACTITIONER

## 2023-10-02 PROCEDURE — 84443 ASSAY THYROID STIM HORMONE: CPT

## 2023-10-02 PROCEDURE — 97530 THERAPEUTIC ACTIVITIES: CPT

## 2023-10-02 PROCEDURE — 2580000003 HC RX 258: Performed by: INTERNAL MEDICINE

## 2023-10-02 PROCEDURE — 83735 ASSAY OF MAGNESIUM: CPT

## 2023-10-02 PROCEDURE — 80053 COMPREHEN METABOLIC PANEL: CPT

## 2023-10-02 PROCEDURE — 81270 JAK2 GENE: CPT

## 2023-10-02 PROCEDURE — 82570 ASSAY OF URINE CREATININE: CPT

## 2023-10-02 PROCEDURE — 87040 BLOOD CULTURE FOR BACTERIA: CPT

## 2023-10-02 PROCEDURE — 99223 1ST HOSP IP/OBS HIGH 75: CPT | Performed by: INTERNAL MEDICINE

## 2023-10-02 PROCEDURE — 84300 ASSAY OF URINE SODIUM: CPT

## 2023-10-02 PROCEDURE — 85027 COMPLETE CBC AUTOMATED: CPT

## 2023-10-02 PROCEDURE — 1200000000 HC SEMI PRIVATE

## 2023-10-02 PROCEDURE — 97162 PT EVAL MOD COMPLEX 30 MIN: CPT

## 2023-10-02 RX ORDER — COLCHICINE 0.6 MG/1
0.6 TABLET ORAL DAILY
Status: DISCONTINUED | OUTPATIENT
Start: 2023-10-02 | End: 2023-10-04

## 2023-10-02 RX ORDER — DIGOXIN 125 MCG
125 TABLET ORAL DAILY
Status: DISCONTINUED | OUTPATIENT
Start: 2023-10-02 | End: 2023-10-06 | Stop reason: HOSPADM

## 2023-10-02 RX ORDER — MAGNESIUM SULFATE IN WATER 40 MG/ML
2000 INJECTION, SOLUTION INTRAVENOUS ONCE
Status: COMPLETED | OUTPATIENT
Start: 2023-10-02 | End: 2023-10-02

## 2023-10-02 RX ADMIN — NITROGLYCERIN 1 INCH: 20 OINTMENT TOPICAL at 03:21

## 2023-10-02 RX ADMIN — NITROGLYCERIN 1 INCH: 20 OINTMENT TOPICAL at 06:32

## 2023-10-02 RX ADMIN — AMPICILLIN SODIUM AND SULBACTAM SODIUM 3000 MG: 2; 1 INJECTION, POWDER, FOR SOLUTION INTRAMUSCULAR; INTRAVENOUS at 16:20

## 2023-10-02 RX ADMIN — COLCHICINE 0.6 MG: 0.6 TABLET, FILM COATED ORAL at 12:07

## 2023-10-02 RX ADMIN — SENNOSIDES AND DOCUSATE SODIUM 2 TABLET: 50; 8.6 TABLET ORAL at 09:33

## 2023-10-02 RX ADMIN — Medication 10 ML: at 09:34

## 2023-10-02 RX ADMIN — AMIODARONE HYDROCHLORIDE 400 MG: 200 TABLET ORAL at 09:34

## 2023-10-02 RX ADMIN — MORPHINE SULFATE 15 MG: 15 TABLET ORAL at 21:45

## 2023-10-02 RX ADMIN — MORPHINE SULFATE 15 MG: 15 TABLET ORAL at 09:32

## 2023-10-02 RX ADMIN — AMPICILLIN SODIUM AND SULBACTAM SODIUM 3000 MG: 2; 1 INJECTION, POWDER, FOR SOLUTION INTRAMUSCULAR; INTRAVENOUS at 21:50

## 2023-10-02 RX ADMIN — GABAPENTIN 100 MG: 100 CAPSULE ORAL at 09:34

## 2023-10-02 RX ADMIN — GABAPENTIN 100 MG: 100 CAPSULE ORAL at 21:44

## 2023-10-02 RX ADMIN — BACITRACIN: 500 OINTMENT TOPICAL at 09:39

## 2023-10-02 RX ADMIN — Medication 10 ML: at 21:45

## 2023-10-02 RX ADMIN — SODIUM CHLORIDE: 9 INJECTION, SOLUTION INTRAVENOUS at 16:18

## 2023-10-02 RX ADMIN — METOPROLOL SUCCINATE 200 MG: 50 TABLET, EXTENDED RELEASE ORAL at 09:33

## 2023-10-02 RX ADMIN — APIXABAN 5 MG: 5 TABLET, FILM COATED ORAL at 09:34

## 2023-10-02 RX ADMIN — AMIODARONE HYDROCHLORIDE 400 MG: 200 TABLET ORAL at 21:45

## 2023-10-02 RX ADMIN — POLYETHYLENE GLYCOL 3350 17 G: 17 POWDER, FOR SOLUTION ORAL at 09:38

## 2023-10-02 RX ADMIN — ATORVASTATIN CALCIUM 80 MG: 80 TABLET, FILM COATED ORAL at 09:33

## 2023-10-02 RX ADMIN — DIGOXIN 125 MCG: 125 TABLET ORAL at 14:14

## 2023-10-02 RX ADMIN — ASPIRIN 81 MG: 81 TABLET, CHEWABLE ORAL at 09:34

## 2023-10-02 RX ADMIN — MORPHINE SULFATE 15 MG: 15 TABLET ORAL at 04:47

## 2023-10-02 RX ADMIN — MAGNESIUM SULFATE HEPTAHYDRATE 2000 MG: 40 INJECTION, SOLUTION INTRAVENOUS at 10:49

## 2023-10-02 RX ADMIN — APIXABAN 5 MG: 5 TABLET, FILM COATED ORAL at 21:45

## 2023-10-02 RX ADMIN — MORPHINE SULFATE 15 MG: 15 TABLET ORAL at 14:10

## 2023-10-02 RX ADMIN — GABAPENTIN 100 MG: 100 CAPSULE ORAL at 14:10

## 2023-10-02 ASSESSMENT — ENCOUNTER SYMPTOMS
EYE DISCHARGE: 0
EYE REDNESS: 0
BACK PAIN: 0
NAUSEA: 0
COUGH: 1
SORE THROAT: 0
SINUS PRESSURE: 0
RHINORRHEA: 0
WHEEZING: 0
SINUS PAIN: 0
ABDOMINAL PAIN: 0
DIARRHEA: 0
CONSTIPATION: 0
SHORTNESS OF BREATH: 0

## 2023-10-02 ASSESSMENT — PAIN DESCRIPTION - DESCRIPTORS: DESCRIPTORS: ACHING

## 2023-10-02 ASSESSMENT — PAIN SCALES - GENERAL
PAINLEVEL_OUTOF10: 9
PAINLEVEL_OUTOF10: 8
PAINLEVEL_OUTOF10: 9

## 2023-10-02 ASSESSMENT — PAIN DESCRIPTION - ORIENTATION
ORIENTATION: LEFT;RIGHT
ORIENTATION: RIGHT;LEFT
ORIENTATION: LEFT;RIGHT

## 2023-10-02 ASSESSMENT — PAIN DESCRIPTION - LOCATION
LOCATION: FOOT
LOCATION: FOOT
LOCATION: LEG
LOCATION: FOOT

## 2023-10-02 NOTE — PROGRESS NOTES
Nutrition Note    RECOMMENDATIONS  PO Diet: Continue current diet   ONS: Offer Ensure Compact BID  Nutrition Support: None      NUTRITION ASSESSMENT   Consult received for poor appetite/PO intake for 5 or more days. Pt reports she is never a Anum eater\" and was eating normally prior to admission. During admission, pt reports lack of appetite, consuming 1-25% of meals. Agreeable to trial Ensure Compact BID. Pt was seen by CHF RN earlier today. Discussed low sodium diet. Pt currently does not add salt to food, but admits she \"hasn't been paying attention to salt in food. \" Pt states she \"knows what to do but hasn't been doing it. \" Explained the rationale behind the low Na+ diet for CHF. Pt agreeable to begin by reading labels and trying not to purchase foods with more than 300 mg/serving. Time spent with patient: 10 minutes. Nutrition Related Findings: Mg+ 1.7. Phos 5.5. LBM 10/1. Appears adequately nourished. No edema noted. Wounds: Skin Tears  Nutrition Education:  Education completed ([facility] diet education 10/2)   Nutrition Goals: PO intake 50% or greater, prior to discharge     MALNUTRITION ASSESSMENT   Malnutrition Status: No malnutrition    NUTRITION DIAGNOSIS   Inadequate protein-energy intake related to inadequate protein-energy intake as evidenced by intake 0-25%    CURRENT NUTRITION THERAPIES  ADULT DIET; Regular; Low Fat/Low Chol/High Fiber/WILBER     PO Intake: 1-25%   PO Supplement Intake:None Ordered    ANTHROPOMETRICS  Current Height: 5' 6\" (167.6 cm)  Current Weight - Scale: 278 lb 3.2 oz (126.2 kg)    Ideal Body Weight (IBW): 130 lbs  (59 kg)        BMI: 44.9    COMPARATIVE STANDARDS  Total Energy Requirements (kcals/day): [id number]     Protein (g):  118 grams       Fluid (mL/day):  0845-0081 mL    The patient will be monitored per nutrition standards of care. Consult dietitian if additional nutrition interventions are needed prior to RD reassessment.      Fabienne Fitzpatrick MS, [address] [facility]    Contact: [phone]

## 2023-10-02 NOTE — CONSULTS
age  EYES:  Lids and lashes normal, pupils equal, round   NECK:  Supple, symmetrical, trachea midline, no adenopathy, thyroid symmetric, not enlarged and no tenderness, skin normal  HEMATOLOGIC/LYMPHATICS:  no cervical lymphadenopathy  LUNGS:  No increased work of breathing, good air exchange, clear to auscultation bilaterally, no crackles or wheezing  CARDIOVASCULAR:  Normal apical impulse, regular rate and rhythm, normal S1 and S2  ABDOMEN:  No scars, normal bowel sounds, soft, non-distended, non-tender, no masses palpated, no hepatosplenomegaly  MUSCULOSKELETAL:  There is no redness, warmth, or swelling of the joints. 1+edema  NEUROLOGIC:  Awake, alert, oriented to name, place and time. SKIN:  no bruising or bleeding    DATA:    CBC:   Lab Results   Component Value Date/Time    WBC 25.2 10/02/2023 04:47 AM    RBC 4.53 10/02/2023 04:47 AM    HGB 11.6 10/02/2023 04:47 AM    HCT 36.7 10/02/2023 04:47 AM    MCV 81.0 10/02/2023 04:47 AM    MCH 25.6 10/02/2023 04:47 AM    MCHC 31.6 10/02/2023 04:47 AM    RDW 14.0 10/02/2023 04:47 AM     10/02/2023 04:47 AM    MPV 8.1 10/02/2023 04:47 AM     BMP:   Lab Results   Component Value Date/Time     10/02/2023 04:38 AM    K 3.6 10/02/2023 04:38 AM    K 3.3 09/29/2023 09:18 PM    CL 96 10/02/2023 04:38 AM    CO2 28 10/02/2023 04:38 AM    BUN 16 10/02/2023 04:38 AM    CREATININE 1.3 10/02/2023 04:38 AM    CALCIUM 9.7 10/02/2023 04:38 AM    GFRAA >60 03/15/2022 05:05 AM    LABGLOM 44 10/02/2023 04:38 AM    GLUCOSE 118 10/02/2023 04:38 AM   Magnesium:    Lab Results   Component Value Date/Time    MG 1.70 10/02/2023 04:38 AM   Phosphorus:    Lab Results   Component Value Date/Time    PHOS 5.5 10/02/2023 04:38 AM     9/30/23 Cxray  Impression:        Limited under penetrated exam.     Prominence of the pulmonary vasculature, could represent pulmonary vascular   congestion. There is a left-sided cardiac device.       UA trace LE, neg blood and protein    IMPRESSION/RECOMMENDATIONS:     VALERIE-probably due to prerenal azotemia in setting of diuretic use, ARB and relative hypotension. Agree with holding diuretics and ARB for now. Hold off on IVF. Follow crea tomorrow. Follow Rusty  Relative hypotension-better  3. Hypokalemia-better  4. Mild Hyponatremia-better  5. Anemia-hgb stable. Follow  6. H/O Systolic CHF-AICD firing. EP following. Hold off RAAS blockade for today  7. H/O Gout-Ortho following. On Colchicine  8. H/O CAD  9. Leukocytosis- ID consulted. On Unaysn. 10.  HLD-on statin    Thank you for the consult. We will follow this patient along the hospitalization.     Jeffrey Cade MD

## 2023-10-02 NOTE — PROGRESS NOTES
[facility]   Electrophysiology Progress Note     Date: 10/4/2023  Admit Date: 9/29/2023     Reason for consultation: AF    Chief Complaint: SOB    History of Present Illness: History obtained from patient and medical record. Elizabeth Castaneda is a 70 y.o. female with a past medical history of HTN, HLD, CHF, obesity, and AICD (2007 at [facility]). In August of 2020, pt presented from outside hospital with chest pain. She recently lost her brother and has not been feeling well. Pt admitted to non-compliance with medications secondary to COVID. She had an AICD shock. Her troponin was noted to be 3.5 with a potassium 2.5 [facility] She underwent cardiac cath, which showed non obstructive coronaries and EF of 35%. S/p RFCA with PVI (10/8/20, Dr. Melissa eMhta). In November of 26949, Pt presented to hospital after AICD shock. She had episodes of sustained AF >200 BPM    Pt has presented to the hospital with multiple AICD shock. Interval Hx: Today, she is being seen for follow up. She is doing ok. She continues to have foot numbness. No further arrhythmia issues. Patient seen and examined. Clinical notes reviewed. Telemetry reviewed. No new complaints today. No major events overnight. Denies having chest pain, palpitations, shortness of breath, orthopnea/PND, cough, or dizziness at the time of this visit. Allergies:  No Known Allergies    Home Meds:  Prior to Visit Medications    Medication Sig Taking?  Authorizing Provider   aspirin 81 MG chewable tablet Take 1 tablet by mouth daily  Provider, MD TOBI Albright 5 MG TABS tablet TAKE ONE TABLET BY MOUTH TWICE A DAY  YEIMY Mckinney - CNP   atorvastatin (LIPITOR) 40 MG tablet Take 2 tablets by mouth daily  YEIMY Felder CNP   metoprolol succinate (TOPROL XL) 200 MG extended release tablet Take 1 tablet by mouth daily  YEIMY Felder CNP   spironolactone (ALDACTONE) 25 MG tablet Take 1 tablet by mouth daily  YEIMY Felder

## 2023-10-02 NOTE — PROGRESS NOTES
Per Dr. Ashish Lam, CT scans should wait until tomorrow pending kidney function. Dr. Kelley George notified.

## 2023-10-02 NOTE — PROGRESS NOTES
Decision Making (Complexity): medium complexity  Clinical Presentation: evolving      Subjective  General: Patient in bed upon arrival, agreeable to OT/PT evaluation. Patient reporting she was up in chair yesterday but unable today due to pain. Patient intermittently emotional due to current functional status  Pain: 9/10. Location: chest and BLE, specifically feet   Pain Interventions: RN delivered pain medication within therapy session per patient request, repositioned , and therapy activities modified        Activities of Daily Living  Basic Activities of Daily Living  Toileting: dependent. Toileting Comments: patient incontinent of urine. Assist of 2 for rolling, pericare and brief change. New purewick placed   General Comments: patient declining other ADLs due to poor sitting tolerance   Instrumental Activities of Daily Living  No IADL completed on this date. Functional Mobility  Bed Mobility:  Supine to Sit: 2 person assistance with maxA of 2    Sit to Supine: 2 person assistance with maxA of 2   Rolling Left: maximum assistance  Rolling Right: maximum assistance  Scootin person assistance with total A of 2    Comments:   Transfers:  No transfers completed on this date secondary to poor sitting tolerance, pain and patient refusal .  Comments:  Functional Mobility  No functional mobility completed on this date secondary to safety concerns .   Balance:  Static Sitting Balance: poor (+): requires min (A) to maintain balance  Dynamic Sitting Balance: poor (-): requires max (A) to maintain balance  Comments: tolerated ~5 mins sitting EOB with CGA-maxA     Other Therapeutic Interventions    Functional Outcomes  AM-PAC Inpatient Daily Activity Raw Score: 10    Cognition  WFL  Orientation:    alert and oriented x 4  Command Following:   [facility]     Education  Barriers To Learning: emotional and physical  Patient Education: patient educated on goals, OT role and benefits, plan of care, transfer training, discharge recommendations  Learning Assessment:  patient verbalizes understanding, would benefit from continued reinforcement    Assessment  Activity Tolerance: patient limited by pain and weakness   Impairments Requiring Therapeutic Intervention: decreased functional mobility, decreased ADL status, decreased strength, decreased safety awareness, decreased cognition, decreased endurance, decreased balance, increased pain, decreased posture  Prognosis: fair  Clinical Assessment: Patient presenting below baseline function secondary to ICD malfunction, pain and debility. Patient typically independent with ADLs, transfers and mobility. Patient requiring maxA of 2 for bed mobility and unable to attempt transfer. Patient is not safe to return home. Patient will benefit from continued OT services to address above deficits and maximize safety and independence. Safety Interventions: patient left in bed, bed alarm in place, call light within reach, patient at risk for falls, and nurse notified    Plan  Frequency: 3-5 x/per week  Current Treatment Recommendations: strengthening, balance training, functional mobility training, transfer training, endurance training, patient/caregiver education, and ADL/self-care training    Goals  Patient Goals: not stated    Short Term Goals:  Time Frame: Discharge   Patient will complete upper body ADL at supervision   Patient will complete lower body ADL at moderate assistance   Patient will complete functional transfers at moderate assistance   Patient will increase [facility] ADL score = to or > than 18/24    Above goals reviewed on 10/2/2023. All goals are ongoing at this time unless indicated above.        Therapy Session Time     Individual Group Co-treatment   Time In    9833   Time Out    0956   Minutes    42        Timed Code Treatment Minutes:  Timed Code Treatment Minutes: 27 Minutes  Total Treatment Minutes:  42 minutes        Electronically Signed By: ORLY Jones MOT OTRIKKI/MICHAEL

## 2023-10-02 NOTE — PROGRESS NOTES
Physical Therapy    [address] [place] [address] Department   Phone: [phone]    Physical Therapy    [x] Initial Evaluation            [] Daily Treatment Note         [] Discharge Summary      Patient: Vu Hoskins   : 1955   MRN: [id number]   Date of Service:  10/2/2023  Admitting Diagnosis: ICD (implantable cardioverter-defibrillator) discharge  Current Admission Summary: Vu Hoskins is a 69 yo female with history of HTN, hyperlipidemia, CHF, NICM, obesity, ICD, afib, NSVT who went to [facility] for chest pain and multiple ICD shocks. Yesterday she was trying to get up from bed and felt 6 consecutive ICD shocks. She sat down on the floor and had an additional 5 shocks. She went to [facility] and was admitted. She was started on IV heparin and IV amiodarone. She had generator change of ICD on 23 here at [facility]. She was transferred from [facility] to [facility] for EP to see last evening. Past Medical History:  has a past medical history of AICD (automatic cardioverter/defibrillator) present, Arthritis, CHF (congestive heart failure) ([address]), Gout, Hyperlipidemia, Hypertension, and MI (myocardial infarction) ([address]). Past Surgical History:  has a past surgical history that includes Dilation and curettage of uterus; Cardiac defibrillator placement; and Pain management procedure (Bilateral, 2022). Discharge Recommendations: Vu Hoskins scored a 7/24 on the AM-PAC short mobility form. Current research shows that an AM-PAC score of 17 or less is typically not associated with a discharge to the patient's home setting. Based on the patient's AM-PAC score and their current functional mobility deficits, it is recommended that the patient have 3-5 sessions per week of Physical Therapy at d/c to increase the patient's independence. Please see assessment section for further patient specific details. If patient discharges prior to next session this note will serve as a discharge summary.   Please see below for understanding, would benefit from continued reinforcement    Assessment  Activity Tolerance: Poor; patient was limited secondary to pain  Impairments Requiring Therapeutic Intervention: decreased functional mobility, decreased ADL status, decreased ROM, decreased strength, decreased endurance, decreased sensation, decreased balance, increased pain, decreased posture  Prognosis: fair  Clinical Assessment: Patient is a 70year old female who presents with weakness/debility secondary to ICD malfunction. Patient reports mod I with use of RW for functional mobility in her home setting. Currently, patient requires max-dep A of two skilled therapists for bed mobility. Patient was unable to stand due to pain in her chest and B LE. Patient would benefit from additional skilled PT upon discharge to promote independence and safety with functional mobility. Safety Interventions: patient left in bed, bed alarm in place, call light within reach, gait belt, patient at risk for falls, and nurse notified    Plan  Frequency: 3-5 x/per week  Current Treatment Recommendations: strengthening, ROM, balance training, functional mobility training, transfer training, gait training, stair training, endurance training, and positioning    Goals  Patient Goals: To return home   Short Term Goals:  Time Frame: Upon discharge   Patient will complete bed mobility at minimal assistance   Patient will complete transfers at minimal assistance   Patient will ambulate 25 ft with use of rolling walker at moderate assistance    Above goals reviewed on 10/2/2023. All goals are ongoing at this time unless indicated above.       Therapy Session Time      Individual Group Co-treatment   Time In     3866   Time Out     0956   Minutes     42     Timed Code Treatment Minutes:  27 Minutes  Total Treatment Minutes:  42 minutes        Electronically Signed By: Lorri Blum PT, DPT [id number]

## 2023-10-02 NOTE — CONSULTS
[facility] Orthopedic Surgery  Consult Note    Patient: Dejan Bland  Admit Date: 9/29/2023  Requesting Physician: Chapis Capone MD  Room: [address]    Chief complaint: Bilateral foot and ankle pain    HPI: Dejan Bland is a 70 y.o. female who presented to [facility] ER after being transferred from [facility] for 6 ICD shocks. She denies trauma. She as hx of gout but not on maintenance meds. She has VALERIE and chronic leukocytosis. She is afebrile but has elevated ESR/CRP and PCT. Describes pain in the bilateral ankles and feet of moderate intensity and of aching nature since 3 days ago which is relieved by nothing. Denies new numbness/tingling. Independent imaging review of the bilateral feet and ankles via plain films demonstrated: no fracture or malalignment - no effusion, there is some soft tissue swelling - no noted lesions. Relevant notes, labs and other tests reviewed. Medical History:  Past Medical History:   Diagnosis Date    AICD (automatic cardioverter/defibrillator) present     Arthritis     CHF (congestive heart failure) ([address])     Gout     Hyperlipidemia     Hypertension     MI (myocardial infarction) ([address])      Past Surgical History:   Procedure Laterality Date    CARDIAC DEFIBRILLATOR PLACEMENT      DILATION AND CURETTAGE OF UTERUS      PAIN MANAGEMENT PROCEDURE Bilateral 6/29/2022    BILATERAL L5 TRANSFORAMINAL EPIDURAL STEROID INJECTION WITH FLUOROSCOPY performed by Abner Jose MD at [address] History:    reports that she has never smoked.  She has never used smokeless tobacco.    Family History:        Problem Relation Age of Onset    Heart Disease Mother     High Blood Pressure Mother     High Cholesterol Mother     Diabetes Mother     Cancer Father        Medications:  ALL MEDICATIONS HAVE BEEN REVIEWED:  Scheduled:   magnesium sulfate  2,000 mg IntraVENous Once    gabapentin  100 mg Oral TID    nitroglycerin  1 inch Topical Q4H    bacitracin   Topical

## 2023-10-02 NOTE — CONSULTS
[address]   Electrophysiology   Date: 10/2/2023  Reason for Consultation: AICD shock    Consult Requesting Physician: Jose Daniel Hernández MD     CC: Multiple AICD shock   HPI: Ant Wise is a 70 y.o. female who has presented to the hospital with multiple AICD shock. Patient has multiple medical conditions including HTN, HLD, obesity, HFrEF with severe LV dysfunction, NSVT, non-ischemic cardiomyopathy, s/p AICD in the past in 2007 at [facility] in August 2020. He has history of PVI in 10/8/2020. She also has history of Afib with RVR resulting in shock. In November 2022 she presented to the hospital with multiple shock. Noted to have atrial flutter with rapid ventricular response. Discussed ablation vs antiarrhythmic therapy. She was started on Amiodarone. Not clear whether she was taking amiodarone or not. She had generator change out on 9/25/2023. EP has been consulted for evaluation of AICD shocks. Assessment:   Afib / Zak Elwin with rapid ventricular response    History of Afib / flutter ablation   Multiple AICD shocks  LV dysfunction   HFrEF  Obesity  HLD  Leukocytosis     Plan:   Discussed treatment options including antiarrhythmic therapy vs EPS/ablation. She is being loaded with amiodarone. She was started on Amiodarone last November but not clear whether she was taking it or not. Device interrogation reviewed and showed Aflutter with rapid ventricular response. I discussed potential, and rare side effects of amiodarone including but not limited to lung, liver and thyroid toxicity. Close monitoring for amiodarone toxicity with LFTs, TSH, and CXR. Change amiodarone to 200 mg bid for two weeks followed by 200 mg/day. Continue Toprol XL  Added digoxin     Discussed ablation. Risks, benefits and alternative of procedure were explained. All questions answered. Plan for ablation when acute issues resolved.      Patient also has had skin abrasion after her dressing TRANSFORAMINAL EPIDURAL STEROID INJECTION WITH FLUOROSCOPY performed by Lias Mcnair MD at [facility]       No Known Allergies     reports that she has never smoked. She has never used smokeless tobacco. She reports that she does not drink alcohol and does not use drugs. Discussed with cardiology team.   I independently reviewed / interpreted ECG, cardiac tracings/rhythm ECGs strips    Independently reviewed/interpreted device interrogation. [facility]. I independently reviewed relevant and available cardiac diagnostic tests. All questions and concerns were addressed to the patient/family. Alternatives to my treatment were discussed. I have discussed the above stated plan and the patient verbalized understanding and agreed with the plan. NOTE: This report was transcribed using voice recognition software. Every effort was made to ensure accuracy, however, inadvertent computerized transcription errors may be present.      Sally Diaz MD, MPH  [address] [place] [address]   Office: [phone]  Fax: [phone]

## 2023-10-02 NOTE — CONSULTS
[address] 1955    History:  Past Medical History:   Diagnosis Date    AICD (automatic cardioverter/defibrillator) present     Arthritis     CHF (congestive heart failure) ([address])     Gout     Hyperlipidemia     Hypertension     MI (myocardial infarction) ([address])        ECHO:  10/8/20      Summary   Left ventricular systolic function is moderately reduced with estimated   ejection fraction of 40%. There is moderate concentric left ventricular   hypertrophy. There is no evidence of mass or thrombus in the left atrium or appendage. ACE/ARB/ARNi: valsartan 160 mg daily- on hold with VALERIE  BB: toprol xl 200 mg daily  Aldosterone Antagonist: aldactone 25  mg daily- on hold with VALERIE  SGLT2: Patient not on- consider if not contraindicated       History of sleep apnea: Yes  Type: ros   Equipment used at home: does not follow up with sleep      DM History: No    Last Hospital Admission: 11/24/22 with [facility]  Code Status: full code   Discharge plans: from home- alone    Family Present: yes, daughters    Dave Looney was admitted to the hospital after her ICD discharged. Was asked to see patient for history of HF. She has been seen in the past and follows with HF NP as an outpatient. She does not have a scale at home. Stressed importance of daily weights. Patient does not add salt to season foods but admits she may get in more sodium in diet. She knows to follow 2 liter fluid restriction. She may go over that if she is thirsty at night. Patient is compliant with medications and follow up visits. Educated patient/family on CHF signs/ symptoms, causes, treatments, limited fluid intake, daily weights, discharge medications, low sodium diet, activiety and follow-up. Pt to call physician if weight gain of 3 pounds in one day or 5 pounds in one week.       Discussed importance of lifestyle changes: encourage daily weights    PATIENT/CAREGIVER TEACHING:    Level of

## 2023-10-02 NOTE — CARE COORDINATION
Case Management Assessment  Initial Evaluation    Date/Time of Evaluation: 10/2/2023 3:45 PM  Assessment Completed by: FRANKI Bhagat, CARSON    If patient is discharged prior to next notation, then this note serves as note for discharge by case management. Patient Name: Samira Tipton                   YOB: 1955  Diagnosis: ICD (implantable cardioverter-defibrillator) discharge [Z45.02]                   Date / Time: 9/29/2023  7:54 PM    Patient Admission Status: Inpatient   Readmission Risk (Low < 19, Mod (19-27), High > 27): Readmission Risk Score: 10.6    Current PCP: No primary care provider on file. PCP verified by CM? Yes    Chart Reviewed: Yes      History Provided by: Patient  Patient Orientation: Alert and Oriented    Patient Cognition: Alert    Hospitalization in the last 30 days (Readmission):  No    If yes, Readmission Assessment in CM Navigator will be completed. Advance Directives:      Code Status: Full Code   Patient's Primary Decision Maker is:        Discharge Planning:    Patient lives with: Alone Type of Home: Apartment (1 level - 2 steps in)  Primary Care Giver: Self  Patient Support Systems include: Family Members   Current Financial resources: Medicare  Current community resources: None  Current services prior to admission: Durable Medical Equipment            Current DME: Other (Comment) (walker, life alert buttong, shower chair, RTS)            Type of Home Care services:  None    ADLS  Prior functional level: Independent in ADLs/IADLs (uses walker)  Current functional level: Mobility (2 person assist)    PT AM-PAC: 7 /24  OT AM-PAC: 10 /24    Family can provide assistance at DC: No  Would you like Case Management to discuss the discharge plan with any other family members/significant others, and if so, who?  Yes (w/family)  Plans to Return to Present Housing: No  Other Identified Issues/Barriers to RETURNING to current housing:  None  Potential Assistance needed at discharge: [address] (Was agreeable to a referral to [facility])            Potential DME:    Patient expects to discharge to: [address] [place] [address] S for transportation at discharge: Other (see comment) (Stretcher to SNF)    Financial    Payor: Elli Cory / Plan: Jeannie Ferris ESSENTIAL/PLUS / Product Type: *No Product type* /     Does insurance require precert for SNF: Yes    Potential assistance Purchasing Medications:    Meds-to-Beds request:        [facility] [id number] Dave Alasjjar, [address] [facility]  [address] [facility] [address]  Phone: [phone] Fax: [phone]      Notes:    Factors facilitating achievement of predicted outcomes: Family support, Cooperative, and Pleasant    Barriers to discharge:  Precert to SNF    Additional Case Management Notes:  SW discussed SNF recommendation by therapy today. Pt is agreeable. She, at first, did not think her insurance would approve since she was denied ARU last admission. SW explained difference between ARU and SNF. Pt verbalized understanding and agreed she does not feel she can handle 3 hours of therapy per day right now. Pt was agreeable to a referral to [facility]. Referral was faxed today. The Plan for Transition of Care is related to the following treatment goals of ICD (implantable cardioverter-defibrillator) discharge [W66.62]    IF APPLICABLE: The Patient and/or patient representative Rell and her family were provided with a choice of provider and agrees with the discharge plan. Freedom of choice list with basic dialogue that supports the patient's individualized plan of care/goals and shares the quality data associated with the providers was provided to: Patient   Patient Representative Name:       The Patient and/or Patient Representative Agree with the Discharge Plan?  Yes    FRANKI Newman LSW    Electronically signed by FRANKI Newman LSW on 10/2/2023 at 3:47

## 2023-10-02 NOTE — PROGRESS NOTES
Bed in lowest position, bed alarm activated, call light and bedside table within reach. Pt. updated on POC, denies further questions. Will continue to monitor.     Kamilla Carlos RN

## 2023-10-03 ENCOUNTER — APPOINTMENT (OUTPATIENT)
Dept: CT IMAGING | Age: 68
End: 2023-10-03
Attending: HOSPITALIST
Payer: MEDICARE

## 2023-10-03 PROBLEM — J13 CAP (COMMUNITY ACQUIRED PNEUMONIA) DUE TO PNEUMOCOCCUS (HCC): Status: ACTIVE | Noted: 2023-10-03

## 2023-10-03 LAB
ALBUMIN SERPL-MCNC: 3.3 G/DL (ref 3.4–5)
ANION GAP SERPL CALCULATED.3IONS-SCNC: 14 MMOL/L (ref 3–16)
BUN SERPL-MCNC: 19 MG/DL (ref 7–20)
CALCIUM SERPL-MCNC: 8.6 MG/DL (ref 8.3–10.6)
CHLORIDE SERPL-SCNC: 95 MMOL/L (ref 99–110)
CK SERPL-CCNC: 5067 U/L (ref 26–192)
CO2 SERPL-SCNC: 24 MMOL/L (ref 21–32)
CREAT SERPL-MCNC: 1.2 MG/DL (ref 0.6–1.2)
DEPRECATED RDW RBC AUTO: 14.2 % (ref 12.4–15.4)
GFR SERPLBLD CREATININE-BSD FMLA CKD-EPI: 49 ML/MIN/{1.73_M2}
GLUCOSE SERPL-MCNC: 122 MG/DL (ref 70–99)
HCT VFR BLD AUTO: 34.6 % (ref 36–48)
HGB BLD-MCNC: 10.8 G/DL (ref 12–16)
LEGIONELLA AG UR QL: NORMAL
MCH RBC QN AUTO: 25.4 PG (ref 26–34)
MCHC RBC AUTO-ENTMCNC: 31.3 G/DL (ref 31–36)
MCV RBC AUTO: 81.1 FL (ref 80–100)
PHOSPHATE SERPL-MCNC: 4.1 MG/DL (ref 2.5–4.9)
PLATELET # BLD AUTO: 319 K/UL (ref 135–450)
PMV BLD AUTO: 8.2 FL (ref 5–10.5)
POTASSIUM SERPL-SCNC: 3.5 MMOL/L (ref 3.5–5.1)
RBC # BLD AUTO: 4.26 M/UL (ref 4–5.2)
S PNEUM AG UR QL: ABNORMAL
SODIUM SERPL-SCNC: 133 MMOL/L (ref 136–145)
SODIUM UR-SCNC: 50 MMOL/L
TSH SERPL DL<=0.005 MIU/L-ACNC: 3.77 UIU/ML (ref 0.27–4.2)
WBC # BLD AUTO: 19.9 K/UL (ref 4–11)

## 2023-10-03 PROCEDURE — 6370000000 HC RX 637 (ALT 250 FOR IP): Performed by: NURSE PRACTITIONER

## 2023-10-03 PROCEDURE — 6370000000 HC RX 637 (ALT 250 FOR IP): Performed by: INTERNAL MEDICINE

## 2023-10-03 PROCEDURE — 87641 MR-STAPH DNA AMP PROBE: CPT

## 2023-10-03 PROCEDURE — 85027 COMPLETE CBC AUTOMATED: CPT

## 2023-10-03 PROCEDURE — 1200000000 HC SEMI PRIVATE

## 2023-10-03 PROCEDURE — 73701 CT LOWER EXTREMITY W/DYE: CPT

## 2023-10-03 PROCEDURE — 97530 THERAPEUTIC ACTIVITIES: CPT

## 2023-10-03 PROCEDURE — 6360000004 HC RX CONTRAST MEDICATION: Performed by: INTERNAL MEDICINE

## 2023-10-03 PROCEDURE — 97110 THERAPEUTIC EXERCISES: CPT

## 2023-10-03 PROCEDURE — 83036 HEMOGLOBIN GLYCOSYLATED A1C: CPT

## 2023-10-03 PROCEDURE — 2580000003 HC RX 258: Performed by: NURSE PRACTITIONER

## 2023-10-03 PROCEDURE — 2580000003 HC RX 258: Performed by: INTERNAL MEDICINE

## 2023-10-03 PROCEDURE — 99233 SBSQ HOSP IP/OBS HIGH 50: CPT | Performed by: INTERNAL MEDICINE

## 2023-10-03 PROCEDURE — 80069 RENAL FUNCTION PANEL: CPT

## 2023-10-03 PROCEDURE — 71260 CT THORAX DX C+: CPT

## 2023-10-03 PROCEDURE — 6360000002 HC RX W HCPCS: Performed by: INTERNAL MEDICINE

## 2023-10-03 PROCEDURE — 82550 ASSAY OF CK (CPK): CPT

## 2023-10-03 PROCEDURE — 36415 COLL VENOUS BLD VENIPUNCTURE: CPT

## 2023-10-03 RX ORDER — AMIODARONE HYDROCHLORIDE 200 MG/1
200 TABLET ORAL 2 TIMES DAILY
Status: DISCONTINUED | OUTPATIENT
Start: 2023-10-03 | End: 2023-10-06 | Stop reason: HOSPADM

## 2023-10-03 RX ORDER — ASCORBIC ACID 500 MG
500 TABLET ORAL DAILY
Status: COMPLETED | OUTPATIENT
Start: 2023-10-03 | End: 2023-10-05

## 2023-10-03 RX ORDER — POTASSIUM CHLORIDE 20 MEQ/1
40 TABLET, EXTENDED RELEASE ORAL ONCE
Status: DISCONTINUED | OUTPATIENT
Start: 2023-10-03 | End: 2023-10-06 | Stop reason: HOSPADM

## 2023-10-03 RX ORDER — SODIUM CHLORIDE 9 MG/ML
INJECTION, SOLUTION INTRAVENOUS CONTINUOUS
Status: ACTIVE | OUTPATIENT
Start: 2023-10-03 | End: 2023-10-04

## 2023-10-03 RX ADMIN — APIXABAN 5 MG: 5 TABLET, FILM COATED ORAL at 08:58

## 2023-10-03 RX ADMIN — APIXABAN 5 MG: 5 TABLET, FILM COATED ORAL at 20:37

## 2023-10-03 RX ADMIN — SENNOSIDES AND DOCUSATE SODIUM 2 TABLET: 50; 8.6 TABLET ORAL at 08:58

## 2023-10-03 RX ADMIN — MORPHINE SULFATE 15 MG: 15 TABLET ORAL at 20:37

## 2023-10-03 RX ADMIN — SODIUM CHLORIDE: 9 INJECTION, SOLUTION INTRAVENOUS at 20:36

## 2023-10-03 RX ADMIN — ATORVASTATIN CALCIUM 80 MG: 80 TABLET, FILM COATED ORAL at 08:58

## 2023-10-03 RX ADMIN — IOPAMIDOL 120 ML: 755 INJECTION, SOLUTION INTRAVENOUS at 12:32

## 2023-10-03 RX ADMIN — GABAPENTIN 100 MG: 100 CAPSULE ORAL at 08:58

## 2023-10-03 RX ADMIN — ASPIRIN 81 MG: 81 TABLET, CHEWABLE ORAL at 08:59

## 2023-10-03 RX ADMIN — BACITRACIN: 500 OINTMENT TOPICAL at 09:20

## 2023-10-03 RX ADMIN — AMIODARONE HYDROCHLORIDE 400 MG: 200 TABLET ORAL at 08:58

## 2023-10-03 RX ADMIN — MORPHINE SULFATE 15 MG: 15 TABLET ORAL at 15:52

## 2023-10-03 RX ADMIN — MORPHINE SULFATE 15 MG: 15 TABLET ORAL at 12:12

## 2023-10-03 RX ADMIN — POTASSIUM BICARBONATE 40 MEQ: 782 TABLET, EFFERVESCENT ORAL at 10:02

## 2023-10-03 RX ADMIN — GABAPENTIN 100 MG: 100 CAPSULE ORAL at 20:37

## 2023-10-03 RX ADMIN — AMPICILLIN SODIUM AND SULBACTAM SODIUM 3000 MG: 2; 1 INJECTION, POWDER, FOR SOLUTION INTRAMUSCULAR; INTRAVENOUS at 09:11

## 2023-10-03 RX ADMIN — OXYCODONE HYDROCHLORIDE AND ACETAMINOPHEN 500 MG: 500 TABLET ORAL at 09:22

## 2023-10-03 RX ADMIN — POLYETHYLENE GLYCOL 3350 17 G: 17 POWDER, FOR SOLUTION ORAL at 08:59

## 2023-10-03 RX ADMIN — AMPICILLIN SODIUM AND SULBACTAM SODIUM 3000 MG: 2; 1 INJECTION, POWDER, FOR SOLUTION INTRAMUSCULAR; INTRAVENOUS at 15:08

## 2023-10-03 RX ADMIN — Medication 10 ML: at 09:16

## 2023-10-03 RX ADMIN — COLCHICINE 0.6 MG: 0.6 TABLET, FILM COATED ORAL at 08:58

## 2023-10-03 RX ADMIN — DIGOXIN 125 MCG: 125 TABLET ORAL at 08:58

## 2023-10-03 RX ADMIN — MORPHINE SULFATE 15 MG: 15 TABLET ORAL at 02:18

## 2023-10-03 RX ADMIN — METOPROLOL SUCCINATE 200 MG: 50 TABLET, EXTENDED RELEASE ORAL at 08:58

## 2023-10-03 RX ADMIN — MORPHINE SULFATE 15 MG: 15 TABLET ORAL at 08:58

## 2023-10-03 RX ADMIN — SODIUM CHLORIDE: 9 INJECTION, SOLUTION INTRAVENOUS at 09:19

## 2023-10-03 RX ADMIN — AMIODARONE HYDROCHLORIDE 200 MG: 200 TABLET ORAL at 20:37

## 2023-10-03 RX ADMIN — GABAPENTIN 100 MG: 100 CAPSULE ORAL at 12:12

## 2023-10-03 RX ADMIN — AMPICILLIN SODIUM AND SULBACTAM SODIUM 3000 MG: 2; 1 INJECTION, POWDER, FOR SOLUTION INTRAMUSCULAR; INTRAVENOUS at 20:43

## 2023-10-03 RX ADMIN — AMPICILLIN SODIUM AND SULBACTAM SODIUM 3000 MG: 2; 1 INJECTION, POWDER, FOR SOLUTION INTRAMUSCULAR; INTRAVENOUS at 04:08

## 2023-10-03 ASSESSMENT — PAIN DESCRIPTION - FREQUENCY
FREQUENCY: CONTINUOUS
FREQUENCY: CONTINUOUS

## 2023-10-03 ASSESSMENT — ENCOUNTER SYMPTOMS
CHEST TIGHTNESS: 0
SHORTNESS OF BREATH: 0
ABDOMINAL DISTENTION: 0
WHEEZING: 0
BACK PAIN: 0
DIARRHEA: 0
ABDOMINAL PAIN: 0
PHOTOPHOBIA: 0
VOMITING: 0
RHINORRHEA: 0
COUGH: 0
SORE THROAT: 0
FACIAL SWELLING: 0
BLOOD IN STOOL: 0
EYE DISCHARGE: 0
EYE REDNESS: 0
SINUS PRESSURE: 0
COUGH: 1
SINUS PAIN: 0
NAUSEA: 0
CONSTIPATION: 0

## 2023-10-03 ASSESSMENT — PAIN DESCRIPTION - ORIENTATION
ORIENTATION: LEFT;RIGHT
ORIENTATION: RIGHT;LEFT
ORIENTATION: LEFT;RIGHT
ORIENTATION: RIGHT;LEFT
ORIENTATION: LEFT;RIGHT

## 2023-10-03 ASSESSMENT — PAIN SCALES - GENERAL
PAINLEVEL_OUTOF10: 8
PAINLEVEL_OUTOF10: 8
PAINLEVEL_OUTOF10: 7
PAINLEVEL_OUTOF10: 0
PAINLEVEL_OUTOF10: 7
PAINLEVEL_OUTOF10: 8
PAINLEVEL_OUTOF10: 9
PAINLEVEL_OUTOF10: 8
PAINLEVEL_OUTOF10: 9
PAINLEVEL_OUTOF10: 6
PAINLEVEL_OUTOF10: 8

## 2023-10-03 ASSESSMENT — PAIN DESCRIPTION - LOCATION
LOCATION: FOOT;LEG
LOCATION: FOOT
LOCATION: LEG;FOOT
LOCATION: LEG

## 2023-10-03 ASSESSMENT — PAIN DESCRIPTION - PAIN TYPE
TYPE: ACUTE PAIN
TYPE: ACUTE PAIN

## 2023-10-03 ASSESSMENT — PAIN DESCRIPTION - DESCRIPTORS
DESCRIPTORS: ACHING

## 2023-10-03 ASSESSMENT — PAIN DESCRIPTION - ONSET
ONSET: ON-GOING
ONSET: ON-GOING

## 2023-10-03 NOTE — PROGRESS NOTES
for CT with contrast to evaluate infection source, she expresses understanding and agreeable to proceed. NSS+Vit C. Follow crea tomorrow. Relative hypotension-better  3. Hypokalemia-better. Give 40meq po K today. 4.   Mild Hyponatremia-follow  5. Anemia-hgb stable. Follow  6. H/O Systolic CHF-AICD firing. EP following. Hold off RAAS blockade for today  7. H/O Gout-Ortho following. On Colchicine  8. H/O CAD  9. Leukocytosis- ID consulted. On Unaysn.    10.  HLD-on statin    Gillian Mclaughlin MD

## 2023-10-03 NOTE — CARE COORDINATION
Discharge Planning:     (CM) was notified that pt was accepted at [facility]. Pt not ready to d/c, CM team to check tomorrow in rounds if pre-cert should be started.         Electronically signed by FRANKI Fritz on 10/3/2023 at 2:42 PM

## 2023-10-03 NOTE — FLOWSHEET NOTE
Assessment completed per flow sheet. Pt c/o level 9/10 pain in bilateral feet. Pain medication given per mar. Pt repositioned in bed purewick brief and pad changed. POC discussed with pt for night and all questions answered.

## 2023-10-03 NOTE — PROGRESS NOTES
Physical Therapy    [address] [place] [address] Department   Phone: [phone]    Physical Therapy    [] Initial Evaluation            [x] Daily Treatment Note         [] Discharge Summary      Patient: Higinio Horn   : 1955   MRN: [id number]   Date of Service:  10/3/2023  Admitting Diagnosis: AICD discharge  Current Admission Summary: Higinio Horn is a 69 yo female with history of HTN, hyperlipidemia, CHF, NICM, obesity, ICD, afib, NSVT who went to [facility] for chest pain and multiple ICD shocks. Yesterday she was trying to get up from bed and felt 6 consecutive ICD shocks. She sat down on the floor and had an additional 5 shocks. She went to [facility] and was admitted. She was started on IV heparin and IV amiodarone. She had generator change of ICD on 23 here at [facility]. She was transferred from [facility] to [facility] for EP to see last evening. XRay B feet and ankle - negative for acute fracture but Soft tissue swelling present   CT of chest and L foot pending  Past Medical History:  has a past medical history of AICD (automatic cardioverter/defibrillator) present, Arthritis, CHF (congestive heart failure) ([address]), Gout, Hyperlipidemia, Hypertension, and MI (myocardial infarction) ([address] Past Surgical History:  has a past surgical history that includes Dilation and curettage of uterus; Cardiac defibrillator placement; and Pain management procedure (Bilateral, 2022). Discharge Recommendations: Higinio Horn scored a 8/24 on the AM-PAC short mobility form. Current research shows that an AM-PAC score of 17 or less is typically not associated with a discharge to the patient's home setting. Based on the patient's AM-PAC score and their current functional mobility deficits, it is recommended that the patient have 3-5 sessions per week of Physical Therapy at d/c to increase the patient's independence. Please see assessment section for further patient specific details.  If patient discharges prior to training, discharge recommendations  Learning Assessment:  patient verbalizes understanding, would benefit from continued reinforcement    Assessment  Activity Tolerance: Poor; patient was limited secondary to pain  Impairments Requiring Therapeutic Intervention: decreased functional mobility, decreased ADL status, decreased ROM, decreased strength, decreased endurance, decreased sensation, decreased balance, increased pain, decreased posture  Prognosis: fair  Clinical Assessment: Patient is a 70year old female who presents with weakness/debility secondary to ICD malfunction. Pt. Is having significant pain in B ankles which is diagnosed at this point as Gout. Infectious disease involved and w/u pending. Patient reports mod I with use of RW for functional mobility in her home setting. Currently, patient was able to get to/from EOB with Mod A but unable to stand or ambulate. Patient would benefit from additional skilled PT upon discharge to promote independence and safety with functional mobility. Safety Interventions: patient left in bed, bed alarm in place, call light within reach, gait belt, patient at risk for falls, and nurse notified    Plan  Frequency: 3-5 x/per week  Current Treatment Recommendations: strengthening, ROM, balance training, functional mobility training, transfer training, gait training, stair training, endurance training, and positioning    Goals  Patient Goals: To return home   Short Term Goals:  Time Frame: Upon discharge   Patient will complete bed mobility at minimal assistance   Patient will complete transfers at minimal assistance   Patient will ambulate 25 ft with use of rolling walker at moderate assistance    Above goals reviewed on 10/3/2023. All goals are ongoing at this time unless indicated above.       Therapy Session Time      Individual Group Co-treatment   Time In 1100       Time Out 1143       Minutes 43         Timed Code Treatment Minutes:  37 Minutes  Total Treatment

## 2023-10-03 NOTE — PROGRESS NOTES
Infectious Diseases   Progress Note      Admission Date: 9/29/2023  Hospital Day: Hospital Day: 5   Attending: Bridgette Martines MD  Date of service: 10/3/2023     Chief complaint/ Reason for consult:     Pneumococcal pneumonia-explains acute on chronic leukocytosis  Left foot pain and swelling  Elevated ESR greater than 130  Elevated CRP of 954.0  Chronic systolic congestive heart failure ejection fraction of 35%  Severe nonischemic cardiomyopathy  Multiple inappropriate AICD shocks    Microbiology:        I have reviewed allavailable micro lab data and cultures    Blood culture (2/2) - collected on 10/1/2023: Negative  Urine pneumococcal antigen- collected on 10/2/2023: Positive    Strep Pneumoniae Antigen [[id number]] (Abnormal) Collected: 10/02/23 1230   Order Status: Completed Specimen: Urine voided Updated: 10/03/23 0921    STREP PNEUMONIAE ANTIGEN, URINE -- Abnormal     POSITIVE for pneumococcal pneumonia   Normal Range: Presumptive Negative    Abnormal    Narrative:     ORDER#: [id number]                          ORDERED BY: Delayne More      Antibiotics and immunizations:       Current antibiotics: All antibiotics and their doses were reviewed by me    Recent Abx Admin                     ampicillin-sulbactam (UNASYN) 3,000 mg in sodium chloride 0.9 % 100 mL IVPB (mini-bag) (mg) 3,000 mg New Bag 10/03/23 0911     3,000 mg New Bag  0408     3,000 mg New Bag 10/02/23 2150     3,000 mg New Bag  1620                      Immunization History: All immunization history was reviewed by me today. There is no immunization history on file for this patient. Known drug allergies: All allergies were reviewed and updated    No Known Allergies    Social history:     Social History:  All social andepidemiologic history was reviewed and updated by me today as needed. Tobacco use:   reports that she has never smoked.  She has never used smokeless tobacco.  Alcohol use:   reports no history of alcohol of those, development and implementation of a thorough treatment plan and coordination of complex care). More than 50 percent of this time includes the floor/unit time spent for counseling and coordination of care  including discussions with patient/family/ nursing staff/providers, calls to other physicians or providers, and tasks such as reviewing the medical record in detail/case management discussions and care coordination, interpretation and documentation of clinical findings, arranging subsequent follow up of ordered medications, labs and other testing, placing referrals and communication with patient care teams. Thank you for involving me in the care of your patient. I will continue to follow. If you have anyadditional questions, please do not hesitate to contact me. Subjective: Interval history: Interval history was obtained from chart review and patient/ RN. The patient is afebrile. Feels tired. She is having ongoing productive cough     REVIEW OF SYSTEMS:      Review of Systems   Constitutional:  Positive for fatigue. Negative for chills, diaphoresis and fever. HENT:  Negative for ear discharge, ear pain, postnasal drip, rhinorrhea, sinus pressure, sinus pain and sore throat. Eyes:  Negative for discharge and redness. Respiratory:  Positive for cough. Negative for shortness of breath and wheezing. Cardiovascular:  Negative for chest pain and leg swelling. Gastrointestinal:  Negative for abdominal pain, constipation, diarrhea and nausea. Endocrine: Negative for cold intolerance, heat intolerance and polydipsia. Genitourinary:  Negative for dysuria, flank pain, frequency, hematuria and urgency. Musculoskeletal:  Negative for back pain and myalgias. Skin:  Negative for rash. Allergic/Immunologic: Negative for immunocompromised state. Neurological:  Negative for dizziness, seizures and headaches. Hematological:  Does not bruise/bleed easily.    Psychiatric/Behavioral:

## 2023-10-03 NOTE — PLAN OF CARE
Problem: Chronic Conditions and Co-morbidities  Goal: Patient's chronic conditions and co-morbidity symptoms are monitored and maintained or improved  Outcome: Progressing  Flowsheets (Taken 10/2/2023 2249)  Care Plan - Patient's Chronic Conditions and Co-Morbidity Symptoms are Monitored and Maintained or Improved: Monitor and assess patient's chronic conditions and comorbid symptoms for stability, deterioration, or improvement     Problem: Discharge Planning  Goal: Discharge to home or other facility with appropriate resources  Outcome: Progressing  Flowsheets (Taken 10/2/2023 2249)  Discharge to home or other facility with appropriate resources: Identify barriers to discharge with patient and caregiver     Problem: Pain  Goal: Verbalizes/displays adequate comfort level or baseline comfort level  Outcome: Progressing  Flowsheets (Taken 10/2/2023 2249)  Verbalizes/displays adequate comfort level or baseline comfort level: Assess pain using appropriate pain scale     Problem: ABCDS Injury Assessment  Goal: Absence of physical injury  Outcome: Progressing  Flowsheets (Taken 10/2/2023 2249)  Absence of Physical Injury: Implement safety measures based on patient assessment     Problem: Safety - Adult  Goal: Free from fall injury  Outcome: Progressing     Problem: Nutrition Deficit:  Goal: Optimize nutritional status  Outcome: Progressing  Flowsheets (Taken 10/2/2023 2249)  Nutrient intake appropriate for improving, restoring, or maintaining nutritional needs: Monitor oral intake, labs, and treatment plans     Problem: Skin/Tissue Integrity  Goal: Absence of new skin breakdown  Description: 1. Monitor for areas of redness and/or skin breakdown  2. Assess vascular access sites hourly  3. Every 4-6 hours minimum:  Change oxygen saturation probe site  4.   Every 4-6 hours:  If on nasal continuous positive airway pressure, respiratory therapy assess nares and determine need for appliance change or resting

## 2023-10-03 NOTE — PROGRESS NOTES
Tessa Pena MD    Hospitalist Progress Note      Name:  Elizabeth Castaneda /Age/Sex: 1955  (74 y.o. female)   MRN & CSN:  [id number] & [id number] Admission Date/Time: 2023  7:54 PM   Location:  [id number] PCP: No primary care provider on file. I saw and examined the patient on 10/3/2023 at 10:08 AM.    Hospital Day: 5     Assessment and Plan:     Elizabeth Castaneda is a 70 y.o. female with pmh of HLD, HTN, Nonischemic cardiomyopathy who presented with AICD discharge. Plan:      ICD firing:   Cardiology consulted: ICD interrogated with findings of A flutter with RVR. Continue amiodarone to 200 mg bid for two weeks followed by 200 mg/day. Continue Toprol XL and Digoxin. Continue Eliquis. Pain control. VALERIE:  Scr on admission 0.7. Now 1.3 -->1.2    Hold Lasix, Aldactone and valsartan. Nephrology consult appreciated. Ns @ 100 cc/hr,  Monitor BMP. Leukocytosis with elevated inflammatory markers:  Patient with acute on chronic leukocytosis (Denies evaluation in the past). CRP to 231.2, ESR >130. Procalcitonin 0.19. CXR without acute abnormality. Urinalysis negative for UTI. Blood cultures pending. ID consult appreciated: Started on Unasyn Pending CT of the feet, chest, abdomen and pelvis. WBC count trending down        Bilateral foot and ankle pain: r/o gout flare  X-rays of the ankles and feet unremarkable for acute abnormality. Uric acid elevated; 11.7. Ortho consult appreciated: Started on colchicine. Difficulty ambulating; PT OT evaluation. Pain control. Elevated troponin due to recent surgery and ICD shocks. Heparin drip discontinued. Resumed Eliquis. Pain control. Hypertension: Monitor BP on home medications. Nonischemic Cardiomyopathy/recovered HFrEF:  Appears compensated. ECHO 23: 50 to 96%, grade 1 diastolic dysfunction. Continue Toprol-XL. Anemia: Follow-up work up. Goal > 8 g/dL.        Chest wall ulcer: Local wound

## 2023-10-03 NOTE — PROGRESS NOTES
V2.0    [facility] Progress Note      Name:  Vu Hoskins /Age/Sex: 1955  (74 y.o. female)   MRN & CSN:  [id number] & [id number] Encounter Date/Time: 10/2/2023 5:02 PM EDT   Location:  [id number] PCP: No primary care provider on file. Esthela Melchor MD       Hospital Day: 4    Assessment and Recommendations   Vu Hoskins is a 70 y.o. female with pmh of HLD, HTN, Nonischemic cardiomyopathy who presented with AICD discharge. Plan:     ICD firing:   Cardiology consulted: ICD interrogated with findings of A flutter with RVR. Continue amiodarone to 200 mg bid for two weeks followed by 200 mg/day. Continue Toprol XL and Digoxin. Continue Eliquis. Pain control. VALERIE:  Scr on admission 0.7. Now 1.3. Hold Lasix, Aldactone and valsartan. Nephrology consult appreciated. Hold off IV fluids. Monitor BMP. Leukocytosis with elevated inflammatory markers:  Patient with acute on chronic leukocytosis (Denies evaluation in the past). CRP to 231.2, ESR >130. Procalcitonin 0.19. CXR without acute abnormality. Urinalysis negative for UTI. Blood cultures pending. ID consult appreciated: Started on Unasyn pending CT of the feet, chest, abdomen and pelvis. Bilateral foot and ankle pain: r/o gout flare  X-rays of the ankles and feet unremarkable for acute abnormality. Uric acid elevated; 11.7. Ortho consult appreciated: Started on colchicine. Difficulty ambulating; PT OT evaluation. Pain control. Elevated troponin due to recent surgery and ICD shocks. Heparin drip discontinued. Resumed Eliquis. Pain control. Hypertension: Monitor BP on home medications. Nonischemic Cardiomyopathy/recovered HFrEF:  Appears compensated. ECHO 23: 50 to 92%, grade 1 diastolic dysfunction. Continue Toprol-XL. Anemia: Follow-up work up. Goal > 8 g/dL. Chest wall ulcer: Local wound care with bacitracin. Diet ADULT DIET;  Regular; Low Fat/Low Chol/High

## 2023-10-03 NOTE — PROGRESS NOTES
[address] [place] [address]   Electrophysiology   Date: 10/3/2023  Reason for Follow up: Atrial flutter      HPI: Drew Michaels is a 70 y.o. female presented with multiple AICD shocks. PMH: HTN, HLD, obesity, HFrEF with severe LV dysfunction, NSVT, non-ischemic cardiomyopathy, s/p AICD in the past in 2007 at [facility] in August 2020. He has history of PVI in 10/8/2020. She also has history of Afib with RVR resulting in shock. In November 2022 she presented to the hospital with multiple shock. Noted to have atrial flutter with rapid ventricular response. Discussed ablation vs antiarrhythmic therapy. She was started on Amiodarone. She had generator change out on 9/25/2023. Patient seen and examined. No major events overnight. Soreness at the site of AICD. No arrhythmia. Feels tired. Assessment:   Afib / Gagan Bern with rapid ventricular response               History of Afib / flutter ablation   Multiple AICD shocks  LV dysfunction   HFrEF  Obesity  HLD  Leukocytosis     Plan:   Continue with amiodarone. Close monitoring for amiodarone toxicity with LFTs, TSH, and CXR. Change amiodarone to 200 mg bid for two weeks followed by 200 mg/day. Continue Toprol XL  Added digoxin      Discussed ablation again. Plan for ablation when acute issues resolved. Patient also has had skin tear after her dressing has been removed at [facility]. Keep the area clean. Toprol Xl  Amiodarone   Valsartan. Leukocytosis ? Etiology   On unasyn   No fever   Blood cultures negative. Morbid obesity significantly increases mortality, morbidity and adverse outcome of treatments / procedures. Relevant available labs, and cardiovascular diagnostics, documents reviewed.    WBC: 19.9    Hgb: 10.8   Plt: 319      ALT: 9  AST: 31        NA: 133  K: 3.5   Cr: 1.2   BUN: 14      CRP: 231.2   Troponin: 65      ECG: sinus bradycardia     Scheduled Meds:   colchicine  0.6 mg Oral Daily    digoxin  125 mcg

## 2023-10-03 NOTE — FLOWSHEET NOTE
Patient seen for skin tear to left chast wall. Patient has hx of ICD placement with a generator change on 9/29/2023. Per patient she developed a tension blister for the adhesive ( tape). This tape was used at [facility] creating the skin tear. Today the skin tear is dry with edges shrinking with pink skin on edges and wound is dry with scab. Review of chart shows bactroban ointment ordered to be applied once a day with a dry dressing. There are steri strips on the incision for the generator exchange. Patient also says that cardiology is rounding on her every day. No new orders at this time. GENA BriggsN, RN, [facility]  [place] [phone]            10/03/23 1458   Wound 09/29/23 Chest Left skin tear   Date First Assessed/Time First Assessed: 09/29/23 2200   Present on Original Admission: Yes  Primary Wound Type: Skin Tear  Location: Chest  Wound Location Orientation: Left  Wound Description (Comments): skin tear   Wound Image    Wound Length (cm) 1.5 cm   Wound Width (cm) 4 cm   Wound Surface Area (cm^2) 6 cm^2   Wound Assessment Epithelialization   Drainage Amount None (dry)   Tanvi-wound Assessment Intact   Margins Defined edges; Attached edges

## 2023-10-04 PROBLEM — Z71.3 WEIGHT LOSS COUNSELING, ENCOUNTER FOR: Status: ACTIVE | Noted: 2023-10-04

## 2023-10-04 LAB
ALBUMIN SERPL-MCNC: 3.2 G/DL (ref 3.4–5)
ANION GAP SERPL CALCULATED.3IONS-SCNC: 15 MMOL/L (ref 3–16)
BUN SERPL-MCNC: 12 MG/DL (ref 7–20)
CALCIUM SERPL-MCNC: 8.6 MG/DL (ref 8.3–10.6)
CHLORIDE SERPL-SCNC: 98 MMOL/L (ref 99–110)
CK SERPL-CCNC: 3079 U/L (ref 26–192)
CK SERPL-CCNC: 3888 U/L (ref 26–192)
CO2 SERPL-SCNC: 25 MMOL/L (ref 21–32)
CREAT SERPL-MCNC: 0.8 MG/DL (ref 0.6–1.2)
DEPRECATED RDW RBC AUTO: 14.1 % (ref 12.4–15.4)
EST. AVERAGE GLUCOSE BLD GHB EST-MCNC: 131.2 MG/DL
GFR SERPLBLD CREATININE-BSD FMLA CKD-EPI: >60 ML/MIN/{1.73_M2}
GLUCOSE SERPL-MCNC: 126 MG/DL (ref 70–99)
HBA1C MFR BLD: 6.2 %
HCT VFR BLD AUTO: 33.9 % (ref 36–48)
HGB BLD-MCNC: 10.6 G/DL (ref 12–16)
MAGNESIUM SERPL-MCNC: 2.4 MG/DL (ref 1.8–2.4)
MCH RBC QN AUTO: 25.6 PG (ref 26–34)
MCHC RBC AUTO-ENTMCNC: 31.3 G/DL (ref 31–36)
MCV RBC AUTO: 81.9 FL (ref 80–100)
MRSA DNA SPEC QL NAA+PROBE: NORMAL
PHOSPHATE SERPL-MCNC: 3.5 MG/DL (ref 2.5–4.9)
PLATELET # BLD AUTO: 344 K/UL (ref 135–450)
PMV BLD AUTO: 8.2 FL (ref 5–10.5)
POTASSIUM SERPL-SCNC: 3.4 MMOL/L (ref 3.5–5.1)
RBC # BLD AUTO: 4.14 M/UL (ref 4–5.2)
SODIUM SERPL-SCNC: 138 MMOL/L (ref 136–145)
WBC # BLD AUTO: 16.3 K/UL (ref 4–11)

## 2023-10-04 PROCEDURE — 2580000003 HC RX 258: Performed by: INTERNAL MEDICINE

## 2023-10-04 PROCEDURE — 99232 SBSQ HOSP IP/OBS MODERATE 35: CPT | Performed by: NURSE PRACTITIONER

## 2023-10-04 PROCEDURE — 97530 THERAPEUTIC ACTIVITIES: CPT

## 2023-10-04 PROCEDURE — 83735 ASSAY OF MAGNESIUM: CPT

## 2023-10-04 PROCEDURE — 36415 COLL VENOUS BLD VENIPUNCTURE: CPT

## 2023-10-04 PROCEDURE — 6370000000 HC RX 637 (ALT 250 FOR IP): Performed by: INTERNAL MEDICINE

## 2023-10-04 PROCEDURE — 6370000000 HC RX 637 (ALT 250 FOR IP): Performed by: NURSE PRACTITIONER

## 2023-10-04 PROCEDURE — 2580000003 HC RX 258: Performed by: NURSE PRACTITIONER

## 2023-10-04 PROCEDURE — 1200000000 HC SEMI PRIVATE

## 2023-10-04 PROCEDURE — 82550 ASSAY OF CK (CPK): CPT

## 2023-10-04 PROCEDURE — 85027 COMPLETE CBC AUTOMATED: CPT

## 2023-10-04 PROCEDURE — 80069 RENAL FUNCTION PANEL: CPT

## 2023-10-04 PROCEDURE — 6360000002 HC RX W HCPCS: Performed by: INTERNAL MEDICINE

## 2023-10-04 PROCEDURE — 99233 SBSQ HOSP IP/OBS HIGH 50: CPT | Performed by: INTERNAL MEDICINE

## 2023-10-04 RX ORDER — INDOMETHACIN 25 MG/1
50 CAPSULE ORAL
Status: COMPLETED | OUTPATIENT
Start: 2023-10-04 | End: 2023-10-05

## 2023-10-04 RX ORDER — LANOLIN ALCOHOL/MO/W.PET/CERES
400 CREAM (GRAM) TOPICAL 2 TIMES DAILY
Status: DISCONTINUED | OUTPATIENT
Start: 2023-10-04 | End: 2023-10-06 | Stop reason: HOSPADM

## 2023-10-04 RX ORDER — SODIUM CHLORIDE 9 MG/ML
INJECTION, SOLUTION INTRAVENOUS CONTINUOUS
Status: DISCONTINUED | OUTPATIENT
Start: 2023-10-04 | End: 2023-10-06

## 2023-10-04 RX ORDER — POTASSIUM CHLORIDE 20 MEQ/1
40 TABLET, EXTENDED RELEASE ORAL ONCE
Status: COMPLETED | OUTPATIENT
Start: 2023-10-04 | End: 2023-10-04

## 2023-10-04 RX ORDER — COLCHICINE 0.6 MG/1
0.6 TABLET ORAL 2 TIMES DAILY
Status: DISCONTINUED | OUTPATIENT
Start: 2023-10-04 | End: 2023-10-06 | Stop reason: HOSPADM

## 2023-10-04 RX ORDER — INDOMETHACIN 25 MG/1
25 CAPSULE ORAL
Status: DISCONTINUED | OUTPATIENT
Start: 2023-10-04 | End: 2023-10-04

## 2023-10-04 RX ORDER — LACTOBACILLUS RHAMNOSUS GG 10B CELL
1 CAPSULE ORAL 2 TIMES DAILY WITH MEALS
Status: DISCONTINUED | OUTPATIENT
Start: 2023-10-04 | End: 2023-10-06 | Stop reason: HOSPADM

## 2023-10-04 RX ADMIN — ATORVASTATIN CALCIUM 80 MG: 80 TABLET, FILM COATED ORAL at 09:39

## 2023-10-04 RX ADMIN — INDOMETHACIN 50 MG: 25 CAPSULE ORAL at 13:48

## 2023-10-04 RX ADMIN — MORPHINE SULFATE 15 MG: 15 TABLET ORAL at 04:01

## 2023-10-04 RX ADMIN — APIXABAN 5 MG: 5 TABLET, FILM COATED ORAL at 21:02

## 2023-10-04 RX ADMIN — COLCHICINE 0.6 MG: 0.6 TABLET, FILM COATED ORAL at 09:49

## 2023-10-04 RX ADMIN — MORPHINE SULFATE 15 MG: 15 TABLET ORAL at 09:40

## 2023-10-04 RX ADMIN — AMIODARONE HYDROCHLORIDE 200 MG: 200 TABLET ORAL at 09:39

## 2023-10-04 RX ADMIN — GABAPENTIN 100 MG: 100 CAPSULE ORAL at 13:48

## 2023-10-04 RX ADMIN — COLCHICINE 0.6 MG: 0.6 TABLET, FILM COATED ORAL at 21:01

## 2023-10-04 RX ADMIN — METOPROLOL SUCCINATE 200 MG: 50 TABLET, EXTENDED RELEASE ORAL at 09:39

## 2023-10-04 RX ADMIN — AMPICILLIN SODIUM AND SULBACTAM SODIUM 3000 MG: 2; 1 INJECTION, POWDER, FOR SOLUTION INTRAMUSCULAR; INTRAVENOUS at 21:03

## 2023-10-04 RX ADMIN — MELATONIN TAB 3 MG 3 MG: 3 TAB at 21:10

## 2023-10-04 RX ADMIN — SODIUM CHLORIDE: 9 INJECTION, SOLUTION INTRAVENOUS at 04:29

## 2023-10-04 RX ADMIN — Medication 1 CAPSULE: at 17:49

## 2023-10-04 RX ADMIN — SODIUM CHLORIDE: 9 INJECTION, SOLUTION INTRAVENOUS at 13:55

## 2023-10-04 RX ADMIN — DIGOXIN 125 MCG: 125 TABLET ORAL at 09:39

## 2023-10-04 RX ADMIN — POLYETHYLENE GLYCOL 3350 17 G: 17 POWDER, FOR SOLUTION ORAL at 09:39

## 2023-10-04 RX ADMIN — Medication 400 MG: at 21:02

## 2023-10-04 RX ADMIN — INDOMETHACIN 50 MG: 25 CAPSULE ORAL at 17:49

## 2023-10-04 RX ADMIN — BACITRACIN: 500 OINTMENT TOPICAL at 09:50

## 2023-10-04 RX ADMIN — APIXABAN 5 MG: 5 TABLET, FILM COATED ORAL at 09:39

## 2023-10-04 RX ADMIN — GABAPENTIN 100 MG: 100 CAPSULE ORAL at 21:02

## 2023-10-04 RX ADMIN — AMPICILLIN SODIUM AND SULBACTAM SODIUM 3000 MG: 2; 1 INJECTION, POWDER, FOR SOLUTION INTRAMUSCULAR; INTRAVENOUS at 09:53

## 2023-10-04 RX ADMIN — Medication 400 MG: at 13:48

## 2023-10-04 RX ADMIN — Medication 10 ML: at 21:02

## 2023-10-04 RX ADMIN — POTASSIUM CHLORIDE 40 MEQ: 1500 TABLET, EXTENDED RELEASE ORAL at 09:39

## 2023-10-04 RX ADMIN — AMPICILLIN SODIUM AND SULBACTAM SODIUM 3000 MG: 2; 1 INJECTION, POWDER, FOR SOLUTION INTRAMUSCULAR; INTRAVENOUS at 03:27

## 2023-10-04 RX ADMIN — ASPIRIN 81 MG: 81 TABLET, CHEWABLE ORAL at 09:39

## 2023-10-04 RX ADMIN — Medication 10 ML: at 09:49

## 2023-10-04 RX ADMIN — OXYCODONE HYDROCHLORIDE AND ACETAMINOPHEN 500 MG: 500 TABLET ORAL at 09:40

## 2023-10-04 RX ADMIN — MORPHINE SULFATE 15 MG: 15 TABLET ORAL at 21:10

## 2023-10-04 RX ADMIN — AMPICILLIN SODIUM AND SULBACTAM SODIUM 3000 MG: 2; 1 INJECTION, POWDER, FOR SOLUTION INTRAMUSCULAR; INTRAVENOUS at 15:10

## 2023-10-04 RX ADMIN — MORPHINE SULFATE 15 MG: 15 TABLET ORAL at 15:04

## 2023-10-04 RX ADMIN — SENNOSIDES AND DOCUSATE SODIUM 2 TABLET: 50; 8.6 TABLET ORAL at 09:40

## 2023-10-04 RX ADMIN — GABAPENTIN 100 MG: 100 CAPSULE ORAL at 09:40

## 2023-10-04 ASSESSMENT — PAIN SCALES - GENERAL
PAINLEVEL_OUTOF10: 9
PAINLEVEL_OUTOF10: 8
PAINLEVEL_OUTOF10: 3
PAINLEVEL_OUTOF10: 9
PAINLEVEL_OUTOF10: 3
PAINLEVEL_OUTOF10: 8
PAINLEVEL_OUTOF10: 7
PAINLEVEL_OUTOF10: 0
PAINLEVEL_OUTOF10: 8
PAINLEVEL_OUTOF10: 5
PAINLEVEL_OUTOF10: 8
PAINLEVEL_OUTOF10: 8

## 2023-10-04 ASSESSMENT — PAIN DESCRIPTION - DESCRIPTORS
DESCRIPTORS: ACHING
DESCRIPTORS: ACHING;DISCOMFORT
DESCRIPTORS: ACHING
DESCRIPTORS: DISCOMFORT
DESCRIPTORS: ACHING

## 2023-10-04 ASSESSMENT — PAIN DESCRIPTION - PAIN TYPE: TYPE: ACUTE PAIN

## 2023-10-04 ASSESSMENT — PAIN DESCRIPTION - ORIENTATION
ORIENTATION: LEFT
ORIENTATION: LEFT;RIGHT
ORIENTATION: RIGHT;LEFT
ORIENTATION: RIGHT;LEFT
ORIENTATION: LEFT

## 2023-10-04 ASSESSMENT — ENCOUNTER SYMPTOMS
FACIAL SWELLING: 0
COUGH: 0
ABDOMINAL DISTENTION: 0
WHEEZING: 0
SORE THROAT: 0
CONSTIPATION: 0
PHOTOPHOBIA: 0
CHEST TIGHTNESS: 0
ABDOMINAL PAIN: 0
SINUS PRESSURE: 0
BACK PAIN: 0
VOMITING: 0
RHINORRHEA: 0
EYE REDNESS: 0
SHORTNESS OF BREATH: 0
SINUS PAIN: 0
DIARRHEA: 0
NAUSEA: 0
EYE DISCHARGE: 0
BLOOD IN STOOL: 0

## 2023-10-04 ASSESSMENT — PAIN DESCRIPTION - LOCATION
LOCATION: ANKLE;FOOT;HIP
LOCATION: ANKLE
LOCATION: ANKLE;FOOT;HIP
LOCATION: ANKLE;FOOT
LOCATION: FOOT;LEG
LOCATION: ANKLE
LOCATION: LEG;FOOT

## 2023-10-04 ASSESSMENT — PAIN DESCRIPTION - ONSET: ONSET: ON-GOING

## 2023-10-04 ASSESSMENT — PAIN - FUNCTIONAL ASSESSMENT: PAIN_FUNCTIONAL_ASSESSMENT: PREVENTS OR INTERFERES SOME ACTIVE ACTIVITIES AND ADLS

## 2023-10-04 ASSESSMENT — PAIN DESCRIPTION - FREQUENCY: FREQUENCY: CONTINUOUS

## 2023-10-04 NOTE — CARE COORDINATION
Case management was instructed to start precert today. Precert was started today through [employer] (Auth# [id number]). Patient's precert was automatically approved once submitted effective from 10/05/23 - 10/12/23. Called Yina in admissions at [facility] [employer] and informed this information. Discharge Plan:  Deni when medically stable. Precert approved.     Electronically signed by FRANKI Zuniga, CARSON on 10/4/2023 at 6:00 PM

## 2023-10-04 NOTE — PROGRESS NOTES
V2.0    [facility] Progress Note      Name:  Dawna Pan /Age/Sex: 1955  (74 y.o. female)   MRN & CSN:  [id number] & [id number] Encounter Date/Time: 10/4/2023 5:02 PM EDT   Location:  [id number] PCP: No primary care provider on file. Attending:Marco [place] [address] Day: 6    Assessment and Recommendations   Dawna Pan is a 70 y.o. female with pmh of HLD, HTN, Nonischemic cardiomyopathy who presented with ICD (implantable cardioverter-defibrillator) discharge. Plan:     ICD firing:   Cardiology consulted: ICD interrogated with findings of A flutter with RVR. Continue amiodarone to 200 mg bid for two weeks followed by 200 mg/day. Continue Toprol XL and Digoxin. Continue Eliquis. Pain control. VALERIE: Resolving  Scr on admission 0.7. Peak Scr 1.3. Hold Lasix, Aldactone and valsartan. Nephrology consult appreciated. Continue IV fluids. Monitor BMP. Leukocytosis with elevated inflammatory markers:  Patient with acute on chronic leukocytosis (Denies evaluation in the past). CRP to 231.2, ESR >130. Procalcitonin 0.19. CXR without acute abnormality. Urinalysis negative for UTI. Blood cultures pending. ID consult appreciated: Started on Unasyn empirically. CT of the feet, chest, abdomen and pelvis with no infection noted. Bilateral foot and ankle pain: r/o gout flare  X-rays of the ankles and feet unremarkable for acute abnormality. Uric acid elevated; 11.7. Ortho consult appreciated: Continue trial of indomethacin and Colchicine. Difficulty ambulating; PT OT evaluation. Pain control. Elevated troponin due to recent surgery and ICD shocks. Heparin drip discontinued. Resumed Eliquis. Pain control. Hypertension: Monitor BP on home medications. Hypokalemia & hypomagnesemia: Replaced. Monitor lites and replace as needed. Nonischemic Cardiomyopathy/recovered HFrEF:  Appears compensated.   ECHO 23: 50 to 35%, grade 1 diastolic

## 2023-10-04 NOTE — PROGRESS NOTES
[facility] Orthopedic Surgery  Progress Note      Chief complaint: Bilateral foot and ankle pain    Subjective: The patient is sitting up in the bed. Denies issues with the right foot/ankle. Main complaint in numbness in the left foot/ankle. Denies new issues. Independent imaging review of the bilateral feet and ankles via plain films demonstrated: no fracture or malalignment - no effusion, there is some soft tissue swelling - no noted lesions. CT left foot shows no effusion, no abscess no fracture or soft tissue gas. Relevant notes, labs and other tests reviewed. Review of Systems:  Constitutional: Negative for fever, chills, fatigue. Skin:  Negative for pruritis, rash  Eyes: Negative for photophobia and visual disturbance. ENT:  Negative for rhinorrhea, epistaxis, sore throat  Respiratory:  Negative for cough and shortness of breath. Cardiovascular: Negative for chest pain. Gastrointestinal: Negative for nausea, vomiting, diarrhea. Genitourinary: Negative for dysuria and difficulty urinating. Neurological: Negative for confusion, dysarthria, tremors, seizures. Psychiatric:  Negative for depression or anxiety  Musculoskeletal:  Positive for bilateral foot and ankle pain. Objective:  Vitals:    10/04/23 0608   BP:    Pulse: 62   Resp:    Temp:    SpO2:       Physical Examination:  GENERAL: No apparent distress, well-nourished  SKIN:  Warm and dry  EYES: Nonicteric. ENT: Mucous membranes moist  HEAD: Normocephalic, atraumatic  RESPIRATORY: Resp easy and unlabored  CARDIOVASCULAR: Regular rate and rhythm  GI: Abdomen soft, nontender  NEURO: Awake and alert. No speech defect  PSYCHIATRIC: Appropriate affect; not agitated  MUSCULOSKELETAL:  Bilateral feet/ankles  Inspection: On exam there are no ulcerations, rashes or lesions about the bilateral lower extremities. There is mild diffuse swelling.   No warmth or erythema or fluctuance on exam.  No instability on exam.  There is pain to palpation of the left>right ankles. Motor: Intact DF/PF bilaterally with decreased OMR due to pain. She can wiggle all toes bilaterally. Sensation: Grossly intact to light touch throughout the bilateral lower extremities in all nerve distributions. Vascular:  2+ bilateral DP pulse. Labs reviewed:  Recent Labs     10/02/23  0447 10/03/23  0427 10/04/23  0418   WBC 25.2* 19.9* 16.3*   HGB 11.6* 10.8* 10.6*   HCT 36.7 34.6* 33.9*    319 344     Recent Labs     10/02/23  0438 10/03/23  0427 10/04/23  0417 10/04/23  0418    133*  --  138   K 3.6 3.5  --  3.4*   CL 96* 95*  --  98*   CO2 28 24  --  25   BUN 16 19  --  12   CREATININE 1.3* 1.2  --  0.8   GLUCOSE 118* 122*  --  126*   CALCIUM 9.7 8.6  --  8.6   MG 1.70*  --  2.40  --    PHOS 5.5* 4.1  --  3.5     No results for input(s): \"INR\", \"PROTIME\" in the last 72 hours. Lab Results   Component Value Date    COLORU Yellow 10/01/2023    CLARITYU Clear 10/01/2023    PHUR 5.5 10/01/2023    GLUCOSEU Negative 10/01/2023    BLOODU Negative 10/01/2023    LEUKOCYTESUR TRACE (A) 10/01/2023    BILIRUBINUR Negative 10/01/2023    UROBILINOGEN 0.2 10/01/2023    RBCUA 1 10/01/2023    WBCUA 5 10/01/2023    BACTERIA Rare (A) 10/01/2023    CRYSTAL None Seen 10/17/2013       Imaging:  CT FOOT LEFT W CONTRAST   Final Result   1. No evidence of osteomyelitis. 2.  Skin thickening over the anterior surface of the ankle may represent   cellulitis. No evidence of abscess or soft tissue gas. CT CHEST ABDOMEN PELVIS W CONTRAST Additional Contrast? None   Final Result      XR ANKLE LEFT (2 VIEWS)   Final Result   No acute fracture or dislocation. XR ANKLE RIGHT (2 VIEWS)   Final Result   No acute fracture or dislocation identified radiographically. Soft tissue   swelling of the foot and ankle may reflect cellulitis in the appropriate   clinical setting.          XR FOOT LEFT (2 VIEWS)   Final Result   No acute fracture or dislocation identified

## 2023-10-04 NOTE — PLAN OF CARE
Problem: Chronic Conditions and Co-morbidities  Goal: Patient's chronic conditions and co-morbidity symptoms are monitored and maintained or improved  Outcome: Progressing  Flowsheets (Taken 10/4/2023 0332)  Care Plan - Patient's Chronic Conditions and Co-Morbidity Symptoms are Monitored and Maintained or Improved: Monitor and assess patient's chronic conditions and comorbid symptoms for stability, deterioration, or improvement     Problem: Pain  Goal: Verbalizes/displays adequate comfort level or baseline comfort level  Outcome: Progressing  Flowsheets (Taken 10/4/2023 0332)  Verbalizes/displays adequate comfort level or baseline comfort level:   Encourage patient to monitor pain and request assistance   Assess pain using appropriate pain scale   Administer analgesics based on type and severity of pain and evaluate response   Implement non-pharmacological measures as appropriate and evaluate response     Problem: ABCDS Injury Assessment  Goal: Absence of physical injury  Outcome: Progressing  Flowsheets (Taken 10/4/2023 0332)  Absence of Physical Injury: Implement safety measures based on patient assessment     Problem: Safety - Adult  Goal: Free from fall injury  Outcome: Progressing  Flowsheets (Taken 10/4/2023 0332)  Free From Fall Injury: Instruct family/caregiver on patient safety     Problem: Skin/Tissue Integrity  Goal: Absence of new skin breakdown  Description: 1. Monitor for areas of redness and/or skin breakdown  2. Assess vascular access sites hourly  3. Every 4-6 hours minimum:  Change oxygen saturation probe site  4. Every 4-6 hours:  If on nasal continuous positive airway pressure, respiratory therapy assess nares and determine need for appliance change or resting period.   Outcome: Progressing     Problem: Respiratory - Adult  Goal: Achieves optimal ventilation and oxygenation  Outcome: Progressing  Flowsheets (Taken 10/4/2023 0332)  Achieves optimal ventilation and oxygenation:   Assess for

## 2023-10-04 NOTE — PROGRESS NOTES
[facility] Note    Patient Active Problem List   Diagnosis    Mixed hyperlipidemia    ICD (implantable cardioverter-defibrillator) in place    Paroxysmal atrial fibrillation (HCC)    History of ventricular tachycardia    Systolic CHF, chronic (HCC)    NICM (nonischemic cardiomyopathy) ([address])    Coronary artery disease due to lipid rich plaque    Class 3 severe obesity due to excess calories with body mass index (BMI) of 40.0 to 44.9 in adult (HCC)    TYLER (obstructive sleep apnea)    Essential hypertension    PAF (paroxysmal atrial fibrillation) ([facility])    Gout    Back pain with radiculopathy    Lumbosacral radiculopathy    Paraparesis of both lower limbs ([facility])    ICD (implantable cardioverter-defibrillator) battery depletion    ICD (implantable cardioverter-defibrillator) discharge    Inappropriate shocks from ICD (implantable cardioverter-defibrillator)    Atrial fibrillation with rapid ventricular response ([facility])    Elevated troponin    CRP elevated    Elevated erythrocyte sedimentation rate    Bandemia    Atrial flutter with rapid ventricular response ([facility])    Productive cough    Cellulitis of left ankle    CAP (community acquired pneumonia) due to Pneum[facility])       Past Medical History:   has a past medical history of AICD (automatic cardioverter/defibrillator) present, Arthritis, CHF (congestive heart failure) ([address]), Gout, Hyperlipidemia, Hypertension, and MI (myocardial infarction) ([address]). Past Social History:   reports that she has never smoked. She has never used smokeless tobacco. She reports that she does not drink alcohol and does not use drugs. Subjective:    No complaints. No SOB  CT with contrast done yesterday    Review of Systems   Constitutional:  Positive for fatigue. Negative for activity change, appetite change, chills, fever and unexpected weight change. HENT:  Negative for congestion and facial swelling.     Eyes:  Negative for photophobia,

## 2023-10-04 NOTE — PROGRESS NOTES
tingling in (R) UE, (B) LE        Subjective  General: Supine in bed, agreeable to getting into the chair. Pain: 10/10. Location: B knees, ankles, chest  Pain Interventions: pain medication in place prior to arrival, RN notified, repositioned , and therapy activities modified       Functional Mobility  Bed Mobility:  Supine to Sit: minimal assistance, moderate assistance  Rolling Right: stand by assistance  Scooting: contact guard assistance to scoot to the edge of the bed  Comments: Significant increased time. VB for sequencing wuth B hands  Transfers:  Sit to stand transfer: minimal assistance, moderate assistance  Stand to sit transfer: minimal assistance  Stand step transfer: contact guard assistance  Comments: increased time required due to pain. Max cues for sequencing and navigation with RW  Ambulation:  Ambulation not tested on this date secondary to decreased endurance. Distance: n/a  Gait Mechanics: n/a  Comments:    Stair Mobility:  Stair mobility not completed on this date. Comments:  Wheelchair Mobility:  No w/c mobility completed on this date. Comments:  Balance:  Static Sitting Balance: fair (-): maintains balance at SBA with use of UE support  Dynamic Sitting Balance: fair (-): maintains balance at CGA with use of UE support  Static Standing Balance: fair (-): maintains balance at CGA with use of UE support  Dynamic Standing Balance: fair (-): maintains balance at CGA with use of UE support  Comments: Patient sat EOB for ~7 minutes with Close Supervision. Other Therapeutic Interventions  EOB for B LAQ 2x10; B AP 2x2 min. Standing GS x10 3 sec holds. Educated on importance of PT and activity frequency - pt verbalized understanding.     Functional Outcomes  AM-PAC Inpatient Mobility Raw Score : 15              Cognition  WFL  Orientation:    alert and oriented x 4  Command Following:   [facility]    Education  Barriers To Learning: physical  Patient Education: patient educated on goals, PT role and 1221       Minutes 48         Timed Code Treatment Minutes:   48  Total Treatment Minutes:  48 minutes        Electronically Signed By: Bianca Beavers PTA

## 2023-10-04 NOTE — PROGRESS NOTES
Infectious Diseases   Progress Note      Admission Date: 9/29/2023  Hospital Day: Hospital Day: 6   Attending: Eveline Antoine MD  Date of service: 10/4/2023     Chief complaint/ Reason for consult:     Pneumococcal pneumonia-explains acute on chronic leukocytosis  Left foot pain and swelling  Elevated ESR greater than 130  Elevated CRP of 302.1  Chronic systolic congestive heart failure ejection fraction of 35%  Severe nonischemic cardiomyopathy  Multiple inappropriate AICD shocks    Microbiology:        I have reviewed allavailable micro lab data and cultures    Blood culture (2/2) - collected on 10/1/2023: Negative  Urine pneumococcal antigen- collected on 10/2/2023: Positive    Strep Pneumoniae Antigen [[id number]] (Abnormal) Collected: 10/02/23 1230   Order Status: Completed Specimen: Urine voided Updated: 10/03/23 0921    STREP PNEUMONIAE ANTIGEN, URINE -- Abnormal     POSITIVE for pneumococcal pneumonia   Normal Range: Presumptive Negative    Abnormal    Narrative:     ORDER#: [id number]                          ORDERED BY: Mabel Mondragon      Antibiotics and immunizations:       Current antibiotics: All antibiotics and their doses were reviewed by me    Recent Abx Admin                     ampicillin-sulbactam (UNASYN) 3,000 mg in sodium chloride 0.9 % 100 mL IVPB (mini-bag) (mg) 3,000 mg New Bag 10/04/23 2103     3,000 mg New Bag  1510     3,000 mg New Bag  0953     3,000 mg New Bag  0327                      Immunization History: All immunization history was reviewed by me today. There is no immunization history on file for this patient. Known drug allergies: All allergies were reviewed and updated    No Known Allergies    Social history:     Social History:  All social andepidemiologic history was reviewed and updated by me today as needed. Tobacco use:   reports that she has never smoked.  She has never used smokeless tobacco.  Alcohol use:   reports no history of alcohol polyethylene glycol  17 g Oral Daily    atorvastatin  80 mg Oral Daily    [Held by provider] furosemide  40 mg Oral BID    metoprolol succinate  200 mg Oral Daily    [Held by provider] spironolactone  25 mg Oral Daily    [Held by provider] valsartan  160 mg Oral Daily    aspirin  81 mg Oral Daily    apixaban  5 mg Oral BID    sodium chloride flush  5-40 mL IntraVENous 2 times per day        sodium chloride 100 mL/hr at 10/04/23 1355    sodium chloride Stopped (10/02/23 1846)       morphine, melatonin, sodium chloride flush, sodium chloride, ondansetron **OR** ondansetron, acetaminophen **OR** acetaminophen, potassium chloride **OR** potassium alternative oral replacement **OR** potassium chloride      Problem list:       Patient Active Problem List   Diagnosis Code    Mixed hyperlipidemia E78.2    ICD (implantable cardioverter-defibrillator) in place Z95.810    Paroxysmal atrial fibrillation ([facility]) I48.0    History of ventricular tachycardia S56.89    Systolic CHF, chronic ([facility]) I50.22    NICM (nonischemic cardiomyopathy) ([facility]) I42.8    Coronary artery disease due to lipid rich plaque I25.10, I25.83    Class 3 severe obesity due to excess calories with body mass index (BMI) of 40.0 to 44.9 in adult ([facility]) E66.01, Z68.41    TYLER (obstructive sleep apnea) G47.33    Essential hypertension I10    PAF (paroxysmal atrial fibrillation) ([facility]) I48.0    Gout M10.9    Back pain with radiculopathy M54.10    Lumbosacral radiculopathy M54.17    Paraparesis of both lower limbs ([facility]) G82.20    ICD (implantable cardioverter-defibrillator) battery depletion Z45.02    ICD (implantable cardioverter-defibrillator) discharge Z45.02    Inappropriate shocks from ICD (implantable cardioverter-defibrillator) T82.198A    Atrial fibrillation with rapid ventricular response ([facility]) I48.91    Elevated troponin R79.89    CRP elevated R79.82    Elevated erythrocyte sedimentation rate R70.0    Bandemia D72.825    Atrial flutter with rapid ventricular

## 2023-10-05 LAB
ALBUMIN SERPL-MCNC: 2.9 G/DL (ref 3.4–5)
ALBUMIN/GLOB SERPL: 0.8 {RATIO} (ref 1.1–2.2)
ALP SERPL-CCNC: 89 U/L (ref 40–129)
ALT SERPL-CCNC: 12 U/L (ref 10–40)
ANION GAP SERPL CALCULATED.3IONS-SCNC: 13 MMOL/L (ref 3–16)
AST SERPL-CCNC: 45 U/L (ref 15–37)
BACTERIA BLD CULT ORG #2: NORMAL
BACTERIA BLD CULT: NORMAL
BASOPHILS # BLD: 0.1 K/UL (ref 0–0.2)
BASOPHILS NFR BLD: 0.6 %
BILIRUB SERPL-MCNC: 0.4 MG/DL (ref 0–1)
BUN SERPL-MCNC: 9 MG/DL (ref 7–20)
CALCIUM SERPL-MCNC: 8.7 MG/DL (ref 8.3–10.6)
CHLORIDE SERPL-SCNC: 102 MMOL/L (ref 99–110)
CK SERPL-CCNC: 1883 U/L (ref 26–192)
CO2 SERPL-SCNC: 24 MMOL/L (ref 21–32)
CREAT SERPL-MCNC: 0.7 MG/DL (ref 0.6–1.2)
DEPRECATED RDW RBC AUTO: 14.1 % (ref 12.4–15.4)
EOSINOPHIL # BLD: 0.4 K/UL (ref 0–0.6)
EOSINOPHIL NFR BLD: 3.4 %
GFR SERPLBLD CREATININE-BSD FMLA CKD-EPI: >60 ML/MIN/{1.73_M2}
GLUCOSE SERPL-MCNC: 124 MG/DL (ref 70–99)
HCT VFR BLD AUTO: 32.3 % (ref 36–48)
HGB BLD-MCNC: 10.1 G/DL (ref 12–16)
LYMPHOCYTES # BLD: 2.3 K/UL (ref 1–5.1)
LYMPHOCYTES NFR BLD: 17.8 %
MAGNESIUM SERPL-MCNC: 2.4 MG/DL (ref 1.8–2.4)
MCH RBC QN AUTO: 25.6 PG (ref 26–34)
MCHC RBC AUTO-ENTMCNC: 31.4 G/DL (ref 31–36)
MCV RBC AUTO: 81.4 FL (ref 80–100)
MONOCYTES # BLD: 1.3 K/UL (ref 0–1.3)
MONOCYTES NFR BLD: 10.1 %
NEUTROPHILS # BLD: 8.8 K/UL (ref 1.7–7.7)
NEUTROPHILS NFR BLD: 68.1 %
PHOSPHATE SERPL-MCNC: 4.1 MG/DL (ref 2.5–4.9)
PLATELET # BLD AUTO: 332 K/UL (ref 135–450)
PMV BLD AUTO: 7.8 FL (ref 5–10.5)
POTASSIUM SERPL-SCNC: 4 MMOL/L (ref 3.5–5.1)
PROT SERPL-MCNC: 6.6 G/DL (ref 6.4–8.2)
RBC # BLD AUTO: 3.97 M/UL (ref 4–5.2)
SODIUM SERPL-SCNC: 139 MMOL/L (ref 136–145)
WBC # BLD AUTO: 12.9 K/UL (ref 4–11)

## 2023-10-05 PROCEDURE — 6370000000 HC RX 637 (ALT 250 FOR IP): Performed by: INTERNAL MEDICINE

## 2023-10-05 PROCEDURE — 80053 COMPREHEN METABOLIC PANEL: CPT

## 2023-10-05 PROCEDURE — 99232 SBSQ HOSP IP/OBS MODERATE 35: CPT | Performed by: INTERNAL MEDICINE

## 2023-10-05 PROCEDURE — 6360000002 HC RX W HCPCS: Performed by: INTERNAL MEDICINE

## 2023-10-05 PROCEDURE — 2580000003 HC RX 258: Performed by: INTERNAL MEDICINE

## 2023-10-05 PROCEDURE — 6370000000 HC RX 637 (ALT 250 FOR IP): Performed by: NURSE PRACTITIONER

## 2023-10-05 PROCEDURE — 97530 THERAPEUTIC ACTIVITIES: CPT

## 2023-10-05 PROCEDURE — 97110 THERAPEUTIC EXERCISES: CPT

## 2023-10-05 PROCEDURE — 97535 SELF CARE MNGMENT TRAINING: CPT

## 2023-10-05 PROCEDURE — 83735 ASSAY OF MAGNESIUM: CPT

## 2023-10-05 PROCEDURE — 82550 ASSAY OF CK (CPK): CPT

## 2023-10-05 PROCEDURE — 1200000000 HC SEMI PRIVATE

## 2023-10-05 PROCEDURE — 84100 ASSAY OF PHOSPHORUS: CPT

## 2023-10-05 PROCEDURE — 36415 COLL VENOUS BLD VENIPUNCTURE: CPT

## 2023-10-05 PROCEDURE — 85025 COMPLETE CBC W/AUTO DIFF WBC: CPT

## 2023-10-05 RX ADMIN — Medication 1 CAPSULE: at 16:29

## 2023-10-05 RX ADMIN — AMIODARONE HYDROCHLORIDE 200 MG: 200 TABLET ORAL at 08:45

## 2023-10-05 RX ADMIN — Medication 400 MG: at 08:44

## 2023-10-05 RX ADMIN — AMPICILLIN SODIUM AND SULBACTAM SODIUM 3000 MG: 2; 1 INJECTION, POWDER, FOR SOLUTION INTRAMUSCULAR; INTRAVENOUS at 14:38

## 2023-10-05 RX ADMIN — ATORVASTATIN CALCIUM 80 MG: 80 TABLET, FILM COATED ORAL at 08:45

## 2023-10-05 RX ADMIN — SODIUM CHLORIDE: 9 INJECTION, SOLUTION INTRAVENOUS at 08:07

## 2023-10-05 RX ADMIN — INDOMETHACIN 50 MG: 25 CAPSULE ORAL at 08:44

## 2023-10-05 RX ADMIN — AMPICILLIN SODIUM AND SULBACTAM SODIUM 3000 MG: 2; 1 INJECTION, POWDER, FOR SOLUTION INTRAMUSCULAR; INTRAVENOUS at 03:30

## 2023-10-05 RX ADMIN — DIGOXIN 125 MCG: 125 TABLET ORAL at 08:44

## 2023-10-05 RX ADMIN — ASPIRIN 81 MG: 81 TABLET, CHEWABLE ORAL at 08:43

## 2023-10-05 RX ADMIN — MORPHINE SULFATE 15 MG: 15 TABLET ORAL at 08:43

## 2023-10-05 RX ADMIN — SODIUM CHLORIDE: 9 INJECTION, SOLUTION INTRAVENOUS at 21:04

## 2023-10-05 RX ADMIN — AMPICILLIN SODIUM AND SULBACTAM SODIUM 3000 MG: 2; 1 INJECTION, POWDER, FOR SOLUTION INTRAMUSCULAR; INTRAVENOUS at 20:19

## 2023-10-05 RX ADMIN — MORPHINE SULFATE 15 MG: 15 TABLET ORAL at 17:42

## 2023-10-05 RX ADMIN — AMPICILLIN SODIUM AND SULBACTAM SODIUM 3000 MG: 2; 1 INJECTION, POWDER, FOR SOLUTION INTRAMUSCULAR; INTRAVENOUS at 08:54

## 2023-10-05 RX ADMIN — AMIODARONE HYDROCHLORIDE 200 MG: 200 TABLET ORAL at 20:18

## 2023-10-05 RX ADMIN — OXYCODONE HYDROCHLORIDE AND ACETAMINOPHEN 500 MG: 500 TABLET ORAL at 08:45

## 2023-10-05 RX ADMIN — METOPROLOL SUCCINATE 200 MG: 50 TABLET, EXTENDED RELEASE ORAL at 08:44

## 2023-10-05 RX ADMIN — COLCHICINE 0.6 MG: 0.6 TABLET, FILM COATED ORAL at 20:18

## 2023-10-05 RX ADMIN — APIXABAN 5 MG: 5 TABLET, FILM COATED ORAL at 20:18

## 2023-10-05 RX ADMIN — COLCHICINE 0.6 MG: 0.6 TABLET, FILM COATED ORAL at 08:43

## 2023-10-05 RX ADMIN — GABAPENTIN 100 MG: 100 CAPSULE ORAL at 13:14

## 2023-10-05 RX ADMIN — GABAPENTIN 100 MG: 100 CAPSULE ORAL at 20:18

## 2023-10-05 RX ADMIN — Medication 1 CAPSULE: at 08:45

## 2023-10-05 RX ADMIN — BACITRACIN: 500 OINTMENT TOPICAL at 09:01

## 2023-10-05 RX ADMIN — Medication 400 MG: at 20:18

## 2023-10-05 RX ADMIN — MORPHINE SULFATE 15 MG: 15 TABLET ORAL at 13:14

## 2023-10-05 RX ADMIN — GABAPENTIN 100 MG: 100 CAPSULE ORAL at 08:45

## 2023-10-05 RX ADMIN — APIXABAN 5 MG: 5 TABLET, FILM COATED ORAL at 08:44

## 2023-10-05 ASSESSMENT — PAIN DESCRIPTION - LOCATION: LOCATION: ANKLE;FOOT

## 2023-10-05 ASSESSMENT — ENCOUNTER SYMPTOMS
SINUS PAIN: 0
EYE REDNESS: 0
WHEEZING: 0
BACK PAIN: 0
SINUS PRESSURE: 0
CHEST TIGHTNESS: 0
NAUSEA: 0
ABDOMINAL PAIN: 0
PHOTOPHOBIA: 0
BLOOD IN STOOL: 0
EYE DISCHARGE: 0
CONSTIPATION: 0
SORE THROAT: 0
RHINORRHEA: 0
FACIAL SWELLING: 0
SHORTNESS OF BREATH: 0
ABDOMINAL DISTENTION: 0
COUGH: 0
DIARRHEA: 0
VOMITING: 0

## 2023-10-05 ASSESSMENT — PAIN SCALES - GENERAL
PAINLEVEL_OUTOF10: 8
PAINLEVEL_OUTOF10: 3
PAINLEVEL_OUTOF10: 7
PAINLEVEL_OUTOF10: 8
PAINLEVEL_OUTOF10: 7
PAINLEVEL_OUTOF10: 0
PAINLEVEL_OUTOF10: 0
PAINLEVEL_OUTOF10: 2
PAINLEVEL_OUTOF10: 0
PAINLEVEL_OUTOF10: 8
PAINLEVEL_OUTOF10: 8

## 2023-10-05 NOTE — PROGRESS NOTES
assessment towards goals. DME Required For Discharge: DME to be determined at next level of care, DME to be determined pending patient progress    Precautions/Restrictions: high fall risk  Weight Bearing Restrictions: no restrictions     Required Braces/Orthotics: no braces required  Positional Restrictions:no positional restrictions    Pre-Admission Information   Lives With: alone                     Type of Home: apartment  Home Layout: one level  Home Access:  1+ 1 step to enter without rails   Bathroom Layout: tub/shower unit, patient states she primarily sponge bathes   Bathroom Equipment: shower chair, toilet raiser  Toilet Height: standard height  Home Equipment: rolling walker, alert button  Transfer Assistance: modified independent with use of RW  Ambulation Assistance:modified independent with use of RW, patient reports that she uses electric scooters or w/c when in community  ADL Assistance: requires assistance with bathing, requires assistance with dressing  IADL Assistance: requires assistance with all homemaking tasks, patient reports her son, sister, and grandchildren assist her as needed . She has Meals-on-Wheels  Active :        [] Yes                 [x] No  Hand Dominance: [] Left                 [x] Right  Current Employment:  retired   Recent Falls: Patient reports a fall in the tub a couple of months ago. Also reports she fell last week due to her ICD shocking her. She reports she was able to get off the floor herself       Subjective  General: Patient in bed upon arrival, agreeable to OT/PT treatment session. Pain: 8/10.   Location: BLE, specifically L foot   Pain Interventions: pain medication in place prior to arrival, repositioned , and therapy activities modified        Activities of Daily Living  Basic Activities of Daily Living  Lower Extremity Dressing: maximum assistance requires verbal cueing Increased time to complete task  Dressing Comments: attempted to solis brief with benefit from continued reinforcement    Assessment  Activity Tolerance: patient limited by pain in L foot and decreased endurance   Impairments Requiring Therapeutic Intervention: decreased functional mobility, decreased ADL status, decreased strength, decreased safety awareness, decreased cognition, decreased endurance, decreased balance, increased pain, decreased posture  Prognosis: fair  Clinical Assessment: Patient presenting below baseline function secondary to ICD malfunction, pain and debility. Patient typically independent with ADLs, transfers and mobility. Patient requiring Junior of 1 for sit > stand t/f and mod A x2 with use of RW for stand step t/f from EOB to recliner chair. Patient is not safe to return home. Patient will benefit from continued OT services to address above deficits and maximize safety and independence. Safety Interventions: patient left in bed, bed alarm in place, call light within reach, patient at risk for falls, and nurse notified    Plan  Frequency: 3-5 x/per week  Current Treatment Recommendations: strengthening, balance training, functional mobility training, transfer training, endurance training, patient/caregiver education, and ADL/self-care training    Goals  Patient Goals: not stated    Short Term Goals:  Time Frame: Discharge   Patient will complete upper body ADL at supervision   Patient will complete lower body ADL at moderate assistance   Patient will complete functional transfers at moderate assistance   Patient will increase [facility] ADL score = to or > than 18/24    Above goals reviewed on 10/5/2023. All goals are ongoing at this time unless indicated above.        Therapy Session Time     Individual Group Co-treatment   Time In   1319   Time Out   1357   Minutes   38        Timed Code Treatment Minutes:  Timed Code Treatment Minutes: 38 Minutes  Total Treatment Minutes:  38 minutes        Electronically Signed By: LORRAINE Basilio/MICHAEL -- [id number]

## 2023-10-05 NOTE — PROGRESS NOTES
Infectious Diseases   Progress Note      Admission Date: 9/29/2023  Hospital Day: Hospital Day: 7   Attending: Farhana Marrero MD  Date of service: 10/5/2023     Chief complaint/ Reason for consult:     Pneumococcal pneumonia-explains acute on chronic leukocytosis  Left foot pain and swelling  Elevated ESR greater than 130  Elevated CRP of 703.8  Chronic systolic congestive heart failure ejection fraction of 35%  Severe nonischemic cardiomyopathy  Multiple inappropriate AICD shocks    Microbiology:        I have reviewed allavailable micro lab data and cultures    Blood culture (2/2) - collected on 10/1/2023: Negative  Urine pneumococcal antigen- collected on 10/2/2023: Positive    Strep Pneumoniae Antigen [[id number]] (Abnormal) Collected: 10/02/23 1230   Order Status: Completed Specimen: Urine voided Updated: 10/03/23 0921    STREP PNEUMONIAE ANTIGEN, URINE -- Abnormal     POSITIVE for pneumococcal pneumonia   Normal Range: Presumptive Negative    Abnormal    Narrative:     ORDER#: [id number]                          ORDERED BY: Ahmet Morgan      Antibiotics and immunizations:       Current antibiotics: All antibiotics and their doses were reviewed by me    Recent Abx Admin                     ampicillin-sulbactam (UNASYN) 3,000 mg in sodium chloride 0.9 % 100 mL IVPB (mini-bag) (mg) 3,000 mg New Bag 10/05/23 2019     3,000 mg New Bag  1438     3,000 mg New Bag  0854     3,000 mg New Bag  0330                      Immunization History: All immunization history was reviewed by me today. There is no immunization history on file for this patient. Known drug allergies: All allergies were reviewed and updated    No Known Allergies    Social history:     Social History:  All social andepidemiologic history was reviewed and updated by me today as needed. Tobacco use:   reports that she has never smoked.  She has never used smokeless tobacco.  Alcohol use:   reports no history of alcohol

## 2023-10-05 NOTE — PROGRESS NOTES
V2.0    [facility] Progress Note      Name:  Joann Mejia /Age/Sex: 1955  (74 y.o. female)   MRN & CSN:  [id number] & [id number] Encounter Date/Time: 10/5/2023 5:02 PM EDT   Location:  [address] PCP: No primary care provider on file. Attending:Brittney Lara, [address] [address] Day: 7    Assessment and Recommendations   Joann Mejia is a 70 y.o. female with pmh of HLD, HTN, Nonischemic cardiomyopathy who presented with ICD (implantable cardioverter-defibrillator) discharge. Plan:     ICD firing:   Cardiology consulted: ICD interrogated with findings of A flutter with RVR. Continue amiodarone to 200 mg bid for two weeks followed by 200 mg/day. Continue Toprol XL and Digoxin. Continue Eliquis. Pain control. VALERIE: Resolving  Scr on admission 0.7. Peak Scr 1.3. Hold Lasix, Aldactone and valsartan. Nephrology consult appreciated. Continue IV fluids. Monitor BMP. Leukocytosis with elevated inflammatory markers due to pneumococcal pneumonia:  Patient with acute on chronic leukocytosis (Denies evaluation in the past). CXR without acute abnormality. Urinalysis negative for UTI. CT of the feet, abdomen and pelvis with no infection noted. CT chest with dependent opacity seen at the lung bases ? Atelectasis. Urine strep antigen positive. CRP to 231.2, ESR >130. Procalcitonin 0.19. Blood cultures NGTD. .  ID consult appreciated: Currently on Unasyn. Final ID plan pending. Rhabdomyolysis: Total CK 5067. Downtrending with IVF      Bilateral foot and ankle pain: r/o gout flare  X-rays of the ankles and feet unremarkable for acute abnormality. Uric acid elevated; 11.7. Ortho consult appreciated: Continue trial of indomethacin and Colchicine. Difficulty ambulating; PT OT evaluation. Pain control with bowel regimen. Elevated troponin due to recent surgery and ICD shocks. Heparin drip discontinued. Resumed Eliquis. Pain control.       Hypertension: Monitor BP on

## 2023-10-05 NOTE — PROGRESS NOTES
Physical Therapy    [address] [place] [address] Department   Phone: [phone]    Physical Therapy    [] Initial Evaluation            [x] Daily Treatment Note         [] Discharge Summary      Patient: Jeovanny Foster   : 1955   MRN: [id number]   Date of Service:  10/5/2023  Admitting Diagnosis: ICD (implantable cardioverter-defibrillator) discharge  Current Admission Summary: Jeovanny Foster is a 69 yo female with history of HTN, hyperlipidemia, CHF, NICM, obesity, ICD, afib, NSVT who went to [facility] for chest pain and multiple ICD shocks. Yesterday she was trying to get up from bed and felt 6 consecutive ICD shocks. She sat down on the floor and had an additional 5 shocks. She went to [facility] and was admitted. She was started on IV heparin and IV amiodarone. She had generator change of ICD on 23 here at [facility]. She was transferred from [facility] to [facility] for EP to see last evening. XRay B feet and ankle - negative for acute fracture but Soft tissue swelling present   CT of chest and L foot pending  Past Medical History:  has a past medical history of AICD (automatic cardioverter/defibrillator) present, Arthritis, CHF (congestive heart failure) ([address]), Gout, Hyperlipidemia, Hypertension, and MI (myocardial infarction) ([address]). Past Surgical History:  has a past surgical history that includes Dilation and curettage of uterus; Cardiac defibrillator placement; and Pain management procedure (Bilateral, 2022). Discharge Recommendations: Jeovanny Foster scored a 14/24 on the AM-PAC short mobility form. Current research shows that an AM-PAC score of 17 or less is typically not associated with a discharge to the patient's home setting. Based on the patient's AM-PAC score and their current functional mobility deficits, it is recommended that the patient have 3-5 sessions per week of Physical Therapy at d/c to increase the patient's independence.   Please see assessment section for further patient specific Abd/add and flexion x 10 each bilat. Educated on importance of PT and activity frequency - pt verbalized understanding. Functional Outcomes  AM-PAC Inpatient Mobility Raw Score : 14              Cognition  WFL  Orientation:    alert and oriented x 4  Command Following:   [facility]  Barriers To Learning: physical  Patient Education: patient educated on goals, PT role and benefits, plan of care, general safety, functional mobility training, transfer training, discharge recommendations  Learning Assessment:  patient verbalizes understanding, would benefit from continued reinforcement    Assessment  Activity Tolerance: Poor; patient was limited secondary to pain  Impairments Requiring Therapeutic Intervention: decreased functional mobility, decreased ADL status, decreased ROM, decreased strength, decreased endurance, decreased sensation, decreased balance, increased pain, decreased posture  Prognosis: fair  Clinical Assessment: Mod A x 2 persons for stand-step transfer, min A bed mob. Patient is a 70year old female who presents with weakness/debility secondary to ICD malfunction. Pt. Is having significant pain in B ankles which is diagnosed at this point as Gout. Infectious disease involved and w/u pending. Patient reports mod I with use of RW for functional mobility in her home setting. Patient would benefit from additional skilled PT upon discharge to promote independence and safety with functional mobility. Safety Interventions: patient left in chair, chair alarm in place, call light within reach, gait belt, patient at risk for falls, and nurse notified    Plan  Frequency: 3-5 x/per week  Current Treatment Recommendations: strengthening, ROM, balance training, functional mobility training, transfer training, gait training, stair training, endurance training, and positioning    Goals  Patient Goals:  To return home   Short Term Goals:  Time Frame: Upon discharge   Patient will complete bed mobility at

## 2023-10-05 NOTE — PLAN OF CARE
Problem: Pain  Goal: Verbalizes/displays adequate comfort level or baseline comfort level  10/5/2023 0924 by Nicolle Pennington RN  Outcome: Progressing  Flowsheets (Taken 10/5/2023 0030 by Danni Finch RN)  Verbalizes/displays adequate comfort level or baseline comfort level:   Encourage patient to monitor pain and request assistance   Assess pain using appropriate pain scale     Problem: Safety - Adult  Goal: Free from fall injury  10/5/2023 0924 by Nicolle Pennington RN  Outcome: Progressing     Problem: Metabolic/Fluid and Electrolytes - Adult  Goal: Electrolytes maintained within normal limits  10/5/2023 0924 by Nicolle Pennington RN  Outcome: Progressing     Problem: Discharge Planning  Goal: Discharge to home or other facility with appropriate resources  10/5/2023 0924 by Nicolle Pennington RN  Outcome: Progressing

## 2023-10-05 NOTE — PROGRESS NOTES
Nutrition Note    RECOMMENDATIONS  PO Diet: Continue current diet   ONS: Continue Ensure Compact BID  Nutrition Support: None      NUTRITION ASSESSMENT   Pt seen for follow up assessment. Nursing has charted PO intake as % of most meals. However pt states her appetite continues to be poor and she is eating less than 50% of meals. Pt does like Ensure Compact and is drinking 100% BID. Will continue to offer. Nutrition Related Findings: Lytes WNL. LBM 10/1. Non-pitting BLE edema. NS at 100 mL/hr. Wounds: Skin Tears  Nutrition Education:  Education completed   Nutrition Goals: PO intake 50% or greater, prior to discharge     MALNUTRITION ASSESSMENT   Malnutrition Status: No malnutrition    NUTRITION DIAGNOSIS   Inadequate protein-energy intake related to inadequate protein-energy intake as evidenced by other (comment) (pt report of)      CURRENT NUTRITION THERAPIES  ADULT DIET; Regular; Low Fat/Low Chol/High Fiber/WILBER  ADULT ORAL NUTRITION SUPPLEMENT; Breakfast, Lunch; Standard 4 oz Oral Supplement     PO Intake: % (per nursing documentation)   PO Supplement Intake:%    ANTHROPOMETRICS  Current Height: 5' 6\" (167.6 cm)  Current Weight - Scale: 276 lb (125.2 kg)    Ideal Body Weight (IBW): 130 lbs  (59 kg)        BMI: 44.6    COMPARATIVE STANDARDS  Total Energy Requirements (kcals/day): 9554-9673     Protein (g):  118 grams       Fluid (mL/day):  6925-9012 mL    The patient will be monitored per nutrition standards of care. Consult dietitian if additional nutrition interventions are needed prior to RD reassessment.      Rikki Terry MS, [address] [facility]    Contact: [phone]

## 2023-10-06 VITALS
HEART RATE: 65 BPM | TEMPERATURE: 97 F | DIASTOLIC BLOOD PRESSURE: 67 MMHG | OXYGEN SATURATION: 98 % | SYSTOLIC BLOOD PRESSURE: 141 MMHG | BODY MASS INDEX: 44.36 KG/M2 | HEIGHT: 66 IN | RESPIRATION RATE: 16 BRPM | WEIGHT: 276 LBS

## 2023-10-06 PROBLEM — R79.82 CRP ELEVATED: Status: RESOLVED | Noted: 2023-10-02 | Resolved: 2023-10-06

## 2023-10-06 PROBLEM — Z71.3 WEIGHT LOSS COUNSELING, ENCOUNTER FOR: Status: RESOLVED | Noted: 2023-10-04 | Resolved: 2023-10-06

## 2023-10-06 PROBLEM — R70.0 ELEVATED ERYTHROCYTE SEDIMENTATION RATE: Status: RESOLVED | Noted: 2023-10-02 | Resolved: 2023-10-06

## 2023-10-06 PROBLEM — R79.89 ELEVATED TROPONIN: Status: RESOLVED | Noted: 2023-09-30 | Resolved: 2023-10-06

## 2023-10-06 PROBLEM — D72.825 BANDEMIA: Status: RESOLVED | Noted: 2023-10-02 | Resolved: 2023-10-06

## 2023-10-06 PROBLEM — L03.116 CELLULITIS OF LEFT ANKLE: Status: RESOLVED | Noted: 2023-10-02 | Resolved: 2023-10-06

## 2023-10-06 LAB
ALBUMIN SERPL-MCNC: 3.2 G/DL (ref 3.4–5)
ALBUMIN/GLOB SERPL: 0.9 {RATIO} (ref 1.1–2.2)
ALP SERPL-CCNC: 95 U/L (ref 40–129)
ALT SERPL-CCNC: 14 U/L (ref 10–40)
ANION GAP SERPL CALCULATED.3IONS-SCNC: 7 MMOL/L (ref 3–16)
AST SERPL-CCNC: 36 U/L (ref 15–37)
BACTERIA BLD CULT ORG #2: NORMAL
BACTERIA BLD CULT: NORMAL
BASOPHILS # BLD: 0.1 K/UL (ref 0–0.2)
BASOPHILS NFR BLD: 0.8 %
BILIRUB SERPL-MCNC: 0.3 MG/DL (ref 0–1)
BUN SERPL-MCNC: 6 MG/DL (ref 7–20)
CALCIUM SERPL-MCNC: 9.1 MG/DL (ref 8.3–10.6)
CHLORIDE SERPL-SCNC: 107 MMOL/L (ref 99–110)
CK SERPL-CCNC: 1025 U/L (ref 26–192)
CO2 SERPL-SCNC: 27 MMOL/L (ref 21–32)
CREAT SERPL-MCNC: 0.6 MG/DL (ref 0.6–1.2)
DEPRECATED RDW RBC AUTO: 14.1 % (ref 12.4–15.4)
EOSINOPHIL # BLD: 0.5 K/UL (ref 0–0.6)
EOSINOPHIL NFR BLD: 3.8 %
FERRITIN SERPL IA-MCNC: 356 NG/ML (ref 15–150)
FOLATE SERPL-MCNC: 5.56 NG/ML (ref 4.78–24.2)
GFR SERPLBLD CREATININE-BSD FMLA CKD-EPI: >60 ML/MIN/{1.73_M2}
GLUCOSE SERPL-MCNC: 122 MG/DL (ref 70–99)
HCT VFR BLD AUTO: 32.9 % (ref 36–48)
HGB BLD-MCNC: 10.2 G/DL (ref 12–16)
IRON SATN MFR SERPL: 20 % (ref 15–50)
IRON SERPL-MCNC: 41 UG/DL (ref 37–145)
LYMPHOCYTES # BLD: 2.1 K/UL (ref 1–5.1)
LYMPHOCYTES NFR BLD: 17.2 %
MAGNESIUM SERPL-MCNC: 2 MG/DL (ref 1.8–2.4)
MCH RBC QN AUTO: 25.3 PG (ref 26–34)
MCHC RBC AUTO-ENTMCNC: 31.1 G/DL (ref 31–36)
MCV RBC AUTO: 81.4 FL (ref 80–100)
MONOCYTES # BLD: 1 K/UL (ref 0–1.3)
MONOCYTES NFR BLD: 8.4 %
NEUTROPHILS # BLD: 8.5 K/UL (ref 1.7–7.7)
NEUTROPHILS NFR BLD: 69.8 %
PHOSPHATE SERPL-MCNC: 4.3 MG/DL (ref 2.5–4.9)
PLATELET # BLD AUTO: 394 K/UL (ref 135–450)
PMV BLD AUTO: 8 FL (ref 5–10.5)
POTASSIUM SERPL-SCNC: 4 MMOL/L (ref 3.5–5.1)
PROT SERPL-MCNC: 6.9 G/DL (ref 6.4–8.2)
RBC # BLD AUTO: 4.04 M/UL (ref 4–5.2)
SODIUM SERPL-SCNC: 141 MMOL/L (ref 136–145)
TIBC SERPL-MCNC: 208 UG/DL (ref 260–445)
VIT B12 SERPL-MCNC: 540 PG/ML (ref 211–911)
WBC # BLD AUTO: 12.2 K/UL (ref 4–11)

## 2023-10-06 PROCEDURE — 80053 COMPREHEN METABOLIC PANEL: CPT

## 2023-10-06 PROCEDURE — 83735 ASSAY OF MAGNESIUM: CPT

## 2023-10-06 PROCEDURE — 6370000000 HC RX 637 (ALT 250 FOR IP): Performed by: INTERNAL MEDICINE

## 2023-10-06 PROCEDURE — 6370000000 HC RX 637 (ALT 250 FOR IP): Performed by: NURSE PRACTITIONER

## 2023-10-06 PROCEDURE — 83550 IRON BINDING TEST: CPT

## 2023-10-06 PROCEDURE — 82728 ASSAY OF FERRITIN: CPT

## 2023-10-06 PROCEDURE — 97116 GAIT TRAINING THERAPY: CPT

## 2023-10-06 PROCEDURE — 82746 ASSAY OF FOLIC ACID SERUM: CPT

## 2023-10-06 PROCEDURE — 83540 ASSAY OF IRON: CPT

## 2023-10-06 PROCEDURE — 82550 ASSAY OF CK (CPK): CPT

## 2023-10-06 PROCEDURE — 36415 COLL VENOUS BLD VENIPUNCTURE: CPT

## 2023-10-06 PROCEDURE — 6370000000 HC RX 637 (ALT 250 FOR IP): Performed by: STUDENT IN AN ORGANIZED HEALTH CARE EDUCATION/TRAINING PROGRAM

## 2023-10-06 PROCEDURE — 97530 THERAPEUTIC ACTIVITIES: CPT

## 2023-10-06 PROCEDURE — 2580000003 HC RX 258: Performed by: INTERNAL MEDICINE

## 2023-10-06 PROCEDURE — 82607 VITAMIN B-12: CPT

## 2023-10-06 PROCEDURE — 99232 SBSQ HOSP IP/OBS MODERATE 35: CPT | Performed by: INTERNAL MEDICINE

## 2023-10-06 PROCEDURE — 97110 THERAPEUTIC EXERCISES: CPT

## 2023-10-06 PROCEDURE — 6360000002 HC RX W HCPCS: Performed by: INTERNAL MEDICINE

## 2023-10-06 PROCEDURE — 85025 COMPLETE CBC W/AUTO DIFF WBC: CPT

## 2023-10-06 PROCEDURE — 84100 ASSAY OF PHOSPHORUS: CPT

## 2023-10-06 RX ORDER — AMOXICILLIN AND CLAVULANATE POTASSIUM 875; 125 MG/1; MG/1
1 TABLET, FILM COATED ORAL EVERY 12 HOURS SCHEDULED
Qty: 14 TABLET | Refills: 0 | Status: SHIPPED | OUTPATIENT
Start: 2023-10-06 | End: 2023-10-13

## 2023-10-06 RX ORDER — AMIODARONE HYDROCHLORIDE 200 MG/1
200 TABLET ORAL 2 TIMES DAILY
Qty: 24 TABLET | Refills: 0 | Status: SHIPPED | OUTPATIENT
Start: 2023-10-06 | End: 2023-10-18

## 2023-10-06 RX ORDER — AMOXICILLIN AND CLAVULANATE POTASSIUM 875; 125 MG/1; MG/1
1 TABLET, FILM COATED ORAL EVERY 12 HOURS SCHEDULED
Status: DISCONTINUED | OUTPATIENT
Start: 2023-10-06 | End: 2023-10-06 | Stop reason: HOSPADM

## 2023-10-06 RX ORDER — AMIODARONE HYDROCHLORIDE 200 MG/1
200 TABLET ORAL DAILY
Qty: 45 TABLET | Refills: 0 | Status: SHIPPED | OUTPATIENT
Start: 2023-10-18

## 2023-10-06 RX ORDER — MORPHINE SULFATE 15 MG/1
15 TABLET ORAL EVERY 6 HOURS PRN
Qty: 12 TABLET | Refills: 0 | Status: SHIPPED | OUTPATIENT
Start: 2023-10-06 | End: 2023-10-09

## 2023-10-06 RX ORDER — COLCHICINE 0.6 MG/1
0.6 TABLET ORAL 2 TIMES DAILY
Qty: 30 TABLET | Refills: 0 | Status: SHIPPED | OUTPATIENT
Start: 2023-10-06

## 2023-10-06 RX ORDER — GABAPENTIN 100 MG/1
100 CAPSULE ORAL 3 TIMES DAILY
Qty: 90 CAPSULE | Refills: 0 | Status: SHIPPED | OUTPATIENT
Start: 2023-10-06 | End: 2023-11-05

## 2023-10-06 RX ORDER — AMIODARONE HYDROCHLORIDE 100 MG/1
200 TABLET ORAL DAILY
Qty: 90 TABLET | Refills: 0 | Status: SHIPPED | OUTPATIENT
Start: 2023-10-18 | End: 2023-10-06 | Stop reason: SDUPTHER

## 2023-10-06 RX ORDER — AMOXICILLIN AND CLAVULANATE POTASSIUM 875; 125 MG/1; MG/1
1 TABLET, FILM COATED ORAL EVERY 12 HOURS SCHEDULED
Qty: 8 TABLET | Refills: 0 | Status: SHIPPED | OUTPATIENT
Start: 2023-10-06 | End: 2023-10-06 | Stop reason: SDUPTHER

## 2023-10-06 RX ORDER — DIGOXIN 125 MCG
125 TABLET ORAL DAILY
Qty: 30 TABLET | Refills: 3 | Status: SHIPPED | OUTPATIENT
Start: 2023-10-07

## 2023-10-06 RX ADMIN — Medication 1 CAPSULE: at 08:04

## 2023-10-06 RX ADMIN — Medication 400 MG: at 08:04

## 2023-10-06 RX ADMIN — AMOXICILLIN AND CLAVULANATE POTASSIUM 1 TABLET: 875; 125 TABLET, FILM COATED ORAL at 11:16

## 2023-10-06 RX ADMIN — MORPHINE SULFATE 15 MG: 15 TABLET ORAL at 08:03

## 2023-10-06 RX ADMIN — AMPICILLIN SODIUM AND SULBACTAM SODIUM 3000 MG: 2; 1 INJECTION, POWDER, FOR SOLUTION INTRAMUSCULAR; INTRAVENOUS at 08:51

## 2023-10-06 RX ADMIN — MELATONIN TAB 3 MG 3 MG: 3 TAB at 01:38

## 2023-10-06 RX ADMIN — GABAPENTIN 100 MG: 100 CAPSULE ORAL at 08:04

## 2023-10-06 RX ADMIN — AMIODARONE HYDROCHLORIDE 200 MG: 200 TABLET ORAL at 08:04

## 2023-10-06 RX ADMIN — APIXABAN 5 MG: 5 TABLET, FILM COATED ORAL at 08:04

## 2023-10-06 RX ADMIN — COLCHICINE 0.6 MG: 0.6 TABLET, FILM COATED ORAL at 08:07

## 2023-10-06 RX ADMIN — BACITRACIN: 500 OINTMENT TOPICAL at 08:08

## 2023-10-06 RX ADMIN — MORPHINE SULFATE 15 MG: 15 TABLET ORAL at 01:37

## 2023-10-06 RX ADMIN — METOPROLOL SUCCINATE 200 MG: 50 TABLET, EXTENDED RELEASE ORAL at 08:04

## 2023-10-06 RX ADMIN — GABAPENTIN 100 MG: 100 CAPSULE ORAL at 12:20

## 2023-10-06 RX ADMIN — ATORVASTATIN CALCIUM 80 MG: 80 TABLET, FILM COATED ORAL at 08:04

## 2023-10-06 RX ADMIN — ASPIRIN 81 MG: 81 TABLET, CHEWABLE ORAL at 08:04

## 2023-10-06 RX ADMIN — MORPHINE SULFATE 15 MG: 15 TABLET ORAL at 12:21

## 2023-10-06 RX ADMIN — DIGOXIN 125 MCG: 125 TABLET ORAL at 08:03

## 2023-10-06 RX ADMIN — AMPICILLIN SODIUM AND SULBACTAM SODIUM 3000 MG: 2; 1 INJECTION, POWDER, FOR SOLUTION INTRAMUSCULAR; INTRAVENOUS at 02:55

## 2023-10-06 ASSESSMENT — ENCOUNTER SYMPTOMS
FACIAL SWELLING: 0
BACK PAIN: 0
DIARRHEA: 0
WHEEZING: 0
SINUS PRESSURE: 0
RHINORRHEA: 0
SHORTNESS OF BREATH: 0
CONSTIPATION: 0
ABDOMINAL DISTENTION: 0
PHOTOPHOBIA: 0
BLOOD IN STOOL: 0
CHEST TIGHTNESS: 0
ABDOMINAL PAIN: 0
COUGH: 0
NAUSEA: 0
VOMITING: 0
SINUS PAIN: 0
EYE DISCHARGE: 0
EYE REDNESS: 0
SORE THROAT: 0

## 2023-10-06 ASSESSMENT — PAIN SCALES - GENERAL
PAINLEVEL_OUTOF10: 8
PAINLEVEL_OUTOF10: 9
PAINLEVEL_OUTOF10: 0
PAINLEVEL_OUTOF10: 7
PAINLEVEL_OUTOF10: 0
PAINLEVEL_OUTOF10: 8

## 2023-10-06 ASSESSMENT — PAIN DESCRIPTION - DESCRIPTORS
DESCRIPTORS: ACHING
DESCRIPTORS: ACHING

## 2023-10-06 ASSESSMENT — PAIN DESCRIPTION - LOCATION
LOCATION: ANKLE;LEG
LOCATION: ANKLE
LOCATION: ANKLE

## 2023-10-06 ASSESSMENT — PAIN DESCRIPTION - ORIENTATION
ORIENTATION: LEFT

## 2023-10-06 NOTE — PROGRESS NOTES
Data- discharge order received, pt verbalized agreement to discharge, needs for [address] [facility] with [facility] for PT/OT, DIANNE reviewed and signed by MD, to be completed by RN. Action- AVS prepared, discharge instructions prepared and given to pt , medication information packet given r/t NEW or CHANGED prescriptions, pt verbalized understanding further self-review. D/C instruction summary: Diet- Cardiac, Activity- up as tolerated, follow up with Primary Care Physician No primary care provider on file. None appointment in one week, immunizations reviewed and updated, medications prescriptions to be filled at pharmacy of choice. Inpatient surgical procedural reviewed: None. Contact information provided to above agencies used. Response- Case Management/ reported faxing completed DIANNE and AVS to needed HHC/DME services stated above. Pt belongings gathered, IV removed, pt dressed appropriately. Disposition is home with HHC/DME as stated above, transported with belongings and discharge instruction, taken to lobby via w/c with RN, no complications. 1. WEIGHT: Admit Weight - Scale: 278 lb 6.4 oz (126.3 kg) (09/29/23 2021)        Today  Weight - Scale: 276 lb (125.2 kg) (10/06/23 0135)       2.  O2 SAT.: SpO2: 98 % (10/06/23 1115)

## 2023-10-06 NOTE — PROGRESS NOTES
[facility] Note    Patient Active Problem List   Diagnosis    Mixed hyperlipidemia    ICD (implantable cardioverter-defibrillator) in place    Paroxysmal atrial fibrillation (HCC)    History of ventricular tachycardia    Systolic CHF, chronic (HCC)    NICM (nonischemic cardiomyopathy) ([address])    Coronary artery disease due to lipid rich plaque    Class 3 severe obesity due to excess calories with body mass index (BMI) of 40.0 to 44.9 in adult (HCC)    TYLER (obstructive sleep apnea)    Essential hypertension    PAF (paroxysmal atrial fibrillation) ([facility])    Gout    Back pain with radiculopathy    Lumbosacral radiculopathy    Paraparesis of both lower limbs ([facility])    ICD (implantable cardioverter-defibrillator) battery depletion    ICD (implantable cardioverter-defibrillator) discharge    Inappropriate shocks from ICD (implantable cardioverter-defibrillator)    Atrial fibrillation with rapid ventricular response (HCC)    Atrial flutter with rapid ventricular response ([facility])    Productive cough    CAP (community acquired pneumonia) due to Pneumococcus [facility])       Past Medical History:   has a past medical history of AICD (automatic cardioverter/defibrillator) present, Arthritis, CHF (congestive heart failure) ([address]), Gout, Hyperlipidemia, Hypertension, and MI (myocardial infarction) ([address]). Past Social History:   reports that she has never smoked. She has never used smokeless tobacco. She reports that she does not drink alcohol and does not use drugs. Subjective:    No complaints. No SOB  CT with contrast done 10/3/23  Urinating well    Review of Systems   Constitutional:  Positive for fatigue. Negative for activity change, appetite change, chills, fever and unexpected weight change. HENT:  Negative for congestion and facial swelling. Eyes:  Negative for photophobia, discharge and redness. Respiratory:  Negative for cough, chest tightness and shortness of breath.

## 2023-10-06 NOTE — CARE COORDINATION
Discharge Planning Note Re: Chad noted consult for discharge planning. Chart reviewed. Noted recommendations for home health care. CM met with patient . Introduced self and explained role of CM and discharge planning. Patient is agreeable to home health on dc. [place] of choice list was provided with basic dialogue that supports the patient's individualized plan of care/goals, treatment preferences and shares the quality data associated with the providers. [x] Yes [] No.     Referral made to [address], [facility] [address]  Phone: [phone]  Fax: [phone] . [facility] order for  [x]RN [x]PT  [x]OT  [x]HHA  []SW  []  SLP    Referral was accepted, will follow patient on discharge.

## 2023-10-06 NOTE — DISCHARGE SUMMARY
Hospital Medicine Discharge Summary    Name:  Elizabeth Castaneda  Gender: female  : 1955  70 y.o. MRN: [id number]    PCP: No primary care provider on file. Date of Admission:  2023  7:54 PM  Discharge Date: 10/6/2023    Admitting Physician: Gabrielle Matute MD  Discharge Physician: Chad Sandhu DO    Communication to PCP  -Holding Lasix  -Augmentin for pneumonia      Discharge Diagnoses: Active Hospital Problems    Diagnosis     CAP (community acquired pneumonia) due to Pneumococcus ([address]) [J13]     Atrial flutter with rapid ventricular response (HCC) [I48.92]     Productive cough [R05.8]     Inappropriate shocks from ICD (implantable cardioverter-defibrillator) [T82.198A]     Atrial fibrillation with rapid ventricular response (HCC) [I48.91]     ICD (implantable cardioverter-defibrillator) discharge [Z45.02]     PAF (paroxysmal atrial fibrillation) (HCC) [I48.0]     Class 3 severe obesity due to excess calories with body mass index (BMI) of 40.0 to 44.9 in adult ([address]) [E66.01, R26.79]     Systolic CHF, chronic ([address]) [I50.22]     NICM (nonischemic cardiomyopathy) ([address]) [I42.8]        The patient was seen and examined on day of discharge and this discharge summary is in conjunction with any daily progress note from day of discharge. Hospital Course:  Elizabeth Castaneda is a 70y.o. year old female who presented to [facility] on 2023  7:54 PM.      Patient admitted for ICD discharge. Cardiology interrogated pacemaker and found a flutter with RVR. Patient started on amiodarone and will continue home Toprol, Eliquis, and digoxin. Cardiology consulted. Patient presented with VALERIE. Improved after holding home Lasix, Aldactone, and valsartan. Aldactone and valsartan will be continued on discharge. Lasix will be held. Nephrology consulted. Patient's urine strep antigen was positive. Elevated inflammatory markers on admission. Started on Unasyn. ID consulted.   Discharged on 10/06/2023 05:43 AM       Renal:    Lab Results   Component Value Date/Time     10/06/2023 05:43 AM    K 4.0 10/06/2023 05:43 AM    K 3.3 09/29/2023 09:18 PM     10/06/2023 05:43 AM    CO2 27 10/06/2023 05:43 AM    BUN 6 10/06/2023 05:43 AM    CREATININE 0.6 10/06/2023 05:43 AM    CALCIUM 9.1 10/06/2023 05:43 AM    PHOS 4.3 10/06/2023 05:43 AM         Significant Diagnostic Studies    Radiology:   CT FOOT LEFT W CONTRAST   Final Result   1. No evidence of osteomyelitis. 2.  Skin thickening over the anterior surface of the ankle may represent   cellulitis. No evidence of abscess or soft tissue gas. CT CHEST ABDOMEN PELVIS W CONTRAST Additional Contrast? None   Final Result      XR ANKLE LEFT (2 VIEWS)   Final Result   No acute fracture or dislocation. XR ANKLE RIGHT (2 VIEWS)   Final Result   No acute fracture or dislocation identified radiographically. Soft tissue   swelling of the foot and ankle may reflect cellulitis in the appropriate   clinical setting. XR FOOT LEFT (2 VIEWS)   Final Result   No acute fracture or dislocation identified radiographically. XR FOOT RIGHT (MIN 3 VIEWS)   Final Result   No acute fracture or dislocation. No acute erosion. Soft tissue swelling of   the foot may reflect cellulitis in the appropriate clinical setting. XR CHEST PORTABLE   Final Result   Limited under penetrated exam.      Prominence of the pulmonary vasculature, could represent pulmonary vascular   congestion. There is a left-sided cardiac device. Consults:     IP CONSULT TO CARDIOLOGY  IP CONSULT TO HEART FAILURE NURSE/COORDINATOR  IP CONSULT TO DIETITIAN  IP CONSULT TO INFECTIOUS DISEASES  IP CONSULT TO NEPHROLOGY    F/U APPTS:  No follow-up provider specified.     Diet:  cardiac diet     Activity:  activity as tolerated    Disposition:  Discharged to SNF    Rehab: Patient is going to rehab at  [facility] at Discharge:

## 2023-10-06 NOTE — CARE COORDINATION
10/06/23 1146   IMM Letter   IMM Letter given to Patient/Family/Significant other/Guardian/POA/by: Second IMM given to patient by CM   IMM Letter date given: 10/06/23   IMM Letter time given: 1110

## 2023-10-06 NOTE — PROGRESS NOTES
Physical Therapy    [address] [facility] Department   Phone: [phone]    Physical Therapy    [] Initial Evaluation            [x] Daily Treatment Note         [] Discharge Summary      Patient: Candida Gregory   : 1955   MRN: [id number]   Date of Service:  10/6/2023  Admitting Diagnosis: ICD (implantable cardioverter-defibrillator) discharge  Current Admission Summary: Candida Gregory is a 69 yo female with history of HTN, hyperlipidemia, CHF, NICM, obesity, ICD, afib, NSVT who went to [facility] for chest pain and multiple ICD shocks. Yesterday she was trying to get up from bed and felt 6 consecutive ICD shocks. She sat down on the floor and had an additional 5 shocks. She went to [facility] and was admitted. She was started on IV heparin and IV amiodarone. She had generator change of ICD on 23 here at [facility]. She was transferred from [facility] to [facility] for EP to see last evening. XRay B feet and ankle - negative for acute fracture but Soft tissue swelling present   CT of chest and L foot pending  Past Medical History:  has a past medical history of AICD (automatic cardioverter/defibrillator) present, Arthritis, CHF (congestive heart failure) ([address]), Gout, Hyperlipidemia, Hypertension, and MI (myocardial infarction) ([address]). Past Surgical History:  has a past surgical history that includes Dilation and curettage of uterus; Cardiac defibrillator placement; and Pain management procedure (Bilateral, 2022). Discharge Recommendations: Candida Gregory scored a 17/24 on the AM-PAC short mobility form. Current research shows that an AM-PAC score of 17 or less is typically not associated with a discharge to the patient's home setting. Based on the patient's AM-PAC score and their current functional mobility deficits, it is recommended that the patient have 3-5 sessions per week of Physical Therapy at d/c to increase the patient's independence.   Please see assessment section for further patient 10/6  Patient will ambulate 25 ft with use of rolling walker at moderate assistance  Pt to perform bed mob with SBA. Pt will transfer sit to/from stand with SBA    Above goals reviewed on 10/6/2023. All goals are ongoing at this time unless indicated above.       Therapy Session Time      Individual Group Co-treatment   Time In 3690       Time Out 1253       Minutes 68         Timed Code Treatment Minutes:  68 Minutes  Total Treatment Minutes:  68 minutes        Electronically Signed By: Claudy Talbot, [id number]

## 2023-10-06 NOTE — CARE COORDINATION
Case Management -  Discharge Note      Patient Name: Lynwood Goldmann                   YOB: 1955            Readmission Risk (Low < 19, Mod (19-27), High > 27): Readmission Risk Score: 13.3    Current PCP: No primary care provider on file. (IMM) Important Message from Medicare:    Date: 10/06/2023    PT AM-PAC: 16 /24  OT AM-PAC: 14 /24    Patient has been educated on the benefits of SNF as well as the possible risks of declining recommended services. Patient declined SNF placement. Patient has decided to go home with skilled hhc services    Patient/ patient representative( daughter) have been provided freedom of choice regarding service provider, supported by basic dialogue that supports the patient's individualized plan of care/goals. Home Care Information:   Is patient resuming current home health care services: No    [address] [place] [address] Avenue:   [address] [place] [address] [address]  Phone: [phone]  Fax: [phone]             Services: Skilled Nursing, PT & OT, HHA  Home Health Order Obtained:  Yes  Home health agency notified of discharge. Stanton Brown in intake is aware of the patient's discharge. COA  - for HHA services daily. Financial    Payor: [place] / Plan: Naresh Meehan ESSENTIAL/PLUS / Product Type: *No Product type* /     Pharmacy:  Potential assistance Purchasing Medications:    [id number] [address] [place] [address], [address] [place] [address]  Phone: [phone] Fax: [phone]      Notes: Additional Case Management Notes:   Patient's sister Valeriano Frazier who is also the patient's  HHA will transport patient home.     Electronically signed by Pan Degroot RN/ BSN/CCM  on 10/6/2023 at 2:29 PM

## 2023-10-06 NOTE — DISCHARGE INSTR - COC
Continuity of Care Form    Patient Name: Taty Mendez   :  1955  MRN:  [id number]    Admit date:  2023  Discharge date:  10/6/2023    Code Status Order: Full Code   Advance Directives:     Admitting Physician:  Joby Vaughn MD  PCP: No primary care provider on file. Discharging Nurse: [address] [place] [address] Unit/Room#: N6J-1513/3452-70  Discharging Unit Phone Number: [phone]    Emergency Contact:   Extended Emergency Contact Information  Primary Emergency Contact: [facility] Phone: [phone]  Mobile Phone: [phone]  Relation: Child  Secondary Emergency Contact: [facility] Phone: [phone]  Mobile Phone: [phone]  Relation: Grandchild  Preferred language: English    Past Surgical History:  Past Surgical History:   Procedure Laterality Date    CARDIAC DEFIBRILLATOR PLACEMENT      DILATION AND CURETTAGE OF UTERUS      PAIN MANAGEMENT PROCEDURE Bilateral 2022    BILATERAL L5 TRANSFORAMINAL EPIDURAL STEROID INJECTION WITH FLUOROSCOPY performed by Nargis Posadas MD at [facility]       Immunization History: There is no immunization history on file for this patient.     Active Problems:  Patient Active Problem List   Diagnosis Code    Mixed hyperlipidemia E78.2    ICD (implantable cardioverter-defibrillator) in place Z95.810    Paroxysmal atrial fibrillation (HCC) I48.0    History of ventricular tachycardia W98.46    Systolic CHF, chronic (HCC) I50.22    NICM (nonischemic cardiomyopathy) ([facility]) I42.8    Coronary artery disease due to lipid rich plaque I25.10, I25.83    Class 3 severe obesity due to excess calories with body mass index (BMI) of 40.0 to 44.9 in adult ([address]) E66.01, Z68.41    TYLER (obstructive sleep apnea) G47.33    Essential hypertension I10    PAF (paroxysmal atrial fibrillation) ([address]) I48.0    Gout M10.9    Back pain with radiculopathy M54.10    Lumbosacral radiculopathy M54.17    Paraparesis of both lower limbs ([address]) G82.20    ICD (implantable Certification: I certify the above information and transfer of Zarina Yuan  is necessary for the continuing treatment of the diagnosis listed and that she requires [address] [place] [address] for greater 30 days.      Update Admission H&P: No change in H&P    PHYSICIAN SIGNATURE:  Electronically signed by Cristino Kelley DO on 10/6/23 at 11:04 AM EDT

## 2023-10-06 NOTE — PROGRESS NOTES
CLINICAL PHARMACY NOTE: MEDS TO BEDS    Total # of Prescriptions Filled: 4   The following medications were delivered to the patient:  AMOXICILLIN - POT CLAVUL 875  DIGOXIN 125MCG  AMIODARONE HCL 200MG  GABAPENTIN 100MG    Additional Documentation:  Patient picked up in outpatient pharmacy = signed  [address] Cont amiodarone sent to [facility].   [facility] interaction with amiodarone will transfer when hear back from pbr

## 2023-10-06 NOTE — CARE COORDINATION
Discharge Planning  Discharge order noted for SNF placement for rehab . CM met with patient to update  her , she stated she has changed her mind and wants to go home with skilled hhc services and continue with [facility] on aging service. Her sister Kristopher Mcguire is her home health Aid via [employer] and she provides services every day. Patient was educated on the benefits of SNF as well as the possible risks of declining recommended services. Patient expressed understanding. CM also spoke with patient's daughter Danelle Mcguire who stated family prefers patient going home instead of SNF, patient has enough family support. She was also agreeable to skilled hhc services . Patient had no preference for  a [facility] provider, stated as long as are in network with her  insurance company. Danelle Jarquin stated patient's sister Kristopher Cerda will transport  the patient to home. DEBORA spoke with Kristopher Mendez who stated that patient's daughter is working on getting the patient a wheel chair. MD was informed via perfect serve to put in a DME order for a wheel chair. DEBORA LVM for Praful Ramey with [facility] admissions.

## 2023-10-06 NOTE — DISCHARGE INSTR - DIET

## 2023-10-06 NOTE — PROGRESS NOTES
Infectious Diseases   Progress Note      Admission Date: 9/29/2023  Hospital Day: Hospital Day: 8   Attending: No att. providers found  Date of service: 10/6/2023     Chief complaint/ Reason for consult:     Pneumococcal pneumonia-explains acute on chronic leukocytosis  Left foot pain and swelling  Elevated ESR greater than 130  Elevated CRP of 977.9  Chronic systolic congestive heart failure ejection fraction of 35%  Severe nonischemic cardiomyopathy  Multiple inappropriate AICD shocks    Microbiology:        I have reviewed allavailable micro lab data and cultures    Blood culture (2/2) - collected on 10/1/2023: Negative  Urine pneumococcal antigen- collected on 10/2/2023: Positive    Strep Pneumoniae Antigen [[id number]] (Abnormal) Collected: 10/02/23 1230   Order Status: Completed Specimen: Urine voided Updated: 10/03/23 0921    STREP PNEUMONIAE ANTIGEN, URINE -- Abnormal     POSITIVE for pneumococcal pneumonia   Normal Range: Presumptive Negative    Abnormal    Narrative:     ORDER#: [id number]                          ORDERED BY: Mabel Mondragon      Antibiotics and immunizations:       Current antibiotics: All antibiotics and their doses were reviewed by me    Recent Abx Admin                     amoxicillin-clavulanate (AUGMENTIN) 875-125 MG per tablet 1 tablet (tablet) 1 tablet Given 10/06/23 1116    ampicillin-sulbactam (UNASYN) 3,000 mg in sodium chloride 0.9 % 100 mL IVPB (mini-bag) (mg) 3,000 mg New Bag 10/06/23 0851     3,000 mg New Bag  0255     3,000 mg New Bag 10/05/23 2019                      Immunization History: All immunization history was reviewed by me today. There is no immunization history on file for this patient. Known drug allergies: All allergies were reviewed and updated    No Known Allergies    Social history:     Social History:  All social andepidemiologic history was reviewed and updated by me today as needed. Tobacco use:   reports that she has never smoked.  She

## 2023-10-06 NOTE — PROGRESS NOTES
[address] [facility] Department   Phone: [phone]    Occupational Therapy    [] Initial Evaluation            [x] Daily Treatment Note         [] Discharge Summary      Patient: Bobby Gupta   : 1955   MRN: [id number]   Date of Service:  10/6/2023    Admitting Diagnosis:  ICD (implantable cardioverter-defibrillator) discharge  Current Admission Summary:   Bobby Gupta is a 71 yo female with history of HTN, hyperlipidemia, CHF, NICM, obesity, ICD, afib, NSVT who went to [facility] for chest pain and multiple ICD shocks. Yesterday she was trying to get up from bed and felt 6 consecutive ICD shocks. She sat down on the floor and had an additional 5 shocks. She went to [facility] and was admitted. She was started on IV heparin and IV amiodarone. She had generator change of ICD on 23 here at [facility]. She was transferred from [facility] to [facility] for EP to see last evening. Past Medical History:  has a past medical history of AICD (automatic cardioverter/defibrillator) present, Arthritis, CHF (congestive heart failure) ([address]), Gout, Hyperlipidemia, Hypertension, and MI (myocardial infarction) ([address]). Past Surgical History:  has a past surgical history that includes Dilation and curettage of uterus; Cardiac defibrillator placement; and Pain management procedure (Bilateral, 2022). Discharge Recommendations: Bobby Gupta scored a 14/24 on the AM-PAC ADL Inpatient form. Current research shows that an AM-PAC score of 17 or less is typically not associated with a discharge to the patient's home setting. Based on the patient's AM-PAC score and their current ADL deficits, it is recommended that the patient have 3-5 sessions per week of Occupational Therapy at d/c to increase the patient's independence. Please see assessment section for further patient specific details. Pt is strongly considering returning home with family assistance.  If pt declines SNF and returns home, recommend 24/

## 2023-10-09 ENCOUNTER — TELEPHONE (OUTPATIENT)
Dept: OTHER | Age: 68
End: 2023-10-09

## 2023-10-09 NOTE — TELEPHONE ENCOUNTER
[address] [place] [address] 1955    ASSESSMENT:   Baseline weight: 276 lbs  Current weight: not weighing--needs scale  Patient weighing daily: No and needs scale  What are your symptoms of heart failure: dyspnea, edema  Are you having any symptoms:  Yes; leg edema still present-- chronic  Patient knows who to call with symptoms: Yes  Diet history:      Patient states sodium limitation is : 3 G      Patient states fluid limitation is 64 oz  Patient following diet as instructed: Yes  Medication history: taking medications as instructed Yes; medication side effects noted No  Patient is being active at home: Yes  Patient knows date and time of follow up appointment: no   Patient has transportation to appointments: Yes    RECOMMENDATIONS:   Medication: taking as prescribed-- home care nurse was out on Saturday and helped fill mediset  Diet: no added salt diet   MD/ Clinic appointment: 11/6 with Kacey Tian NP-- patient wasn't sure of appointment - reminded her of date/time  Other: Doing okay. Son there now with her, family taking turns helping. She does have home health coming to house also. Encouraged her to call with any questions or concerns.

## 2023-10-10 LAB — MISCELLANEOUS LAB TEST ORDER: NORMAL

## 2023-10-12 RX ORDER — SPIRONOLACTONE 25 MG/1
25 TABLET ORAL DAILY
Qty: 90 TABLET | Refills: 0 | Status: SHIPPED | OUTPATIENT
Start: 2023-10-12

## 2023-10-12 NOTE — TELEPHONE ENCOUNTER
Medication Refill    Medication needing refilled:  spironolactone (ALDACTONE) 25 MG    Dosage of the medication:    How are you taking this medication (QD, BID, TID, QID, PRN):1 tablet by mouth daily    30 or 90 day supply called in: 90 day supply    When will you run out of your medication:    Which Pharmacy are we sending the medication [facility]?:[id number] [address] [place] [address]

## 2023-10-16 LAB
Lab: NORMAL
REPORT: NORMAL
THIS TEST SENT TO: NORMAL

## 2024-01-02 RX ORDER — FUROSEMIDE 40 MG/1
40 TABLET ORAL 2 TIMES DAILY
Qty: 180 TABLET | Refills: 1 | OUTPATIENT
Start: 2024-01-02

## 2024-01-15 NOTE — TELEPHONE ENCOUNTER
Letter sent by Glory Atorvastatin can only be ordered for 30 days and 11 refills per 40 mg taking 2 daily if we change atorvastatin to 80 mg and once a day she can have 90 and 3 refills

## 2024-01-16 DIAGNOSIS — I50.22 CHRONIC SYSTOLIC HF (HEART FAILURE) (HCC): Primary | ICD-10-CM

## 2024-01-16 RX ORDER — ATORVASTATIN CALCIUM 80 MG/1
80 TABLET, FILM COATED ORAL DAILY
Qty: 30 TABLET | Refills: 5 | Status: SHIPPED | OUTPATIENT
Start: 2024-01-16

## 2024-02-26 NOTE — PROGRESS NOTES
[facility]   Electrophysiology Follow up   Date: 2/27/2024  I had the privilege of visiting Rell Lorenz in the office.       CC: PAF   HPI: Rell Lorenz is a 69 y.o. female history of HTN, HLD, CHF, obesity, and AICD (2007 at [facility]). In August of 2020, pt presented from outside hospital with chest pain. She recently lost her brother and has not been feeling well. Pt admitted to non-compliance with medications secondary to COVID. She had an AICD shock. Her troponin was noted to be 3.5 with a potassium 2.5 in the ER at [facility]. She underwent cardiac cath, which showed non obstructive coronaries and EF of 35%. S/p RFCA with PVI (10/8/20, Dr. Alves). In November of 20223, Pt presented to hospital after AICD shock. She had episodes of sustained AF >200 BPM     Direct admit from [facility] 9/29/2023 after ICD discharge. She was loaded on Amiodarone    [place] presents to the office in follow up. She denies any cardiac issues at this time. She is feeling well since her hospitalization. Her mobility is impaired and she is unable to be active. Her son is living with her to assist with tasks at home.     Assessment and plan:   -Paroxysmal Atrial Fibrillation, PAT/SVT   ECG today shows SR   <0.1% AT/AF burden per device interrogation today.               S/p RFCA with PVI (10/8/20)              Continue Toprol  mg QD and digoxin 125 MCG daily   Continue  amiodarone,  I have discussed with patient side effects of amiodarone including but not limited to thyroid disease, discoloration of skin and cornea and pulmonary fibrosis. Patient would like to continue with it. Routine lab monitoring with AST 36, ALT 14, TSH 3.77 (10/2/2023)      High risk [id number] score. Anticoagulation is recommended. 4 (Age, Gender, CHF, HTN) Continue Eliquis 5 mg BID, Denies signs and symptoms of bleeding or excessive bruising, will continue to monitor for this.      Her afib appears to be well controlled with medications. We can consider

## 2024-02-27 ENCOUNTER — NURSE ONLY (OUTPATIENT)
Dept: CARDIOLOGY CLINIC | Age: 69
End: 2024-02-27
Payer: MEDICARE

## 2024-02-27 ENCOUNTER — OFFICE VISIT (OUTPATIENT)
Dept: CARDIOLOGY CLINIC | Age: 69
End: 2024-02-27
Payer: MEDICARE

## 2024-02-27 VITALS
SYSTOLIC BLOOD PRESSURE: 112 MMHG | HEIGHT: 64 IN | OXYGEN SATURATION: 94 % | HEART RATE: 72 BPM | DIASTOLIC BLOOD PRESSURE: 60 MMHG | WEIGHT: 276 LBS | BODY MASS INDEX: 47.12 KG/M2

## 2024-02-27 DIAGNOSIS — Z45.02 ICD (IMPLANTABLE CARDIOVERTER-DEFIBRILLATOR) BATTERY DEPLETION: Primary | ICD-10-CM

## 2024-02-27 DIAGNOSIS — I48.0 PAROXYSMAL ATRIAL FIBRILLATION (HCC): ICD-10-CM

## 2024-02-27 DIAGNOSIS — I25.10 CORONARY ARTERY DISEASE DUE TO LIPID RICH PLAQUE: ICD-10-CM

## 2024-02-27 DIAGNOSIS — I42.8 NICM (NONISCHEMIC CARDIOMYOPATHY) (HCC): ICD-10-CM

## 2024-02-27 DIAGNOSIS — I25.83 CORONARY ARTERY DISEASE DUE TO LIPID RICH PLAQUE: ICD-10-CM

## 2024-02-27 DIAGNOSIS — I10 ESSENTIAL HYPERTENSION: ICD-10-CM

## 2024-02-27 DIAGNOSIS — Z95.810 ICD (IMPLANTABLE CARDIOVERTER-DEFIBRILLATOR) IN PLACE: Primary | ICD-10-CM

## 2024-02-27 DIAGNOSIS — I48.91 ATRIAL FIBRILLATION WITH RAPID VENTRICULAR RESPONSE (HCC): ICD-10-CM

## 2024-02-27 DIAGNOSIS — I48.0 PAF (PAROXYSMAL ATRIAL FIBRILLATION) (HCC): ICD-10-CM

## 2024-02-27 DIAGNOSIS — I50.22 SYSTOLIC CHF, CHRONIC (HCC): ICD-10-CM

## 2024-02-27 PROCEDURE — 99214 OFFICE O/P EST MOD 30 MIN: CPT | Performed by: INTERNAL MEDICINE

## 2024-02-27 PROCEDURE — 93000 ELECTROCARDIOGRAM COMPLETE: CPT | Performed by: INTERNAL MEDICINE

## 2024-02-27 PROCEDURE — 3078F DIAST BP <80 MM HG: CPT | Performed by: INTERNAL MEDICINE

## 2024-02-27 PROCEDURE — 3074F SYST BP LT 130 MM HG: CPT | Performed by: INTERNAL MEDICINE

## 2024-02-27 PROCEDURE — 93283 PRGRMG EVAL IMPLANTABLE DFB: CPT | Performed by: INTERNAL MEDICINE

## 2024-02-27 PROCEDURE — 1123F ACP DISCUSS/DSCN MKR DOCD: CPT | Performed by: INTERNAL MEDICINE

## 2024-02-27 RX ORDER — AMIODARONE HYDROCHLORIDE 200 MG/1
200 TABLET ORAL DAILY
Qty: 90 TABLET | Refills: 1 | Status: SHIPPED | OUTPATIENT
Start: 2024-02-27

## 2024-02-27 RX ORDER — METOPROLOL SUCCINATE 200 MG/1
200 TABLET, EXTENDED RELEASE ORAL DAILY
Qty: 90 TABLET | Refills: 3 | Status: SHIPPED | OUTPATIENT
Start: 2024-02-27

## 2024-03-04 ASSESSMENT — ENCOUNTER SYMPTOMS
BACK PAIN: 1
RESPIRATORY NEGATIVE: 1
GASTROINTESTINAL NEGATIVE: 1

## 2024-03-04 NOTE — PROGRESS NOTES
motion.      Cervical back: Normal range of motion and neck supple.   Skin:     General: Skin is warm and dry.   Neurological:      General: No focal deficit present.      Mental Status: She is alert and oriented to person, place, and time. Mental status is at baseline.   Psychiatric:         Mood and Affect: Mood normal.         Behavior: Behavior normal.         Thought Content: Thought content normal.         Judgment: Judgment normal.         Lab Data:    CBC:   Lab Results   Component Value Date/Time    WBC 12.2 10/06/2023 05:43 AM    WBC 12.9 10/05/2023 05:09 AM    WBC 16.3 10/04/2023 04:18 AM    RBC 4.04 10/06/2023 05:43 AM    RBC 3.97 10/05/2023 05:09 AM    RBC 4.14 10/04/2023 04:18 AM    HGB 10.2 10/06/2023 05:43 AM    HGB 10.1 10/05/2023 05:09 AM    HGB 10.6 10/04/2023 04:18 AM    HCT 32.9 10/06/2023 05:43 AM    HCT 32.3 10/05/2023 05:09 AM    HCT 33.9 10/04/2023 04:18 AM    MCV 81.4 10/06/2023 05:43 AM    MCV 81.4 10/05/2023 05:09 AM    MCV 81.9 10/04/2023 04:18 AM    RDW 14.1 10/06/2023 05:43 AM    RDW 14.1 10/05/2023 05:09 AM    RDW 14.1 10/04/2023 04:18 AM     10/06/2023 05:43 AM     10/05/2023 05:09 AM     10/04/2023 04:18 AM     BMP:  Lab Results   Component Value Date/Time     10/06/2023 05:43 AM     10/05/2023 05:09 AM     10/04/2023 04:18 AM    K 4.0 10/06/2023 05:43 AM    K 4.0 10/05/2023 05:09 AM    K 3.4 10/04/2023 04:18 AM    K 3.3 09/29/2023 09:18 PM    K 4.2 11/29/2022 04:51 AM    K 4.5 11/28/2022 05:53 AM     10/06/2023 05:43 AM     10/05/2023 05:09 AM    CL 98 10/04/2023 04:18 AM    CO2 27 10/06/2023 05:43 AM    CO2 24 10/05/2023 05:09 AM    CO2 25 10/04/2023 04:18 AM    PHOS 4.3 10/06/2023 05:43 AM    PHOS 4.1 10/05/2023 05:09 AM    PHOS 3.5 10/04/2023 04:18 AM    BUN 6 10/06/2023 05:43 AM    BUN 9 10/05/2023 05:09 AM    BUN 12 10/04/2023 04:18 AM    CREATININE 0.6 10/06/2023 05:43 AM    CREATININE 0.7 10/05/2023 05:09 AM    CREATININE

## 2024-03-05 ENCOUNTER — HOSPITAL ENCOUNTER (OUTPATIENT)
Age: 69
Discharge: HOME | End: 2024-03-05
Payer: MEDICARE

## 2024-03-05 ENCOUNTER — OFFICE VISIT (OUTPATIENT)
Dept: CARDIOLOGY CLINIC | Age: 69
End: 2024-03-05
Payer: MEDICARE

## 2024-03-05 VITALS
BODY MASS INDEX: 47.38 KG/M2 | DIASTOLIC BLOOD PRESSURE: 80 MMHG | OXYGEN SATURATION: 96 % | HEIGHT: 64 IN | SYSTOLIC BLOOD PRESSURE: 120 MMHG | HEART RATE: 57 BPM

## 2024-03-05 DIAGNOSIS — I25.10 CORONARY ARTERY DISEASE DUE TO LIPID RICH PLAQUE: ICD-10-CM

## 2024-03-05 DIAGNOSIS — I50.22 SYSTOLIC CHF, CHRONIC (HCC): Primary | ICD-10-CM

## 2024-03-05 DIAGNOSIS — I10 ESSENTIAL HYPERTENSION: ICD-10-CM

## 2024-03-05 DIAGNOSIS — I50.22 SYSTOLIC CHF, CHRONIC (HCC): ICD-10-CM

## 2024-03-05 DIAGNOSIS — I48.0 PAF (PAROXYSMAL ATRIAL FIBRILLATION) (HCC): ICD-10-CM

## 2024-03-05 DIAGNOSIS — I42.8 NICM (NONISCHEMIC CARDIOMYOPATHY) (HCC): ICD-10-CM

## 2024-03-05 DIAGNOSIS — I25.83 CORONARY ARTERY DISEASE DUE TO LIPID RICH PLAQUE: ICD-10-CM

## 2024-03-05 LAB
ALBUMIN SERPL-MCNC: 3.6 G/DL (ref 3.4–5)
ALBUMIN/GLOB SERPL: 1 {RATIO} (ref 1.1–2.2)
ALP SERPL-CCNC: 104 U/L (ref 40–129)
ALT SERPL-CCNC: 13 U/L (ref 10–40)
ANION GAP SERPL CALCULATED.3IONS-SCNC: 9 MMOL/L (ref 3–16)
AST SERPL-CCNC: 21 U/L (ref 15–37)
BILIRUB SERPL-MCNC: 0.5 MG/DL (ref 0–1)
BUN SERPL-MCNC: 9 MG/DL (ref 7–20)
CALCIUM SERPL-MCNC: 9 MG/DL (ref 8.3–10.6)
CHLORIDE SERPL-SCNC: 104 MMOL/L (ref 99–110)
CO2 SERPL-SCNC: 29 MMOL/L (ref 21–32)
CREAT SERPL-MCNC: 0.6 MG/DL (ref 0.6–1.2)
GFR SERPLBLD CREATININE-BSD FMLA CKD-EPI: >60 ML/MIN/{1.73_M2}
GLUCOSE SERPL-MCNC: 87 MG/DL (ref 70–99)
NT-PROBNP SERPL-MCNC: 272 PG/ML (ref 0–124)
POTASSIUM SERPL-SCNC: 4.4 MMOL/L (ref 3.5–5.1)
PROT SERPL-MCNC: 7.2 G/DL (ref 6.4–8.2)
SODIUM SERPL-SCNC: 142 MMOL/L (ref 136–145)

## 2024-03-05 PROCEDURE — 80053 COMPREHEN METABOLIC PANEL: CPT

## 2024-03-05 PROCEDURE — 3074F SYST BP LT 130 MM HG: CPT | Performed by: NURSE PRACTITIONER

## 2024-03-05 PROCEDURE — 36415 COLL VENOUS BLD VENIPUNCTURE: CPT

## 2024-03-05 PROCEDURE — 83880 ASSAY OF NATRIURETIC PEPTIDE: CPT

## 2024-03-05 PROCEDURE — 3079F DIAST BP 80-89 MM HG: CPT | Performed by: NURSE PRACTITIONER

## 2024-03-05 PROCEDURE — 99214 OFFICE O/P EST MOD 30 MIN: CPT | Performed by: NURSE PRACTITIONER

## 2024-03-05 PROCEDURE — 1123F ACP DISCUSS/DSCN MKR DOCD: CPT | Performed by: NURSE PRACTITIONER

## 2024-03-05 RX ORDER — SPIRONOLACTONE 25 MG/1
25 TABLET ORAL DAILY
Qty: 90 TABLET | Refills: 0 | Status: CANCELLED | OUTPATIENT
Start: 2024-03-05

## 2024-03-05 NOTE — PATIENT INSTRUCTIONS
Instructions:   Medications: no change in meds  Labs: today  Lifestyle Recommendations: Weigh yourself every day in the morning after urination, call Sav if wt increases 2-3lb in one day or 5lb in one week, Limit sodium to 2000mg/day and fluids to 2L or 64oz/day.   Follow up:sav in 6months        [facility] CHF Resource Line: [phone]

## 2024-03-06 RX ORDER — SPIRONOLACTONE 25 MG/1
25 TABLET ORAL DAILY
Qty: 90 TABLET | Refills: 2 | Status: SHIPPED | OUTPATIENT
Start: 2024-03-06

## 2024-03-07 ENCOUNTER — TELEPHONE (OUTPATIENT)
Dept: CARDIOLOGY CLINIC | Age: 69
End: 2024-03-07

## 2024-03-07 NOTE — TELEPHONE ENCOUNTER
----- Message from YEIMY Hussein - CNP sent at 3/6/2024  8:36 AM EST -----  Labs ok, fluid number is up a little but getting back on dejon should help - refill sent. JANEV  Tried to reach patient LMOM for her to return our call    Spoke to patient she verbalized understanding.

## 2024-04-29 ENCOUNTER — TELEPHONE (OUTPATIENT)
Dept: CARDIOLOGY CLINIC | Age: 69
End: 2024-04-29

## 2024-04-29 NOTE — TELEPHONE ENCOUNTER
Medication Refill    Medication needing refilled:  apixaban (ELIQUIS) 5 MG TABS tablet     Dosage of the medication:  Take 1 tablet by mouth 2 times daily     How are you taking this medication (QD, BID, TID, QID, PRN):    30 or 90 day supply called in:  90 days    When will you run out of your medication:  Pt is completely out of medication    Which Pharmacy are we sending the medication to?:  [facility] [id number] [place] [id number] [facility] [phone] [facility] [phone]   
Called and spoke with PT medication is ready for pickup.  
Tried to call PT to inform them that I spoke with the pharmacy to confirm that they have refills left. The prescription has been filled and ready to be picked up today. Unable to leave message will try to CB soon.      
WAITING FOR PT TO CALL BACK       Pt called she spoke w/Pharmacist on Saturday,  Please re-send the script for Eliquis 5mg         
Initial (On Arrival)

## 2024-05-28 ENCOUNTER — NURSE ONLY (OUTPATIENT)
Dept: CARDIOLOGY CLINIC | Age: 69
End: 2024-05-28
Payer: MEDICARE

## 2024-05-28 DIAGNOSIS — I48.0 PAF (PAROXYSMAL ATRIAL FIBRILLATION) (HCC): ICD-10-CM

## 2024-05-28 DIAGNOSIS — I48.91 ATRIAL FIBRILLATION WITH RAPID VENTRICULAR RESPONSE (HCC): ICD-10-CM

## 2024-05-28 DIAGNOSIS — Z45.02 ICD (IMPLANTABLE CARDIOVERTER-DEFIBRILLATOR) BATTERY DEPLETION: Primary | ICD-10-CM

## 2024-05-28 DIAGNOSIS — I25.5 ISCHEMIC CARDIOMYOPATHY: ICD-10-CM

## 2024-05-28 PROCEDURE — 93283 PRGRMG EVAL IMPLANTABLE DFB: CPT | Performed by: INTERNAL MEDICINE

## 2024-08-22 RX ORDER — VALSARTAN 160 MG/1
160 TABLET ORAL DAILY
Qty: 90 TABLET | Refills: 3 | Status: SHIPPED | OUTPATIENT
Start: 2024-08-22

## 2024-09-05 RX ORDER — AMIODARONE HYDROCHLORIDE 200 MG/1
200 TABLET ORAL DAILY
Qty: 90 TABLET | Refills: 3 | Status: SHIPPED | OUTPATIENT
Start: 2024-09-05

## 2024-09-05 NOTE — TELEPHONE ENCOUNTER
Medication Refill    Medication needing refilled:  amiodarone     Dosage of the medication:  200mg    How are you taking this medication (QD, BID, TID, QID, PRN):   Take 1 tablet by mouth daily     30 or 90 day supply called in:  90    When will you run out of your medication: Totally out    Which Pharmacy are we sending the medication to?:    [facility] [id number]  [address] [place] [address]   Phone:  [phone]   Fax:   [phone]

## 2024-09-16 ASSESSMENT — ENCOUNTER SYMPTOMS
BACK PAIN: 1
GASTROINTESTINAL NEGATIVE: 1

## 2024-09-17 ENCOUNTER — TELEPHONE (OUTPATIENT)
Dept: ADMINISTRATIVE | Age: 69
End: 2024-09-17

## 2024-09-17 ENCOUNTER — HOSPITAL ENCOUNTER (OUTPATIENT)
Age: 69
Setting detail: OBSERVATION
Discharge: HOME | End: 2024-09-19
Attending: EMERGENCY MEDICINE | Admitting: INTERNAL MEDICINE
Payer: MEDICARE

## 2024-09-17 ENCOUNTER — APPOINTMENT (OUTPATIENT)
Dept: CT IMAGING | Age: 69
End: 2024-09-17
Attending: EMERGENCY MEDICINE
Payer: MEDICARE

## 2024-09-17 ENCOUNTER — OFFICE VISIT (OUTPATIENT)
Dept: CARDIOLOGY CLINIC | Age: 69
End: 2024-09-17
Payer: MEDICARE

## 2024-09-17 ENCOUNTER — NURSE ONLY (OUTPATIENT)
Dept: CARDIOLOGY CLINIC | Age: 69
End: 2024-09-17
Payer: MEDICARE

## 2024-09-17 ENCOUNTER — HOSPITAL ENCOUNTER (OUTPATIENT)
Age: 69
Discharge: HOME | End: 2024-09-17
Payer: MEDICARE

## 2024-09-17 VITALS
DIASTOLIC BLOOD PRESSURE: 82 MMHG | HEART RATE: 54 BPM | HEIGHT: 64 IN | SYSTOLIC BLOOD PRESSURE: 136 MMHG | BODY MASS INDEX: 47.38 KG/M2 | OXYGEN SATURATION: 95 %

## 2024-09-17 DIAGNOSIS — I25.5 ISCHEMIC CARDIOMYOPATHY: ICD-10-CM

## 2024-09-17 DIAGNOSIS — E78.49 OTHER HYPERLIPIDEMIA: ICD-10-CM

## 2024-09-17 DIAGNOSIS — I25.10 CORONARY ARTERY DISEASE DUE TO LIPID RICH PLAQUE: ICD-10-CM

## 2024-09-17 DIAGNOSIS — I50.22 SYSTOLIC CHF, CHRONIC (HCC): Primary | ICD-10-CM

## 2024-09-17 DIAGNOSIS — M54.32 LEFT SIDED SCIATICA: ICD-10-CM

## 2024-09-17 DIAGNOSIS — E66.01 CLASS 3 SEVERE OBESITY DUE TO EXCESS CALORIES WITH SERIOUS COMORBIDITY AND BODY MASS INDEX (BMI) OF 40.0 TO 44.9 IN ADULT (HCC): ICD-10-CM

## 2024-09-17 DIAGNOSIS — I48.92 ATRIAL FLUTTER WITH RAPID VENTRICULAR RESPONSE (HCC): ICD-10-CM

## 2024-09-17 DIAGNOSIS — I25.83 CORONARY ARTERY DISEASE DUE TO LIPID RICH PLAQUE: ICD-10-CM

## 2024-09-17 DIAGNOSIS — Z45.02 ICD (IMPLANTABLE CARDIOVERTER-DEFIBRILLATOR) BATTERY DEPLETION: Primary | ICD-10-CM

## 2024-09-17 DIAGNOSIS — I10 UNCONTROLLED HYPERTENSION: ICD-10-CM

## 2024-09-17 DIAGNOSIS — I48.91 ATRIAL FIBRILLATION WITH RAPID VENTRICULAR RESPONSE (HCC): ICD-10-CM

## 2024-09-17 DIAGNOSIS — Z79.01 ANTICOAGULATED: ICD-10-CM

## 2024-09-17 DIAGNOSIS — I42.8 NICM (NONISCHEMIC CARDIOMYOPATHY) (HCC): ICD-10-CM

## 2024-09-17 DIAGNOSIS — M54.59 INTRACTABLE LOW BACK PAIN: Primary | ICD-10-CM

## 2024-09-17 DIAGNOSIS — I48.0 PAF (PAROXYSMAL ATRIAL FIBRILLATION) (HCC): ICD-10-CM

## 2024-09-17 PROBLEM — M54.9 INTRACTABLE BACK PAIN: Status: ACTIVE | Noted: 2024-09-17

## 2024-09-17 LAB
ALBUMIN SERPL-MCNC: 4.2 G/DL (ref 3.4–5)
ALBUMIN/GLOB SERPL: 1.2 {RATIO} (ref 1.1–2.2)
ALP SERPL-CCNC: 99 U/L (ref 40–129)
ALT SERPL-CCNC: 21 U/L (ref 10–40)
ANION GAP SERPL CALCULATED.3IONS-SCNC: 11 MMOL/L (ref 3–16)
ANION GAP SERPL CALCULATED.3IONS-SCNC: 8 MMOL/L (ref 3–16)
AST SERPL-CCNC: 33 U/L (ref 15–37)
BACTERIA URNS QL MICRO: ABNORMAL /HPF
BASOPHILS # BLD: 0.3 K/UL (ref 0–0.2)
BASOPHILS NFR BLD: 2.2 %
BILIRUB SERPL-MCNC: 0.7 MG/DL (ref 0–1)
BILIRUB UR QL STRIP.AUTO: NEGATIVE
BUN SERPL-MCNC: 12 MG/DL (ref 7–20)
BUN SERPL-MCNC: 12 MG/DL (ref 7–20)
CALCIUM SERPL-MCNC: 9.4 MG/DL (ref 8.3–10.6)
CALCIUM SERPL-MCNC: 9.6 MG/DL (ref 8.3–10.6)
CHLORIDE SERPL-SCNC: 103 MMOL/L (ref 99–110)
CHLORIDE SERPL-SCNC: 104 MMOL/L (ref 99–110)
CLARITY UR: ABNORMAL
CO2 SERPL-SCNC: 29 MMOL/L (ref 21–32)
CO2 SERPL-SCNC: 31 MMOL/L (ref 21–32)
COLOR UR: YELLOW
CREAT SERPL-MCNC: 0.7 MG/DL (ref 0.6–1.2)
CREAT SERPL-MCNC: 0.7 MG/DL (ref 0.6–1.2)
DEPRECATED RDW RBC AUTO: 14.9 % (ref 12.4–15.4)
EOSINOPHIL # BLD: 0.2 K/UL (ref 0–0.6)
EOSINOPHIL NFR BLD: 1.9 %
EPI CELLS #/AREA URNS AUTO: 2 /HPF (ref 0–5)
GFR SERPLBLD CREATININE-BSD FMLA CKD-EPI: >90 ML/MIN/{1.73_M2}
GFR SERPLBLD CREATININE-BSD FMLA CKD-EPI: >90 ML/MIN/{1.73_M2}
GLUCOSE SERPL-MCNC: 103 MG/DL (ref 70–99)
GLUCOSE SERPL-MCNC: 93 MG/DL (ref 70–99)
GLUCOSE UR STRIP.AUTO-MCNC: NEGATIVE MG/DL
HCT VFR BLD AUTO: 42.5 % (ref 36–48)
HGB BLD-MCNC: 13.3 G/DL (ref 12–16)
HGB UR QL STRIP.AUTO: NEGATIVE
HYALINE CASTS #/AREA URNS AUTO: 0 /LPF (ref 0–8)
INR PPP: 1.03 (ref 0.85–1.15)
KETONES UR STRIP.AUTO-MCNC: NEGATIVE MG/DL
LEUKOCYTE ESTERASE UR QL STRIP.AUTO: ABNORMAL
LYMPHOCYTES # BLD: 2.5 K/UL (ref 1–5.1)
LYMPHOCYTES NFR BLD: 21.4 %
MCH RBC QN AUTO: 26.7 PG (ref 26–34)
MCHC RBC AUTO-ENTMCNC: 31.3 G/DL (ref 31–36)
MCV RBC AUTO: 85.4 FL (ref 80–100)
MONOCYTES # BLD: 0.7 K/UL (ref 0–1.3)
MONOCYTES NFR BLD: 6.3 %
NEUTROPHILS # BLD: 8 K/UL (ref 1.7–7.7)
NEUTROPHILS NFR BLD: 68.2 %
NITRITE UR QL STRIP.AUTO: POSITIVE
NT-PROBNP SERPL-MCNC: 167 PG/ML (ref 0–124)
PH UR STRIP.AUTO: 6 [PH] (ref 5–8)
PLATELET # BLD AUTO: 257 K/UL (ref 135–450)
PMV BLD AUTO: 8.1 FL (ref 5–10.5)
POTASSIUM SERPL-SCNC: 4.1 MMOL/L (ref 3.5–5.1)
POTASSIUM SERPL-SCNC: 4.5 MMOL/L (ref 3.5–5.1)
PROT SERPL-MCNC: 7.8 G/DL (ref 6.4–8.2)
PROT UR STRIP.AUTO-MCNC: NEGATIVE MG/DL
PROTHROMBIN TIME: 13.7 SEC (ref 11.9–14.9)
RBC # BLD AUTO: 4.98 M/UL (ref 4–5.2)
RBC CLUMPS #/AREA URNS AUTO: 1 /HPF (ref 0–4)
SODIUM SERPL-SCNC: 142 MMOL/L (ref 136–145)
SODIUM SERPL-SCNC: 144 MMOL/L (ref 136–145)
SP GR UR STRIP.AUTO: >=1.03 (ref 1–1.03)
UA COMPLETE W REFLEX CULTURE PNL UR: YES
UA DIPSTICK W REFLEX MICRO PNL UR: YES
URN SPEC COLLECT METH UR: ABNORMAL
UROBILINOGEN UR STRIP-ACNC: 1 E.U./DL
WBC # BLD AUTO: 11.7 K/UL (ref 4–11)
WBC #/AREA URNS AUTO: 25 /HPF (ref 0–5)

## 2024-09-17 PROCEDURE — 93005 ELECTROCARDIOGRAM TRACING: CPT | Performed by: EMERGENCY MEDICINE

## 2024-09-17 PROCEDURE — 87186 SC STD MICRODIL/AGAR DIL: CPT

## 2024-09-17 PROCEDURE — 87077 CULTURE AEROBIC IDENTIFY: CPT

## 2024-09-17 PROCEDURE — 2580000003 HC RX 258: Performed by: INTERNAL MEDICINE

## 2024-09-17 PROCEDURE — G0378 HOSPITAL OBSERVATION PER HR: HCPCS

## 2024-09-17 PROCEDURE — 85610 PROTHROMBIN TIME: CPT

## 2024-09-17 PROCEDURE — 1123F ACP DISCUSS/DSCN MKR DOCD: CPT | Performed by: NURSE PRACTITIONER

## 2024-09-17 PROCEDURE — 87086 URINE CULTURE/COLONY COUNT: CPT

## 2024-09-17 PROCEDURE — 81001 URINALYSIS AUTO W/SCOPE: CPT

## 2024-09-17 PROCEDURE — 96374 THER/PROPH/DIAG INJ IV PUSH: CPT

## 2024-09-17 PROCEDURE — 99214 OFFICE O/P EST MOD 30 MIN: CPT | Performed by: NURSE PRACTITIONER

## 2024-09-17 PROCEDURE — 2580000003 HC RX 258: Performed by: EMERGENCY MEDICINE

## 2024-09-17 PROCEDURE — 96375 TX/PRO/DX INJ NEW DRUG ADDON: CPT

## 2024-09-17 PROCEDURE — 6370000000 HC RX 637 (ALT 250 FOR IP): Performed by: INTERNAL MEDICINE

## 2024-09-17 PROCEDURE — 80048 BASIC METABOLIC PNL TOTAL CA: CPT

## 2024-09-17 PROCEDURE — 99285 EMERGENCY DEPT VISIT HI MDM: CPT

## 2024-09-17 PROCEDURE — 3079F DIAST BP 80-89 MM HG: CPT | Performed by: NURSE PRACTITIONER

## 2024-09-17 PROCEDURE — 6360000002 HC RX W HCPCS: Performed by: EMERGENCY MEDICINE

## 2024-09-17 PROCEDURE — 6360000002 HC RX W HCPCS: Performed by: INTERNAL MEDICINE

## 2024-09-17 PROCEDURE — 36415 COLL VENOUS BLD VENIPUNCTURE: CPT

## 2024-09-17 PROCEDURE — 85025 COMPLETE CBC W/AUTO DIFF WBC: CPT

## 2024-09-17 PROCEDURE — 80053 COMPREHEN METABOLIC PANEL: CPT

## 2024-09-17 PROCEDURE — 6370000000 HC RX 637 (ALT 250 FOR IP): Performed by: EMERGENCY MEDICINE

## 2024-09-17 PROCEDURE — 6360000004 HC RX CONTRAST MEDICATION: Performed by: EMERGENCY MEDICINE

## 2024-09-17 PROCEDURE — 75635 CT ANGIO ABDOMINAL ARTERIES: CPT

## 2024-09-17 PROCEDURE — 3075F SYST BP GE 130 - 139MM HG: CPT | Performed by: NURSE PRACTITIONER

## 2024-09-17 PROCEDURE — 96376 TX/PRO/DX INJ SAME DRUG ADON: CPT

## 2024-09-17 PROCEDURE — 93283 PRGRMG EVAL IMPLANTABLE DFB: CPT | Performed by: INTERNAL MEDICINE

## 2024-09-17 PROCEDURE — 83880 ASSAY OF NATRIURETIC PEPTIDE: CPT

## 2024-09-17 RX ORDER — SODIUM CHLORIDE 0.9 % (FLUSH) 0.9 %
5-40 SYRINGE (ML) INJECTION PRN
Status: DISCONTINUED | OUTPATIENT
Start: 2024-09-17 | End: 2024-09-19 | Stop reason: HOSPADM

## 2024-09-17 RX ORDER — HYDROMORPHONE HYDROCHLORIDE 1 MG/ML
1 INJECTION, SOLUTION INTRAMUSCULAR; INTRAVENOUS; SUBCUTANEOUS EVERY 4 HOURS PRN
Status: DISCONTINUED | OUTPATIENT
Start: 2024-09-17 | End: 2024-09-19 | Stop reason: HOSPADM

## 2024-09-17 RX ORDER — ACETAMINOPHEN 325 MG/1
650 TABLET ORAL EVERY 6 HOURS PRN
Status: DISCONTINUED | OUTPATIENT
Start: 2024-09-17 | End: 2024-09-19 | Stop reason: HOSPADM

## 2024-09-17 RX ORDER — IBUPROFEN 200 MG
400 TABLET ORAL EVERY 8 HOURS PRN
COMMUNITY

## 2024-09-17 RX ORDER — SPIRONOLACTONE 25 MG/1
25 TABLET ORAL DAILY
Status: DISCONTINUED | OUTPATIENT
Start: 2024-09-18 | End: 2024-09-19 | Stop reason: HOSPADM

## 2024-09-17 RX ORDER — SEMAGLUTIDE 0.25 MG/.5ML
0.25 INJECTION, SOLUTION SUBCUTANEOUS
Qty: 4 ML | Refills: 0 | Status: SHIPPED | OUTPATIENT
Start: 2024-09-17 | End: 2024-09-20 | Stop reason: SDUPTHER

## 2024-09-17 RX ORDER — POLYETHYLENE GLYCOL 3350 17 G/17G
17 POWDER, FOR SOLUTION ORAL DAILY PRN
Status: DISCONTINUED | OUTPATIENT
Start: 2024-09-17 | End: 2024-09-19 | Stop reason: HOSPADM

## 2024-09-17 RX ORDER — OXYCODONE AND ACETAMINOPHEN 5; 325 MG/1; MG/1
2 TABLET ORAL ONCE
Status: COMPLETED | OUTPATIENT
Start: 2024-09-17 | End: 2024-09-17

## 2024-09-17 RX ORDER — SODIUM CHLORIDE 0.9 % (FLUSH) 0.9 %
5-40 SYRINGE (ML) INJECTION EVERY 12 HOURS SCHEDULED
Status: DISCONTINUED | OUTPATIENT
Start: 2024-09-17 | End: 2024-09-19 | Stop reason: HOSPADM

## 2024-09-17 RX ORDER — METOPROLOL SUCCINATE 50 MG/1
200 TABLET, EXTENDED RELEASE ORAL DAILY
Status: DISCONTINUED | OUTPATIENT
Start: 2024-09-17 | End: 2024-09-19 | Stop reason: HOSPADM

## 2024-09-17 RX ORDER — ROSUVASTATIN CALCIUM 40 MG/1
40 TABLET, COATED ORAL DAILY
Status: DISCONTINUED | OUTPATIENT
Start: 2024-09-17 | End: 2024-09-17

## 2024-09-17 RX ORDER — CHOLECALCIFEROL (VITAMIN D3) 25 MCG
1000 TABLET ORAL DAILY
Qty: 30 TABLET | Refills: 0 | Status: SHIPPED | OUTPATIENT
Start: 2024-09-17

## 2024-09-17 RX ORDER — MORPHINE SULFATE 4 MG/ML
4 INJECTION, SOLUTION INTRAMUSCULAR; INTRAVENOUS
Status: COMPLETED | OUTPATIENT
Start: 2024-09-17 | End: 2024-09-17

## 2024-09-17 RX ORDER — HYDRALAZINE HYDROCHLORIDE 20 MG/ML
5 INJECTION INTRAMUSCULAR; INTRAVENOUS ONCE
Status: COMPLETED | OUTPATIENT
Start: 2024-09-17 | End: 2024-09-17

## 2024-09-17 RX ORDER — SODIUM CHLORIDE 9 MG/ML
INJECTION, SOLUTION INTRAVENOUS PRN
Status: DISCONTINUED | OUTPATIENT
Start: 2024-09-17 | End: 2024-09-19 | Stop reason: HOSPADM

## 2024-09-17 RX ORDER — ASPIRIN 81 MG/1
81 TABLET, CHEWABLE ORAL DAILY
Status: DISCONTINUED | OUTPATIENT
Start: 2024-09-17 | End: 2024-09-19 | Stop reason: HOSPADM

## 2024-09-17 RX ORDER — ACETAMINOPHEN 650 MG/1
650 SUPPOSITORY RECTAL EVERY 6 HOURS PRN
Status: DISCONTINUED | OUTPATIENT
Start: 2024-09-17 | End: 2024-09-19 | Stop reason: HOSPADM

## 2024-09-17 RX ORDER — AMIODARONE HYDROCHLORIDE 200 MG/1
200 TABLET ORAL DAILY
Status: DISCONTINUED | OUTPATIENT
Start: 2024-09-17 | End: 2024-09-19 | Stop reason: HOSPADM

## 2024-09-17 RX ORDER — ORPHENADRINE CITRATE 30 MG/ML
60 INJECTION INTRAMUSCULAR; INTRAVENOUS ONCE
Status: COMPLETED | OUTPATIENT
Start: 2024-09-17 | End: 2024-09-17

## 2024-09-17 RX ORDER — COLCHICINE 0.6 MG/1
0.6 TABLET ORAL 2 TIMES DAILY
Status: DISCONTINUED | OUTPATIENT
Start: 2024-09-17 | End: 2024-09-17

## 2024-09-17 RX ORDER — ROSUVASTATIN CALCIUM 40 MG/1
40 TABLET, COATED ORAL DAILY
Status: DISCONTINUED | OUTPATIENT
Start: 2024-10-01 | End: 2024-09-19 | Stop reason: HOSPADM

## 2024-09-17 RX ORDER — IOPAMIDOL 755 MG/ML
120 INJECTION, SOLUTION INTRAVASCULAR
Status: COMPLETED | OUTPATIENT
Start: 2024-09-17 | End: 2024-09-17

## 2024-09-17 RX ORDER — VALSARTAN 160 MG/1
160 TABLET ORAL DAILY
Status: DISCONTINUED | OUTPATIENT
Start: 2024-09-17 | End: 2024-09-19 | Stop reason: HOSPADM

## 2024-09-17 RX ORDER — CYCLOBENZAPRINE HCL 10 MG
10 TABLET ORAL 3 TIMES DAILY PRN
Status: DISCONTINUED | OUTPATIENT
Start: 2024-09-17 | End: 2024-09-19 | Stop reason: HOSPADM

## 2024-09-17 RX ORDER — DIGOXIN 125 MCG
125 TABLET ORAL DAILY
Status: DISCONTINUED | OUTPATIENT
Start: 2024-09-17 | End: 2024-09-17 | Stop reason: ALTCHOICE

## 2024-09-17 RX ORDER — ROSUVASTATIN CALCIUM 40 MG/1
40 TABLET, COATED ORAL DAILY
Qty: 90 TABLET | Refills: 1 | Status: SHIPPED | OUTPATIENT
Start: 2024-09-17

## 2024-09-17 RX ADMIN — METOPROLOL SUCCINATE 200 MG: 50 TABLET, EXTENDED RELEASE ORAL at 21:25

## 2024-09-17 RX ADMIN — APIXABAN 5 MG: 5 TABLET, FILM COATED ORAL at 21:32

## 2024-09-17 RX ADMIN — ORPHENADRINE CITRATE 60 MG: 60 INJECTION INTRAMUSCULAR; INTRAVENOUS at 11:31

## 2024-09-17 RX ADMIN — IOPAMIDOL 120 ML: 755 INJECTION, SOLUTION INTRAVENOUS at 12:21

## 2024-09-17 RX ADMIN — HYDRALAZINE HYDROCHLORIDE 5 MG: 20 INJECTION INTRAMUSCULAR; INTRAVENOUS at 18:38

## 2024-09-17 RX ADMIN — MORPHINE SULFATE 4 MG: 4 INJECTION, SOLUTION INTRAMUSCULAR; INTRAVENOUS at 15:53

## 2024-09-17 RX ADMIN — AMIODARONE HYDROCHLORIDE 200 MG: 200 TABLET ORAL at 21:29

## 2024-09-17 RX ADMIN — OXYCODONE HYDROCHLORIDE AND ACETAMINOPHEN 2 TABLET: 5; 325 TABLET ORAL at 11:31

## 2024-09-17 RX ADMIN — MORPHINE SULFATE 4 MG: 4 INJECTION, SOLUTION INTRAMUSCULAR; INTRAVENOUS at 17:13

## 2024-09-17 RX ADMIN — ASPIRIN 81 MG: 81 TABLET, CHEWABLE ORAL at 21:26

## 2024-09-17 RX ADMIN — HYDROMORPHONE HYDROCHLORIDE 1 MG: 1 INJECTION, SOLUTION INTRAMUSCULAR; INTRAVENOUS; SUBCUTANEOUS at 21:29

## 2024-09-17 RX ADMIN — CYCLOBENZAPRINE 10 MG: 10 TABLET, FILM COATED ORAL at 17:12

## 2024-09-17 RX ADMIN — WATER 125 MG: 1 INJECTION INTRAMUSCULAR; INTRAVENOUS; SUBCUTANEOUS at 15:53

## 2024-09-17 RX ADMIN — VALSARTAN 160 MG: 160 TABLET ORAL at 21:26

## 2024-09-17 RX ADMIN — SODIUM CHLORIDE, PRESERVATIVE FREE 10 ML: 5 INJECTION INTRAVENOUS at 21:30

## 2024-09-17 ASSESSMENT — PAIN DESCRIPTION - LOCATION
LOCATION: BACK;BUTTOCKS
LOCATION: HIP;LEG
LOCATION: LEG
LOCATION: HIP;LEG
LOCATION: BACK;BUTTOCKS
LOCATION: LEG

## 2024-09-17 ASSESSMENT — PAIN SCALES - GENERAL
PAINLEVEL_OUTOF10: 7
PAINLEVEL_OUTOF10: 9
PAINLEVEL_OUTOF10: 10
PAINLEVEL_OUTOF10: 9
PAINLEVEL_OUTOF10: 3

## 2024-09-17 ASSESSMENT — PAIN DESCRIPTION - DESCRIPTORS
DESCRIPTORS: ACHING;SHARP;SHOOTING
DESCRIPTORS: ACHING
DESCRIPTORS: ACHING;SHARP
DESCRIPTORS: ACHING
DESCRIPTORS: ACHING;SHARP

## 2024-09-17 ASSESSMENT — PAIN DESCRIPTION - ORIENTATION
ORIENTATION: LEFT

## 2024-09-17 ASSESSMENT — ENCOUNTER SYMPTOMS
COUGH: 1
SHORTNESS OF BREATH: 1

## 2024-09-17 ASSESSMENT — PAIN - FUNCTIONAL ASSESSMENT: PAIN_FUNCTIONAL_ASSESSMENT: 0-10

## 2024-09-17 ASSESSMENT — LIFESTYLE VARIABLES
HOW MANY STANDARD DRINKS CONTAINING ALCOHOL DO YOU HAVE ON A TYPICAL DAY: 1 OR 2
HOW OFTEN DO YOU HAVE A DRINK CONTAINING ALCOHOL: MONTHLY OR LESS

## 2024-09-17 ASSESSMENT — PAIN SCALES - WONG BAKER: WONGBAKER_NUMERICALRESPONSE: NO HURT

## 2024-09-18 LAB
ANION GAP SERPL CALCULATED.3IONS-SCNC: 10 MMOL/L (ref 3–16)
BASOPHILS # BLD: 0.1 K/UL (ref 0–0.2)
BASOPHILS NFR BLD: 0.4 %
BUN SERPL-MCNC: 13 MG/DL (ref 7–20)
CALCIUM SERPL-MCNC: 9.4 MG/DL (ref 8.3–10.6)
CHLORIDE SERPL-SCNC: 102 MMOL/L (ref 99–110)
CO2 SERPL-SCNC: 26 MMOL/L (ref 21–32)
CREAT SERPL-MCNC: 0.7 MG/DL (ref 0.6–1.2)
DEPRECATED RDW RBC AUTO: 15.1 % (ref 12.4–15.4)
EKG ATRIAL RATE: 54 BPM
EKG DIAGNOSIS: NORMAL
EKG P AXIS: 64 DEGREES
EKG P-R INTERVAL: 178 MS
EKG Q-T INTERVAL: 490 MS
EKG QRS DURATION: 98 MS
EKG QTC CALCULATION (BAZETT): 464 MS
EKG R AXIS: -15 DEGREES
EKG T AXIS: 76 DEGREES
EKG VENTRICULAR RATE: 54 BPM
EOSINOPHIL # BLD: 0 K/UL (ref 0–0.6)
EOSINOPHIL NFR BLD: 0 %
GFR SERPLBLD CREATININE-BSD FMLA CKD-EPI: >90 ML/MIN/{1.73_M2}
GLUCOSE SERPL-MCNC: 143 MG/DL (ref 70–99)
HCT VFR BLD AUTO: 39.3 % (ref 36–48)
HGB BLD-MCNC: 13.2 G/DL (ref 12–16)
LYMPHOCYTES # BLD: 1.4 K/UL (ref 1–5.1)
LYMPHOCYTES NFR BLD: 9.3 %
MCH RBC QN AUTO: 28.6 PG (ref 26–34)
MCHC RBC AUTO-ENTMCNC: 33.6 G/DL (ref 31–36)
MCV RBC AUTO: 85.1 FL (ref 80–100)
MONOCYTES # BLD: 0.2 K/UL (ref 0–1.3)
MONOCYTES NFR BLD: 1.3 %
NEUTROPHILS # BLD: 13.5 K/UL (ref 1.7–7.7)
NEUTROPHILS NFR BLD: 89 %
PLATELET # BLD AUTO: 262 K/UL (ref 135–450)
PMV BLD AUTO: 7.9 FL (ref 5–10.5)
POTASSIUM SERPL-SCNC: 4.2 MMOL/L (ref 3.5–5.1)
RBC # BLD AUTO: 4.61 M/UL (ref 4–5.2)
SODIUM SERPL-SCNC: 138 MMOL/L (ref 136–145)
WBC # BLD AUTO: 15.2 K/UL (ref 4–11)

## 2024-09-18 PROCEDURE — 6360000002 HC RX W HCPCS: Performed by: NURSE PRACTITIONER

## 2024-09-18 PROCEDURE — G0378 HOSPITAL OBSERVATION PER HR: HCPCS

## 2024-09-18 PROCEDURE — 85025 COMPLETE CBC W/AUTO DIFF WBC: CPT

## 2024-09-18 PROCEDURE — 6360000002 HC RX W HCPCS: Performed by: INTERNAL MEDICINE

## 2024-09-18 PROCEDURE — 97530 THERAPEUTIC ACTIVITIES: CPT

## 2024-09-18 PROCEDURE — 97165 OT EVAL LOW COMPLEX 30 MIN: CPT

## 2024-09-18 PROCEDURE — 97161 PT EVAL LOW COMPLEX 20 MIN: CPT

## 2024-09-18 PROCEDURE — 6370000000 HC RX 637 (ALT 250 FOR IP): Performed by: INTERNAL MEDICINE

## 2024-09-18 PROCEDURE — 97535 SELF CARE MNGMENT TRAINING: CPT

## 2024-09-18 PROCEDURE — 97116 GAIT TRAINING THERAPY: CPT

## 2024-09-18 PROCEDURE — 2580000003 HC RX 258: Performed by: INTERNAL MEDICINE

## 2024-09-18 PROCEDURE — 96376 TX/PRO/DX INJ SAME DRUG ADON: CPT

## 2024-09-18 PROCEDURE — 80048 BASIC METABOLIC PNL TOTAL CA: CPT

## 2024-09-18 PROCEDURE — 93010 ELECTROCARDIOGRAM REPORT: CPT | Performed by: INTERNAL MEDICINE

## 2024-09-18 PROCEDURE — 96375 TX/PRO/DX INJ NEW DRUG ADDON: CPT

## 2024-09-18 RX ORDER — KETOROLAC TROMETHAMINE 15 MG/ML
15 INJECTION, SOLUTION INTRAMUSCULAR; INTRAVENOUS EVERY 6 HOURS
Status: DISCONTINUED | OUTPATIENT
Start: 2024-09-18 | End: 2024-09-19 | Stop reason: HOSPADM

## 2024-09-18 RX ORDER — ORPHENADRINE CITRATE 30 MG/ML
60 INJECTION INTRAMUSCULAR; INTRAVENOUS EVERY 12 HOURS
Status: COMPLETED | OUTPATIENT
Start: 2024-09-18 | End: 2024-09-19

## 2024-09-18 RX ORDER — HYDRALAZINE HYDROCHLORIDE 20 MG/ML
10 INJECTION INTRAMUSCULAR; INTRAVENOUS EVERY 6 HOURS PRN
Status: COMPLETED | OUTPATIENT
Start: 2024-09-18 | End: 2024-09-19

## 2024-09-18 RX ORDER — ACETAMINOPHEN 325 MG/1
650 TABLET ORAL EVERY 6 HOURS SCHEDULED
Status: DISCONTINUED | OUTPATIENT
Start: 2024-09-18 | End: 2024-09-19 | Stop reason: HOSPADM

## 2024-09-18 RX ORDER — DIAZEPAM 2 MG
2 TABLET ORAL EVERY 6 HOURS
Status: DISCONTINUED | OUTPATIENT
Start: 2024-09-18 | End: 2024-09-19 | Stop reason: HOSPADM

## 2024-09-18 RX ADMIN — ORPHENADRINE CITRATE 60 MG: 60 INJECTION INTRAMUSCULAR; INTRAVENOUS at 22:19

## 2024-09-18 RX ADMIN — SPIRONOLACTONE 25 MG: 25 TABLET ORAL at 08:56

## 2024-09-18 RX ADMIN — HYDROMORPHONE HYDROCHLORIDE 1 MG: 1 INJECTION, SOLUTION INTRAMUSCULAR; INTRAVENOUS; SUBCUTANEOUS at 14:26

## 2024-09-18 RX ADMIN — SODIUM CHLORIDE, PRESERVATIVE FREE 10 ML: 5 INJECTION INTRAVENOUS at 08:56

## 2024-09-18 RX ADMIN — APIXABAN 5 MG: 5 TABLET, FILM COATED ORAL at 22:19

## 2024-09-18 RX ADMIN — HYDROMORPHONE HYDROCHLORIDE 1 MG: 1 INJECTION, SOLUTION INTRAMUSCULAR; INTRAVENOUS; SUBCUTANEOUS at 01:42

## 2024-09-18 RX ADMIN — ASPIRIN 81 MG: 81 TABLET, CHEWABLE ORAL at 08:56

## 2024-09-18 RX ADMIN — METOPROLOL SUCCINATE 200 MG: 50 TABLET, EXTENDED RELEASE ORAL at 08:56

## 2024-09-18 RX ADMIN — APIXABAN 5 MG: 5 TABLET, FILM COATED ORAL at 08:56

## 2024-09-18 RX ADMIN — KETOROLAC TROMETHAMINE 15 MG: 15 INJECTION, SOLUTION INTRAMUSCULAR; INTRAVENOUS at 22:19

## 2024-09-18 RX ADMIN — KETOROLAC TROMETHAMINE 15 MG: 15 INJECTION, SOLUTION INTRAMUSCULAR; INTRAVENOUS at 14:26

## 2024-09-18 RX ADMIN — VALSARTAN 160 MG: 160 TABLET ORAL at 08:56

## 2024-09-18 RX ADMIN — KETOROLAC TROMETHAMINE 15 MG: 15 INJECTION, SOLUTION INTRAMUSCULAR; INTRAVENOUS at 09:30

## 2024-09-18 RX ADMIN — HYDRALAZINE HYDROCHLORIDE 10 MG: 20 INJECTION INTRAMUSCULAR; INTRAVENOUS at 01:41

## 2024-09-18 RX ADMIN — DIAZEPAM 2 MG: 2 TABLET ORAL at 16:45

## 2024-09-18 RX ADMIN — HYDROMORPHONE HYDROCHLORIDE 1 MG: 1 INJECTION, SOLUTION INTRAMUSCULAR; INTRAVENOUS; SUBCUTANEOUS at 18:13

## 2024-09-18 RX ADMIN — AMIODARONE HYDROCHLORIDE 200 MG: 200 TABLET ORAL at 08:56

## 2024-09-18 RX ADMIN — HYDROMORPHONE HYDROCHLORIDE 1 MG: 1 INJECTION, SOLUTION INTRAMUSCULAR; INTRAVENOUS; SUBCUTANEOUS at 09:30

## 2024-09-18 RX ADMIN — ORPHENADRINE CITRATE 60 MG: 60 INJECTION INTRAMUSCULAR; INTRAVENOUS at 09:30

## 2024-09-18 RX ADMIN — CYCLOBENZAPRINE 10 MG: 10 TABLET, FILM COATED ORAL at 18:18

## 2024-09-18 RX ADMIN — SODIUM CHLORIDE, PRESERVATIVE FREE 10 ML: 5 INJECTION INTRAVENOUS at 22:23

## 2024-09-18 RX ADMIN — CYCLOBENZAPRINE 10 MG: 10 TABLET, FILM COATED ORAL at 01:42

## 2024-09-18 ASSESSMENT — PAIN DESCRIPTION - DESCRIPTORS
DESCRIPTORS: ACHING

## 2024-09-18 ASSESSMENT — PAIN SCALES - GENERAL
PAINLEVEL_OUTOF10: 8
PAINLEVEL_OUTOF10: 10
PAINLEVEL_OUTOF10: 8
PAINLEVEL_OUTOF10: 2
PAINLEVEL_OUTOF10: 9
PAINLEVEL_OUTOF10: 8
PAINLEVEL_OUTOF10: 2
PAINLEVEL_OUTOF10: 2

## 2024-09-18 ASSESSMENT — PAIN SCALES - WONG BAKER
WONGBAKER_NUMERICALRESPONSE: NO HURT
WONGBAKER_NUMERICALRESPONSE: HURTS A LITTLE BIT
WONGBAKER_NUMERICALRESPONSE: HURTS A LITTLE BIT
WONGBAKER_NUMERICALRESPONSE: NO HURT
WONGBAKER_NUMERICALRESPONSE: HURTS A LITTLE BIT

## 2024-09-18 ASSESSMENT — PAIN DESCRIPTION - ORIENTATION
ORIENTATION: LEFT
ORIENTATION: MID;LOWER
ORIENTATION: LOWER;MID
ORIENTATION: LEFT
ORIENTATION: LOWER
ORIENTATION: MID;LOWER

## 2024-09-18 ASSESSMENT — PAIN DESCRIPTION - LOCATION
LOCATION: BACK
LOCATION: BACK;LEG
LOCATION: BACK
LOCATION: BACK;LEG
LOCATION: BACK;LEG
LOCATION: BACK
LOCATION: BACK

## 2024-09-19 VITALS
TEMPERATURE: 97.5 F | OXYGEN SATURATION: 97 % | DIASTOLIC BLOOD PRESSURE: 73 MMHG | SYSTOLIC BLOOD PRESSURE: 159 MMHG | WEIGHT: 293 LBS | BODY MASS INDEX: 47.09 KG/M2 | HEART RATE: 62 BPM | RESPIRATION RATE: 17 BRPM | HEIGHT: 66 IN

## 2024-09-19 LAB
ANION GAP SERPL CALCULATED.3IONS-SCNC: 12 MMOL/L (ref 3–16)
BACTERIA UR CULT: ABNORMAL
BACTERIA UR CULT: ABNORMAL
BASOPHILS # BLD: 0.1 K/UL (ref 0–0.2)
BASOPHILS NFR BLD: 0.5 %
BUN SERPL-MCNC: 22 MG/DL (ref 7–20)
CALCIUM SERPL-MCNC: 9.1 MG/DL (ref 8.3–10.6)
CHLORIDE SERPL-SCNC: 100 MMOL/L (ref 99–110)
CO2 SERPL-SCNC: 26 MMOL/L (ref 21–32)
CREAT SERPL-MCNC: 1 MG/DL (ref 0.6–1.2)
DEPRECATED RDW RBC AUTO: 14.7 % (ref 12.4–15.4)
EOSINOPHIL # BLD: 0.1 K/UL (ref 0–0.6)
EOSINOPHIL NFR BLD: 0.5 %
GFR SERPLBLD CREATININE-BSD FMLA CKD-EPI: 60 ML/MIN/{1.73_M2}
GLUCOSE SERPL-MCNC: 151 MG/DL (ref 70–99)
HCT VFR BLD AUTO: 39.2 % (ref 36–48)
HGB BLD-MCNC: 12.4 G/DL (ref 12–16)
LYMPHOCYTES # BLD: 2.7 K/UL (ref 1–5.1)
LYMPHOCYTES NFR BLD: 15.7 %
MCH RBC QN AUTO: 27.3 PG (ref 26–34)
MCHC RBC AUTO-ENTMCNC: 31.7 G/DL (ref 31–36)
MCV RBC AUTO: 86.2 FL (ref 80–100)
MONOCYTES # BLD: 1 K/UL (ref 0–1.3)
MONOCYTES NFR BLD: 5.5 %
NEUTROPHILS # BLD: 13.4 K/UL (ref 1.7–7.7)
NEUTROPHILS NFR BLD: 77.8 %
ORGANISM: ABNORMAL
ORGANISM: ABNORMAL
PLATELET # BLD AUTO: 252 K/UL (ref 135–450)
PMV BLD AUTO: 8.1 FL (ref 5–10.5)
POTASSIUM SERPL-SCNC: 3.9 MMOL/L (ref 3.5–5.1)
RBC # BLD AUTO: 4.55 M/UL (ref 4–5.2)
SODIUM SERPL-SCNC: 138 MMOL/L (ref 136–145)
WBC # BLD AUTO: 17.2 K/UL (ref 4–11)

## 2024-09-19 PROCEDURE — 96376 TX/PRO/DX INJ SAME DRUG ADON: CPT

## 2024-09-19 PROCEDURE — 85025 COMPLETE CBC W/AUTO DIFF WBC: CPT

## 2024-09-19 PROCEDURE — 2700000000 HC OXYGEN THERAPY PER DAY

## 2024-09-19 PROCEDURE — G0378 HOSPITAL OBSERVATION PER HR: HCPCS

## 2024-09-19 PROCEDURE — 6370000000 HC RX 637 (ALT 250 FOR IP): Performed by: INTERNAL MEDICINE

## 2024-09-19 PROCEDURE — 2580000003 HC RX 258: Performed by: INTERNAL MEDICINE

## 2024-09-19 PROCEDURE — 96375 TX/PRO/DX INJ NEW DRUG ADDON: CPT

## 2024-09-19 PROCEDURE — 6360000002 HC RX W HCPCS: Performed by: INTERNAL MEDICINE

## 2024-09-19 PROCEDURE — 80048 BASIC METABOLIC PNL TOTAL CA: CPT

## 2024-09-19 PROCEDURE — 36415 COLL VENOUS BLD VENIPUNCTURE: CPT

## 2024-09-19 PROCEDURE — 6360000002 HC RX W HCPCS: Performed by: NURSE PRACTITIONER

## 2024-09-19 RX ORDER — DIAZEPAM 2 MG
2 TABLET ORAL EVERY 6 HOURS
Qty: 28 TABLET | Refills: 0 | Status: SHIPPED | OUTPATIENT
Start: 2024-09-19 | End: 2024-09-19 | Stop reason: HOSPADM

## 2024-09-19 RX ORDER — CEPHALEXIN 500 MG/1
500 CAPSULE ORAL 3 TIMES DAILY
Qty: 18 CAPSULE | Refills: 0 | Status: SHIPPED | OUTPATIENT
Start: 2024-09-19 | End: 2024-09-25

## 2024-09-19 RX ORDER — OXYCODONE HYDROCHLORIDE 5 MG/1
5 TABLET ORAL EVERY 6 HOURS PRN
Qty: 20 TABLET | Refills: 0 | Status: SHIPPED | OUTPATIENT
Start: 2024-09-19 | End: 2024-09-26

## 2024-09-19 RX ORDER — TIZANIDINE 2 MG/1
2 TABLET ORAL 3 TIMES DAILY PRN
Qty: 30 TABLET | Refills: 0 | Status: SHIPPED | OUTPATIENT
Start: 2024-09-19

## 2024-09-19 RX ORDER — CYCLOBENZAPRINE HCL 10 MG
10 TABLET ORAL 3 TIMES DAILY PRN
Qty: 30 TABLET | Refills: 0 | Status: SHIPPED | OUTPATIENT
Start: 2024-09-19 | End: 2024-09-19 | Stop reason: HOSPADM

## 2024-09-19 RX ORDER — DIAZEPAM 5 MG
2.5 TABLET ORAL EVERY 6 HOURS PRN
Qty: 28 TABLET | Refills: 0 | Status: SHIPPED | OUTPATIENT
Start: 2024-09-19 | End: 2024-10-03

## 2024-09-19 RX ADMIN — DIAZEPAM 2 MG: 2 TABLET ORAL at 11:38

## 2024-09-19 RX ADMIN — METOPROLOL SUCCINATE 200 MG: 50 TABLET, EXTENDED RELEASE ORAL at 09:13

## 2024-09-19 RX ADMIN — ACETAMINOPHEN 650 MG: 325 TABLET ORAL at 05:05

## 2024-09-19 RX ADMIN — ASPIRIN 81 MG: 81 TABLET, CHEWABLE ORAL at 09:14

## 2024-09-19 RX ADMIN — ORPHENADRINE CITRATE 60 MG: 60 INJECTION INTRAMUSCULAR; INTRAVENOUS at 09:14

## 2024-09-19 RX ADMIN — SPIRONOLACTONE 25 MG: 25 TABLET ORAL at 09:14

## 2024-09-19 RX ADMIN — SODIUM CHLORIDE, PRESERVATIVE FREE 10 ML: 5 INJECTION INTRAVENOUS at 09:14

## 2024-09-19 RX ADMIN — ACETAMINOPHEN 650 MG: 325 TABLET ORAL at 00:12

## 2024-09-19 RX ADMIN — AMIODARONE HYDROCHLORIDE 200 MG: 200 TABLET ORAL at 09:14

## 2024-09-19 RX ADMIN — VALSARTAN 160 MG: 160 TABLET ORAL at 09:13

## 2024-09-19 RX ADMIN — DIAZEPAM 2 MG: 2 TABLET ORAL at 05:04

## 2024-09-19 RX ADMIN — APIXABAN 5 MG: 5 TABLET, FILM COATED ORAL at 09:14

## 2024-09-19 RX ADMIN — HYDRALAZINE HYDROCHLORIDE 10 MG: 20 INJECTION INTRAMUSCULAR; INTRAVENOUS at 00:13

## 2024-09-19 RX ADMIN — KETOROLAC TROMETHAMINE 15 MG: 15 INJECTION, SOLUTION INTRAMUSCULAR; INTRAVENOUS at 09:14

## 2024-09-19 RX ADMIN — KETOROLAC TROMETHAMINE 15 MG: 15 INJECTION, SOLUTION INTRAMUSCULAR; INTRAVENOUS at 03:19

## 2024-09-19 RX ADMIN — WATER 1000 MG: 1 INJECTION INTRAMUSCULAR; INTRAVENOUS; SUBCUTANEOUS at 11:38

## 2024-09-19 RX ADMIN — DIAZEPAM 2 MG: 2 TABLET ORAL at 00:13

## 2024-09-19 ASSESSMENT — PAIN DESCRIPTION - LOCATION: LOCATION: BACK;LEG

## 2024-09-19 ASSESSMENT — PAIN SCALES - GENERAL
PAINLEVEL_OUTOF10: 8
PAINLEVEL_OUTOF10: 0

## 2024-09-19 ASSESSMENT — PAIN DESCRIPTION - DESCRIPTORS: DESCRIPTORS: ACHING

## 2024-09-19 ASSESSMENT — PAIN DESCRIPTION - ORIENTATION: ORIENTATION: LOWER;MID;LEFT

## 2024-09-19 ASSESSMENT — PAIN SCALES - WONG BAKER: WONGBAKER_NUMERICALRESPONSE: HURTS A LITTLE BIT

## 2024-09-20 RX ORDER — SEMAGLUTIDE 0.25 MG/.5ML
0.25 INJECTION, SOLUTION SUBCUTANEOUS
Qty: 4 ML | Refills: 0 | Status: SHIPPED | OUTPATIENT
Start: 2024-09-20

## 2024-09-23 ENCOUNTER — TELEPHONE (OUTPATIENT)
Dept: CARDIOLOGY CLINIC | Age: 69
End: 2024-09-23

## 2024-10-22 ENCOUNTER — TELEPHONE (OUTPATIENT)
Dept: CARDIOLOGY CLINIC | Age: 69
End: 2024-10-22

## 2024-10-22 NOTE — TELEPHONE ENCOUNTER
The patient phoned stating that she found the   Semaglutide-Weight Management (WEGOVY) 0.25 MG/0.5ML SOAJ SC injection [[id number]]  at [facility] in [place].  They state that they would need  a prescription sent to them. She states they have 1 left.  Thank you     Medication Refill    Medication needing refilled:  Semaglutide-Weight Management (WEGOVY) 0.25 MG/0.5ML SOAJ SC injection [[id number]]     Dosage of the medication:    How are you taking this medication (QD, BID, TID, QID, PRN):  Sig: Inject 0.25 mg into the skin every 7 days     30 or 90 day supply called in:    When will you run out of your medication:    Which Pharmacy are we sending the medication to?:      [id number] [address] [phone] -  [phone]

## 2024-10-22 NOTE — TELEPHONE ENCOUNTER
Pt states [facility] had some and they are filling the script, so we don't have to send it to [facility].    Thank you,

## 2024-11-14 RX ORDER — SEMAGLUTIDE 0.5 MG/.5ML
0.5 INJECTION, SOLUTION SUBCUTANEOUS
Qty: 4 ML | Refills: 0 | Status: SHIPPED | OUTPATIENT
Start: 2024-11-14 | End: 2024-11-14 | Stop reason: SDUPTHER

## 2024-11-14 RX ORDER — SEMAGLUTIDE 0.5 MG/.5ML
0.5 INJECTION, SOLUTION SUBCUTANEOUS
Qty: 4 ML | Refills: 0 | Status: SHIPPED | OUTPATIENT
Start: 2024-11-14

## 2024-11-14 RX ORDER — SEMAGLUTIDE 0.25 MG/.5ML
INJECTION, SOLUTION SUBCUTANEOUS
Qty: 4 ML | Refills: 0 | OUTPATIENT
Start: 2024-11-14

## 2024-12-10 ENCOUNTER — TELEPHONE (OUTPATIENT)
Dept: CARDIOLOGY CLINIC | Age: 69
End: 2024-12-10

## 2024-12-10 NOTE — TELEPHONE ENCOUNTER
YENIFERI:  Pt called stating that their sister will be coming to the appt, 12/11 that is her means of transportation. Pt stated they come in the room with her.    Pt would like yo know if  NPKV not mention Semaglutide-Weight Management (WEGOVY) at appt, it NPKV has to mention it  just say the  new medication

## 2024-12-12 ASSESSMENT — ENCOUNTER SYMPTOMS
BACK PAIN: 1
GASTROINTESTINAL NEGATIVE: 1

## 2024-12-13 ENCOUNTER — OFFICE VISIT (OUTPATIENT)
Dept: CARDIOLOGY CLINIC | Age: 69
End: 2024-12-13

## 2024-12-13 ENCOUNTER — NURSE ONLY (OUTPATIENT)
Dept: CARDIOLOGY CLINIC | Age: 69
End: 2024-12-13

## 2024-12-13 VITALS
DIASTOLIC BLOOD PRESSURE: 68 MMHG | OXYGEN SATURATION: 95 % | HEART RATE: 72 BPM | HEIGHT: 66 IN | SYSTOLIC BLOOD PRESSURE: 104 MMHG | BODY MASS INDEX: 48.74 KG/M2

## 2024-12-13 DIAGNOSIS — I42.8 NICM (NONISCHEMIC CARDIOMYOPATHY) (HCC): ICD-10-CM

## 2024-12-13 DIAGNOSIS — Z86.79 HISTORY OF VENTRICULAR TACHYCARDIA: ICD-10-CM

## 2024-12-13 DIAGNOSIS — I50.32 CHRONIC DIASTOLIC CONGESTIVE HEART FAILURE (HCC): Primary | ICD-10-CM

## 2024-12-13 DIAGNOSIS — I48.0 PAF (PAROXYSMAL ATRIAL FIBRILLATION) (HCC): ICD-10-CM

## 2024-12-13 DIAGNOSIS — I50.22 CHRONIC SYSTOLIC HF (HEART FAILURE) (HCC): ICD-10-CM

## 2024-12-13 DIAGNOSIS — I47.20 VT (VENTRICULAR TACHYCARDIA) (HCC): ICD-10-CM

## 2024-12-13 DIAGNOSIS — I48.91 ATRIAL FIBRILLATION WITH RAPID VENTRICULAR RESPONSE (HCC): ICD-10-CM

## 2024-12-13 DIAGNOSIS — I48.92 ATRIAL FLUTTER WITH RAPID VENTRICULAR RESPONSE (HCC): ICD-10-CM

## 2024-12-13 DIAGNOSIS — I25.83 CORONARY ARTERY DISEASE DUE TO LIPID RICH PLAQUE: ICD-10-CM

## 2024-12-13 DIAGNOSIS — I47.29 NSVT (NONSUSTAINED VENTRICULAR TACHYCARDIA) (HCC): ICD-10-CM

## 2024-12-13 DIAGNOSIS — I25.5 ISCHEMIC CARDIOMYOPATHY: ICD-10-CM

## 2024-12-13 DIAGNOSIS — Z45.02 ICD (IMPLANTABLE CARDIOVERTER-DEFIBRILLATOR) BATTERY DEPLETION: Primary | ICD-10-CM

## 2024-12-13 DIAGNOSIS — I25.10 CORONARY ARTERY DISEASE DUE TO LIPID RICH PLAQUE: ICD-10-CM

## 2024-12-13 DIAGNOSIS — I10 ESSENTIAL HYPERTENSION: ICD-10-CM

## 2024-12-13 RX ORDER — SEMAGLUTIDE 0.5 MG/.5ML
0.5 INJECTION, SOLUTION SUBCUTANEOUS
Qty: 4 ML | Refills: 0 | OUTPATIENT
Start: 2024-12-13

## 2024-12-13 RX ORDER — SPIRONOLACTONE 25 MG/1
25 TABLET ORAL DAILY
Qty: 90 TABLET | Refills: 2 | Status: SHIPPED | OUTPATIENT
Start: 2024-12-13

## 2024-12-13 RX ORDER — SEMAGLUTIDE 1 MG/.5ML
1 INJECTION, SOLUTION SUBCUTANEOUS
Qty: 4 ML | Refills: 0 | Status: SHIPPED | OUTPATIENT
Start: 2024-12-13

## 2024-12-13 NOTE — TELEPHONE ENCOUNTER
Medication Refill    Medication needing refilled: wegovy     Dosage of the medication:    How are you taking this medication (QD, BID, TID, QID, PRN):    30 or 90 day supply called in:    When will you run out of your medication:    Which Pharmacy are we sending the medication to?: [facility] [address] [place] [address] [phone] -  [phone]

## 2024-12-13 NOTE — PATIENT INSTRUCTIONS
Instructions:   Medications:  rosuvastatin and start one before bed  Labs: fasting labs 6 weeks after starting new cholesterol medicine  Lifestyle Recommendations: Weigh yourself every day in the morning after urination, call Sav if wt increases 2-3lb in one day or 5lb in one week, Limit sodium to 2000mg/day and fluids to 2L or 64oz/day.   [facility] [phone]

## 2024-12-16 RX ORDER — SEMAGLUTIDE 0.25 MG/.5ML
INJECTION, SOLUTION SUBCUTANEOUS
Qty: 4 ML | Refills: 0 | OUTPATIENT
Start: 2024-12-16

## 2024-12-16 RX ORDER — SEMAGLUTIDE 1 MG/.5ML
1 INJECTION, SOLUTION SUBCUTANEOUS
Qty: 4 ML | Refills: 0 | Status: SHIPPED | OUTPATIENT
Start: 2024-12-16

## 2024-12-16 NOTE — TELEPHONE ENCOUNTER
Please send scrip for Semaglutide-Weight Management (WEGOVY) 1 MG/0.5ML SOAJ SC injection to [facility] [address] [place] [address] -  [phone] - f [phone]

## 2025-01-08 RX ORDER — SEMAGLUTIDE 1.7 MG/.75ML
1.7 INJECTION, SOLUTION SUBCUTANEOUS
Qty: 4 ML | Refills: 0 | Status: SHIPPED | OUTPATIENT
Start: 2025-01-08 | End: 2025-01-08 | Stop reason: SDUPTHER

## 2025-01-08 RX ORDER — SEMAGLUTIDE 1.7 MG/.75ML
1.7 INJECTION, SOLUTION SUBCUTANEOUS
Qty: 4 ML | Refills: 0 | Status: SHIPPED | OUTPATIENT
Start: 2025-01-08

## 2025-01-08 RX ORDER — SEMAGLUTIDE 1 MG/.5ML
INJECTION, SOLUTION SUBCUTANEOUS
Qty: 4 ML | Refills: 0 | OUTPATIENT
Start: 2025-01-08

## 2025-01-08 NOTE — TELEPHONE ENCOUNTER
Pt states [facility] called her and states she was told her refill for Wegovy was denied. Pt is requesting for someone to call her to discuss.

## 2025-01-08 NOTE — TELEPHONE ENCOUNTER
Medication Refill    Medication needing refilled:    Semaglutide-Weight Management (WEGOVY) 1 MG/0.5ML SOAJ SC injection   Dosage of the medication:  Inject 1 mg into the skin every 7 days   How are you taking this medication (QD, BID, TID, QID, PRN):    30 or 90 day supply called in:90    When will you run out of your medication:  Last appt 12/13/24  Which Pharmacy are we sending the medication to?:  [facility] [address] [place] [address] [phone] -  [phone]

## 2025-01-27 ENCOUNTER — APPOINTMENT (OUTPATIENT)
Dept: GENERAL RADIOLOGY | Age: 70
DRG: 305 | End: 2025-01-27
Payer: MEDICARE

## 2025-01-27 ENCOUNTER — HOSPITAL ENCOUNTER (INPATIENT)
Age: 70
LOS: 2 days | Discharge: HOME HEALTH | DRG: 305 | End: 2025-01-29
Attending: STUDENT IN AN ORGANIZED HEALTH CARE EDUCATION/TRAINING PROGRAM | Admitting: STUDENT IN AN ORGANIZED HEALTH CARE EDUCATION/TRAINING PROGRAM
Payer: MEDICARE

## 2025-01-27 DIAGNOSIS — M54.9 INTRACTABLE BACK PAIN: ICD-10-CM

## 2025-01-27 DIAGNOSIS — I16.1 HYPERTENSIVE EMERGENCY: Primary | ICD-10-CM

## 2025-01-27 DIAGNOSIS — R07.9 CHEST PAIN, UNSPECIFIED TYPE: ICD-10-CM

## 2025-01-27 LAB
ALBUMIN SERPL-MCNC: 4.1 G/DL (ref 3.4–5)
ALBUMIN/GLOB SERPL: 1.1 {RATIO} (ref 1.1–2.2)
ALP SERPL-CCNC: 86 U/L (ref 40–129)
ALT SERPL-CCNC: 20 U/L (ref 10–40)
ANION GAP SERPL CALCULATED.3IONS-SCNC: 14 MMOL/L (ref 3–16)
AST SERPL-CCNC: 32 U/L (ref 15–37)
BASOPHILS # BLD: 0.1 K/UL (ref 0–0.2)
BASOPHILS NFR BLD: 1.1 %
BILIRUB SERPL-MCNC: 0.6 MG/DL (ref 0–1)
BUN SERPL-MCNC: 7 MG/DL (ref 7–20)
CALCIUM SERPL-MCNC: 10 MG/DL (ref 8.3–10.6)
CHLORIDE SERPL-SCNC: 100 MMOL/L (ref 99–110)
CO2 SERPL-SCNC: 26 MMOL/L (ref 21–32)
CREAT SERPL-MCNC: 0.9 MG/DL (ref 0.6–1.2)
DEPRECATED RDW RBC AUTO: 13.8 % (ref 12.4–15.4)
EOSINOPHIL # BLD: 0.1 K/UL (ref 0–0.6)
EOSINOPHIL NFR BLD: 1.2 %
GFR SERPLBLD CREATININE-BSD FMLA CKD-EPI: 68 ML/MIN/{1.73_M2}
GLUCOSE SERPL-MCNC: 117 MG/DL (ref 70–99)
HCT VFR BLD AUTO: 44.3 % (ref 36–48)
HGB BLD-MCNC: 13.9 G/DL (ref 12–16)
LIPASE SERPL-CCNC: 21 U/L (ref 13–60)
LYMPHOCYTES # BLD: 2.8 K/UL (ref 1–5.1)
LYMPHOCYTES NFR BLD: 23.9 %
MCH RBC QN AUTO: 27.3 PG (ref 26–34)
MCHC RBC AUTO-ENTMCNC: 31.4 G/DL (ref 31–36)
MCV RBC AUTO: 87.1 FL (ref 80–100)
MONOCYTES # BLD: 0.7 K/UL (ref 0–1.3)
MONOCYTES NFR BLD: 6.3 %
NEUTROPHILS # BLD: 8 K/UL (ref 1.7–7.7)
NEUTROPHILS NFR BLD: 67.5 %
NT-PROBNP SERPL-MCNC: 49 PG/ML (ref 0–124)
PLATELET # BLD AUTO: 250 K/UL (ref 135–450)
PMV BLD AUTO: 8.3 FL (ref 5–10.5)
POTASSIUM SERPL-SCNC: 4.1 MMOL/L (ref 3.5–5.1)
PROT SERPL-MCNC: 7.8 G/DL (ref 6.4–8.2)
RBC # BLD AUTO: 5.09 M/UL (ref 4–5.2)
SODIUM SERPL-SCNC: 140 MMOL/L (ref 136–145)
TROPONIN, HIGH SENSITIVITY: 22 NG/L (ref 0–14)
TROPONIN, HIGH SENSITIVITY: 29 NG/L (ref 0–14)
WBC # BLD AUTO: 11.8 K/UL (ref 4–11)

## 2025-01-27 PROCEDURE — 84484 ASSAY OF TROPONIN QUANT: CPT

## 2025-01-27 PROCEDURE — 93005 ELECTROCARDIOGRAM TRACING: CPT | Performed by: STUDENT IN AN ORGANIZED HEALTH CARE EDUCATION/TRAINING PROGRAM

## 2025-01-27 PROCEDURE — 83690 ASSAY OF LIPASE: CPT

## 2025-01-27 PROCEDURE — 85025 COMPLETE CBC W/AUTO DIFF WBC: CPT

## 2025-01-27 PROCEDURE — 83880 ASSAY OF NATRIURETIC PEPTIDE: CPT

## 2025-01-27 PROCEDURE — 6370000000 HC RX 637 (ALT 250 FOR IP): Performed by: STUDENT IN AN ORGANIZED HEALTH CARE EDUCATION/TRAINING PROGRAM

## 2025-01-27 PROCEDURE — 71045 X-RAY EXAM CHEST 1 VIEW: CPT

## 2025-01-27 PROCEDURE — 1200000000 HC SEMI PRIVATE

## 2025-01-27 PROCEDURE — 6370000000 HC RX 637 (ALT 250 FOR IP): Performed by: NURSE PRACTITIONER

## 2025-01-27 PROCEDURE — 80053 COMPREHEN METABOLIC PANEL: CPT

## 2025-01-27 PROCEDURE — 2500000003 HC RX 250 WO HCPCS: Performed by: NURSE PRACTITIONER

## 2025-01-27 PROCEDURE — 99285 EMERGENCY DEPT VISIT HI MDM: CPT

## 2025-01-27 RX ORDER — ONDANSETRON 4 MG/1
4 TABLET, ORALLY DISINTEGRATING ORAL EVERY 8 HOURS PRN
Status: DISCONTINUED | OUTPATIENT
Start: 2025-01-27 | End: 2025-01-29 | Stop reason: HOSPADM

## 2025-01-27 RX ORDER — SODIUM CHLORIDE 9 MG/ML
INJECTION, SOLUTION INTRAVENOUS PRN
Status: DISCONTINUED | OUTPATIENT
Start: 2025-01-27 | End: 2025-01-29 | Stop reason: HOSPADM

## 2025-01-27 RX ORDER — SODIUM CHLORIDE 0.9 % (FLUSH) 0.9 %
5-40 SYRINGE (ML) INJECTION PRN
Status: DISCONTINUED | OUTPATIENT
Start: 2025-01-27 | End: 2025-01-29 | Stop reason: HOSPADM

## 2025-01-27 RX ORDER — ASPIRIN 81 MG/1
81 TABLET, CHEWABLE ORAL DAILY
Status: DISCONTINUED | OUTPATIENT
Start: 2025-01-28 | End: 2025-01-29 | Stop reason: HOSPADM

## 2025-01-27 RX ORDER — ROSUVASTATIN CALCIUM 40 MG/1
40 TABLET, COATED ORAL DAILY
Status: DISCONTINUED | OUTPATIENT
Start: 2025-01-28 | End: 2025-01-27

## 2025-01-27 RX ORDER — HYDRALAZINE HYDROCHLORIDE 20 MG/ML
10 INJECTION INTRAMUSCULAR; INTRAVENOUS EVERY 4 HOURS PRN
Status: DISCONTINUED | OUTPATIENT
Start: 2025-01-27 | End: 2025-01-28

## 2025-01-27 RX ORDER — POTASSIUM CHLORIDE 7.45 MG/ML
10 INJECTION INTRAVENOUS PRN
Status: DISCONTINUED | OUTPATIENT
Start: 2025-01-27 | End: 2025-01-29 | Stop reason: HOSPADM

## 2025-01-27 RX ORDER — NITROGLYCERIN 0.4 MG/1
0.4 TABLET SUBLINGUAL ONCE
Status: COMPLETED | OUTPATIENT
Start: 2025-01-27 | End: 2025-01-27

## 2025-01-27 RX ORDER — VITAMIN B COMPLEX
1000 TABLET ORAL NIGHTLY
Status: DISCONTINUED | OUTPATIENT
Start: 2025-01-27 | End: 2025-01-29 | Stop reason: HOSPADM

## 2025-01-27 RX ORDER — ONDANSETRON 2 MG/ML
4 INJECTION INTRAMUSCULAR; INTRAVENOUS EVERY 6 HOURS PRN
Status: DISCONTINUED | OUTPATIENT
Start: 2025-01-27 | End: 2025-01-29 | Stop reason: HOSPADM

## 2025-01-27 RX ORDER — M-VIT,TX,IRON,MINS/CALC/FOLIC 27MG-0.4MG
1 TABLET ORAL NIGHTLY
COMMUNITY

## 2025-01-27 RX ORDER — SODIUM CHLORIDE 0.9 % (FLUSH) 0.9 %
5-40 SYRINGE (ML) INJECTION EVERY 12 HOURS SCHEDULED
Status: DISCONTINUED | OUTPATIENT
Start: 2025-01-27 | End: 2025-01-29 | Stop reason: HOSPADM

## 2025-01-27 RX ORDER — MAGNESIUM SULFATE IN WATER 40 MG/ML
2000 INJECTION, SOLUTION INTRAVENOUS PRN
Status: DISCONTINUED | OUTPATIENT
Start: 2025-01-27 | End: 2025-01-29 | Stop reason: HOSPADM

## 2025-01-27 RX ORDER — NITROGLYCERIN 20 MG/100ML
5-200 INJECTION INTRAVENOUS CONTINUOUS
Status: DISCONTINUED | OUTPATIENT
Start: 2025-01-27 | End: 2025-01-27

## 2025-01-27 RX ORDER — ACETAMINOPHEN 650 MG/1
650 SUPPOSITORY RECTAL EVERY 6 HOURS PRN
Status: DISCONTINUED | OUTPATIENT
Start: 2025-01-27 | End: 2025-01-28

## 2025-01-27 RX ORDER — VALSARTAN 40 MG/1
160 TABLET ORAL NIGHTLY
Status: DISCONTINUED | OUTPATIENT
Start: 2025-01-27 | End: 2025-01-29 | Stop reason: HOSPADM

## 2025-01-27 RX ORDER — AMIODARONE HYDROCHLORIDE 200 MG/1
200 TABLET ORAL NIGHTLY
Status: DISCONTINUED | OUTPATIENT
Start: 2025-01-27 | End: 2025-01-29 | Stop reason: HOSPADM

## 2025-01-27 RX ORDER — POTASSIUM CHLORIDE 1500 MG/1
40 TABLET, EXTENDED RELEASE ORAL PRN
Status: DISCONTINUED | OUTPATIENT
Start: 2025-01-27 | End: 2025-01-29 | Stop reason: HOSPADM

## 2025-01-27 RX ORDER — METHOCARBAMOL 500 MG/1
750 TABLET, FILM COATED ORAL ONCE
Status: COMPLETED | OUTPATIENT
Start: 2025-01-27 | End: 2025-01-27

## 2025-01-27 RX ORDER — ACETAMINOPHEN 325 MG/1
650 TABLET ORAL EVERY 6 HOURS PRN
Status: DISCONTINUED | OUTPATIENT
Start: 2025-01-27 | End: 2025-01-28

## 2025-01-27 RX ORDER — POLYETHYLENE GLYCOL 3350 17 G/17G
17 POWDER, FOR SOLUTION ORAL DAILY PRN
Status: DISCONTINUED | OUTPATIENT
Start: 2025-01-27 | End: 2025-01-29 | Stop reason: HOSPADM

## 2025-01-27 RX ORDER — SPIRONOLACTONE 25 MG/1
25 TABLET ORAL DAILY
Status: DISCONTINUED | OUTPATIENT
Start: 2025-01-28 | End: 2025-01-29 | Stop reason: HOSPADM

## 2025-01-27 RX ORDER — ASPIRIN 81 MG/1
162 TABLET, CHEWABLE ORAL ONCE
Status: COMPLETED | OUTPATIENT
Start: 2025-01-27 | End: 2025-01-27

## 2025-01-27 RX ORDER — M-VIT,TX,IRON,MINS/CALC/FOLIC 27MG-0.4MG
1 TABLET ORAL NIGHTLY
Status: DISCONTINUED | OUTPATIENT
Start: 2025-01-27 | End: 2025-01-29 | Stop reason: HOSPADM

## 2025-01-27 RX ORDER — METOPROLOL SUCCINATE 50 MG/1
200 TABLET, EXTENDED RELEASE ORAL NIGHTLY
Status: DISCONTINUED | OUTPATIENT
Start: 2025-01-27 | End: 2025-01-29

## 2025-01-27 RX ADMIN — NITROGLYCERIN 0.4 MG: 0.4 TABLET, ORALLY DISINTEGRATING SUBLINGUAL at 19:57

## 2025-01-27 RX ADMIN — ASPIRIN 162 MG: 81 TABLET, CHEWABLE ORAL at 19:56

## 2025-01-27 RX ADMIN — VALSARTAN 160 MG: 40 TABLET, FILM COATED ORAL at 23:41

## 2025-01-27 RX ADMIN — METHOCARBAMOL 750 MG: 500 TABLET ORAL at 20:47

## 2025-01-27 RX ADMIN — METOPROLOL SUCCINATE 200 MG: 50 TABLET, EXTENDED RELEASE ORAL at 23:42

## 2025-01-27 RX ADMIN — SODIUM CHLORIDE, PRESERVATIVE FREE 10 ML: 5 INJECTION INTRAVENOUS at 23:42

## 2025-01-27 RX ADMIN — Medication 1000 UNITS: at 23:41

## 2025-01-27 RX ADMIN — APIXABAN 5 MG: 5 TABLET, FILM COATED ORAL at 23:42

## 2025-01-27 RX ADMIN — Medication 1 TABLET: at 23:42

## 2025-01-27 RX ADMIN — AMIODARONE HYDROCHLORIDE 200 MG: 200 TABLET ORAL at 23:42

## 2025-01-27 ASSESSMENT — PAIN SCALES - GENERAL
PAINLEVEL_OUTOF10: 5
PAINLEVEL_OUTOF10: 0

## 2025-01-27 ASSESSMENT — HEART SCORE: ECG: NON-SPECIFC REPOLARIZATION DISTURBANCE/LBTB/PM

## 2025-01-27 ASSESSMENT — LIFESTYLE VARIABLES
HOW OFTEN DO YOU HAVE A DRINK CONTAINING ALCOHOL: MONTHLY OR LESS
HOW MANY STANDARD DRINKS CONTAINING ALCOHOL DO YOU HAVE ON A TYPICAL DAY: 1 OR 2

## 2025-01-27 ASSESSMENT — PAIN DESCRIPTION - LOCATION
LOCATION: CHEST
LOCATION: CHEST

## 2025-01-27 ASSESSMENT — PAIN - FUNCTIONAL ASSESSMENT: PAIN_FUNCTIONAL_ASSESSMENT: 0-10

## 2025-01-27 ASSESSMENT — PAIN DESCRIPTION - DESCRIPTORS: DESCRIPTORS: DISCOMFORT

## 2025-01-27 NOTE — PROGRESS NOTES
.In an effort to ensure that our patients LiveWell, a Team Member has reviewed your chart and identified an opportunity to provide the best care possible. An attempt was made to discuss or schedule due or overdue Preventive or Chronic Condition care.Care Gaps identified: Wellness Visits.    The Outcome was Contact was not made, left message. We are attempting to schedule a yearly wellness visit. If you have any questions or need help with scheduling, contact your primary care provider..   Type of Appointment needed: Medicare Wellness Visit   
CO2 31 09/17/2024 11:26 AM    PHOS 4.3 10/06/2023 05:43 AM    PHOS 4.1 10/05/2023 05:09 AM    PHOS 3.5 10/04/2023 04:18 AM    BUN 22 09/19/2024 05:39 AM    BUN 13 09/18/2024 04:59 AM    BUN 12 09/17/2024 11:26 AM    CREATININE 1.0 09/19/2024 05:39 AM    CREATININE 0.7 09/18/2024 04:59 AM    CREATININE 0.7 09/17/2024 11:26 AM     BNP:   Lab Results   Component Value Date/Time    PROBNP 167 09/17/2024 10:25 AM    PROBNP 272 03/05/2024 12:16 PM    PROBNP <36 06/16/2023 11:00 AM    PROBNP 42 11/24/2022 02:30 AM     Iron Studies:    Lab Results   Component Value Date/Time    TIBC 208 10/06/2023 05:43 AM    FERRITIN 356.0 10/06/2023 05:43 AM         Assessment/Plan:    1. Systolic heart failure, chronic (HCC) -  compensated, continue dejon   2.  Cardiomyopathy - EF improved, continue ARB, BB and dejon   3.  HLD/ myalgia - try crestor   4.  HTN - controlled   5. Paroxysmal atrial fibrillation (HCC) - cont AC, amio, BB   6.  CAD - no CP, continue BB, ASA, restart statin         Instructions:   Medications:  rosuvastatin and start one before bed  Labs: fasting labs 6 weeks after starting new cholesterol medicine  Lifestyle Recommendations: Weigh yourself every day in the morning after urination, call Sav if wt increases 2-3lb in one day or 5lb in one week, Limit sodium to 2000mg/day and fluids to 2L or 64oz/day.   [facility]: [phone]      I appreciate the opportunity of cooperating in the care of this individual.    YEIMY AGUERO - CNP, CNP, 12/12/2024,8:42 AM

## 2025-01-28 ENCOUNTER — TELEPHONE (OUTPATIENT)
Dept: CARDIOLOGY CLINIC | Age: 70
End: 2025-01-28

## 2025-01-28 ENCOUNTER — APPOINTMENT (OUTPATIENT)
Age: 70
DRG: 305 | End: 2025-01-28
Payer: MEDICARE

## 2025-01-28 LAB
ANION GAP SERPL CALCULATED.3IONS-SCNC: 12 MMOL/L (ref 3–16)
BASOPHILS # BLD: 0.1 K/UL (ref 0–0.2)
BASOPHILS NFR BLD: 0.8 %
BUN SERPL-MCNC: 7 MG/DL (ref 7–20)
CALCIUM SERPL-MCNC: 9.4 MG/DL (ref 8.3–10.6)
CHLORIDE SERPL-SCNC: 102 MMOL/L (ref 99–110)
CHOLEST SERPL-MCNC: 246 MG/DL (ref 0–199)
CO2 SERPL-SCNC: 25 MMOL/L (ref 21–32)
CREAT SERPL-MCNC: 0.7 MG/DL (ref 0.6–1.2)
DEPRECATED RDW RBC AUTO: 13.7 % (ref 12.4–15.4)
ECHO AO ASC DIAM: 3 CM
ECHO AO ASCENDING AORTA INDEX: 1.28 CM/M2
ECHO AO ROOT DIAM: 3 CM
ECHO AO ROOT INDEX: 1.28 CM/M2
ECHO AV AREA PEAK VELOCITY: 2.7 CM2
ECHO AV AREA VTI: 3.1 CM2
ECHO AV AREA/BSA PEAK VELOCITY: 1.2 CM2/M2
ECHO AV AREA/BSA VTI: 1.3 CM2/M2
ECHO AV MEAN GRADIENT: 2 MMHG
ECHO AV MEAN VELOCITY: 0.7 M/S
ECHO AV PEAK GRADIENT: 5 MMHG
ECHO AV PEAK VELOCITY: 1.1 M/S
ECHO AV VELOCITY RATIO: 0.73
ECHO AV VTI: 23.5 CM
ECHO BSA: 2.47 M2
ECHO IVC INSP: 0.4 CM
ECHO IVC PROX: 1.4 CM
ECHO LA AREA 2C: 14.9 CM2
ECHO LA AREA 4C: 19.6 CM2
ECHO LA MAJOR AXIS: 5.6 CM
ECHO LA MINOR AXIS: 5.1 CM
ECHO LA VOL BP: 47 ML (ref 22–52)
ECHO LA VOL MOD A2C: 35 ML (ref 22–52)
ECHO LA VOL MOD A4C: 56 ML (ref 22–52)
ECHO LA VOL/BSA BIPLANE: 20 ML/M2 (ref 16–34)
ECHO LA VOLUME INDEX MOD A2C: 15 ML/M2 (ref 16–34)
ECHO LA VOLUME INDEX MOD A4C: 24 ML/M2 (ref 16–34)
ECHO LV E' LATERAL VELOCITY: 6.02 CM/S
ECHO LV E' SEPTAL VELOCITY: 5.4 CM/S
ECHO LV EDV A2C: 96 ML
ECHO LV EDV A4C: 118 ML
ECHO LV EDV INDEX A4C: 50 ML/M2
ECHO LV EDV NDEX A2C: 41 ML/M2
ECHO LV EJECTION FRACTION A2C: 61 %
ECHO LV EJECTION FRACTION A4C: 62 %
ECHO LV EJECTION FRACTION BIPLANE: 61 % (ref 55–100)
ECHO LV ESV A2C: 38 ML
ECHO LV ESV A4C: 45 ML
ECHO LV ESV INDEX A2C: 16 ML/M2
ECHO LV ESV INDEX A4C: 19 ML/M2
ECHO LVOT AREA: 3.8 CM2
ECHO LVOT AV VTI INDEX: 0.8
ECHO LVOT DIAM: 2.2 CM
ECHO LVOT MEAN GRADIENT: 1 MMHG
ECHO LVOT PEAK GRADIENT: 2 MMHG
ECHO LVOT PEAK VELOCITY: 0.8 M/S
ECHO LVOT STROKE VOLUME INDEX: 30.7 ML/M2
ECHO LVOT SV: 71.8 ML
ECHO LVOT VTI: 18.9 CM
ECHO MV A VELOCITY: 0.91 M/S
ECHO MV E VELOCITY: 0.98 M/S
ECHO MV E/A RATIO: 1.08
ECHO MV E/E' LATERAL: 16.28
ECHO MV E/E' RATIO (AVERAGED): 17.21
ECHO MV E/E' SEPTAL: 18.15
ECHO PV MAX VELOCITY: 0.6 M/S
ECHO PV PEAK GRADIENT: 2 MMHG
ECHO RV FREE WALL PEAK S': 7.3 CM/S
ECHO RV TAPSE: 1.7 CM (ref 1.7–?)
EOSINOPHIL # BLD: 0.1 K/UL (ref 0–0.6)
EOSINOPHIL NFR BLD: 1.4 %
EST. AVERAGE GLUCOSE BLD GHB EST-MCNC: 116.9 MG/DL
GFR SERPLBLD CREATININE-BSD FMLA CKD-EPI: >90 ML/MIN/{1.73_M2}
GLUCOSE SERPL-MCNC: 110 MG/DL (ref 70–99)
HBA1C MFR BLD: 5.7 %
HCT VFR BLD AUTO: 41.6 % (ref 36–48)
HDLC SERPL-MCNC: 55 MG/DL (ref 40–60)
HGB BLD-MCNC: 13.3 G/DL (ref 12–16)
LDLC SERPL CALC-MCNC: 178 MG/DL
LYMPHOCYTES # BLD: 2 K/UL (ref 1–5.1)
LYMPHOCYTES NFR BLD: 19.7 %
MAGNESIUM SERPL-MCNC: 1.87 MG/DL (ref 1.8–2.4)
MCH RBC QN AUTO: 27.8 PG (ref 26–34)
MCHC RBC AUTO-ENTMCNC: 32 G/DL (ref 31–36)
MCV RBC AUTO: 86.8 FL (ref 80–100)
MONOCYTES # BLD: 0.9 K/UL (ref 0–1.3)
MONOCYTES NFR BLD: 8.4 %
NEUTROPHILS # BLD: 7.2 K/UL (ref 1.7–7.7)
NEUTROPHILS NFR BLD: 69.7 %
PLATELET # BLD AUTO: 223 K/UL (ref 135–450)
PMV BLD AUTO: 8.2 FL (ref 5–10.5)
POTASSIUM SERPL-SCNC: 3.4 MMOL/L (ref 3.5–5.1)
RBC # BLD AUTO: 4.79 M/UL (ref 4–5.2)
SODIUM SERPL-SCNC: 139 MMOL/L (ref 136–145)
TRIGL SERPL-MCNC: 63 MG/DL (ref 0–150)
TROPONIN, HIGH SENSITIVITY: 23 NG/L (ref 0–14)
TSH SERPL DL<=0.005 MIU/L-ACNC: 3.46 UIU/ML (ref 0.27–4.2)
VLDLC SERPL CALC-MCNC: 13 MG/DL
WBC # BLD AUTO: 10.3 K/UL (ref 4–11)

## 2025-01-28 PROCEDURE — 84443 ASSAY THYROID STIM HORMONE: CPT

## 2025-01-28 PROCEDURE — 6360000002 HC RX W HCPCS: Performed by: NURSE PRACTITIONER

## 2025-01-28 PROCEDURE — 84484 ASSAY OF TROPONIN QUANT: CPT

## 2025-01-28 PROCEDURE — 6370000000 HC RX 637 (ALT 250 FOR IP): Performed by: NURSE PRACTITIONER

## 2025-01-28 PROCEDURE — 2500000003 HC RX 250 WO HCPCS: Performed by: NURSE PRACTITIONER

## 2025-01-28 PROCEDURE — 80048 BASIC METABOLIC PNL TOTAL CA: CPT

## 2025-01-28 PROCEDURE — 83735 ASSAY OF MAGNESIUM: CPT

## 2025-01-28 PROCEDURE — 80061 LIPID PANEL: CPT

## 2025-01-28 PROCEDURE — 6360000002 HC RX W HCPCS: Performed by: INTERNAL MEDICINE

## 2025-01-28 PROCEDURE — 93306 TTE W/DOPPLER COMPLETE: CPT | Performed by: INTERNAL MEDICINE

## 2025-01-28 PROCEDURE — C8929 TTE W OR WO FOL WCON,DOPPLER: HCPCS

## 2025-01-28 PROCEDURE — 83036 HEMOGLOBIN GLYCOSYLATED A1C: CPT

## 2025-01-28 PROCEDURE — 2500000003 HC RX 250 WO HCPCS: Performed by: INTERNAL MEDICINE

## 2025-01-28 PROCEDURE — 36415 COLL VENOUS BLD VENIPUNCTURE: CPT

## 2025-01-28 PROCEDURE — 6360000004 HC RX CONTRAST MEDICATION: Performed by: NURSE PRACTITIONER

## 2025-01-28 PROCEDURE — 85025 COMPLETE CBC W/AUTO DIFF WBC: CPT

## 2025-01-28 PROCEDURE — 6370000000 HC RX 637 (ALT 250 FOR IP): Performed by: INTERNAL MEDICINE

## 2025-01-28 PROCEDURE — 1200000000 HC SEMI PRIVATE

## 2025-01-28 RX ORDER — ACETAMINOPHEN 650 MG/1
650 SUPPOSITORY RECTAL EVERY 6 HOURS PRN
Status: DISCONTINUED | OUTPATIENT
Start: 2025-01-28 | End: 2025-01-29 | Stop reason: HOSPADM

## 2025-01-28 RX ORDER — ORPHENADRINE CITRATE 30 MG/ML
60 INJECTION INTRAMUSCULAR; INTRAVENOUS 2 TIMES DAILY
Status: DISCONTINUED | OUTPATIENT
Start: 2025-01-28 | End: 2025-01-29 | Stop reason: HOSPADM

## 2025-01-28 RX ORDER — GABAPENTIN 100 MG/1
100 CAPSULE ORAL 3 TIMES DAILY
Status: DISCONTINUED | OUTPATIENT
Start: 2025-01-28 | End: 2025-01-29 | Stop reason: HOSPADM

## 2025-01-28 RX ORDER — OXYCODONE HYDROCHLORIDE 5 MG/1
10 TABLET ORAL EVERY 4 HOURS PRN
Status: DISCONTINUED | OUTPATIENT
Start: 2025-01-28 | End: 2025-01-29 | Stop reason: HOSPADM

## 2025-01-28 RX ORDER — OXYCODONE AND ACETAMINOPHEN 5; 325 MG/1; MG/1
1 TABLET ORAL EVERY 6 HOURS PRN
Status: DISCONTINUED | OUTPATIENT
Start: 2025-01-28 | End: 2025-01-28

## 2025-01-28 RX ORDER — ACETAMINOPHEN 500 MG
1000 TABLET ORAL EVERY 8 HOURS SCHEDULED
Status: DISCONTINUED | OUTPATIENT
Start: 2025-01-28 | End: 2025-01-29 | Stop reason: HOSPADM

## 2025-01-28 RX ORDER — METHOCARBAMOL 750 MG/1
750 TABLET, FILM COATED ORAL 4 TIMES DAILY PRN
Status: DISCONTINUED | OUTPATIENT
Start: 2025-01-28 | End: 2025-01-29 | Stop reason: HOSPADM

## 2025-01-28 RX ORDER — ACETAMINOPHEN 325 MG/1
650 TABLET ORAL EVERY 6 HOURS PRN
Status: DISCONTINUED | OUTPATIENT
Start: 2025-01-28 | End: 2025-01-29 | Stop reason: HOSPADM

## 2025-01-28 RX ORDER — OXYCODONE HYDROCHLORIDE 5 MG/1
5 TABLET ORAL EVERY 4 HOURS PRN
Status: DISCONTINUED | OUTPATIENT
Start: 2025-01-28 | End: 2025-01-29 | Stop reason: HOSPADM

## 2025-01-28 RX ADMIN — SPIRONOLACTONE 25 MG: 25 TABLET ORAL at 07:56

## 2025-01-28 RX ADMIN — AMIODARONE HYDROCHLORIDE 200 MG: 200 TABLET ORAL at 17:09

## 2025-01-28 RX ADMIN — HYDRALAZINE HYDROCHLORIDE 10 MG: 20 INJECTION INTRAMUSCULAR; INTRAVENOUS at 16:06

## 2025-01-28 RX ADMIN — METOPROLOL SUCCINATE 200 MG: 50 TABLET, EXTENDED RELEASE ORAL at 20:23

## 2025-01-28 RX ADMIN — APIXABAN 5 MG: 5 TABLET, FILM COATED ORAL at 11:35

## 2025-01-28 RX ADMIN — SULFUR HEXAFLUORIDE 2 ML: 60.7; .19; .19 INJECTION, POWDER, LYOPHILIZED, FOR SUSPENSION INTRAVENOUS; INTRAVESICAL at 15:42

## 2025-01-28 RX ADMIN — POTASSIUM CHLORIDE 40 MEQ: 1500 TABLET, EXTENDED RELEASE ORAL at 08:02

## 2025-01-28 RX ADMIN — APIXABAN 5 MG: 5 TABLET, FILM COATED ORAL at 20:22

## 2025-01-28 RX ADMIN — SODIUM CHLORIDE, PRESERVATIVE FREE 10 ML: 5 INJECTION INTRAVENOUS at 09:30

## 2025-01-28 RX ADMIN — HYDRALAZINE HYDROCHLORIDE 10 MG: 20 INJECTION INTRAMUSCULAR; INTRAVENOUS at 08:03

## 2025-01-28 RX ADMIN — WATER 60 MG: 1 INJECTION INTRAMUSCULAR; INTRAVENOUS; SUBCUTANEOUS at 13:54

## 2025-01-28 RX ADMIN — GABAPENTIN 100 MG: 100 CAPSULE ORAL at 20:21

## 2025-01-28 RX ADMIN — SODIUM CHLORIDE, PRESERVATIVE FREE 10 ML: 5 INJECTION INTRAVENOUS at 20:23

## 2025-01-28 RX ADMIN — Medication 1000 UNITS: at 20:22

## 2025-01-28 RX ADMIN — OXYCODONE 10 MG: 5 TABLET ORAL at 16:06

## 2025-01-28 RX ADMIN — ORPHENADRINE CITRATE 60 MG: 60 INJECTION INTRAMUSCULAR; INTRAVENOUS at 13:57

## 2025-01-28 RX ADMIN — METHOCARBAMOL TABLETS 750 MG: 750 TABLET, COATED ORAL at 11:35

## 2025-01-28 RX ADMIN — GABAPENTIN 100 MG: 100 CAPSULE ORAL at 13:53

## 2025-01-28 RX ADMIN — ORPHENADRINE CITRATE 60 MG: 60 INJECTION INTRAMUSCULAR; INTRAVENOUS at 20:55

## 2025-01-28 RX ADMIN — ACETAMINOPHEN 1000 MG: 500 TABLET ORAL at 20:22

## 2025-01-28 RX ADMIN — VALSARTAN 160 MG: 40 TABLET, FILM COATED ORAL at 17:09

## 2025-01-28 RX ADMIN — Medication 1 TABLET: at 20:23

## 2025-01-28 RX ADMIN — OXYCODONE HYDROCHLORIDE AND ACETAMINOPHEN 1 TABLET: 5; 325 TABLET ORAL at 09:40

## 2025-01-28 RX ADMIN — ASPIRIN 81 MG: 81 TABLET, CHEWABLE ORAL at 07:56

## 2025-01-28 ASSESSMENT — PAIN DESCRIPTION - ORIENTATION
ORIENTATION: LOWER
ORIENTATION: LOWER

## 2025-01-28 ASSESSMENT — PAIN SCALES - GENERAL
PAINLEVEL_OUTOF10: 0
PAINLEVEL_OUTOF10: 0
PAINLEVEL_OUTOF10: 5
PAINLEVEL_OUTOF10: 8
PAINLEVEL_OUTOF10: 6
PAINLEVEL_OUTOF10: 9
PAINLEVEL_OUTOF10: 0
PAINLEVEL_OUTOF10: 0
PAINLEVEL_OUTOF10: 8
PAINLEVEL_OUTOF10: 0
PAINLEVEL_OUTOF10: 9
PAINLEVEL_OUTOF10: 9

## 2025-01-28 ASSESSMENT — ENCOUNTER SYMPTOMS
COUGH: 0
RHINORRHEA: 0
ABDOMINAL PAIN: 0
NAUSEA: 0
DIARRHEA: 0
VOMITING: 0
SHORTNESS OF BREATH: 0

## 2025-01-28 ASSESSMENT — PAIN DESCRIPTION - LOCATION
LOCATION: BACK

## 2025-01-28 ASSESSMENT — PAIN DESCRIPTION - DESCRIPTORS
DESCRIPTORS: ACHING
DESCRIPTORS: SHARP
DESCRIPTORS: ACHING

## 2025-01-28 NOTE — PLAN OF CARE
Problem: Chronic Conditions and Co-morbidities  Goal: Patient's chronic conditions and co-morbidity symptoms are monitored and maintained or improved  Outcome: Progressing     Problem: Discharge Planning  Goal: Discharge to home or other facility with appropriate resources  Outcome: Progressing  Flowsheets (Taken 1/27/2025 6410)  Discharge to home or other facility with appropriate resources:   Identify barriers to discharge with patient and caregiver   Identify discharge learning needs (meds, wound care, etc)   Refer to discharge planning if patient needs post-hospital services based on physician order or complex needs related to functional status, cognitive ability or social support system     Problem: Safety - Adult  Goal: Free from fall injury  Outcome: Progressing     Problem: ABCDS Injury Assessment  Goal: Absence of physical injury  Outcome: Progressing     Problem: Skin/Tissue Integrity  Goal: Skin integrity remains intact  Description: 1.  Monitor for areas of redness and/or skin breakdown  2.  Assess vascular access sites hourly  3.  Every 4-6 hours minimum:  Change oxygen saturation probe site  4.  Every 4-6 hours:  If on nasal continuous positive airway pressure, respiratory therapy assess nares and determine need for appliance change or resting period  Outcome: Progressing

## 2025-01-28 NOTE — TELEPHONE ENCOUNTER
Pt called stating they are in F 5903, to heart related issues.    Pt would like to know if the office received the forms from [employer] that were sent 1/24/2025.   Pt would like to know if office has had time to fill the papers out because that's only way they can get on the list.     Pt is also requesting office to come up and do a devise check while they are in F.    Pls advise

## 2025-01-28 NOTE — H&P
Personally Reviewed Medications Prior to Admission     Prior to Admission medications    Medication Sig Start Date End Date Taking? Authorizing Provider   Multiple Vitamins-Minerals (THERAPEUTIC MULTIVITAMIN-MINERALS) tablet Take 1 tablet by mouth nightly   Yes ProviderNatalee MD   Semaglutide-Weight Management (WEGOVY) 1.7 MG/0.75ML SOAJ SC injection Inject 1.7 mg into the skin every 7 days  Patient taking differently: Inject 1.7 mg into the skin every 7 days Sundays. 1/8/25  Yes Katie Sharpe APRN - CNP   spironolactone (ALDACTONE) 25 MG tablet Take 1 tablet by mouth daily 12/13/24  Yes Katie Sharpe APRN - CNP   vitamin D3 (CHOLECALCIFEROL) 25 MCG (1000 UT) TABS tablet Take 1 tablet by mouth daily  Patient taking differently: Take 1 tablet by mouth nightly 9/17/24  Yes Katie Sharpe APRN - CNP   ibuprofen (ADVIL;MOTRIN) 200 MG tablet Take 2 tablets by mouth every 8 hours as needed for Pain   Yes ProviderNatalee MD   amiodarone (CORDARONE) 200 MG tablet TAKE 1 TABLET BY MOUTH DAILY  Patient taking differently: Take 1 tablet by mouth nightly 9/5/24  Yes Magan Cutler APRN - CNP   valsartan (DIOVAN) 160 MG tablet TAKE 1 TABLET BY MOUTH DAILY  Patient taking differently: Take 1 tablet by mouth nightly 8/22/24  Yes Katie Sharpe APRN - CNP   apixaban (ELIQUIS) 5 MG TABS tablet Take 1 tablet by mouth 2 times daily 2/27/24  Yes Long Reed MD   metoprolol succinate (TOPROL XL) 200 MG extended release tablet Take 1 tablet by mouth daily  Patient taking differently: Take 1 tablet by mouth nightly 2/27/24  Yes Long Reed MD   aspirin 81 MG chewable tablet Take 1 tablet by mouth daily   Yes ProviderNatalee MD   tiZANidine (ZANAFLEX) 2 MG tablet Take 1 tablet by mouth 3 times daily as needed (Spasm)  Patient not taking: Reported on 12/13/2024 9/19/24   Mason Lewis MD   rosuvastatin (CRESTOR) 40 MG tablet Take 1 tablet by mouth daily  Patient not taking: Reported on 9/17/2024 9/17/24   Annemarie

## 2025-01-28 NOTE — ED PROVIDER NOTES
ED Attending Attestation Note    I personally saw the patient and made/approved the management plan and take responsibility for patient management.      Briefly, 70 y.o. female presents with substernal chest pain that is not exertional nature nonpleuritic in nature no associated diaphoresis but did have some associated nausea.  Felt like a pressure ache.  Her prior MI she states felt more heavy in this nature    No cough no fevers no congestion.    Last cardiac catheterization that I can see is from 10/8/2020 which showed nonobstructive coronaries and EF of 35%, had presented at this time for a elective cardiac ablation status post RF CA with PVI yesterday with Dr. Ji at the time.     Focused exam:   Physical Exam  Vitals and nursing note reviewed.   Constitutional:       Appearance: She is obese. She is not toxic-appearing or diaphoretic.   HENT:      Head: Normocephalic and atraumatic.      Right Ear: External ear normal.      Left Ear: External ear normal.      Nose: Nose normal.   Eyes:      Conjunctiva/sclera: Conjunctivae normal.   Cardiovascular:      Rate and Rhythm: Normal rate and regular rhythm.      Pulses: Normal pulses.      Comments: Radial pulses 2+ bilateral  Pulmonary:      Effort: Pulmonary effort is normal. No respiratory distress.      Breath sounds: No wheezing or rales.   Skin:     General: Skin is warm and dry.      Capillary Refill: Capillary refill takes less than 2 seconds.   Neurological:      Mental Status: She is alert.         Imaging:   XR CHEST PORTABLE   Final Result   No acute cardiopulmonary process.                  I independently interpreted the following studies:   ECG: Accelerated junctional with occasional PVCs with a ventricular rate of 90, QRS 86 QTc 41, no clinically significant ST segment elevation or depression some nonspecific T wave changes diffusely.       External Documentation Reviewed: Previous patient encounter documents & history available on EMR was reviewed: 
vomiting.   Genitourinary:  Negative for difficulty urinating, dysuria and hematuria.       Positives and Pertinent negatives as per HPI.     SURGICAL HISTORY     Past Surgical History:   Procedure Laterality Date    CARDIAC DEFIBRILLATOR PLACEMENT      DILATION AND CURETTAGE OF UTERUS      PAIN MANAGEMENT PROCEDURE Bilateral 6/29/2022    BILATERAL L5 TRANSFORAMINAL EPIDURAL STEROID INJECTION WITH FLUOROSCOPY performed by Nikolas Emerson MD at [facility]       CURRENTMEDICATIONS       Current Discharge Medication List        CONTINUE these medications which have NOT CHANGED    Details   Multiple Vitamins-Minerals (THERAPEUTIC MULTIVITAMIN-MINERALS) tablet Take 1 tablet by mouth nightly      Semaglutide-Weight Management (WEGOVY) 1.7 MG/0.75ML SOAJ SC injection Inject 1.7 mg into the skin every 7 days  Qty: 4 mL, Refills: 0      spironolactone (ALDACTONE) 25 MG tablet Take 1 tablet by mouth daily  Qty: 90 tablet, Refills: 2      vitamin D3 (CHOLECALCIFEROL) 25 MCG (1000 UT) TABS tablet Take 1 tablet by mouth daily  Qty: 30 tablet, Refills: 0      ibuprofen (ADVIL;MOTRIN) 200 MG tablet Take 2 tablets by mouth every 8 hours as needed for Pain      amiodarone (CORDARONE) 200 MG tablet TAKE 1 TABLET BY MOUTH DAILY  Qty: 90 tablet, Refills: 3      valsartan (DIOVAN) 160 MG tablet TAKE 1 TABLET BY MOUTH DAILY  Qty: 90 tablet, Refills: 3      apixaban (ELIQUIS) 5 MG TABS tablet Take 1 tablet by mouth 2 times daily  Qty: 180 tablet, Refills: 3      metoprolol succinate (TOPROL XL) 200 MG extended release tablet Take 1 tablet by mouth daily  Qty: 90 tablet, Refills: 3      aspirin 81 MG chewable tablet Take 1 tablet by mouth daily      tiZANidine (ZANAFLEX) 2 MG tablet Take 1 tablet by mouth 3 times daily as needed (Spasm)  Qty: 30 tablet, Refills: 0      rosuvastatin (CRESTOR) 40 MG tablet Take 1 tablet by mouth daily  Qty: 90 tablet, Refills: 1             ALLERGIES     Atorvastatin    FAMILYHISTORY       Family History

## 2025-01-28 NOTE — PLAN OF CARE
Problem: Chronic Conditions and Co-morbidities  Goal: Patient's chronic conditions and co-morbidity symptoms are monitored and maintained or improved  Outcome: Completed     Problem: Discharge Planning  Goal: Discharge to home or other facility with appropriate resources  Outcome: Completed     Problem: Pain  Goal: Verbalizes/displays adequate comfort level or baseline comfort level  Outcome: Completed     Problem: Safety - Adult  Goal: Free from fall injury  Outcome: Completed     Problem: ABCDS Injury Assessment  Goal: Absence of physical injury  Outcome: Completed     Problem: Skin/Tissue Integrity  Goal: Skin integrity remains intact  Description: 1.  Monitor for areas of redness and/or skin breakdown  2.  Assess vascular access sites hourly  3.  Every 4-6 hours minimum:  Change oxygen saturation probe site  4.  Every 4-6 hours:  If on nasal continuous positive airway pressure, respiratory therapy assess nares and determine need for appliance change or resting period  Outcome: Completed

## 2025-01-28 NOTE — ED NOTES
Patient Name: Rell Lorenz  : 1955 70 y.o.  MRN: [id number]  ED Room #: ED-0029/29     Chief complaint:   Chief Complaint   Patient presents with    Chest Pain     Chest pain since this AM.     Hospital Problem/Diagnosis:   Hospital Problems             Last Modified POA    * (Principal) Chest pain, unspecified type 2025 Yes         O2 Flow Rate:O2 Device: None (Room air)   (if applicable)  Cardiac Rhythm:   (if applicable)  Active LDA's:           How does patient ambulate? Wheelchair    2. How does patient take pills? Whole with Water    3. Is patient alert? Alert    4. Is patient oriented? To Person, To Place, To Time, To Situation, and Follows Commands    5.   Patient arrived from:  home  Facility Name: ___________________________________________    6. If patient is disoriented or from a Skill Nursing Facility has family been notified of admission? No    7. Patient belongings? Belongings: Cell Phone, Wallet, and Clothing    Disposition of belongings?  Kept with patient       8. Any specific patient or family belongings/needs/dynamics?   a.     9. Miscellaneous comments/pending orders?  a. Pt from home complaining of chest pain since the morning.  Her initial blood pressure was 217/111.  He blood pressure had normalized by the time I took over without any intervention.  She was given 1 nitro sl for cp which helped.        If there are any additional questions please reach out to the Emergency Department.

## 2025-01-28 NOTE — CONSULTS
[facility]  Cardiology Consult    Rell Lorenz  1955    January 28, 2025      Reason for Referral: Chest pain    Referring physician:    CC: ***      Subjective:     History of Present Illness:    Rell Lorenz is a 70 y.o. patient with a PMH significant for CAD, MI, hypertension, ischemic cardiomyopathy, presence of AICD, CHF, PAF and gout. She presented to the emergency room on 1/27/2025 with midsternal chest pain. Initial troponin elevated at 29.         Past Medical History:   has a past medical history of AICD (automatic cardioverter/defibrillator) present, Arthritis, CHF (congestive heart failure) (HCC), Gout, Hyperlipidemia, Hypertension, and MI (myocardial infarction) (HCC).    Surgical History:   has a past surgical history that includes Dilation and curettage of uterus; Cardiac defibrillator placement; and Pain management procedure (Bilateral, 6/29/2022).     Social History:   reports that she has never smoked. She has never used smokeless tobacco. She reports that she does not drink alcohol and does not use drugs.     Family History:  family history includes Cancer in her father; Diabetes in her mother; Heart Disease in her mother; High Blood Pressure in her mother; High Cholesterol in her mother.    Home Medications:  Were reviewed and are listed in nursing record and/or below  Prior to Admission medications    Medication Sig Start Date End Date Taking? Authorizing Provider   Multiple Vitamins-Minerals (THERAPEUTIC MULTIVITAMIN-MINERALS) tablet Take 1 tablet by mouth nightly   Yes Provider, MD Natalee   Semaglutide-Weight Management (WEGOVY) 1.7 MG/0.75ML SOAJ SC injection Inject 1.7 mg into the skin every 7 days  Patient taking differently: Inject 1.7 mg into the skin every 7 days Sundays. 1/8/25  Yes Katie Sharpe APRN - CNP   spironolactone (ALDACTONE) 25 MG tablet Take 1 tablet by mouth daily 12/13/24  Yes Katie Sharpe APRN - CNP   vitamin D3 (CHOLECALCIFEROL) 25 MCG (1000 UT) TABS

## 2025-01-29 VITALS
DIASTOLIC BLOOD PRESSURE: 77 MMHG | RESPIRATION RATE: 16 BRPM | BODY MASS INDEX: 46.12 KG/M2 | WEIGHT: 287 LBS | SYSTOLIC BLOOD PRESSURE: 151 MMHG | OXYGEN SATURATION: 96 % | TEMPERATURE: 97.7 F | HEART RATE: 65 BPM | HEIGHT: 66 IN

## 2025-01-29 PROBLEM — I16.1 HYPERTENSIVE EMERGENCY: Status: ACTIVE | Noted: 2025-01-29

## 2025-01-29 LAB
ANION GAP SERPL CALCULATED.3IONS-SCNC: 13 MMOL/L (ref 3–16)
BUN SERPL-MCNC: 9 MG/DL (ref 7–20)
CALCIUM SERPL-MCNC: 10 MG/DL (ref 8.3–10.6)
CHLORIDE SERPL-SCNC: 102 MMOL/L (ref 99–110)
CO2 SERPL-SCNC: 24 MMOL/L (ref 21–32)
CREAT SERPL-MCNC: 0.7 MG/DL (ref 0.6–1.2)
EKG ATRIAL RATE: 87 BPM
EKG DIAGNOSIS: NORMAL
EKG DIAGNOSIS: NORMAL
EKG P AXIS: 98 DEGREES
EKG P-R INTERVAL: 172 MS
EKG Q-T INTERVAL: 394 MS
EKG Q-T INTERVAL: 402 MS
EKG QRS DURATION: 86 MS
EKG QRS DURATION: 94 MS
EKG QTC CALCULATION (BAZETT): 481 MS
EKG QTC CALCULATION (BAZETT): 483 MS
EKG R AXIS: -15 DEGREES
EKG R AXIS: -4 DEGREES
EKG T AXIS: 106 DEGREES
EKG T AXIS: 78 DEGREES
EKG VENTRICULAR RATE: 87 BPM
EKG VENTRICULAR RATE: 90 BPM
GFR SERPLBLD CREATININE-BSD FMLA CKD-EPI: >90 ML/MIN/{1.73_M2}
GLUCOSE SERPL-MCNC: 124 MG/DL (ref 70–99)
MAGNESIUM SERPL-MCNC: 1.94 MG/DL (ref 1.8–2.4)
PHOSPHATE SERPL-MCNC: 4.1 MG/DL (ref 2.5–4.9)
POTASSIUM SERPL-SCNC: 4.2 MMOL/L (ref 3.5–5.1)
SODIUM SERPL-SCNC: 139 MMOL/L (ref 136–145)

## 2025-01-29 PROCEDURE — 99233 SBSQ HOSP IP/OBS HIGH 50: CPT

## 2025-01-29 PROCEDURE — 36415 COLL VENOUS BLD VENIPUNCTURE: CPT

## 2025-01-29 PROCEDURE — 6370000000 HC RX 637 (ALT 250 FOR IP): Performed by: INTERNAL MEDICINE

## 2025-01-29 PROCEDURE — 2500000003 HC RX 250 WO HCPCS: Performed by: NURSE PRACTITIONER

## 2025-01-29 PROCEDURE — 6370000000 HC RX 637 (ALT 250 FOR IP): Performed by: NURSE PRACTITIONER

## 2025-01-29 PROCEDURE — 84100 ASSAY OF PHOSPHORUS: CPT

## 2025-01-29 PROCEDURE — 97530 THERAPEUTIC ACTIVITIES: CPT

## 2025-01-29 PROCEDURE — 93010 ELECTROCARDIOGRAM REPORT: CPT | Performed by: INTERNAL MEDICINE

## 2025-01-29 PROCEDURE — 83735 ASSAY OF MAGNESIUM: CPT

## 2025-01-29 PROCEDURE — 80048 BASIC METABOLIC PNL TOTAL CA: CPT

## 2025-01-29 PROCEDURE — 6360000002 HC RX W HCPCS: Performed by: INTERNAL MEDICINE

## 2025-01-29 PROCEDURE — 97165 OT EVAL LOW COMPLEX 30 MIN: CPT

## 2025-01-29 PROCEDURE — 97535 SELF CARE MNGMENT TRAINING: CPT

## 2025-01-29 PROCEDURE — 97161 PT EVAL LOW COMPLEX 20 MIN: CPT

## 2025-01-29 RX ORDER — LABETALOL 200 MG/1
100 TABLET, FILM COATED ORAL EVERY 12 HOURS SCHEDULED
Status: DISCONTINUED | OUTPATIENT
Start: 2025-01-29 | End: 2025-01-29

## 2025-01-29 RX ORDER — LABETALOL 100 MG/1
100 TABLET, FILM COATED ORAL EVERY 12 HOURS SCHEDULED
Qty: 60 TABLET | Refills: 3 | Status: SHIPPED | OUTPATIENT
Start: 2025-01-29

## 2025-01-29 RX ORDER — LABETALOL 200 MG/1
100 TABLET, FILM COATED ORAL EVERY 12 HOURS SCHEDULED
Status: DISCONTINUED | OUTPATIENT
Start: 2025-01-29 | End: 2025-01-29 | Stop reason: HOSPADM

## 2025-01-29 RX ORDER — POLYETHYLENE GLYCOL 3350 17 G/17G
17 POWDER, FOR SOLUTION ORAL DAILY PRN
COMMUNITY
Start: 2025-01-29 | End: 2025-02-28

## 2025-01-29 RX ORDER — OXYCODONE HYDROCHLORIDE 5 MG/1
5 TABLET ORAL EVERY 4 HOURS PRN
Qty: 20 TABLET | Refills: 0 | Status: SHIPPED | OUTPATIENT
Start: 2025-01-29 | End: 2025-02-01

## 2025-01-29 RX ORDER — TIZANIDINE 2 MG/1
2 TABLET ORAL 3 TIMES DAILY PRN
Qty: 20 TABLET | Refills: 0 | Status: SHIPPED | OUTPATIENT
Start: 2025-01-29

## 2025-01-29 RX ORDER — GABAPENTIN 100 MG/1
100 CAPSULE ORAL 2 TIMES DAILY
Qty: 20 CAPSULE | Refills: 0 | Status: SHIPPED | OUTPATIENT
Start: 2025-01-29 | End: 2025-02-08

## 2025-01-29 RX ADMIN — ORPHENADRINE CITRATE 60 MG: 60 INJECTION INTRAMUSCULAR; INTRAVENOUS at 07:36

## 2025-01-29 RX ADMIN — GABAPENTIN 100 MG: 100 CAPSULE ORAL at 13:10

## 2025-01-29 RX ADMIN — AMIODARONE HYDROCHLORIDE 200 MG: 200 TABLET ORAL at 07:43

## 2025-01-29 RX ADMIN — OXYCODONE 10 MG: 5 TABLET ORAL at 11:46

## 2025-01-29 RX ADMIN — VALSARTAN 160 MG: 40 TABLET, FILM COATED ORAL at 07:43

## 2025-01-29 RX ADMIN — SPIRONOLACTONE 25 MG: 25 TABLET ORAL at 07:36

## 2025-01-29 RX ADMIN — APIXABAN 5 MG: 5 TABLET, FILM COATED ORAL at 07:36

## 2025-01-29 RX ADMIN — LABETALOL HYDROCHLORIDE 100 MG: 200 TABLET, FILM COATED ORAL at 13:10

## 2025-01-29 RX ADMIN — SODIUM CHLORIDE, PRESERVATIVE FREE 10 ML: 5 INJECTION INTRAVENOUS at 07:44

## 2025-01-29 RX ADMIN — ASPIRIN 81 MG: 81 TABLET, CHEWABLE ORAL at 07:36

## 2025-01-29 RX ADMIN — GABAPENTIN 100 MG: 100 CAPSULE ORAL at 07:36

## 2025-01-29 RX ADMIN — METHOCARBAMOL TABLETS 750 MG: 750 TABLET, COATED ORAL at 10:29

## 2025-01-29 RX ADMIN — OXYCODONE 10 MG: 5 TABLET ORAL at 07:35

## 2025-01-29 RX ADMIN — METOPROLOL SUCCINATE 200 MG: 50 TABLET, EXTENDED RELEASE ORAL at 10:29

## 2025-01-29 ASSESSMENT — PAIN SCALES - GENERAL
PAINLEVEL_OUTOF10: 0
PAINLEVEL_OUTOF10: 7
PAINLEVEL_OUTOF10: 0
PAINLEVEL_OUTOF10: 8
PAINLEVEL_OUTOF10: 8
PAINLEVEL_OUTOF10: 0
PAINLEVEL_OUTOF10: 6
PAINLEVEL_OUTOF10: 0
PAINLEVEL_OUTOF10: 8

## 2025-01-29 ASSESSMENT — PAIN DESCRIPTION - LOCATION: LOCATION: BACK

## 2025-01-29 NOTE — DISCHARGE SUMMARY
V2.0  Discharge Summary    Name:  Rell Lorenz /Age/Sex: 1955 (70 y.o. female)   Admit Date: 2025  Discharge Date: 25    MRN & CSN:  [id number] & [id number] Encounter Date and Time 25 11:57 AM EST    Attending:  Maxine Lara MD Discharging Provider: Maxine Lara MD       Hospital Course:     Brief HPI: Rell Lorenz is a 70 y.o. female  with pmh of CAD, MI, HTN, ischemic cardiomyopathy, presence of AICD, CHF, PAF and gout and sciatica presented with acute onset midsternal chest pain onset.    Chest pain, midsternal: Resolved  Known history of CAD on statin and aspirin.    EKG without acute ischemia.   Chest x-ray without acute findings.    hs- troponin 29, 22, 23.   Cardiology consulted: No indication for further cardiac workup.  Continue aspirin, Toprol and Crestor.     Hypertensive: Continue pain control.   Metoprolol discontinued. Started on Labetalol.  Continue Diovan.     Chronic combined systolic and diastolic heart failure:  No acute decompensation.  TTE 2025: LVEF 50 to 55%.  Indeterminate diastolic function.  Continue home medications.     Hypokalemia: Replaced.      Ischemic cardiomyopathy: Stable on home medications.     Presence of an AICD: Stable.     Paroxysmal atrial fibrillation: Continue Eliquis, amiodarone and Toprol.     History of sciatica with left leg weakness:  Continue pain multimodal control and fall precautions.  Outpatient follow-up with PCP.     Morbid obesity:Body mass index is 46.48 kg/m².   BMI Complicating assessment and treatment. Placing patient at risk for multiple co-morbidities as well as early death and contributing to the patient's presentation. Education, and counseling provided.     The patient expressed appropriate understanding of, and agreement with the discharge recommendations, medications, and plan.     Consults this admission:  IP CONSULT TO CARDIOLOGY  IP CONSULT TO HOME CARE NEEDS    Discharge Diagnosis:   Chest pain, unspecified

## 2025-01-29 NOTE — DISCHARGE INSTRUCTIONS
Ensure daily bowel movement with over-the-counter stool softeners/laxatives while on Percocet for pain control.    Guidelines for Heart Failure home management:    1. Continue to monitor weight first thing each morning. You should weigh yourself after using the bathroom and before you eat breakfast.    2. Report to your doctor any significant weight change. Remember that weight change of 2-3 lbs. in 1 day or 5 lbs in a week is \"significant\" and likely represents changes in \"fluid\" status.  If you are experiencing any swelling in your feet, ankles or abdomen, or shortness of breath, call your doctor.     3. You should restrict all sodium intake to 3000 milligrams (3 grams) a day. Depending on your status, you may also be asked to restrict fluid intake to no more that 64 oz/2 Liters a day. If uncertain, ask the nurse or physician.     4. Regular aerobic exercise is encouraged 30 minutes a day (walking, bike, swimming, etc.). For specific exercise recommendations, ask your physician.     5. Report to your doctor any change in symptoms (chest pain, worsening shortness of breath, increased dizziness or passing out, increased palpitations or ICD shock, trouble catching breath while lying down, increased edema or abdominal bloating). Remember that even \"minor\" changes in symptoms may be important. Also report any changes in medications including \"over the counter\" medications.     6. DO NOT take NSAID's for pain (i.e, Advil, Aleve, Motrin, ibuprofen, and many more) since these may cause serious problems in those with a history of CHF. If uncertain about the medication, call your doctor.    7. If you have new significant or ongoing diarrhea or vomiting, please call your doctor for further instructions. Taking a diuretic (water pill) with these symptoms can worsen dehydration.     8. If you have any questions or concerns you can always call the CHF  Resource Line( [phone].  It is available Monday thru Friday 8 am- 5 pm.

## 2025-01-29 NOTE — PROGRESS NOTES
[facility] - Inpatient Rehabilitation Department   Phone: [phone]    Physical Therapy    [x] Initial Evaluation            [] Daily Treatment Note         [] Discharge Summary      Patient: Rell Lorenz   : 1955   MRN: [id number]   Date of Service:  2025  Admitting Diagnosis: Chest pain, unspecified type    Current Admission Summary: Rell Lorenz is a 70 y.o. female with pmh of CAD, MI, hypertension, ischemic cardiomyopathy, presence of AICD, CHF, PAF and gout and sciatica.  She presents with midsternal chest pain onset this morning upon waking up.  She describes the pain as \"feels like a toothache and like something bad was going to happen\".  She states that the pain was initially intermittent and later became constant.  She states it was worse with deep breathing.  She also reports that her heartbeat was kind of off.  She states that it felt like she was missing some beats.  She denies dizziness, lightheadedness, nausea, vomiting, diarrhea, abdominal pain and constipation.  She states that nitro given in the ED helped with the pain.  The pain was initially 9/10 at onset but improved to 3/10 with nitro.  She states that the pain is not as bad as when she had her MI.     Past Medical History:  has a past medical history of AICD (automatic cardioverter/defibrillator) present, Arthritis, CHF (congestive heart failure) ([facility]), Gout, Hyperlipidemia, Hypertension, and MI (myocardial infarction) ([facility]).  Past Surgical History:  has a past surgical history that includes Dilation and curettage of uterus; Cardiac defibrillator placement; and Pain management procedure (Bilateral, 2022).  Discharge Recommendations: Rell Lorenz scored a 18/24 on the AM-PAC short mobility form. Current research shows that an AM-PAC score of 18 or greater is typically associated with a discharge to the patient's home setting. Based on the patient's AM-PAC score and their current functional mobility deficits, it is 
  Physician Progress Note      PATIENT:               CORI BAUTISTA  CSN #:                  [id number]  :                       1955  ADMIT DATE:       2025 5:52 PM  DISCH DATE:  RESPONDING  PROVIDER #:        Maxine Lara MD          QUERY TEXT:    Patient admitted with hypertensive urgency and chest pain, noted to have   Paroxysmal atrial fibrillation and is maintained on Eliquis. If possible,   please document in progress notes and discharge summary if you are evaluating   and/or treating any of the following:?  ?  The medical record reflects the following:  Risk Factors: 70 year old female PMH HTN, CHF  Clinical Indicators:  H&P: \"Paroxysmal atrial fibrillation-on Eliquis and Toprol.\"    Treatment: Eliquis 5mg two times daily  Options provided:  -- Secondary hypercoagulable state in a patient with atrial fibrillation  -- Other - I will add my own diagnosis  -- Disagree - Not applicable / Not valid  -- Disagree - Clinically unable to determine / Unknown  -- Refer to Clinical Documentation Reviewer    PROVIDER RESPONSE TEXT:    This patient has secondary hypercoagulable state in a patient with atrial   fibrillation.    Query created by: Yesi Solano on 2025 8:30 PM      Electronically signed by:  Maxine Lara MD 2025 8:08 AM          
CLINICAL PHARMACY NOTE: MEDS TO BEDS    Total # of Prescriptions Filled: 4   The following medications were delivered to the patient:  Labetalol  Oxycodone  Tizanidine  gabapentin    Additional Documentation:  Devika bates delivery, patient signed  
Pt requested to be wheel out. IV and Tele-monitor removed. Pt left without paperwork.   
9/17/2024) 90 tablet 1       Patient is no longer taking rosuvastatin or tizanidine.    Of note, patient takes Eliquis 5 mg BID: took all AM meds around 1430 today; takes BP meds at night usually.    Timing of last doses updated.    Thank you,  Candice Romero CPhT      
Interventions: pain medication in place prior to arrival, repositioned , and therapy activities modified        Activities of Daily Living  Basic Activities of Daily Living  Grooming: setup assistance  Grooming Comments: completed oral hygiene and applied deodorant seated in recliner with setup   Upper Extremity Bathing: setup assistance supervision  Bathing Comments: completed partial sponge bath seated in recliner, washing only UB and face. Reports daughter typically assists at home.   Lower Extremity Dressing: stand by assistance  Dressing Comments: donned pullup seated in recliner with reacher and increased time. Declined trialing sock aide this date. Reported she tried it last admission and typically just wears crocs   Toileting Comments: purewick removed during session and pt agreeable to toileting on toilet or BSC later with nursing. Reports typically using toilet aide at home for pericare   General Comments: patient declining other ADLs   Instrumental Activities of Daily Living  No IADL completed on this date.    Functional Mobility  Bed Mobility:  Supine to Sit: minimal assistance  Scooting: stand by assistance  Comments:  Transfers:  Sit to stand transfer:stand by assistance  Stand to sit transfer: stand by assistance  Comments:  Functional Mobility  Functional Mobility Activity: 4 ft from EOB to recliner   Device Use: rolling walker  Required Assistance: stand by assistance  Comment: declining further mobility due to back pain   Balance:  Static Sitting Balance: fair (+): maintains balance at SBA/supervision without use of UE support  Dynamic Sitting Balance: fair (+): maintains balance at SBA/supervision without use of UE support  Static Standing Balance: fair (-): maintains balance at SBA with use of UE support  Dynamic Standing Balance: fair (-): maintains balance at SBA with use of UE support  Comments:    Other Therapeutic Interventions    Functional Outcomes  -PAC Inpatient Daily Activity Raw 
01/27/25  1824   AST 32   ALT 20   BILITOT 0.6   ALKPHOS 86     Lipids:   Lab Results   Component Value Date/Time    CHOL 246 01/28/2025 04:34 AM    HDL 55 01/28/2025 04:34 AM    TRIG 63 01/28/2025 04:34 AM     Hemoglobin A1C:   Lab Results   Component Value Date/Time    LABA1C 5.7 01/28/2025 04:34 AM     TSH:   Lab Results   Component Value Date/Time    TSH 3.46 01/28/2025 04:34 AM     Troponin: No results found for: \"TROPONINT\"  Lactic Acid: No results for input(s): \"LACTA\" in the last 72 hours.  BNP:   Recent Labs     01/27/25  1824   PROBNP 49     UA:  Lab Results   Component Value Date/Time    NITRU POSITIVE 09/17/2024 05:59 PM    COLORU Yellow 09/17/2024 05:59 PM    PHUR 6.0 09/17/2024 05:59 PM    PHUR 5.5 10/01/2023 07:00 PM    WBCUA 25 09/17/2024 05:59 PM    RBCUA 1 09/17/2024 05:59 PM    BACTERIA 4+ 09/17/2024 05:59 PM    CLARITYU TURBID 09/17/2024 05:59 PM    LEUKOCYTESUR SMALL 09/17/2024 05:59 PM    UROBILINOGEN 1.0 09/17/2024 05:59 PM    BILIRUBINUR Negative 09/17/2024 05:59 PM    BLOODU Negative 09/17/2024 05:59 PM    GLUCOSEU Negative 09/17/2024 05:59 PM    KETUA Negative 09/17/2024 05:59 PM     Urine Cultures:   Lab Results   Component Value Date/Time    LABURIN >100,000 CFU/ml 09/17/2024 05:59 PM    LABURIN  09/17/2024 05:59 PM     50,000 CFU/ml  Susceptibility testing not routinely done. No further work up.       Blood Cultures:   Lab Results   Component Value Date/Time    BC No Growth after 4 days of incubation. 10/02/2023 11:40 AM     Lab Results   Component Value Date/Time    BLOODCULT2 No Growth after 4 days of incubation. 10/02/2023 11:40 AM     Organism:   Lab Results   Component Value Date/Time    ORG Klebsiella pneumoniae 09/17/2024 05:59 PM    ORG Aerococcus species 09/17/2024 05:59 PM         Electronically signed by Maxine Lara MD on 1/28/2025 at 4:23 PM   
image quality.    Image quality is poor. Contrast used: Lumason.    Stress Test:      Cath:     Other imaging:       Problem List:   Patient Active Problem List    Diagnosis Date Noted    ICD (implantable cardioverter-defibrillator) battery depletion 11/24/2022    Chest pain, unspecified type 01/27/2025    Chronic diastolic congestive heart failure (HCC) 12/13/2024    Ischemic cardiomyopathy 12/13/2024    Intractable back pain 09/17/2024    CAP (community acquired pneumonia) due to Pneumococcus ([facility]) 10/03/2023    Atrial flutter with rapid ventricular response ([facility]) 10/02/2023    Productive cough 10/02/2023    Inappropriate shocks from ICD (implantable cardioverter-defibrillator) 09/30/2023    ICD (implantable cardioverter-defibrillator) discharge 09/29/2023    Paraparesis of both lower limbs ([facility]) 03/18/2022    Lumbosacral radiculopathy 03/17/2022    Gout 03/13/2022    Back pain with radiculopathy 03/13/2022    Essential hypertension 10/08/2020    PAF (paroxysmal atrial fibrillation) ([facility]) 10/08/2020    Coronary artery disease due to lipid rich plaque     Morbid obesity with BMI of 45.0-49.9, adult     TYLER (obstructive sleep apnea)     Systolic CHF, chronic ([facility])     History of ventricular tachycardia 08/12/2020    ICD (implantable cardioverter-defibrillator) in place 01/13/2015    Mixed hyperlipidemia 12/18/2014        Assessment and Plan:   Chest pain   Resolved   EKG without any acute findings   Troponin trending downward. 29, 22, 23  1/28/25 Echo EF of 50 - 55% No regional wall motion abnormalities identified     CAD -   no complaints of chest pain at present    continue BB, ASA, continue  statin         Chronic systolic heart failure  LVEF 50-55% (2023).    continue ARB, BB and dejon       Paroxsymal atrial fibrillation  Continue Amiodarone and Eliquis.     HLD  1/28/24 TC- 246 HDL-55 LDL-178 Trigs 63   myalgia -with statin   Continue Crestor   Consider PCSK9/ bromic Acid if unable to tolerate statin       HTN

## 2025-01-29 NOTE — CARE COORDINATION
Discharge Planning  Recommendation and an order for [facility] service noted. CM met with patient to discuss , patient declined stating she would prefer outpatient PT & OT instead. The MD was updated & order was put in .

## 2025-01-29 NOTE — DISCHARGE INSTR - COC
Continuity of Care Form    Patient Name: Rell Lorenz   :  1955  MRN:  [id number]    Admit date:  2025  Discharge date:  ***    Code Status Order: Full Code   Advance Directives:   Advance Care Flowsheet Documentation             Admitting Physician:  No admitting provider for patient encounter.  PCP: Karie Larson MD    Discharging Nurse: ***  Discharging Hospital Unit/Room#: K4W-7475/5903-01  Discharging Unit Phone Number: ***    Emergency Contact:   Extended Emergency Contact Information  Primary Emergency Contact: Maryjane Cadena  Home Phone: [phone]  Mobile Phone: [phone]  Relation: Child  Secondary Emergency Contact: Joshua Lorenz  Home Phone: [phone]  Mobile Phone: [phone]  Relation: Grandchild  Preferred language: English    Past Surgical History:  Past Surgical History:   Procedure Laterality Date    CARDIAC DEFIBRILLATOR PLACEMENT      DILATION AND CURETTAGE OF UTERUS      PAIN MANAGEMENT PROCEDURE Bilateral 2022    BILATERAL L5 TRANSFORAMINAL EPIDURAL STEROID INJECTION WITH FLUOROSCOPY performed by Nikolas Emerson MD at [facility]       Immunization History:     There is no immunization history on file for this patient.    Active Problems:  Patient Active Problem List   Diagnosis Code    Mixed hyperlipidemia E78.2    ICD (implantable cardioverter-defibrillator) in place Z95.810    History of ventricular tachycardia Z86.79    Systolic CHF, chronic ([facility]) I50.22    Coronary artery disease due to lipid rich plaque I25.10, I25.83    Morbid obesity with BMI of 45.0-49.9, adult E66.01, Z68.42    TYLER (obstructive sleep apnea) G47.33    Essential hypertension I10    PAF (paroxysmal atrial fibrillation) ([facility]) I48.0    Gout M10.9    Back pain with radiculopathy M54.10    Lumbar radiculopathy M54.16    Lumbosacral radiculopathy M54.17    Paraparesis of both lower limbs ([facility]) G82.20    ICD (implantable cardioverter-defibrillator) battery depletion Z45.02    ICD (implantable

## 2025-01-29 NOTE — NURSE NAVIGATOR
[facility]  HEART FAILURE PROGRAM      Rell Lorenz 1955    History:  Past Medical History:   Diagnosis Date    AICD (automatic cardioverter/defibrillator) present     Arthritis     CHF (congestive heart failure) ([facility])     Gout     Hyperlipidemia     Hypertension     MI (myocardial infarction) ([facility])        ECHO:  1/28/2025    Left Ventricle: Normal left ventricular systolic function with a visually estimated EF of 50 - 55%. Not well visualized. Left ventricle size is normal. Unable to assess wall thickness. No regional wall motion abnormalities identified. Decreased sensitivity due to poor endocardial definition. Indeterminate diastolic function.    Right Ventricle: Not assessed due to poor image quality. Unable to assess systolic function.    Tricuspid Valve: Unable to assess RVSP.    IVC/SVC: IVC was not assessed due to poor image quality.    Image quality is poor. Contrast used: Lumason.      ACE/ARB/ARNi: valsartan 160mg nightly   BB: labetalol 100mg BID  Aldosterone Antagonist: spironolactone 25mg daily  SGLT2:     History of sleep apnea: No    [place] Screen ordered: Yes    DM History: No      Last Hospital Admission: 9/17/2024 for intractable low back pain  Code Status: Full  Discharge plans: Home    Family Present: None    Rell Lorenz was admitted to the hospital with chest pain and found to be in a hypertensive emergency. Pt has hx of ischemic cardiomyopathy and AICD. Pt does not weigh herself daily and does not have a scale or BP cuff. Pt able to obtain this equipment once d/c from hospital. Pt reports that she does follow a fluid restriction of 64oz at home and does not add salt to foods. Pt reports LE swelling R>L. Pt is compliant with her medication regiment and attends f/u visits. Pt is on a restricted income and states her medications now cost her $36 per month but she has reapplied for Medicaid to assist.     Patient provided with both written and verbal education on CHF signs/

## 2025-01-29 NOTE — CARE COORDINATION
Discharge Planning Note:  Chart reviewed and it appears that patient has minimal needs for discharge at this time.     Risk Score 9 %     Primary Care Physician is Karie Larson MD    Primary insurance is [employer] Medicare    Please notify case management if any discharge needs are identified.      Case management will continue to follow progress and update discharge plan as needed.

## 2025-01-31 ENCOUNTER — TELEPHONE (OUTPATIENT)
Dept: OTHER | Age: 70
End: 2025-01-31

## 2025-01-31 NOTE — TELEPHONE ENCOUNTER
[facility]  HEART FAILURE PROGRAM  TELEPHONE ENCOUNTER FORM    Rell Lorenz 1955    ASSESSMENT:   Baseline weight: 287 lbs  Current weight: not checking  Patient weighing daily: No; needs scale  What are your symptoms of heart failure: dyspnea, edema  Are you having any symptoms:  No  Patient knows who to call with symptoms: Yes  Diet history:      Patient states sodium limitation is : unsure      Patient states fluid limitation is unsure  Patient following diet as instructed: Yes  Medication history: taking medications as instructed Yes; medication side effects noted No  Patient is being active at home: Yes  Patient knows date and time of follow up appointment: Yes   Patient has transportation to appointments: Yes    RECOMMENDATIONS:   Medication: taking as prescribed  Diet: no added salt diet  MD/ Clinic appointment: 2/5 with Katie Sharpe NP  Other: Patient doing well. No dyspnea or edema. Encouraged daily weights for monitoring of overload. Encouraged patient to call with any questions or concerns.

## 2025-02-04 ASSESSMENT — ENCOUNTER SYMPTOMS
GASTROINTESTINAL NEGATIVE: 1
BACK PAIN: 1

## 2025-02-04 NOTE — PROGRESS NOTES
[facility]   Congestive Heart Failure    PrimaryCare Doctor:  Karie Larson MD    Chief Complaint:  CHF    History of Present Illness:  Rell Lorenz is a 70 y.o. female with PMH CAD, MI, ICM, HFimpEF, ICD, AF, HLD, HTN who presents today for hospital f/u. She was admitted 1/27/25 and discharged 1/29/25  Hospital Course: Chest pain, midsternal: Resolved  Known history of CAD on statin and aspirin.    EKG without acute ischemia.   Chest x-ray without acute findings.    hs- troponin 29, 22, 23.   Cardiology consulted: No indication for further cardiac workup.  Continue aspirin, Toprol and Crestor.     Hypertensive: Continue pain control.   Metoprolol discontinued. Started on Labetalol.  Continue Diovan.     Chronic combined systolic and diastolic heart failure:  No acute decompensation.  TTE 1/28/2025: LVEF 50 to 55%.  Indeterminate diastolic function.  Continue home medications.     Hypokalemia: Replaced.      Ischemic cardiomyopathy: Stable on home medications.     Presence of an AICD: Stable.     Paroxysmal atrial fibrillation: Continue Eliquis, amiodarone and Toprol.     History of sciatica with left leg weakness:  Continue pain multimodal control and fall precautions.  Outpatient follow-up with PCP.     Morbid obesity:Body mass index is 46.48 kg/m².   BMI Complicating assessment and treatment. Placing patient at risk for multiple co-morbidities as well as early death and contributing to the patient's presentation. Education, and counseling provided.    Hosp f/u phone call: 1/31/25    Since hospitalization: She  is feeling better since hosp d/c and denies chest pain, edema, SOB, palpitations,  orthopnea, PND.   She does not weigh herself at home and refuses wts here so it is challenging to manage her fluid.       Optivol: over baseline today but leveled out    Baseline Weight:   Wt Readings from Last 3 Encounters:   01/29/25 130.2 kg (287 lb)   09/19/24 (!) 137 kg (302 lb)   02/27/24 125.2 kg (276

## 2025-02-05 ENCOUNTER — TELEPHONE (OUTPATIENT)
Dept: CARDIOLOGY CLINIC | Age: 70
End: 2025-02-05

## 2025-02-05 ENCOUNTER — OFFICE VISIT (OUTPATIENT)
Dept: CARDIOLOGY CLINIC | Age: 70
End: 2025-02-05

## 2025-02-05 VITALS
HEART RATE: 82 BPM | OXYGEN SATURATION: 98 % | DIASTOLIC BLOOD PRESSURE: 80 MMHG | HEIGHT: 66 IN | BODY MASS INDEX: 46.32 KG/M2 | SYSTOLIC BLOOD PRESSURE: 100 MMHG

## 2025-02-05 DIAGNOSIS — R07.9 CHEST PAIN, UNSPECIFIED TYPE: ICD-10-CM

## 2025-02-05 DIAGNOSIS — I25.5 ISCHEMIC CARDIOMYOPATHY: ICD-10-CM

## 2025-02-05 DIAGNOSIS — I50.22 CHRONIC SYSTOLIC HF (HEART FAILURE) (HCC): Primary | ICD-10-CM

## 2025-02-05 DIAGNOSIS — I48.0 PAF (PAROXYSMAL ATRIAL FIBRILLATION) (HCC): ICD-10-CM

## 2025-02-05 DIAGNOSIS — I10 ESSENTIAL HYPERTENSION: ICD-10-CM

## 2025-02-05 DIAGNOSIS — Z09 HOSPITAL DISCHARGE FOLLOW-UP: ICD-10-CM

## 2025-02-05 DIAGNOSIS — I25.10 CORONARY ARTERY DISEASE DUE TO LIPID RICH PLAQUE: ICD-10-CM

## 2025-02-05 DIAGNOSIS — I25.83 CORONARY ARTERY DISEASE DUE TO LIPID RICH PLAQUE: ICD-10-CM

## 2025-02-05 NOTE — TELEPHONE ENCOUNTER
Patient is needing a letter sent to be excused from jury duty, she left the information with [employer] but needs her to know that she needs to include the \"Badge #\"  that is on that piece of paper with the fax that is sent.   Patient states both the Badge # and the Fax # are on the piece of paper she left with [employer], she is unable to provide it.

## 2025-02-05 NOTE — PATIENT INSTRUCTIONS
Instructions:   Medications: no change in meds  Labs: fasting labs before next office visit  Lifestyle Recommendations: Weigh yourself every day in the morning after urination, call Sav if wt increases 2-3lb in one day or 5lb in one week, Limit sodium to 2000mg/day and fluids to 2L or 64oz/day.   Follow up:sav as scheduled        [facility] Resource Line: [phone]

## 2025-02-05 NOTE — TELEPHONE ENCOUNTER
Medication Refill    Medication needing refilled:  Semaglutide-Weight Management (WEGOVY) 1.7 MG/0.75ML SOAJ SC injection     Dosage of the medication:    How are you taking this medication (QD, BID, TID, QID, PRN): Sig: Inject 1.7 mg into the skin every 7 days   Patient taking differently: Inject 1.7 mg into the skin every 7 days Sundays.     30 or 90 day supply called in:    When will you run out of your medication:    Which Pharmacy are we sending the medication to?:   [facility] [address] [place] [address] [phone] - F [phone]

## 2025-02-10 ENCOUNTER — TELEPHONE (OUTPATIENT)
Dept: CARDIOLOGY CLINIC | Age: 70
End: 2025-02-10

## 2025-02-10 NOTE — TELEPHONE ENCOUNTER
Pt called stating they went to [facility] at [facility] and they pharmacy do not have the RX for the Semaglutide-, Pt stated the RX should be sent to [facility]     Semaglutide-Weight Management (WEGOVY) 2.4 MG/0.75ML SOAJ SC injection   2.4 MG  Inject 2.4 mg into the skin every 7 day         [facility] - [place], [place] - [address] [facility] [address] [address] - P [phone] - F [phone]  [address] [place], [place] [address]  Phone: [phone]  Fax: [phone]

## 2025-04-21 ENCOUNTER — TELEPHONE (OUTPATIENT)
Dept: CARDIOLOGY CLINIC | Age: 70
End: 2025-04-21

## 2025-04-23 RX ORDER — APIXABAN 5 MG/1
5 TABLET, FILM COATED ORAL 2 TIMES DAILY
Qty: 180 TABLET | Refills: 1 | Status: SHIPPED | OUTPATIENT
Start: 2025-04-23

## 2025-04-23 NOTE — TELEPHONE ENCOUNTER
Patient due for follow up. Please schedule her to see EPNP or PA or Dr. Alves whenever available.  6 months worth of eliquis sent to hold her over.

## 2025-05-26 ENCOUNTER — HOSPITAL ENCOUNTER (INPATIENT)
Age: 70
LOS: 3 days | Discharge: HOME HEALTH | DRG: 312 | End: 2025-05-29
Attending: STUDENT IN AN ORGANIZED HEALTH CARE EDUCATION/TRAINING PROGRAM | Admitting: STUDENT IN AN ORGANIZED HEALTH CARE EDUCATION/TRAINING PROGRAM
Payer: MEDICARE

## 2025-05-26 DIAGNOSIS — R07.9 CHEST PAIN, UNSPECIFIED TYPE: Primary | ICD-10-CM

## 2025-05-26 DIAGNOSIS — M54.17 LUMBOSACRAL RADICULOPATHY: ICD-10-CM

## 2025-05-26 PROBLEM — R55 SYNCOPE, UNSPECIFIED SYNCOPE TYPE: Status: ACTIVE | Noted: 2025-05-26

## 2025-05-26 PROCEDURE — 1200000000 HC SEMI PRIVATE

## 2025-05-27 ENCOUNTER — APPOINTMENT (OUTPATIENT)
Dept: GENERAL RADIOLOGY | Age: 70
DRG: 312 | End: 2025-05-27
Attending: STUDENT IN AN ORGANIZED HEALTH CARE EDUCATION/TRAINING PROGRAM
Payer: MEDICARE

## 2025-05-27 PROBLEM — I20.89 OTHER FORMS OF ANGINA PECTORIS: Status: ACTIVE | Noted: 2025-01-27

## 2025-05-27 LAB
ANION GAP SERPL CALCULATED.3IONS-SCNC: 13 MMOL/L (ref 3–16)
BASOPHILS # BLD: 0.1 K/UL (ref 0–0.2)
BASOPHILS NFR BLD: 1.1 %
BUN SERPL-MCNC: 9 MG/DL (ref 7–20)
CALCIUM SERPL-MCNC: 9.7 MG/DL (ref 8.3–10.6)
CHLORIDE SERPL-SCNC: 101 MMOL/L (ref 99–110)
CO2 SERPL-SCNC: 22 MMOL/L (ref 21–32)
CREAT SERPL-MCNC: 0.7 MG/DL (ref 0.6–1.2)
DEPRECATED RDW RBC AUTO: 16.5 % (ref 12.4–15.4)
EOSINOPHIL # BLD: 0.1 K/UL (ref 0–0.6)
EOSINOPHIL NFR BLD: 1.2 %
GFR SERPLBLD CREATININE-BSD FMLA CKD-EPI: >90 ML/MIN/{1.73_M2}
GLUCOSE SERPL-MCNC: 106 MG/DL (ref 70–99)
HCT VFR BLD AUTO: 43.5 % (ref 36–48)
HGB BLD-MCNC: 13.9 G/DL (ref 12–16)
LYMPHOCYTES # BLD: 1.9 K/UL (ref 1–5.1)
LYMPHOCYTES NFR BLD: 16.9 %
MCH RBC QN AUTO: 28.5 PG (ref 26–34)
MCHC RBC AUTO-ENTMCNC: 32 G/DL (ref 31–36)
MCV RBC AUTO: 88.9 FL (ref 80–100)
MONOCYTES # BLD: 1 K/UL (ref 0–1.3)
MONOCYTES NFR BLD: 8.6 %
NEUTROPHILS # BLD: 8.1 K/UL (ref 1.7–7.7)
NEUTROPHILS NFR BLD: 72.2 %
PLATELET # BLD AUTO: 230 K/UL (ref 135–450)
PMV BLD AUTO: 8.6 FL (ref 5–10.5)
POTASSIUM SERPL-SCNC: 3.8 MMOL/L (ref 3.5–5.1)
RBC # BLD AUTO: 4.9 M/UL (ref 4–5.2)
SODIUM SERPL-SCNC: 136 MMOL/L (ref 136–145)
T4 FREE SERPL-MCNC: 1.7 NG/DL (ref 0.9–1.8)
TROPONIN, HIGH SENSITIVITY: 18 NG/L (ref 0–14)
TROPONIN, HIGH SENSITIVITY: 24 NG/L (ref 0–14)
TSH SERPL DL<=0.005 MIU/L-ACNC: 5.05 UIU/ML (ref 0.27–4.2)
WBC # BLD AUTO: 11.2 K/UL (ref 4–11)

## 2025-05-27 PROCEDURE — 85025 COMPLETE CBC W/AUTO DIFF WBC: CPT

## 2025-05-27 PROCEDURE — 80048 BASIC METABOLIC PNL TOTAL CA: CPT

## 2025-05-27 PROCEDURE — 84484 ASSAY OF TROPONIN QUANT: CPT

## 2025-05-27 PROCEDURE — 2500000003 HC RX 250 WO HCPCS: Performed by: STUDENT IN AN ORGANIZED HEALTH CARE EDUCATION/TRAINING PROGRAM

## 2025-05-27 PROCEDURE — 6370000000 HC RX 637 (ALT 250 FOR IP): Performed by: NURSE PRACTITIONER

## 2025-05-27 PROCEDURE — 6370000000 HC RX 637 (ALT 250 FOR IP): Performed by: PHYSICIAN ASSISTANT

## 2025-05-27 PROCEDURE — 2500000003 HC RX 250 WO HCPCS: Performed by: INTERNAL MEDICINE

## 2025-05-27 PROCEDURE — 99223 1ST HOSP IP/OBS HIGH 75: CPT | Performed by: INTERNAL MEDICINE

## 2025-05-27 PROCEDURE — 93005 ELECTROCARDIOGRAM TRACING: CPT | Performed by: PHYSICIAN ASSISTANT

## 2025-05-27 PROCEDURE — 84439 ASSAY OF FREE THYROXINE: CPT

## 2025-05-27 PROCEDURE — 84443 ASSAY THYROID STIM HORMONE: CPT

## 2025-05-27 PROCEDURE — 36415 COLL VENOUS BLD VENIPUNCTURE: CPT

## 2025-05-27 PROCEDURE — 73030 X-RAY EXAM OF SHOULDER: CPT

## 2025-05-27 PROCEDURE — 1200000000 HC SEMI PRIVATE

## 2025-05-27 PROCEDURE — 6370000000 HC RX 637 (ALT 250 FOR IP): Performed by: INTERNAL MEDICINE

## 2025-05-27 PROCEDURE — 2500000003 HC RX 250 WO HCPCS: Performed by: PHYSICIAN ASSISTANT

## 2025-05-27 RX ORDER — POTASSIUM CHLORIDE 1500 MG/1
40 TABLET, EXTENDED RELEASE ORAL PRN
Status: DISCONTINUED | OUTPATIENT
Start: 2025-05-27 | End: 2025-05-29 | Stop reason: HOSPADM

## 2025-05-27 RX ORDER — SENNOSIDES 8.6 MG/1
1 TABLET ORAL DAILY PRN
Status: DISCONTINUED | OUTPATIENT
Start: 2025-05-27 | End: 2025-05-29 | Stop reason: HOSPADM

## 2025-05-27 RX ORDER — SODIUM CHLORIDE 9 MG/ML
INJECTION, SOLUTION INTRAVENOUS PRN
Status: DISCONTINUED | OUTPATIENT
Start: 2025-05-27 | End: 2025-05-29 | Stop reason: HOSPADM

## 2025-05-27 RX ORDER — AMIODARONE HYDROCHLORIDE 200 MG/1
200 TABLET ORAL NIGHTLY
Status: DISCONTINUED | OUTPATIENT
Start: 2025-05-27 | End: 2025-05-29 | Stop reason: HOSPADM

## 2025-05-27 RX ORDER — GABAPENTIN 100 MG/1
100 CAPSULE ORAL 2 TIMES DAILY
Status: DISCONTINUED | OUTPATIENT
Start: 2025-05-27 | End: 2025-05-29 | Stop reason: HOSPADM

## 2025-05-27 RX ORDER — CARVEDILOL 3.12 MG/1
3.12 TABLET ORAL 2 TIMES DAILY WITH MEALS
Status: DISCONTINUED | OUTPATIENT
Start: 2025-05-28 | End: 2025-05-29 | Stop reason: HOSPADM

## 2025-05-27 RX ORDER — SPIRONOLACTONE 25 MG/1
25 TABLET ORAL DAILY
Status: DISCONTINUED | OUTPATIENT
Start: 2025-05-27 | End: 2025-05-29 | Stop reason: HOSPADM

## 2025-05-27 RX ORDER — TRAMADOL HYDROCHLORIDE 50 MG/1
50 TABLET ORAL EVERY 6 HOURS PRN
Status: DISCONTINUED | OUTPATIENT
Start: 2025-05-27 | End: 2025-05-29 | Stop reason: HOSPADM

## 2025-05-27 RX ORDER — ACETAMINOPHEN 650 MG/1
650 SUPPOSITORY RECTAL EVERY 6 HOURS PRN
Status: DISCONTINUED | OUTPATIENT
Start: 2025-05-27 | End: 2025-05-29 | Stop reason: HOSPADM

## 2025-05-27 RX ORDER — HYDROMORPHONE HYDROCHLORIDE 1 MG/ML
1 INJECTION, SOLUTION INTRAMUSCULAR; INTRAVENOUS; SUBCUTANEOUS ONCE
Status: COMPLETED | OUTPATIENT
Start: 2025-05-27 | End: 2025-05-27

## 2025-05-27 RX ORDER — ASPIRIN 81 MG/1
81 TABLET, CHEWABLE ORAL DAILY
Status: DISCONTINUED | OUTPATIENT
Start: 2025-05-27 | End: 2025-05-27

## 2025-05-27 RX ORDER — SODIUM CHLORIDE 0.9 % (FLUSH) 0.9 %
10 SYRINGE (ML) INJECTION PRN
Status: DISCONTINUED | OUTPATIENT
Start: 2025-05-27 | End: 2025-05-29 | Stop reason: HOSPADM

## 2025-05-27 RX ORDER — VALSARTAN 160 MG/1
160 TABLET ORAL NIGHTLY
Status: DISCONTINUED | OUTPATIENT
Start: 2025-05-27 | End: 2025-05-29 | Stop reason: HOSPADM

## 2025-05-27 RX ORDER — POTASSIUM CHLORIDE 7.45 MG/ML
10 INJECTION INTRAVENOUS PRN
Status: DISCONTINUED | OUTPATIENT
Start: 2025-05-27 | End: 2025-05-29 | Stop reason: HOSPADM

## 2025-05-27 RX ORDER — SODIUM CHLORIDE 0.9 % (FLUSH) 0.9 %
5-40 SYRINGE (ML) INJECTION EVERY 12 HOURS SCHEDULED
Status: DISCONTINUED | OUTPATIENT
Start: 2025-05-27 | End: 2025-05-29 | Stop reason: HOSPADM

## 2025-05-27 RX ORDER — MAGNESIUM SULFATE IN WATER 40 MG/ML
2000 INJECTION, SOLUTION INTRAVENOUS PRN
Status: DISCONTINUED | OUTPATIENT
Start: 2025-05-27 | End: 2025-05-29 | Stop reason: HOSPADM

## 2025-05-27 RX ORDER — ACETAMINOPHEN 325 MG/1
650 TABLET ORAL EVERY 6 HOURS PRN
Status: DISCONTINUED | OUTPATIENT
Start: 2025-05-27 | End: 2025-05-29 | Stop reason: HOSPADM

## 2025-05-27 RX ORDER — HYDROCODONE BITARTRATE AND ACETAMINOPHEN 5; 325 MG/1; MG/1
1 TABLET ORAL
Status: DISCONTINUED | OUTPATIENT
Start: 2025-05-27 | End: 2025-05-29 | Stop reason: HOSPADM

## 2025-05-27 RX ORDER — HYDROMORPHONE HYDROCHLORIDE 1 MG/ML
0.5 INJECTION, SOLUTION INTRAMUSCULAR; INTRAVENOUS; SUBCUTANEOUS ONCE
Status: COMPLETED | OUTPATIENT
Start: 2025-05-27 | End: 2025-05-27

## 2025-05-27 RX ORDER — LABETALOL 200 MG/1
100 TABLET, FILM COATED ORAL EVERY 12 HOURS SCHEDULED
Status: DISCONTINUED | OUTPATIENT
Start: 2025-05-27 | End: 2025-05-27

## 2025-05-27 RX ORDER — ONDANSETRON 2 MG/ML
4 INJECTION INTRAMUSCULAR; INTRAVENOUS EVERY 6 HOURS PRN
Status: DISCONTINUED | OUTPATIENT
Start: 2025-05-27 | End: 2025-05-29 | Stop reason: HOSPADM

## 2025-05-27 RX ADMIN — TRAMADOL HYDROCHLORIDE 50 MG: 50 TABLET, COATED ORAL at 21:11

## 2025-05-27 RX ADMIN — AMIODARONE HYDROCHLORIDE 200 MG: 200 TABLET ORAL at 21:06

## 2025-05-27 RX ADMIN — SPIRONOLACTONE 25 MG: 25 TABLET ORAL at 08:11

## 2025-05-27 RX ADMIN — ASPIRIN 81 MG: 81 TABLET, CHEWABLE ORAL at 08:11

## 2025-05-27 RX ADMIN — HYDROMORPHONE HYDROCHLORIDE 1 MG: 1 INJECTION, SOLUTION INTRAMUSCULAR; INTRAVENOUS; SUBCUTANEOUS at 11:50

## 2025-05-27 RX ADMIN — APIXABAN 5 MG: 5 TABLET, FILM COATED ORAL at 08:11

## 2025-05-27 RX ADMIN — Medication 10 ML: at 21:29

## 2025-05-27 RX ADMIN — Medication 10 ML: at 08:14

## 2025-05-27 RX ADMIN — VALSARTAN 160 MG: 160 TABLET ORAL at 21:07

## 2025-05-27 RX ADMIN — LABETALOL HYDROCHLORIDE 100 MG: 200 TABLET, FILM COATED ORAL at 08:11

## 2025-05-27 RX ADMIN — TRAMADOL HYDROCHLORIDE 50 MG: 50 TABLET, COATED ORAL at 14:35

## 2025-05-27 RX ADMIN — HYDROCODONE BITARTRATE AND ACETAMINOPHEN 1 TABLET: 5; 325 TABLET ORAL at 08:11

## 2025-05-27 RX ADMIN — APIXABAN 5 MG: 5 TABLET, FILM COATED ORAL at 21:07

## 2025-05-27 RX ADMIN — APIXABAN 5 MG: 5 TABLET, FILM COATED ORAL at 02:20

## 2025-05-27 RX ADMIN — HYDROMORPHONE HYDROCHLORIDE 0.5 MG: 1 INJECTION, SOLUTION INTRAMUSCULAR; INTRAVENOUS; SUBCUTANEOUS at 04:35

## 2025-05-27 ASSESSMENT — PAIN SCALES - WONG BAKER
WONGBAKER_NUMERICALRESPONSE: NO HURT

## 2025-05-27 ASSESSMENT — PAIN DESCRIPTION - DESCRIPTORS
DESCRIPTORS: ACHING;GNAWING
DESCRIPTORS: ACHING
DESCRIPTORS: ACHING;GNAWING

## 2025-05-27 ASSESSMENT — PAIN SCALES - GENERAL
PAINLEVEL_OUTOF10: 5
PAINLEVEL_OUTOF10: 0
PAINLEVEL_OUTOF10: 7
PAINLEVEL_OUTOF10: 7
PAINLEVEL_OUTOF10: 8
PAINLEVEL_OUTOF10: 8
PAINLEVEL_OUTOF10: 6
PAINLEVEL_OUTOF10: 6

## 2025-05-27 ASSESSMENT — PAIN DESCRIPTION - LOCATION
LOCATION: NECK;BACK;BUTTOCKS
LOCATION: BACK;NECK;BUTTOCKS
LOCATION: BACK
LOCATION: BACK;SACRUM
LOCATION: BACK;BUTTOCKS

## 2025-05-27 ASSESSMENT — PAIN DESCRIPTION - ORIENTATION
ORIENTATION: LOWER;MID
ORIENTATION: MID;LOWER
ORIENTATION: MID
ORIENTATION: MID;LOWER
ORIENTATION: MID;LOWER

## 2025-05-27 ASSESSMENT — PAIN DESCRIPTION - FREQUENCY: FREQUENCY: CONTINUOUS

## 2025-05-27 ASSESSMENT — PAIN - FUNCTIONAL ASSESSMENT: PAIN_FUNCTIONAL_ASSESSMENT: PREVENTS OR INTERFERES SOME ACTIVE ACTIVITIES AND ADLS

## 2025-05-27 NOTE — H&P
[facility] Medicine History & Physical        Name:  Rell Lorenz /Age/Sex: 1955  (70 y.o. female)   MRN & CSN:  [id number] & [id number] Encounter Date/Time: 2025 11:53 PM EDT   Location:  [id number] PCP: Karie Larson MD         CHIEF COMPLAINT:   Transferred from [facility] ER for further evaluation and management of syncope        HISTORY OF PRESENT ILLNESS:      History from: patient  Limitations to history : None     Rell Lorenz is a 70 y.o. female who was transferred from [facility] ER per patient preference due to patient's primary cardiologist being at [facility].  Patient reports she had a syncopal episode 3 days ago.  She reports that she does not remember much about the event.  She is unsure if she had any symptoms prior to losing consciousness.  Patient's family was there and heard her fall.  She denies hitting her head.  She denies any injury.  Patient reports she has had intermittent dizziness since that event and today she almost passed out again prompting her visit to the ED.  Patient denies fever, chest pain, shortness of breath, cough, GI symptoms, urinary symptoms.  She denies any other complaints concerns this time.      REVIEW OF SYSTEMS:   Pertinent positives as noted in the HPI. All other systems reviewed and negative.      PHYSICAL EXAM PERFORMED:  /77   Pulse 86   Temp 97.5 °F (36.4 °C) (Temporal)   Resp 18   Ht 1.676 m (5' 6\")   Wt 120.8 kg (266 lb 6.1 oz)   SpO2 96%   BMI 43.00 kg/m²     General appearance:  Awake, alert, no apparent distress  HEENT:  Normocephalic, atraumatic without obvious deformity. PERRL. EOM intact. Conjunctivae/corneas clear.  Neck:  Supple, with full range of motion. No JVD. Trachea midline.  Respiratory:  Clear to auscultation bilaterally without rales, wheezes, or rhonchi. Normal respiratory effort.   Cardiovascular:  Regular rate and rhythm without murmurs, rubs or gallops.   Abdomen:  Soft, NT,  Pt has been managed by anticoagulation clinic for Warfarin monitoring. Please complete the annual service to anticoagulation clinic [facility] so our office may continue to manage their Warfarin therapy according to our standards and protocols.

## 2025-05-27 NOTE — ACP (ADVANCE CARE PLANNING)
Advanced Care Planning Note.    Purpose of Encounter: Advanced care planning in light of hospitalization  Parties In Attendance: Patient,    Decisional Capacity: Yes  Subjective: Patient  understand that this conversation is to address long term care goal  Objective: admitted to hospital with vertigo  Goals of Care Determination: Patient would pursue CPR and Intubation if required.  Code Status: full code  Time spent on Advanced care Planning: 15 minutes  Advanced Care Planning Documents: documented patient's wishes, would like anthony Solorzano to make medical decisions if unable to make decisions    Charlotte Campbell MD  5/27/2025 10:10 AM

## 2025-05-27 NOTE — CONSULTS
[facility]   CONSULTATION  [phone]    Chief complaint: loss of consciousness           History of Present Illness:  Rell Lorenz is a 70 y.o. patient who presented to the hospital with complaints of loss of consciousness. I have been asked to provide consultation regarding further management and testing.  The patient reports that she has been feeling well from a heart perspective. No chest pain or pressure. She reports that she can walk without stopping, but has slowed down over the last few years. No exercise. She reports that she came to the hospital for a syncopal episode. She reports that she was using the commode and stood up. She walked over to her bed. She states that she could feel palpitations and \"like she was going down.\" She fell on her bedside table and sustained an injury to her right arm. She denies any chest pain or pressure. No post ictal symptoms. No loss of bowel or bladder.     Past Medical History:   has a past medical history of AICD (automatic cardioverter/defibrillator) present, Arthritis, CHF (congestive heart failure) ([facility]), Gout, Hyperlipidemia, Hypertension, and MI (myocardial infarction) ([facility]).    Surgical History:   has a past surgical history that includes Dilation and curettage of uterus; Cardiac defibrillator placement; and Pain management procedure (Bilateral, 6/29/2022).     Social History:   reports that she has never smoked. She has never used smokeless tobacco. She reports that she does not drink alcohol and does not use drugs.       Home Medications:  Were reviewed and are listed in nursing record. and/or listed below  Prior to Admission medications    Medication Sig Start Date End Date Taking? Authorizing Provider   apixaban (ELIQUIS) 5 MG TABS tablet TAKE 1 TABLET BY MOUTH TWICE A DAY 4/23/25  Yes Long Reed MD   Semaglutide-Weight Management (WEGOVY) 2.4 MG/0.75ML SOAJ SC injection Inject 2.4 mg into the skin every 7 days 4/21/25  Yes Katie Sharpe APRN -

## 2025-05-27 NOTE — PLAN OF CARE
Problem: Chronic Conditions and Co-morbidities  Goal: Patient's chronic conditions and co-morbidity symptoms are monitored and maintained or improved  Flowsheets (Taken 5/26/2025 2348)  Care Plan - Patient's Chronic Conditions and Co-Morbidity Symptoms are Monitored and Maintained or Improved:   Monitor and assess patient's chronic conditions and comorbid symptoms for stability, deterioration, or improvement   Collaborate with multidisciplinary team to address chronic and comorbid conditions and prevent exacerbation or deterioration   Update acute care plan with appropriate goals if chronic or comorbid symptoms are exacerbated and prevent overall improvement and discharge     Problem: Discharge Planning  Goal: Discharge to home or other facility with appropriate resources  Flowsheets (Taken 5/26/2025 2348)  Discharge to home or other facility with appropriate resources:   Identify barriers to discharge with patient and caregiver   Arrange for needed discharge resources and transportation as appropriate   Identify discharge learning needs (meds, wound care, etc)   Refer to discharge planning if patient needs post-hospital services based on physician order or complex needs related to functional status, cognitive ability or social support system     Problem: Pain  Goal: Verbalizes/displays adequate comfort level or baseline comfort level  Flowsheets (Taken 5/26/2025 2348)  Verbalizes/displays adequate comfort level or baseline comfort level:   Encourage patient to monitor pain and request assistance   Assess pain using appropriate pain scale   Administer analgesics based on type and severity of pain and evaluate response   Implement non-pharmacological measures as appropriate and evaluate response   Notify Licensed Independent Practitioner if interventions unsuccessful or patient reports new pain   Consider cultural and social influences on pain and pain management

## 2025-05-28 ENCOUNTER — APPOINTMENT (OUTPATIENT)
Dept: CT IMAGING | Age: 70
DRG: 312 | End: 2025-05-28
Attending: STUDENT IN AN ORGANIZED HEALTH CARE EDUCATION/TRAINING PROGRAM
Payer: MEDICARE

## 2025-05-28 ENCOUNTER — APPOINTMENT (OUTPATIENT)
Age: 70
DRG: 312 | End: 2025-05-28
Attending: INTERNAL MEDICINE
Payer: MEDICARE

## 2025-05-28 LAB
ECHO AO ASC DIAM: 2.5 CM
ECHO AO ASCENDING AORTA INDEX: 1.1 CM/M2
ECHO AO ROOT DIAM: 3.1 CM
ECHO AO ROOT INDEX: 1.37 CM/M2
ECHO AV AREA PEAK VELOCITY: 2.5 CM2
ECHO AV AREA VTI: 2.4 CM2
ECHO AV AREA/BSA PEAK VELOCITY: 1.1 CM2/M2
ECHO AV AREA/BSA VTI: 1.1 CM2/M2
ECHO AV MEAN GRADIENT: 4 MMHG
ECHO AV MEAN VELOCITY: 1 M/S
ECHO AV PEAK GRADIENT: 9 MMHG
ECHO AV PEAK VELOCITY: 1.5 M/S
ECHO AV VELOCITY RATIO: 0.87
ECHO AV VTI: 33 CM
ECHO BSA: 2.39 M2
ECHO EST RA PRESSURE: 3 MMHG
ECHO LA AREA 4C: 15.7 CM2
ECHO LA MAJOR AXIS: 5.5 CM
ECHO LA VOL MOD A4C: 36 ML (ref 22–52)
ECHO LA VOLUME INDEX MOD A4C: 16 ML/M2 (ref 16–34)
ECHO LV E' LATERAL VELOCITY: 6.68 CM/S
ECHO LV E' SEPTAL VELOCITY: 5.03 CM/S
ECHO LV EDV A2C: 75 ML
ECHO LV EDV A4C: 104 ML
ECHO LV EDV INDEX A4C: 46 ML/M2
ECHO LV EDV NDEX A2C: 33 ML/M2
ECHO LV EF PHYSICIAN: 65 %
ECHO LV EJECTION FRACTION A2C: 69 %
ECHO LV EJECTION FRACTION A4C: 71 %
ECHO LV EJECTION FRACTION BIPLANE: 68 % (ref 55–100)
ECHO LV ESV A2C: 23 ML
ECHO LV ESV A4C: 30 ML
ECHO LV ESV INDEX A2C: 10 ML/M2
ECHO LV ESV INDEX A4C: 13 ML/M2
ECHO LV FRACTIONAL SHORTENING: 38 % (ref 28–44)
ECHO LV INTERNAL DIMENSION DIASTOLE INDEX: 1.76 CM/M2
ECHO LV INTERNAL DIMENSION DIASTOLIC: 4 CM (ref 3.9–5.3)
ECHO LV INTERNAL DIMENSION SYSTOLIC INDEX: 1.1 CM/M2
ECHO LV INTERNAL DIMENSION SYSTOLIC: 2.5 CM
ECHO LV IVSD: 1 CM (ref 0.6–0.9)
ECHO LV MASS 2D: 127.1 G (ref 67–162)
ECHO LV MASS INDEX 2D: 56 G/M2 (ref 43–95)
ECHO LV POSTERIOR WALL DIASTOLIC: 1 CM (ref 0.6–0.9)
ECHO LV RELATIVE WALL THICKNESS RATIO: 0.5
ECHO LVOT AREA: 2.8 CM2
ECHO LVOT AV VTI INDEX: 0.83
ECHO LVOT DIAM: 1.9 CM
ECHO LVOT MEAN GRADIENT: 3 MMHG
ECHO LVOT PEAK GRADIENT: 7 MMHG
ECHO LVOT PEAK VELOCITY: 1.3 M/S
ECHO LVOT STROKE VOLUME INDEX: 34.1 ML/M2
ECHO LVOT SV: 77.4 ML
ECHO LVOT VTI: 27.3 CM
ECHO MV A VELOCITY: 0.83 M/S
ECHO MV AREA VTI: 3.1 CM2
ECHO MV E VELOCITY: 0.7 M/S
ECHO MV E/A RATIO: 0.84
ECHO MV E/E' LATERAL: 10.48
ECHO MV E/E' RATIO (AVERAGED): 12.2
ECHO MV E/E' SEPTAL: 13.92
ECHO MV LVOT VTI INDEX: 0.91
ECHO MV MAX VELOCITY: 0.9 M/S
ECHO MV MEAN GRADIENT: 2 MMHG
ECHO MV MEAN VELOCITY: 0.6 M/S
ECHO MV PEAK GRADIENT: 3 MMHG
ECHO MV VTI: 24.9 CM
ECHO PV MAX VELOCITY: 0.7 M/S
ECHO PV PEAK GRADIENT: 2 MMHG
ECHO RA AREA 4C: 20.4 CM2
ECHO RA END SYSTOLIC VOLUME APICAL 4 CHAMBER INDEX BSA: 29 ML/M2
ECHO RA VOLUME: 66 ML
ECHO RV BASAL DIMENSION: 3.8 CM
ECHO RV FREE WALL PEAK S': 8.8 CM/S
ECHO RV MID DIMENSION: 2.8 CM
ECHO RV TAPSE: 2 CM (ref 1.7–?)

## 2025-05-28 PROCEDURE — 1200000000 HC SEMI PRIVATE

## 2025-05-28 PROCEDURE — 97535 SELF CARE MNGMENT TRAINING: CPT

## 2025-05-28 PROCEDURE — 6370000000 HC RX 637 (ALT 250 FOR IP): Performed by: NURSE PRACTITIONER

## 2025-05-28 PROCEDURE — 97166 OT EVAL MOD COMPLEX 45 MIN: CPT

## 2025-05-28 PROCEDURE — 2580000003 HC RX 258: Performed by: INTERNAL MEDICINE

## 2025-05-28 PROCEDURE — 6360000002 HC RX W HCPCS: Performed by: INTERNAL MEDICINE

## 2025-05-28 PROCEDURE — 6370000000 HC RX 637 (ALT 250 FOR IP): Performed by: INTERNAL MEDICINE

## 2025-05-28 PROCEDURE — 97116 GAIT TRAINING THERAPY: CPT

## 2025-05-28 PROCEDURE — 6370000000 HC RX 637 (ALT 250 FOR IP): Performed by: PHYSICIAN ASSISTANT

## 2025-05-28 PROCEDURE — 99231 SBSQ HOSP IP/OBS SF/LOW 25: CPT | Performed by: INTERNAL MEDICINE

## 2025-05-28 PROCEDURE — 97530 THERAPEUTIC ACTIVITIES: CPT

## 2025-05-28 PROCEDURE — 70450 CT HEAD/BRAIN W/O DYE: CPT

## 2025-05-28 PROCEDURE — C8929 TTE W OR WO FOL WCON,DOPPLER: HCPCS

## 2025-05-28 PROCEDURE — 97161 PT EVAL LOW COMPLEX 20 MIN: CPT

## 2025-05-28 PROCEDURE — 93306 TTE W/DOPPLER COMPLETE: CPT | Performed by: INTERNAL MEDICINE

## 2025-05-28 PROCEDURE — 2500000003 HC RX 250 WO HCPCS: Performed by: PHYSICIAN ASSISTANT

## 2025-05-28 PROCEDURE — 6360000004 HC RX CONTRAST MEDICATION: Performed by: INTERNAL MEDICINE

## 2025-05-28 RX ORDER — METHOCARBAMOL 750 MG/1
750 TABLET, FILM COATED ORAL ONCE
Status: DISCONTINUED | OUTPATIENT
Start: 2025-05-28 | End: 2025-05-29 | Stop reason: HOSPADM

## 2025-05-28 RX ADMIN — TRAMADOL HYDROCHLORIDE 50 MG: 50 TABLET, COATED ORAL at 20:50

## 2025-05-28 RX ADMIN — APIXABAN 5 MG: 5 TABLET, FILM COATED ORAL at 20:49

## 2025-05-28 RX ADMIN — TRAMADOL HYDROCHLORIDE 50 MG: 50 TABLET, COATED ORAL at 14:07

## 2025-05-28 RX ADMIN — Medication 10 ML: at 20:51

## 2025-05-28 RX ADMIN — VALSARTAN 160 MG: 160 TABLET ORAL at 20:49

## 2025-05-28 RX ADMIN — TRAMADOL HYDROCHLORIDE 50 MG: 50 TABLET, COATED ORAL at 06:09

## 2025-05-28 RX ADMIN — APIXABAN 5 MG: 5 TABLET, FILM COATED ORAL at 10:29

## 2025-05-28 RX ADMIN — LORAZEPAM 1 MG: 2 INJECTION INTRAMUSCULAR; INTRAVENOUS at 10:32

## 2025-05-28 RX ADMIN — SPIRONOLACTONE 25 MG: 25 TABLET ORAL at 10:29

## 2025-05-28 RX ADMIN — SULFUR HEXAFLUORIDE 2 ML: 60.7; .19; .19 INJECTION, POWDER, LYOPHILIZED, FOR SUSPENSION INTRAVENOUS; INTRAVESICAL at 17:03

## 2025-05-28 RX ADMIN — CARVEDILOL 3.12 MG: 3.12 TABLET, FILM COATED ORAL at 10:29

## 2025-05-28 RX ADMIN — CARVEDILOL 3.12 MG: 3.12 TABLET, FILM COATED ORAL at 17:55

## 2025-05-28 ASSESSMENT — PAIN DESCRIPTION - LOCATION
LOCATION: HIP
LOCATION: BACK;NECK;FOOT

## 2025-05-28 ASSESSMENT — PAIN DESCRIPTION - DESCRIPTORS
DESCRIPTORS: ACHING

## 2025-05-28 ASSESSMENT — PAIN SCALES - WONG BAKER
WONGBAKER_NUMERICALRESPONSE: NO HURT

## 2025-05-28 ASSESSMENT — PAIN SCALES - GENERAL
PAINLEVEL_OUTOF10: 9
PAINLEVEL_OUTOF10: 8
PAINLEVEL_OUTOF10: 9
PAINLEVEL_OUTOF10: 3
PAINLEVEL_OUTOF10: 8
PAINLEVEL_OUTOF10: 6
PAINLEVEL_OUTOF10: 8
PAINLEVEL_OUTOF10: 8

## 2025-05-28 ASSESSMENT — PAIN DESCRIPTION - FREQUENCY
FREQUENCY: CONTINUOUS
FREQUENCY: CONTINUOUS

## 2025-05-28 ASSESSMENT — PAIN - FUNCTIONAL ASSESSMENT: PAIN_FUNCTIONAL_ASSESSMENT: PREVENTS OR INTERFERES SOME ACTIVE ACTIVITIES AND ADLS

## 2025-05-28 ASSESSMENT — PAIN DESCRIPTION - ORIENTATION: ORIENTATION: RIGHT;LEFT;MID

## 2025-05-28 NOTE — CARE COORDINATION
05/28/25 1033   IMM Letter   IMM Letter given to Patient/Family/Significant other/Guardian/POA/by: IMM Given   IMM Letter date given: 05/28/25   IMM Letter time given: 1033

## 2025-05-28 NOTE — CARE COORDINATION
Case Management Assessment  Initial Evaluation    Date/Time of Evaluation: 5/28/2025 4:03 PM  Assessment Completed by: DHARMESH KYLE RN    If patient is discharged prior to next notation, then this note serves as note for discharge by case management.    Patient Name: Rell Lorenz                   YOB: 1955  Diagnosis: Syncope [R55]  Syncope, unspecified syncope type [R55]                   Date / Time: 5/26/2025 10:58 PM    Patient Admission Status: Inpatient   Readmission Risk (Low < 19, Mod (19-27), High > 27): Readmission Risk Score: 9    Current PCP: Karie Larson MD  PCP verified by CM? No    Chart Reviewed: Yes      History Provided by: Patient, Medical Record  Patient Orientation: Alert and Oriented, Person, Place, Situation    Patient Cognition: Alert    Hospitalization in the last 30 days (Readmission):  No    If yes, Readmission Assessment in  Navigator will be completed.    Advance Directives:      Code Status: Full Code   Patient's Primary Decision Maker is: Legal Next of Kin      Discharge Planning:    Patient lives with: Alone Type of Home: House  Primary Care Giver: Self  Patient Support Systems include: Children, Family Members, JOSE/Passport   Current Financial resources: Medicare  Current community resources: Other (Comment)  Current services prior to admission: Durable Medical Equipment, JOSE/Passport            Current DME: Walker            Type of Home Care services:  Aide Services    ADLS  Prior functional level: Housework, Shopping, Mobility, Assistance with the following:  Current functional level: Assistance with the following:, Housework, Shopping, Mobility    PT AM-PAC: 17 /24  OT AM-PAC: 13 /24    Family can provide assistance at DC: Yes  Would you like Case Management to discuss the discharge plan with any other family members/significant others, and if so, who? No  Plans to Return to Present Housing: Unknown at present  Other Identified Issues/Barriers to

## 2025-05-28 NOTE — PLAN OF CARE
Problem: Chronic Conditions and Co-morbidities  Goal: Patient's chronic conditions and co-morbidity symptoms are monitored and maintained or improved  Outcome: Progressing  Flowsheets (Taken 5/27/2025 2124)  Care Plan - Patient's Chronic Conditions and Co-Morbidity Symptoms are Monitored and Maintained or Improved:   Monitor and assess patient's chronic conditions and comorbid symptoms for stability, deterioration, or improvement   Collaborate with multidisciplinary team to address chronic and comorbid conditions and prevent exacerbation or deterioration   Update acute care plan with appropriate goals if chronic or comorbid symptoms are exacerbated and prevent overall improvement and discharge     Problem: Discharge Planning  Goal: Discharge to home or other facility with appropriate resources  Outcome: Progressing  Flowsheets (Taken 5/27/2025 2124)  Discharge to home or other facility with appropriate resources:   Identify barriers to discharge with patient and caregiver   Arrange for needed discharge resources and transportation as appropriate   Identify discharge learning needs (meds, wound care, etc)     Problem: Safety - Adult  Goal: Free from fall injury  Outcome: Progressing     Problem: Pain  Goal: Verbalizes/displays adequate comfort level or baseline comfort level  Outcome: Progressing  Flowsheets (Taken 5/27/2025 2124)  Verbalizes/displays adequate comfort level or baseline comfort level:   Encourage patient to monitor pain and request assistance   Assess pain using appropriate pain scale   Administer analgesics based on type and severity of pain and evaluate response   Implement non-pharmacological measures as appropriate and evaluate response

## 2025-05-28 NOTE — PLAN OF CARE
Problem: Chronic Conditions and Co-morbidities  Goal: Patient's chronic conditions and co-morbidity symptoms are monitored and maintained or improved  5/28/2025 1115 by Blanca Adkins RN  Outcome: Progressing  5/27/2025 2124 by Vivian Baig RN  Outcome: Progressing  Flowsheets (Taken 5/27/2025 2124)  Care Plan - Patient's Chronic Conditions and Co-Morbidity Symptoms are Monitored and Maintained or Improved:   Monitor and assess patient's chronic conditions and comorbid symptoms for stability, deterioration, or improvement   Collaborate with multidisciplinary team to address chronic and comorbid conditions and prevent exacerbation or deterioration   Update acute care plan with appropriate goals if chronic or comorbid symptoms are exacerbated and prevent overall improvement and discharge     Problem: Discharge Planning  Goal: Discharge to home or other facility with appropriate resources  5/28/2025 1115 by Blanca Adkins RN  Outcome: Progressing  5/27/2025 2124 by Vivian Baig RN  Outcome: Progressing  Flowsheets (Taken 5/27/2025 2124)  Discharge to home or other facility with appropriate resources:   Identify barriers to discharge with patient and caregiver   Arrange for needed discharge resources and transportation as appropriate   Identify discharge learning needs (meds, wound care, etc)     Problem: Safety - Adult  Goal: Free from fall injury  5/28/2025 1115 by Blanca Adkins RN  Outcome: Progressing  5/27/2025 2124 by Vivian Baig RN  Outcome: Progressing     Problem: Pain  Goal: Verbalizes/displays adequate comfort level or baseline comfort level  5/28/2025 1115 by Blanca Adkins RN  Outcome: Progressing  5/27/2025 2124 by Vivian Baig RN  Outcome: Progressing  Flowsheets (Taken 5/27/2025 2124)  Verbalizes/displays adequate comfort level or baseline comfort level:   Encourage patient to monitor pain and request assistance   Assess pain using appropriate pain scale

## 2025-05-29 VITALS
OXYGEN SATURATION: 100 % | HEIGHT: 66 IN | BODY MASS INDEX: 43.39 KG/M2 | TEMPERATURE: 97 F | SYSTOLIC BLOOD PRESSURE: 121 MMHG | WEIGHT: 270 LBS | HEART RATE: 82 BPM | RESPIRATION RATE: 18 BRPM | DIASTOLIC BLOOD PRESSURE: 68 MMHG

## 2025-05-29 LAB
EKG ATRIAL RATE: 80 BPM
EKG DIAGNOSIS: NORMAL
EKG P AXIS: 71 DEGREES
EKG P-R INTERVAL: 176 MS
EKG Q-T INTERVAL: 454 MS
EKG QRS DURATION: 112 MS
EKG QTC CALCULATION (BAZETT): 523 MS
EKG R AXIS: -7 DEGREES
EKG T AXIS: 76 DEGREES
EKG VENTRICULAR RATE: 80 BPM

## 2025-05-29 PROCEDURE — 2500000003 HC RX 250 WO HCPCS: Performed by: PHYSICIAN ASSISTANT

## 2025-05-29 PROCEDURE — 99231 SBSQ HOSP IP/OBS SF/LOW 25: CPT | Performed by: INTERNAL MEDICINE

## 2025-05-29 PROCEDURE — 6370000000 HC RX 637 (ALT 250 FOR IP): Performed by: INTERNAL MEDICINE

## 2025-05-29 PROCEDURE — 93010 ELECTROCARDIOGRAM REPORT: CPT | Performed by: INTERNAL MEDICINE

## 2025-05-29 PROCEDURE — 6370000000 HC RX 637 (ALT 250 FOR IP): Performed by: PHYSICIAN ASSISTANT

## 2025-05-29 RX ORDER — CARVEDILOL 3.12 MG/1
3.12 TABLET ORAL 2 TIMES DAILY WITH MEALS
Qty: 60 TABLET | Refills: 3 | Status: SHIPPED | OUTPATIENT
Start: 2025-05-29

## 2025-05-29 RX ORDER — TRAMADOL HYDROCHLORIDE 50 MG/1
50 TABLET ORAL EVERY 6 HOURS PRN
Qty: 20 TABLET | Refills: 0 | Status: SHIPPED | OUTPATIENT
Start: 2025-05-29 | End: 2025-06-03

## 2025-05-29 RX ADMIN — Medication 10 ML: at 08:35

## 2025-05-29 RX ADMIN — APIXABAN 5 MG: 5 TABLET, FILM COATED ORAL at 08:35

## 2025-05-29 RX ADMIN — SPIRONOLACTONE 25 MG: 25 TABLET ORAL at 08:35

## 2025-05-29 RX ADMIN — CARVEDILOL 3.12 MG: 3.12 TABLET, FILM COATED ORAL at 08:35

## 2025-05-29 RX ADMIN — TRAMADOL HYDROCHLORIDE 50 MG: 50 TABLET, COATED ORAL at 05:24

## 2025-05-29 ASSESSMENT — PAIN DESCRIPTION - LOCATION: LOCATION: NECK;BACK;BUTTOCKS

## 2025-05-29 ASSESSMENT — PAIN DESCRIPTION - DESCRIPTORS: DESCRIPTORS: ACHING

## 2025-05-29 ASSESSMENT — PAIN SCALES - GENERAL
PAINLEVEL_OUTOF10: 6
PAINLEVEL_OUTOF10: 3
PAINLEVEL_OUTOF10: 8

## 2025-05-29 ASSESSMENT — PAIN DESCRIPTION - ORIENTATION: ORIENTATION: MID

## 2025-05-29 NOTE — PLAN OF CARE
Problem: Chronic Conditions and Co-morbidities  Goal: Patient's chronic conditions and co-morbidity symptoms are monitored and maintained or improved  5/28/2025 2217 by Marge Geller RN  Outcome: Progressing  5/28/2025 1115 by Blanca Adkins RN  Outcome: Progressing     Problem: Discharge Planning  Goal: Discharge to home or other facility with appropriate resources  5/28/2025 2217 by Marge Geller RN  Outcome: Progressing  5/28/2025 1115 by Blanca Adkins RN  Outcome: Progressing     Problem: Safety - Adult  Goal: Free from fall injury  5/28/2025 2217 by Marge Geller RN  Outcome: Progressing  5/28/2025 1115 by Blanca Adkins RN  Outcome: Progressing     Problem: Pain  Goal: Verbalizes/displays adequate comfort level or baseline comfort level  5/28/2025 2217 by Marge Geller RN  Outcome: Progressing  5/28/2025 1115 by Blanca Adkins RN  Outcome: Progressing

## 2025-05-29 NOTE — DISCHARGE INSTR - COC
Continuity of Care Form    Patient Name: Rell Lorenz   :  1955  MRN:  [id number]    Admit date:  2025  Discharge date:  25    Code Status Order: Full Code   Advance Directives:     Admitting Physician:  Judy Tirado MD  PCP: Karie Larson MD    Discharging Nurse: Ja  Discharging Hospital Unit/Room#: G1C-2388/5912-01  Discharging Unit Phone Number: [phone]    Emergency Contact:   Extended Emergency Contact Information  Primary Emergency Contact: Maryjane Cadena  Home Phone: [phone]  Mobile Phone: [phone]  Relation: Child  Secondary Emergency Contact: Joshua Lorenz  Home Phone: [phone]  Mobile Phone: [phone]  Relation: Grandchild  Preferred language: English    Past Surgical History:  Past Surgical History:   Procedure Laterality Date    CARDIAC DEFIBRILLATOR PLACEMENT      DILATION AND CURETTAGE OF UTERUS      PAIN MANAGEMENT PROCEDURE Bilateral 2022    BILATERAL L5 TRANSFORAMINAL EPIDURAL STEROID INJECTION WITH FLUOROSCOPY performed by Nikolas Emerson MD at [facility]       Immunization History:     There is no immunization history on file for this patient.    Active Problems:  Patient Active Problem List   Diagnosis Code    Mixed hyperlipidemia E78.2    ICD (implantable cardioverter-defibrillator) in place Z95.810    History of ventricular tachycardia Z86.79    Systolic CHF, chronic ([facility]) I50.22    Coronary artery disease due to lipid rich plaque I25.10, I25.83    Morbid obesity with BMI of 45.0-49.9, adult ([facility]) E66.01, Z68.42    TYLER (obstructive sleep apnea) G47.33    Essential hypertension I10    PAF (paroxysmal atrial fibrillation) ([facility]) I48.0    Gout M10.9    Back pain with radiculopathy M54.10    Lumbar radiculopathy M54.16    Lumbosacral radiculopathy M54.17    Paraparesis of both lower limbs ([facility]) G82.20    ICD (implantable cardioverter-defibrillator) battery depletion Z45.02    ICD (implantable cardioverter-defibrillator) discharge Z45.02    Inappropriate

## 2025-05-29 NOTE — HOME CARE
I certify this patient under my care for Home Health with a face-to-face encounter on 5/29/2025.     I further certify that my clinical findings support that this patient is confined to home due to:  [x] Fall Risk   [] Dyspnea with minimal exertion   [] Confined to wheelchair   [] Angina with minimal exertion  [] Angina at rest   [] Amputation   [] Bowel/Bladder incontinence   [] Contractures  [] CVA/Hemiparalysis/Dysphonia   [] Hearing impairment   [] Legally Blind  [] Mental status impairment   [] Paralysis   [] Speech impairment   [] Other:    Initial Plan of Care: The patient’s Home Care needs include:  [] Administration of Medications   [] Complex care plan management  [x] PT  [x] OT  [] SLP   [] Teaching/Training  [] Wound Care   [] Observe/Assess   [] NG and Trach Aspiration/Care  [] Catheter Placement/Care   [] Ostomy Care   [] Psychiatric Eval/Therapy  [] Rehabilitation Nursing  [] Tube Feeding  [] Other: _    Due to deconditioning.     Ongoing plan development and review by PCP - Karie Larson MD

## 2025-05-29 NOTE — CARE COORDINATION
Case Management -  Discharge Note      Patient Name: Rell Lorenz                   YOB: 1955  Room: [address]            Readmission Risk (Low < 19, Mod (19-27), High > 27): Readmission Risk Score: 9    Current PCP: Karie Larson MD      (IMM) Important Message from Medicare:    Has pt received appropriate compliance notices before being discharged if required: yes  Compliance doc:  [x] 2nd IMM; [] Code 44 [] [place]  Date Given: 5/28 Given By: DEBORA     PT AM-PAC: 17 /24  OT AM-PAC: 13 /24    Patient/patient representative has been educated on the benefits of HHC as well as the possible risks of declining recommended services. Patient/patient representative has acknowledged the information provided and decided on the following discharge plan. Patient/ patient representative has been provided freedom of choice regarding service provider, supported by basic dialogue that supports the patient's individualized plan of care/goals.    Home Care Information:   Is patient resuming current home health care services: No    Home Care Agency:   [address],   [place] [id number]  Phone: [phone]  Fax: [phone]             Services: Skilled Nursing , PT & OT  Home Health Order Obtained: Yes    Home health agency notified of discharge.   Gay in intake    [facility] agency notified of discharge:  [x] Yes [] No  [] NA    Family notified of discharge:  [x] Yes  [] No  [] NA    Facility notified of discharge:  [] Yes  [] No  [x] NA    Pt is being discharged with Outpt IV Antibiotics  [] Yes [x] No  [] NA  If yes, make sure DIANNE is faxed to [facility] agency, and meds are called in to pharmacy by RN from DIANNE orders only.      Patient will continue with the home care services via the  [employer] program     Financial    Payor: [employer] / Plan: [employer] MEDICARE / Product Type: *No Product type* /     Pharmacy:  Potential assistance Purchasing Medications: No  Meds-to-Beds

## 2025-05-29 NOTE — CARE COORDINATION
Discharge Planning;  Patient declined SNF placement as recommended , she is agreeable with discharging home with [facility] services.

## 2025-05-29 NOTE — DISCHARGE SUMMARY
Hospital Medicine Discharge Summary    Patient: Rell Lorenz     Gender: female  : 1955   Age: 70 y.o.  MRN: [id number]    Admitting Physician: Judy Tirado MD  Discharge Physician: Charlotte Campbell MD     Code Status: Full Code     Admit Date: 2025   Discharge Date:   2025    Disposition:  Home  Time spent arranging discharge: 31 minutes    Discharge Diagnoses:    Syncope  vertigo          Condition at Discharge:  Stable    Hospital Course:   Admitted to the hospital with possible syncopal event likely orthostatic per cardiology no events on telemetry echo did not show any acute pathology cleared for discharge by cardiology patient did have vertigo unable to get MRI brain pacemaker CT head did not show acute stroke symptoms have improved less likely stroke or TIA  Patient was recommended to SNF for rehab patient refused discharge home with home PT and OT    Discharge Exam:    /68   Pulse 82   Temp 97 °F (36.1 °C) (Temporal)   Resp 18   Ht 1.676 m (5' 6\")   Wt 122.5 kg (270 lb)   SpO2 100%   BMI 43.58 kg/m²   General appearance:  Appears comfortable. AAOx3  HEENT: atraumatic, Pupils equal, muscous membranes moist, no masses appreciated  Cardiovascular: Regular rate and rhythm no murmurs appreciated  Respiratory: CTAB no wheezing  Gastrointestinal: Abdomen soft, non-tender, BS+  EXT: no edema  Neurology: no gross focal deficts  Psychiatry: Appropriate affect. Not agitated  Skin: Warm, dry, no rashes appreciated    Discharge Medications:   Current Discharge Medication List        START taking these medications    Details   carvedilol (COREG) 3.125 MG tablet Take 1 tablet by mouth 2 times daily (with meals)  Qty: 60 tablet, Refills: 3           Current Discharge Medication List        Current Discharge Medication List        CONTINUE these medications which have NOT CHANGED    Details   apixaban (ELIQUIS) 5 MG TABS tablet TAKE 1 TABLET BY MOUTH TWICE A DAY  Qty: 180 tablet,

## 2025-05-30 NOTE — PROGRESS NOTES
[facility]    5/28/2025 4:56 PM        Name: Rell Lorenz .              Admitted: 5/26/2025  Primary Care Provider: Karie Larson MD (Tel: [phone])        Subjective:    Dizziness improving    Reviewed interval ancillary notes    Current Medications  sulfur hexafluoride microspheres (LUMASON) 60.7-25 MG injection 2 mL, ONCE PRN  methocarbamol (ROBAXIN) tablet 750 mg, Once  amiodarone (CORDARONE) tablet 200 mg, Nightly  apixaban (ELIQUIS) tablet 5 mg, BID  gabapentin (NEURONTIN) capsule 100 mg, BID  spironolactone (ALDACTONE) tablet 25 mg, Daily  valsartan (DIOVAN) tablet 160 mg, Nightly  sodium chloride flush 0.9 % injection 5-40 mL, 2 times per day  sodium chloride flush 0.9 % injection 10 mL, PRN  0.9 % sodium chloride infusion, PRN  potassium chloride (KLOR-CON M) extended release tablet 40 mEq, PRN   Or  potassium bicarb-citric acid (EFFER-K) effervescent tablet 40 mEq, PRN   Or  potassium chloride 10 mEq/100 mL IVPB (Peripheral Line), PRN  magnesium sulfate 2000 mg in 50 mL IVPB premix, PRN  acetaminophen (TYLENOL) tablet 650 mg, Q6H PRN   Or  acetaminophen (TYLENOL) suppository 650 mg, Q6H PRN  senna (SENOKOT) tablet 8.6 mg, Daily PRN  ondansetron (ZOFRAN) injection 4 mg, Q6H PRN  HYDROcodone-acetaminophen (NORCO) 5-325 MG per tablet 1 tablet, Once PRN  traMADol (ULTRAM) tablet 50 mg, Q6H PRN  carvedilol (COREG) tablet 3.125 mg, BID WC        Objective:  /74   Pulse 77   Temp 97 °F (36.1 °C) (Temporal)   Resp 16   Ht 1.676 m (5' 6\")   Wt 122.8 kg (270 lb 11.6 oz)   SpO2 100%   BMI 43.70 kg/m²     Intake/Output Summary (Last 24 hours) at 5/28/2025 1656  Last data filed at 5/28/2025 1032  Gross per 24 hour   Intake --   Output 120 ml   Net -120 ml      Wt Readings from Last 3 Encounters:   05/28/25 122.8 kg (270 lb 11.6 oz)   01/29/25 130.2 kg (287 lb)   09/19/24 (!) 137 kg (302 lb) 
                                                                  [facility]    5/27/2025 10:09 AM        Name: Rell Lorenz .              Admitted: 5/26/2025  Primary Care Provider: Karie Larson MD (Tel: [phone])        Subjective:    Patient compalining of dizziness to me less passing out feeling  No n./v no new focal weakness    Reviewed interval ancillary notes    Current Medications  amiodarone (CORDARONE) tablet 200 mg, Nightly  apixaban (ELIQUIS) tablet 5 mg, BID  aspirin chewable tablet 81 mg, Daily  gabapentin (NEURONTIN) capsule 100 mg, BID  labetalol (NORMODYNE) tablet 100 mg, 2 times per day  spironolactone (ALDACTONE) tablet 25 mg, Daily  valsartan (DIOVAN) tablet 160 mg, Nightly  sodium chloride flush 0.9 % injection 5-40 mL, 2 times per day  sodium chloride flush 0.9 % injection 10 mL, PRN  0.9 % sodium chloride infusion, PRN  potassium chloride (KLOR-CON M) extended release tablet 40 mEq, PRN   Or  potassium bicarb-citric acid (EFFER-K) effervescent tablet 40 mEq, PRN   Or  potassium chloride 10 mEq/100 mL IVPB (Peripheral Line), PRN  magnesium sulfate 2000 mg in 50 mL IVPB premix, PRN  acetaminophen (TYLENOL) tablet 650 mg, Q6H PRN   Or  acetaminophen (TYLENOL) suppository 650 mg, Q6H PRN  senna (SENOKOT) tablet 8.6 mg, Daily PRN  ondansetron (ZOFRAN) injection 4 mg, Q6H PRN  HYDROcodone-acetaminophen (NORCO) 5-325 MG per tablet 1 tablet, Once PRN        Objective:  BP (!) 148/79   Pulse 84   Temp 97.5 °F (36.4 °C) (Oral)   Resp 16   Ht 1.676 m (5' 6\")   Wt 121.9 kg (268 lb 12.8 oz)   SpO2 99%   BMI 43.39 kg/m²     Intake/Output Summary (Last 24 hours) at 5/27/2025 1009  Last data filed at 5/27/2025 0617  Gross per 24 hour   Intake --   Output 200 ml   Net -200 ml      Wt Readings from Last 3 Encounters:   05/27/25 121.9 kg (268 lb 12.8 oz)   01/29/25 130.2 kg (287 lb)   09/19/24 (!) 137 kg (302 lb)       General appearance:  Appears comfortable. 
  [facility] - Inpatient Rehabilitation Department   Phone: [phone]    Physical Therapy    [x] Initial Evaluation            [] Daily Treatment Note         [] Discharge Summary      Patient: Rell Lorenz   : 1955   MRN: [id number]   Date of Service:  2025  Admitting Diagnosis: Syncope, unspecified syncope type  Current Admission Summary: 70 y.o. female who was transferred from [facility] ER per patient preference due to patient's primary cardiologist being at [facility].  Patient reports she had a syncopal episode 3 days ago.  She reports that she does not remember much about the event.  She is unsure if she had any symptoms prior to losing consciousness.  Patient's family was there and heard her fall.  She denies hitting her head.  She denies any injury.  Patient reports she has had intermittent dizziness since that event and today she almost passed out again prompting her visit to the ED.  Patient denies fever, chest pain, shortness of breath, cough, GI symptoms, urinary symptoms.  She denies any other complaints concerns this time   Past Medical History:  has a past medical history of AICD (automatic cardioverter/defibrillator) present, Arthritis, CHF (congestive heart failure) ([facility]), Gout, Hyperlipidemia, Hypertension, and MI (myocardial infarction) ([facility]).  Past Surgical History:  has a past surgical history that includes Dilation and curettage of uterus; Cardiac defibrillator placement; and Pain management procedure (Bilateral, 2022).  Discharge Recommendations: Rell Lorenz scored a 17/24 on the AM-PAC short mobility form. Current research shows that an AM-PAC score of 17 or less is typically not associated with a discharge to the patient's home setting. Based on the patient's AM-PAC score and their current functional mobility deficits, it is recommended that the patient have 3-5 sessions per week of Physical Therapy at d/c to increase the patient's 
 Patient admitted to room 5912 from [facility]. Side rails up x2. Patient has an order for telemetry. Bed is locked and in lowest position. Call light placed in patient reach. Patient explained the routine of the hospital, including but not limited to lab work, vital signs, hourly rounding, etc. Care plans and education updated.     Temp 97.5 °F (36.4 °C)   Ht 1.676 m (5' 6\")   Wt 120.8 kg (266 lb 6.1 oz)   BMI 43.00 kg/m²     Most recent set of vitals as shown.     Patient has an LDA that does not require CHG wipes, including possible a surgery incision, leos catheter, or a central line.     4 Eyes Skin Assessment     The patient is being assess for   Admission    I agree that 2 RN's have performed a thorough Head to Toe Skin Assessment on the patient. ALL assessment sites listed below have been assessed.      Areas assessed for pressure by both nurses:   [x]   Head, Face, and Ears   [x]   Shoulders, Back, and Chest, Abdomen  [x]   Arms, Elbows, and Hands   [x]   Coccyx, Sacrum, and Ischium  [x]   Legs, Feet, and Heels                       **SHARE this note so that the co-signing nurse is able to place an eSignature**    Co-signer eSignature: Jessica Crowder RN    Does the Patient have Skin Breakdown related to pressure?  No              Jeremy Prevention initiated:  No   Wound Care Orders initiated:  No      [facility] nurse consulted for Pressure Injury (Stage 3,4, Unstageable, DTI, NWPT, Complex wounds)and New or Established Ostomies:  No      Primary Nurse eSignature: Electronically signed by Vivian Baig RN on 5/26/25 at 11:33 PM EDT        
CLINICAL PHARMACY NOTE: MEDS TO BEDS    Total # of Prescriptions Filled: 2   The following medications were delivered to the patient:  Carvedilol 3.125 mg   Tramadol HCL 50 mg     Additional Documentation: Ja/Yesi approved to deliver medication to patient room=signed  Ade Traore CPhT     
[facility]   Progress notes  [phone]    Chief complaint: loss of consciousness           History of Present Illness:  Rell Lorenz is a 70 y.o. patient who presented to the hospital with complaints of loss of consciousness. I have been asked to provide consultation regarding further management and testing.  The patient reports that she has been feeling well from a heart perspective. No chest pain or pressure. She reports that she can walk without stopping, but has slowed down over the last few years. No exercise. She reports that she came to the hospital for a syncopal episode. She reports that she was using the commode and stood up. She walked over to her bed. She states that she could feel palpitations and \"like she was going down.\" She fell on her bedside table and sustained an injury to her right arm. She denies any chest pain or pressure. No post ictal symptoms. No loss of bowel or bladder.     Subjective: no acute events overnight. Patient reports sciatica    Past Medical History:   has a past medical history of AICD (automatic cardioverter/defibrillator) present, Arthritis, CHF (congestive heart failure) ([facility]), Gout, Hyperlipidemia, Hypertension, and MI (myocardial infarction) ([facility]).    Surgical History:   has a past surgical history that includes Dilation and curettage of uterus; Cardiac defibrillator placement; and Pain management procedure (Bilateral, 6/29/2022).     Social History:   reports that she has never smoked. She has never used smokeless tobacco. She reports that she does not drink alcohol and does not use drugs.       Home Medications:  Were reviewed and are listed in nursing record. and/or listed below  Prior to Admission medications    Medication Sig Start Date End Date Taking? Authorizing Provider   carvedilol (COREG) 3.125 MG tablet Take 1 tablet by mouth 2 times daily (with meals) 5/29/25  Yes Charlotte Campbell MD   traMADol (ULTRAM) 50 MG tablet Take 1 tablet by mouth every 
Discharge instructions reviewed with patient. Verbalized understanding. IV dc'd without complications. Tele box returned to nurse's station. Patient to be taken to front entrance via wheelchair.     
[facility] [place] [facility]   Progress notes  [phone]    Chief complaint: loss of consciousness           History of Present Illness:  Rell Lorenz is a 70 y.o. patient who presented to the hospital with complaints of loss of consciousness. I have been asked to provide consultation regarding further management and testing.  The patient reports that she has been feeling well from a heart perspective. No chest pain or pressure. She reports that she can walk without stopping, but has slowed down over the last few years. No exercise. She reports that she came to the hospital for a syncopal episode. She reports that she was using the commode and stood up. She walked over to her bed. She states that she could feel palpitations and \"like she was going down.\" She fell on her bedside table and sustained an injury to her right arm. She denies any chest pain or pressure. No post ictal symptoms. No loss of bowel or bladder.     Subjective: no acute events overnight. Patient reports sciatica    Past Medical History:   has a past medical history of AICD (automatic cardioverter/defibrillator) present, Arthritis, CHF (congestive heart failure) ([facility]), Gout, Hyperlipidemia, Hypertension, and MI (myocardial infarction) ([facility]).    Surgical History:   has a past surgical history that includes Dilation and curettage of uterus; Cardiac defibrillator placement; and Pain management procedure (Bilateral, 6/29/2022).     Social History:   reports that she has never smoked. She has never used smokeless tobacco. She reports that she does not drink alcohol and does not use drugs.       Home Medications:  Were reviewed and are listed in nursing record. and/or listed below  Prior to Admission medications    Medication Sig Start Date End Date Taking? Authorizing Provider   apixaban (ELIQUIS) 5 MG TABS tablet TAKE 1 TABLET BY MOUTH TWICE A DAY 4/23/25  Yes Long Reed MD   Semaglutide-Weight Management (WEGOVY) 2.4 MG/0.75ML SOAJ SC injection Inject 
Night shift assessment completed. Routine vitals have been obtained. Scheduled medications given. Patient is awake, alert and oriented/ talking to staff. Respirations are easy and unlabored with no c/o SOB. Skin has been assessed per writer. IV site is C/D/I. Patient does not appear to be in distress. Call light within reach. Standard safety measures are in place. All needs have been met at this time.   
Patient is AXOX 4. Patient tolerating ADULT DIET; Regular; Low Fat/Low Chol/High Fiber/WILBER and has no c/o N/V. Shift assessment completed, VSS.   Vitals:    05/28/25 0230   BP: 119/61   Pulse: 66   Resp: 16   Temp: 97.8 °F (36.6 °C)   SpO2: 98%   Scheduled medications administered.  Careplan and education was discussed with Rell Lorenz and mutually agreed upon. Patient voiced no concerns at this time. Does not appear to be in distress. Call light in reach, safety precautions in place.    
Per [employer] Verify - patients pacemaker is NOT MR Conditional. Patients system does not have MR conditional leads or configuration. RN aware. Please call with any questions.     CRISTAL Cast,R.T.(MR)  
Shift Summary    Admission Diagnosis: Syncope, unspecified syncope type    Shift Events: Admitted from [facility]    Vitals:  Vitals:    05/26/25 2311 05/27/25 0435 05/27/25 0505 05/27/25 0600   BP:  (!) 145/91     Pulse:  76     Resp:  18 16    Temp:  97.8 °F (36.6 °C)     TempSrc:  Temporal     SpO2:  96%     Weight: 120.8 kg (266 lb 6.1 oz)   121.9 kg (268 lb 12.8 oz)   Height: 1.676 m (5' 6\")           room air    WEIGHT: Admit Weight - Scale: 120.8 kg (266 lb 6.1 oz)      Today  Weight - Scale: 121.9 kg (268 lb 12.8 oz)    Tele:Normal sinus      IV/Line:  Peripheral IV 05/27/25 Left Antecubital (Active)   Site Assessment Clean, dry & intact 05/27/25 0615   Line Status Capped;Normal saline locked 05/27/25 0615   Line Care Cap changed 05/27/25 0000   Phlebitis Assessment No symptoms 05/27/25 0615   Infiltration Assessment 0 05/27/25 0615   Dressing Status Clean, dry & intact 05/27/25 0615   Dressing Type Transparent 05/27/25 0615       Peripheral IV 05/27/25 Right Antecubital (Active)   Site Assessment Clean, dry & intact 05/27/25 0615   Line Status Normal saline locked 05/27/25 0615   Line Care Cap changed 05/27/25 0000   Phlebitis Assessment No symptoms 05/27/25 0615   Infiltration Assessment 0 05/27/25 0615   Dressing Status Clean, dry & intact 05/27/25 0615   Dressing Type Transparent 05/27/25 0615          Drains/Dumont:  External Urinary Catheter (Active)   Output (mL) 200 mL 05/27/25 0617       Output by Drain (mL) 05/25/25 0701 - 05/25/25 1900 05/25/25 1901 - 05/26/25 0700 05/26/25 0701 - 05/26/25 1900 05/26/25 1901 - 05/27/25 0618   Requested LDAs do not have output data documented.       Neuro:   oriented to time, place, person and situation    I/O:   I/O this shift:  In: -   Out: 200 [Urine:200]              
support  Static Standing Balance: fair (-): maintains balance at CGA with use of UE support  Dynamic Standing Balance: fair (-): maintains balance at CGA with use of UE support  Comments:    Other Therapeutic Interventions    Functional Outcomes  AM-PAC Inpatient Daily Activity Raw Score: 13                                    Cognition  Safety Judgement: decreased awareness of need for safety  Orientation:    alert and oriented x 4  Command Following:   WFL     Education  Barriers To Learning: none  Patient Education: patient educated on goals, OT role and benefits, plan of care, ADL adaptive strategies, discharge recommendations  Learning Assessment:  patient verbalizes and demonstrates understanding    Assessment  Activity Tolerance: fair, limited due to pain  Impairments Requiring Therapeutic Intervention: decreased functional mobility, decreased ADL status, decreased strength, decreased endurance, decreased balance, increased pain  Prognosis: good  Clinical Assessment: The patient is a 70 y.o. female who presents below their baseline level of function due to above deficits, associated with Syncope, unspecified syncope type. Typically, pt is requiring assistance for all IADL tasks and bathing and dressing. Pt typically is independent with use of RW for ambulation and transfers. Currently, pt is requiring SBA for grooming, UE bathing, UE dressing and performs these tasks seated. Pt is dependent for LE bathing and dressing. Pt uses a RW for ambulation and is CGA for ambulation, transfers, and standing/sitting balance. Continued OT indicated in order to promote return to PLOF   Safety Interventions: patient left in chair, chair alarm in place, call light within reach, gait belt, patient at risk for falls, and nurse notified    Plan  Frequency: 3-5 x/per week  Current Treatment Recommendations: strengthening, balance training, functional mobility training, endurance training, and ADL/self-care

## 2025-06-02 ENCOUNTER — HOSPITAL ENCOUNTER (INPATIENT)
Age: 70
LOS: 1 days | Discharge: NURSING, REHAB OR LONG-TERM CARE | DRG: 563 | End: 2025-06-06
Attending: EMERGENCY MEDICINE | Admitting: INTERNAL MEDICINE
Payer: MEDICARE

## 2025-06-02 ENCOUNTER — APPOINTMENT (OUTPATIENT)
Dept: CT IMAGING | Age: 70
DRG: 563 | End: 2025-06-02
Attending: EMERGENCY MEDICINE
Payer: MEDICARE

## 2025-06-02 DIAGNOSIS — M54.17 LUMBOSACRAL RADICULOPATHY: ICD-10-CM

## 2025-06-02 DIAGNOSIS — M54.10 BACK PAIN WITH RADICULOPATHY: ICD-10-CM

## 2025-06-02 DIAGNOSIS — R79.89 ELEVATED TROPONIN: ICD-10-CM

## 2025-06-02 DIAGNOSIS — M54.16 LUMBAR RADICULOPATHY: ICD-10-CM

## 2025-06-02 DIAGNOSIS — R07.9 CHEST PAIN, UNSPECIFIED TYPE: Primary | ICD-10-CM

## 2025-06-02 DIAGNOSIS — I20.9 ANGINA PECTORIS: ICD-10-CM

## 2025-06-02 LAB
ALBUMIN SERPL-MCNC: 4 G/DL (ref 3.4–5)
ALBUMIN/GLOB SERPL: 1.1 {RATIO} (ref 1.1–2.2)
ALP SERPL-CCNC: 77 U/L (ref 40–129)
ALT SERPL-CCNC: 20 U/L (ref 10–40)
ANION GAP SERPL CALCULATED.3IONS-SCNC: 14 MMOL/L (ref 3–16)
APTT BLD: 29.6 SEC (ref 22.1–36.4)
AST SERPL-CCNC: 35 U/L (ref 15–37)
BASOPHILS # BLD: 0.1 K/UL (ref 0–0.2)
BASOPHILS NFR BLD: 0.8 %
BILIRUB SERPL-MCNC: 0.6 MG/DL (ref 0–1)
BUN SERPL-MCNC: 6 MG/DL (ref 7–20)
CALCIUM SERPL-MCNC: 10.4 MG/DL (ref 8.3–10.6)
CHLORIDE SERPL-SCNC: 102 MMOL/L (ref 99–110)
CO2 SERPL-SCNC: 24 MMOL/L (ref 21–32)
CREAT SERPL-MCNC: 0.9 MG/DL (ref 0.6–1.2)
DEPRECATED RDW RBC AUTO: 16.5 % (ref 12.4–15.4)
EKG ATRIAL RATE: 89 BPM
EKG DIAGNOSIS: NORMAL
EKG P AXIS: 82 DEGREES
EKG P-R INTERVAL: 224 MS
EKG Q-T INTERVAL: 412 MS
EKG QRS DURATION: 106 MS
EKG QTC CALCULATION (BAZETT): 501 MS
EKG R AXIS: -32 DEGREES
EKG T AXIS: 90 DEGREES
EKG VENTRICULAR RATE: 89 BPM
EOSINOPHIL # BLD: 0.1 K/UL (ref 0–0.6)
EOSINOPHIL NFR BLD: 0.9 %
GFR SERPLBLD CREATININE-BSD FMLA CKD-EPI: 68 ML/MIN/{1.73_M2}
GLUCOSE SERPL-MCNC: 108 MG/DL (ref 70–99)
HCT VFR BLD AUTO: 46.3 % (ref 36–48)
HGB BLD-MCNC: 14.8 G/DL (ref 12–16)
INR PPP: 1.3 (ref 0.85–1.15)
LYMPHOCYTES # BLD: 2 K/UL (ref 1–5.1)
LYMPHOCYTES NFR BLD: 17.8 %
MCH RBC QN AUTO: 28.5 PG (ref 26–34)
MCHC RBC AUTO-ENTMCNC: 31.9 G/DL (ref 31–36)
MCV RBC AUTO: 89.2 FL (ref 80–100)
MONOCYTES # BLD: 0.8 K/UL (ref 0–1.3)
MONOCYTES NFR BLD: 7.4 %
NEUTROPHILS # BLD: 8.1 K/UL (ref 1.7–7.7)
NEUTROPHILS NFR BLD: 73.1 %
PLATELET # BLD AUTO: 250 K/UL (ref 135–450)
PMV BLD AUTO: 8.9 FL (ref 5–10.5)
POTASSIUM SERPL-SCNC: 4.2 MMOL/L (ref 3.5–5.1)
PROT SERPL-MCNC: 7.8 G/DL (ref 6.4–8.2)
PROTHROMBIN TIME: 16.4 SEC (ref 11.9–14.9)
RBC # BLD AUTO: 5.19 M/UL (ref 4–5.2)
SODIUM SERPL-SCNC: 140 MMOL/L (ref 136–145)
TROPONIN, HIGH SENSITIVITY: 23 NG/L (ref 0–14)
TROPONIN, HIGH SENSITIVITY: 23 NG/L (ref 0–14)
TROPONIN, HIGH SENSITIVITY: 24 NG/L (ref 0–14)
TROPONIN, HIGH SENSITIVITY: 25 NG/L (ref 0–14)
TROPONIN, HIGH SENSITIVITY: 25 NG/L (ref 0–14)
WBC # BLD AUTO: 11.1 K/UL (ref 4–11)

## 2025-06-02 PROCEDURE — 85730 THROMBOPLASTIN TIME PARTIAL: CPT

## 2025-06-02 PROCEDURE — 85610 PROTHROMBIN TIME: CPT

## 2025-06-02 PROCEDURE — 2500000003 HC RX 250 WO HCPCS: Performed by: HOSPITALIST

## 2025-06-02 PROCEDURE — 96376 TX/PRO/DX INJ SAME DRUG ADON: CPT

## 2025-06-02 PROCEDURE — 6360000002 HC RX W HCPCS: Performed by: HOSPITALIST

## 2025-06-02 PROCEDURE — 93010 ELECTROCARDIOGRAM REPORT: CPT | Performed by: INTERNAL MEDICINE

## 2025-06-02 PROCEDURE — 6360000002 HC RX W HCPCS: Performed by: EMERGENCY MEDICINE

## 2025-06-02 PROCEDURE — 6360000004 HC RX CONTRAST MEDICATION: Performed by: EMERGENCY MEDICINE

## 2025-06-02 PROCEDURE — 96375 TX/PRO/DX INJ NEW DRUG ADDON: CPT

## 2025-06-02 PROCEDURE — 36415 COLL VENOUS BLD VENIPUNCTURE: CPT

## 2025-06-02 PROCEDURE — 96374 THER/PROPH/DIAG INJ IV PUSH: CPT

## 2025-06-02 PROCEDURE — 6370000000 HC RX 637 (ALT 250 FOR IP): Performed by: EMERGENCY MEDICINE

## 2025-06-02 PROCEDURE — 6370000000 HC RX 637 (ALT 250 FOR IP): Performed by: HOSPITALIST

## 2025-06-02 PROCEDURE — G0378 HOSPITAL OBSERVATION PER HR: HCPCS

## 2025-06-02 PROCEDURE — 93005 ELECTROCARDIOGRAM TRACING: CPT | Performed by: EMERGENCY MEDICINE

## 2025-06-02 PROCEDURE — 99285 EMERGENCY DEPT VISIT HI MDM: CPT

## 2025-06-02 PROCEDURE — 74174 CTA ABD&PLVS W/CONTRAST: CPT

## 2025-06-02 PROCEDURE — 80053 COMPREHEN METABOLIC PANEL: CPT

## 2025-06-02 PROCEDURE — 85025 COMPLETE CBC W/AUTO DIFF WBC: CPT

## 2025-06-02 PROCEDURE — 84484 ASSAY OF TROPONIN QUANT: CPT

## 2025-06-02 RX ORDER — TRAMADOL HYDROCHLORIDE 50 MG/1
50 TABLET ORAL EVERY 6 HOURS PRN
Status: DISCONTINUED | OUTPATIENT
Start: 2025-06-02 | End: 2025-06-03

## 2025-06-02 RX ORDER — CARVEDILOL 3.12 MG/1
3.12 TABLET ORAL 2 TIMES DAILY WITH MEALS
Status: DISCONTINUED | OUTPATIENT
Start: 2025-06-02 | End: 2025-06-06 | Stop reason: HOSPADM

## 2025-06-02 RX ORDER — FENTANYL CITRATE 50 UG/ML
25 INJECTION, SOLUTION INTRAMUSCULAR; INTRAVENOUS ONCE
Refills: 0 | Status: COMPLETED | OUTPATIENT
Start: 2025-06-02 | End: 2025-06-02

## 2025-06-02 RX ORDER — ASPIRIN 81 MG/1
81 TABLET, CHEWABLE ORAL DAILY
Status: DISCONTINUED | OUTPATIENT
Start: 2025-06-03 | End: 2025-06-06 | Stop reason: HOSPADM

## 2025-06-02 RX ORDER — ACETAMINOPHEN 325 MG/1
650 TABLET ORAL EVERY 6 HOURS PRN
Status: DISCONTINUED | OUTPATIENT
Start: 2025-06-02 | End: 2025-06-06 | Stop reason: HOSPADM

## 2025-06-02 RX ORDER — VALSARTAN 160 MG/1
160 TABLET ORAL NIGHTLY
Status: DISCONTINUED | OUTPATIENT
Start: 2025-06-02 | End: 2025-06-06

## 2025-06-02 RX ORDER — SODIUM CHLORIDE 0.9 % (FLUSH) 0.9 %
5-40 SYRINGE (ML) INJECTION EVERY 12 HOURS SCHEDULED
Status: DISCONTINUED | OUTPATIENT
Start: 2025-06-02 | End: 2025-06-06 | Stop reason: HOSPADM

## 2025-06-02 RX ORDER — SPIRONOLACTONE 25 MG/1
25 TABLET ORAL DAILY
Status: DISCONTINUED | OUTPATIENT
Start: 2025-06-02 | End: 2025-06-06

## 2025-06-02 RX ORDER — ROSUVASTATIN CALCIUM 20 MG/1
40 TABLET, COATED ORAL DAILY
Status: DISCONTINUED | OUTPATIENT
Start: 2025-06-02 | End: 2025-06-06 | Stop reason: HOSPADM

## 2025-06-02 RX ORDER — MORPHINE SULFATE 2 MG/ML
2 INJECTION, SOLUTION INTRAMUSCULAR; INTRAVENOUS ONCE
Status: COMPLETED | OUTPATIENT
Start: 2025-06-02 | End: 2025-06-02

## 2025-06-02 RX ORDER — POLYETHYLENE GLYCOL 3350 17 G/17G
17 POWDER, FOR SOLUTION ORAL DAILY PRN
Status: DISCONTINUED | OUTPATIENT
Start: 2025-06-02 | End: 2025-06-06 | Stop reason: HOSPADM

## 2025-06-02 RX ORDER — SODIUM CHLORIDE 0.9 % (FLUSH) 0.9 %
5-40 SYRINGE (ML) INJECTION PRN
Status: DISCONTINUED | OUTPATIENT
Start: 2025-06-02 | End: 2025-06-06 | Stop reason: HOSPADM

## 2025-06-02 RX ORDER — MAGNESIUM SULFATE IN WATER 40 MG/ML
2000 INJECTION, SOLUTION INTRAVENOUS PRN
Status: DISCONTINUED | OUTPATIENT
Start: 2025-06-02 | End: 2025-06-06 | Stop reason: HOSPADM

## 2025-06-02 RX ORDER — SODIUM CHLORIDE 9 MG/ML
INJECTION, SOLUTION INTRAVENOUS PRN
Status: DISCONTINUED | OUTPATIENT
Start: 2025-06-02 | End: 2025-06-06 | Stop reason: HOSPADM

## 2025-06-02 RX ORDER — ONDANSETRON 2 MG/ML
4 INJECTION INTRAMUSCULAR; INTRAVENOUS EVERY 6 HOURS PRN
Status: DISCONTINUED | OUTPATIENT
Start: 2025-06-02 | End: 2025-06-06 | Stop reason: HOSPADM

## 2025-06-02 RX ORDER — MORPHINE SULFATE 4 MG/ML
4 INJECTION, SOLUTION INTRAMUSCULAR; INTRAVENOUS EVERY 4 HOURS PRN
Status: DISCONTINUED | OUTPATIENT
Start: 2025-06-02 | End: 2025-06-03

## 2025-06-02 RX ORDER — POTASSIUM CHLORIDE 1500 MG/1
40 TABLET, EXTENDED RELEASE ORAL PRN
Status: DISCONTINUED | OUTPATIENT
Start: 2025-06-02 | End: 2025-06-06 | Stop reason: HOSPADM

## 2025-06-02 RX ORDER — AMIODARONE HYDROCHLORIDE 200 MG/1
200 TABLET ORAL NIGHTLY
Status: DISCONTINUED | OUTPATIENT
Start: 2025-06-02 | End: 2025-06-06 | Stop reason: HOSPADM

## 2025-06-02 RX ORDER — NITROGLYCERIN 0.4 MG/1
0.4 TABLET SUBLINGUAL EVERY 5 MIN PRN
Status: DISCONTINUED | OUTPATIENT
Start: 2025-06-02 | End: 2025-06-06 | Stop reason: HOSPADM

## 2025-06-02 RX ORDER — POTASSIUM CHLORIDE 7.45 MG/ML
10 INJECTION INTRAVENOUS PRN
Status: DISCONTINUED | OUTPATIENT
Start: 2025-06-02 | End: 2025-06-06 | Stop reason: HOSPADM

## 2025-06-02 RX ORDER — ASPIRIN 325 MG
325 TABLET ORAL ONCE
Status: COMPLETED | OUTPATIENT
Start: 2025-06-02 | End: 2025-06-02

## 2025-06-02 RX ORDER — ONDANSETRON 4 MG/1
4 TABLET, ORALLY DISINTEGRATING ORAL EVERY 8 HOURS PRN
Status: DISCONTINUED | OUTPATIENT
Start: 2025-06-02 | End: 2025-06-06 | Stop reason: HOSPADM

## 2025-06-02 RX ORDER — ACETAMINOPHEN 650 MG/1
650 SUPPOSITORY RECTAL EVERY 6 HOURS PRN
Status: DISCONTINUED | OUTPATIENT
Start: 2025-06-02 | End: 2025-06-06 | Stop reason: HOSPADM

## 2025-06-02 RX ADMIN — APIXABAN 5 MG: 5 TABLET, FILM COATED ORAL at 20:33

## 2025-06-02 RX ADMIN — MORPHINE SULFATE 4 MG: 4 INJECTION INTRAVENOUS at 17:44

## 2025-06-02 RX ADMIN — MORPHINE SULFATE 4 MG: 4 INJECTION INTRAVENOUS at 22:40

## 2025-06-02 RX ADMIN — CARVEDILOL 3.12 MG: 3.12 TABLET, FILM COATED ORAL at 17:45

## 2025-06-02 RX ADMIN — ROSUVASTATIN CALCIUM 40 MG: 20 TABLET, FILM COATED ORAL at 17:45

## 2025-06-02 RX ADMIN — IOHEXOL 75 ML: 350 INJECTION, SOLUTION INTRAVENOUS at 14:54

## 2025-06-02 RX ADMIN — FENTANYL CITRATE 25 MCG: 50 INJECTION INTRAMUSCULAR; INTRAVENOUS at 15:08

## 2025-06-02 RX ADMIN — ASPIRIN 325 MG: 325 TABLET ORAL at 16:27

## 2025-06-02 RX ADMIN — VALSARTAN 160 MG: 160 TABLET, FILM COATED ORAL at 20:32

## 2025-06-02 RX ADMIN — AMIODARONE HYDROCHLORIDE 200 MG: 200 TABLET ORAL at 20:33

## 2025-06-02 RX ADMIN — SODIUM CHLORIDE, PRESERVATIVE FREE 10 ML: 5 INJECTION INTRAVENOUS at 20:33

## 2025-06-02 RX ADMIN — SODIUM CHLORIDE, PRESERVATIVE FREE 10 ML: 5 INJECTION INTRAVENOUS at 17:45

## 2025-06-02 RX ADMIN — MORPHINE SULFATE 2 MG: 2 INJECTION, SOLUTION INTRAMUSCULAR; INTRAVENOUS at 16:27

## 2025-06-02 RX ADMIN — SPIRONOLACTONE 25 MG: 25 TABLET ORAL at 17:45

## 2025-06-02 ASSESSMENT — ENCOUNTER SYMPTOMS
GASTROINTESTINAL NEGATIVE: 1
ABDOMINAL PAIN: 0
SORE THROAT: 0
SHORTNESS OF BREATH: 1

## 2025-06-02 ASSESSMENT — PAIN DESCRIPTION - ORIENTATION
ORIENTATION: LEFT
ORIENTATION: ANTERIOR
ORIENTATION: ANTERIOR

## 2025-06-02 ASSESSMENT — PAIN SCALES - GENERAL
PAINLEVEL_OUTOF10: 8
PAINLEVEL_OUTOF10: 0
PAINLEVEL_OUTOF10: 0
PAINLEVEL_OUTOF10: 8
PAINLEVEL_OUTOF10: 7

## 2025-06-02 ASSESSMENT — PAIN DESCRIPTION - DESCRIPTORS
DESCRIPTORS: ACHING;BURNING
DESCRIPTORS: ACHING;SHARP
DESCRIPTORS: ACHING;BURNING

## 2025-06-02 ASSESSMENT — PAIN DESCRIPTION - LOCATION
LOCATION: CHEST
LOCATION: LEG;BUTTOCKS
LOCATION: CHEST

## 2025-06-02 ASSESSMENT — HEART SCORE: ECG: NON-SPECIFC REPOLARIZATION DISTURBANCE/LBTB/PM

## 2025-06-02 ASSESSMENT — PAIN DESCRIPTION - FREQUENCY: FREQUENCY: CONTINUOUS

## 2025-06-02 ASSESSMENT — PAIN SCALES - WONG BAKER: WONGBAKER_NUMERICALRESPONSE: NO HURT

## 2025-06-02 NOTE — H&P
HOSPITALISTS HISTORY AND PHYSICAL    6/2/2025 4:21 PM    Patient Information:  RELL LORENZ is a 70 y.o. female [id number]  PCP:  Karie Larson MD (Tel: [phone] )    Chief complaint:    Chief Complaint   Patient presents with    Chest Pain     Pt to ED c/io midsternal chest pain that began yesterday while doing nothing. Pt reports SOB. Pt denies nausea. Pt reports history of MI 11/2024. Pt is on blood thinners. Pt recently admission for a-fib.         History of Present Illness:  Rell Lorenz is a 70 y.o. female who presented with presents to ER with complaints of chest pain.  Severe midsternal pain began yesterday initially came on at rest but now worse with exertion.  Associate with shortness of breath radiating to the back and left arm.  Patient with history of previous MI back in 2024 recently admitted for A-fib chest pain went underwent echocardiogram but no stress test is on aspirin Eliquis    REVIEW OF SYSTEMS:   Constitutional: Negative for fever,chills or night sweats  ENT: Negative for rhinorrhea, epistaxis, hoarseness, sore throat.  Respiratory: Negative for shortness of breath,wheezing  Cardiovascular: Negative for chest pain, palpitations   Gastrointestinal: Negative for nausea, vomiting, diarrhea  Genitourinary: Negative for polyuria, dysuria   Hematologic/Lymphatic: Negative for bleeding tendency, easy bruising  Musculoskeletal: Negative for myalgias and arthralgias  Neurologic: Negative for confusion,dysarthria.  Skin: Negative for itching,rash, good capillary refill.   Psychiatric: Negative for depression,anxiety, agitation.  Endocrine: Negative for polydipsia,polyuria,heat /cold intolerance.    Past Medical History:   has a past medical history of AICD (automatic cardioverter/defibrillator) present, Arthritis, CHF (congestive heart failure)  aspirin 81 MG chewable tablet Take 1 tablet by mouth daily         Allergies:  Allergies   Allergen Reactions    Atorvastatin Myalgia        Social History:   reports that she has never smoked. She has never used smokeless tobacco. She reports that she does not drink alcohol and does not use drugs.     Family History:  family history includes Cancer in her father; Diabetes in her mother; Heart Disease in her mother; High Blood Pressure in her mother; High Cholesterol in her mother. ,     Physical Exam:  BP (!) 148/77   Pulse 91   Temp 98.9 °F (37.2 °C) (Oral)   Resp 19   Ht 1.676 m (5' 6\")   Wt 122.5 kg (270 lb)   SpO2 97%   BMI 43.58 kg/m²     General appearance:  Appears comfortable. Well nourished  Eyes: Sclera clear, pupils equal  ENT: Moist mucus membranes, no thrush. Trachea midline.  Cardiovascular: Regular rhythm, normal S1, S2. No murmur, gallop, rub. No edema in lower extremities  Respiratory: Clear to auscultation bilaterally, no wheeze, good inspiratory effort  Gastrointestinal: Abdomen soft, non-tender, not distended, normal bowel sounds  Musculoskeletal: No cyanosis in digits, neck supple  Neurology: Cranial nerves grossly intact. Alert and oriented in time, place and person. No speech or motor deficits  Psychiatry: Appropriate affect. Not agitated  Skin: Warm, dry, normal turgor, no rash    Labs:  CBC:   Lab Results   Component Value Date/Time    WBC 11.1 06/02/2025 01:15 PM    RBC 5.19 06/02/2025 01:15 PM    HGB 14.8 06/02/2025 01:15 PM    HCT 46.3 06/02/2025 01:15 PM    MCV 89.2 06/02/2025 01:15 PM    MCH 28.5 06/02/2025 01:15 PM    MCHC 31.9 06/02/2025 01:15 PM    RDW 16.5 06/02/2025 01:15 PM     06/02/2025 01:15 PM    MPV 8.9 06/02/2025 01:15 PM     BMP:    Lab Results   Component Value Date/Time     06/02/2025 01:15 PM    K 4.2 06/02/2025 01:15 PM     06/02/2025 01:15 PM    CO2 24 06/02/2025 01:15 PM    BUN 6 06/02/2025 01:15 PM    CREATININE 0.9 06/02/2025 01:15 PM

## 2025-06-02 NOTE — ED PROVIDER NOTES
[facility] EMERGENCY DEPARTMENT  EMERGENCY DEPARTMENT ENCOUNTER        Pt Name: Rell Lorenz  MRN: [id number]  Birthdate 1955  Date of evaluation: 6/2/2025  Provider: Magan Agee MD  PCP: Karie Larson MD  Note Started: 1:00 PM EDT 6/2/25    CHIEF COMPLAINT       Chief Complaint   Patient presents with    Chest Pain     Pt to ED c/io midsternal chest pain that began yesterday while doing nothing. Pt reports SOB. Pt denies nausea. Pt reports history of MI 11/2024. Pt is on blood thinners. Pt recently admission for a-fib.        HISTORY OF PRESENT ILLNESS: 1 or more Elements     History from : Patient    Limitations to history : None    Rell Lorenz is a 70 y.o. female who presents for severe midsternal chest pain that began yesterday initially came on at rest but was worse with exertion associated with shortness of breath no nausea radiates to her left arm radiates to her back.  It is worsening history of prior MI in November 2024 was recently on blood thinners.  Patient was just admitted for A-fib and chest pain but states that now her symptoms are worse.  She did not take aspirin today.  Nothing else seems to make her symptoms better.    Nursing Notes were all reviewed and agreed with or any disagreements were addressed in the HPI.    REVIEW OF SYSTEMS :      Review of Systems   Constitutional: Negative.    HENT:  Negative for congestion and sore throat.    Respiratory:  Positive for shortness of breath.    Cardiovascular:  Positive for chest pain.   Gastrointestinal: Negative.  Negative for abdominal pain.   Genitourinary: Negative.    Neurological: Negative.  Negative for headaches.       Positives and Pertinent negatives as per HPI.     SURGICAL HISTORY     Past Surgical History:   Procedure Laterality Date    CARDIAC DEFIBRILLATOR PLACEMENT      DILATION AND CURETTAGE OF UTERUS      PAIN MANAGEMENT PROCEDURE Bilateral 6/29/2022    BILATERAL L5 TRANSFORAMINAL EPIDURAL STEROID INJECTION  Labs and Imaging results were explained to the patient in detail, including explanation of what these results mean. All treatment and disposition options including admission or discharge were discussed with the patient and a treatment plan with the patient's best short and long term care was made in collaboration with the patient.         I am the Primary Clinician of Record.    I PERSONALLY SAW THE PATIENT AND PERFORMED A SUBSTANTIVE PORTION OF THE VISIT INCLUDING ALL ASPECTS OF THE MEDICAL DECISION MAKING PROCESS.    FINAL IMPRESSION      1. Chest pain, unspecified type    2. Elevated troponin          DISPOSITION/PLAN     DISPOSITION Decision To Admit 06/02/2025 04:13:51 PM   DISPOSITION CONDITION Stable           PATIENT REFERRED TO:  No follow-up provider specified.    DISCHARGE MEDICATIONS:  New Prescriptions    No medications on file       DISCONTINUED MEDICATIONS:  Discontinued Medications    No medications on file              (Please note that portions of this note were completed with a voice recognition program.  Efforts were made to edit the dictations but occasionally words are mis-transcribed.)    Magan Agee MD (electronically signed)            Magan Agee MD  06/02/25 4087

## 2025-06-02 NOTE — PROGRESS NOTES
4 Eyes Skin Assessment     NAME:  Rell Lorenz  YOB: 1955  MEDICAL RECORD NUMBER:  [id number]    The patient is being assessed for  Admission    I agree that at least one RN has performed a thorough Head to Toe Skin Assessment on the patient. ALL assessment sites listed below have been assessed.      Areas assessed by both nurses:    Head, Face, Ears, Shoulders, Back, Chest, Arms, Elbows, Hands, Sacrum. Buttock, Coccyx, Ischium, Legs. Feet and Heels, and Under Medical Devices         Does the Patient have a Wound? Yes wound(s) were present on assessment. LDA wound assessment was Initiated and completed by RN small speck sized opening on left buttock.       Jeremy Prevention initiated by RN: No  Wound Care Orders initiated by RN: No    Pressure Injury (Stage 3,4, Unstageable, DTI, NWPT, and Complex wounds) if present, place Wound referral order by RN under : No    New Ostomies, if present place, Ostomy referral order under : No     Nurse 1 eSignature: Electronically signed by RAMESH CHILDERS RN on 6/2/25 at 6:31 PM EDT    **SHARE this note so that the co-signing nurse can place an eSignature**    Nurse 2 eSignature: Electronically signed by Faiza Rae RN on 6/3/25 at 11:32 AM EDT

## 2025-06-02 NOTE — PROGRESS NOTES
6/1/25: Patient was unsure what medications she is taking, however patient did report taking meds prior to ED arrival.

## 2025-06-03 ENCOUNTER — APPOINTMENT (OUTPATIENT)
Dept: CT IMAGING | Age: 70
DRG: 563 | End: 2025-06-03
Payer: MEDICARE

## 2025-06-03 LAB
ANION GAP SERPL CALCULATED.3IONS-SCNC: 13 MMOL/L (ref 3–16)
BASOPHILS # BLD: 0.1 K/UL (ref 0–0.2)
BASOPHILS NFR BLD: 1.2 %
BUN SERPL-MCNC: 7 MG/DL (ref 7–20)
CALCIUM SERPL-MCNC: 9.8 MG/DL (ref 8.3–10.6)
CHLORIDE SERPL-SCNC: 102 MMOL/L (ref 99–110)
CO2 SERPL-SCNC: 22 MMOL/L (ref 21–32)
CREAT SERPL-MCNC: 0.9 MG/DL (ref 0.6–1.2)
DEPRECATED RDW RBC AUTO: 16.1 % (ref 12.4–15.4)
EOSINOPHIL # BLD: 0.1 K/UL (ref 0–0.6)
EOSINOPHIL NFR BLD: 1.3 %
GFR SERPLBLD CREATININE-BSD FMLA CKD-EPI: 68 ML/MIN/{1.73_M2}
GLUCOSE SERPL-MCNC: 107 MG/DL (ref 70–99)
HCT VFR BLD AUTO: 43.8 % (ref 36–48)
HGB BLD-MCNC: 14.1 G/DL (ref 12–16)
LYMPHOCYTES # BLD: 2.1 K/UL (ref 1–5.1)
LYMPHOCYTES NFR BLD: 21.5 %
MCH RBC QN AUTO: 28.6 PG (ref 26–34)
MCHC RBC AUTO-ENTMCNC: 32.2 G/DL (ref 31–36)
MCV RBC AUTO: 88.7 FL (ref 80–100)
MONOCYTES # BLD: 0.9 K/UL (ref 0–1.3)
MONOCYTES NFR BLD: 9 %
NEUTROPHILS # BLD: 6.4 K/UL (ref 1.7–7.7)
NEUTROPHILS NFR BLD: 67 %
PLATELET # BLD AUTO: 240 K/UL (ref 135–450)
PMV BLD AUTO: 9 FL (ref 5–10.5)
POTASSIUM SERPL-SCNC: 3.7 MMOL/L (ref 3.5–5.1)
RBC # BLD AUTO: 4.94 M/UL (ref 4–5.2)
SODIUM SERPL-SCNC: 137 MMOL/L (ref 136–145)
TROPONIN, HIGH SENSITIVITY: 28 NG/L (ref 0–14)
WBC # BLD AUTO: 9.6 K/UL (ref 4–11)

## 2025-06-03 PROCEDURE — 6370000000 HC RX 637 (ALT 250 FOR IP): Performed by: NURSE PRACTITIONER

## 2025-06-03 PROCEDURE — 36415 COLL VENOUS BLD VENIPUNCTURE: CPT

## 2025-06-03 PROCEDURE — 6360000002 HC RX W HCPCS: Performed by: HOSPITALIST

## 2025-06-03 PROCEDURE — 72128 CT CHEST SPINE W/O DYE: CPT

## 2025-06-03 PROCEDURE — 85025 COMPLETE CBC W/AUTO DIFF WBC: CPT

## 2025-06-03 PROCEDURE — G0378 HOSPITAL OBSERVATION PER HR: HCPCS

## 2025-06-03 PROCEDURE — 6370000000 HC RX 637 (ALT 250 FOR IP): Performed by: HOSPITALIST

## 2025-06-03 PROCEDURE — 96376 TX/PRO/DX INJ SAME DRUG ADON: CPT

## 2025-06-03 PROCEDURE — 72125 CT NECK SPINE W/O DYE: CPT

## 2025-06-03 PROCEDURE — 99222 1ST HOSP IP/OBS MODERATE 55: CPT | Performed by: INTERNAL MEDICINE

## 2025-06-03 PROCEDURE — 72131 CT LUMBAR SPINE W/O DYE: CPT

## 2025-06-03 PROCEDURE — 2500000003 HC RX 250 WO HCPCS: Performed by: HOSPITALIST

## 2025-06-03 PROCEDURE — 84484 ASSAY OF TROPONIN QUANT: CPT

## 2025-06-03 PROCEDURE — 80048 BASIC METABOLIC PNL TOTAL CA: CPT

## 2025-06-03 RX ORDER — MORPHINE SULFATE 2 MG/ML
2 INJECTION, SOLUTION INTRAMUSCULAR; INTRAVENOUS EVERY 6 HOURS PRN
Status: DISCONTINUED | OUTPATIENT
Start: 2025-06-03 | End: 2025-06-06 | Stop reason: HOSPADM

## 2025-06-03 RX ORDER — OXYCODONE AND ACETAMINOPHEN 5; 325 MG/1; MG/1
1 TABLET ORAL EVERY 4 HOURS PRN
Refills: 0 | Status: DISCONTINUED | OUTPATIENT
Start: 2025-06-03 | End: 2025-06-06 | Stop reason: HOSPADM

## 2025-06-03 RX ORDER — REGADENOSON 0.08 MG/ML
0.4 INJECTION, SOLUTION INTRAVENOUS
Status: COMPLETED | OUTPATIENT
Start: 2025-06-03 | End: 2025-06-04

## 2025-06-03 RX ORDER — HYDROCODONE BITARTRATE AND ACETAMINOPHEN 5; 325 MG/1; MG/1
1 TABLET ORAL EVERY 6 HOURS PRN
Status: DISCONTINUED | OUTPATIENT
Start: 2025-06-03 | End: 2025-06-03

## 2025-06-03 RX ORDER — METHOCARBAMOL 750 MG/1
750 TABLET, FILM COATED ORAL 3 TIMES DAILY PRN
Status: DISCONTINUED | OUTPATIENT
Start: 2025-06-03 | End: 2025-06-06 | Stop reason: HOSPADM

## 2025-06-03 RX ADMIN — TRAMADOL HYDROCHLORIDE 50 MG: 50 TABLET, COATED ORAL at 11:45

## 2025-06-03 RX ADMIN — ROSUVASTATIN CALCIUM 40 MG: 20 TABLET, FILM COATED ORAL at 08:29

## 2025-06-03 RX ADMIN — MORPHINE SULFATE 4 MG: 4 INJECTION INTRAVENOUS at 08:36

## 2025-06-03 RX ADMIN — METHOCARBAMOL TABLETS 750 MG: 750 TABLET, COATED ORAL at 20:12

## 2025-06-03 RX ADMIN — VALSARTAN 160 MG: 160 TABLET, FILM COATED ORAL at 20:12

## 2025-06-03 RX ADMIN — OXYCODONE AND ACETAMINOPHEN 1 TABLET: 325; 5 TABLET ORAL at 20:13

## 2025-06-03 RX ADMIN — SPIRONOLACTONE 25 MG: 25 TABLET ORAL at 08:29

## 2025-06-03 RX ADMIN — ASPIRIN 81 MG: 81 TABLET, CHEWABLE ORAL at 08:29

## 2025-06-03 RX ADMIN — METHOCARBAMOL TABLETS 750 MG: 750 TABLET, COATED ORAL at 15:42

## 2025-06-03 RX ADMIN — SODIUM CHLORIDE, PRESERVATIVE FREE 10 ML: 5 INJECTION INTRAVENOUS at 20:13

## 2025-06-03 RX ADMIN — OXYCODONE AND ACETAMINOPHEN 1 TABLET: 325; 5 TABLET ORAL at 15:42

## 2025-06-03 RX ADMIN — CARVEDILOL 3.12 MG: 3.12 TABLET, FILM COATED ORAL at 18:45

## 2025-06-03 RX ADMIN — CARVEDILOL 3.12 MG: 3.12 TABLET, FILM COATED ORAL at 08:29

## 2025-06-03 RX ADMIN — AMIODARONE HYDROCHLORIDE 200 MG: 200 TABLET ORAL at 20:12

## 2025-06-03 RX ADMIN — SODIUM CHLORIDE, PRESERVATIVE FREE 10 ML: 5 INJECTION INTRAVENOUS at 08:29

## 2025-06-03 ASSESSMENT — PAIN DESCRIPTION - LOCATION
LOCATION: LEG
LOCATION: GENERALIZED
LOCATION_2: NECK
LOCATION: BACK;LEG;CHEST
LOCATION: BACK;NECK;LEG

## 2025-06-03 ASSESSMENT — PAIN SCALES - GENERAL
PAINLEVEL_OUTOF10: 10
PAINLEVEL_OUTOF10: 7
PAINLEVEL_OUTOF10: 10
PAINLEVEL_OUTOF10: 0
PAINLEVEL_OUTOF10: 0
PAINLEVEL_OUTOF10: 7
PAINLEVEL_OUTOF10: 0
PAINLEVEL_OUTOF10: 0
PAINLEVEL_OUTOF10: 10
PAINLEVEL_OUTOF10: 7

## 2025-06-03 ASSESSMENT — PAIN SCALES - WONG BAKER
WONGBAKER_NUMERICALRESPONSE: NO HURT

## 2025-06-03 ASSESSMENT — PAIN DESCRIPTION - DESCRIPTORS
DESCRIPTORS: ACHING;THROBBING
DESCRIPTORS: ACHING;THROBBING
DESCRIPTORS: ACHING

## 2025-06-03 ASSESSMENT — PAIN DESCRIPTION - ORIENTATION
ORIENTATION: LEFT
ORIENTATION_2: LEFT

## 2025-06-03 ASSESSMENT — PAIN DESCRIPTION - INTENSITY: RATING_2: 8

## 2025-06-03 NOTE — PROGRESS NOTES
Pt has referral for pain management placed.   Stress test Tomorrow d/t stress lab not having isotopes to complete today.

## 2025-06-03 NOTE — PLAN OF CARE
Problem: Chronic Conditions and Co-morbidities  Goal: Patient's chronic conditions and co-morbidity symptoms are monitored and maintained or improved  Outcome: Progressing  Flowsheets (Taken 6/2/2025 2000)  Care Plan - Patient's Chronic Conditions and Co-Morbidity Symptoms are Monitored and Maintained or Improved:   Monitor and assess patient's chronic conditions and comorbid symptoms for stability, deterioration, or improvement   Collaborate with multidisciplinary team to address chronic and comorbid conditions and prevent exacerbation or deterioration     Problem: Discharge Planning  Goal: Discharge to home or other facility with appropriate resources  Outcome: Progressing  Flowsheets (Taken 6/2/2025 2000)  Discharge to home or other facility with appropriate resources:   Identify barriers to discharge with patient and caregiver   Arrange for needed discharge resources and transportation as appropriate     Problem: Pain  Goal: Verbalizes/displays adequate comfort level or baseline comfort level  Outcome: Progressing     Problem: Skin/Tissue Integrity  Goal: Skin integrity remains intact  Description: 1.  Monitor for areas of redness and/or skin breakdown2.  Assess vascular access sites hourly3.  Every 4-6 hours minimum:  Change oxygen saturation probe site4.  Every 4-6 hours:  If on nasal continuous positive airway pressure, respiratory therapy assess nares and determine need for appliance change or resting period  Outcome: Progressing  Flowsheets (Taken 6/2/2025 2000)  Skin Integrity Remains Intact:   Assess vascular access sites hourly   Monitor for areas of redness and/or skin breakdown

## 2025-06-03 NOTE — CONSULTS
[facility]   CONSULTATION  [phone]      Chief Complaint   Patient presents with    Chest Pain     Pt to ED c/io midsternal chest pain that began yesterday while doing nothing. Pt reports SOB. Pt denies nausea. Pt reports history of MI 11/2024. Pt is on blood thinners. Pt recently admission for a-fib.             History of Present Illness:  Rell Lorenz is a 70 y.o. patient who presented to the hospital with complaints of chest pain. Occurred out of no where. Continues to come and go. Worsens on palpation. Denies associated dyspnea. Patient follows with my partner, Dr. Osman for non ischemic cardiomyopathy and Dr. Alves for afib. [facility] 2020 without obstructive disease. I have been asked to provide consultation regarding further management and testing.      Past Medical History:   has a past medical history of AICD (automatic cardioverter/defibrillator) present, Arthritis, CHF (congestive heart failure) (HCC), Gout, Hyperlipidemia, Hypertension, and MI (myocardial infarction) (HCC).    Surgical History:   has a past surgical history that includes Dilation and curettage of uterus; Cardiac defibrillator placement; and Pain management procedure (Bilateral, 6/29/2022).     Social History:   reports that she has never smoked. She has never used smokeless tobacco. She reports that she does not drink alcohol and does not use drugs.     Family History:  family history includes Cancer in her father; Diabetes in her mother; Heart Disease in her mother; High Blood Pressure in her mother; High Cholesterol in her mother.     Home Medications:  Were reviewed and are listed in nursing record. and/or listed below  Prior to Admission medications    Medication Sig Start Date End Date Taking? Authorizing Provider   carvedilol (COREG) 3.125 MG tablet Take 1 tablet by mouth 2 times daily (with meals) 5/29/25  Yes Charlotte Campbell MD   apixaban (ELIQUIS) 5 MG TABS tablet TAKE 1 TABLET BY MOUTH TWICE A DAY 4/23/25  Yes

## 2025-06-03 NOTE — PROGRESS NOTES
Hospitalist Progress Note      Name:  Rell Lorenz /Age/Sex: 1955  (70 y.o. female)   MRN & CSN:  [id number] & [id number] Encounter Date/Time: 6/3/2025 7:36 AM EDT   Location:  [id number] PCP: Karie Larson MD     Attending:Campos Méndez MD       Hospital Day: 2    Subjective:   Chief Complaint:   Chief Complaint   Patient presents with    Chest Pain     Pt to ED c/io midsternal chest pain that began yesterday while doing nothing. Pt reports SOB. Pt denies nausea. Pt reports history of MI 2024. Pt is on blood thinners. Pt recently admission for a-fib.      Rell Lorenz is a 70 y.o. female with a past medical history of hypertension, hyperlipidemia, sCHD, PAF AICD who presented with midsternal CP x 1 day.  DIscharge  after workup for LOC.  Underwent echo at that time, but no stress test.  On Eliquis.      Interval History:  Today, she is resting in bed.  Reports left sided pain in her arm from her neck and down to her left foot.  This is not new, she is having a hard time getting pain controlled.  No new trauma or fall.     Independently reviewed interval ancillary notes from cardiology.     Assessment and Recommendations   Problem List  Principal Problem:    Chest pain  Resolved Problems:    * No resolved hospital problems. *     Assessment and Plan:    Chest Pain  Elevated Troponin   -    - CTA chest no acute PE or acute abnormality    - Trop 23->28   - Cardiology consulted   - Stress test pending  Paroxysmal Atrial Fibrillation    - SR on telemetry   - On Eliquis 5 mg bid HELD for stress test   Chronic HFimpEF   - Appears compensated   - EF 60-65%   - Cardiology following  Hypertension   - Controlled.  Continue current medications  HLD   - Continue Crestor  Left arm and left leg pain   Chronic Pain Syndrome   - Complains of chronic left arm and left leg pain   - Need pain management follow up    - Add CT cervical, thoracic and lumbar spine for assessment, no recent acute injury    Plan:   Stress test per cardiology recs  Add Percocet for pain control  Add CT cervical, thoracic and lumbar spine for assessment  Wean IV Morphine    Discussed care with patient and nursing.     All pertinent information and plan of care discussed with the attending physician.     Diet: Diet NPO  Code Status: Full Code  DVT Prophylaxis: Eliquis  Disposition PTA: Home   Discharge Disposition:  pending  Surrogate Decision Maker/ POA: Child Maryjane    Review of Systems:      Pertinent positives and negatives discussed in HPI    Objective:     Intake/Output Summary (Last 24 hours) at 6/3/2025 0736  Last data filed at 6/2/2025 2032  Gross per 24 hour   Intake 10 ml   Output --   Net 10 ml      Vitals:   Vitals:    06/02/25 2240 06/02/25 2352 06/03/25 0008 06/03/25 0508   BP:  (!) 114/54  130/63   Pulse:  77  79   Resp: 16 15  16   Temp:  97.5 °F (36.4 °C)  97.6 °F (36.4 °C)   TempSrc:  Oral  Oral   SpO2:  93%  94%   Weight:   122.5 kg (270 lb)    Height:           Physical Exam:    Physical Examination:  Vitals:    06/03/25 0508   BP: 130/63   Pulse: 79   Resp: 16   Temp: 97.6 °F (36.4 °C)   SpO2: 94%      In: 10 [I.V.:10]  Out: -    Wt Readings from Last 3 Encounters:   06/03/25 122.5 kg (270 lb)   05/29/25 122.5 kg (270 lb)   01/29/25 130.2 kg (287 lb)       Intake/Output Summary (Last 24 hours) at 6/3/2025 0736  Last data filed at 6/2/2025 2032  Gross per 24 hour   Intake 10 ml   Output --   Net 10 ml     Telemetry: Personally Reviewed Normal sinus rhythm  Constitutional: Cooperative and in no apparent distress, and appears well nourished  Skin: Warm and pink; no cyanosis, bruising, or clubbing  HEENT: Symmetric and normocephalic. Conjunctiva pink with clear sclera. Mucus membranes pink and moist.   Cardiovascular: regular rate and rhythm. S1 & S2, positive for murmurs. Peripheral pulses 2+, capillary refill < 3 seconds. No peripheral edema  Respiratory: Respirations symmetric and unlabored. Lungs clear to

## 2025-06-04 ENCOUNTER — APPOINTMENT (OUTPATIENT)
Age: 70
DRG: 563 | End: 2025-06-04
Attending: INTERNAL MEDICINE
Payer: MEDICARE

## 2025-06-04 LAB
ANION GAP SERPL CALCULATED.3IONS-SCNC: 10 MMOL/L (ref 3–16)
BASOPHILS # BLD: 0.1 K/UL (ref 0–0.2)
BASOPHILS NFR BLD: 1.1 %
BUN SERPL-MCNC: 9 MG/DL (ref 7–20)
CALCIUM SERPL-MCNC: 9.8 MG/DL (ref 8.3–10.6)
CHLORIDE SERPL-SCNC: 100 MMOL/L (ref 99–110)
CO2 SERPL-SCNC: 28 MMOL/L (ref 21–32)
CREAT SERPL-MCNC: 1 MG/DL (ref 0.6–1.2)
DEPRECATED RDW RBC AUTO: 16.3 % (ref 12.4–15.4)
ECHO BSA: 2.37 M2
EOSINOPHIL # BLD: 0.1 K/UL (ref 0–0.6)
EOSINOPHIL NFR BLD: 1.4 %
GFR SERPLBLD CREATININE-BSD FMLA CKD-EPI: 60 ML/MIN/{1.73_M2}
GLUCOSE SERPL-MCNC: 98 MG/DL (ref 70–99)
HCT VFR BLD AUTO: 44.4 % (ref 36–48)
HGB BLD-MCNC: 14.2 G/DL (ref 12–16)
LYMPHOCYTES # BLD: 2.2 K/UL (ref 1–5.1)
LYMPHOCYTES NFR BLD: 22.1 %
MCH RBC QN AUTO: 28.6 PG (ref 26–34)
MCHC RBC AUTO-ENTMCNC: 32 G/DL (ref 31–36)
MCV RBC AUTO: 89.2 FL (ref 80–100)
MONOCYTES # BLD: 0.8 K/UL (ref 0–1.3)
MONOCYTES NFR BLD: 7.8 %
NEUTROPHILS # BLD: 6.6 K/UL (ref 1.7–7.7)
NEUTROPHILS NFR BLD: 67.6 %
NUC STRESS EJECTION FRACTION: 61 %
NUC STRESS LV EDV: 66 ML (ref 56–104)
NUC STRESS LV ESV: 26 ML (ref 19–49)
NUC STRESS LV MASS: 108 G
PLATELET # BLD AUTO: 233 K/UL (ref 135–450)
PMV BLD AUTO: 8.5 FL (ref 5–10.5)
POTASSIUM SERPL-SCNC: 4 MMOL/L (ref 3.5–5.1)
RBC # BLD AUTO: 4.97 M/UL (ref 4–5.2)
SODIUM SERPL-SCNC: 138 MMOL/L (ref 136–145)
STRESS BASELINE DIAS BP: 48 MMHG
STRESS BASELINE HR: 80 BPM
STRESS BASELINE SYS BP: 115 MMHG
STRESS ESTIMATED WORKLOAD: 1 METS
STRESS EXERCISE DUR MIN: 4 MIN
STRESS EXERCISE DUR SEC: 0 SEC
STRESS O2 SAT PEAK: 96 %
STRESS O2 SAT REST: 93 %
STRESS PEAK DIAS BP: 45 MMHG
STRESS PEAK SYS BP: 80 MMHG
STRESS PERCENT HR ACHIEVED: 66 %
STRESS POST PEAK HR: 98 BPM
STRESS RATE PRESSURE PRODUCT: 7840 BPM*MMHG
STRESS TARGET HR: 149 BPM
TID: 1.17
WBC # BLD AUTO: 9.8 K/UL (ref 4–11)

## 2025-06-04 PROCEDURE — 93017 CV STRESS TEST TRACING ONLY: CPT

## 2025-06-04 PROCEDURE — 6370000000 HC RX 637 (ALT 250 FOR IP): Performed by: HOSPITALIST

## 2025-06-04 PROCEDURE — 78452 HT MUSCLE IMAGE SPECT MULT: CPT | Performed by: INTERNAL MEDICINE

## 2025-06-04 PROCEDURE — 6360000002 HC RX W HCPCS: Performed by: INTERNAL MEDICINE

## 2025-06-04 PROCEDURE — 6360000002 HC RX W HCPCS: Performed by: NURSE PRACTITIONER

## 2025-06-04 PROCEDURE — 3430000000 HC RX DIAGNOSTIC RADIOPHARMACEUTICAL: Performed by: INTERNAL MEDICINE

## 2025-06-04 PROCEDURE — 97116 GAIT TRAINING THERAPY: CPT

## 2025-06-04 PROCEDURE — 2500000003 HC RX 250 WO HCPCS: Performed by: HOSPITALIST

## 2025-06-04 PROCEDURE — G0378 HOSPITAL OBSERVATION PER HR: HCPCS

## 2025-06-04 PROCEDURE — 6370000000 HC RX 637 (ALT 250 FOR IP): Performed by: NURSE PRACTITIONER

## 2025-06-04 PROCEDURE — A9502 TC99M TETROFOSMIN: HCPCS | Performed by: INTERNAL MEDICINE

## 2025-06-04 PROCEDURE — 96376 TX/PRO/DX INJ SAME DRUG ADON: CPT

## 2025-06-04 PROCEDURE — 36415 COLL VENOUS BLD VENIPUNCTURE: CPT

## 2025-06-04 PROCEDURE — 97161 PT EVAL LOW COMPLEX 20 MIN: CPT

## 2025-06-04 PROCEDURE — 97530 THERAPEUTIC ACTIVITIES: CPT

## 2025-06-04 PROCEDURE — 80048 BASIC METABOLIC PNL TOTAL CA: CPT

## 2025-06-04 PROCEDURE — 93018 CV STRESS TEST I&R ONLY: CPT | Performed by: INTERNAL MEDICINE

## 2025-06-04 PROCEDURE — 93016 CV STRESS TEST SUPVJ ONLY: CPT | Performed by: INTERNAL MEDICINE

## 2025-06-04 PROCEDURE — 99233 SBSQ HOSP IP/OBS HIGH 50: CPT

## 2025-06-04 PROCEDURE — 85025 COMPLETE CBC W/AUTO DIFF WBC: CPT

## 2025-06-04 PROCEDURE — 78452 HT MUSCLE IMAGE SPECT MULT: CPT

## 2025-06-04 RX ORDER — AMINOPHYLLINE 25 MG/ML
100 INJECTION, SOLUTION INTRAVENOUS ONCE
Status: COMPLETED | OUTPATIENT
Start: 2025-06-04 | End: 2025-06-04

## 2025-06-04 RX ADMIN — AMINOPHYLLINE 100 MG: 25 INJECTION, SOLUTION INTRAVENOUS at 10:07

## 2025-06-04 RX ADMIN — OXYCODONE AND ACETAMINOPHEN 1 TABLET: 325; 5 TABLET ORAL at 17:17

## 2025-06-04 RX ADMIN — SODIUM CHLORIDE, PRESERVATIVE FREE 10 ML: 5 INJECTION INTRAVENOUS at 12:09

## 2025-06-04 RX ADMIN — ASPIRIN 81 MG: 81 TABLET, CHEWABLE ORAL at 12:06

## 2025-06-04 RX ADMIN — VALSARTAN 160 MG: 160 TABLET, FILM COATED ORAL at 20:34

## 2025-06-04 RX ADMIN — OXYCODONE AND ACETAMINOPHEN 1 TABLET: 325; 5 TABLET ORAL at 23:38

## 2025-06-04 RX ADMIN — AMIODARONE HYDROCHLORIDE 200 MG: 200 TABLET ORAL at 20:34

## 2025-06-04 RX ADMIN — OXYCODONE AND ACETAMINOPHEN 1 TABLET: 325; 5 TABLET ORAL at 12:06

## 2025-06-04 RX ADMIN — MORPHINE SULFATE 2 MG: 2 INJECTION, SOLUTION INTRAMUSCULAR; INTRAVENOUS at 04:33

## 2025-06-04 RX ADMIN — TETROFOSMIN 32.9 MILLICURIE: 1.38 INJECTION, POWDER, LYOPHILIZED, FOR SOLUTION INTRAVENOUS at 10:04

## 2025-06-04 RX ADMIN — METHOCARBAMOL TABLETS 750 MG: 750 TABLET, COATED ORAL at 23:38

## 2025-06-04 RX ADMIN — SODIUM CHLORIDE, PRESERVATIVE FREE 10 ML: 5 INJECTION INTRAVENOUS at 20:36

## 2025-06-04 RX ADMIN — TETROFOSMIN 10.8 MILLICURIE: 1.38 INJECTION, POWDER, LYOPHILIZED, FOR SOLUTION INTRAVENOUS at 08:36

## 2025-06-04 RX ADMIN — METHOCARBAMOL TABLETS 750 MG: 750 TABLET, COATED ORAL at 12:06

## 2025-06-04 RX ADMIN — CARVEDILOL 3.12 MG: 3.12 TABLET, FILM COATED ORAL at 12:06

## 2025-06-04 RX ADMIN — CARVEDILOL 3.12 MG: 3.12 TABLET, FILM COATED ORAL at 17:17

## 2025-06-04 RX ADMIN — REGADENOSON 0.4 MG: 0.08 INJECTION, SOLUTION INTRAVENOUS at 10:02

## 2025-06-04 RX ADMIN — SPIRONOLACTONE 25 MG: 25 TABLET ORAL at 12:06

## 2025-06-04 RX ADMIN — METHOCARBAMOL TABLETS 750 MG: 750 TABLET, COATED ORAL at 17:17

## 2025-06-04 RX ADMIN — ROSUVASTATIN CALCIUM 40 MG: 20 TABLET, FILM COATED ORAL at 12:06

## 2025-06-04 ASSESSMENT — PAIN DESCRIPTION - ORIENTATION
ORIENTATION: LEFT;RIGHT
ORIENTATION: RIGHT;LEFT

## 2025-06-04 ASSESSMENT — PAIN SCALES - GENERAL
PAINLEVEL_OUTOF10: 8
PAINLEVEL_OUTOF10: 0
PAINLEVEL_OUTOF10: 0
PAINLEVEL_OUTOF10: 5
PAINLEVEL_OUTOF10: 0
PAINLEVEL_OUTOF10: 5
PAINLEVEL_OUTOF10: 8

## 2025-06-04 ASSESSMENT — PAIN DESCRIPTION - DESCRIPTORS
DESCRIPTORS: ACHING
DESCRIPTORS: THROBBING

## 2025-06-04 ASSESSMENT — PAIN DESCRIPTION - LOCATION
LOCATION: GENERALIZED
LOCATION: BACK
LOCATION: BACK
LOCATION: LEG;FOOT

## 2025-06-04 NOTE — PLAN OF CARE
Problem: Chronic Conditions and Co-morbidities  Goal: Patient's chronic conditions and co-morbidity symptoms are monitored and maintained or improved  Outcome: Progressing  Flowsheets (Taken 6/3/2025 2014)  Care Plan - Patient's Chronic Conditions and Co-Morbidity Symptoms are Monitored and Maintained or Improved: Monitor and assess patient's chronic conditions and comorbid symptoms for stability, deterioration, or improvement     Problem: Discharge Planning  Goal: Discharge to home or other facility with appropriate resources  Outcome: Progressing  Flowsheets (Taken 6/3/2025 2014)  Discharge to home or other facility with appropriate resources: Identify barriers to discharge with patient and caregiver     Problem: Pain  Goal: Verbalizes/displays adequate comfort level or baseline comfort level  Outcome: Progressing     Problem: Skin/Tissue Integrity  Goal: Skin integrity remains intact  Description: 1.  Monitor for areas of redness and/or skin breakdown2.  Assess vascular access sites hourly3.  Every 4-6 hours minimum:  Change oxygen saturation probe site4.  Every 4-6 hours:  If on nasal continuous positive airway pressure, respiratory therapy assess nares and determine need for appliance change or resting period  Outcome: Progressing  Flowsheets (Taken 6/2/2025 2000 by Megha Moran RN)  Skin Integrity Remains Intact:   Assess vascular access sites hourly   Monitor for areas of redness and/or skin breakdown     Problem: ABCDS Injury Assessment  Goal: Absence of physical injury  Outcome: Progressing  Flowsheets (Taken 6/4/2025 0441)  Absence of Physical Injury: Implement safety measures based on patient assessment     Problem: Safety - Adult  Goal: Free from fall injury  Outcome: Progressing  Flowsheets (Taken 6/4/2025 0441)  Free From Fall Injury:   Instruct family/caregiver on patient safety   Based on caregiver fall risk screen, instruct family/caregiver to ask for assistance with transferring infant if  caregiver noted to have fall risk factors

## 2025-06-04 NOTE — PROGRESS NOTES
This pt was helped to the chair and could only sit in it for 1 hour before needing to move back to bed d/t discomfort and pain in the back, leg and neck. Poor appetite this afternoon. Almost none of her meals eaten total for the day.

## 2025-06-04 NOTE — PLAN OF CARE
Problem: Chronic Conditions and Co-morbidities  Goal: Patient's chronic conditions and co-morbidity symptoms are monitored and maintained or improved  6/4/2025 0935 by Maryjane Camacho RN  Outcome: Progressing  6/4/2025 0441 by Kamini Belcher RN  Outcome: Progressing  Flowsheets (Taken 6/3/2025 2014)  Care Plan - Patient's Chronic Conditions and Co-Morbidity Symptoms are Monitored and Maintained or Improved: Monitor and assess patient's chronic conditions and comorbid symptoms for stability, deterioration, or improvement     Problem: Discharge Planning  Goal: Discharge to home or other facility with appropriate resources  6/4/2025 0935 by Maryjane Camacho RN  Outcome: Progressing  6/4/2025 0441 by Kamini eBlcher RN  Outcome: Progressing  Flowsheets (Taken 6/3/2025 2014)  Discharge to home or other facility with appropriate resources: Identify barriers to discharge with patient and caregiver     Problem: Pain  Goal: Verbalizes/displays adequate comfort level or baseline comfort level  6/4/2025 0935 by Maryjane Camacho RN  Outcome: Progressing  6/4/2025 0441 by Kamini Belcher RN  Outcome: Progressing     Problem: Skin/Tissue Integrity  Goal: Skin integrity remains intact  Description: 1.  Monitor for areas of redness and/or skin breakdown2.  Assess vascular access sites hourly3.  Every 4-6 hours minimum:  Change oxygen saturation probe site4.  Every 4-6 hours:  If on nasal continuous positive airway pressure, respiratory therapy assess nares and determine need for appliance change or resting period  6/4/2025 0935 by Maryjane Camacho RN  Outcome: Progressing  6/4/2025 0441 by Kamini Belcher RN  Outcome: Progressing  Flowsheets (Taken 6/2/2025 2000 by Bowling, Megha A, RN)  Skin Integrity Remains Intact:   Assess vascular access sites hourly   Monitor for areas of redness and/or skin breakdown     Problem: ABCDS Injury Assessment  Goal: Absence of physical injury  6/4/2025 0935 by Maryjane Camacho  RN  Outcome: Progressing  6/4/2025 0441 by Kamini Belcher RN  Outcome: Progressing  Flowsheets (Taken 6/4/2025 0441)  Absence of Physical Injury: Implement safety measures based on patient assessment     Problem: Safety - Adult  Goal: Free from fall injury  6/4/2025 0935 by Maryjane Camacho RN  Outcome: Progressing  6/4/2025 0441 by Kamini Belcher RN  Outcome: Progressing  Flowsheets (Taken 6/4/2025 0441)  Free From Fall Injury:   Instruct family/caregiver on patient safety   Based on caregiver fall risk screen, instruct family/caregiver to ask for assistance with transferring infant if caregiver noted to have fall risk factors

## 2025-06-04 NOTE — PROGRESS NOTES
Physical/Occupational Therapy  Rell Lorenz    PT/OT orders in place. Pt currently SUZETTE for stress test. Will follow-up as pt status and schedule allow.   No charge.   Ally Reilly, PT, DPT, CNS #[id number]  Ino Gamez, [facility] OTR/L [id number]

## 2025-06-04 NOTE — PROGRESS NOTES
[facility] - Inpatient Rehabilitation Department   Phone: [phone]    Physical Therapy    [x] Initial Evaluation            [] Daily Treatment Note         [] Discharge Summary      Patient: Rell Lorenz   : 1955   MRN: [id number]   Date of Service:  2025  Admitting Diagnosis: Chest pain  Current Admission Summary: Per H&P on  \"70 y.o. female who presented with presents to ER with complaints of chest pain.  Severe midsternal pain began yesterday initially came on at rest but now worse with exertion.  Associate with shortness of breath radiating to the back and left arm.  Patient with history of previous MI back in  recently admitted for A-fib chest pain went underwent echocardiogram but no stress test is on aspirin Eliquis.\"    Of note the patient was admitted last week for syncope event and SNF was recommended, she declined at that time due to financial concerns and returned home. Family very supportive of rehab.    Past Medical History:  has a past medical history of AICD (automatic cardioverter/defibrillator) present, Arthritis, CHF (congestive heart failure) ([facility]), Gout, Hyperlipidemia, Hypertension, and MI (myocardial infarction) ([facility]).  Past Surgical History:  has a past surgical history that includes Dilation and curettage of uterus; Cardiac defibrillator placement; and Pain management procedure (Bilateral, 2022).    Discharge Recommendations: Rell Lorenz scored a 17/24 on the AM-PAC short mobility form. Current research shows that an AM-PAC score of 17 or less is typically not associated with a discharge to the patient's home setting. Based on the patient's AM-PAC score and their current functional mobility deficits, it is recommended that the patient have 3-5 sessions per week of Physical Therapy at d/c to increase the patient's independence.  Please see assessment section for further patient specific details.    If patient discharges prior to next session this note

## 2025-06-04 NOTE — PROGRESS NOTES
Patient tolerated Robaxin and Percocet combination well, receiving treatment only once overnight. This morning, she is complaining of pain. IV morphine was administered due to NPO status in preparation for a stress test.

## 2025-06-04 NOTE — PROGRESS NOTES
[facility]   Cardiology Hospital Progress Note     Date: 6/4/2025  Admit Date: 6/2/2025     Reason for consultation:     Chief Complaint   Patient presents with    Chest Pain     Pt to ED c/io midsternal chest pain that began yesterday while doing nothing. Pt reports SOB. Pt denies nausea. Pt reports history of MI 11/2024. Pt is on blood thinners. Pt recently admission for a-fib.        History of Present Illness: History obtained from patient and medical record.     Rell Lorenz is a 70 y.o. female who presented to the hospital with complaints of chest pain. Occurred out of no where. Continues to come and go. Worsens on palpation. Denies associated dyspnea. Patient follows with my partner, Dr. Osman for non ischemic cardiomyopathy and Dr. Alves for afib. [facility] 2020 without obstructive disease. I have been asked to provide consultation regarding further management and testing. (per consult note)       Interval Hx: Today, she is resting in bed. Daughter at the bedside. No complaints. Telemetry stable.     Patient seen and examined. Clinical notes reviewed. Telemetry reviewed.  No new complaints today. No major events overnight.   Denies having chest pain, palpitations, shortness of breath, orthopnea/PND, cough, or dizziness at the time of this visit.      Past Medical History:  Past Medical History:   Diagnosis Date    AICD (automatic cardioverter/defibrillator) present     Arthritis     CHF (congestive heart failure) (HCC)     Gout     Hyperlipidemia     Hypertension     MI (myocardial infarction) (HCC)         Past Surgical History:    has a past surgical history that includes Dilation and curettage of uterus; Cardiac defibrillator placement; and Pain management procedure (Bilateral, 6/29/2022).     Social History:  Reviewed.  reports that she has never smoked. She has never used smokeless tobacco. She reports that she does not drink alcohol and does not use drugs.     Allergies:  Allergies   Allergen  amiodarone  200 mg Oral Nightly    [Held by provider] apixaban  5 mg Oral BID    aspirin  81 mg Oral Daily    carvedilol  3.125 mg Oral BID     rosuvastatin  40 mg Oral Daily    spironolactone  25 mg Oral Daily    valsartan  160 mg Oral Nightly    sodium chloride flush  5-40 mL IntraVENous 2 times per day     Continuous Infusions:   sodium chloride       PRN Meds:oxyCODONE-acetaminophen, methocarbamol, morphine, sodium chloride flush, sodium chloride, potassium chloride **OR** potassium alternative oral replacement **OR** potassium chloride, magnesium sulfate, ondansetron **OR** ondansetron, polyethylene glycol, acetaminophen **OR** acetaminophen, nitroGLYCERIN     Review of Systems:  Constitutional: Negative for fever, night sweats, chills,  Skin: Negative for rash, pruritus, bleeding    HEENT: Negative for vision changes, ringing in the ears, dysphagia  Respiratory: Reviewed in HPI  Cardiovascular: Reviewed in HPI  Gastrointestinal: Negative for abdominal pain, N/V/D, constipation, or black/tarry stools  Genito-Urinary: Negative for dysuria, incontinence, or hematuria  Musculoskeletal: Negative for joint swelling, muscle pain  Neurological/Psych: Negative for confusion, seizures, headaches, or TIA-like symptoms. No anxiety or depression.     Physical Examination:  Vitals:    06/04/25 1002   BP: (!) 115/48   Pulse: 78   Resp:    Temp:    SpO2:       In: 480 [P.O.:480]  Out: 1000    Wt Readings from Last 3 Encounters:   06/04/25 120.2 kg (265 lb)   05/29/25 122.5 kg (270 lb)   01/29/25 130.2 kg (287 lb)       Intake/Output Summary (Last 24 hours) at 6/4/2025 1055  Last data filed at 6/4/2025 0436  Gross per 24 hour   Intake 480 ml   Output 1000 ml   Net -520 ml       Telemetry: Personally Reviewed  - Sinus rhythm   Constitutional: Cooperative and in no apparent distress, and appears well nourished  Skin: Warm and pink; no pallor, cyanosis, clubbing. + scattered bruising   HEENT: Symmetric and normocephalic. PERRL.

## 2025-06-05 LAB
ANION GAP SERPL CALCULATED.3IONS-SCNC: 13 MMOL/L (ref 3–16)
BASOPHILS # BLD: 0.1 K/UL (ref 0–0.2)
BASOPHILS NFR BLD: 1.2 %
BUN SERPL-MCNC: 12 MG/DL (ref 7–20)
CALCIUM SERPL-MCNC: 9.6 MG/DL (ref 8.3–10.6)
CHLORIDE SERPL-SCNC: 101 MMOL/L (ref 99–110)
CO2 SERPL-SCNC: 22 MMOL/L (ref 21–32)
CREAT SERPL-MCNC: 1.3 MG/DL (ref 0.6–1.2)
DEPRECATED RDW RBC AUTO: 16 % (ref 12.4–15.4)
EOSINOPHIL # BLD: 0.1 K/UL (ref 0–0.6)
EOSINOPHIL NFR BLD: 1.5 %
GFR SERPLBLD CREATININE-BSD FMLA CKD-EPI: 44 ML/MIN/{1.73_M2}
GLUCOSE SERPL-MCNC: 94 MG/DL (ref 70–99)
HCT VFR BLD AUTO: 43.1 % (ref 36–48)
HGB BLD-MCNC: 13.7 G/DL (ref 12–16)
LYMPHOCYTES # BLD: 2.1 K/UL (ref 1–5.1)
LYMPHOCYTES NFR BLD: 23.8 %
MCH RBC QN AUTO: 28.5 PG (ref 26–34)
MCHC RBC AUTO-ENTMCNC: 31.8 G/DL (ref 31–36)
MCV RBC AUTO: 89.6 FL (ref 80–100)
MONOCYTES # BLD: 0.8 K/UL (ref 0–1.3)
MONOCYTES NFR BLD: 9.1 %
NEUTROPHILS # BLD: 5.6 K/UL (ref 1.7–7.7)
NEUTROPHILS NFR BLD: 64.4 %
PLATELET # BLD AUTO: 230 K/UL (ref 135–450)
PMV BLD AUTO: 8.8 FL (ref 5–10.5)
POTASSIUM SERPL-SCNC: 4.3 MMOL/L (ref 3.5–5.1)
RBC # BLD AUTO: 4.82 M/UL (ref 4–5.2)
SODIUM SERPL-SCNC: 136 MMOL/L (ref 136–145)
WBC # BLD AUTO: 8.7 K/UL (ref 4–11)

## 2025-06-05 PROCEDURE — 97165 OT EVAL LOW COMPLEX 30 MIN: CPT

## 2025-06-05 PROCEDURE — G0378 HOSPITAL OBSERVATION PER HR: HCPCS

## 2025-06-05 PROCEDURE — 2500000003 HC RX 250 WO HCPCS: Performed by: HOSPITALIST

## 2025-06-05 PROCEDURE — 85025 COMPLETE CBC W/AUTO DIFF WBC: CPT

## 2025-06-05 PROCEDURE — 6370000000 HC RX 637 (ALT 250 FOR IP): Performed by: HOSPITALIST

## 2025-06-05 PROCEDURE — 6370000000 HC RX 637 (ALT 250 FOR IP): Performed by: NURSE PRACTITIONER

## 2025-06-05 PROCEDURE — 80048 BASIC METABOLIC PNL TOTAL CA: CPT

## 2025-06-05 PROCEDURE — 36415 COLL VENOUS BLD VENIPUNCTURE: CPT

## 2025-06-05 PROCEDURE — 99232 SBSQ HOSP IP/OBS MODERATE 35: CPT

## 2025-06-05 PROCEDURE — 97535 SELF CARE MNGMENT TRAINING: CPT

## 2025-06-05 RX ORDER — GABAPENTIN 100 MG/1
100 CAPSULE ORAL 2 TIMES DAILY
Status: DISCONTINUED | OUTPATIENT
Start: 2025-06-05 | End: 2025-06-06 | Stop reason: HOSPADM

## 2025-06-05 RX ADMIN — SPIRONOLACTONE 25 MG: 25 TABLET ORAL at 08:57

## 2025-06-05 RX ADMIN — ASPIRIN 81 MG: 81 TABLET, CHEWABLE ORAL at 08:57

## 2025-06-05 RX ADMIN — SODIUM CHLORIDE, PRESERVATIVE FREE 10 ML: 5 INJECTION INTRAVENOUS at 08:57

## 2025-06-05 RX ADMIN — OXYCODONE AND ACETAMINOPHEN 1 TABLET: 325; 5 TABLET ORAL at 14:00

## 2025-06-05 RX ADMIN — METHOCARBAMOL TABLETS 750 MG: 750 TABLET, COATED ORAL at 08:56

## 2025-06-05 RX ADMIN — OXYCODONE AND ACETAMINOPHEN 1 TABLET: 325; 5 TABLET ORAL at 19:07

## 2025-06-05 RX ADMIN — ROSUVASTATIN CALCIUM 40 MG: 20 TABLET, FILM COATED ORAL at 08:56

## 2025-06-05 RX ADMIN — APIXABAN 5 MG: 5 TABLET, FILM COATED ORAL at 20:16

## 2025-06-05 RX ADMIN — METHOCARBAMOL TABLETS 750 MG: 750 TABLET, COATED ORAL at 19:07

## 2025-06-05 RX ADMIN — CARVEDILOL 3.12 MG: 3.12 TABLET, FILM COATED ORAL at 17:06

## 2025-06-05 RX ADMIN — OXYCODONE AND ACETAMINOPHEN 1 TABLET: 325; 5 TABLET ORAL at 08:56

## 2025-06-05 RX ADMIN — SODIUM CHLORIDE, PRESERVATIVE FREE 10 ML: 5 INJECTION INTRAVENOUS at 20:17

## 2025-06-05 RX ADMIN — AMIODARONE HYDROCHLORIDE 200 MG: 200 TABLET ORAL at 20:16

## 2025-06-05 RX ADMIN — APIXABAN 5 MG: 5 TABLET, FILM COATED ORAL at 12:33

## 2025-06-05 RX ADMIN — CARVEDILOL 3.12 MG: 3.12 TABLET, FILM COATED ORAL at 08:57

## 2025-06-05 ASSESSMENT — PAIN SCALES - GENERAL
PAINLEVEL_OUTOF10: 7
PAINLEVEL_OUTOF10: 7
PAINLEVEL_OUTOF10: 8
PAINLEVEL_OUTOF10: 10
PAINLEVEL_OUTOF10: 8

## 2025-06-05 ASSESSMENT — PAIN DESCRIPTION - LOCATION
LOCATION: HIP
LOCATION: BACK;HIP
LOCATION: BACK;HIP

## 2025-06-05 ASSESSMENT — PAIN SCALES - WONG BAKER: WONGBAKER_NUMERICALRESPONSE: NO HURT

## 2025-06-05 ASSESSMENT — PAIN - FUNCTIONAL ASSESSMENT: PAIN_FUNCTIONAL_ASSESSMENT: PREVENTS OR INTERFERES SOME ACTIVE ACTIVITIES AND ADLS

## 2025-06-05 ASSESSMENT — PAIN DESCRIPTION - FREQUENCY: FREQUENCY: CONTINUOUS

## 2025-06-05 ASSESSMENT — PAIN DESCRIPTION - DESCRIPTORS
DESCRIPTORS: ACHING
DESCRIPTORS: ACHING

## 2025-06-05 ASSESSMENT — PAIN DESCRIPTION - ORIENTATION: ORIENTATION: RIGHT;LEFT

## 2025-06-05 NOTE — PLAN OF CARE
Problem: Chronic Conditions and Co-morbidities  Goal: Patient's chronic conditions and co-morbidity symptoms are monitored and maintained or improved  Outcome: Progressing  Flowsheets (Taken 6/4/2025 2036)  Care Plan - Patient's Chronic Conditions and Co-Morbidity Symptoms are Monitored and Maintained or Improved: Monitor and assess patient's chronic conditions and comorbid symptoms for stability, deterioration, or improvement     Problem: Discharge Planning  Goal: Discharge to home or other facility with appropriate resources  Outcome: Progressing  Flowsheets (Taken 6/4/2025 2036)  Discharge to home or other facility with appropriate resources: Identify barriers to discharge with patient and caregiver     Problem: Pain  Goal: Verbalizes/displays adequate comfort level or baseline comfort level  Outcome: Progressing     Problem: Skin/Tissue Integrity  Goal: Skin integrity remains intact  Description: 1.  Monitor for areas of redness and/or skin breakdown2.  Assess vascular access sites hourly3.  Every 4-6 hours minimum:  Change oxygen saturation probe site4.  Every 4-6 hours:  If on nasal continuous positive airway pressure, respiratory therapy assess nares and determine need for appliance change or resting period  Outcome: Progressing  Flowsheets (Taken 6/4/2025 2036)  Skin Integrity Remains Intact: Monitor for areas of redness and/or skin breakdown     Problem: ABCDS Injury Assessment  Goal: Absence of physical injury  Outcome: Progressing     Problem: Safety - Adult  Goal: Free from fall injury  Outcome: Progressing

## 2025-06-05 NOTE — PROGRESS NOTES
HF Guidelines:   intravenous iron replacement in patients with New York Heart Association (NYHA) class II and III HF and iron deficiency(ferritin <100 ng/ml or 100-300 ng/ml if transferrin saturation <20%), to improve functional status and QoL.    Problem List:   Patient Active Problem List    Diagnosis Date Noted    ICD (implantable cardioverter-defibrillator) battery depletion 11/24/2022    Chest pain 06/02/2025    Syncope, unspecified syncope type 05/26/2025    Hypertensive emergency 01/29/2025    Other forms of angina pectoris 01/27/2025    Chronic diastolic congestive heart failure (HCC) 12/13/2024    Ischemic cardiomyopathy 12/13/2024    Intractable back pain 09/17/2024    CAP (community acquired pneumonia) due to Pneumococcus 10/03/2023    Atrial flutter with rapid ventricular response (HCC) 10/02/2023    Productive cough 10/02/2023    Inappropriate shocks from ICD (implantable cardioverter-defibrillator) 09/30/2023    ICD (implantable cardioverter-defibrillator) discharge 09/29/2023    Paraparesis of both lower limbs (HCC) 03/18/2022    Lumbosacral radiculopathy 03/17/2022    Lumbar radiculopathy 03/15/2022    Gout 03/13/2022    Back pain with radiculopathy 03/13/2022    Essential hypertension 10/08/2020    PAF (paroxysmal atrial fibrillation) ([facility]) 10/08/2020    Coronary artery disease due to lipid rich plaque     Morbid obesity with BMI of 45.0-49.9, adult ([facility])     TYLER (obstructive sleep apnea)     Systolic CHF, chronic ([facility])     History of ventricular tachycardia 08/12/2020    ICD (implantable cardioverter-defibrillator) in place 01/13/2015    Mixed hyperlipidemia 12/18/2014        Assessment and Plan:   Chest pain  pain reproducible upon palpitation   HS trop chronically elevated   Atypical pain, appears more musculoskeletal in nature  Stress test The study is negative for myocardial ischemia. Findings suggest a low risk of cardiac events.   Cotniue ASA, Crestor       Afib   Stable   Rate  controlled-amiodarone  Continue  Eliquis       HFpEF  S/p Dual chamber Medtronic AICD (2007 at [facility]). S/p gen change (2015), Gen change 9/25/2023, Device is NOT MRI compatible   Appears compensated on exam   I/Os, daily weights  Continue carvedilol,   Valsartan, Spironolactone on hold for elevated creatine     HTN  Stable  Valsartan  on hold for elevated creatine   Continue Carvedilol             Multiple medical conditions with risk of decompensation.   All pertinent information and plan of care discussed with the physician.  All questions and concerns were addressed to the patient. Alternatives to my treatment were discussed. I have discussed the above stated plan with patient and the nurse. The patient verbalized understanding and agreed with the plan.    Thank you for allowing to us to participate in the care of Rell Lorenz.    Total visit time > 55 minutes; > 50% spend counseling / coordinating care. I reviewed interval history, physical exam, review of data including labs, imaging, development and implementation of treatment plan and coordination of complex care. Counseled on risk factor modifications.     YEIMY Vences-CNP  [facility] [place]   Office: [phone]    NOTE:  This report was transcribed using voice recognition software.  Every effort was made to ensure accuracy; however, inadvertent computerized transcription errors may be present.\

## 2025-06-05 NOTE — PLAN OF CARE
Problem: Chronic Conditions and Co-morbidities  Goal: Patient's chronic conditions and co-morbidity symptoms are monitored and maintained or improved  6/5/2025 0906 by Blanca Adkins RN  Outcome: Progressing  6/5/2025 0637 by Kamini Belcher RN  Outcome: Progressing  Flowsheets (Taken 6/4/2025 2036)  Care Plan - Patient's Chronic Conditions and Co-Morbidity Symptoms are Monitored and Maintained or Improved: Monitor and assess patient's chronic conditions and comorbid symptoms for stability, deterioration, or improvement     Problem: Discharge Planning  Goal: Discharge to home or other facility with appropriate resources  6/5/2025 0906 by Blanca Adkins RN  Outcome: Progressing  6/5/2025 0637 by Kamini Belcher RN  Outcome: Progressing  Flowsheets (Taken 6/4/2025 2036)  Discharge to home or other facility with appropriate resources: Identify barriers to discharge with patient and caregiver     Problem: Pain  Goal: Verbalizes/displays adequate comfort level or baseline comfort level  6/5/2025 0906 by Blanca Adkins RN  Outcome: Progressing  6/5/2025 0637 by Kamini Belcher RN  Outcome: Progressing     Problem: Skin/Tissue Integrity  Goal: Skin integrity remains intact  Description: 1.  Monitor for areas of redness and/or skin breakdown2.  Assess vascular access sites hourly3.  Every 4-6 hours minimum:  Change oxygen saturation probe site4.  Every 4-6 hours:  If on nasal continuous positive airway pressure, respiratory therapy assess nares and determine need for appliance change or resting period  6/5/2025 0906 by Blanca Adkins RN  Outcome: Progressing  6/5/2025 0637 by Kamini Belcher RN  Outcome: Progressing  Flowsheets (Taken 6/4/2025 2036)  Skin Integrity Remains Intact: Monitor for areas of redness and/or skin breakdown     Problem: ABCDS Injury Assessment  Goal: Absence of physical injury  6/5/2025 0906 by Blanca Adkins RN  Outcome: Progressing  6/5/2025 0637 by Kamini Belcher RN  Outcome: Progressing      Problem: Safety - Adult  Goal: Free from fall injury  6/5/2025 0906 by Blanca Adkins, RN  Outcome: Progressing  6/5/2025 0637 by Kamini Belcher, RN  Outcome: Progressing

## 2025-06-05 NOTE — PROGRESS NOTES
Per MD pt needs miralax given today. This RN discussed with pt, pt wishes to hold off on taking miralax at this time

## 2025-06-05 NOTE — PROGRESS NOTES
[facility] - Inpatient Rehabilitation Department   Phone: [phone]    Occupational Therapy    [x] Initial Evaluation            [] Daily Treatment Note         [] Discharge Summary      Patient: Rell Lorenz   : 1955   MRN: [id number]   Date of Service:  2025    Admitting Diagnosis:  Chest pain  Current Admission Summary: Per H&P on  \"70 y.o. female who presented with presents to ER with complaints of chest pain.  Severe midsternal pain began yesterday initially came on at rest but now worse with exertion.  Associate with shortness of breath radiating to the back and left arm.  Patient with history of previous MI back in  recently admitted for A-fib chest pain went underwent echocardiogram but no stress test is on aspirin Eliquis.\"     Of note the patient was admitted last week for syncope event and SNF was recommended, she declined at that time due to financial concerns and returned home. Family very supportive of rehab.  Past Medical History:  has a past medical history of AICD (automatic cardioverter/defibrillator) present, Arthritis, CHF (congestive heart failure) ([facility]), Gout, Hyperlipidemia, Hypertension, and MI (myocardial infarction) ([facility]).  Past Surgical History:  has a past surgical history that includes Dilation and curettage of uterus; Cardiac defibrillator placement; and Pain management procedure (Bilateral, 2022).    Discharge Recommendations: Rell Lorenz scored a 14/24 on the AM-PAC ADL Inpatient form. Current research shows that an AM-PAC score of 17 or less is typically not associated with a discharge to the patient's home setting. Based on the patient's AM-PAC score and their current ADL deficits, it is recommended that the patient have 3-5 sessions per week of Occupational Therapy at d/c to increase the patient's independence.  Please see assessment section for further patient specific details.    If patient discharges prior to next session this note will  balance at SBA/supervision without use of UE support  Dynamic Sitting Balance: fair (+): maintains balance at SBA/supervision without use of UE support  Static Standing Balance: fair: maintains balance at CGA without use of UE support  Dynamic Standing Balance: fair (-): maintains balance at CGA with use of UE support  Comments:    Other Therapeutic Interventions    CM provided list of rehab facilities, but pt reports print being too small as she does not have reading classes. Attempted to write ratings for facilities in larger font for pt on handout, left note for CM to try provided pt a more legible list    Functional Outcomes  AM-PAC Inpatient Daily Activity Raw Score: 14                                    Cognition  WFL  Orientation:    alert and oriented x 4  Command Following:   WFL     Education  Barriers To Learning: none  Patient Education: patient educated on goals, OT role and benefits, plan of care, ADL adaptive strategies, IADL safety, transfer training, discharge recommendations  Learning Assessment:  patient verbalizes understanding, would benefit from continued reinforcement    Assessment  Activity Tolerance: Tolerated fair    Impairments Requiring Therapeutic Intervention: decreased functional mobility, decreased ADL status, decreased ROM, decreased strength, decreased safety awareness, decreased endurance, decreased balance, decreased IADL  Prognosis: good  Clinical Assessment: The patient is a 70 y.o. female who presents below their baseline level of function due to above deficits, associated with Chest pain. Typically, pt is is assisted for some ADL, mod I for mobility. Currently, pt is CGA for mobility/transfers, assist of 1 for ADL. Continued OT indicated in order to promote return to PLOF    Safety Interventions: patient left in chair, chair alarm in place, call light within reach, gait belt, and nurse notified    Plan  Frequency: 3-5 x/per week  Current Treatment Recommendations:

## 2025-06-05 NOTE — PROGRESS NOTES
CT THORACIC SPINE WO CONTRAST   Preliminary Result   1. No acute osseous abnormality of the thoracic or lumbar spine.   2. Multilevel degenerative disc disease and facet arthropathy in the lower   lumbar spine.         CT CERVICAL SPINE WO CONTRAST   Final Result   1. No acute osseous abnormality of the cervical spine.   2. Mild multilevel degenerative disc disease. Suggestion of bilateral   foraminal stenosis at C5-6 slightly worse on the left.      RECOMMENDATIONS:   Cervical MRI may be more helpful in evaluating neural compression.         CTA CHEST ABDOMEN PELVIS W CONTRAST   Final Result   1. No evidence of thoracic or abdominal aortic aneurysm or dissection.   2. No acute pulmonary embolism.   3. No acute cardiopulmonary process.   4. No acute intra-abdominal or pelvic process.   5. Mild diverticulosis but no acute diverticulitis.   6. Mild ventral hernia containing mostly fat and minimal bowel but no   obstruction or strangulation.           Echo: 5/28/2025    Left Ventricle: Normal left ventricular systolic function with a visually estimated EF of 60 - 65%. EF by 2D Simpsons Biplane is 68%. Not well visualized. Left ventricle is smaller than normal. Mildly increased wall thickness. Findings consistent with concentric hypertrophy. Normal wall motion. Indeterminate diastolic function.    Right Ventricle: Not well visualized. Right ventricle size is normal. Lead present in the right ventricle. Normal systolic function.    Right Atrium: Not well visualized. Right atrium is mildly dilated.    Image quality is technically difficult. Contrast used: Lumason. Technically difficult study with poor endocardial visualization and procedure performed with the patient in a supine position.    CBC:   Recent Labs     06/03/25  0442 06/04/25  0333 06/05/25  0502   WBC 9.6 9.8 8.7   HGB 14.1 14.2 13.7    233 230     BMP:    Recent Labs     06/03/25  0442 06/04/25  0333 06/05/25  0502    138 136   K 3.7 4.0  reviewed as a part of this visit    Electronically signed by YEIMY June CNP on 6/5/2025 at 9:55 AM

## 2025-06-06 VITALS
HEART RATE: 77 BPM | BODY MASS INDEX: 41.88 KG/M2 | RESPIRATION RATE: 18 BRPM | HEIGHT: 66 IN | WEIGHT: 260.58 LBS | OXYGEN SATURATION: 99 % | DIASTOLIC BLOOD PRESSURE: 58 MMHG | SYSTOLIC BLOOD PRESSURE: 109 MMHG | TEMPERATURE: 97.4 F

## 2025-06-06 LAB
ANION GAP SERPL CALCULATED.3IONS-SCNC: 12 MMOL/L (ref 3–16)
BACTERIA URNS QL MICRO: ABNORMAL /HPF
BASOPHILS # BLD: 0.1 K/UL (ref 0–0.2)
BASOPHILS NFR BLD: 0.9 %
BILIRUB UR QL STRIP.AUTO: NEGATIVE
BUN SERPL-MCNC: 14 MG/DL (ref 7–20)
CALCIUM SERPL-MCNC: 9.3 MG/DL (ref 8.3–10.6)
CHLORIDE SERPL-SCNC: 100 MMOL/L (ref 99–110)
CLARITY UR: CLEAR
CO2 SERPL-SCNC: 24 MMOL/L (ref 21–32)
COLOR UR: YELLOW
CREAT SERPL-MCNC: 1.3 MG/DL (ref 0.6–1.2)
DEPRECATED RDW RBC AUTO: 16.3 % (ref 12.4–15.4)
EOSINOPHIL # BLD: 0.1 K/UL (ref 0–0.6)
EOSINOPHIL NFR BLD: 1.5 %
EPI CELLS #/AREA URNS AUTO: 5 /HPF (ref 0–5)
GFR SERPLBLD CREATININE-BSD FMLA CKD-EPI: 44 ML/MIN/{1.73_M2}
GLUCOSE SERPL-MCNC: 109 MG/DL (ref 70–99)
GLUCOSE UR STRIP.AUTO-MCNC: NEGATIVE MG/DL
HCT VFR BLD AUTO: 43.9 % (ref 36–48)
HGB BLD-MCNC: 14.2 G/DL (ref 12–16)
HGB UR QL STRIP.AUTO: NEGATIVE
HYALINE CASTS #/AREA URNS AUTO: 0 /LPF (ref 0–8)
KETONES UR STRIP.AUTO-MCNC: ABNORMAL MG/DL
LEUKOCYTE ESTERASE UR QL STRIP.AUTO: ABNORMAL
LYMPHOCYTES # BLD: 1.5 K/UL (ref 1–5.1)
LYMPHOCYTES NFR BLD: 17.9 %
MCH RBC QN AUTO: 28.4 PG (ref 26–34)
MCHC RBC AUTO-ENTMCNC: 32.2 G/DL (ref 31–36)
MCV RBC AUTO: 88 FL (ref 80–100)
MONOCYTES # BLD: 0.7 K/UL (ref 0–1.3)
MONOCYTES NFR BLD: 7.9 %
NEUTROPHILS # BLD: 6.2 K/UL (ref 1.7–7.7)
NEUTROPHILS NFR BLD: 71.8 %
NITRITE UR QL STRIP.AUTO: POSITIVE
PH UR STRIP.AUTO: 6 [PH] (ref 5–8)
PLATELET # BLD AUTO: 228 K/UL (ref 135–450)
PMV BLD AUTO: 8.7 FL (ref 5–10.5)
POTASSIUM SERPL-SCNC: 4.1 MMOL/L (ref 3.5–5.1)
PROT UR STRIP.AUTO-MCNC: NEGATIVE MG/DL
RBC # BLD AUTO: 4.99 M/UL (ref 4–5.2)
RBC CLUMPS #/AREA URNS AUTO: 1 /HPF (ref 0–4)
SODIUM SERPL-SCNC: 136 MMOL/L (ref 136–145)
SP GR UR STRIP.AUTO: 1.01 (ref 1–1.03)
UA COMPLETE W REFLEX CULTURE PNL UR: ABNORMAL
UA DIPSTICK W REFLEX MICRO PNL UR: YES
URN SPEC COLLECT METH UR: ABNORMAL
UROBILINOGEN UR STRIP-ACNC: 1 E.U./DL
WBC # BLD AUTO: 8.6 K/UL (ref 4–11)
WBC #/AREA URNS AUTO: 2 /HPF (ref 0–5)

## 2025-06-06 PROCEDURE — 6360000002 HC RX W HCPCS: Performed by: NURSE PRACTITIONER

## 2025-06-06 PROCEDURE — 80048 BASIC METABOLIC PNL TOTAL CA: CPT

## 2025-06-06 PROCEDURE — 87186 SC STD MICRODIL/AGAR DIL: CPT

## 2025-06-06 PROCEDURE — 6370000000 HC RX 637 (ALT 250 FOR IP): Performed by: NURSE PRACTITIONER

## 2025-06-06 PROCEDURE — 51798 US URINE CAPACITY MEASURE: CPT

## 2025-06-06 PROCEDURE — 36415 COLL VENOUS BLD VENIPUNCTURE: CPT

## 2025-06-06 PROCEDURE — 81001 URINALYSIS AUTO W/SCOPE: CPT

## 2025-06-06 PROCEDURE — 87077 CULTURE AEROBIC IDENTIFY: CPT

## 2025-06-06 PROCEDURE — 2500000003 HC RX 250 WO HCPCS: Performed by: NURSE PRACTITIONER

## 2025-06-06 PROCEDURE — 97110 THERAPEUTIC EXERCISES: CPT

## 2025-06-06 PROCEDURE — 6370000000 HC RX 637 (ALT 250 FOR IP): Performed by: HOSPITALIST

## 2025-06-06 PROCEDURE — 97530 THERAPEUTIC ACTIVITIES: CPT

## 2025-06-06 PROCEDURE — 85025 COMPLETE CBC W/AUTO DIFF WBC: CPT

## 2025-06-06 PROCEDURE — 97116 GAIT TRAINING THERAPY: CPT

## 2025-06-06 PROCEDURE — 2500000003 HC RX 250 WO HCPCS: Performed by: HOSPITALIST

## 2025-06-06 PROCEDURE — 87086 URINE CULTURE/COLONY COUNT: CPT

## 2025-06-06 PROCEDURE — 1200000000 HC SEMI PRIVATE

## 2025-06-06 RX ORDER — METHOCARBAMOL 750 MG/1
750 TABLET, FILM COATED ORAL 3 TIMES DAILY PRN
DISCHARGE
Start: 2025-06-06 | End: 2025-06-16

## 2025-06-06 RX ORDER — OXYCODONE AND ACETAMINOPHEN 5; 325 MG/1; MG/1
1 TABLET ORAL EVERY 6 HOURS PRN
Qty: 12 TABLET | Refills: 0 | Status: SHIPPED | OUTPATIENT
Start: 2025-06-06 | End: 2025-06-09

## 2025-06-06 RX ORDER — OXYCODONE AND ACETAMINOPHEN 5; 325 MG/1; MG/1
1 TABLET ORAL EVERY 6 HOURS PRN
Qty: 12 TABLET | Refills: 0 | Status: SHIPPED | OUTPATIENT
Start: 2025-06-06 | End: 2025-06-06

## 2025-06-06 RX ORDER — CEFADROXIL 500 MG/1
500 CAPSULE ORAL 2 TIMES DAILY
DISCHARGE
Start: 2025-06-07 | End: 2025-06-11

## 2025-06-06 RX ADMIN — CARVEDILOL 3.12 MG: 3.12 TABLET, FILM COATED ORAL at 16:51

## 2025-06-06 RX ADMIN — OXYCODONE AND ACETAMINOPHEN 1 TABLET: 325; 5 TABLET ORAL at 08:48

## 2025-06-06 RX ADMIN — APIXABAN 5 MG: 5 TABLET, FILM COATED ORAL at 08:48

## 2025-06-06 RX ADMIN — POLYETHYLENE GLYCOL 3350 17 G: 17 POWDER, FOR SOLUTION ORAL at 12:27

## 2025-06-06 RX ADMIN — WATER 1000 MG: 1 INJECTION INTRAMUSCULAR; INTRAVENOUS; SUBCUTANEOUS at 12:15

## 2025-06-06 RX ADMIN — METHOCARBAMOL TABLETS 750 MG: 750 TABLET, COATED ORAL at 16:51

## 2025-06-06 RX ADMIN — OXYCODONE AND ACETAMINOPHEN 1 TABLET: 325; 5 TABLET ORAL at 04:40

## 2025-06-06 RX ADMIN — CARVEDILOL 3.12 MG: 3.12 TABLET, FILM COATED ORAL at 08:48

## 2025-06-06 RX ADMIN — OXYCODONE AND ACETAMINOPHEN 1 TABLET: 325; 5 TABLET ORAL at 16:51

## 2025-06-06 RX ADMIN — ROSUVASTATIN CALCIUM 40 MG: 20 TABLET, FILM COATED ORAL at 08:47

## 2025-06-06 RX ADMIN — METHOCARBAMOL TABLETS 750 MG: 750 TABLET, COATED ORAL at 08:48

## 2025-06-06 RX ADMIN — OXYCODONE AND ACETAMINOPHEN 1 TABLET: 325; 5 TABLET ORAL at 12:46

## 2025-06-06 RX ADMIN — ASPIRIN 81 MG: 81 TABLET, CHEWABLE ORAL at 08:48

## 2025-06-06 RX ADMIN — SODIUM CHLORIDE, PRESERVATIVE FREE 10 ML: 5 INJECTION INTRAVENOUS at 08:48

## 2025-06-06 ASSESSMENT — PAIN DESCRIPTION - LOCATION
LOCATION: GENERALIZED

## 2025-06-06 ASSESSMENT — PAIN DESCRIPTION - ORIENTATION
ORIENTATION: RIGHT;LEFT
ORIENTATION: RIGHT;LEFT

## 2025-06-06 ASSESSMENT — PAIN SCALES - GENERAL
PAINLEVEL_OUTOF10: 7
PAINLEVEL_OUTOF10: 6
PAINLEVEL_OUTOF10: 8
PAINLEVEL_OUTOF10: 8
PAINLEVEL_OUTOF10: 9
PAINLEVEL_OUTOF10: 8

## 2025-06-06 ASSESSMENT — PAIN DESCRIPTION - DESCRIPTORS: DESCRIPTORS: ACHING

## 2025-06-06 ASSESSMENT — PAIN DESCRIPTION - FREQUENCY: FREQUENCY: CONTINUOUS

## 2025-06-06 NOTE — PLAN OF CARE
Problem: Chronic Conditions and Co-morbidities  Goal: Patient's chronic conditions and co-morbidity symptoms are monitored and maintained or improved  6/6/2025 0908 by Blanca Adkins RN  Outcome: Progressing  6/6/2025 0438 by Kamini Belcher RN  Outcome: Progressing  Flowsheets (Taken 6/5/2025 2014)  Care Plan - Patient's Chronic Conditions and Co-Morbidity Symptoms are Monitored and Maintained or Improved: Monitor and assess patient's chronic conditions and comorbid symptoms for stability, deterioration, or improvement     Problem: Discharge Planning  Goal: Discharge to home or other facility with appropriate resources  6/6/2025 0908 by Blanca Adkins RN  Outcome: Progressing  6/6/2025 0438 by Kamini Belcher RN  Outcome: Progressing  Flowsheets (Taken 6/5/2025 2014)  Discharge to home or other facility with appropriate resources: Identify barriers to discharge with patient and caregiver     Problem: Pain  Goal: Verbalizes/displays adequate comfort level or baseline comfort level  6/6/2025 0908 by Blanca Adkins RN  Outcome: Progressing  6/6/2025 0438 by Kamini Belcher RN  Outcome: Progressing     Problem: Skin/Tissue Integrity  Goal: Skin integrity remains intact  Description: 1.  Monitor for areas of redness and/or skin breakdown2.  Assess vascular access sites hourly3.  Every 4-6 hours minimum:  Change oxygen saturation probe site4.  Every 4-6 hours:  If on nasal continuous positive airway pressure, respiratory therapy assess nares and determine need for appliance change or resting period  6/6/2025 0908 by Blanca Adkins RN  Outcome: Progressing  6/6/2025 0438 by Kamini Belcher RN  Outcome: Progressing  Flowsheets (Taken 6/5/2025 2014)  Skin Integrity Remains Intact: Monitor for areas of redness and/or skin breakdown     Problem: ABCDS Injury Assessment  Goal: Absence of physical injury  6/6/2025 0908 by Blanca Adkins RN  Outcome: Progressing  6/6/2025 0438 by Kamini Belcher RN  Outcome: Progressing

## 2025-06-06 NOTE — PLAN OF CARE
Problem: Chronic Conditions and Co-morbidities  Goal: Patient's chronic conditions and co-morbidity symptoms are monitored and maintained or improved  Outcome: Progressing  Flowsheets (Taken 6/5/2025 2014)  Care Plan - Patient's Chronic Conditions and Co-Morbidity Symptoms are Monitored and Maintained or Improved: Monitor and assess patient's chronic conditions and comorbid symptoms for stability, deterioration, or improvement     Problem: Discharge Planning  Goal: Discharge to home or other facility with appropriate resources  Outcome: Progressing  Flowsheets (Taken 6/5/2025 2014)  Discharge to home or other facility with appropriate resources: Identify barriers to discharge with patient and caregiver     Problem: Pain  Goal: Verbalizes/displays adequate comfort level or baseline comfort level  Outcome: Progressing     Problem: Skin/Tissue Integrity  Goal: Skin integrity remains intact  Description: 1.  Monitor for areas of redness and/or skin breakdown2.  Assess vascular access sites hourly3.  Every 4-6 hours minimum:  Change oxygen saturation probe site4.  Every 4-6 hours:  If on nasal continuous positive airway pressure, respiratory therapy assess nares and determine need for appliance change or resting period  Outcome: Progressing  Flowsheets (Taken 6/5/2025 2014)  Skin Integrity Remains Intact: Monitor for areas of redness and/or skin breakdown     Problem: ABCDS Injury Assessment  Goal: Absence of physical injury  Outcome: Progressing     Problem: Safety - Adult  Goal: Free from fall injury  Outcome: Progressing

## 2025-06-06 NOTE — CONSULTS
covid19    136 136   K 4.0 4.3 4.1    101 100   CO2 28 22 24   BUN 9 12 14   CREATININE 1.0 1.3* 1.3*     Recent Labs     06/04/25  0333 06/05/25  0502 06/06/25  0728   CALCIUM 9.8 9.6 9.3     No results for input(s): \"PH\", \"PCO2\", \"PO2\" in the last 72 hours.    Invalid input(s): \"[id number]\", \"INSPIREDO2\"    ABG:  No results found for: \"PH\", \"PCO2\", \"PO2\", \"HCO3\", \"BE\", \"THGB\", \"TCO2\", \"O2SAT\"  VBG:  No results found for: \"PHVEN\", \"BME0DKR\", \"BEVEN\", \"E3PYBXKZ\"    LDH:    Lab Results   Component Value Date/Time     11/29/2022 04:51 AM     Uric Acid:    Lab Results   Component Value Date/Time    URICACID 11.7 10/01/2023 09:22 AM       PT/INR:    Lab Results   Component Value Date/Time    PROTIME 16.4 06/02/2025 01:15 PM    INR 1.30 06/02/2025 01:15 PM     Warfarin PT/INR:  No components found for: \"PTPATWAR\", \"PTINRWAR\"  PTT:    Lab Results   Component Value Date/Time    APTT 29.6 06/02/2025 01:15 PM   [APTT}  Last 3 Troponin:    Lab Results   Component Value Date/Time    TROPONINI 0.34 11/24/2022 04:00 PM    TROPONINI 0.43 11/24/2022 10:48 AM    TROPONINI 0.03 11/24/2022 02:30 AM       U/A:    Lab Results   Component Value Date/Time    COLORU Yellow 06/06/2025 08:55 AM    PROTEINU Negative 06/06/2025 08:55 AM    PHUR 6.0 06/06/2025 08:55 AM    PHUR 5.5 10/01/2023 07:00 PM    WBCUA 2 06/06/2025 08:55 AM    RBCUA 1 06/06/2025 08:55 AM    BACTERIA 4+ 06/06/2025 08:55 AM    CLARITYU Clear 06/06/2025 08:55 AM    LEUKOCYTESUR SMALL 06/06/2025 08:55 AM    UROBILINOGEN 1.0 06/06/2025 08:55 AM    BILIRUBINUR Negative 06/06/2025 08:55 AM    BLOODU Negative 06/06/2025 08:55 AM    GLUCOSEU Negative 06/06/2025 08:55 AM     Microalbumen/Creatinine ratio:  No components found for: \"RUCREAT\"  24 Hour Urine for Protein:  No components found for: \"RAWUPRO\", \"UHRS3\", \"YNMB37KO\", \"UTV3\"  24 Hour Urine for Creatinine Clearance:  No components found for: \"CREAT4\", \"UHRS10\", \"UTV10\"  Urine Toxicology:  No components found  acute osseous abnormality of the thoracic or lumbar spine. 2. Multilevel degenerative disc disease and facet arthropathy in the lower lumbar spine.     Nuclear stress test with myocardial perfusion  Result Date: 6/4/2025    Stress Combined Conclusion: The study is negative for myocardial ischemia. Findings suggest a low risk of cardiac events.   Stress Function: Left ventricular function post-stress is normal. Post-stress ejection fraction is 61%. The stress end diastolic cavity size is normal. Stress end diastolic volume: 66 mL. The stress end systolic cavity size is normal. Stress end systolic volume: 26 mL. LV mass: 108.0 g.   Perfusion Comments: LV perfusion is normal. There is no evidence of inducible ischemia.   Perfusion Conclusion: There is no evidence of transient ischemic dilation (TID). TID ratio is 1.17.   ECG: Resting ECG demonstrates normal sinus rhythm and left anterior fascicular block.   Sensitivity of testing for detection of ischemia is reduced on betablocker.     CT CERVICAL SPINE WO CONTRAST  Result Date: 6/4/2025  EXAMINATION: CT OF THE CERVICAL SPINE WITHOUT CONTRAST 6/3/2025 4:09 pm TECHNIQUE: CT of the cervical spine was performed without the administration of intravenous contrast. Multiplanar reformatted images are provided for review. Automated exposure control, iterative reconstruction, and/or weight based adjustment of the mA/kV was utilized to reduce the radiation dose to as low as reasonably achievable. COMPARISON: None. HISTORY: ORDERING SYSTEM PROVIDED HISTORY: neck pain with radiation to left arm TECHNOLOGIST PROVIDED HISTORY: Reason for exam:->neck pain with radiation to left arm Reason for Exam: neck pain with radiation to left arm FINDINGS: BONES/ALIGNMENT: There is no acute fracture or traumatic malalignment. DEGENERATIVE CHANGES: Mild multilevel degenerative disc disease.  Suggestion of bilateral foraminal stenosis at C5-6 slightly worse on the left. SOFT TISSUES: There is no

## 2025-06-06 NOTE — DISCHARGE SUMMARY
[facility] DISCHARGE SUMMARY    Patient Demographics    Patient. Rell Lorenz  Date of Birth. 1955  MRN. [id number]     Primary care provider. Karie Larson MD  (Tel: [phone])    Admit date: 6/2/2025    Discharge date (blank if same as Note Date):   Note Date: 6/6/2025     Reason for Hospitalization.   Chief Complaint   Patient presents with    Chest Pain     Pt to ED c/io midsternal chest pain that began yesterday while doing nothing. Pt reports SOB. Pt denies nausea. Pt reports history of MI 11/2024. Pt is on blood thinners. Pt recently admission for a-fib.        Significant Findings.   Principal Problem:    Chest pain  Resolved Problems:    * No resolved hospital problems. *     Problem-based Hospital Course.  Rell Lorenz is a 70 y.o. female with a past medical history of hypertension, hyperlipidemia, sCHD, PAF AICD who presented with midsternal CP x 1 day.  DIscharge 5/28 after workup for LOC.  Underwent echo at that time, but no stress test.  On Eliquis. Recovered from VALERIE  and nephrology OK with discharge.  Cardiology consulted for CP and had CTPE negative for PE and stress test negative for ischemia.  Cardiology signed off. Discharge to SNF on oral abx for UTI, culture pending.     Late ENtry: 6/8 Reviewed culture 6/7 showed >100,000 cfu e coli.  Final result reviewed today and is <50,000 cfu mixed pathogens.  She was treated at discharge and will continue as initially was positive and culture was growing e coli as well as patient reported dysuria.  No changes in med rec.     Assessment and Plan:  VALERIE              - Cr 0.9->1.3->1.3              - HOLD ARB and dejon              - Liberalize fluid restriction               - Nephrology consulted, OK with discharge as cr stable, HOLD dejon and ARB at discharge can increase Coreg and add amlodipine if needed  Abnormal Urinalysis   - Has some dysuria, she is unsure when it started   - Given ceftriaxone 1 g x 1, switch    Skin. Warm, dry, normal turgor    Condition at time of discharge: Fair    Medication instructions provided to patient at discharge.     Medication List        START taking these medications      cefadroxil 500 MG capsule  Commonly known as: DURICEF  Take 1 capsule by mouth 2 times daily for 4 days  Start taking on: June 7, 2025  Notes to patient: It is an antibiotic and can treat infection.      methocarbamol 750 MG tablet  Commonly known as: ROBAXIN  Take 1 tablet by mouth 3 times daily as needed (muscle spasm)  Notes to patient: Use: as a muscle relaxer for relief of muscle spasms, cramps or tetanus  Side effects: drowsiness, dizziness/lightheadedness, abdominal pain, fast heart rate, or dark stool        oxyCODONE-acetaminophen 5-325 MG per tablet  Commonly known as: PERCOCET  Take 1 tablet by mouth every 6 hours as needed for Pain for up to 3 days. Max Daily Amount: 4 tablets  Notes to patient: It can relieve your pain.      vitamin D3 25 MCG (1000 UT) Tabs tablet  Commonly known as: CHOLECALCIFEROL  Take 1 tablet by mouth daily  Notes to patient: Use: Dietary supplement, helps absorb calcium better.  Side effects: upset stomach, nausea               CHANGE how you take these medications      amiodarone 200 MG tablet  Commonly known as: CORDARONE  TAKE 1 TABLET BY MOUTH DAILY  What changed: when to take this  Notes to patient: Use: to treat certain types of abnormal heartbeats  Side effects: Constipation, upset stomach, throwing up, change in taste               CONTINUE taking these medications      aspirin 81 MG chewable tablet  Notes to patient: Use: anti inflammatory used to help reduce the risk of heart attack  Side effects: increased bleeding & bruising, upset stomach & nausea        carvedilol 3.125 MG tablet  Commonly known as: COREG  Take 1 tablet by mouth 2 times daily (with meals)  Notes to patient: Use: is a beta blocker which reduces the workload of your heart. It is used to treat high blood

## 2025-06-06 NOTE — PROGRESS NOTES
Data- discharge order received, pt verbalized agreement to discharge, disposition to [facility] #[id number], DIANNE reviewed and signed by physician.    Action- AVS prepared, DIANNE completed/ reported faxed by case management/. Discharge instruction summary: Diet- general, Activity- as tolerated, medications prescriptions to be filled at receiving facility except for the controlled prescriptions to be sent: with paper script, Transfer code status: Full Code, LDAs to remain with discharge: none.   DME used: walker.     Response- Bedside RN to call report to receiving facility. Pt belongings gathered, peripheral IV and cardiac monitoring removed. Disposition to Discharged via cart/stretcher and via ambulance to skilled nursing by EMS transportation, no complications reported.       1. WEIGHT: Admit Weight - Scale: 122.5 kg (270 lb) (06/02/25 1248)        Today  Weight - Scale: 118.2 kg (260 lb 9.3 oz) (06/06/25 0439)       2. O2 SAT.: SpO2: 99 % (06/06/25 1650)

## 2025-06-06 NOTE — PLAN OF CARE
Problem: Chronic Conditions and Co-morbidities  Goal: Patient's chronic conditions and co-morbidity symptoms are monitored and maintained or improved  6/6/2025 1724 by Blanca Adkins RN  Outcome: Completed  6/6/2025 0908 by Blanca Adkins RN  Outcome: Progressing  6/6/2025 0438 by Kamini Belcher RN  Outcome: Progressing  Flowsheets (Taken 6/5/2025 2014)  Care Plan - Patient's Chronic Conditions and Co-Morbidity Symptoms are Monitored and Maintained or Improved: Monitor and assess patient's chronic conditions and comorbid symptoms for stability, deterioration, or improvement     Problem: Discharge Planning  Goal: Discharge to home or other facility with appropriate resources  6/6/2025 1724 by Blanca Adkins RN  Outcome: Completed  6/6/2025 0908 by Blanca Adkins RN  Outcome: Progressing  6/6/2025 0438 by Kamini Belcher RN  Outcome: Progressing  Flowsheets (Taken 6/5/2025 2014)  Discharge to home or other facility with appropriate resources: Identify barriers to discharge with patient and caregiver     Problem: Pain  Goal: Verbalizes/displays adequate comfort level or baseline comfort level  6/6/2025 1724 by Blanca Adkins RN  Outcome: Completed  6/6/2025 0908 by Blanca Adkins RN  Outcome: Progressing  6/6/2025 0438 by Kamini Belcher RN  Outcome: Progressing     Problem: Skin/Tissue Integrity  Goal: Skin integrity remains intact  Description: 1.  Monitor for areas of redness and/or skin breakdown2.  Assess vascular access sites hourly3.  Every 4-6 hours minimum:  Change oxygen saturation probe site4.  Every 4-6 hours:  If on nasal continuous positive airway pressure, respiratory therapy assess nares and determine need for appliance change or resting period  6/6/2025 1724 by Blanca Adkins RN  Outcome: Completed  6/6/2025 0908 by Blanca Adkins RN  Outcome: Progressing  6/6/2025 0438 by Kamini Belcher RN  Outcome: Progressing  Flowsheets (Taken 6/5/2025 2014)  Skin Integrity Remains Intact:  Monitor for areas of redness and/or skin breakdown     Problem: ABCDS Injury Assessment  Goal: Absence of physical injury  6/6/2025 1724 by Blanca Adkins RN  Outcome: Completed  6/6/2025 0908 by Blanca Adkins RN  Outcome: Progressing  6/6/2025 0438 by Kamini Belcher RN  Outcome: Progressing     Problem: Safety - Adult  Goal: Free from fall injury  6/6/2025 1724 by Blanca Adkins RN  Outcome: Completed  6/6/2025 0908 by Blanca Adkins RN  Outcome: Progressing  6/6/2025 0438 by Kamini Belcher RN  Outcome: Progressing

## 2025-06-06 NOTE — CARE COORDINATION
06/06/25 1128   IMM Letter   IMM Letter given to Patient/Family/Significant other/Guardian/POA/by: IMM Given   IMM Letter date given: 06/06/25   IMM Letter time given: 1128       
DISCHARGE PLANNING:   Patient agreeable to [facility] SNF placement.  [employer]  SNF Precert is being initiated.  
Discharge Planning Note Re: Skilled Nursing     CM noted recommendations for SNF.  CM met with patient   Introduced self and explained role of CM and discharge planning.    Patient is agreeable to SNF on dc.     [place] of choice list was provided with basic dialogue that supports the patient's individualized plan of care/goals, treatment preferences and shares the quality data associated with the providers. [x] Yes [] No.  Patient has been  provided with a list of SNF to make choices.    Referral made to   [facility],   [facility],    [facility]   [facility],   [facility]     CM/SW will follow-up on referrals and provide any additional documentation necessary to facilitate placement.       Updates on SNF referrals  [facility], - No beds available    [facility],  - No beds available    [facility] - have a semi-private room can accept pt if agreeable.    [facility], - Unable to accept- Out of network    [facility] - Unable to accept -  out of Network        
Discharge Planning Note:    Chart reviewed and it appears that patient has minimal needs for discharge at this time. Risk Score NA in OBS %     Primary Care Physician is Karie Larson MD    Primary insurance is [facility] medicare    Please notify case management if any discharge needs are identified.      Case management will continue to follow progress and update discharge plan as needed.    Electronically signed by Oneal Healy on 6/3/2025 at 9:50 AM    
Discharge Planning;  Patient has been approved for SNF placement to [facility].  The precert expires on 06/11/2025.  Will need a HENS completed .  
     Financial    Payor: [employer] MEDICARE / Plan: [employer] [place] MEDICARE / Product Type: *No Product type* /     Pharmacy:  Potential assistance Purchasing Medications: No  Meds-to-Beds request: Yes      [facility] - [place], [place] - [address] [phone] - F [phone]  [address] [address]  Phone: [phone] Fax: [phone]    [id number] - [place], [place] - [address] [phone] - F [phone]  [address] [address]  Phone: [phone] Fax: [phone]      Notes:    Additional Case Management Notes:   The admissions liaison - Alejandra was informed of patient's discharge and pickup time.      Electronically signed by DHARMESH KYLE RN/BSN/CCM  on 6/6/2025 at 4:38 PM

## 2025-06-06 NOTE — PROGRESS NOTES
Nephrology consult received - full consult note to follow.   Thank you for allowing us to participate in this patient’s care. Please do not hesitate to contact, if any questions or concerns. We will follow along with you.     Bean Adamson MD  [facility]   [phone]   Or [employer].

## 2025-06-06 NOTE — PROGRESS NOTES
Pt refuses to take gabapentin and states that the medication doesn't agree with her body and it makes her sick.

## 2025-06-06 NOTE — DISCHARGE INSTR - COC
Continuity of Care Form    Patient Name: Rell Lorenz   :  1955  MRN:  [id number]    Admit date:  2025  Discharge date:  25    Code Status Order: Full Code   Advance Directives:     Admitting Physician:  Campos Méndez MD  PCP: Karie Larson MD    Discharging Nurse: BROOK Rios  Discharging Hospital Unit/Room#: B1G-0683/5913-01  Discharging Unit Phone Number: [phone]    Emergency Contact:   Extended Emergency Contact Information  Primary Emergency Contact: Maryjane Cadena  Home Phone: [phone]  Mobile Phone: [phone]  Relation: Child  Secondary Emergency Contact: Joshua Lorenz  Home Phone: [phone]  Mobile Phone: [phone]  Relation: Grandchild  Preferred language: English    Past Surgical History:  Past Surgical History:   Procedure Laterality Date    CARDIAC DEFIBRILLATOR PLACEMENT      DILATION AND CURETTAGE OF UTERUS      PAIN MANAGEMENT PROCEDURE Bilateral 2022    BILATERAL L5 TRANSFORAMINAL EPIDURAL STEROID INJECTION WITH FLUOROSCOPY performed by Nikolas Emerson MD at [facility]       Immunization History:     There is no immunization history on file for this patient.    Active Problems:  Patient Active Problem List   Diagnosis Code    Mixed hyperlipidemia E78.2    ICD (implantable cardioverter-defibrillator) in place Z95.810    History of ventricular tachycardia Z86.79    Systolic CHF, chronic ([facility]) I50.22    Coronary artery disease due to lipid rich plaque I25.10, I25.83    Morbid obesity with BMI of 45.0-49.9, adult ([facility]) E66.01, Z68.42    TYLER (obstructive sleep apnea) G47.33    Essential hypertension I10    PAF (paroxysmal atrial fibrillation) ([facility]) I48.0    Gout M10.9    Back pain with radiculopathy M54.10    Lumbar radiculopathy M54.16    Lumbosacral radiculopathy M54.17    Paraparesis of both lower limbs ([facility]) G82.20    ICD (implantable cardioverter-defibrillator) battery depletion Z45.02    ICD (implantable cardioverter-defibrillator) discharge Z45.02

## 2025-06-07 NOTE — PROGRESS NOTES
Physician Progress Note      PATIENT:               CORI BAUTISTA  CSN #:                  [id number]  :                       1955  ADMIT DATE:       2025 12:41 PM  DISCH DATE:        2025 5:37 PM  RESPONDING  PROVIDER #:        BERNADINE Schaffer CNP          QUERY TEXT:    Chest pain is documented in the medical record MD notes  Please specify the   underlying cause:    The clinical indicators include:  -Cardiology notes \"Chest pain-pain reproducible upon palpitation. Atypical   pain, appears more musculoskeletal in nature. Findings suggest a low risk of   cardiac events.  Cotniue ASA, Crestor  -Cardiac panel, Cardiology/Nephrology consult  -ECHO, stress test  -Meds-Aspirin, Crestor, Coreg  Options provided:  -- Chest pain related to costochondritis  -- Other - I will add my own diagnosis  -- Disagree - Not applicable / Not valid  -- Disagree - Clinically unable to determine / Unknown  -- Refer to Clinical Documentation Reviewer    PROVIDER RESPONSE TEXT:    Chest Pain due to muscle strain    Query created by: Skylar Chaney on 2025 12:27 PM      Electronically signed by:  BERNADINE Schaffer CNP 2025 7:49 AM

## 2025-06-08 LAB — BACTERIA UR CULT: NORMAL

## 2025-06-24 ENCOUNTER — TELEPHONE (OUTPATIENT)
Dept: CARDIOLOGY CLINIC | Age: 70
End: 2025-06-24

## 2025-06-24 NOTE — TELEPHONE ENCOUNTER
Fadumo called to let providers know pt was admitted to [facility] on 6/19 and is still there.

## 2025-07-14 ENCOUNTER — TELEPHONE (OUTPATIENT)
Dept: ADMINISTRATIVE | Age: 70
End: 2025-07-14

## 2025-07-14 NOTE — TELEPHONE ENCOUNTER
Submitted PA for Wegovy 2.4MG/0.75ML auto-injectors  Via CMM Key: [id number] STATUS: PENDING.    Follow up done daily; if no decision with in three days we will refax.  If another three days goes by with no decision will call the insurance for status.

## 2025-07-17 ENCOUNTER — TELEPHONE (OUTPATIENT)
Dept: CARDIOLOGY CLINIC | Age: 70
End: 2025-07-17

## 2025-07-17 NOTE — TELEPHONE ENCOUNTER
Pt canceled 7/21/25 appt. Stats she needs a Friday due to transportation. Please advise and call  to schedule

## 2025-07-17 NOTE — TELEPHONE ENCOUNTER
Called pt about message below she states is not available tomorrow, pt states she needs the appoint for the device check and to be seen by provider on the same day and Fridays works best. Please advise.

## 2025-07-17 NOTE — TELEPHONE ENCOUNTER
Noted on blue sticky note in chart and paper tracker   Patient presents to ED with complaint of right lower leg pain for 1 week. Patient states the pain fluctuates between a 5/10 to 10/10. No redness or swelling to lower leg. The pain makes walking difficult.  Patient reports taking OTC tylenol with minimal re

## 2025-08-16 ENCOUNTER — APPOINTMENT (OUTPATIENT)
Dept: CT IMAGING | Age: 70
End: 2025-08-16
Payer: MEDICARE

## 2025-08-16 ENCOUNTER — HOSPITAL ENCOUNTER (EMERGENCY)
Age: 70
Discharge: HOME | End: 2025-08-16
Attending: INTERNAL MEDICINE
Payer: MEDICARE

## 2025-08-16 VITALS
RESPIRATION RATE: 16 BRPM | HEIGHT: 65 IN | BODY MASS INDEX: 41.82 KG/M2 | DIASTOLIC BLOOD PRESSURE: 85 MMHG | HEART RATE: 72 BPM | WEIGHT: 251 LBS | SYSTOLIC BLOOD PRESSURE: 104 MMHG | OXYGEN SATURATION: 99 % | TEMPERATURE: 97.9 F

## 2025-08-16 DIAGNOSIS — M54.42 ACUTE LEFT-SIDED LOW BACK PAIN WITH LEFT-SIDED SCIATICA: Primary | ICD-10-CM

## 2025-08-16 LAB
ALBUMIN SERPL-MCNC: 3.7 G/DL (ref 3.4–5)
ALBUMIN/GLOB SERPL: 1.1 {RATIO} (ref 1.1–2.2)
ALP SERPL-CCNC: 102 U/L (ref 40–129)
ALT SERPL-CCNC: 16 U/L (ref 10–40)
ANION GAP SERPL CALCULATED.3IONS-SCNC: 12 MMOL/L (ref 3–16)
AST SERPL-CCNC: 32 U/L (ref 15–37)
BACTERIA URNS QL MICRO: NORMAL /HPF
BASOPHILS # BLD: 0.2 K/UL (ref 0–0.2)
BASOPHILS NFR BLD: 1.4 %
BILIRUB SERPL-MCNC: 0.5 MG/DL (ref 0–1)
BILIRUB UR QL STRIP.AUTO: NEGATIVE
BUN SERPL-MCNC: 16 MG/DL (ref 7–20)
CALCIUM SERPL-MCNC: 9.8 MG/DL (ref 8.3–10.6)
CHLORIDE SERPL-SCNC: 105 MMOL/L (ref 99–110)
CLARITY UR: CLEAR
CO2 SERPL-SCNC: 23 MMOL/L (ref 21–32)
COLOR UR: YELLOW
CREAT SERPL-MCNC: 1.1 MG/DL (ref 0.6–1.2)
DEPRECATED RDW RBC AUTO: 14.8 % (ref 12.4–15.4)
EOSINOPHIL # BLD: 0.2 K/UL (ref 0–0.6)
EOSINOPHIL NFR BLD: 2 %
EPI CELLS #/AREA URNS AUTO: 4 /HPF (ref 0–5)
GFR SERPLBLD CREATININE-BSD FMLA CKD-EPI: 54 ML/MIN/{1.73_M2}
GLUCOSE SERPL-MCNC: 103 MG/DL (ref 70–99)
GLUCOSE UR STRIP.AUTO-MCNC: NEGATIVE MG/DL
HCT VFR BLD AUTO: 39.8 % (ref 36–48)
HGB BLD-MCNC: 12.9 G/DL (ref 12–16)
HGB UR QL STRIP.AUTO: NEGATIVE
HYALINE CASTS #/AREA URNS AUTO: 1 /LPF (ref 0–8)
KETONES UR STRIP.AUTO-MCNC: NEGATIVE MG/DL
LEUKOCYTE ESTERASE UR QL STRIP.AUTO: ABNORMAL
LYMPHOCYTES # BLD: 1.9 K/UL (ref 1–5.1)
LYMPHOCYTES NFR BLD: 16.5 %
MAGNESIUM SERPL-MCNC: 2.04 MG/DL (ref 1.8–2.4)
MCH RBC QN AUTO: 27.9 PG (ref 26–34)
MCHC RBC AUTO-ENTMCNC: 32.4 G/DL (ref 31–36)
MCV RBC AUTO: 86.1 FL (ref 80–100)
MONOCYTES # BLD: 0.8 K/UL (ref 0–1.3)
MONOCYTES NFR BLD: 6.4 %
NEUTROPHILS # BLD: 8.7 K/UL (ref 1.7–7.7)
NEUTROPHILS NFR BLD: 73.7 %
NITRITE UR QL STRIP.AUTO: NEGATIVE
PH UR STRIP.AUTO: 5.5 [PH] (ref 5–8)
PLATELET # BLD AUTO: 265 K/UL (ref 135–450)
PMV BLD AUTO: 8.3 FL (ref 5–10.5)
POTASSIUM SERPL-SCNC: 4.7 MMOL/L (ref 3.5–5.1)
PROT SERPL-MCNC: 7.1 G/DL (ref 6.4–8.2)
PROT UR STRIP.AUTO-MCNC: ABNORMAL MG/DL
RBC # BLD AUTO: 4.63 M/UL (ref 4–5.2)
RBC CLUMPS #/AREA URNS AUTO: 1 /HPF (ref 0–4)
SODIUM SERPL-SCNC: 140 MMOL/L (ref 136–145)
SP GR UR STRIP.AUTO: 1.02 (ref 1–1.03)
UA COMPLETE W REFLEX CULTURE PNL UR: ABNORMAL
UA DIPSTICK W REFLEX MICRO PNL UR: YES
URN SPEC COLLECT METH UR: ABNORMAL
UROBILINOGEN UR STRIP-ACNC: 0.2 E.U./DL
WBC # BLD AUTO: 11.8 K/UL (ref 4–11)
WBC #/AREA URNS AUTO: 2 /HPF (ref 0–5)

## 2025-08-16 PROCEDURE — 80053 COMPREHEN METABOLIC PANEL: CPT

## 2025-08-16 PROCEDURE — 74176 CT ABD & PELVIS W/O CONTRAST: CPT

## 2025-08-16 PROCEDURE — 83735 ASSAY OF MAGNESIUM: CPT

## 2025-08-16 PROCEDURE — 85025 COMPLETE CBC W/AUTO DIFF WBC: CPT

## 2025-08-16 PROCEDURE — 99284 EMERGENCY DEPT VISIT MOD MDM: CPT

## 2025-08-16 PROCEDURE — 6360000002 HC RX W HCPCS: Performed by: PHYSICIAN ASSISTANT

## 2025-08-16 PROCEDURE — 2580000003 HC RX 258: Performed by: PHYSICIAN ASSISTANT

## 2025-08-16 PROCEDURE — 6370000000 HC RX 637 (ALT 250 FOR IP): Performed by: PHYSICIAN ASSISTANT

## 2025-08-16 PROCEDURE — 72128 CT CHEST SPINE W/O DYE: CPT

## 2025-08-16 PROCEDURE — 81001 URINALYSIS AUTO W/SCOPE: CPT

## 2025-08-16 PROCEDURE — 96374 THER/PROPH/DIAG INJ IV PUSH: CPT

## 2025-08-16 RX ORDER — NAPROXEN 250 MG/1
250 TABLET ORAL 2 TIMES DAILY WITH MEALS
Qty: 60 TABLET | Refills: 5 | Status: SHIPPED | OUTPATIENT
Start: 2025-08-16

## 2025-08-16 RX ORDER — 0.9 % SODIUM CHLORIDE 0.9 %
1000 INTRAVENOUS SOLUTION INTRAVENOUS ONCE
Status: COMPLETED | OUTPATIENT
Start: 2025-08-16 | End: 2025-08-16

## 2025-08-16 RX ORDER — OXYCODONE AND ACETAMINOPHEN 5; 325 MG/1; MG/1
1 TABLET ORAL ONCE
Refills: 0 | Status: COMPLETED | OUTPATIENT
Start: 2025-08-16 | End: 2025-08-16

## 2025-08-16 RX ORDER — LIDOCAINE 4 G/G
1 PATCH TOPICAL DAILY
Qty: 30 PATCH | Refills: 0 | Status: SHIPPED | OUTPATIENT
Start: 2025-08-16 | End: 2025-09-15

## 2025-08-16 RX ORDER — ONDANSETRON 2 MG/ML
4 INJECTION INTRAMUSCULAR; INTRAVENOUS ONCE
Status: COMPLETED | OUTPATIENT
Start: 2025-08-16 | End: 2025-08-16

## 2025-08-16 RX ADMIN — ONDANSETRON 4 MG: 2 INJECTION INTRAMUSCULAR; INTRAVENOUS at 13:02

## 2025-08-16 RX ADMIN — SODIUM CHLORIDE 1000 ML: 9 INJECTION, SOLUTION INTRAVENOUS at 13:02

## 2025-08-16 RX ADMIN — OXYCODONE AND ACETAMINOPHEN 1 TABLET: 5; 325 TABLET ORAL at 10:56

## 2025-08-16 ASSESSMENT — ENCOUNTER SYMPTOMS
VOMITING: 0
ABDOMINAL PAIN: 0
SHORTNESS OF BREATH: 0
BACK PAIN: 1
NAUSEA: 0
DIARRHEA: 0
CONSTIPATION: 0

## 2025-08-16 ASSESSMENT — PAIN - FUNCTIONAL ASSESSMENT: PAIN_FUNCTIONAL_ASSESSMENT: 0-10

## 2025-08-16 ASSESSMENT — PAIN DESCRIPTION - ORIENTATION: ORIENTATION: LOWER

## 2025-08-16 ASSESSMENT — PAIN DESCRIPTION - LOCATION: LOCATION: BACK

## 2025-08-16 ASSESSMENT — PAIN SCALES - GENERAL: PAINLEVEL_OUTOF10: 10

## (undated) DEVICE — GLOVE ORANGE PI 8   MSG9080

## (undated) DEVICE — NEEDLE SPNL 22GA L3.5IN BLK HUB S STL REG WALL FIT STYL W/

## (undated) DEVICE — STANDARD HYPODERMIC NEEDLE,POLYPROPYLENE HUB: Brand: MONOJECT

## (undated) DEVICE — MEDIA CONTRAST RX ISOVUE-300 61% 30ML VIALS

## (undated) DEVICE — SYRINGE MED 10ML LUERLOCK TIP W/O SFTY DISP

## (undated) DEVICE — PORT VLV 2 W NDL FREE SMRTSITE

## (undated) DEVICE — CHLORAPREP 26ML ORANGE

## (undated) DEVICE — Device: Brand: JELCO

## (undated) DEVICE — STERILE POLYISOPRENE POWDER-FREE SURGICAL GLOVES: Brand: PROTEXIS